# Patient Record
Sex: FEMALE | Race: WHITE | NOT HISPANIC OR LATINO | Employment: OTHER | URBAN - METROPOLITAN AREA
[De-identification: names, ages, dates, MRNs, and addresses within clinical notes are randomized per-mention and may not be internally consistent; named-entity substitution may affect disease eponyms.]

---

## 2017-04-17 ENCOUNTER — APPOINTMENT (OUTPATIENT)
Dept: LAB | Facility: MEDICAL CENTER | Age: 67
End: 2017-04-17
Payer: COMMERCIAL

## 2017-04-17 ENCOUNTER — TRANSCRIBE ORDERS (OUTPATIENT)
Dept: ADMINISTRATIVE | Facility: HOSPITAL | Age: 67
End: 2017-04-17

## 2017-04-17 DIAGNOSIS — E11.9 DIABETES MELLITUS WITHOUT COMPLICATION (HCC): ICD-10-CM

## 2017-04-17 DIAGNOSIS — E78.00 PURE HYPERCHOLESTEROLEMIA: Primary | ICD-10-CM

## 2017-04-17 DIAGNOSIS — E78.00 PURE HYPERCHOLESTEROLEMIA: ICD-10-CM

## 2017-04-17 LAB
ANION GAP SERPL CALCULATED.3IONS-SCNC: 4 MMOL/L (ref 4–13)
BUN SERPL-MCNC: 11 MG/DL (ref 5–25)
CALCIUM SERPL-MCNC: 9.2 MG/DL (ref 8.3–10.1)
CHLORIDE SERPL-SCNC: 97 MMOL/L (ref 100–108)
CO2 SERPL-SCNC: 31 MMOL/L (ref 21–32)
CREAT SERPL-MCNC: 0.84 MG/DL (ref 0.6–1.3)
EST. AVERAGE GLUCOSE BLD GHB EST-MCNC: 160 MG/DL
GFR SERPL CREATININE-BSD FRML MDRD: >60 ML/MIN/1.73SQ M
GLUCOSE P FAST SERPL-MCNC: 173 MG/DL (ref 65–99)
HBA1C MFR BLD: 7.2 % (ref 4.2–6.3)
POTASSIUM SERPL-SCNC: 3.8 MMOL/L (ref 3.5–5.3)
SODIUM SERPL-SCNC: 132 MMOL/L (ref 136–145)

## 2017-04-17 PROCEDURE — 36415 COLL VENOUS BLD VENIPUNCTURE: CPT

## 2017-04-17 PROCEDURE — 80048 BASIC METABOLIC PNL TOTAL CA: CPT

## 2017-04-17 PROCEDURE — 83036 HEMOGLOBIN GLYCOSYLATED A1C: CPT

## 2017-04-20 ENCOUNTER — ALLSCRIPTS OFFICE VISIT (OUTPATIENT)
Dept: OTHER | Facility: OTHER | Age: 67
End: 2017-04-20

## 2017-04-20 DIAGNOSIS — Z12.31 ENCOUNTER FOR SCREENING MAMMOGRAM FOR MALIGNANT NEOPLASM OF BREAST: ICD-10-CM

## 2017-04-20 DIAGNOSIS — J44.9 CHRONIC OBSTRUCTIVE PULMONARY DISEASE (HCC): ICD-10-CM

## 2017-05-01 DIAGNOSIS — E78.00 PURE HYPERCHOLESTEROLEMIA: ICD-10-CM

## 2017-05-01 DIAGNOSIS — N18.1 CHRONIC KIDNEY DISEASE, STAGE I: ICD-10-CM

## 2017-05-01 DIAGNOSIS — R80.9 PROTEINURIA: ICD-10-CM

## 2017-05-01 DIAGNOSIS — I12.9 HYPERTENSIVE CHRONIC KIDNEY DISEASE WITH STAGE 1 THROUGH STAGE 4 CHRONIC KIDNEY DISEASE, OR UNSPECIFIED CHRONIC KIDNEY DISEASE: ICD-10-CM

## 2017-05-01 DIAGNOSIS — E87.1 HYPO-OSMOLALITY AND HYPONATREMIA: ICD-10-CM

## 2017-05-12 ENCOUNTER — APPOINTMENT (OUTPATIENT)
Dept: LAB | Facility: MEDICAL CENTER | Age: 67
End: 2017-05-12
Payer: COMMERCIAL

## 2017-05-12 DIAGNOSIS — N18.1 CHRONIC KIDNEY DISEASE, STAGE I: ICD-10-CM

## 2017-05-12 DIAGNOSIS — R80.9 PROTEINURIA: ICD-10-CM

## 2017-05-12 DIAGNOSIS — I12.9 HYPERTENSIVE CHRONIC KIDNEY DISEASE WITH STAGE 1 THROUGH STAGE 4 CHRONIC KIDNEY DISEASE, OR UNSPECIFIED CHRONIC KIDNEY DISEASE: ICD-10-CM

## 2017-05-12 LAB
ALBUMIN SERPL BCP-MCNC: 3.7 G/DL (ref 3.5–5)
ALP SERPL-CCNC: 84 U/L (ref 46–116)
ALT SERPL W P-5'-P-CCNC: 25 U/L (ref 12–78)
ANION GAP SERPL CALCULATED.3IONS-SCNC: 5 MMOL/L (ref 4–13)
AST SERPL W P-5'-P-CCNC: 7 U/L (ref 5–45)
BASOPHILS # BLD AUTO: 0.03 THOUSANDS/ΜL (ref 0–0.1)
BASOPHILS NFR BLD AUTO: 0 % (ref 0–1)
BILIRUB SERPL-MCNC: 0.43 MG/DL (ref 0.2–1)
BUN SERPL-MCNC: 18 MG/DL (ref 5–25)
CALCIUM SERPL-MCNC: 9.3 MG/DL (ref 8.3–10.1)
CHLORIDE SERPL-SCNC: 97 MMOL/L (ref 100–108)
CK SERPL-CCNC: 73 U/L (ref 26–192)
CO2 SERPL-SCNC: 31 MMOL/L (ref 21–32)
CREAT SERPL-MCNC: 0.77 MG/DL (ref 0.6–1.3)
CREAT UR-MCNC: 56.2 MG/DL
EOSINOPHIL # BLD AUTO: 0.27 THOUSAND/ΜL (ref 0–0.61)
EOSINOPHIL NFR BLD AUTO: 3 % (ref 0–6)
ERYTHROCYTE [DISTWIDTH] IN BLOOD BY AUTOMATED COUNT: 13.6 % (ref 11.6–15.1)
GFR SERPL CREATININE-BSD FRML MDRD: >60 ML/MIN/1.73SQ M
GLUCOSE P FAST SERPL-MCNC: 172 MG/DL (ref 65–99)
HCT VFR BLD AUTO: 46.8 % (ref 34.8–46.1)
HGB BLD-MCNC: 16 G/DL (ref 11.5–15.4)
LYMPHOCYTES # BLD AUTO: 2.31 THOUSANDS/ΜL (ref 0.6–4.47)
LYMPHOCYTES NFR BLD AUTO: 28 % (ref 14–44)
MAGNESIUM SERPL-MCNC: 2 MG/DL (ref 1.6–2.6)
MCH RBC QN AUTO: 31.2 PG (ref 26.8–34.3)
MCHC RBC AUTO-ENTMCNC: 34.2 G/DL (ref 31.4–37.4)
MCV RBC AUTO: 91 FL (ref 82–98)
MONOCYTES # BLD AUTO: 0.57 THOUSAND/ΜL (ref 0.17–1.22)
MONOCYTES NFR BLD AUTO: 7 % (ref 4–12)
NEUTROPHILS # BLD AUTO: 4.99 THOUSANDS/ΜL (ref 1.85–7.62)
NEUTS SEG NFR BLD AUTO: 62 % (ref 43–75)
NRBC BLD AUTO-RTO: 0 /100 WBCS
PHOSPHATE SERPL-MCNC: 3.5 MG/DL (ref 2.3–4.1)
PLATELET # BLD AUTO: 196 THOUSANDS/UL (ref 149–390)
PMV BLD AUTO: 11.5 FL (ref 8.9–12.7)
POTASSIUM SERPL-SCNC: 3.8 MMOL/L (ref 3.5–5.3)
PROT SERPL-MCNC: 7.2 G/DL (ref 6.4–8.2)
PROT UR-MCNC: 22 MG/DL
PROT/CREAT UR: 0.39 MG/G{CREAT} (ref 0–0.1)
RBC # BLD AUTO: 5.13 MILLION/UL (ref 3.81–5.12)
SODIUM SERPL-SCNC: 133 MMOL/L (ref 136–145)
WBC # BLD AUTO: 8.19 THOUSAND/UL (ref 4.31–10.16)

## 2017-05-12 PROCEDURE — 82570 ASSAY OF URINE CREATININE: CPT

## 2017-05-12 PROCEDURE — 36415 COLL VENOUS BLD VENIPUNCTURE: CPT

## 2017-05-12 PROCEDURE — 84156 ASSAY OF PROTEIN URINE: CPT

## 2017-05-12 PROCEDURE — 84100 ASSAY OF PHOSPHORUS: CPT

## 2017-05-12 PROCEDURE — 83735 ASSAY OF MAGNESIUM: CPT

## 2017-05-12 PROCEDURE — 80053 COMPREHEN METABOLIC PANEL: CPT

## 2017-05-12 PROCEDURE — 82550 ASSAY OF CK (CPK): CPT

## 2017-05-12 PROCEDURE — 85025 COMPLETE CBC W/AUTO DIFF WBC: CPT

## 2017-05-22 ENCOUNTER — ALLSCRIPTS OFFICE VISIT (OUTPATIENT)
Dept: OTHER | Facility: OTHER | Age: 67
End: 2017-05-22

## 2017-05-30 ENCOUNTER — APPOINTMENT (OUTPATIENT)
Dept: LAB | Facility: MEDICAL CENTER | Age: 67
End: 2017-05-30
Payer: COMMERCIAL

## 2017-05-30 DIAGNOSIS — E78.00 PURE HYPERCHOLESTEROLEMIA: ICD-10-CM

## 2017-05-30 DIAGNOSIS — R80.9 PROTEINURIA: ICD-10-CM

## 2017-05-30 DIAGNOSIS — N18.1 CHRONIC KIDNEY DISEASE, STAGE I: ICD-10-CM

## 2017-05-30 DIAGNOSIS — E87.1 HYPO-OSMOLALITY AND HYPONATREMIA: ICD-10-CM

## 2017-05-30 DIAGNOSIS — I12.9 HYPERTENSIVE CHRONIC KIDNEY DISEASE WITH STAGE 1 THROUGH STAGE 4 CHRONIC KIDNEY DISEASE, OR UNSPECIFIED CHRONIC KIDNEY DISEASE: ICD-10-CM

## 2017-05-30 LAB
ANION GAP SERPL CALCULATED.3IONS-SCNC: 5 MMOL/L (ref 4–13)
BUN SERPL-MCNC: 14 MG/DL (ref 5–25)
CALCIUM SERPL-MCNC: 8.9 MG/DL (ref 8.3–10.1)
CHLORIDE SERPL-SCNC: 104 MMOL/L (ref 100–108)
CO2 SERPL-SCNC: 30 MMOL/L (ref 21–32)
CREAT SERPL-MCNC: 0.66 MG/DL (ref 0.6–1.3)
GFR SERPL CREATININE-BSD FRML MDRD: >60 ML/MIN/1.73SQ M
GLUCOSE P FAST SERPL-MCNC: 157 MG/DL (ref 65–99)
POTASSIUM SERPL-SCNC: 4.1 MMOL/L (ref 3.5–5.3)
SODIUM SERPL-SCNC: 139 MMOL/L (ref 136–145)

## 2017-05-30 PROCEDURE — 80048 BASIC METABOLIC PNL TOTAL CA: CPT

## 2017-05-30 PROCEDURE — 36415 COLL VENOUS BLD VENIPUNCTURE: CPT

## 2017-06-09 ENCOUNTER — GENERIC CONVERSION - ENCOUNTER (OUTPATIENT)
Dept: OTHER | Facility: OTHER | Age: 67
End: 2017-06-09

## 2017-07-17 DIAGNOSIS — E11.621 TYPE 2 DIABETES MELLITUS WITH FOOT ULCER (CODE) (HCC): ICD-10-CM

## 2017-07-17 DIAGNOSIS — E11.9 TYPE 2 DIABETES MELLITUS WITHOUT COMPLICATIONS (HCC): ICD-10-CM

## 2017-07-17 DIAGNOSIS — E78.00 PURE HYPERCHOLESTEROLEMIA: ICD-10-CM

## 2017-07-17 DIAGNOSIS — J44.9 CHRONIC OBSTRUCTIVE PULMONARY DISEASE (HCC): ICD-10-CM

## 2017-07-17 DIAGNOSIS — F32.9 MAJOR DEPRESSIVE DISORDER, SINGLE EPISODE: ICD-10-CM

## 2017-08-04 ENCOUNTER — HOSPITAL ENCOUNTER (OUTPATIENT)
Dept: RADIOLOGY | Facility: MEDICAL CENTER | Age: 67
Discharge: HOME | End: 2017-08-04
Payer: COMMERCIAL

## 2017-08-04 DIAGNOSIS — Z12.31 ENCOUNTER FOR SCREENING MAMMOGRAM FOR MALIGNANT NEOPLASM OF BREAST: ICD-10-CM

## 2017-08-04 PROCEDURE — G0202 SCR MAMMO BI INCL CAD: HCPCS

## 2017-08-20 ENCOUNTER — APPOINTMENT (EMERGENCY)
Dept: CT IMAGING | Facility: HOSPITAL | Age: 67
DRG: 189 | End: 2017-08-20
Payer: COMMERCIAL

## 2017-08-20 ENCOUNTER — HOSPITAL ENCOUNTER (INPATIENT)
Facility: HOSPITAL | Age: 67
LOS: 4 days | Discharge: NURSING, REHAB OR LONG-TERM CARE | DRG: 189 | End: 2017-08-24
Attending: EMERGENCY MEDICINE | Admitting: INTERNAL MEDICINE
Payer: COMMERCIAL

## 2017-08-20 DIAGNOSIS — L97.519 ULCER OF RIGHT FOOT (HCC): ICD-10-CM

## 2017-08-20 DIAGNOSIS — J44.9 COPD WITHOUT EXACERBATION (HCC): Chronic | ICD-10-CM

## 2017-08-20 DIAGNOSIS — J96.01 ACUTE RESPIRATORY FAILURE WITH HYPOXIA (HCC): ICD-10-CM

## 2017-08-20 DIAGNOSIS — J44.1 COPD WITH EXACERBATION (HCC): ICD-10-CM

## 2017-08-20 DIAGNOSIS — R41.0 DELIRIUM: Primary | ICD-10-CM

## 2017-08-20 DIAGNOSIS — R41.82 MENTAL STATUS CHANGE: ICD-10-CM

## 2017-08-20 PROBLEM — E87.6 HYPOKALEMIA: Status: ACTIVE | Noted: 2017-08-20

## 2017-08-20 PROBLEM — E11.65 TYPE 2 DIABETES MELLITUS WITH HYPERGLYCEMIA (HCC): Chronic | Status: ACTIVE | Noted: 2017-08-20

## 2017-08-20 PROBLEM — D72.829 LEUCOCYTOSIS: Status: ACTIVE | Noted: 2017-08-20

## 2017-08-20 PROBLEM — I10 ESSENTIAL HYPERTENSION: Chronic | Status: ACTIVE | Noted: 2017-08-20

## 2017-08-20 LAB
ALBUMIN SERPL BCP-MCNC: 3.7 G/DL (ref 3.5–5)
ALP SERPL-CCNC: 88 U/L (ref 46–116)
ALT SERPL W P-5'-P-CCNC: 25 U/L (ref 12–78)
AMMONIA PLAS-SCNC: <10 UMOL/L (ref 11–35)
ANION GAP SERPL CALCULATED.3IONS-SCNC: 9 MMOL/L (ref 4–13)
ARTERIAL PATENCY WRIST A: YES
AST SERPL W P-5'-P-CCNC: 13 U/L (ref 5–45)
BACTERIA UR QL AUTO: ABNORMAL /HPF
BASE EXCESS BLDA CALC-SCNC: -0.4 MMOL/L
BASOPHILS # BLD AUTO: 0.04 THOUSANDS/ΜL (ref 0–0.1)
BASOPHILS NFR BLD AUTO: 0 % (ref 0–1)
BILIRUB SERPL-MCNC: 0.7 MG/DL (ref 0.2–1)
BILIRUB UR QL STRIP: ABNORMAL
BUN SERPL-MCNC: 14 MG/DL (ref 5–25)
CALCIUM SERPL-MCNC: 9.3 MG/DL (ref 8.3–10.1)
CHLORIDE SERPL-SCNC: 98 MMOL/L (ref 100–108)
CLARITY UR: CLEAR
CO2 SERPL-SCNC: 29 MMOL/L (ref 21–32)
COLOR UR: YELLOW
CREAT SERPL-MCNC: 0.67 MG/DL (ref 0.6–1.3)
EOSINOPHIL # BLD AUTO: 0.09 THOUSAND/ΜL (ref 0–0.61)
EOSINOPHIL NFR BLD AUTO: 1 % (ref 0–6)
ERYTHROCYTE [DISTWIDTH] IN BLOOD BY AUTOMATED COUNT: 13.8 % (ref 11.6–15.1)
EST. AVERAGE GLUCOSE BLD GHB EST-MCNC: 143 MG/DL
GFR SERPL CREATININE-BSD FRML MDRD: 92 ML/MIN/1.73SQ M
GLUCOSE SERPL-MCNC: 150 MG/DL (ref 65–140)
GLUCOSE SERPL-MCNC: 171 MG/DL (ref 65–140)
GLUCOSE UR STRIP-MCNC: NEGATIVE MG/DL
HBA1C MFR BLD: 6.6 % (ref 4.2–6.3)
HCO3 BLDA-SCNC: 23.5 MMOL/L (ref 22–28)
HCT VFR BLD AUTO: 46.1 % (ref 34.8–46.1)
HGB BLD-MCNC: 16 G/DL (ref 11.5–15.4)
HGB UR QL STRIP.AUTO: ABNORMAL
KETONES UR STRIP-MCNC: ABNORMAL MG/DL
LEUKOCYTE ESTERASE UR QL STRIP: NEGATIVE
LYMPHOCYTES # BLD AUTO: 1.63 THOUSANDS/ΜL (ref 0.6–4.47)
LYMPHOCYTES NFR BLD AUTO: 12 % (ref 14–44)
MAGNESIUM SERPL-MCNC: 2.1 MG/DL (ref 1.6–2.6)
MCH RBC QN AUTO: 30.9 PG (ref 26.8–34.3)
MCHC RBC AUTO-ENTMCNC: 34.7 G/DL (ref 31.4–37.4)
MCV RBC AUTO: 89 FL (ref 82–98)
MONOCYTES # BLD AUTO: 1.01 THOUSAND/ΜL (ref 0.17–1.22)
MONOCYTES NFR BLD AUTO: 7 % (ref 4–12)
NEUTROPHILS # BLD AUTO: 11.06 THOUSANDS/ΜL (ref 1.85–7.62)
NEUTS SEG NFR BLD AUTO: 80 % (ref 43–75)
NITRITE UR QL STRIP: NEGATIVE
NON VENT ROOM AIR: 21 %
NON-SQ EPI CELLS URNS QL MICRO: ABNORMAL /HPF
O2 CT BLDA-SCNC: 19.2 ML/DL (ref 16–23)
OXYHGB MFR BLDA: 87.6 % (ref 94–97)
PCO2 BLDA: 36.5 MM HG (ref 36–44)
PH BLDA: 7.43 [PH] (ref 7.35–7.45)
PH UR STRIP.AUTO: 5.5 [PH] (ref 4.5–8)
PLATELET # BLD AUTO: 208 THOUSANDS/UL (ref 149–390)
PMV BLD AUTO: 10.9 FL (ref 8.9–12.7)
PO2 BLDA: 56.6 MM HG (ref 75–129)
POTASSIUM SERPL-SCNC: 3.4 MMOL/L (ref 3.5–5.3)
PROT SERPL-MCNC: 7.7 G/DL (ref 6.4–8.2)
PROT UR STRIP-MCNC: ABNORMAL MG/DL
RBC # BLD AUTO: 5.17 MILLION/UL (ref 3.81–5.12)
RBC #/AREA URNS AUTO: ABNORMAL /HPF
SODIUM SERPL-SCNC: 136 MMOL/L (ref 136–145)
SP GR UR STRIP.AUTO: >=1.03 (ref 1–1.03)
SPECIMEN SOURCE: ABNORMAL
SPECIMEN SOURCE: NORMAL
TROPONIN I BLD-MCNC: 0 NG/ML (ref 0–0.08)
TSH SERPL DL<=0.05 MIU/L-ACNC: 0.73 UIU/ML (ref 0.36–3.74)
UROBILINOGEN UR QL STRIP.AUTO: 1 E.U./DL
WBC # BLD AUTO: 13.83 THOUSAND/UL (ref 4.31–10.16)
WBC #/AREA URNS AUTO: ABNORMAL /HPF

## 2017-08-20 PROCEDURE — 81001 URINALYSIS AUTO W/SCOPE: CPT | Performed by: EMERGENCY MEDICINE

## 2017-08-20 PROCEDURE — 96360 HYDRATION IV INFUSION INIT: CPT

## 2017-08-20 PROCEDURE — 82805 BLOOD GASES W/O2 SATURATION: CPT | Performed by: INTERNAL MEDICINE

## 2017-08-20 PROCEDURE — 84443 ASSAY THYROID STIM HORMONE: CPT | Performed by: INTERNAL MEDICINE

## 2017-08-20 PROCEDURE — 83735 ASSAY OF MAGNESIUM: CPT | Performed by: EMERGENCY MEDICINE

## 2017-08-20 PROCEDURE — 94664 DEMO&/EVAL PT USE INHALER: CPT

## 2017-08-20 PROCEDURE — 70450 CT HEAD/BRAIN W/O DYE: CPT

## 2017-08-20 PROCEDURE — 99285 EMERGENCY DEPT VISIT HI MDM: CPT

## 2017-08-20 PROCEDURE — 36415 COLL VENOUS BLD VENIPUNCTURE: CPT | Performed by: EMERGENCY MEDICINE

## 2017-08-20 PROCEDURE — 83036 HEMOGLOBIN GLYCOSYLATED A1C: CPT | Performed by: INTERNAL MEDICINE

## 2017-08-20 PROCEDURE — 93005 ELECTROCARDIOGRAM TRACING: CPT | Performed by: EMERGENCY MEDICINE

## 2017-08-20 PROCEDURE — 84484 ASSAY OF TROPONIN QUANT: CPT

## 2017-08-20 PROCEDURE — 85025 COMPLETE CBC W/AUTO DIFF WBC: CPT | Performed by: EMERGENCY MEDICINE

## 2017-08-20 PROCEDURE — 82140 ASSAY OF AMMONIA: CPT | Performed by: EMERGENCY MEDICINE

## 2017-08-20 PROCEDURE — 96361 HYDRATE IV INFUSION ADD-ON: CPT

## 2017-08-20 PROCEDURE — 94760 N-INVAS EAR/PLS OXIMETRY 1: CPT

## 2017-08-20 PROCEDURE — 82948 REAGENT STRIP/BLOOD GLUCOSE: CPT

## 2017-08-20 PROCEDURE — 80053 COMPREHEN METABOLIC PANEL: CPT | Performed by: EMERGENCY MEDICINE

## 2017-08-20 PROCEDURE — 94640 AIRWAY INHALATION TREATMENT: CPT

## 2017-08-20 PROCEDURE — 36600 WITHDRAWAL OF ARTERIAL BLOOD: CPT

## 2017-08-20 RX ORDER — FOSINOPRIL SODIUM 20 MG/1
20 TABLET ORAL DAILY
Status: DISCONTINUED | OUTPATIENT
Start: 2017-08-21 | End: 2017-08-24 | Stop reason: HOSPADM

## 2017-08-20 RX ORDER — CELECOXIB 200 MG/1
CAPSULE ORAL
COMMUNITY
Start: 2011-10-31 | End: 2017-08-24 | Stop reason: HOSPADM

## 2017-08-20 RX ORDER — HYDROCHLOROTHIAZIDE 25 MG/1
TABLET ORAL
Status: ON HOLD | COMMUNITY
Start: 2012-02-01 | End: 2017-08-24

## 2017-08-20 RX ORDER — HYDROXYZINE HYDROCHLORIDE 25 MG/1
TABLET, FILM COATED ORAL
COMMUNITY
Start: 2016-11-09 | End: 2017-08-24 | Stop reason: HOSPADM

## 2017-08-20 RX ORDER — POTASSIUM CHLORIDE 20 MEQ/1
40 TABLET, EXTENDED RELEASE ORAL ONCE
Status: COMPLETED | OUTPATIENT
Start: 2017-08-20 | End: 2017-08-20

## 2017-08-20 RX ORDER — PRAVASTATIN SODIUM 80 MG/1
80 TABLET ORAL
Status: DISCONTINUED | OUTPATIENT
Start: 2017-08-21 | End: 2017-08-24 | Stop reason: HOSPADM

## 2017-08-20 RX ORDER — NICOTINE 21 MG/24HR
1 PATCH, TRANSDERMAL 24 HOURS TRANSDERMAL DAILY
Status: DISCONTINUED | OUTPATIENT
Start: 2017-08-21 | End: 2017-08-24 | Stop reason: HOSPADM

## 2017-08-20 RX ORDER — BUDESONIDE AND FORMOTEROL FUMARATE DIHYDRATE 160; 4.5 UG/1; UG/1
AEROSOL RESPIRATORY (INHALATION)
Status: ON HOLD | COMMUNITY
Start: 2011-08-29 | End: 2017-08-24

## 2017-08-20 RX ORDER — GLIMEPIRIDE 2 MG/1
TABLET ORAL
Status: ON HOLD | COMMUNITY
Start: 2015-09-22 | End: 2017-08-24

## 2017-08-20 RX ORDER — ASPIRIN 81 MG/1
81 TABLET, CHEWABLE ORAL DAILY
Status: DISCONTINUED | OUTPATIENT
Start: 2017-08-21 | End: 2017-08-24 | Stop reason: HOSPADM

## 2017-08-20 RX ORDER — BUDESONIDE AND FORMOTEROL FUMARATE DIHYDRATE 160; 4.5 UG/1; UG/1
2 AEROSOL RESPIRATORY (INHALATION)
Status: DISCONTINUED | OUTPATIENT
Start: 2017-08-20 | End: 2017-08-24 | Stop reason: HOSPADM

## 2017-08-20 RX ORDER — FOSINOPRIL SODIUM 20 MG/1
TABLET ORAL
Status: ON HOLD | COMMUNITY
Start: 2016-12-12 | End: 2017-08-24

## 2017-08-20 RX ORDER — METHYLPREDNISOLONE SODIUM SUCCINATE 40 MG/ML
40 INJECTION, POWDER, LYOPHILIZED, FOR SOLUTION INTRAMUSCULAR; INTRAVENOUS EVERY 12 HOURS SCHEDULED
Status: COMPLETED | OUTPATIENT
Start: 2017-08-20 | End: 2017-08-21

## 2017-08-20 RX ORDER — SODIUM CHLORIDE 9 MG/ML
100 INJECTION, SOLUTION INTRAVENOUS CONTINUOUS
Status: DISCONTINUED | OUTPATIENT
Start: 2017-08-20 | End: 2017-08-21

## 2017-08-20 RX ORDER — ACETAMINOPHEN 325 MG/1
650 TABLET ORAL EVERY 6 HOURS PRN
Status: DISCONTINUED | OUTPATIENT
Start: 2017-08-20 | End: 2017-08-24 | Stop reason: HOSPADM

## 2017-08-20 RX ORDER — BUTALBITAL, ACETAMINOPHEN AND CAFFEINE 50; 325; 40 MG/1; MG/1; MG/1
TABLET ORAL
COMMUNITY
Start: 2015-07-27 | End: 2017-08-24 | Stop reason: HOSPADM

## 2017-08-20 RX ORDER — LEVALBUTEROL 1.25 MG/.5ML
1.25 SOLUTION, CONCENTRATE RESPIRATORY (INHALATION)
Status: DISCONTINUED | OUTPATIENT
Start: 2017-08-20 | End: 2017-08-21

## 2017-08-20 RX ORDER — SIMVASTATIN 40 MG
TABLET ORAL
Status: ON HOLD | COMMUNITY
Start: 2017-02-09 | End: 2017-08-24

## 2017-08-20 RX ADMIN — SODIUM CHLORIDE 1000 ML: 0.9 INJECTION, SOLUTION INTRAVENOUS at 14:21

## 2017-08-20 RX ADMIN — IPRATROPIUM BROMIDE 0.5 MG: 0.5 SOLUTION RESPIRATORY (INHALATION) at 20:58

## 2017-08-20 RX ADMIN — INSULIN LISPRO 1 UNITS: 100 INJECTION, SOLUTION INTRAVENOUS; SUBCUTANEOUS at 22:59

## 2017-08-20 RX ADMIN — METHYLPREDNISOLONE SODIUM SUCCINATE 40 MG: 40 INJECTION, POWDER, FOR SOLUTION INTRAMUSCULAR; INTRAVENOUS at 22:51

## 2017-08-20 RX ADMIN — BUDESONIDE AND FORMOTEROL FUMARATE DIHYDRATE 2 PUFF: 160; 4.5 AEROSOL RESPIRATORY (INHALATION) at 22:50

## 2017-08-20 RX ADMIN — LEVALBUTEROL HYDROCHLORIDE 1.25 MG: 1.25 SOLUTION, CONCENTRATE RESPIRATORY (INHALATION) at 20:58

## 2017-08-20 RX ADMIN — SODIUM CHLORIDE 100 ML/HR: 0.9 INJECTION, SOLUTION INTRAVENOUS at 22:48

## 2017-08-20 RX ADMIN — POTASSIUM CHLORIDE 40 MEQ: 1500 TABLET, EXTENDED RELEASE ORAL at 22:51

## 2017-08-21 ENCOUNTER — APPOINTMENT (INPATIENT)
Dept: RADIOLOGY | Facility: HOSPITAL | Age: 67
DRG: 189 | End: 2017-08-21
Payer: COMMERCIAL

## 2017-08-21 LAB
ANION GAP SERPL CALCULATED.3IONS-SCNC: 11 MMOL/L (ref 4–13)
BUN SERPL-MCNC: 12 MG/DL (ref 5–25)
CALCIUM SERPL-MCNC: 8.5 MG/DL (ref 8.3–10.1)
CHLORIDE SERPL-SCNC: 102 MMOL/L (ref 100–108)
CO2 SERPL-SCNC: 23 MMOL/L (ref 21–32)
CREAT SERPL-MCNC: 0.56 MG/DL (ref 0.6–1.3)
ERYTHROCYTE [DISTWIDTH] IN BLOOD BY AUTOMATED COUNT: 13.7 % (ref 11.6–15.1)
GFR SERPL CREATININE-BSD FRML MDRD: 97 ML/MIN/1.73SQ M
GLUCOSE SERPL-MCNC: 162 MG/DL (ref 65–140)
GLUCOSE SERPL-MCNC: 167 MG/DL (ref 65–140)
GLUCOSE SERPL-MCNC: 186 MG/DL (ref 65–140)
GLUCOSE SERPL-MCNC: 230 MG/DL (ref 65–140)
GLUCOSE SERPL-MCNC: 237 MG/DL (ref 65–140)
HCT VFR BLD AUTO: 42.8 % (ref 34.8–46.1)
HGB BLD-MCNC: 14.7 G/DL (ref 11.5–15.4)
MCH RBC QN AUTO: 31.1 PG (ref 26.8–34.3)
MCHC RBC AUTO-ENTMCNC: 34.3 G/DL (ref 31.4–37.4)
MCV RBC AUTO: 91 FL (ref 82–98)
PLATELET # BLD AUTO: 197 THOUSANDS/UL (ref 149–390)
PMV BLD AUTO: 11.9 FL (ref 8.9–12.7)
POTASSIUM SERPL-SCNC: 3.9 MMOL/L (ref 3.5–5.3)
RBC # BLD AUTO: 4.72 MILLION/UL (ref 3.81–5.12)
SODIUM SERPL-SCNC: 136 MMOL/L (ref 136–145)
WBC # BLD AUTO: 12.51 THOUSAND/UL (ref 4.31–10.16)

## 2017-08-21 PROCEDURE — 82948 REAGENT STRIP/BLOOD GLUCOSE: CPT

## 2017-08-21 PROCEDURE — G8987 SELF CARE CURRENT STATUS: HCPCS

## 2017-08-21 PROCEDURE — 97163 PT EVAL HIGH COMPLEX 45 MIN: CPT

## 2017-08-21 PROCEDURE — 94760 N-INVAS EAR/PLS OXIMETRY 1: CPT

## 2017-08-21 PROCEDURE — G8978 MOBILITY CURRENT STATUS: HCPCS

## 2017-08-21 PROCEDURE — 71020 HB CHEST X-RAY 2VW FRONTAL&LATL: CPT

## 2017-08-21 PROCEDURE — G8979 MOBILITY GOAL STATUS: HCPCS

## 2017-08-21 PROCEDURE — 94640 AIRWAY INHALATION TREATMENT: CPT

## 2017-08-21 PROCEDURE — 80048 BASIC METABOLIC PNL TOTAL CA: CPT | Performed by: INTERNAL MEDICINE

## 2017-08-21 PROCEDURE — G8988 SELF CARE GOAL STATUS: HCPCS

## 2017-08-21 PROCEDURE — 85027 COMPLETE CBC AUTOMATED: CPT | Performed by: INTERNAL MEDICINE

## 2017-08-21 PROCEDURE — 97167 OT EVAL HIGH COMPLEX 60 MIN: CPT

## 2017-08-21 RX ORDER — INSULIN GLARGINE 100 [IU]/ML
5 INJECTION, SOLUTION SUBCUTANEOUS
Status: DISCONTINUED | OUTPATIENT
Start: 2017-08-21 | End: 2017-08-23

## 2017-08-21 RX ORDER — LEVALBUTEROL 1.25 MG/.5ML
1.25 SOLUTION, CONCENTRATE RESPIRATORY (INHALATION) EVERY 6 HOURS PRN
Status: DISCONTINUED | OUTPATIENT
Start: 2017-08-21 | End: 2017-08-24 | Stop reason: HOSPADM

## 2017-08-21 RX ORDER — SODIUM CHLORIDE FOR INHALATION 0.9 %
3 VIAL, NEBULIZER (ML) INHALATION EVERY 6 HOURS PRN
Status: DISCONTINUED | OUTPATIENT
Start: 2017-08-21 | End: 2017-08-24 | Stop reason: HOSPADM

## 2017-08-21 RX ORDER — HYDROCHLOROTHIAZIDE 25 MG/1
25 TABLET ORAL DAILY
Status: DISCONTINUED | OUTPATIENT
Start: 2017-08-21 | End: 2017-08-24 | Stop reason: HOSPADM

## 2017-08-21 RX ORDER — PREDNISONE 20 MG/1
40 TABLET ORAL DAILY
Status: DISCONTINUED | OUTPATIENT
Start: 2017-08-22 | End: 2017-08-21

## 2017-08-21 RX ADMIN — INSULIN LISPRO 2 UNITS: 100 INJECTION, SOLUTION INTRAVENOUS; SUBCUTANEOUS at 17:44

## 2017-08-21 RX ADMIN — IPRATROPIUM BROMIDE 0.5 MG: 0.5 SOLUTION RESPIRATORY (INHALATION) at 07:33

## 2017-08-21 RX ADMIN — PRAVASTATIN SODIUM 80 MG: 80 TABLET ORAL at 15:48

## 2017-08-21 RX ADMIN — INSULIN LISPRO 1 UNITS: 100 INJECTION, SOLUTION INTRAVENOUS; SUBCUTANEOUS at 22:53

## 2017-08-21 RX ADMIN — INSULIN LISPRO 1 UNITS: 100 INJECTION, SOLUTION INTRAVENOUS; SUBCUTANEOUS at 08:35

## 2017-08-21 RX ADMIN — FOSINOPRIL SODIUM 20 MG: 20 TABLET ORAL at 08:34

## 2017-08-21 RX ADMIN — TIOTROPIUM BROMIDE 18 MCG: 18 CAPSULE ORAL; RESPIRATORY (INHALATION) at 14:21

## 2017-08-21 RX ADMIN — METHYLPREDNISOLONE SODIUM SUCCINATE 40 MG: 40 INJECTION, POWDER, FOR SOLUTION INTRAMUSCULAR; INTRAVENOUS at 22:52

## 2017-08-21 RX ADMIN — LEVALBUTEROL HYDROCHLORIDE 1.25 MG: 1.25 SOLUTION, CONCENTRATE RESPIRATORY (INHALATION) at 07:33

## 2017-08-21 RX ADMIN — ENOXAPARIN SODIUM 40 MG: 40 INJECTION SUBCUTANEOUS at 08:34

## 2017-08-21 RX ADMIN — BUDESONIDE AND FORMOTEROL FUMARATE DIHYDRATE 2 PUFF: 160; 4.5 AEROSOL RESPIRATORY (INHALATION) at 08:34

## 2017-08-21 RX ADMIN — INSULIN GLARGINE 5 UNITS: 100 INJECTION, SOLUTION SUBCUTANEOUS at 22:52

## 2017-08-21 RX ADMIN — BUDESONIDE AND FORMOTEROL FUMARATE DIHYDRATE 2 PUFF: 160; 4.5 AEROSOL RESPIRATORY (INHALATION) at 22:54

## 2017-08-21 RX ADMIN — ASPIRIN 81 MG 81 MG: 81 TABLET ORAL at 08:34

## 2017-08-21 RX ADMIN — METHYLPREDNISOLONE SODIUM SUCCINATE 40 MG: 40 INJECTION, POWDER, FOR SOLUTION INTRAMUSCULAR; INTRAVENOUS at 08:34

## 2017-08-21 RX ADMIN — INSULIN LISPRO 2 UNITS: 100 INJECTION, SOLUTION INTRAVENOUS; SUBCUTANEOUS at 12:35

## 2017-08-21 RX ADMIN — HYDROCHLOROTHIAZIDE 25 MG: 25 TABLET ORAL at 15:15

## 2017-08-22 ENCOUNTER — APPOINTMENT (INPATIENT)
Dept: RADIOLOGY | Facility: HOSPITAL | Age: 67
DRG: 189 | End: 2017-08-22
Payer: COMMERCIAL

## 2017-08-22 LAB
BASOPHILS # BLD MANUAL: 0 THOUSAND/UL (ref 0–0.1)
BASOPHILS NFR MAR MANUAL: 0 % (ref 0–1)
EOSINOPHIL # BLD MANUAL: 0 THOUSAND/UL (ref 0–0.4)
EOSINOPHIL NFR BLD MANUAL: 0 % (ref 0–6)
ERYTHROCYTE [DISTWIDTH] IN BLOOD BY AUTOMATED COUNT: 13.6 % (ref 11.6–15.1)
GLUCOSE SERPL-MCNC: 136 MG/DL (ref 65–140)
GLUCOSE SERPL-MCNC: 149 MG/DL (ref 65–140)
GLUCOSE SERPL-MCNC: 175 MG/DL (ref 65–140)
GLUCOSE SERPL-MCNC: 203 MG/DL (ref 65–140)
GLUCOSE SERPL-MCNC: 301 MG/DL (ref 65–140)
HCT VFR BLD AUTO: 43.6 % (ref 34.8–46.1)
HGB BLD-MCNC: 15.1 G/DL (ref 11.5–15.4)
LYMPHOCYTES # BLD AUTO: 0.72 THOUSAND/UL (ref 0.6–4.47)
LYMPHOCYTES # BLD AUTO: 6 % (ref 14–44)
MCH RBC QN AUTO: 31.1 PG (ref 26.8–34.3)
MCHC RBC AUTO-ENTMCNC: 34.6 G/DL (ref 31.4–37.4)
MCV RBC AUTO: 90 FL (ref 82–98)
MONOCYTES # BLD AUTO: 0.6 THOUSAND/UL (ref 0–1.22)
MONOCYTES NFR BLD: 5 % (ref 4–12)
NEUTROPHILS # BLD MANUAL: 10.7 THOUSAND/UL (ref 1.85–7.62)
NEUTS SEG NFR BLD AUTO: 89 % (ref 43–75)
PLATELET # BLD AUTO: 187 THOUSANDS/UL (ref 149–390)
PLATELET BLD QL SMEAR: ADEQUATE
PMV BLD AUTO: 11.6 FL (ref 8.9–12.7)
RBC # BLD AUTO: 4.86 MILLION/UL (ref 3.81–5.12)
TOTAL CELLS COUNTED SPEC: 100
VIT B12 SERPL-MCNC: 432 PG/ML (ref 100–900)
WBC # BLD AUTO: 12.02 THOUSAND/UL (ref 4.31–10.16)

## 2017-08-22 PROCEDURE — 73630 X-RAY EXAM OF FOOT: CPT

## 2017-08-22 PROCEDURE — 85027 COMPLETE CBC AUTOMATED: CPT | Performed by: INTERNAL MEDICINE

## 2017-08-22 PROCEDURE — 85007 BL SMEAR W/DIFF WBC COUNT: CPT | Performed by: INTERNAL MEDICINE

## 2017-08-22 PROCEDURE — 82948 REAGENT STRIP/BLOOD GLUCOSE: CPT

## 2017-08-22 PROCEDURE — 82607 VITAMIN B-12: CPT | Performed by: INTERNAL MEDICINE

## 2017-08-22 RX ADMIN — TIOTROPIUM BROMIDE 18 MCG: 18 CAPSULE ORAL; RESPIRATORY (INHALATION) at 08:54

## 2017-08-22 RX ADMIN — ASPIRIN 81 MG 81 MG: 81 TABLET ORAL at 08:53

## 2017-08-22 RX ADMIN — ENOXAPARIN SODIUM 40 MG: 40 INJECTION SUBCUTANEOUS at 08:53

## 2017-08-22 RX ADMIN — INSULIN LISPRO 1 UNITS: 100 INJECTION, SOLUTION INTRAVENOUS; SUBCUTANEOUS at 22:09

## 2017-08-22 RX ADMIN — FOSINOPRIL SODIUM 20 MG: 20 TABLET ORAL at 08:55

## 2017-08-22 RX ADMIN — INSULIN LISPRO 3 UNITS: 100 INJECTION, SOLUTION INTRAVENOUS; SUBCUTANEOUS at 11:52

## 2017-08-22 RX ADMIN — PRAVASTATIN SODIUM 80 MG: 80 TABLET ORAL at 18:52

## 2017-08-22 RX ADMIN — BUDESONIDE AND FORMOTEROL FUMARATE DIHYDRATE 2 PUFF: 160; 4.5 AEROSOL RESPIRATORY (INHALATION) at 22:08

## 2017-08-22 RX ADMIN — HYDROCHLOROTHIAZIDE 25 MG: 25 TABLET ORAL at 08:53

## 2017-08-22 RX ADMIN — BUDESONIDE AND FORMOTEROL FUMARATE DIHYDRATE 2 PUFF: 160; 4.5 AEROSOL RESPIRATORY (INHALATION) at 08:54

## 2017-08-22 RX ADMIN — INSULIN GLARGINE 5 UNITS: 100 INJECTION, SOLUTION SUBCUTANEOUS at 22:08

## 2017-08-23 PROBLEM — E87.6 HYPOKALEMIA: Status: RESOLVED | Noted: 2017-08-20 | Resolved: 2017-08-23

## 2017-08-23 PROBLEM — D72.829 LEUCOCYTOSIS: Status: RESOLVED | Noted: 2017-08-20 | Resolved: 2017-08-23

## 2017-08-23 LAB
ATRIAL RATE: 101 BPM
ERYTHROCYTE [DISTWIDTH] IN BLOOD BY AUTOMATED COUNT: 13.7 % (ref 11.6–15.1)
GLUCOSE SERPL-MCNC: 158 MG/DL (ref 65–140)
GLUCOSE SERPL-MCNC: 162 MG/DL (ref 65–140)
GLUCOSE SERPL-MCNC: 173 MG/DL (ref 65–140)
GLUCOSE SERPL-MCNC: 189 MG/DL (ref 65–140)
HCT VFR BLD AUTO: 44.4 % (ref 34.8–46.1)
HGB BLD-MCNC: 15.1 G/DL (ref 11.5–15.4)
MCH RBC QN AUTO: 30.6 PG (ref 26.8–34.3)
MCHC RBC AUTO-ENTMCNC: 34 G/DL (ref 31.4–37.4)
MCV RBC AUTO: 90 FL (ref 82–98)
P AXIS: 74 DEGREES
PLATELET # BLD AUTO: 178 THOUSANDS/UL (ref 149–390)
PMV BLD AUTO: 10.8 FL (ref 8.9–12.7)
PR INTERVAL: 170 MS
QRS AXIS: 64 DEGREES
QRSD INTERVAL: 80 MS
QT INTERVAL: 340 MS
QTC INTERVAL: 440 MS
RBC # BLD AUTO: 4.93 MILLION/UL (ref 3.81–5.12)
T WAVE AXIS: 51 DEGREES
VENTRICULAR RATE: 101 BPM
WBC # BLD AUTO: 9.64 THOUSAND/UL (ref 4.31–10.16)

## 2017-08-23 PROCEDURE — 85027 COMPLETE CBC AUTOMATED: CPT | Performed by: INTERNAL MEDICINE

## 2017-08-23 PROCEDURE — 97116 GAIT TRAINING THERAPY: CPT

## 2017-08-23 PROCEDURE — 94760 N-INVAS EAR/PLS OXIMETRY 1: CPT

## 2017-08-23 PROCEDURE — 97535 SELF CARE MNGMENT TRAINING: CPT

## 2017-08-23 PROCEDURE — 97530 THERAPEUTIC ACTIVITIES: CPT

## 2017-08-23 PROCEDURE — 82948 REAGENT STRIP/BLOOD GLUCOSE: CPT

## 2017-08-23 RX ORDER — INSULIN GLARGINE 100 [IU]/ML
8 INJECTION, SOLUTION SUBCUTANEOUS
Status: DISCONTINUED | OUTPATIENT
Start: 2017-08-23 | End: 2017-08-24 | Stop reason: HOSPADM

## 2017-08-23 RX ORDER — ACETAMINOPHEN 325 MG/1
TABLET ORAL
Status: COMPLETED
Start: 2017-08-23 | End: 2017-08-23

## 2017-08-23 RX ADMIN — ACETAMINOPHEN 650 MG: 325 TABLET ORAL at 00:22

## 2017-08-23 RX ADMIN — BUDESONIDE AND FORMOTEROL FUMARATE DIHYDRATE 2 PUFF: 160; 4.5 AEROSOL RESPIRATORY (INHALATION) at 20:14

## 2017-08-23 RX ADMIN — INSULIN LISPRO 1 UNITS: 100 INJECTION, SOLUTION INTRAVENOUS; SUBCUTANEOUS at 17:31

## 2017-08-23 RX ADMIN — ENOXAPARIN SODIUM 40 MG: 40 INJECTION SUBCUTANEOUS at 08:11

## 2017-08-23 RX ADMIN — PRAVASTATIN SODIUM 80 MG: 80 TABLET ORAL at 17:32

## 2017-08-23 RX ADMIN — FOSINOPRIL SODIUM 20 MG: 20 TABLET ORAL at 08:11

## 2017-08-23 RX ADMIN — INSULIN GLARGINE 8 UNITS: 100 INJECTION, SOLUTION SUBCUTANEOUS at 21:07

## 2017-08-23 RX ADMIN — BUDESONIDE AND FORMOTEROL FUMARATE DIHYDRATE 2 PUFF: 160; 4.5 AEROSOL RESPIRATORY (INHALATION) at 08:12

## 2017-08-23 RX ADMIN — TIOTROPIUM BROMIDE 18 MCG: 18 CAPSULE ORAL; RESPIRATORY (INHALATION) at 08:12

## 2017-08-23 RX ADMIN — HYDROCHLOROTHIAZIDE 25 MG: 25 TABLET ORAL at 08:11

## 2017-08-23 RX ADMIN — ASPIRIN 81 MG 81 MG: 81 TABLET ORAL at 08:11

## 2017-08-23 RX ADMIN — INSULIN LISPRO 1 UNITS: 100 INJECTION, SOLUTION INTRAVENOUS; SUBCUTANEOUS at 21:07

## 2017-08-24 VITALS
RESPIRATION RATE: 18 BRPM | TEMPERATURE: 98.1 F | SYSTOLIC BLOOD PRESSURE: 130 MMHG | HEART RATE: 80 BPM | WEIGHT: 152 LBS | HEIGHT: 62 IN | OXYGEN SATURATION: 92 % | DIASTOLIC BLOOD PRESSURE: 69 MMHG | BODY MASS INDEX: 27.97 KG/M2

## 2017-08-24 LAB
GLUCOSE SERPL-MCNC: 149 MG/DL (ref 65–140)
GLUCOSE SERPL-MCNC: 257 MG/DL (ref 65–140)

## 2017-08-24 PROCEDURE — 82948 REAGENT STRIP/BLOOD GLUCOSE: CPT

## 2017-08-24 RX ORDER — NICOTINE 21 MG/24HR
1 PATCH, TRANSDERMAL 24 HOURS TRANSDERMAL DAILY
Qty: 28 PATCH | Refills: 0
Start: 2017-08-24 | End: 2017-10-05

## 2017-08-24 RX ORDER — HYDROCHLOROTHIAZIDE 25 MG/1
25 TABLET ORAL DAILY
Refills: 0
Start: 2017-08-24 | End: 2018-05-09

## 2017-08-24 RX ORDER — GLIMEPIRIDE 2 MG/1
2 TABLET ORAL
Refills: 0
Start: 2017-08-24 | End: 2020-11-02

## 2017-08-24 RX ORDER — NICOTINE 21 MG/24HR
1 PATCH, TRANSDERMAL 24 HOURS TRANSDERMAL EVERY 24 HOURS
Qty: 28 PATCH | Refills: 0
Start: 2017-10-06 | End: 2017-10-20

## 2017-08-24 RX ORDER — FOSINOPRIL SODIUM 20 MG/1
20 TABLET ORAL DAILY
Refills: 0
Start: 2017-08-24 | End: 2018-05-09

## 2017-08-24 RX ORDER — BUDESONIDE AND FORMOTEROL FUMARATE DIHYDRATE 160; 4.5 UG/1; UG/1
2 AEROSOL RESPIRATORY (INHALATION) 2 TIMES DAILY
Qty: 1 INHALER | Refills: 0
Start: 2017-08-24 | End: 2018-10-15 | Stop reason: SDUPTHER

## 2017-08-24 RX ORDER — SIMVASTATIN 40 MG
40 TABLET ORAL
Refills: 0
Start: 2017-08-24

## 2017-08-24 RX ADMIN — TIOTROPIUM BROMIDE 18 MCG: 18 CAPSULE ORAL; RESPIRATORY (INHALATION) at 08:03

## 2017-08-24 RX ADMIN — FOSINOPRIL SODIUM 20 MG: 20 TABLET ORAL at 08:02

## 2017-08-24 RX ADMIN — BUDESONIDE AND FORMOTEROL FUMARATE DIHYDRATE 2 PUFF: 160; 4.5 AEROSOL RESPIRATORY (INHALATION) at 08:03

## 2017-08-24 RX ADMIN — HYDROCHLOROTHIAZIDE 25 MG: 25 TABLET ORAL at 08:02

## 2017-08-24 RX ADMIN — ENOXAPARIN SODIUM 40 MG: 40 INJECTION SUBCUTANEOUS at 08:02

## 2017-08-24 RX ADMIN — ASPIRIN 81 MG 81 MG: 81 TABLET ORAL at 08:02

## 2017-08-25 ENCOUNTER — GENERIC CONVERSION - ENCOUNTER (OUTPATIENT)
Dept: OTHER | Facility: OTHER | Age: 67
End: 2017-08-25

## 2017-10-03 ENCOUNTER — HOSPITAL ENCOUNTER (EMERGENCY)
Facility: HOSPITAL | Age: 67
Discharge: HOME | End: 2017-10-03
Attending: EMERGENCY MEDICINE
Payer: MEDICARE

## 2017-10-03 VITALS
RESPIRATION RATE: 19 BRPM | OXYGEN SATURATION: 95 % | WEIGHT: 139.11 LBS | TEMPERATURE: 97.9 F | HEART RATE: 89 BPM | BODY MASS INDEX: 25.44 KG/M2 | SYSTOLIC BLOOD PRESSURE: 100 MMHG | DIASTOLIC BLOOD PRESSURE: 52 MMHG

## 2017-10-03 DIAGNOSIS — R46.89 WANDERING BEHAVIOR: Primary | ICD-10-CM

## 2017-10-03 LAB
GLUCOSE SERPL-MCNC: 196 MG/DL (ref 65–140)
GLUCOSE SERPL-MCNC: 302 MG/DL (ref 65–140)

## 2017-10-03 PROCEDURE — 99285 EMERGENCY DEPT VISIT HI MDM: CPT

## 2017-10-03 PROCEDURE — 82948 REAGENT STRIP/BLOOD GLUCOSE: CPT

## 2017-10-03 RX ORDER — GLIMEPIRIDE 1 MG/1
1 TABLET ORAL ONCE
Status: COMPLETED | OUTPATIENT
Start: 2017-10-03 | End: 2017-10-03

## 2017-10-03 RX ADMIN — GLIMEPIRIDE 1 MG: 1 TABLET ORAL at 14:25

## 2017-10-03 NOTE — ED NOTES
Pt ambulated to restroom with single-point cane with negative complications  Steady gait noted  Pt requested to sit at sink counter in room to eat "my meal"  Pt passed dysphagia screening with negative complications  Pt stated "I don't want to be hooked up to that monitor anymore"  NSR, VS  MD updated       Tyrone Moreland RN  10/03/17 6625

## 2017-10-03 NOTE — ED PROVIDER NOTES
History  Chief Complaint   Patient presents with    Altered Mental Status     Pt brought to ER via EMS with c/o increased confusion after family found pt walking on 36 today  Pt seen for similar symptoms 2 weeks ago  Pt denies any complaints at present time  79 yr female with hx of simialr behavior in past with intermitetn confusion/ wandering-- wasadmitted for same  [facility]- family member s took her in until she gets senoir aportment- today found walkign on [place] [address] told ems she was going to get breakfast-- no injury or any recent illness/new sympotms/ meds--         History provided by:  Patient and relative   used: No        Prior to Admission Medications   Prescriptions Last Dose Informant Patient Reported?  Taking?   aspirin 81 MG tablet   Yes No   Sig: Take by mouth   budesonide-formoterol (SYMBICORT) 160-4 5 mcg/act inhaler   No No   Sig: Inhale 2 puffs 2 (two) times a day   fosinopril (MONOPRIL) 20 mg tablet   No No   Sig: Take 1 tablet by mouth daily   glimepiride (AMARYL) 2 mg tablet   No No   Sig: Take 1 tablet by mouth daily with breakfast   hydrochlorothiazide (HYDRODIURIL) 25 mg tablet   No No   Sig: Take 1 tablet by mouth daily   nicotine (NICODERM CQ) 14 mg/24hr TD 24 hr patch   No No   Sig: Place 1 patch on the skin every 24 hours for 14 days   nicotine (NICODERM CQ) 21 mg/24 hr TD 24 hr patch   No No   Sig: Place 1 patch on the skin daily for 42 days   nicotine (NICODERM CQ) 7 mg/24hr TD 24 hr patch   No No   Sig: Place 1 patch on the skin every 24 hours for 14 days   simvastatin (ZOCOR) 40 mg tablet   No No   Sig: Take 1 tablet by mouth daily at bedtime   tiotropium (SPIRIVA) 18 mcg inhalation capsule   No No   Sig: Place 1 capsule into inhaler and inhale daily      Facility-Administered Medications: None       Past Medical History:   Diagnosis Date    Asthma     COPD (chronic obstructive pulmonary disease)     Diabetes mellitus     Hyperlipidemia     Hypertension        Past Surgical History:   Procedure Laterality Date    KNEE SURGERY      TOE SURGERY         Family History   Problem Relation Age of Onset    Dementia Brother      I have reviewed and agree with the history as documented  Social History   Substance Use Topics    Smoking status: Former Smoker     Packs/day: 1 00     Years: 52 00     Types: Cigarettes     Start date: 1965    Smokeless tobacco: Never Used    Alcohol use No        Review of Systems   Constitutional: Negative  HENT: Negative  Eyes: Negative  Respiratory: Negative  Cardiovascular: Negative  Gastrointestinal: Negative  Endocrine: Negative  Genitourinary: Negative  Musculoskeletal: Negative  Skin: Negative  Allergic/Immunologic: Negative  Neurological: Negative  Hematological: Negative  Psychiatric/Behavioral: Negative  Physical Exam  ED Triage Vitals [10/03/17 1142]   Temperature Pulse Respirations Blood Pressure SpO2   97 9 °F (36 6 °C) (!) 107 18 135/78 94 %      Temp Source Heart Rate Source Patient Position - Orthostatic VS BP Location FiO2 (%)   Oral Monitor Lying Right arm --      Pain Score       No Pain           Physical Exam   Constitutional: She is oriented to person, place, and time  No distress  avss-- pulse ox 95 % on r- intepretation is normal- no intervention    HENT:   Head: Normocephalic and atraumatic  Eyes: Conjunctivae and EOM are normal  Pupils are equal, round, and reactive to light  Right eye exhibits no discharge  Left eye exhibits no discharge  No scleral icterus  Mm pink   Neck: Normal range of motion  Neck supple  No JVD present  No tracheal deviation present  No thyromegaly present  Cardiovascular: Normal rate, regular rhythm, normal heart sounds and intact distal pulses  Exam reveals no gallop and no friction rub  No murmur heard  Pulmonary/Chest: Effort normal  No stridor  No respiratory distress  She has wheezes   She has no rales  She exhibits no tenderness  Faint bilateral expir wheezes   Abdominal: Soft  Bowel sounds are normal  She exhibits no distension and no mass  There is no tenderness  There is no rebound and no guarding  No hernia  Musculoskeletal: She exhibits no edema, tenderness or deformity  Normal/equal bilateral radial/dp pulses- no ble edema/claf tendenress/assym/ erythema   Lymphadenopathy:     She has no cervical adenopathy  Neurological: She is alert and oriented to person, place, and time  No cranial nerve deficit or sensory deficit  She exhibits normal muscle tone  Coordination normal    No nystgmus- neg terst of sckew/ normal gait   Skin: Skin is warm  Capillary refill takes less than 2 seconds  No rash noted  She is not diaphoretic  No erythema  Psychiatric: She has a normal mood and affect  Her behavior is normal  Judgment and thought content normal    Nursing note and vitals reviewed  ED Medications  Medications   glimepiride (AMARYL) tablet 1 mg (1 mg Oral Given 10/3/17 1425)       Diagnostic Studies  Labs Reviewed   POCT GLUCOSE - Abnormal        Result Value Ref Range Status    POC Glucose 302 (*) 65 - 140 mg/dl Final       No orders to display       Procedures  Procedures      Phone Contacts  ED Phone Contact    ED Course  ED Course as of Oct 03 1646   Tue Oct 03, 2017   1526 Er md note- d/w pt brother ceasar and  harpreet daniele- pt seen in er by case management- is working on day services and on waiting list for [facility] called back by er md- waiting for call back    [phone] - er md note- ceasar - pt brother ceasar- recontacted- talked to his wife- aware that pt is going to [facility]  The patient presented with a condition in which there was a high probability of imminent or life-threatening deterioration, and critical care services (excluding separately billable procedures) totalled 30-74 minutes          Disposition  Final diagnoses:   None ED Disposition     None      Follow-up Information    None       Patient's Medications   Discharge Prescriptions    No medications on file     No discharge procedures on file      ED Provider  Electronically Signed by       Kaden Kitchen MD  10/04/17 8743

## 2017-10-03 NOTE — ED PROCEDURE NOTE
PROCEDURE  CriticalCare Time  Performed by: Jose Francisco Marx  Authorized by: Jose Francisco Marx     Critical care provider statement:     Critical care time (minutes):  35    Critical care start time:  10/3/2017 3:17 PM    Critical care end time:  10/3/2017 3:52 PM    Critical care time was exclusive of:  Separately billable procedures and treating other patients and teaching time    Critical care was time spent personally by me on the following activities:  Examination of patient, development of treatment plan with patient or surrogate, discussions with consultants and review of old charts

## 2017-10-03 NOTE — ED NOTES
Pt laying on stretcher watching TV in negative distress  Pt denies any complaints at present time  NSR on monitor  VS  Will continue to monitor       Paula Wilson, PAUL  10/03/17 5960

## 2017-10-03 NOTE — ED NOTES
[facility] to transport to [place] at [facility]  10/03/17 8816

## 2017-10-03 NOTE — SOCIAL WORK
Patient was accepted a [facility]  All information needs to be fax to [phone] and phone is [phone] for reports  Please let them know a time once it is setup

## 2017-10-10 ENCOUNTER — ALLSCRIPTS OFFICE VISIT (OUTPATIENT)
Dept: OTHER | Facility: OTHER | Age: 67
End: 2017-10-10

## 2017-10-10 ENCOUNTER — TRANSCRIBE ORDERS (OUTPATIENT)
Dept: ADMINISTRATIVE | Facility: HOSPITAL | Age: 67
End: 2017-10-10

## 2017-10-10 DIAGNOSIS — F03.90 DEMENTIA WITHOUT BEHAVIORAL DISTURBANCE (HCC): ICD-10-CM

## 2017-10-10 DIAGNOSIS — R29.898 OTHER SYMPTOMS AND SIGNS INVOLVING THE MUSCULOSKELETAL SYSTEM: ICD-10-CM

## 2017-10-10 DIAGNOSIS — F03.90 SENILE DEMENTIA, UNCOMPLICATED (HCC): Primary | ICD-10-CM

## 2017-10-11 NOTE — CONSULTS
Assessment  1  Depression (311) (F32 9)   2  Advanced dementia (294 20) (F03 90)   3  Chronic obstructive pulmonary disease (496) (J44 9)   4  Acute respiratory failure with hypoxemia (518 81) (J96 01)   5  Left leg weakness (729 89) (R29 898)    Plan  Advanced dementia    · MRI BRAIN NEUROQUANT WO CONTRAST; Status:Need Information - Financial  Authorization; Requested for:10Oct2017;    Perform:[facility]; Due:10Oct2018; Ordered; For:Advanced dementia; Ordered By:Manuela Bruno;   · Occupational Therapy Referral Other Co-Management  *  Status: Active  Requested for:  92VEN2463   Ordered; For: Advanced dementia; Ordered By: Kenisha Hunter Performed:  Due: 88DWM9599  are Referring to a non- Preferred Provider : Established Patient  Care Summary provided  : Yes  Advanced dementia, Left leg weakness    · Physical Therapy Referral Other Co-Management  *  Status: Active  Requested for:  72PWO0920   Ordered; For: Advanced dementia, Left leg weakness; Ordered By: Kenisha Hunter Performed:  Due: 08ION6039  are Referring to a non- Preferred Provider : Established Patient  Care Summary provided  : Yes  Depression    · 1 - Devarinti DO, [address]  Neurology Co-Management  *  Status: Active  Requested for:  59VWK6394   Ordered; For: Depression; Ordered By: Kenisha Hunter Performed:  Due: 36SBE1932  Care Summary provided  : Yes   · Psychiatry Referral Other Co-Management  *  Status: Hold For - Scheduling  Requested  for: 28BKF5504   Ordered; For: Depression; Ordered By: Kenisha Hunter Performed:  Due: 15AAZ6799  are Referring to a non- Preferred Provider : Established Patient  Care Summary provided  : Yes   · Follow-up visit in 6 months Evaluation and Treatment  Follow-up  Status: Hold For -  Scheduling  Requested for: 09DAY8907   Ordered; For: Depression; Ordered By: Kenisha Hunter Performed:  Due: 44DXF2791    Discussion/Summary      Mrs Walls has presented to [facility] after her recent hospitalization in 2017 at [facility] for transient confusional states  Dr Yoel Monique completed evaluation in 2017 and suspicion was underlying cognitive impairment which felt to be multifactorial, including acute/on chronic respiratory failure, uncontrolled diabetes and underlying mood related issues  Patient has normal dementia labs and mild small vessels disease on her CT head  Patient will proceed with MRI neuroquant brain at [address] [place] [address]- area she lives  She will also be completing PT/OT for her cognitive dysfunction and ambulatory dysfunction  Moises Perera has several family members  last year and this year her brother , who she lived with, with pronounced psychiatric disturbances followed the event and she has worsening depression and anxiety, presenting as a dementia  Her MMSE was 26/30, with only MCI has been concerned at this point  [facility] referral [facility] provided, [facility], as patient was permanently placed to nursing home recently  During this encounter we reached nursing staff from [facility]  We were not able to reach Patricia Bennett, who is a patient's nephew and next of kin, at this point  His contact information is [phone] and cell [phone]  No new focal neurologic deficit has been noted since discharge  Patient is to follow with Dr Alaniz in 4-6 months after patient completes her therapy and will be evaluated and managed by psychiatry team    Counseling Documentation With Imm: Greater than 50% of the 60 minutes evaluation was a face-to-face discussion regarding  the pathophysiology of her current symptoms and further plan, as well as counseling, educating, and coordinating the patients care  Chief Complaint  Chief Complaint Free Text Note Form: Patient is here today for a consult for her dementia      History of Present Illness  Mrs Zenaida Parmar has presented for follow up on her confusional states after recent hospitalization to [facility]    Patient was evaluated by Dr Andres Connolly initially during her acute state and team discussed her findings with her nephew Marva Thornton  Patient showed difficulties with solving some visuospatial problems, and difficulties with executive function  Patient stated that she forgot where she was born, cant recall people, places, names  At the same time, patient was not able to complete MOCA, but MMSE was 25/30  She was able to have to complete 3 step Luria's test now  Patirnt stated thats eh had fell at least 3 times this year with likely concussion, and she moved to her sister-in-law house as she can't stay in apartment any longer  Patient's nephew Marva Thornton, is the one who brought the patient to the hospital, and then to SNF with gradual transition to full time NH  I tried to reach Marva Thornton today  to at least clarify the baseline of cognitive function, but was unsuccessful  Patient described to be depressed and anxious, she has been constantly rocking in her chair, with no eye contact made  She stated that she is not suicidal or homicidal  She has secure alarm on her left ankle while staying at [facility]   CT head was unremarkable while in the hospital  Blood work was reviewed with no other signs of thyroid dysfunction or B!2 deficiency noted  No new focal neurologic deficit described since her discharge from [facility] in August 2017  Review of Systems  ROS Reviewed:   ROS reviewed  Active Problems  1  Acute sinusitis (461 9) (J01 90)   2  Acute upper respiratory infection (465 9) (J06 9)   3  Benign essential hypertension (401 1) (I10)   4  Benign hypertensive kidney disease, stage I (403 10,585 1) (I12 9,N18 1)   5  Chronic kidney disease, stage 1 (585 1) (N18 1)   6  Chronic obstructive pulmonary disease (496) (J44 9)   7  COPD exacerbation (491 21) (J44 1)   8   Depression (311) (F32 9) 9  Diabetes mellitus, type II (250 00) (E11 9)   10  Diabetic ulcer of other part of right foot associated with type 2 diabetes mellitus, limited    to breakdown of skin (250 80,707 15) (E11 621,L97 511)   11  Diabetic ulcer of toe (250 80,707 15) (E11 621,L97 509)   12  Encounter for screening for malignant neoplasm of colon (V76 51) (Z12 11)   13  Encounter for screening mammogram for malignant neoplasm of breast (V76 12)    (Z12 31)   14  Headache (784 0) (R51)   15  Hematuria (599 70) (R31 9)   16  Hypercholesterolemia (272 0) (E78 00)   17  Hyponatremia (276 1) (E87 1)   18  Low blood potassium (276 8) (E87 6)   19  Lumbar radiculopathy (724 4) (M54 16)   20  Need for influenza vaccination (V04 81) (Z23)   21  Need for vaccination with 13-polyvalent pneumococcal conjugate vaccine (V03 82) (Z23)   22  Osteoarthritis of knee (715 36) (M17 10)   23  Osteopenia (733 90) (M85 80)   24  Proteinuria (791 0) (R80 9)   25  Sciatica (724 3) (M54 30)   26  Screening for genitourinary condition (V81 6) (Z13 89)   27  Screening for neurological condition (V80 09) (Z13 89)   28  Skin neoplasm (239 2) (D49 2)   29  Welcome to Medicare preventive visit (V70 0) (Z00 00)    Past Medical History  1  History of Acute maxillary sinusitis (461 0) (J01 00)   2  History of acute pharyngitis (V12 69) (Z87 09)   3  History of chronic kidney disease (V13 09) (Z87 448)   4  History of fever (V13 89) (Z87 898)   5  History of Osteoarthritis (V13 4)   6  History of Pruritus (698 9) (L29 9)  Active Problems And Past Medical History Reviewed: The active problems and past medical history were reviewed and updated today  Surgical History  1  History of Knee Surgery  Surgical History Reviewed: The surgical history was reviewed and updated today  Family History  Sister    1  Family history of Breast Cancer (V16 3)  Family History Reviewed: The family history was reviewed and updated today         Social History   · Denied: History of Alcohol Use (History)   · Current Every Day Smoker (305 1)   · Denied: History of Daily Coffee Consumption (___ Cups/Day)   · Daily Cola Consumption (___ Cans/Day)   · Daily Tea Consumption (___ Cups/Day)   · Does not use illicit drugs (E28 66) (Z78 9)  Social History Reviewed: The social history was reviewed and updated today  The social history was reviewed and is unchanged  Current Meds   1  Aspirin 81 MG TABS; Take 1 tablet daily Recorded   2  Butalbital-APAP-Caffeine -40 MG Oral Tablet; take 1 tablet by mouth every 4 hours   as needed for headache; Therapy: 35HEM6264 to (Evaluate:79Oex6060)  Requested for: 80NHO0898; Last   Rx:51Dsr5457 Ordered   3  Celecoxib 200 MG Oral Capsule; TAKE ONE CAPSULE BY MOUTH EVERY DAY; Therapy: 33CSE0139 to (Evaluate:76Afg1269)  Requested for: 17Aug2017; Last   Rx:50Dce0954 Ordered   4  Cyclobenzaprine HCl - 10 MG Oral Tablet; TAKE 1 TABLET BY MOUTH AT BEDTIME AS   NEEDED FOR MUSCLE SPASMS; Therapy: 15SLR6618 to (Evaluate:63Nez8297)  Requested for: 54RUC3910; Last   Rx:41Zbr7607 Ordered   5  Dulcolax 10 MG Rectal Suppository; USE AS DIRECTED; Therapy: (Recorded:10Oct2017) to Recorded   6  Fosinopril Sodium 20 MG Oral Tablet; take 1 tablet by mouth daily; Therapy: 02Apr2012 to (Evaluate:31Jan2018)  Requested for: 30AUH0381; Last   Rx:56Yzu4160 Ordered   7  FreeStyle Test In Vitro Strip; Therapy: 16OLX0468 to (Last Rx:03Mar2011)  Requested for: 39LFX9438 Ordered   8  Glimepiride 2 MG Oral Tablet; take 1 tablet twice a day; Therapy: 45GDD3627 to (442 99 778)  Requested for: 68Boo0148; Last   Rx:53Dek9509 Ordered   9  HydroCHLOROthiazide 25 MG Oral Tablet; TAKE 1 TABLET DAILY; Therapy: (Recorded:10Oct2017) to Recorded   10  HydrOXYzine HCl - 25 MG Oral Tablet; TAKE 1 TABLET BY MOUTH AT BEDTIME AS    NEEDED FOR SLEEP; Therapy: 83SVP6755 to (Gilda Ren)  Requested for: 21BDN7265; Last    Rx:09Nov2016 Ordered   11   Nicotine 21 MG/24HR Transdermal Patch 24 Hour; APPLY 1 PATCH DAILY AS    DIRECTED; Therapy: (Recorded:10Oct2017) to Recorded   12  OneTouch Ultra Blue In Vitro Strip; TEST ONE TO TWO TIMES A DAY/AS DIRECTED; Therapy: 78LXZ4762 to (Evaluate:30Apr2017)  Requested for: 97UIY0764; Last    Rx:12Oct2016 Ordered   13  Simvastatin 40 MG Oral Tablet; take 1 tablet by mouth every day; Therapy: 28FGM8906 to (HCHRAGPV:81QPS9473)  Requested for: 02Aug2017; Last    Rx:02Aug2017 Ordered   14  Spiriva HandiHaler 18 MCG Inhalation Capsule; INHALE CONTENTS OF 1 CAPSULE    ONCE DAILY; Therapy: (Recorded:10Oct2017) to Recorded   15  Symbicort 160-4 5 MCG/ACT Inhalation Aerosol; 1-2 PUFFS ONCE DAILY AS NEEDED; Therapy: 71Elo3627 to (Evaluate:56Gha2658)  Requested for: 28Mar2017; Last    Rx:28Mar2017 Ordered  Medication List Reviewed: The medication list was reviewed and updated today  Allergies  1  Penicillins   2  Crestor TABS   3  Keflex CAPS   4  Zithromax Z-Robert TABS    Vitals  Signs   Recorded: 47VWH2464 10:07AM   Heart Rate: 101  Respiration: 14  Systolic: 415  Diastolic: 64  Height: 5 ft   Height Unobtainable: Yes  Weight Unobtainable: Yes  O2 Saturation: 96    Physical Exam  CONSTITUTIONAL: NAD, pleasant  NECK: supple, no lymphadenopathy, no thyromegaly, no JVD  CARDIOVASCULAR: RRR, normal S1S2, no murmurs, no rubs  RESP: clear to auscultation bilaterally, no wheezes/rhonchi/rales  ABDOMEN: soft, non tender, non distended  SKIN: no rash or skin lesions  EXTREMITIES: no edema, pulses 2+bilaterally  PSYCH: appropriate mood and affect  NEUROLOGIC COMPREHENSIVE EXAM: Patient is oriented to person, place and time, NAD; blunted affect  CN II, III, IV, V, VI, VII,VIII,IX,X,XI-XII intact with EOMI, PERRLA, OKN intact, VF grossly intact, fundi poorly visualized secondary to pupillary constriction; symmetric face noted  Motor: 5/5 UE/LE bilateral symmetric;LLE 3/5 hip flexion and 4/ 5 dorsiflexion;  Sensory: intact to light touch and pinprick bilaterally; normal vibration sensation feet bilaterally; Coordination within normal limits on FTN and DICK testing; DTR: 1/4 through, no Babinski, no clonus  Tandem gait was not performed- patirnt is in wheelchair  Future Appointments    Date/Time Provider Specialty Site   10/19/2017 01:30 PM HILTON Cruz   [address] [facility]   11/01/2017 09:40 AM Consuelo Mcclelland DO Pulmonary Medicine [address], [address] [place] [address]     Signatures   Electronically signed by : HILTON Quijano ; Oct 10 2017 11:33AM EST                       (Author)

## 2017-10-26 ENCOUNTER — HOSPITAL ENCOUNTER (OUTPATIENT)
Dept: MRI IMAGING | Facility: HOSPITAL | Age: 67
Discharge: HOME | End: 2017-10-26
Attending: PSYCHIATRY & NEUROLOGY
Payer: MEDICARE

## 2017-10-26 DIAGNOSIS — F03.90 DEMENTIA WITHOUT BEHAVIORAL DISTURBANCE (HCC): ICD-10-CM

## 2017-10-26 DIAGNOSIS — F03.90 SENILE DEMENTIA, UNCOMPLICATED (HCC): ICD-10-CM

## 2017-10-26 PROCEDURE — 76377 3D RENDER W/INTRP POSTPROCES: CPT

## 2017-10-26 PROCEDURE — 70551 MRI BRAIN STEM W/O DYE: CPT

## 2017-10-28 ENCOUNTER — GENERIC CONVERSION - ENCOUNTER (OUTPATIENT)
Dept: OTHER | Facility: OTHER | Age: 67
End: 2017-10-28

## 2017-11-01 ENCOUNTER — ALLSCRIPTS OFFICE VISIT (OUTPATIENT)
Dept: OTHER | Facility: OTHER | Age: 67
End: 2017-11-01

## 2017-11-01 DIAGNOSIS — N18.1 CHRONIC KIDNEY DISEASE, STAGE I: ICD-10-CM

## 2017-11-01 DIAGNOSIS — E87.1 HYPO-OSMOLALITY AND HYPONATREMIA: ICD-10-CM

## 2017-11-01 DIAGNOSIS — E78.00 PURE HYPERCHOLESTEROLEMIA: ICD-10-CM

## 2017-11-01 DIAGNOSIS — R80.9 PROTEINURIA: ICD-10-CM

## 2017-11-01 DIAGNOSIS — I12.9 HYPERTENSIVE CHRONIC KIDNEY DISEASE WITH STAGE 1 THROUGH STAGE 4 CHRONIC KIDNEY DISEASE, OR UNSPECIFIED CHRONIC KIDNEY DISEASE: ICD-10-CM

## 2017-11-02 NOTE — PROGRESS NOTES
Assessment  1  Advanced dementia (294 20) (F03 90)   2  Asthma (493 90) (J45 909)    Plan  Asthma    · SPIROMETRY W/O BRONCHO- POC; Status:Active - Perform Order; Requested  MWM:34EZC7829;    Perform: In Office; POP:26OGS3602;SMHNGZY; Today; For:Asthma; Ordered By:Nader Duval;   · Follow-up visit in 1 year Evaluation and Treatment  Follow-up  Status: Hold For -  Scheduling  Requested for: 42VMS8367   Ordered; For: Asthma; Ordered By: Leonard Parker Performed:  Due: 17JLS4414    Results/Data  Results Free Text Form [facility]:   Results   Other 8/23/17 Hgb 15 1  Chest X-ray 8/2017 - CXR - images personally reviewed - mildly prominent right basilar vasculature, no dense infiltrates, no PTX, blunted CP angles suggestive of trace effusions  PFT Results v2:     Spirometry:   Post Bronchodilator Spirometry:   Lung Volumes:   DLCO:    PFT Interpretation:   11/1/2017 - Ratio 69%, %, FEV1 115% - normal spirometry - shortened expiratory time but with values >100% predicted  Discussion/Summary  Discussion Summary: It is a pleasure to see Ms Walls today  She does not have evidence of COPD as she has normal spirometry and was without hyperinflation on radiographs  She may have underlying asthma component  She was encouraged to remain tobacco free  Given current findings, I have recommended stopping spiriva and reduce symbicort to 1puff bid  She can continue with as needed short acting beta-agonist  She has obtained her flu vaccine, prevnar in 2015 and can repeat pneumovax  Recommendations placed on [facility] paperwork  She can follow up in 1 year or sooner as needed  Counseling Documentation With Imm: The patient, patient's caretaker was counseled regarding diagnostic results,-- instructions for management,-- risk factor reductions,-- impressions,-- risks and benefits of treatment options,-- importance of compliance with treatment  Goals and Barriers: The patient has the current Goals: Maintain health   The patent has the current Barriers: Former smoker, advancing dementia  Patient's Capacity to Self-Care: Patient is unable to Self-Care: Resides in [place]  Medication SE Review and Pt Understands Tx: Possible side effects of new medications were reviewed with the patient/guardian today  The treatment plan was reviewed with the patient/guardian  The patient/guardian understands and agrees with the treatment plan      Active Problems  1  Advanced dementia (294 20) (F03 90)   2  Benign essential hypertension (401 1) (I10)   3  Benign hypertensive kidney disease, stage I (403 10,585 1) (I12 9,N18 1)   4  Chronic kidney disease, stage 1 (585 1) (N18 1)   5  Depression (311) (F32 9)   6  Diabetes mellitus, type II (250 00) (E11 9)   7  Diabetic ulcer of other part of right foot associated with type 2 diabetes mellitus, limited   to breakdown of skin (250 80,707 15) (E11 621,L97 511)   8  Diabetic ulcer of toe (250 80,707 15) (E11 621,L97 509)   9  Encounter for screening for malignant neoplasm of colon (V76 51) (Z12 11)   10  Encounter for screening mammogram for malignant neoplasm of breast (V76 12)    (Z12 31)   11  Headache (784 0) (R51)   12  Hematuria (599 70) (R31 9)   13  Hypercholesterolemia (272 0) (E78 00)   14  Hyponatremia (276 1) (E87 1)   15  Left leg weakness (729 89) (R29 898)   16  Low blood potassium (276 8) (E87 6)   17  Lumbar radiculopathy (724 4) (M54 16)   18  Need for influenza vaccination (V04 81) (Z23)   19  Need for vaccination with 13-polyvalent pneumococcal conjugate vaccine (V03 82) (Z23)   20  Osteoarthritis of knee (715 36) (M17 10)   21  Osteopenia (733 90) (M85 80)   22  Proteinuria (791 0) (R80 9)   23  Sciatica (724 3) (M54 30)   24  Screening for genitourinary condition (V81 6) (Z13 89)   25  Screening for neurological condition (V80 09) (Z13 89)   26  Skin neoplasm (239 2) (D49 2)   27   Welcome to Medicare preventive visit (V70 0) (Z00 00)    Chief Complaint  Chief Complaint Free Text Note Form: hospital follow up      History of Present Illness  HPI: Ms Colletta Au is a 79year old woman who was initially seen during an admission for hypoxia and encephalopathy in August 2017  Her hypoxia resolved and she was at that time diagnosed with possible COPD without acute exacerbation  She did not require supplemental O2 at discharge  She also reported history of asthma  She has since had continued functional decline with progressive dementia and has now been admitted to [place]  She presents today for follow up, outpatient PFTs were not obtained  currently reports feeling well but the accuracy of her ROS is in question  She denied cough, no wheeze, no sputum production, no chest pain, no pleurisy, no edema, no hemoptysis  She denied any change in breathing with inhaler use and per [place] is on spiriva handihaler and symbicort 160/4 5 2puffs bid  She had been given duonebs three times a day on admission, she denied improvement in breathing with these  She denied nocturnal dyspnea or orthopnea  She reports she is no longer smoking since admitted  She is not very active and ambulates with walker in NH  Review of Systems  Complete-Female - Pulm:   Constitutional: no fever,-- not feeling poorly,-- no recent weight gain,-- no chills,-- not feeling tired-- and-- no recent weight loss  Eyes: no eye pain-- and-- eyes not red  ENT: no sore throat,-- no nasal discharge-- and-- no hoarseness  Cardiovascular: no chest pain,-- no palpitations-- and-- no lower extremity edema  Respiratory: as noted in HPI  Gastrointestinal: no abdominal pain,-- no nausea-- and-- no vomiting  Musculoskeletal: arthralgias, but-- no myalgias  Integumentary: no rashes-- and-- no skin lesions  Neurological: difficulty walking, but-- no headache,-- no dizziness-- and-- no fainting  Psychiatric: no sleep disturbances  Hematologic/Lymphatic: no swollen glands-- and-- no swollen glands in the neck        Past Medical History  1  History of Acute maxillary sinusitis (461 0) (J01 00)   2  History of acute pharyngitis (V12 69) (Z87 09)   3  History of chronic kidney disease (V13 09) (Z87 448)   4  History of fever (V13 89) (Z87 898)   5  History of Osteoarthritis (V13 4)   6  History of Pruritus (698 9) (L29 9)    Surgical History  1  History of Knee Surgery    Family History  Sister    1  Family history of Breast Cancer (V16 3)    Social History   · Denied: History of Alcohol Use (History)   · Current Every Day Smoker (305 1)   · Denied: History of Daily Coffee Consumption (___ Cups/Day)   · Daily Cola Consumption (___ Cans/Day)   · Daily Tea Consumption (___ Cups/Day)   · Does not use illicit drugs (X65 39) (Z78 9)  Social History Reviewed: The social history was reviewed and updated today  The social history was reviewed and is unchanged  Current Meds   1  Aspirin 81 MG TABS; Take 1 tablet daily Recorded   2  Butalbital-APAP-Caffeine -40 MG Oral Tablet; take 1 tablet by mouth every 4 hours   as needed for headache; Therapy: 29NKK2539 to (Evaluate:17Kgb2035)  Requested for: 52UXP2305; Last   Rx:75Kbd9243 Ordered   3  Celecoxib 200 MG Oral Capsule; TAKE ONE CAPSULE BY MOUTH EVERY DAY; Therapy: 01SRC6226 to (Evaluate:04Ogf7747)  Requested for: 17Aug2017; Last   Rx:25Ffu4442 Ordered   4  Cyclobenzaprine HCl - 10 MG Oral Tablet; TAKE 1 TABLET BY MOUTH AT BEDTIME AS   NEEDED FOR MUSCLE SPASMS; Therapy: 57VZW0795 to (Evaluate:95Nsw1911)  Requested for: 32BNB1085; Last   Rx:95Avi5318 Ordered   5  Dulcolax 10 MG Rectal Suppository; USE AS DIRECTED; Therapy: (Recorded:10Oct2017) to Recorded   6  Fosinopril Sodium 20 MG Oral Tablet; take 1 tablet by mouth daily; Therapy: 02Apr2012 to (Evaluate:31Jan2018)  Requested for: 25LBL8244; Last   Rx:93Cqc8460 Ordered   7  FreeStyle Test In Vitro Strip; Therapy: 73KNJ5674 to (Last Rx:03Mar2011)  Requested for: 17BMJ3197 Ordered   8   Glimepiride 2 MG Oral Tablet; take 1 tablet twice a day; Therapy: 92DFS1355 to ([phone])  Requested for: 02QNU0490; Last   Rx:98Sil7815 Ordered   9  HydroCHLOROthiazide 25 MG Oral Tablet; TAKE 1 TABLET DAILY; Therapy: (Recorded:10Oct2017) to Recorded   10  HydrOXYzine HCl - 25 MG Oral Tablet; TAKE 1 TABLET BY MOUTH AT BEDTIME AS    NEEDED FOR SLEEP; Therapy: 03TOJ1326 to (Suellyn Frankel)  Requested for: 04RQO4724; Last    Rx:09Nov2016 Ordered   11  Nicotine 21 MG/24HR Transdermal Patch 24 Hour; APPLY 1 PATCH DAILY AS    DIRECTED; Therapy: (Recorded:10Oct2017) to Recorded   12  OneTouch Ultra Blue In Vitro Strip; TEST ONE TO TWO TIMES A DAY/AS DIRECTED; Therapy: 87SGC7225 to (Evaluate:30Apr2017)  Requested for: 51YVU2028; Last    Rx:78Omg8022 Ordered   13  Simvastatin 40 MG Oral Tablet; take 1 tablet by mouth every day; Therapy: 89WKX0010 to (ISXVYNDU:01FCN4651)  Requested for: 12Lrt9792; Last    Rx:51Its1755 Ordered   14  Spiriva HandiHaler 18 MCG Inhalation Capsule; INHALE CONTENTS OF 1 CAPSULE    ONCE DAILY; Therapy: (Recorded:10Oct2017) to Recorded   15  Symbicort 160-4 5 MCG/ACT Inhalation Aerosol; 1-2 PUFFS ONCE DAILY AS NEEDED; Therapy: 34Rjr9866 to (Evaluate:79Knz0612)  Requested for: 28Mar2017; Last    Rx:28Mar2017 Ordered  Medication List Reviewed: The medication list was reviewed and updated today  Allergies  1  Penicillins   2  Crestor TABS   3  Keflex CAPS   4  Zithromax Z-Robert TABS    Vitals  Vital Signs    Recorded: 44VSN3508 09:37AM   Temperature 98 F   Heart Rate 87   Respiration 16   Systolic 159   Diastolic 60   Height Unobtainable Yes   Weight Unobtainable Yes   O2 Saturation 95, RA     Physical Exam    Constitutional   General appearance: No acute distress, well appearing and well nourished  -- Elderly female in wheelchair, NAD  Ears, Nose, Mouth, and Throat   Nasal mucosa, septum, and turbinates: Normal without edema or erythema  Lips, teeth, and gums: Normal, good dentition  -- no thrush  Oropharynx: Normal with no erythema, edema, exudate or lesions  Neck   Neck: Supple, symmetric, trachea midline, no masses  Jugular veins: Normal     Pulmonary   Chest: Normal     Respiratory effort: No increased work of breathing or signs of respiratory distress  Auscultation of lungs: Abnormal  -- mildly diminished BS, no wheeze, no rales  Cardiovascular   Auscultation of heart: Normal rate and rhythm, normal S1 and S2, no murmurs  Examination of extremities for edema and/or varicosities: Normal     Lymphatic   Palpation of lymph nodes in neck: No lymphadenopathy  Musculoskeletal   Gait and station: Abnormal  -- in wheelchair  Digits and nails: Normal without clubbing or cyanosis  Neurologic   Mental Status: Abnormal  -- calm but offers little details on recent events and symptoms  Skin   Skin and subcutaneous tissue: Limited exam shows no rash  Psychiatric   Orientation to person, place and time: Abnormal  -- oriented to person, place, and year - not month     Mood and affect: Normal        Signatures   Electronically signed by : Gavin Larkin DO; Nov 1 2017 10:56AM EST                       (Author)    Electronically signed by : Gavin Larkin DO; Nov 1 2017  1:15PM EST                       (Author)

## 2017-11-21 ENCOUNTER — TRANSCRIBE ORDERS (OUTPATIENT)
Dept: ADMINISTRATIVE | Facility: HOSPITAL | Age: 67
End: 2017-11-21

## 2017-11-21 DIAGNOSIS — J44.9 CHRONIC OBSTRUCTIVE PULMONARY DISEASE, UNSPECIFIED COPD TYPE (HCC): Primary | ICD-10-CM

## 2017-11-29 ENCOUNTER — GENERIC CONVERSION - ENCOUNTER (OUTPATIENT)
Dept: OTHER | Facility: OTHER | Age: 67
End: 2017-11-29

## 2017-11-29 ENCOUNTER — HOSPITAL ENCOUNTER (OUTPATIENT)
Dept: CT IMAGING | Facility: HOSPITAL | Age: 67
Discharge: HOME | End: 2017-11-29
Payer: MEDICARE

## 2017-11-29 DIAGNOSIS — J44.9 CHRONIC OBSTRUCTIVE PULMONARY DISEASE, UNSPECIFIED COPD TYPE (HCC): ICD-10-CM

## 2017-11-29 PROCEDURE — 71250 CT THORAX DX C-: CPT

## 2017-12-12 ENCOUNTER — GENERIC CONVERSION - ENCOUNTER (OUTPATIENT)
Dept: OTHER | Facility: OTHER | Age: 67
End: 2017-12-12

## 2018-01-10 NOTE — RESULT NOTES
Verified Results  MRI BRAIN NEUROQUANT [address] [facility] [id number] 09:26AM Iker Ruiz Order Number: [id number]    - Patient Instructions: To schedule this appointment, please contact Central Scheduling at [phone]  Test Name Result Flag Reference   MRI BRAIN [address] (Report)     MRI BRAIN WITHOUT CONTRAST, NeuroQuant IMAGING     INDICATION: Dementia  Cognitive decline  COMPARISON:  CT brain August 20, 2017     TECHNIQUE: Sagittal T1, axial T2, axial [place], axial T1, axial FLAIR, axial diffusion imaging  Sagittal 3D volumetric imaging processed by Marycarmen lopez [facility]rophy reports  IMAGE QUALITY: Diagnostic  FINDINGS:     BRAIN PARENCHYMA: There is no discrete mass, mass effect or midline shift There are foci of T2 and FLAIR signal abnormality in the periventricular and subcortical white matter which are typical for microvascular disease in patients of this age group       Brainstem and cerebellum demonstrate normal signal  There is no intracranial hemorrhage  There is no evidence of acute infarction and diffusion imaging is unremarkable  QUANTITATIVE:      Exam Quality: Adequate for volumetric analysis  Hippocampus     Total hippocampal volume (cc):  6 82    Percentile for similar age:   30th      Lateral ventricular volume (cc):   57 61    Percentile for similar age:   Greater than 95th      Inferior lateral vent volume (cc):  3 66    Percentile for similar age:   Greater than 95th        Quantitative conclusions:      Hippocampi:             Normal volume      Lateral Ventricle Volume:   High Volume     Temporal Horn Volume:    High Volume     Concordance between qualitative and quantitative hippocampal volume assessment: Concordant        Change in brain volumes: No previous volumetric study for comparison     Mean hippocampal volume loss among normal elderly: 0 7% per year, (-0 3 to 1 7; Maryana Menezes 2008; also Norris 2010)  VENTRICLES: Diffuse ventriculomegaly noted  SELLA AND PITUITARY GLAND: Normal      ORBITS: Normal      PARANASAL SINUSES: Right inferior mastoid opacification  VASCULATURE: Evaluation of the major intracranial vasculature demonstrates appropriate flow voids  CALVARIUM AND SKULL BASE: Normal      EXTRACRANIAL SOFT TISSUES: Normal            IMPRESSION:     1  Diffuse cerebral volume loss and moderate chronic microangiopathic changes  2  NeuroQuant analysis was performed: Normal hippocampal volume and enlarged ventricular system: does not support hippocampal degeneration  Possible expansion of ventricular system without medial temporal lobe focused ex-vacuo process         Workstation performed: [id number]     Signed by:   Lise Wharton DO   10/27/17

## 2018-01-12 NOTE — RESULT NOTES
Verified Results  (1) CBC/PLT/DIFF 05Apr2016 10:10AM [facility] Order Number: [id number]    TW Order Number: [id number]     Test Name Result Flag Reference   WBC COUNT 10 09 Thousand/uL  4 31-10 16   RBC COUNT 4 83 Million/uL  3 81-5 12   HEMOGLOBIN 15 3 g/dL  11 5-15 4   HEMATOCRIT 43 6 %  34 8-46  1   MCV 90 fL  82-98   MCH 31 7 pg  26 8-34 3   MCHC 35 1 g/dL  31 4-37 4   RDW 13 6 %  11 6-15 1   MPV 11 3 fL  8 9-12 7   PLATELET COUNT 029 Thousands/uL  149-390   nRBC AUTOMATED 0 /100 WBCs     NEUTROPHILS RELATIVE PERCENT 78 % H 43-75   LYMPHOCYTES RELATIVE PERCENT 15 %  14-44   MONOCYTES RELATIVE PERCENT 5 %  4-12   EOSINOPHILS RELATIVE PERCENT 2 %  0-6   BASOPHILS RELATIVE PERCENT 0 %  0-1   NEUTROPHILS ABSOLUTE COUNT 7 74 Thousands/µL H 1 85-7 62   LYMPHOCYTES ABSOLUTE COUNT 1 55 Thousands/µL  0 60-4 47   MONOCYTES ABSOLUTE COUNT 0 51 Thousand/µL  0 17-1 22   EOSINOPHILS ABSOLUTE COUNT 0 22 Thousand/µL  0 00-0 61   BASOPHILS ABSOLUTE COUNT 0 03 Thousands/µL  0 00-0 10     (1) SED RATE 05Apr2016 10:10AM Brooklyn Narvaez   TW Order Number: [id number]     Test Name Result Flag Reference   SED RATE 18 mm/hour  0-20     (1) HEMOGLOBIN A1C 05Apr2016 10:10AM [facility] Order Number: [id number]      5 7-6 4% impaired fasting glucose  >=6 5% diagnosis of diabetes    Falsely low levels are seen in conditions linked to short RBC life span-  hemolytic anemia, and splenomegaly  Falsely elevated levels are seen in situations where there is an increased production of RBC- receipt of erythropoietin or blood transfusions  Adopted from ADA-Clinical Practice Recommendations     Test Name Result Flag Reference   HEMOGLOBIN A1C 7 3 % H 4 0-5 6   EST  AVG   GLUCOSE 163 mg/dl       (1) COMPREHENSIVE METABOLIC PANEL 97GEK7061 42:44UX [facility] Order Number: [id number]      National Kidney Disease Education Program recommendations are as follows:  GFR calculation is accurate only with a steady state creatinine  Chronic Kidney disease less than 60 ml/min/1 73 sq  meters  Kidney failure less than 15 ml/min/1 73 sq  meters  Test Name Result Flag Reference   GLUCOSE,RANDM 255 mg/dL H    SODIUM 134 mmol/L L 136-145   POTASSIUM 3 6 mmol/L  3 5-5 3   CHLORIDE 97 mmol/L L 100-108   CARBON DIOXIDE 26 mmol/L  21-32   ANION GAP (CALC) 11 mmol/L  4-13   BLOOD UREA NITROGEN 16 mg/dL  5-25   CREATININE 0 86 mg/dL  0 60-1 30   Standardized to IDMS reference method   CALCIUM 8 9 mg/dL  8 3-10 1   BILI, TOTAL 0 81 mg/dL  0 20-1 00   ALK PHOSPHATAS 83 U/L     ALT (SGPT) 17 U/L  12-78   AST(SGOT) 10 U/L  5-45   ALBUMIN 3 7 g/dL  3 5-5 0   TOTAL PROTEIN 7 4 g/dL  6 4-8 2   eGFR Non-African American      >60 0 ml/min/1 73sq m     (Q) CULTURE, AEROBIC AND ANAEROBIC W/GRAM STAIN 2016 12:00AM Kalyan Contreras     Test Name Result Flag Reference   CULTURE, AEROBIC AND ANAEROBIC W/GRAM STAIN      CULTURE, ANAEROBIC BACTERIA W/GRAM STAIN         MICRO NUMBER:      [id number]    TEST STATUS:       FINAL    SPECIMEN SOURCE:   NOT GIVEN    SPECIMEN QUALITY:  INADEQUATE    GRAM STAIN:        Smear not performed  RESULT:            Test not performed  The specimen was received in                       an  collection container  CULTURE, AEROBIC BACTERIA      CULTURE, AEROBIC BACTERIA         MICRO NUMBER:      [id number]    TEST STATUS:       FINAL    SPECIMEN SOURCE:   NOT GIVEN    SPECIMEN QUALITY:  INADEQUATE    RESULT:            Test not performed  The specimen was received in                       an  collection container

## 2018-01-13 VITALS
HEIGHT: 62 IN | WEIGHT: 153.13 LBS | BODY MASS INDEX: 28.18 KG/M2 | SYSTOLIC BLOOD PRESSURE: 114 MMHG | RESPIRATION RATE: 17 BRPM | HEART RATE: 84 BPM | TEMPERATURE: 97.4 F | DIASTOLIC BLOOD PRESSURE: 68 MMHG

## 2018-01-14 VITALS
SYSTOLIC BLOOD PRESSURE: 105 MMHG | OXYGEN SATURATION: 96 % | RESPIRATION RATE: 14 BRPM | HEART RATE: 101 BPM | HEIGHT: 60 IN | DIASTOLIC BLOOD PRESSURE: 64 MMHG

## 2018-01-14 VITALS
SYSTOLIC BLOOD PRESSURE: 110 MMHG | HEART RATE: 88 BPM | DIASTOLIC BLOOD PRESSURE: 62 MMHG | WEIGHT: 153 LBS | HEIGHT: 62 IN | BODY MASS INDEX: 28.16 KG/M2

## 2018-01-14 VITALS
HEART RATE: 87 BPM | TEMPERATURE: 98 F | OXYGEN SATURATION: 95 % | DIASTOLIC BLOOD PRESSURE: 60 MMHG | RESPIRATION RATE: 16 BRPM | SYSTOLIC BLOOD PRESSURE: 110 MMHG

## 2018-01-14 NOTE — PROGRESS NOTES
History of Present Illness  Care Coordination Encounter Information:   Type of Encounter: Telephonic   Contact: Initial Contact    Spoke to Patient   Patient refused care coordination services at this time  Active Problems    1  Acute sinusitis (461 9) (J01 90)   2  Acute upper respiratory infection (465 9) (J06 9)   3  Benign essential hypertension (401 1) (I10)   4  Benign hypertensive kidney disease, stage I (403 10,585 1) (I12 9,N18 1)   5  Chronic kidney disease, stage 1 (585 1) (N18 1)   6  Chronic obstructive pulmonary disease (496) (J44 9)   7  COPD exacerbation (491 21) (J44 1)   8  Depression (311) (F32 9)   9  Diabetes mellitus, type II (250 00) (E11 9)   10  Diabetic ulcer of other part of right foot associated with type 2 diabetes mellitus, limited    to breakdown of skin (250 80,707 15) (E11 621,L97 511)   11  Diabetic ulcer of toe (250 80,707 15) (E11 621,L97 509)   12  Encounter for screening for malignant neoplasm of colon (V76 51) (Z12 11)   13  Encounter for screening mammogram for malignant neoplasm of breast (V76 12)    (Z12 31)   14  Headache (784 0) (R51)   15  Hematuria (599 70) (R31 9)   16  Hypercholesterolemia (272 0) (E78 00)   17  Hyponatremia (276 1) (E87 1)   18  Low blood potassium (276 8) (E87 6)   19  Lumbar radiculopathy (724 4) (M54 16)   20  Need for influenza vaccination (V04 81) (Z23)   21  Need for vaccination with 13-polyvalent pneumococcal conjugate vaccine (V03 82) (Z23)   22  Osteoarthritis of knee (715 36) (M17 10)   23  Osteopenia (733 90) (M85 80)   24  Proteinuria (791 0) (R80 9)   25  Sciatica (724 3) (M54 30)   26  Screening for genitourinary condition (V81 6) (Z13 89)   27  Screening for neurological condition (V80 09) (Z13 89)   28  Skin neoplasm (239 2) (D49 2)   29  Welcome to Medicare preventive visit (V70 0) (Z00 00)    Past Medical History    1  History of Acute maxillary sinusitis (461 0) (J01 00)   2   History of acute pharyngitis (V12 69) (Z87 09) 3  History of chronic kidney disease (V13 09) (Z87 448)   4  History of fever (V13 89) (Z87 898)   5  History of Osteoarthritis (V13 4)   6  History of Pruritus (698 9) (L29 9)    Surgical History    1  History of Knee Surgery    Family History  Sister    1  Family history of Breast Cancer (V16 3)    Social History    · Denied: History of Alcohol Use (History)   · Current Every Day Smoker (305 1)   · Denied: History of Daily Coffee Consumption (___ Cups/Day)   · Daily Cola Consumption (___ Cans/Day)   · Daily Tea Consumption (___ Cups/Day)   · Does not use illicit drugs (J84 30) (Z78 9)    Current Meds    1  Fosinopril Sodium 20 MG Oral Tablet; take 1 tablet by mouth daily; Therapy: 02Apr2012 to (Evaluate:10Jwj6376)  Requested for: 68Onw9161; Last   Rx:50Yco4034 Ordered    2  Symbicort 160-4 5 MCG/ACT Inhalation Aerosol; 1-2 PUFFS ONCE DAILY AS NEEDED; Therapy: 57Xss7726 to (Evaluate:62Ejo8459)  Requested for: 28Mar2017; Last   Rx:28Mar2017 Ordered    3  Glimepiride 2 MG Oral Tablet; take 1 tablet twice a day; Therapy: 31AEB7483 to [phone])  Requested for: 72Ksp6692; Last   Rx:52Abh4029 Ordered   4  OneTouch Ultra Blue In Vitro Strip; TEST ONE TO TWO TIMES A DAY/AS DIRECTED; Therapy: 41XTE5031 to (Evaluate:75Qtg4621)  Requested for: 72QPG1067; Last   Rx:29Tdu8707 Ordered    5  Butalbital-APAP-Caffeine -40 MG Oral Tablet; take 1 tablet by mouth every 4 hours   as needed for headache; Therapy: 98RNU2841 to (Evaluate:02Dym9254)  Requested for: 84FSQ1706; Last   HE:46WUT3905 Ordered    6  HydrOXYzine HCl - 25 MG Oral Tablet; TAKE 1 TABLET BY MOUTH AT BEDTIME AS   NEEDED FOR SLEEP; Therapy: 55TMP9745 to (Thuy Au)  Requested for: 57VWQ8201; Last   Rx:70Gwt5270 Ordered    7  Simvastatin 40 MG Oral Tablet; take 1 tablet by mouth every day; Therapy: 12RAR5794 to (Evaluate:97Wug9280)  Requested for: 93SRM4108; Last   Rx:56Ytv7480 Ordered    8   Cyclobenzaprine HCl - 10 MG Oral Tablet; TAKE 1 TABLET BY MOUTH AT BEDTIME AS   NEEDED FOR MUSCLE SPASMS; Therapy: 78PBA4346 to (Evaluate:71Xnh7752)  Requested for: 00VNB0143; Last   Rx:07May2017 Ordered    9  Celecoxib 200 MG Oral Capsule (CeleBREX); TAKE 1 CAPSULE ONCE DAILY; Therapy: 51FEM1029 to (Evaluate:04Krz1447)  Requested for: 13Yqc6078; Last   Rx:35Mnk1771 Ordered    10  Aspirin 81 MG TABS; Take 1 tablet daily Recorded   11  FreeStyle Test In Vitro Strip; Therapy: 86ZHV9538 to (Last Rx:03Mar2011)  Requested for: 82WAP7571 Ordered    Allergies    1  Penicillins   2  Crestor TABS   3  Keflex CAPS   4  Zithromax Z-Robert TABS    End of Encounter Meds    1  Fosinopril Sodium 20 MG Oral Tablet; take 1 tablet by mouth daily; Therapy: 88Dwn2328 to (Evaluate:47Gvy3925)  Requested for: 83Rms2532; Last   Rx:43Ejv2136 Ordered    2  Symbicort 160-4 5 MCG/ACT Inhalation Aerosol; 1-2 PUFFS ONCE DAILY AS NEEDED; Therapy: 71Nhk7095 to (Evaluate:58Rvx8468)  Requested for: 28Mar2017; Last   Rx:28Mar2017 Ordered    3  Glimepiride 2 MG Oral Tablet; take 1 tablet twice a day; Therapy: 40PYK1793 to (03 17 74 30 53)  Requested for: 47Roe0309; Last   Rx:49Qlg1563 Ordered   4  OneTouch Ultra Blue In Vitro Strip; TEST ONE TO TWO TIMES A DAY/AS DIRECTED; Therapy: 16FVQ4604 to (Evaluate:30Apr2017)  Requested for: 61RGT7293; Last   Rx:11Zyt1153 Ordered    5  Butalbital-APAP-Caffeine -40 MG Oral Tablet; take 1 tablet by mouth every 4 hours   as needed for headache; Therapy: 12BIQ8183 to (Evaluate:13Sab3302)  Requested for: 95SOR1587; Last   LY:98HNE7562 Ordered    6  HydrOXYzine HCl - 25 MG Oral Tablet; TAKE 1 TABLET BY MOUTH AT BEDTIME AS   NEEDED FOR SLEEP; Therapy: 18PUV8799 to (Himanshu Lopez)  Requested for: 69TAA8417; Last   Rx:09Nov2016 Ordered    7  Simvastatin 40 MG Oral Tablet; take 1 tablet by mouth every day; Therapy: 41EVT0380 to (Evaluate:27Rrs9470)  Requested for: 29BMR4743; Last   Rx:09Feb2017 Ordered    8   Cyclobenzaprine HCl - 10 MG Oral Tablet; TAKE 1 TABLET BY MOUTH AT BEDTIME AS   NEEDED FOR MUSCLE SPASMS; Therapy: 70ATH0121 to (Evaluate:26Ydj0999)  Requested for: 35DZR9367; Last   Rx:46Qis9860 Ordered    9  Celecoxib 200 MG Oral Capsule (CeleBREX); TAKE 1 CAPSULE ONCE DAILY; Therapy: 60JGQ3231 to (Evaluate:96Yme2590)  Requested for: 51Xwd0592; Last   Rx:60Txk6748 Ordered    10  Aspirin 81 MG TABS; Take 1 tablet daily Recorded   11  FreeStyle Test In Vitro Strip; Therapy: 91YQQ5606 to (Last UB:03JCI6624)  Requested for: 78CQC2581 Ordered    Future Appointments    Date/Time Provider Specialty Site   06/14/2017 03:50 PM JIMBO Martinez  Nephrology [address] [facility]   10/19/2017 01:30 PM IHLTON Moody   [address] [place] [address]     Patient Care Team    Care Team Member Role Specialty Office Number   [facility] MD Specialist Cardiology [phone]   Efrem Pryor MD Specialist Nephrology [phone]   Rhonda Og MD Specialist Gastroenterology Adult [phone]   Tisha Carter DPM Specialist Podiatry [phone]   [place] [address] Specialist Optometry [phone]     Signatures   Electronically signed by : Sangeeta Mcelroy RN; Jun 9 2017  2:00PM EST                       (Author)

## 2018-01-23 NOTE — MISCELLANEOUS
Message  Fax report of CT ordered by CT forwarded to me  Reported 7mm LYLA nodule and 4mm right apical nodule  I have recommended repeat CT at 6month interval after reviewing images  Will place order now and have office staff arrange for CT in 5/2018 in coordination with Marcelo MARCELO  Plan  Pulmonary nodule    · * CT CHEST WO CONTRAST; Status:Hold For - Scheduling,Exact Date;  Requested  for:After 57ZVU5392;     Signatures   Electronically signed by : Alissa Hurley DO; Dec 12 2017 10:14AM EST                       (Author)

## 2018-03-08 ENCOUNTER — TRANSCRIBE ORDERS (OUTPATIENT)
Dept: ADMINISTRATIVE | Facility: HOSPITAL | Age: 68
End: 2018-03-08

## 2018-03-08 DIAGNOSIS — R91.8 PULMONARY NODULES: Primary | ICD-10-CM

## 2018-03-12 ENCOUNTER — HOSPITAL ENCOUNTER (OUTPATIENT)
Dept: RADIOLOGY | Facility: MEDICAL CENTER | Age: 68
Discharge: HOME | End: 2018-03-12
Payer: MEDICARE

## 2018-03-12 DIAGNOSIS — R91.8 PULMONARY NODULES: ICD-10-CM

## 2018-03-12 PROCEDURE — 71250 CT THORAX DX C-: CPT

## 2018-04-01 DIAGNOSIS — R91.1 SOLITARY PULMONARY NODULE: ICD-10-CM

## 2018-05-09 ENCOUNTER — OFFICE VISIT (OUTPATIENT)
Dept: NEUROLOGY | Facility: CLINIC | Age: 68
End: 2018-05-09
Payer: MEDICARE

## 2018-05-09 VITALS
BODY MASS INDEX: 35.94 KG/M2 | SYSTOLIC BLOOD PRESSURE: 155 MMHG | RESPIRATION RATE: 16 BRPM | WEIGHT: 184 LBS | DIASTOLIC BLOOD PRESSURE: 64 MMHG | HEART RATE: 79 BPM

## 2018-05-09 DIAGNOSIS — I10 ESSENTIAL HYPERTENSION: Primary | Chronic | ICD-10-CM

## 2018-05-09 PROCEDURE — 99214 OFFICE O/P EST MOD 30 MIN: CPT | Performed by: PSYCHIATRY & NEUROLOGY

## 2018-05-09 RX ORDER — POLYETHYLENE GLYCOL 3350 17 G/17G
17 POWDER, FOR SOLUTION ORAL DAILY
COMMUNITY
End: 2022-05-17 | Stop reason: ALTCHOICE

## 2018-05-09 RX ORDER — MIRTAZAPINE 30 MG/1
7.5 TABLET, FILM COATED ORAL
COMMUNITY
End: 2020-09-25 | Stop reason: HOSPADM

## 2018-05-09 RX ORDER — ONDANSETRON 4 MG/1
4 TABLET, FILM COATED ORAL EVERY 8 HOURS PRN
COMMUNITY
End: 2021-11-08 | Stop reason: HOSPADM

## 2018-05-09 RX ORDER — MEMANTINE HYDROCHLORIDE 10 MG/1
10 TABLET ORAL 2 TIMES DAILY
COMMUNITY

## 2018-05-09 RX ORDER — CHOLECALCIFEROL (VITAMIN D3) 1250 MCG
50000 CAPSULE ORAL
COMMUNITY

## 2018-05-09 RX ORDER — NICOTINE POLACRILEX 4 MG/1
20 GUM, CHEWING ORAL DAILY
COMMUNITY

## 2018-05-09 RX ORDER — GABAPENTIN 300 MG/1
300 CAPSULE ORAL 3 TIMES DAILY
COMMUNITY
End: 2022-05-17 | Stop reason: ALTCHOICE

## 2018-05-09 NOTE — PROGRESS NOTES
Patient is here today for her follow up fof her memory    Patient ID: Jesus Carrillo is a 79 y o  female  Assessment/Plan:     Problem List Items Addressed This Visit     None          Dx:    Mild Cognitive Impairment     DEPRESSION    Mrs Marcela Babcock has presented for follow up on cognitive dysfunction, we believe due to mild cognitive impairment  We discussed that her problem is likely multifactorial, including diabetes related brain atrophy, moderate small vessels disease, depression  Patient had MRI brain Neuroquant- it did not support focal neurodegeneration usually seeing in Alzheimer's disease, but mild diffuse brain volume loss  She will be re-tested in 6-8 months  She had started Namenda 10 mg bid by primary team       MMSE today 24/30 with 3/3 words recalled and she had 26/30 on her prior evaluation in October 2017  I would consider she has Mild cognitive impairment rather than overt Dementia  Psychiatry referral was provided previously, consult is still pending  No new focal neurologic deficit noted on today's exam      Follow up in 6-8 months  HPI/History of Present Illness  Mrs Marcela Babcock has a history of HTN, CKD stage I, COPD, depression, right foot diabetic ulcer, DM type II, hematuria, HLD, osteopenia, sciatica, who presented from Patient is a resident of [address] [place] [address]  Patient started Namenda 10 mg bid after slow titration  Blood pressure under control  Patient has no new focal neurologic deficit, full strength in her upper and lower extremities  The following portions of the patient's history were reviewed and updated as appropriate:   She  has a past medical history of Asthma; COPD (chronic obstructive pulmonary disease) ([facility] ); Diabetes mellitus ([facility] ); Hyperlipidemia; and Hypertension    She   Patient Active Problem List    Diagnosis Date Noted    Mental status change 08/20/2017    Essential hypertension 08/20/2017    Acute respiratory failure with hypoxia (HCC) 08/20/2017    COPD (chronic obstructive pulmonary disease) ([facility] ) 08/20/2017    Type 2 diabetes mellitus with hyperglycemia ([facility] ) 08/20/2017     She  has a past surgical history that includes Knee surgery and Toe Surgery  Her family history includes Dementia in her brother  She  reports that she has quit smoking  Her smoking use included Cigarettes  She started smoking about 53 years ago  She has a 52 00 pack-year smoking history  She has never used smokeless tobacco  She reports that she does not drink alcohol or use drugs    Current Outpatient Prescriptions   Medication Sig Dispense Refill    acetaminophen (TYLENOL) 325 mg tablet Take 650 mg by mouth every 4 (four) hours as needed for mild pain      ALBUTEROL IN Inhale 3 mg      aspirin 81 MG tablet Take by mouth      bisacodyl (DULCOLAX) 10 mg suppository Insert into the rectum Twice daily      budesonide-formoterol (SYMBICORT) 160-4 5 mcg/act inhaler Inhale 2 puffs 2 (two) times a day 1 Inhaler 0    celecoxib (CeleBREX) 200 mg capsule Take 1 capsule by mouth daily      Cholecalciferol (VITAMIN D3) 3000 units TABS Take 50,000 Units by mouth every 30 (thirty) days      gabapentin (NEURONTIN) 300 mg capsule Take 300 mg by mouth 3 (three) times a day      glimepiride (AMARYL) 2 mg tablet Take 1 tablet by mouth daily with breakfast  0    Glucose Blood (BL TEST STRIP PACK VI) by In Vitro route      glucose blood (ONE TOUCH ULTRA TEST) test strip by In Vitro route      Ipratropium-Albuterol (DUONEB IN) Inhale 3 mg every 6 (six) hours as needed      memantine (NAMENDA) 10 mg tablet 10 mg      mirtazapine (REMERON) 30 mg tablet Take 30 mg by mouth daily at bedtime      Omeprazole 20 MG TBEC Take 20 mg by mouth daily      ondansetron (ZOFRAN) 4 mg tablet Take 4 mg by mouth every 8 (eight) hours as needed for nausea or vomiting      polyethylene glycol (MIRALAX) 17 g packet 17 g daily      simvastatin (ZOCOR) 40 mg tablet Take 1 tablet by mouth daily at bedtime  0    tiotropium (SPIRIVA) 18 mcg inhalation capsule Place 1 capsule into inhaler and inhale daily 30 capsule 0    nicotine (NICODERM CQ) 14 mg/24hr TD 24 hr patch Place 1 patch on the skin every 24 hours for 14 days 28 patch 0    nicotine (NICODERM CQ) 21 mg/24 hr TD 24 hr patch Place 1 patch on the skin daily for 42 days 28 patch 0    nicotine (NICODERM CQ) 7 mg/24hr TD 24 hr patch Place 1 patch on the skin every 24 hours for 14 days 28 patch 0     No current facility-administered medications for this visit        Current Outpatient Prescriptions on File Prior to Visit   Medication Sig    aspirin 81 MG tablet Take by mouth    bisacodyl (DULCOLAX) 10 mg suppository Insert into the rectum Twice daily    budesonide-formoterol (SYMBICORT) 160-4 5 mcg/act inhaler Inhale 2 puffs 2 (two) times a day    celecoxib (CeleBREX) 200 mg capsule Take 1 capsule by mouth daily    glimepiride (AMARYL) 2 mg tablet Take 1 tablet by mouth daily with breakfast    Glucose Blood (BL TEST STRIP PACK VI) by In Vitro route    glucose blood (ONE TOUCH ULTRA TEST) test strip by In Vitro route    simvastatin (ZOCOR) 40 mg tablet Take 1 tablet by mouth daily at bedtime    tiotropium (SPIRIVA) 18 mcg inhalation capsule Place 1 capsule into inhaler and inhale daily    [DISCONTINUED] budesonide-formoterol (SYMBICORT) 160-4 5 mcg/act inhaler Inhale    [DISCONTINUED] butalbital-acetaminophen-caffeine (FIORICET,ESGIC) -40 mg per tablet Take by mouth    [DISCONTINUED] cyclobenzaprine (FLEXERIL) 10 mg tablet Take by mouth    [DISCONTINUED] fosinopril (MONOPRIL) 20 mg tablet Take 1 tablet by mouth daily    [DISCONTINUED] hydrochlorothiazide (HYDRODIURIL) 25 mg tablet Take 1 tablet by mouth daily    [DISCONTINUED] hydrOXYzine HCL (ATARAX) 25 mg tablet Take 1 tablet by mouth    nicotine (NICODERM CQ) 14 mg/24hr TD 24 hr patch Place 1 patch on the skin every 24 hours for 14 days    nicotine (NICODERM CQ) 21 mg/24 hr TD 24 hr patch Place 1 patch on the skin daily for 42 days    nicotine (NICODERM CQ) 7 mg/24hr TD 24 hr patch Place 1 patch on the skin every 24 hours for 14 days     No current facility-administered medications on file prior to visit  She is allergic to penicillins; cephalexin; crestor  [rosuvastatin]; and zithromax  [azithromycin]            Objective:    Blood pressure 155/64, pulse 79, resp  rate 16, weight 83 5 kg (184 lb)  Physical Exam/Neurological Exam  CONSTITUTIONAL: NAD, pleasant  NECK: supple, no lymphadenopathy, no thyromegaly, no JVD  CARDIOVASCULAR: RRR, normal S1S2, no murmurs, no rubs  RESP: clear to auscultation bilaterally, no wheezes/rhonchi/rales  ABDOMEN: soft, non tender, non distended  SKIN: no rash or skin lesions  EXTREMITIES: no edema, pulses 2+bilaterally  PSYCH: appropriate mood and affect  NEUROLOGIC COMPREHENSIVE EXAM: Patient is oriented to person, place and time, NAD; appropriate affect  CN II, III, IV, V, VI, VII,VIII,IX,X,XI-XII intact with EOMI, PERRLA, OKN intact, VF grossly intact, fundi poorly visualized secondary to pupillary constriction; symmetric face noted  Motor: 5/5 UE/LE bilateral symmetric; Sensory: intact to light touch and pinprick bilaterally; normal vibration sensation feet bilaterally; Coordination within normal limits on FTN and DICK testing; DTR: 2/4 through, no Babinski, no clonus  Tandem gait is abnormal  Romberg: negative  ROS:  12 points of review of system was reviewed with the patient and was unremarkable with exception: see HPI  Review of Systems   Constitutional: Positive for fatigue  Negative for appetite change and fever  HENT: Positive for hearing loss  Negative for tinnitus, trouble swallowing and voice change  Eyes: Negative  Negative for photophobia and pain  Respiratory: Positive for cough, shortness of breath and wheezing  Cardiovascular: Negative  Negative for palpitations  Gastrointestinal: Negative  Negative for nausea and vomiting  Endocrine: Negative  Negative for cold intolerance and heat intolerance  Genitourinary: Negative  Negative for dysuria, frequency and urgency  Musculoskeletal: Negative  Negative for myalgias and neck pain  Skin: Negative  Negative for rash  Allergic/Immunologic: Negative  Neurological: Positive for tremors (right hand), speech difficulty, light-headedness and headaches  Negative for dizziness, seizures, syncope, facial asymmetry, weakness and numbness  Hematological: Negative  Does not bruise/bleed easily  Psychiatric/Behavioral: Positive for confusion and sleep disturbance  Negative for hallucinations

## 2018-07-09 ENCOUNTER — HOSPITAL ENCOUNTER (OUTPATIENT)
Dept: RADIOLOGY | Facility: MEDICAL CENTER | Age: 68
Discharge: HOME | End: 2018-07-09
Payer: MEDICARE

## 2018-07-09 DIAGNOSIS — R91.1 SOLITARY PULMONARY NODULE: ICD-10-CM

## 2018-07-09 PROCEDURE — 71250 CT THORAX DX C-: CPT

## 2018-07-18 DIAGNOSIS — R91.1 LUNG NODULE: Primary | ICD-10-CM

## 2018-10-15 ENCOUNTER — OFFICE VISIT (OUTPATIENT)
Dept: PULMONOLOGY | Facility: CLINIC | Age: 68
End: 2018-10-15
Payer: MEDICARE

## 2018-10-15 VITALS
BODY MASS INDEX: 39.14 KG/M2 | HEART RATE: 86 BPM | SYSTOLIC BLOOD PRESSURE: 142 MMHG | OXYGEN SATURATION: 93 % | DIASTOLIC BLOOD PRESSURE: 80 MMHG | WEIGHT: 200.4 LBS | TEMPERATURE: 97.4 F | RESPIRATION RATE: 16 BRPM

## 2018-10-15 DIAGNOSIS — G30.9 ALZHEIMER'S DEMENTIA WITHOUT BEHAVIORAL DISTURBANCE, UNSPECIFIED TIMING OF DEMENTIA ONSET (HCC): ICD-10-CM

## 2018-10-15 DIAGNOSIS — J45.41 MODERATE PERSISTENT ASTHMA WITH ACUTE EXACERBATION: Primary | ICD-10-CM

## 2018-10-15 DIAGNOSIS — R91.1 PULMONARY NODULE: ICD-10-CM

## 2018-10-15 DIAGNOSIS — F02.80 ALZHEIMER'S DEMENTIA WITHOUT BEHAVIORAL DISTURBANCE, UNSPECIFIED TIMING OF DEMENTIA ONSET (HCC): ICD-10-CM

## 2018-10-15 DIAGNOSIS — Z01.811 PREOP PULMONARY/RESPIRATORY EXAM: ICD-10-CM

## 2018-10-15 PROBLEM — J96.01 ACUTE RESPIRATORY FAILURE WITH HYPOXIA (HCC): Status: RESOLVED | Noted: 2017-08-20 | Resolved: 2018-10-15

## 2018-10-15 PROBLEM — J45.909 ASTHMA: Status: ACTIVE | Noted: 2018-10-15

## 2018-10-15 PROCEDURE — 99215 OFFICE O/P EST HI 40 MIN: CPT | Performed by: INTERNAL MEDICINE

## 2018-10-15 RX ORDER — PREDNISONE 20 MG/1
40 TABLET ORAL DAILY
Qty: 10 TABLET | Refills: 0 | Status: SHIPPED | OUTPATIENT
Start: 2018-10-15 | End: 2018-10-20

## 2018-10-15 RX ORDER — BUDESONIDE AND FORMOTEROL FUMARATE DIHYDRATE 160; 4.5 UG/1; UG/1
2 AEROSOL RESPIRATORY (INHALATION) 2 TIMES DAILY
Qty: 3 INHALER | Refills: 3 | Status: SHIPPED | OUTPATIENT
Start: 2018-10-15 | End: 2020-11-02

## 2018-10-15 NOTE — PROGRESS NOTES
Pulmonary Follow Up Note   Reg Luque 76 y o  female MRN: [id number]  10/15/2018      Assessment:  1  Asthma - moderate persistent with acute exacerbation  · Normal spirometry previously and with no emphysematous changes on CT - She does not have COPD  · Mild exacerbation currently  · Will continue with Zyrtec and singulair  · Increase Symbicort 160/4 5 2puffs BID  · Continue duonebs BID for now  · Start Prednisone 40mg daily x 5 days  · Advised to call if not improving in next 2-3 days  · Flu Vaccine 2018 at [facility], [address] 2015, pneumovax 1999 - can redose at next visit  · Follow up in 6 months or sooner as needed    2  Pulmonary Nodule - repeat CT 7/2019    3  Pulmonary Preoperative recommendations  · She does not have any contraindications for surgery  · She should be wheeze free and at baseline respiratory status on day of surgery  · Continue symbicort and duonebs in the perioperative period  · Monitor for apnea and hypoxia and provide oxygen to maintain SpO2 >88%    4  Dementia    Plan:    Diagnoses and all orders for this visit:    Moderate persistent asthma with acute exacerbation  -     predniSONE 20 mg tablet; Take 2 tablets (40 mg total) by mouth daily for 5 days  -     budesonide-formoterol (SYMBICORT) 160-4 5 mcg/act inhaler; Inhale 2 puffs 2 (two) times a day    Alzheimer's dementia without behavioral disturbance, unspecified timing of dementia onset    Pulmonary nodule    Preop pulmonary/respiratory exam        Return in about 6 months (around 4/15/2019) for Recheck  History of Present Illness   HPI:  Reg Luque is a 76 y o  female who was initially seen during an admission for hypoxia and encephalopathy in August 2017  Her hypoxia resolved and she was at that time diagnosed with possible COPD without acute exacerbation  She did not require supplemental O2 at discharge  She has progressive dementia and resides at [facility]   She was last seen in Nov 2017 and noted normal spirometry - andCOPD was excluded  Plans at last visit were to stop spiriva and reduce symbicort to 1puff BID  She presents today for follow up  She reports she was overall doing well until today  She reports mild nonproductive cough and some wheeze today  She denied fevers or chills  She reported needing OCS and antibiotics for respiratory infections 1-2 times since last visit  She denied fevers or chills, no hemoptysis, no pleurisy, no increased LE edema, no rest dyspnea  She remains primarily sedentary  She did have mechanical fall 2 weeks ago  She reports having planned knee replacement next [facility] at [facility]  Review of Systems   Constitutional: Negative for activity change, fatigue, fever and unexpected weight change  HENT: Negative for postnasal drip, rhinorrhea, sinus pain, trouble swallowing and voice change  Eyes: Negative for visual disturbance  Respiratory: Positive for cough, shortness of breath and wheezing  Negative for chest tightness  Cardiovascular: Negative for chest pain, palpitations and leg swelling  Gastrointestinal: Negative for abdominal pain, diarrhea, nausea and vomiting  Genitourinary: Negative for dysuria  Musculoskeletal: Positive for arthralgias and gait problem  Skin: Positive for rash and wound  Facial ecchymosis    Neurological: Negative for syncope and headaches  Hematological: Negative for adenopathy  Psychiatric/Behavioral: Negative for sleep disturbance         Historical Information   Past Medical History:   Diagnosis Date    Asthma     [facility]     Hyperlipidemia     Hypertension      Past Surgical History:   Procedure Laterality Date    KNEE SURGERY      TOE SURGERY       Family History   Problem Relation Age of Onset    Dementia Brother          Meds/Allergies     Current Outpatient Prescriptions:     acetaminophen (TYLENOL) 325 mg tablet, Take 650 mg by mouth every 4 (four) hours as needed for mild pain, Disp: , Rfl:    ALBUTEROL IN, Inhale 3 mg, Disp: , Rfl:     aspirin 81 MG tablet, Take by mouth, Disp: , Rfl:     bisacodyl (DULCOLAX) 10 mg suppository, Insert into the rectum Twice daily, Disp: , Rfl:     budesonide-formoterol (SYMBICORT) 160-4 5 mcg/act inhaler, Inhale 2 puffs 2 (two) times a day, Disp: 3 Inhaler, Rfl: 3    celecoxib (CeleBREX) 200 mg capsule, Take 1 capsule by mouth daily, Disp: , Rfl:     Cholecalciferol (VITAMIN D3) 3000 units TABS, Take 50,000 Units by mouth every 30 (thirty) days, Disp: , Rfl:     gabapentin (NEURONTIN) 300 mg capsule, Take 300 mg by mouth 3 (three) times a day, Disp: , Rfl:     glimepiride (AMARYL) 2 mg tablet, Take 1 tablet by mouth daily with breakfast, Disp: , Rfl: 0    Glucose Blood (BL TEST STRIP PACK VI), by In Vitro route, Disp: , Rfl:     glucose blood (ONE TOUCH ULTRA TEST) test strip, by In Vitro route, Disp: , Rfl:     Ipratropium-Albuterol (DUONEB IN), Inhale 3 mg every 6 (six) hours as needed, Disp: , Rfl:     memantine (NAMENDA) 10 mg tablet, 10 mg, Disp: , Rfl:     mirtazapine (REMERON) 30 mg tablet, Take 30 mg by mouth daily at bedtime, Disp: , Rfl:     Omeprazole 20 MG TBEC, Take 20 mg by mouth daily, Disp: , Rfl:     ondansetron (ZOFRAN) 4 mg tablet, Take 4 mg by mouth every 8 (eight) hours as needed for nausea or vomiting, Disp: , Rfl:     polyethylene glycol (MIRALAX) 17 g packet, 17 g daily, Disp: , Rfl:     simvastatin (ZOCOR) 40 mg tablet, Take 1 tablet by mouth daily at bedtime, Disp: , Rfl: 0    tiotropium (SPIRIVA) 18 mcg inhalation capsule, Place 1 capsule into inhaler and inhale daily, Disp: 30 capsule, Rfl: 0    nicotine (NICODERM CQ) 14 mg/24hr TD 24 hr patch, Place 1 patch on the skin every 24 hours for 14 days, Disp: 28 patch, Rfl: 0    nicotine (NICODERM CQ) 7 mg/24hr TD 24 hr patch, Place 1 patch on the skin every 24 hours for 14 days, Disp: 28 patch, Rfl: 0    predniSONE 20 mg tablet, Take 2 tablets (40 mg total) by mouth daily for 5 days, Disp: 10 tablet, Rfl: 0  Allergies   Allergen Reactions    Penicillins Shortness Of Breath    Cephalexin      Reaction Date: 26May2011;    Clarene Ham  [Rosuvastatin]      Reaction Date: [id number];     Zithromax  [Azithromycin]        Vitals: Blood pressure 142/80, pulse 86, temperature (!) 97 4 °F (36 3 °C), temperature source Tympanic, resp  rate 16, weight 90 9 kg (200 lb 6 4 oz), SpO2 93 %  Body mass index is 39 14 kg/m²  Oxygen Therapy  SpO2: 93 % (ROOM AIR)      Physical Exam  Physical Exam   Constitutional: She is oriented to person, place, and time  She appears well-developed and well-nourished  No distress  Obese elderly female in wheelchair   HENT:   Head: Normocephalic  Right Ear: External ear normal    Left Ear: External ear normal    Nose: Nose normal    Mouth/Throat: No oropharyngeal exudate  Healing facial ecchymosis on left   Eyes: Pupils are equal, round, and reactive to light  Conjunctivae are normal  Right eye exhibits no discharge  Left eye exhibits no discharge  No scleral icterus  Neck: No JVD present  No tracheal deviation present  Cardiovascular: Normal rate, regular rhythm and normal heart sounds  No murmur heard  Pulmonary/Chest: Effort normal  No stridor  No respiratory distress  She has wheezes  She has no rales  Moist nonproductive cough  Diffuse scattered expiratory wheeze b/l   Abdominal: Soft  Bowel sounds are normal  She exhibits no distension  There is no tenderness  Musculoskeletal: She exhibits edema  She exhibits no deformity  Trace pretibial edema b/l   Lymphadenopathy:     She has no cervical adenopathy  Neurological: She is alert and oriented to person, place, and time  Skin: Skin is warm and dry  No rash noted  No erythema  Psychiatric: She has a normal mood and affect  Her behavior is normal    Vitals reviewed  Labs: I have personally reviewed pertinent lab results    Lab Results   Component Value Date    WBC 9 64 08/23/2017    HGB 15 1 2017    HCT 44 4 2017    MCV 90 2017     2017     Lab Results   Component Value Date    GLUCOSE 171 (H) 2015    CALCIUM 8 5 2017     2017    K 3 9 2017    CO2 23 2017     2017    BUN 12 2017    CREATININE 0 56 (L) 2017     No results found for: IGE  Lab Results   Component Value Date    ALT 25 2017    AST 13 2017    ALKPHOS 88 2017    BILITOT 0 61 2015       Imaging and other studies: I have personally reviewed pertinent reports  and I have personally reviewed pertinent films in PACS  CT Chest 18 - stable 8mm LYLA and 3mm RUL nodules, no sig mediastinal adenopathy, mild basilar atelectasis    Pulmonary function testin2017 - Ratio 69%, %, FEV1 115% - normal spirometry - shortened expiratory time but with values >100% predicted  Cem Duval DO, [facility]

## 2018-10-15 NOTE — PATIENT INSTRUCTIONS
Asthma, Ambulatory Care   GENERAL INFORMATION:   Asthma  is a lung disease that makes breathing difficult  Chronic inflammation and reactions to triggers narrow the airways in your lungs  Asthma can become life-threatening if it is not managed  Common symptoms include the following:   · Coughing     · Wheezing     · Shortness of breath     · Chest tightness  Seek immediate care for the following symptoms:   · Severe shortness of breath    · Blue or gray lips or nails    · Skin around your neck and ribs pulls in with each breath    · Shortness of breath, even after you take your short-term medicine as directed     · Peak flow numbers in the red zone of your asthma action plan  Treatment for asthma  will depend on how severe it is  Medicine may decrease inflammation, open airways, and make it easier to breathe  Medicines may be inhaled, taken as a pill, or injected  Short-term medicines relieve your symptoms quickly  Long-term medicines are used to prevent future attacks  You may also need medicine to help control your allergies  Manage and prevent future asthma attacks:   · Follow your asthma action pan  This is a written plan that you and your healthcare provider create  It explains which medicine you need and when to change doses if necessary  It also explains how you can monitor symptoms and use a peak flow meter  The meter measures how well your lungs are working  · Manage other health conditions , such as allergies, acid reflux, and sleep apnea  · Identify and avoid triggers  These may include pets, dust mites, mold, and cockroaches  · Do not smoke and avoid others who smoke  If you smoke, it is never too late to quit  Ask your healthcare provider if you need help quitting  · Ask about a flu vaccine  The flu can make your asthma worse  You may need a yearly flu shot  Follow up with your healthcare provider as directed:   You will need to return to make sure your medicine is working and your symptoms are controlled  You may be referred to an asthma or allergy specialist  Poppy Veras may be asked to keep a record of your peak flow values and bring it with you to your appointments  Write down your questions so you remember to ask them during your visits  CARE AGREEMENT:   You have the right to help plan your care  Learn about your health condition and how it may be treated  Discuss treatment options with your caregivers to decide what care you want to receive  You always have the right to refuse treatment  The above information is an  only  It is not intended as medical advice for individual conditions or treatments  Talk to your doctor, nurse or pharmacist before following any medical regimen to see if it is safe and effective for you  © 2014 [address] is for End User's use only and may not be sold, redistributed or otherwise used for commercial purposes  All illustrations and images included in CareNotes® are the copyrighted property of A D A M [employer] Inc  or Remy Seo

## 2018-11-14 ENCOUNTER — OFFICE VISIT (OUTPATIENT)
Dept: NEUROLOGY | Facility: CLINIC | Age: 68
End: 2018-11-14
Payer: MEDICARE

## 2018-11-14 VITALS — HEART RATE: 81 BPM | SYSTOLIC BLOOD PRESSURE: 161 MMHG | DIASTOLIC BLOOD PRESSURE: 84 MMHG

## 2018-11-14 DIAGNOSIS — G31.84 MCI (MILD COGNITIVE IMPAIRMENT): Primary | ICD-10-CM

## 2018-11-14 DIAGNOSIS — H49.22 SIXTH NERVE PALSY OF LEFT EYE: ICD-10-CM
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
## 2018-11-14 DIAGNOSIS — S09.93XS FACIAL INJURY, SEQUELA: ICD-10-CM

## 2018-11-14 DIAGNOSIS — H02.402 PTOSIS OF LEFT EYELID: ICD-10-CM

## 2018-11-14 PROBLEM — S09.93XA INJURY OF FACE: Status: ACTIVE | Noted: 2018-11-14
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
## 2018-11-14 PROCEDURE — 99214 OFFICE O/P EST MOD 30 MIN: CPT | Performed by: PSYCHIATRY & NEUROLOGY

## 2018-11-14 NOTE — PROGRESS NOTES
Patient ID: Esther Rodriguez is a 76 y o  female  Assessment/Plan:     Problem List Items Addressed This Visit        Nervous and Auditory    MCI (mild cognitive impairment) - Primary    Sixth nerve palsy of left eye    Relevant Orders    MRI brain and orbits wo and w contrast    SUE Screen w/ Reflex to Titer/Pattern    BUN    Creatinine, serum    Acetylcholine receptor, blocking    Acetylcholine receptor, modulating    Acetylcholine receptor, binding    MUSK Antibody       Other    Fall    Injury of face    Relevant Orders    MRI brain and orbits wo and w contrast    SUE Screen w/ Reflex to Titer/Pattern    BUN    Creatinine, serum    CT facial bones wo contrast    Ptosis of left eyelid         Today I had the pleasure of seeing your patient, Kyra Block, in consultation at [address] [facility]  Mrs Karin Ferreira has presented for follow up on cognitive dysfunction - she has stable findings of her MCI with MMSE improved since last evaluation 6 months ago from 24/30 to 27/30 today  She is to continue Namenda 10 mg bid with trazodone and Mirtazapine for her sleep related issues  Patient had recent fall with facial trauma and tender to touch in left upper cheek - CT facial bones wo will be ordered  Patient has worsening left eye ptosis and left CN VI palsy ( not seen on prior evaluation) - MRI orbits will be ordered and blood work for connective tissue disorder and myasthenia gravis will be further scheduled  Left knee is swollen and tender - she is to follow with primary care team for that matter  Follow up in 3 months with Alyssa Dandy  Subjective: MCI and related dysfunction, double vision    HPI/History of Present Illness  Mrs Karin Ferreira has a history of HTN, CKD stage I, COPD, depression, right foot diabetic ulcer, DM type II, hematuria, HLD, osteopenia, sciatica, who presented from [facility] for follow up on cognitive dysfunction     1 month ago patient feel while walking with her walker, and she hit her face and left knee, with no LOC reported  No medical attention required  No confusional states or word finding difficulties reported  Left knee issue has been a longstanding information for a long time  The following portions of the patient's history were reviewed and updated as appropriate:   She  has a past medical history of Asthma; Diabetes mellitus ([facility] ); Hyperlipidemia; and Hypertension  She   Patient Active Problem List    Diagnosis Date Noted    MCI (mild cognitive impairment) 11/14/2018    Fall 11/14/2018    Injury of face 11/14/2018    Sixth nerve palsy of left eye 11/14/2018    Ptosis of left eyelid 11/14/2018    Moderate persistent asthma with acute exacerbation 10/15/2018    Alzheimer's disease 10/15/2018    Pulmonary nodule 10/15/2018    Preop pulmonary/respiratory exam 10/15/2018    Mental status change 08/20/2017    Essential hypertension 08/20/2017    Type 2 diabetes mellitus with hyperglycemia ([facility] ) 08/20/2017     She  has a past surgical history that includes Knee surgery and Toe Surgery  Her family history includes Dementia in her brother  She  reports that she quit smoking about 14 months ago  Her smoking use included Cigarettes  She started smoking about 53 years ago  She has a 52 00 pack-year smoking history  She has never used smokeless tobacco  She reports that she does not drink alcohol or use drugs    Current Outpatient Prescriptions   Medication Sig Dispense Refill    acetaminophen (TYLENOL) 325 mg tablet Take 650 mg by mouth every 4 (four) hours as needed for mild pain      ALBUTEROL IN Inhale 3 mg      aspirin 81 MG tablet Take by mouth      bisacodyl (DULCOLAX) 10 mg suppository Insert into the rectum Twice daily      budesonide-formoterol (SYMBICORT) 160-4 5 mcg/act inhaler Inhale 2 puffs 2 (two) times a day 3 Inhaler 3    celecoxib (CeleBREX) 200 mg capsule Take 1 capsule by mouth daily      Cholecalciferol (VITAMIN D3) 3000 units TABS Take 50,000 Units by mouth every 30 (thirty) days      gabapentin (NEURONTIN) 300 mg capsule Take 300 mg by mouth 3 (three) times a day      glimepiride (AMARYL) 2 mg tablet Take 1 tablet by mouth daily with breakfast  0    Glucose Blood (BL TEST STRIP PACK VI) by In Vitro route      glucose blood (ONE TOUCH ULTRA TEST) test strip by In Vitro route      Ipratropium-Albuterol (DUONEB IN) Inhale 3 mg every 6 (six) hours as needed      memantine (NAMENDA) 10 mg tablet 10 mg      mirtazapine (REMERON) 30 mg tablet Take 30 mg by mouth daily at bedtime      nicotine (NICODERM CQ) 7 mg/24hr TD 24 hr patch Place 1 patch on the skin every 24 hours for 14 days 28 patch 0    Omeprazole 20 MG TBEC Take 20 mg by mouth daily      ondansetron (ZOFRAN) 4 mg tablet Take 4 mg by mouth every 8 (eight) hours as needed for nausea or vomiting      polyethylene glycol (MIRALAX) 17 g packet 17 g daily      simvastatin (ZOCOR) 40 mg tablet Take 1 tablet by mouth daily at bedtime  0    tiotropium (SPIRIVA) 18 mcg inhalation capsule Place 1 capsule into inhaler and inhale daily 30 capsule 0     No current facility-administered medications for this visit        Current Outpatient Prescriptions on File Prior to Visit   Medication Sig    acetaminophen (TYLENOL) 325 mg tablet Take 650 mg by mouth every 4 (four) hours as needed for mild pain    ALBUTEROL IN Inhale 3 mg    aspirin 81 MG tablet Take by mouth    bisacodyl (DULCOLAX) 10 mg suppository Insert into the rectum Twice daily    budesonide-formoterol (SYMBICORT) 160-4 5 mcg/act inhaler Inhale 2 puffs 2 (two) times a day    celecoxib (CeleBREX) 200 mg capsule Take 1 capsule by mouth daily    Cholecalciferol (VITAMIN D3) 3000 units TABS Take 50,000 Units by mouth every 30 (thirty) days    gabapentin (NEURONTIN) 300 mg capsule Take 300 mg by mouth 3 (three) times a day    glimepiride (AMARYL) 2 mg tablet Take 1 tablet by mouth daily with breakfast    Glucose Blood (BL TEST STRIP PACK VI) by In Vitro route    glucose blood (ONE TOUCH ULTRA TEST) test strip by In Vitro route    Ipratropium-Albuterol (DUONEB IN) Inhale 3 mg every 6 (six) hours as needed    memantine (NAMENDA) 10 mg tablet 10 mg    mirtazapine (REMERON) 30 mg tablet Take 30 mg by mouth daily at bedtime    nicotine (NICODERM CQ) 7 mg/24hr TD 24 hr patch Place 1 patch on the skin every 24 hours for 14 days    Omeprazole 20 MG TBEC Take 20 mg by mouth daily    ondansetron (ZOFRAN) 4 mg tablet Take 4 mg by mouth every 8 (eight) hours as needed for nausea or vomiting    polyethylene glycol (MIRALAX) 17 g packet 17 g daily    simvastatin (ZOCOR) 40 mg tablet Take 1 tablet by mouth daily at bedtime    tiotropium (SPIRIVA) 18 mcg inhalation capsule Place 1 capsule into inhaler and inhale daily     No current facility-administered medications on file prior to visit  She is allergic to penicillins; cephalexin; crestor  [rosuvastatin]; and zithromax  [azithromycin]            Objective:    Blood pressure 161/84, pulse 81  Physical Exam/Neurological Exam    CONSTITUTIONAL: NAD, pleasant  NECK: supple, no lymphadenopathy, no thyromegaly, no JVD  CARDIOVASCULAR: RRR, normal S1S2, no murmurs, no rubs  RESP: clear to auscultation bilaterally, no wheezes/rhonchi/rales  ABDOMEN: soft, non tender, non distended  SKIN: no rash or skin lesions  EXTREMITIES: no edema, pulses 2+bilaterally  PSYCH: appropriate mood and affect  NEUROLOGIC COMPREHENSIVE EXAM: Patient is oriented to person, place and time, NAD; appropriate affect  CN II, III, IV, V, VI, VII,VIII,IX,X,XI-XII intact with EOMI, PERRLA, OKN intact, VF grossly intact, fundi poorly visualized secondary to pupillary constriction; symmetric face noted  Motor: 5/5 UE/LE bilateral symmetric; Sensory: intact to light touch and pinprick bilaterally; normal vibration sensation feet bilaterally;  Coordination within normal limits on FTN and DICK testing; DTR: 2/4 through, no Babinski, no clonus  Tandem gait is abnormal  Romberg: negative  Left knee swollen and painful to touch, left facial residual bruise noted  ROS:  12 points of review of system was reviewed with the patient and was unremarkable with exception: see HPI  Review of Systems   Constitutional: Negative  Negative for appetite change and fever  HENT: Negative  Negative for hearing loss, tinnitus, trouble swallowing and voice change  Eyes: Negative  Negative for photophobia and pain  Respiratory: Negative  Negative for shortness of breath  Cardiovascular: Negative  Negative for palpitations  Gastrointestinal: Negative  Negative for nausea and vomiting  Endocrine: Negative  Negative for cold intolerance and heat intolerance  Genitourinary: Negative  Negative for dysuria, frequency and urgency  Musculoskeletal: Negative  Negative for myalgias and neck pain  Skin: Negative  Negative for rash  Neurological: Negative  Negative for dizziness, tremors, seizures, syncope, facial asymmetry, speech difficulty, weakness, light-headedness, numbness and headaches  Hematological: Negative  Does not bruise/bleed easily  Psychiatric/Behavioral: Positive for confusion  Negative for hallucinations and sleep disturbance

## 2018-12-13 ENCOUNTER — HOSPITAL ENCOUNTER (OUTPATIENT)
Dept: CT IMAGING | Facility: HOSPITAL | Age: 68
Discharge: HOME | End: 2018-12-13
Attending: PSYCHIATRY & NEUROLOGY
Payer: MEDICARE

## 2018-12-13 ENCOUNTER — HOSPITAL ENCOUNTER (OUTPATIENT)
Dept: MRI IMAGING | Facility: HOSPITAL | Age: 68
Discharge: HOME | End: 2018-12-13
Attending: PSYCHIATRY & NEUROLOGY
Payer: MEDICARE

## 2018-12-13 DIAGNOSIS — S09.93XS FACIAL INJURY, SEQUELA: ICD-10-CM

## 2018-12-13 DIAGNOSIS — H49.22 SIXTH NERVE PALSY OF LEFT EYE: ICD-10-CM

## 2018-12-13 PROCEDURE — 70553 MRI BRAIN STEM W/O & W/DYE: CPT

## 2018-12-13 PROCEDURE — A9585 GADOBUTROL INJECTION: HCPCS | Performed by: PSYCHIATRY & NEUROLOGY

## 2018-12-13 PROCEDURE — 70486 CT MAXILLOFACIAL W/O DYE: CPT

## 2018-12-13 PROCEDURE — 70543 MRI ORBT/FAC/NCK W/O &W/DYE: CPT

## 2018-12-13 RX ADMIN — GADOBUTROL 9 ML: 604.72 INJECTION INTRAVENOUS at 11:40

## 2019-02-21 ENCOUNTER — OFFICE VISIT (OUTPATIENT)
Dept: NEUROLOGY | Facility: CLINIC | Age: 69
End: 2019-02-21
Payer: MEDICARE

## 2019-02-21 VITALS
SYSTOLIC BLOOD PRESSURE: 124 MMHG | DIASTOLIC BLOOD PRESSURE: 72 MMHG | BODY MASS INDEX: 44.54 KG/M2 | HEART RATE: 85 BPM | HEIGHT: 56 IN | WEIGHT: 198 LBS

## 2019-02-21 DIAGNOSIS — H49.22 SIXTH NERVE PALSY OF LEFT EYE: ICD-10-CM

## 2019-02-21 DIAGNOSIS — G31.84 MCI (MILD COGNITIVE IMPAIRMENT): Primary | ICD-10-CM

## 2019-02-21 DIAGNOSIS — H02.402 PTOSIS OF LEFT EYELID: ICD-10-CM

## 2019-02-21 PROCEDURE — 99214 OFFICE O/P EST MOD 30 MIN: CPT | Performed by: PHYSICIAN ASSISTANT

## 2019-02-21 RX ORDER — TRAZODONE HYDROCHLORIDE 50 MG/1
50 TABLET ORAL
COMMUNITY
End: 2022-03-16

## 2019-02-21 RX ORDER — RANITIDINE HCL 75 MG
75 TABLET ORAL 2 TIMES DAILY
COMMUNITY
End: 2021-11-08 | Stop reason: HOSPADM

## 2019-02-21 RX ORDER — MONTELUKAST SODIUM 10 MG/1
10 TABLET ORAL
COMMUNITY

## 2019-02-21 RX ORDER — CETIRIZINE HYDROCHLORIDE 10 MG/1
10 TABLET ORAL DAILY
COMMUNITY

## 2019-02-21 RX ORDER — GUAIFENESIN DEXTROMETHORPHAN HYDROBROMIDE ORAL SOLUTION 10; 100 MG/5ML; MG/5ML
5 SOLUTION ORAL EVERY 12 HOURS
Status: ON HOLD | COMMUNITY
End: 2022-06-25

## 2019-02-21 NOTE — PROGRESS NOTES
Patient ID: Alda Mcallister is a 76 y o  female  Assessment/Plan:    MCI (mild cognitive impairment)  Memory has been stable  Memantine 10mg 2x daily will be continued  Twenty minutes were spent with patient reviewing history, examining and formulating a treatment plan  Pt was agreeable to the treatment plan  She will follow-up in 6 months  Sixth nerve palsy of left eye  Resolved  Ptosis of left eyelid  Stable  Results of testing reviewed with the pt  Problem List Items Addressed This Visit        Nervous and Auditory    MCI (mild cognitive impairment) - Primary     Memory has been stable  Memantine 10mg 2x daily will be continued  She will follow-up in 6 months  Sixth nerve palsy of left eye     Resolved  Other    Ptosis of left eyelid     Stable  Results of testing reviewed with the pt  Subjective:    HPI    Maryan Rutledge returns today for neurologic follow-up  She was initially seen due to memory complaints  She was diagnosed with mild cognitive impairment likely due to chronic medical illness  She did not have any MRI findings consistent with Alzheimer's dementia  Her memory testing improved  She admits to being forgetful at times but overall it has been stable  At her last appt she was noted to have a new Lt cranial nerve VI palsy and Lt eye ptosis  An MRI of the brain and orbits with and without contrast was ordered  It was negative for acute ischemia and the orbital portion was unremarkable  She did have some white matter changes in the bilateral cerebral hemispheres  MUSK antibody and acetylcholine receptor binding, modulating and blocking antibodies were negative  A CT of the facial bones was also ordered and was negative  The patient denies any new problems since her last appt  She did have a Lt knee replacement since her last appt  She denies any further falls      The following portions of the patient's history were reviewed and updated as appropriate: allergies, current medications, past family history, past medical history, past social history, past surgical history and problem list          Objective:    Blood pressure 124/72, pulse 85, height 4' 8" (1 422 m), weight 89 8 kg (198 lb)  Physical Exam   Eyes: Pupils are equal, round, and reactive to light  Neurological: She has normal strength and normal reflexes  Coordination normal    Psychiatric: Her speech is normal    Vitals reviewed  Neurological Exam  Mental Status   Oriented to person, place, time and situation  Speech is normal  Language is fluent with no aphasia  Attention and concentration are normal  Fund of knowledge is appropriate for level of education  Apraxia absent  Cranial Nerves  CN II: Visual acuity is normal  Visual fields full to confrontation  CN III, IV, VI: Extraocular movements intact bilaterally  Left ptosis  Pupils equal round and reactive to light bilaterally  CN V: Facial sensation is normal   CN VII: Full and symmetric facial movement  CN VIII: Hearing is normal   CN IX, X: Palate elevates symmetrically  Normal gag reflex  CN XI: Shoulder shrug strength is normal   CN XII: Tongue midline without atrophy or fasciculations  Motor   Strength is 5/5 throughout all four extremities  Sensory  Sensation is intact to light touch, pinprick, vibration and proprioception in all four extremities  Reflexes  Deep tendon reflexes are 2+ and symmetric in all four extremities with downgoing toes bilaterally  Coordination  Finger-to-nose, rapid alternating movements and heel-to-shin normal bilaterally without dysmetria  Gait  Uses a rolling walker           ROS:    Review of Systems   Constitutional: Negative  Negative for appetite change and fever  HENT: Negative  Negative for hearing loss, tinnitus, trouble swallowing and voice change  Eyes: Negative  Negative for photophobia and pain  Respiratory: Negative  Negative for shortness of breath  Cardiovascular: Negative  Negative for palpitations  Gastrointestinal: Negative  Negative for nausea and vomiting  Endocrine: Negative  Negative for cold intolerance and heat intolerance  Genitourinary: Negative  Negative for dysuria, frequency and urgency  Musculoskeletal: Positive for gait problem  Negative for myalgias and neck pain  Skin: Negative  Negative for rash  Neurological: Negative for dizziness, tremors, seizures, syncope, facial asymmetry, speech difficulty, weakness, light-headedness, numbness and headaches  Hematological: Negative  Does not bruise/bleed easily  Psychiatric/Behavioral: Positive for confusion (occasionally) and decreased concentration (occasionally)  Negative for hallucinations and sleep disturbance  The patient is nervous/anxious (occasionally)  Review of systems personally reviewed

## 2019-02-21 NOTE — ASSESSMENT & PLAN NOTE
Memory has been stable  Memantine 10mg 2x daily will be continued  Twenty minutes were spent with patient reviewing history, examining and formulating a treatment plan  Pt was agreeable to the treatment plan  She will follow-up in 6 months

## 2019-02-21 NOTE — PATIENT INSTRUCTIONS
MCI (mild cognitive impairment)  Memory has been stable  Memantine 10mg 2x daily will be continued  Twenty minutes were spent with patient reviewing history, examining and formulating a treatment plan  Pt was agreeable to the treatment plan  She will follow-up in 6 months  Sixth nerve palsy of left eye  Resolved  Ptosis of left eyelid  Stable  Results of testing reviewed with the pt

## 2019-03-29 ENCOUNTER — TRANSCRIBE ORDERS (OUTPATIENT)
Dept: ADMINISTRATIVE | Facility: HOSPITAL | Age: 69
End: 2019-03-29

## 2019-03-29 DIAGNOSIS — Z12.31 SCREENING MAMMOGRAM, ENCOUNTER FOR: Primary | ICD-10-CM

## 2019-04-09 ENCOUNTER — HOSPITAL ENCOUNTER (OUTPATIENT)
Dept: RADIOLOGY | Facility: HOSPITAL | Age: 69
Discharge: HOME | End: 2019-04-09
Payer: MEDICARE

## 2019-04-09 VITALS — HEIGHT: 56 IN | WEIGHT: 198 LBS | BODY MASS INDEX: 44.54 KG/M2

## 2019-04-09 DIAGNOSIS — Z12.31 SCREENING MAMMOGRAM, ENCOUNTER FOR: ICD-10-CM

## 2019-04-09 PROCEDURE — 77067 SCR MAMMO BI INCL CAD: CPT

## 2019-04-12 ENCOUNTER — OFFICE VISIT (OUTPATIENT)
Dept: PULMONOLOGY | Facility: CLINIC | Age: 69
End: 2019-04-12
Payer: MEDICARE

## 2019-04-12 VITALS
TEMPERATURE: 98.1 F | HEIGHT: 56 IN | BODY MASS INDEX: 44.81 KG/M2 | SYSTOLIC BLOOD PRESSURE: 148 MMHG | OXYGEN SATURATION: 93 % | WEIGHT: 199.2 LBS | HEART RATE: 82 BPM | DIASTOLIC BLOOD PRESSURE: 76 MMHG

## 2019-04-12 DIAGNOSIS — R91.1 PULMONARY NODULE: ICD-10-CM

## 2019-04-12 DIAGNOSIS — Z23 NEED FOR PNEUMOCOCCAL VACCINATION: ICD-10-CM

## 2019-04-12 DIAGNOSIS — J30.2 SEASONAL ALLERGIES: ICD-10-CM

## 2019-04-12 DIAGNOSIS — J45.41 MODERATE PERSISTENT ASTHMA WITH ACUTE EXACERBATION: Primary | ICD-10-CM

## 2019-04-12 PROCEDURE — 90732 PPSV23 VACC 2 YRS+ SUBQ/IM: CPT | Performed by: NURSE PRACTITIONER

## 2019-04-12 PROCEDURE — G0009 ADMIN PNEUMOCOCCAL VACCINE: HCPCS | Performed by: NURSE PRACTITIONER

## 2019-04-12 PROCEDURE — 99214 OFFICE O/P EST MOD 30 MIN: CPT | Performed by: NURSE PRACTITIONER

## 2019-04-12 RX ORDER — PREDNISONE 20 MG/1
40 TABLET ORAL DAILY
Qty: 10 TABLET | Refills: 0 | Status: SHIPPED | OUTPATIENT
Start: 2019-04-12 | End: 2019-08-20 | Stop reason: ALTCHOICE

## 2019-07-08 ENCOUNTER — HOSPITAL ENCOUNTER (OUTPATIENT)
Dept: CT IMAGING | Facility: HOSPITAL | Age: 69
Discharge: HOME | End: 2019-07-08
Attending: INTERNAL MEDICINE
Payer: MEDICARE

## 2019-07-08 DIAGNOSIS — R91.1 LUNG NODULE: ICD-10-CM

## 2019-07-08 PROCEDURE — 71250 CT THORAX DX C-: CPT

## 2019-07-17 DIAGNOSIS — R91.1 PULMONARY NODULE: Primary | ICD-10-CM

## 2019-08-13 LAB — HBA1C MFR BLD HPLC: 8 %

## 2019-08-19 NOTE — ASSESSMENT & PLAN NOTE
· CT chest shows stable nodule in July of 2019  · CT of the chest ordered for July 2020  If stable, no further imaging per Dr Dany James last note

## 2019-08-19 NOTE — PROGRESS NOTES
Pulmonary Follow-Up Note   Ivette Anders 76 y o  female MRN: [id number]  8/20/2019      Assessment/Plan:    Problem List Items Addressed This Visit        Respiratory    Moderate persistent asthma without complication - Primary     · Continue Symbicort with DuoNeb nebulized twice daily  · Activity as tolerated  · Continue Singulair and Zyrtec and add Flonase  · Up-to-date on flu and pneumonia vaccines  Repeat flu vaccine this fall at facility  Other    Pulmonary nodule     · CT chest shows stable nodule in July of 2019  · CT of the chest ordered for July 2020  If stable, no further imaging per Dr Jade Dai last note  Seasonal allergies     · Continue Zyrtec, Singulair, and add Flonase for postnasal drip  Relevant Medications    fluticasone (FLONASE) 50 mcg/act nasal spray    Hoarse voice quality     · Reports acid reflux is controlled with Zantac  · Add Flonase for postnasal drip  · If no further improvement, will need follow-up with ENT  · No thrush on exam today  Education provided at this visit:   Need for Vaccination:  Up-to-date on flu and pneumonia vaccines   Pulmonary Rehab:  Not applicable   Smoking Cessation:  Not applicable   Lung Cancer Screening:  CT chest as above   Inhaler Use:  Reiterated proper use and technique    Return in about 3 months (around 11/20/2019), or if symptoms worsen or fail to improve  All of Jud's questions were answered prior to leaving the office today  She will follow-up with Dr Madonna Paris in 4 months or sooner should the need arise  She is aware to call our office with any further questions or concerns  History of Present Illness   Reason for Visit: Follow-Up  Chief Complaint:  "I'm doing pretty good    HPI: Ivette Anders is a 76 y o  female who presents to the office today for follow-up of asthma  Overall, Elner Pollen reports she is doing well    When she was last seen in our office, she required prednisone for an asthma exacerbation  She reports she required additional prednisone taper sometime between then and now, but has been doing well  She has good days and bad days in regard to her shortness of breath and has an occasional cough  She has had a hoarse voice over the last few weeks and attributes this to allergies  She does have postnasal drip and is no longer on nasal spray  She has controlled acid reflux on Zantac  She has no thrush  She rinses her mouth after using her Symbicort and uses her DuoNeb nebulized twice daily  She denies any fevers or chills  She denies any chest pain  She denies any other complaints  She does have a boot on her right foot for a wound that is slowly healing  She reports she is still able to be active and out of bed with her walker  Review of Systems   All other systems reviewed and are negative  A full 12-point review of systems was completed and is negative except for those outlined in the HPI       Historical Information   Past Medical History:   Diagnosis Date    Asthma     Diabetes mellitus ([facility] [address] )     Hyperlipidemia     Hypertension      Past Surgical History:   Procedure Laterality Date    KNEE SURGERY      TOE SURGERY       Family History   Problem Relation Age of Onset    Dementia Brother     Breast cancer Sister 76     Social History   Social History     Substance and Sexual Activity   Alcohol Use No     Social History     Substance and Sexual Activity   Drug Use No     Social History     Tobacco Use   Smoking Status Former Smoker    Packs/day: 1 00    Years: 52 00    Pack years: 52 00    Types: Cigarettes    Start date:     Last attempt to quit: 2017    Years since quittin 9   Smokeless Tobacco Never Used       Meds/Allergies     Current Outpatient Medications:     ACETAMINOPHEN PO, Take 650 mg by mouth every 12 (twelve) hours as needed for mild pain , Disp: , Rfl:     ALBUTEROL IN, Inhale 3 mg, Disp: , Rfl:     aspirin 81 MG tablet, Take by mouth, Disp: , Rfl:     bisacodyl (DULCOLAX) 10 mg suppository, Insert into the rectum Twice daily, Disp: , Rfl:     budesonide-formoterol (SYMBICORT) 160-4 5 mcg/act inhaler, Inhale 2 puffs 2 (two) times a day, Disp: 3 Inhaler, Rfl: 3    celecoxib (CeleBREX) 200 mg capsule, Take 100 mg by mouth daily , Disp: , Rfl:     cetirizine (ZyrTEC) 10 mg tablet, Take 10 mg by mouth daily, Disp: , Rfl:     Cholecalciferol (VITAMIN D3) 49983 units CAPS, Take 50,000 Units by mouth every 30 (thirty) days, Disp: , Rfl:     clindamycin (CLEOCIN) 300 MG capsule, Take 300 mg by mouth 4 (four) times a day, Disp: , Rfl:     dextromethorphan-guaiFENesin (DIABETIC SILTUSSIN-DM)  mg/5 mL oral liquid, Take 5 mL by mouth every 12 (twelve) hours, Disp: , Rfl:     gabapentin (NEURONTIN) 300 mg capsule, Take 300 mg by mouth 3 (three) times a day, Disp: , Rfl:     glimepiride (AMARYL) 2 mg tablet, Take 1 tablet by mouth daily with breakfast (Patient taking differently: Take 4 mg by mouth daily with breakfast ), Disp: , Rfl: 0    Glucose Blood (BL TEST STRIP PACK VI), by In Vitro route, Disp: , Rfl:     glucose blood (ONE TOUCH ULTRA TEST) test strip, by In Vitro route, Disp: , Rfl:     insulin glargine (LANTUS SOLOSTAR) 100 units/mL injection pen, Inject under the skin daily, Disp: , Rfl:     Ipratropium-Albuterol (DUONEB IN), Inhale 3 mg every 6 (six) hours as needed, Disp: , Rfl:     lisinopril (ZESTRIL) 2 5 mg tablet, Take 2 5 mg by mouth daily, Disp: , Rfl:     magnesium hydroxide (EQ MILK OF MAGNESIA) 400 mg/5 mL oral suspension, Take by mouth daily as needed for constipation, Disp: , Rfl:     memantine (NAMENDA) 10 mg tablet, 10 mg 2 (two) times a day , Disp: , Rfl:     metFORMIN (GLUCOPHAGE) 1000 MG tablet, Take 1,000 mg by mouth 2 (two) times a day with meals, Disp: , Rfl:     mirtazapine (REMERON) 30 mg tablet, Take 7 5 mg by mouth daily at bedtime , Disp: , Rfl:     montelukast (SINGULAIR) 10 mg tablet, Take 10 mg by mouth daily at bedtime, Disp: , Rfl:     Omeprazole 20 MG TBEC, Take 20 mg by mouth daily, Disp: , Rfl:     ondansetron (ZOFRAN) 4 mg tablet, Take 4 mg by mouth every 8 (eight) hours as needed for nausea or vomiting, Disp: , Rfl:     polyethylene glycol (MIRALAX) 17 g packet, 17 g daily, Disp: , Rfl:     ranitidine (ZANTAC) 75 MG tablet, Take 75 mg by mouth 2 (two) times a day, Disp: , Rfl:     simvastatin (ZOCOR) 40 mg tablet, Take 1 tablet by mouth daily at bedtime, Disp: , Rfl: 0    traZODone (DESYREL) 50 mg tablet, Take 50 mg by mouth daily at bedtime, Disp: , Rfl:     fluticasone (FLONASE) 50 mcg/act nasal spray, 1 spray into each nostril daily, Disp: 1 Bottle, Rfl: 5  Allergies   Allergen Reactions    Penicillins Shortness Of Breath    Cephalexin      Reaction Date: [id number];     Crestor  [Rosuvastatin]      Reaction Date: [id number];     Zithromax  [Azithromycin]        Vitals: Blood pressure 130/76, pulse 85, temperature (!) 97 4 °F (36 3 °C), temperature source Tympanic, height 4' 8" (1 422 m), weight 90 7 kg (200 lb), SpO2 91 %  Body mass index is 44 84 kg/m²  Oxygen Therapy  SpO2: 91 %  Oxygen Therapy: None (Room air)    Physical Exam:  Physical Exam   Constitutional: She is oriented to person, place, and time  She appears well-developed and well-nourished  She is cooperative  Non-toxic appearance  No distress  HENT:   Head: Normocephalic and atraumatic  Mouth/Throat: No oropharyngeal exudate  Eyes: EOM are normal  No scleral icterus  Neck: Neck supple  No JVD present  No tracheal deviation present  Cardiovascular: Normal rate, regular rhythm, S1 normal and S2 normal  Exam reveals no gallop and no friction rub  No murmur heard  Pulmonary/Chest: Effort normal and breath sounds normal  No accessory muscle usage or stridor  No tachypnea  No respiratory distress  She has no decreased breath sounds  She has no wheezes  She has no rhonchi  She has no rales   She exhibits no tenderness  Abdominal: Soft  Bowel sounds are normal  She exhibits no distension  There is no tenderness  There is no rebound and no guarding  Musculoskeletal: She exhibits no edema  Neurological: She is alert and oriented to person, place, and time  She has normal strength  GCS eye subscore is 4  GCS verbal subscore is 5  GCS motor subscore is 6  Skin: Skin is warm and dry  No rash noted  She is not diaphoretic  No cyanosis  Right foot boot in place  Psychiatric: She has a normal mood and affect  Her speech is normal and behavior is normal    Vitals reviewed  No new labs or diagnostic testing    CELINA Kenney  [facility]        Portions of the record may have been created with voice recognition software  Occasional wrong word or "sound a like" substitutions may have occurred due to the inherent limitations of voice recognition software  Read the chart carefully and recognize, using context, where substitutions have occurred or contact the dictating provider

## 2019-08-20 ENCOUNTER — OFFICE VISIT (OUTPATIENT)
Dept: PULMONOLOGY | Facility: CLINIC | Age: 69
End: 2019-08-20
Payer: MEDICARE

## 2019-08-20 VITALS
OXYGEN SATURATION: 91 % | WEIGHT: 200 LBS | HEIGHT: 56 IN | DIASTOLIC BLOOD PRESSURE: 76 MMHG | BODY MASS INDEX: 44.99 KG/M2 | HEART RATE: 85 BPM | SYSTOLIC BLOOD PRESSURE: 130 MMHG | TEMPERATURE: 97.4 F

## 2019-08-20 DIAGNOSIS — J45.40 MODERATE PERSISTENT ASTHMA WITHOUT COMPLICATION: Primary | ICD-10-CM

## 2019-08-20 DIAGNOSIS — J30.2 SEASONAL ALLERGIES: ICD-10-CM

## 2019-08-20 DIAGNOSIS — R49.0 HOARSE VOICE QUALITY: ICD-10-CM

## 2019-08-20 DIAGNOSIS — R91.1 PULMONARY NODULE: ICD-10-CM

## 2019-08-20 PROCEDURE — 99215 OFFICE O/P EST HI 40 MIN: CPT | Performed by: NURSE PRACTITIONER

## 2019-08-20 RX ORDER — CLINDAMYCIN HYDROCHLORIDE 300 MG/1
300 CAPSULE ORAL 4 TIMES DAILY
COMMUNITY
End: 2020-09-25 | Stop reason: HOSPADM

## 2019-08-20 RX ORDER — FLUTICASONE PROPIONATE 50 MCG
1 SPRAY, SUSPENSION (ML) NASAL DAILY
Qty: 1 BOTTLE | Refills: 5 | Status: SHIPPED | OUTPATIENT
Start: 2019-08-20

## 2019-08-20 RX ORDER — LISINOPRIL 2.5 MG/1
2.5 TABLET ORAL DAILY
COMMUNITY
End: 2022-06-28

## 2019-08-20 NOTE — ASSESSMENT & PLAN NOTE
· Continue Symbicort with DuoNeb nebulized twice daily  · Activity as tolerated  · Continue Singulair and Zyrtec and add Flonase  · Up-to-date on flu and pneumonia vaccines  Repeat flu vaccine this fall at facility

## 2019-08-20 NOTE — ASSESSMENT & PLAN NOTE
· Reports acid reflux is controlled with Zantac  · Add Flonase for postnasal drip  · If no further improvement, will need follow-up with ENT  · No thrush on exam today

## 2019-08-21 ENCOUNTER — OFFICE VISIT (OUTPATIENT)
Dept: NEUROLOGY | Facility: CLINIC | Age: 69
End: 2019-08-21
Payer: MEDICARE

## 2019-08-21 VITALS
HEART RATE: 84 BPM | SYSTOLIC BLOOD PRESSURE: 138 MMHG | HEIGHT: 56 IN | BODY MASS INDEX: 44.84 KG/M2 | DIASTOLIC BLOOD PRESSURE: 74 MMHG

## 2019-08-21 DIAGNOSIS — G31.84 MILD COGNITIVE IMPAIRMENT: Primary | ICD-10-CM

## 2019-08-21 PROCEDURE — 99213 OFFICE O/P EST LOW 20 MIN: CPT | Performed by: PHYSICIAN ASSISTANT

## 2019-08-21 NOTE — PATIENT INSTRUCTIONS
MCI (mild cognitive impairment)  Pt will continue memantine  She will continue to play memory games on the tablet, socialize and do crossword puzzles  She was encouraged to continue walking as well  Fifteen minutes were spent with the patient gathering history, examining patient and formulating a treatment plan  Pt/family understood and were in agreement with the treatment plan  She will follow-up in 1 year

## 2019-08-21 NOTE — ASSESSMENT & PLAN NOTE
Pt will continue memantine  She will continue to play memory games on the tablet, socialize and do crossword puzzles  She was encouraged to continue walking as well  Fifteen minutes were spent with the patient gathering history, examining patient and formulating a treatment plan  Pt/family understood and were in agreement with the treatment plan  She will follow-up in 1 year

## 2019-08-21 NOTE — PROGRESS NOTES
Patient ID: Susan Rueda is a 76 y o  female  Assessment/Plan:    MCI (mild cognitive impairment)  Pt will continue memantine  She will continue to play memory games on the tablet, socialize and do crossword puzzles  She was encouraged to continue walking as well  Fifteen minutes were spent with the patient gathering history, examining patient and formulating a treatment plan  Pt/family understood and were in agreement with the treatment plan  She will follow-up in 1 year  Problem List Items Addressed This Visit        Nervous and Auditory    Alzheimer's disease - Primary             Subjective:    YAQUELIN Barron returns today for neurologic follow-up  She was initially seen due to memory complaints  She was diagnosed with mild cognitive impairment likely due to chronic medical illness  She did not have any MRI findings consistent with Alzheimer's dementia  Her memory testing improved  She admits to being forgetful at times and believes that there has been some worsening  Pt reports that sometimes she forgets when she needs to do  She reports that her brother notices her difficulties at times  She is tolerating the memantine without difficulty  She does get around with a rolling walker  She does socialize, do crosswork puzzles and plays memory games on a tablet  She denies any recent hospitalizations or falls  The following portions of the patient's history were reviewed and updated as appropriate: allergies, current medications, past family history, past medical history, past social history, past surgical history and problem list          Objective:    Blood pressure 138/74, pulse 84, height 4' 8" (1 422 m)  Physical Exam   Eyes: Pupils are equal, round, and reactive to light  Lids are normal    Neurological: She has normal strength and normal reflexes  Coordination normal    Psychiatric: Her speech is normal    Vitals reviewed        Neurological Exam  Mental Status   Oriented to person, place, time and situation  Speech is normal  Language is fluent with no aphasia  Fund of knowledge is appropriate for level of education  Apraxia absent  Cranial Nerves  CN II: Visual acuity is normal  Visual fields full to confrontation  CN III, IV, VI: Extraocular movements intact bilaterally  Normal lids and orbits bilaterally  Pupils equal round and reactive to light bilaterally  CN V: Facial sensation is normal   CN VII: Full and symmetric facial movement  CN VIII: Hearing is normal   CN IX, X: Palate elevates symmetrically  Normal gag reflex  CN XI: Shoulder shrug strength is normal   CN XII: Tongue midline without atrophy or fasciculations  Motor   Strength is 5/5 throughout all four extremities  Sensory  Sensation is intact to light touch, pinprick, vibration and proprioception in all four extremities  Reflexes  Deep tendon reflexes are 2+ and symmetric in all four extremities with downgoing toes bilaterally  Coordination  Finger-to-nose, rapid alternating movements and heel-to-shin normal bilaterally without dysmetria  Gait  Uses a rolling walker  ROS:    Review of Systems   Constitutional: Negative  Negative for appetite change and fever  HENT: Negative  Negative for hearing loss, tinnitus, trouble swallowing and voice change (ruff voice)  Eyes: Negative  Negative for photophobia and pain  Respiratory: Positive for shortness of breath  Cardiovascular: Negative  Negative for palpitations  Gastrointestinal: Negative  Negative for nausea and vomiting  Endocrine: Negative  Negative for cold intolerance and heat intolerance  Genitourinary: Negative  Negative for dysuria, frequency and urgency  Musculoskeletal: Positive for neck pain (spine surgery at one time)  Negative for myalgias  Skin: Negative  Negative for rash  Neurological: Positive for dizziness and weakness   Negative for tremors, seizures, syncope, facial asymmetry, speech difficulty, light-headedness, numbness and headaches (once and awhile)  Hematological: Negative  Does not bruise/bleed easily  Psychiatric/Behavioral: Positive for confusion and decreased concentration (some times)  Negative for hallucinations and sleep disturbance  Review of systems personally reviewed

## 2019-11-26 NOTE — PROGRESS NOTES
Pulmonary Follow Up Note   Kimber Espinoza 71 y o  female MRN: [id number]  11/27/2019      Assessment:  1  Asthma - moderate persistent with acute exacerbation  · Normal spirometry previously and with no emphysematous changes on CT - She does not have COPD  · Mild exacerbation currently noted  · Will continue with Zyrtec and singulair  · Cont Symbicort 160/4 5 2puffs BID  · Continue duonebs TID-QID for now  · Restart Prednisone 40mg daily x 5 days, add doxycycline 100mg BID x 7 days  · Advised to call if not improving in next 2-3 days  · Flu Vaccine 2019 at [facility], [address] 2015, pneumovax 1999/2019  · Follow up in 4 months or sooner as needed     2  Pulmonary Nodule - repeat CT 7/2020     3  Acute tracheobronchitis  · Trial of doxycycline x 7 days     4  Dementia  Plan:    Diagnoses and all orders for this visit:    Moderate persistent asthma with acute exacerbation  -     doxycycline monohydrate (MONODOX) 100 mg capsule; Take 1 capsule (100 mg total) by mouth 2 (two) times a day for 7 days  -     predniSONE 20 mg tablet; Take 2 tablets (40 mg total) by mouth daily for 5 days    Tracheobronchitis  -     doxycycline monohydrate (MONODOX) 100 mg capsule; Take 1 capsule (100 mg total) by mouth 2 (two) times a day for 7 days  -     predniSONE 20 mg tablet; Take 2 tablets (40 mg total) by mouth daily for 5 days    Pulmonary nodule        Return in about 4 months (around 3/27/2020) for Recheck  History of Present Illness   HPI:  Kimber Espinoza is a 71 y o  female who was initially seen during an admission for hypoxia and encephalopathy in August 2017  Her hypoxia resolved and she was at that time diagnosed with possible COPD without acute exacerbation  She did not require supplemental O2 at discharge  She has progressive dementia and resides at [address] [place] [address]  She was last seen by me in Oct 2018 and plans made for increase symbicort 2puffs BID and duonebs BID with repeat CT Chest in July 2019    She has since been seen by Lolly Nunnelly in April 2019 for mild asthma exacerbation and again in August 2019  She presents today for follow up      She reports she had felt well until 1 week ago  She has had increased cough, purulent sputum production and dyspnea  She reported given OCS course with some improvement but had completed and cough has worsened  She did awaken one night with dyspnea relieved with neb  She denied fevers or chills, no hemoptysis  She denied SSCP  She did report some intermittent GERD symptoms but no aspiration events  Review of Systems   Constitutional: Negative for activity change, chills, fatigue and fever  HENT: Negative for congestion, mouth sores, postnasal drip, rhinorrhea, sore throat, trouble swallowing and voice change  Respiratory: Positive for cough and shortness of breath  Negative for chest tightness and wheezing  Cardiovascular: Negative for chest pain, palpitations and leg swelling  Gastrointestinal: Negative for abdominal pain, nausea and vomiting  Endocrine: Negative for cold intolerance and heat intolerance  Musculoskeletal: Positive for gait problem  Negative for arthralgias  Skin: Negative for rash  Allergic/Immunologic: Negative for immunocompromised state  Neurological: Negative for dizziness, syncope, light-headedness and headaches  Hematological: Negative for adenopathy  Psychiatric/Behavioral: Negative for sleep disturbance  The patient is not nervous/anxious          Historical Information   Past Medical History:   Diagnosis Date    Asthma     Diabetes mellitus ([facility] [place] [address] )     Hyperlipidemia     Hypertension      Past Surgical History:   Procedure Laterality Date    KNEE SURGERY      TOE SURGERY       Family History   Problem Relation Age of Onset    Dementia Brother     Breast cancer Sister 76         Meds/Allergies     Current Outpatient Medications:     ACETAMINOPHEN PO, Take 650 mg by mouth every 12 (twelve) hours as needed for mild pain , Disp: , Rfl:    ALBUTEROL IN, Inhale 3 mg, Disp: , Rfl:     aspirin 81 MG tablet, Take by mouth, Disp: , Rfl:     bisacodyl (DULCOLAX) 10 mg suppository, Insert into the rectum Twice daily, Disp: , Rfl:     budesonide-formoterol (SYMBICORT) 160-4 5 mcg/act inhaler, Inhale 2 puffs 2 (two) times a day, Disp: 3 Inhaler, Rfl: 3    celecoxib (CeleBREX) 200 mg capsule, Take 100 mg by mouth daily , Disp: , Rfl:     cetirizine (ZyrTEC) 10 mg tablet, Take 10 mg by mouth daily, Disp: , Rfl:     Cholecalciferol (VITAMIN D3) 28716 units CAPS, Take 50,000 Units by mouth every 30 (thirty) days, Disp: , Rfl:     clindamycin (CLEOCIN) 300 MG capsule, Take 300 mg by mouth 4 (four) times a day, Disp: , Rfl:     dextromethorphan-guaiFENesin (DIABETIC SILTUSSIN-DM)  mg/5 mL oral liquid, Take 5 mL by mouth every 12 (twelve) hours, Disp: , Rfl:     fluticasone (FLONASE) 50 mcg/act nasal spray, 1 spray into each nostril daily, Disp: 1 Bottle, Rfl: 5    gabapentin (NEURONTIN) 300 mg capsule, Take 300 mg by mouth 3 (three) times a day, Disp: , Rfl:     glimepiride (AMARYL) 2 mg tablet, Take 1 tablet by mouth daily with breakfast (Patient taking differently: Take 4 mg by mouth daily with breakfast ), Disp: , Rfl: 0    Glucose Blood (BL TEST STRIP PACK VI), by In Vitro route, Disp: , Rfl:     glucose blood (ONE TOUCH ULTRA TEST) test strip, by In Vitro route, Disp: , Rfl:     insulin glargine (LANTUS SOLOSTAR) 100 units/mL injection pen, Inject under the skin daily, Disp: , Rfl:     Ipratropium-Albuterol (DUONEB IN), Inhale 3 mg every 6 (six) hours as needed, Disp: , Rfl:     lisinopril (ZESTRIL) 2 5 mg tablet, Take 2 5 mg by mouth daily, Disp: , Rfl:     magnesium hydroxide (EQ MILK OF MAGNESIA) 400 mg/5 mL oral suspension, Take by mouth daily as needed for constipation, Disp: , Rfl:     memantine (NAMENDA) 10 mg tablet, 10 mg 2 (two) times a day , Disp: , Rfl:     metFORMIN (GLUCOPHAGE) 1000 MG tablet, Take 1,000 mg by mouth 2 (two) times a day with meals, Disp: , Rfl:     mirtazapine (REMERON) 30 mg tablet, Take 7 5 mg by mouth daily at bedtime , Disp: , Rfl:     montelukast (SINGULAIR) 10 mg tablet, Take 10 mg by mouth daily at bedtime, Disp: , Rfl:     Omeprazole 20 MG TBEC, Take 20 mg by mouth daily, Disp: , Rfl:     ondansetron (ZOFRAN) 4 mg tablet, Take 4 mg by mouth every 8 (eight) hours as needed for nausea or vomiting, Disp: , Rfl:     polyethylene glycol (MIRALAX) 17 g packet, 17 g daily, Disp: , Rfl:     ranitidine (ZANTAC) 75 MG tablet, Take 75 mg by mouth 2 (two) times a day, Disp: , Rfl:     simvastatin (ZOCOR) 40 mg tablet, Take 1 tablet by mouth daily at bedtime, Disp: , Rfl: 0    traZODone (DESYREL) 50 mg tablet, Take 50 mg by mouth daily at bedtime, Disp: , Rfl:     doxycycline monohydrate (MONODOX) 100 mg capsule, Take 1 capsule (100 mg total) by mouth 2 (two) times a day for 7 days, Disp: 14 capsule, Rfl: 0    predniSONE 20 mg tablet, Take 2 tablets (40 mg total) by mouth daily for 5 days, Disp: 10 tablet, Rfl: 0  Allergies   Allergen Reactions    Penicillins Shortness Of Breath    Cephalexin      Reaction Date: 26May2011;     Crestor  [Rosuvastatin]      Reaction Date: [id number];     Zithromax  [Azithromycin]        Vitals: Blood pressure 148/76, pulse 92, temperature 97 6 °F (36 4 °C), temperature source Tympanic, height 4' 8" (1 422 m), weight 89 6 kg (197 lb 9 6 oz), SpO2 91 %  Body mass index is 44 3 kg/m²  Oxygen Therapy  SpO2: 91 %  Oxygen Therapy: None (Room air)      Physical Exam  Physical Exam   Constitutional: She is oriented to person, place, and time  She appears well-developed and well-nourished  No distress  Obese female, no distress, conversant   HENT:   Head: Normocephalic and atraumatic  Right Ear: External ear normal    Left Ear: External ear normal    Nose: Nose normal    Mouth/Throat: No oropharyngeal exudate  No thrush   Eyes: Pupils are equal, round, and reactive to light  Conjunctivae are normal  Right eye exhibits no discharge  Left eye exhibits no discharge  No scleral icterus  Neck: No JVD present  No tracheal deviation present  Cardiovascular: Normal rate, regular rhythm and normal heart sounds  No murmur heard  Pulmonary/Chest: Effort normal  No stridor  No respiratory distress  She has wheezes  She has no rales  Moderate scattered central rhonchi with scattered basilar expiratory wheeze   Abdominal: Soft  Bowel sounds are normal  She exhibits no distension  There is no tenderness  Obese, otherwise normal   Musculoskeletal: She exhibits edema  She exhibits no deformity  Trace pretibial edema b/l   Lymphadenopathy:     She has no cervical adenopathy  Neurological: She is alert and oriented to person, place, and time  Skin: Skin is warm and dry  Capillary refill takes less than 2 seconds  No rash noted  No erythema  Psychiatric: She has a normal mood and affect  Her behavior is normal    Vitals reviewed  Labs: I have personally reviewed pertinent lab results    Lab Results   Component Value Date    WBC 9 64 08/23/2017    HGB 15 1 08/23/2017    HCT 44 4 08/23/2017    MCV 90 08/23/2017     08/23/2017     Lab Results   Component Value Date    GLUCOSE 171 (H) 09/21/2015    CALCIUM 8 5 08/21/2017     (L) 09/21/2015    K 3 9 08/21/2017    CO2 23 08/21/2017     08/21/2017    BUN 12 08/21/2017    CREATININE 0 56 (L) 08/21/2017     No results found for: IGE  Lab Results   Component Value Date    ALT 25 08/20/2017    AST 13 08/20/2017    ALKPHOS 88 08/20/2017    BILITOT 0 61 09/21/2015       Imaging and other studies: New images and reports personally reviewed  CT Chest 7/8/19 - stable LYLA apical nodule, measures at 7mm per radiology, 4mm RUL pulmonary nodule, no significant mediastinal adenopathy    CT Chest 7/9/18 - stable 8mm LYLA and 3mm RUL nodules, no sig mediastinal adenopathy, mild basilar atelectasis     Pulmonary function testing: 11/1/2017 - Ratio 69%, %, FEV1 115% - normal spirometry - shortened expiratory time but with values >100% predicted  Kayleen Duval DO, [facility]

## 2019-11-27 ENCOUNTER — OFFICE VISIT (OUTPATIENT)
Dept: PULMONOLOGY | Facility: CLINIC | Age: 69
End: 2019-11-27
Payer: MEDICARE

## 2019-11-27 VITALS
OXYGEN SATURATION: 91 % | BODY MASS INDEX: 44.45 KG/M2 | HEART RATE: 92 BPM | HEIGHT: 56 IN | WEIGHT: 197.6 LBS | DIASTOLIC BLOOD PRESSURE: 76 MMHG | SYSTOLIC BLOOD PRESSURE: 148 MMHG | TEMPERATURE: 97.6 F

## 2019-11-27 DIAGNOSIS — R91.1 PULMONARY NODULE: ICD-10-CM

## 2019-11-27 DIAGNOSIS — J45.41 MODERATE PERSISTENT ASTHMA WITH ACUTE EXACERBATION: Primary | ICD-10-CM

## 2019-11-27 DIAGNOSIS — J40 TRACHEOBRONCHITIS: ICD-10-CM

## 2019-11-27 PROCEDURE — 99214 OFFICE O/P EST MOD 30 MIN: CPT | Performed by: INTERNAL MEDICINE

## 2019-11-27 RX ORDER — DOXYCYCLINE 100 MG/1
100 CAPSULE ORAL 2 TIMES DAILY
Qty: 14 CAPSULE | Refills: 0 | Status: SHIPPED | OUTPATIENT
Start: 2019-11-27 | End: 2019-12-04

## 2019-11-27 RX ORDER — PREDNISONE 20 MG/1
40 TABLET ORAL DAILY
Qty: 10 TABLET | Refills: 0 | Status: SHIPPED | OUTPATIENT
Start: 2019-11-27 | End: 2019-12-02

## 2020-05-26 ENCOUNTER — TELEMEDICINE (OUTPATIENT)
Dept: PULMONOLOGY | Facility: CLINIC | Age: 70
End: 2020-05-26
Payer: MEDICARE

## 2020-05-26 DIAGNOSIS — J45.40 MODERATE PERSISTENT ASTHMA WITHOUT COMPLICATION: Primary | ICD-10-CM

## 2020-05-26 DIAGNOSIS — R91.1 PULMONARY NODULE: ICD-10-CM

## 2020-05-26 PROCEDURE — 99442 PR PHYS/QHP TELEPHONE EVALUATION 11-20 MIN: CPT | Performed by: PHYSICIAN ASSISTANT

## 2020-07-28 ENCOUNTER — TELEPHONE (OUTPATIENT)
Dept: NEUROLOGY | Facility: CLINIC | Age: 70
End: 2020-07-28

## 2020-09-10 ENCOUNTER — TRANSCRIBE ORDERS (OUTPATIENT)
Dept: ADMINISTRATIVE | Facility: HOSPITAL | Age: 70
End: 2020-09-10

## 2020-09-10 DIAGNOSIS — Z12.31 ENCOUNTER FOR SCREENING MAMMOGRAM FOR MALIGNANT NEOPLASM OF BREAST: Primary | ICD-10-CM

## 2020-09-16 ENCOUNTER — HOSPITAL ENCOUNTER (OUTPATIENT)
Dept: MAMMOGRAPHY | Facility: HOSPITAL | Age: 70
Discharge: HOME | End: 2020-09-16
Payer: MEDICARE

## 2020-09-16 VITALS — BODY MASS INDEX: 44.32 KG/M2 | WEIGHT: 197 LBS | HEIGHT: 56 IN

## 2020-09-16 DIAGNOSIS — Z12.31 ENCOUNTER FOR SCREENING MAMMOGRAM FOR MALIGNANT NEOPLASM OF BREAST: ICD-10-CM

## 2020-09-16 PROCEDURE — 77067 SCR MAMMO BI INCL CAD: CPT

## 2020-09-19 ENCOUNTER — APPOINTMENT (EMERGENCY)
Dept: RADIOLOGY | Facility: HOSPITAL | Age: 70
DRG: 871 | End: 2020-09-19
Payer: MEDICARE

## 2020-09-19 ENCOUNTER — APPOINTMENT (EMERGENCY)
Dept: CT IMAGING | Facility: HOSPITAL | Age: 70
DRG: 871 | End: 2020-09-19
Payer: MEDICARE

## 2020-09-19 ENCOUNTER — HOSPITAL ENCOUNTER (INPATIENT)
Facility: HOSPITAL | Age: 70
LOS: 6 days | Discharge: NURSING, REHAB OR LONG-TERM CARE | DRG: 871 | End: 2020-09-25
Attending: EMERGENCY MEDICINE | Admitting: INTERNAL MEDICINE
Payer: MEDICARE

## 2020-09-19 DIAGNOSIS — A41.9 SEVERE SEPSIS (HCC): Primary | ICD-10-CM

## 2020-09-19 DIAGNOSIS — G89.4 CHRONIC PAIN SYNDROME: ICD-10-CM

## 2020-09-19 DIAGNOSIS — J45.909 ASTHMA: ICD-10-CM

## 2020-09-19 DIAGNOSIS — G47.33 OSA (OBSTRUCTIVE SLEEP APNEA): ICD-10-CM

## 2020-09-19 DIAGNOSIS — J18.9 PNEUMONIA: ICD-10-CM

## 2020-09-19 DIAGNOSIS — R65.20 SEVERE SEPSIS (HCC): Primary | ICD-10-CM

## 2020-09-19 DIAGNOSIS — J18.9 PNEUMONIA OF BOTH LOWER LOBES DUE TO INFECTIOUS ORGANISM: ICD-10-CM

## 2020-09-19 DIAGNOSIS — F41.9 ANXIETY: ICD-10-CM

## 2020-09-19 DIAGNOSIS — J45.40 MODERATE PERSISTENT ASTHMA WITHOUT COMPLICATION: ICD-10-CM

## 2020-09-19 PROBLEM — N17.9 AKI (ACUTE KIDNEY INJURY) (HCC): Status: ACTIVE | Noted: 2020-09-19

## 2020-09-19 PROBLEM — E78.5 HYPERLIPIDEMIA: Status: ACTIVE | Noted: 2020-09-19

## 2020-09-19 LAB
ALBUMIN SERPL BCP-MCNC: 3.6 G/DL (ref 3.5–5)
ALP SERPL-CCNC: 71 U/L (ref 46–116)
ALT SERPL W P-5'-P-CCNC: 34 U/L (ref 12–78)
ANION GAP SERPL CALCULATED.3IONS-SCNC: 8 MMOL/L (ref 4–13)
APTT PPP: 27 SECONDS (ref 23–37)
AST SERPL W P-5'-P-CCNC: 29 U/L (ref 5–45)
BACTERIA UR QL AUTO: ABNORMAL /HPF
BASOPHILS # BLD AUTO: 0.04 THOUSANDS/ΜL (ref 0–0.1)
BASOPHILS NFR BLD AUTO: 0 % (ref 0–1)
BILIRUB SERPL-MCNC: 0.68 MG/DL (ref 0.2–1)
BILIRUB UR QL STRIP: NEGATIVE
BUN SERPL-MCNC: 25 MG/DL (ref 5–25)
CALCIUM SERPL-MCNC: 9.2 MG/DL (ref 8.3–10.1)
CHLORIDE SERPL-SCNC: 97 MMOL/L (ref 100–108)
CLARITY UR: CLEAR
CO2 SERPL-SCNC: 30 MMOL/L (ref 21–32)
COLOR UR: YELLOW
CREAT SERPL-MCNC: 1.33 MG/DL (ref 0.6–1.3)
EOSINOPHIL # BLD AUTO: 0.13 THOUSAND/ΜL (ref 0–0.61)
EOSINOPHIL NFR BLD AUTO: 1 % (ref 0–6)
ERYTHROCYTE [DISTWIDTH] IN BLOOD BY AUTOMATED COUNT: 15.9 % (ref 11.6–15.1)
GFR SERPL CREATININE-BSD FRML MDRD: 41 ML/MIN/1.73SQ M
GLUCOSE SERPL-MCNC: 196 MG/DL (ref 65–140)
GLUCOSE UR STRIP-MCNC: NEGATIVE MG/DL
HCT VFR BLD AUTO: 37.2 % (ref 34.8–46.1)
HGB BLD-MCNC: 11.8 G/DL (ref 11.5–15.4)
HGB UR QL STRIP.AUTO: ABNORMAL
IMM GRANULOCYTES # BLD AUTO: 0.1 THOUSAND/UL (ref 0–0.2)
IMM GRANULOCYTES NFR BLD AUTO: 1 % (ref 0–2)
INR PPP: 0.96 (ref 0.84–1.19)
KETONES UR STRIP-MCNC: NEGATIVE MG/DL
LACTATE SERPL-SCNC: 1.5 MMOL/L (ref 0.5–2)
LACTATE SERPL-SCNC: 2.4 MMOL/L (ref 0.5–2)
LEUKOCYTE ESTERASE UR QL STRIP: NEGATIVE
LYMPHOCYTES # BLD AUTO: 1.05 THOUSANDS/ΜL (ref 0.6–4.47)
LYMPHOCYTES NFR BLD AUTO: 6 % (ref 14–44)
MCH RBC QN AUTO: 28.2 PG (ref 26.8–34.3)
MCHC RBC AUTO-ENTMCNC: 31.7 G/DL (ref 31.4–37.4)
MCV RBC AUTO: 89 FL (ref 82–98)
MONOCYTES # BLD AUTO: 1 THOUSAND/ΜL (ref 0.17–1.22)
MONOCYTES NFR BLD AUTO: 6 % (ref 4–12)
NEUTROPHILS # BLD AUTO: 14.79 THOUSANDS/ΜL (ref 1.85–7.62)
NEUTS SEG NFR BLD AUTO: 86 % (ref 43–75)
NITRITE UR QL STRIP: NEGATIVE
NON-SQ EPI CELLS URNS QL MICRO: ABNORMAL /HPF
NRBC BLD AUTO-RTO: 0 /100 WBCS
PH UR STRIP.AUTO: 6 [PH]
PLATELET # BLD AUTO: 202 THOUSANDS/UL (ref 149–390)
PMV BLD AUTO: 10.1 FL (ref 8.9–12.7)
POTASSIUM SERPL-SCNC: 4.8 MMOL/L (ref 3.5–5.3)
PROT SERPL-MCNC: 8.3 G/DL (ref 6.4–8.2)
PROT UR STRIP-MCNC: ABNORMAL MG/DL
PROTHROMBIN TIME: 12.9 SECONDS (ref 11.6–14.5)
RBC # BLD AUTO: 4.19 MILLION/UL (ref 3.81–5.12)
RBC #/AREA URNS AUTO: ABNORMAL /HPF
SARS-COV-2 RNA RESP QL NAA+PROBE: NEGATIVE
SODIUM SERPL-SCNC: 135 MMOL/L (ref 136–145)
SP GR UR STRIP.AUTO: 1.01 (ref 1–1.03)
UROBILINOGEN UR QL STRIP.AUTO: 0.2 E.U./DL
WBC # BLD AUTO: 17.11 THOUSAND/UL (ref 4.31–10.16)
WBC #/AREA URNS AUTO: ABNORMAL /HPF

## 2020-09-19 PROCEDURE — 85025 COMPLETE CBC W/AUTO DIFF WBC: CPT | Performed by: EMERGENCY MEDICINE

## 2020-09-19 PROCEDURE — 36415 COLL VENOUS BLD VENIPUNCTURE: CPT

## 2020-09-19 PROCEDURE — 80053 COMPREHEN METABOLIC PANEL: CPT | Performed by: EMERGENCY MEDICINE

## 2020-09-19 PROCEDURE — 93005 ELECTROCARDIOGRAM TRACING: CPT

## 2020-09-19 PROCEDURE — 96366 THER/PROPH/DIAG IV INF ADDON: CPT

## 2020-09-19 PROCEDURE — 74177 CT ABD & PELVIS W/CONTRAST: CPT

## 2020-09-19 PROCEDURE — 84145 PROCALCITONIN (PCT): CPT | Performed by: EMERGENCY MEDICINE

## 2020-09-19 PROCEDURE — 99223 1ST HOSP IP/OBS HIGH 75: CPT | Performed by: NURSE PRACTITIONER

## 2020-09-19 PROCEDURE — 87040 BLOOD CULTURE FOR BACTERIA: CPT | Performed by: EMERGENCY MEDICINE

## 2020-09-19 PROCEDURE — 85730 THROMBOPLASTIN TIME PARTIAL: CPT | Performed by: EMERGENCY MEDICINE

## 2020-09-19 PROCEDURE — 85610 PROTHROMBIN TIME: CPT | Performed by: EMERGENCY MEDICINE

## 2020-09-19 PROCEDURE — 96365 THER/PROPH/DIAG IV INF INIT: CPT

## 2020-09-19 PROCEDURE — G1004 CDSM NDSC: HCPCS

## 2020-09-19 PROCEDURE — 99285 EMERGENCY DEPT VISIT HI MDM: CPT | Performed by: EMERGENCY MEDICINE

## 2020-09-19 PROCEDURE — 83605 ASSAY OF LACTIC ACID: CPT | Performed by: EMERGENCY MEDICINE

## 2020-09-19 PROCEDURE — 71260 CT THORAX DX C+: CPT

## 2020-09-19 PROCEDURE — 87635 SARS-COV-2 COVID-19 AMP PRB: CPT | Performed by: EMERGENCY MEDICINE

## 2020-09-19 PROCEDURE — 71045 X-RAY EXAM CHEST 1 VIEW: CPT

## 2020-09-19 PROCEDURE — 99285 EMERGENCY DEPT VISIT HI MDM: CPT

## 2020-09-19 PROCEDURE — 81001 URINALYSIS AUTO W/SCOPE: CPT | Performed by: EMERGENCY MEDICINE

## 2020-09-19 RX ORDER — ACETAMINOPHEN 325 MG/1
650 TABLET ORAL ONCE
Status: COMPLETED | OUTPATIENT
Start: 2020-09-19 | End: 2020-09-19

## 2020-09-19 RX ORDER — LEVOFLOXACIN 5 MG/ML
750 INJECTION, SOLUTION INTRAVENOUS ONCE
Status: COMPLETED | OUTPATIENT
Start: 2020-09-19 | End: 2020-09-19

## 2020-09-19 RX ADMIN — MORPHINE SULFATE 2 MG: 2 INJECTION, SOLUTION INTRAMUSCULAR; INTRAVENOUS at 23:20

## 2020-09-19 RX ADMIN — SODIUM CHLORIDE 1000 ML: 0.9 INJECTION, SOLUTION INTRAVENOUS at 19:39

## 2020-09-19 RX ADMIN — VANCOMYCIN HYDROCHLORIDE 1500 MG: 1 INJECTION, POWDER, LYOPHILIZED, FOR SOLUTION INTRAVENOUS at 23:20

## 2020-09-19 RX ADMIN — IOHEXOL 100 ML: 350 INJECTION, SOLUTION INTRAVENOUS at 20:27

## 2020-09-19 RX ADMIN — ACETAMINOPHEN 650 MG: 325 TABLET, FILM COATED ORAL at 19:37

## 2020-09-19 RX ADMIN — LEVOFLOXACIN 750 MG: 5 INJECTION, SOLUTION INTRAVENOUS at 20:06

## 2020-09-19 NOTE — ED PROVIDER NOTES
History  Chief Complaint   Patient presents with    Shortness of Breath     Pt presents to ED via EMS from [address] [place] [address] w/ fever, sob, cough for days  Pt tested for covid Tues, was negative  Pt (+) hx COPD     Patient is a 75-year-old female with a history of COPD who presents with a fever  She resides at [address] [place] [address] and has reportedly had a fever, shortness of breath and cough for several days  Patient states that it worsened today  She admits to shortness of breath and a nonproductive cough  She is on oxygen on an as-needed basis  She was tested for COVID-19 on Tuesday and it was negative  She has a history of COPD and has been receiving her bronchodilators  She denies chest pain, abdominal pain, vomiting, diarrhea or other complaints  History provided by:  Patient  Fever - 9 weeks to 74 years   Severity:  Moderate  Onset quality:  Gradual  Timing:  Constant  Progression:  Worsening  Chronicity:  New  Ineffective treatments:  Acetaminophen  Associated symptoms: cough    Associated symptoms: no chest pain, no chills, no diarrhea, no dysuria, no headaches, no nausea, no rash, no sore throat and no vomiting        Prior to Admission Medications   Prescriptions Last Dose Informant Patient Reported? Taking?    [address] (Specify) Yes No   Sig: Take 650 mg by mouth every 12 (twelve) hours as needed for mild pain    ALBUTEROL IN  Outside Facility (Specify) Yes No   Sig: Inhale 3 mg   Cholecalciferol (VITAMIN D3) 01521 units CAPS  Outside Facility (Specify) Yes No   Sig: Take 50,000 Units by mouth every 30 (thirty) days   Glucose Blood (BL TEST STRIP PACK VI)  Outside Facility (Specify) Yes No   Sig: by In Vitro route   Ipratropium-Albuterol (DUONEB IN)  Outside Facility (Specify) Yes No   Sig: Inhale 3 mg every 6 (six) hours as needed   Omeprazole 20 MG TBEC  Outside Facility (Specify) Yes No   Sig: Take 20 mg by mouth daily   aspirin 81 MG tablet  Outside Facility (Specify) Yes No   Sig: Take by mouth   bisacodyl (DULCOLAX) 10 mg suppository  Outside Facility (Specify) Yes No   Sig: Insert into the rectum Twice daily   budesonide-formoterol (SYMBICORT) 160-4 5 mcg/act inhaler  Outside Facility (Specify) No No   Sig: Inhale 2 puffs 2 (two) times a day   celecoxib (CeleBREX) 200 mg capsule  Outside Facility (Specify) Yes No   Sig: Take 100 mg by mouth daily    cetirizine (ZyrTEC) 10 mg tablet  Outside Facility (Specify) Yes No   Sig: Take 10 mg by mouth daily   clindamycin (CLEOCIN) 300 MG capsule  Outside Facility (Specify) Yes No   Sig: Take 300 mg by mouth 4 (four) times a day   dextromethorphan-guaiFENesin (DIABETIC SILTUSSIN-DM)  mg/5 mL oral liquid  Outside Facility (Specify) Yes No   Sig: Take 5 mL by mouth every 12 (twelve) hours   fluticasone (FLONASE) 50 mcg/act nasal spray  Outside Facility (Specify) No No   Si spray into each nostril daily   gabapentin (NEURONTIN) 300 mg capsule  Outside Facility (Specify) Yes No   Sig: Take 300 mg by mouth 3 (three) times a day   glimepiride (AMARYL) 2 mg tablet  Outside Facility (Specify) No No   Sig: Take 1 tablet by mouth daily with breakfast   Patient taking differently: Take 4 mg by mouth daily with breakfast    glucose blood (ONE TOUCH ULTRA TEST) test strip  Outside Facility (Specify) Yes No   Sig: by In Vitro route   insulin glargine (LANTUS SOLOSTAR) 100 units/mL injection pen  Outside Facility (Specify) Yes No   Sig: Inject under the skin daily   lisinopril (ZESTRIL) 2 5 mg tablet  Outside Facility (Specify) Yes No   Sig: Take 2 5 mg by mouth daily   magnesium hydroxide (EQ MILK OF MAGNESIA) 400 mg/5 mL oral suspension  Outside Facility (Specify) Yes No   Sig: Take by mouth daily as needed for constipation   memantine (NAMENDA) 10 mg tablet  Outside Facility (Specify) Yes No   Sig: 10 mg 2 (two) times a day    metFORMIN (GLUCOPHAGE) 1000 MG tablet  Outside Facility (Specify) Yes No   Sig: Take 1,000 mg by mouth 2 (two) times a day with meals   mirtazapine (REMERON) 30 mg tablet  Outside Facility (Specify) Yes No   Sig: Take 7 5 mg by mouth daily at bedtime    montelukast (SINGULAIR) 10 mg tablet  Outside Facility (Specify) Yes No   Sig: Take 10 mg by mouth daily at bedtime   ondansetron (ZOFRAN) 4 mg tablet  Outside Facility (Specify) Yes No   Sig: Take 4 mg by mouth every 8 (eight) hours as needed for nausea or vomiting   polyethylene glycol (MIRALAX) 17 g packet  Outside Facility (Specify) Yes No   Si g daily   ranitidine (ZANTAC) 75 MG tablet  Outside Facility (Specify) Yes No   Sig: Take 75 mg by mouth 2 (two) times a day   simvastatin (ZOCOR) 40 mg tablet  Outside Facility (Specify) No No   Sig: Take 1 tablet by mouth daily at bedtime   traZODone (DESYREL) 50 mg tablet  Outside Facility (Specify) Yes No   Sig: Take 50 mg by mouth daily at bedtime      Facility-Administered Medications: None       Past Medical History:   Diagnosis Date    Asthma     Diabetes mellitus ([facility] [address] )     Hyperlipidemia     Hypertension        Past Surgical History:   Procedure Laterality Date    KNEE SURGERY      TOE SURGERY         Family History   Problem Relation Age of Onset    Dementia Brother     Breast cancer Sister 76    Cancer Brother      I have reviewed and agree with the history as documented  E-Cigarette/Vaping    E-Cigarette Use Never User      E-Cigarette/Vaping Substances     Social History     Tobacco Use    Smoking status: Former Smoker     Packs/day: 1 00     Years: 52 00     Pack years: 52 00     Types: Cigarettes     Start date:      Last attempt to quit: 2017     Years since quitting: 3 0    Smokeless tobacco: Never Used   Substance Use Topics    Alcohol use: No    Drug use: No       Review of Systems   Constitutional: Positive for fever  Negative for chills and diaphoresis  HENT: Negative for nosebleeds, sore throat and trouble swallowing  Eyes: Negative for photophobia, pain and visual disturbance     Respiratory: Positive for cough  Negative for chest tightness and shortness of breath  Cardiovascular: Negative for chest pain, palpitations and leg swelling  Gastrointestinal: Negative for abdominal pain, constipation, diarrhea, nausea and vomiting  Endocrine: Negative for polydipsia and polyuria  Genitourinary: Negative for difficulty urinating, dysuria, hematuria, pelvic pain, vaginal bleeding and vaginal discharge  Musculoskeletal: Negative for back pain, neck pain and neck stiffness  Skin: Negative for pallor and rash  Neurological: Negative for dizziness, seizures, light-headedness and headaches  All other systems reviewed and are negative  Physical Exam  Physical Exam  Vitals signs and nursing note reviewed  Constitutional:       General: She is not in acute distress  Appearance: She is well-developed  HENT:      Head: Normocephalic and atraumatic  Eyes:      Pupils: Pupils are equal, round, and reactive to light  Neck:      Musculoskeletal: Normal range of motion and neck supple  Cardiovascular:      Rate and Rhythm: Regular rhythm  Tachycardia present  Pulses: Normal pulses  Heart sounds: Normal heart sounds  Pulmonary:      Effort: Pulmonary effort is normal  No respiratory distress  Breath sounds: Examination of the right-lower field reveals rhonchi  Examination of the left-lower field reveals rhonchi  Wheezing and rhonchi present  Abdominal:      General: There is no distension  Palpations: Abdomen is soft  Abdomen is not rigid  Tenderness: There is no abdominal tenderness  There is no guarding or rebound  Musculoskeletal: Normal range of motion  General: No tenderness  Lymphadenopathy:      Cervical: No cervical adenopathy  Skin:     General: Skin is warm and dry  Capillary Refill: Capillary refill takes less than 2 seconds  Neurological:      Mental Status: She is alert and oriented to person, place, and time        Cranial Nerves: No cranial nerve deficit  Sensory: No sensory deficit  Vital Signs  ED Triage Vitals   Temperature Pulse Respirations Blood Pressure SpO2   09/19/20 1848 09/19/20 1847 09/19/20 1847 09/19/20 1851 09/19/20 1847   (!) 101 2 °F (38 4 °C) (!) 112 20 140/80 91 %      Temp Source Heart Rate Source Patient Position - Orthostatic VS BP Location FiO2 (%)   09/19/20 1848 09/19/20 1847 09/19/20 2015 09/19/20 1851 --   Oral Monitor Lying Right arm       Pain Score       09/19/20 2008       7           Vitals:    09/19/20 2215 09/19/20 2230 09/19/20 2330 09/19/20 2345   BP: 111/60 112/63 116/66 127/58   Pulse: 100 98 96 94   Patient Position - Orthostatic VS: Lying Lying Lying Lying         Visual Acuity      ED Medications  Medications   vancomycin (VANCOCIN) 1,500 mg in sodium chloride 0 9 % 250 mL IVPB (1,500 mg Intravenous New Bag 9/19/20 2320)   acetaminophen (TYLENOL) tablet 650 mg (650 mg Oral Given 9/19/20 1937)   sodium chloride 0 9 % bolus 1,000 mL (0 mL Intravenous Stopped 9/19/20 2134)   levofloxacin (LEVAQUIN) IVPB (premix in dextrose) 750 mg 150 mL (0 mg Intravenous Stopped 9/19/20 2139)   iohexol (OMNIPAQUE) 350 MG/ML injection (MULTI-DOSE) 100 mL (100 mL Intravenous Given 9/19/20 2027)   morphine injection 2 mg (2 mg Intravenous Given 9/19/20 2320)       Diagnostic Studies  Results Reviewed     Procedure Component Value Units Date/Time    Lactic acid 2 Hours [[id number]]  (Normal) Collected:  09/19/20 2142    Lab Status:  Final result Specimen:  Blood from Arm, Left Updated:  09/19/20 2203     LACTIC ACID 1 5 mmol/L     Narrative:       Result may be elevated if tourniquet was used during collection  [facility] [[id number]]  (Normal) Collected:  09/19/20 1939    Lab Status:  Final result Specimen:  Nares from Nose Updated:  09/19/20 2035     SARS-CoV-2 Negative    Narrative:        The specimen collection materials, transport medium, and/or testing methodology utilized in the production of these test results have been proven to be reliable in a limited validation with an abbreviated program under the Emergency Utilization Authorization provided by the FDA  Testing reported as "Presumptive positive" will be confirmed with secondary testing with a reference laboratory to ensure result accuracy  Clinical caution and judgement should be used with the interpretation of these results with consideration of the clinical impression and other laboratory testing  Testing reported as "Positive" or "Negative" has been proven to be accurate according to standard laboratory validation requirements  All testing is performed with control materials showing appropriate reactivity at standard intervals  Urine Microscopic [[id number]]  (Abnormal) Collected:  09/19/20 1928    Lab Status:  Final result Specimen:  Urine, Clean Catch Updated:  09/19/20 1957     RBC, UA 0-1 /hpf      WBC, UA None Seen /hpf      Epithelial Cells Occasional /hpf      Bacteria, UA None Seen /hpf     Lactic acid [[id number]]  (Abnormal) Collected:  09/19/20 1854    Lab Status:  Final result Specimen:  Blood from Arm, Right Updated:  09/19/20 1940     LACTIC ACID 2 4 mmol/L     Narrative:       Result may be elevated if tourniquet was used during collection      UA w Reflex to Microscopic w Reflex to Culture [[id number]]  (Abnormal) Collected:  09/19/20 1928    Lab Status:  Final result Specimen:  Urine, Clean Catch Updated:  09/19/20 1940     Color, UA Yellow     Clarity, UA Clear     Specific Gravity, UA 1 015     pH, UA 6 0     Leukocytes, UA Negative     Nitrite, UA Negative     Protein,  (2+) mg/dl      Glucose, UA Negative mg/dl      Ketones, UA Negative mg/dl      Urobilinogen, UA 0 2 E U /dl      Bilirubin, UA Negative     Blood, UA Trace-Intact    Comprehensive metabolic panel [[id number]]  (Abnormal) Collected:  09/19/20 1854    Lab Status:  Final result Specimen:  Blood from Arm, Right Updated:  09/19/20 1915 Sodium 135 mmol/L      Potassium 4 8 mmol/L      Chloride 97 mmol/L      CO2 30 mmol/L      ANION GAP 8 mmol/L      BUN 25 mg/dL      Creatinine 1 33 mg/dL      Glucose 196 mg/dL      Calcium 9 2 mg/dL      AST 29 U/L      ALT 34 U/L      Alkaline Phosphatase 71 U/L      Total Protein 8 3 g/dL      Albumin 3 6 g/dL      Total Bilirubin 0 68 mg/dL      eGFR 41 ml/min/1 73sq m     Narrative:       National Kidney Disease Foundation guidelines for Chronic Kidney Disease (CKD):     Stage 1 with normal or high GFR (GFR > 90 mL/min/1 73 square meters)    Stage 2 Mild CKD (GFR = 60-89 mL/min/1 73 square meters)    Stage 3A Moderate CKD (GFR = 45-59 mL/min/1 73 square meters)    Stage 3B Moderate CKD (GFR = 30-44 mL/min/1 73 square meters)    Stage 4 Severe CKD (GFR = 15-29 mL/min/1 73 square meters)    Stage 5 End Stage CKD (GFR <15 mL/min/1 73 square meters)  Note: GFR calculation is accurate only with a steady state creatinine    APTT [[id number]]  (Normal) Collected:  09/19/20 1854    Lab Status:  Final result Specimen:  Blood from Arm, Right Updated:  09/19/20 1910     PTT 27 seconds     Protime-INR [[id number]]  (Normal) Collected:  09/19/20 1854    Lab Status:  Final result Specimen:  Blood from Arm, Right Updated:  09/19/20 1910     Protime 12 9 seconds      INR 0 96    CBC and differential [[id number]]  (Abnormal) Collected:  09/19/20 1854    Lab Status:  Final result Specimen:  Blood from Arm, Right Updated:  09/19/20 1902     WBC 17 11 Thousand/uL      RBC 4 19 Million/uL      Hemoglobin 11 8 g/dL      Hematocrit 37 2 %      MCV 89 fL      MCH 28 2 pg      MCHC 31 7 g/dL      RDW 15 9 %      MPV 10 1 fL      Platelets 619 Thousands/uL      nRBC 0 /100 WBCs      Neutrophils Relative 86 %      Immat GRANS % 1 %      Lymphocytes Relative 6 %      Monocytes Relative 6 %      Eosinophils Relative 1 %      Basophils Relative 0 %      Neutrophils Absolute 14 79 Thousands/µL      Immature Grans Absolute 0 10 Thousand/uL      Lymphocytes Absolute 1 05 Thousands/µL      Monocytes Absolute 1 00 Thousand/µL      Eosinophils Absolute 0 13 Thousand/µL      Basophils Absolute 0 04 Thousands/µL     Blood culture #2 [[id number]] Collected:  09/19/20 1854    Lab Status: In process Specimen:  Blood from Arm, Left Updated:  09/19/20 1859    Blood culture #1 [[id number]] Collected:  09/19/20 1854    Lab Status: In process Specimen:  Blood from Arm, Right Updated:  09/19/20 1859    Procalcitonin with AM Reflex [[id number]] Collected:  09/19/20 1854    Lab Status: In process Specimen:  Blood from Arm, Right Updated:  09/19/20 1858                 CT chest abdomen pelvis w contrast   Final Result by Xavier Phillips MD (09/19 2108)         1  Bibasilar infiltrates concerning for atelectasis versus pneumonia  Stable pulmonary nodules  2   No acute abnormality in the abdomen or pelvis  3   Hepatosplenomegaly with underlying severe hepatic steatosis  4   Constipation  5   Left lower quadrant abdominal wall hernia containing nondilated colon  Workstation performed: [id number]         XR chest 1 view portable   ED Interpretation by Paulette Chambers DO (09/19 1953)   No infiltrates  No cardiomegaly  Procedures  Procedures         ED Course  ED Course as of Sep 20 0018   Sat Sep 19, 2020   1954 Antibiotics ordered for severe sepsis  Will call sepsis alert  Initial Sepsis Screening     Row Name 09/19/20 1954                Is the patient's history suggestive of a new or worsening infection? (!) Yes (Proceed)  -CD        Suspected source of infection  suspect infection, source unknown  -CD        Are two or more of the following signs & symptoms of infection both present and new to the patient? (!) Yes (Proceed)  -CD        Indicate SIRS criteria  Hyperthemia > 38 3C (100 9F); Tachycardia > 90 bpm;Leukocytosis (WBC > 95961 IJL)  -CD        If the answer is yes to both questions, suspicion of sepsis is present          If severe sepsis is present AND tissue hypoperfusion perists in the hour after fluid resuscitation or lactate > 4, the patient meets criteria for SEPTIC SHOCK          Are any of the following organ dysfunction criteria present within 6 hours of suspected infection and SIRS criteria that are NOT considered to be chronic conditions? (!) Yes  -CD        Organ dysfunction  Lactate > 2 0 mmol/L  -CD        Date of presentation of severe sepsis  09/19/20  -CD        Time of presentation of severe sepsis  1954  -        Tissue hypoperfusion persists in the hour after crystalloid fluid administration, evidenced, by either:          Was hypotension present within one hour of the conclusion of crystalloid fluid administration?         Date of presentation of septic shock          Time of presentation of septic shock            User Key  (r) = Recorded By, (t) = Taken By, (c) = Cosigned By    Initials Name Provider Type    KRISTA Vargas DO Physician          SBIRT 22yo+      Most Recent Value   SBIRT (23 yo +)   In order to provide better care to our patients, we are screening all of our patients for alcohol and drug use  Would it be okay to ask you these screening questions? Yes Filed at: 09/19/2020 1909   Initial Alcohol Screen: US AUDIT-C    1  How often do you have a drink containing alcohol?  0 Filed at: 09/19/2020 1909   2  How many drinks containing alcohol do you have on a typical day you are drinking? 0 Filed at: 09/19/2020 1909   3a  Male UNDER 65: How often do you have five or more drinks on one occasion? 0 Filed at: 09/19/2020 1909   3b  FEMALE Any Age, or MALE 65+: How often do you have 4 or more drinks on one occassion? 0 Filed at: 09/19/2020 1909   Audit-C Score  0 Filed at: 09/19/2020 1909   KELI: How many times in the past year have you    Used an illegal drug or used a prescription medication for non-medical reasons?   Never Filed at: 09/19/2020 Sheila Wiggins                  [facility]  Number of Diagnoses or Management Options  Pneumonia of both lower lobes due to infectious organism: new and requires workup  Severe sepsis [facility]): new and requires workup  Diagnosis management comments: Patient presents with cough, fever and shortness of breath  CT confirms a pulmonary source of infection  She was treated with vancomycin and Levaquin since she is coming from a nursing facility  Initial lactate is elevated however it trended down with IV fluids  She is hemodynamically stable  Will hospitalize for further treatment  Amount and/or Complexity of Data Reviewed  Clinical lab tests: ordered and reviewed  Tests in the radiology section of CPT®: ordered and reviewed  Tests in the medicine section of CPT®: ordered and reviewed  Review and summarize past medical records: yes  Discuss the patient with other providers: yes  Independent visualization of images, tracings, or specimens: yes    Risk of Complications, Morbidity, and/or Mortality  Presenting problems: high  Diagnostic procedures: high  Management options: high    Patient Progress  Patient progress: stable      Disposition  Final diagnoses:   Severe sepsis ([facility] [address] )   Pneumonia of both lower lobes due to infectious organism     Time reflects when diagnosis was documented in both MDM as applicable and the Disposition within this note     Time User Action Codes Description Comment    9/19/2020 10:52 PM Brea Lazo Add [A41 9,  R65 20] Severe sepsis ([facility] [address] )     9/19/2020 10:53 PM Opal GRAVES Add [J18 9] Pneumonia of both lower lobes due to infectious organism       ED Disposition     ED Disposition Condition Date/Time Comment    Admit Stable Sat Sep 19, 2020 10:52 PM Case was discussed with GAIL and the patient's admission status was agreed to be Admission Status: inpatient status to the service of Dr Maria De Jesus Pulido           Follow-up Information    None         Patient's Medications   Discharge Prescriptions    No medications on file     No discharge procedures on file      PDMP Review     None          ED Provider  Electronically Signed by           Dayana Gilliland DO  09/20/20 8490

## 2020-09-20 PROBLEM — K08.409 S/P TOOTH EXTRACTION: Status: ACTIVE | Noted: 2020-09-20

## 2020-09-20 LAB
ANION GAP SERPL CALCULATED.3IONS-SCNC: 9 MMOL/L (ref 4–13)
ATRIAL RATE: 113 BPM
BUN SERPL-MCNC: 21 MG/DL (ref 5–25)
CALCIUM SERPL-MCNC: 8.7 MG/DL (ref 8.3–10.1)
CHLORIDE SERPL-SCNC: 102 MMOL/L (ref 100–108)
CO2 SERPL-SCNC: 29 MMOL/L (ref 21–32)
CREAT SERPL-MCNC: 1.09 MG/DL (ref 0.6–1.3)
ERYTHROCYTE [DISTWIDTH] IN BLOOD BY AUTOMATED COUNT: 16 % (ref 11.6–15.1)
FLUAV RNA NPH QL NAA+PROBE: NORMAL
FLUBV RNA NPH QL NAA+PROBE: NORMAL
GFR SERPL CREATININE-BSD FRML MDRD: 52 ML/MIN/1.73SQ M
GLUCOSE SERPL-MCNC: 275 MG/DL (ref 65–140)
GLUCOSE SERPL-MCNC: 281 MG/DL (ref 65–140)
GLUCOSE SERPL-MCNC: 369 MG/DL (ref 65–140)
GLUCOSE SERPL-MCNC: 71 MG/DL (ref 65–140)
GLUCOSE SERPL-MCNC: 80 MG/DL (ref 65–140)
GLUCOSE SERPL-MCNC: 81 MG/DL (ref 65–140)
HCT VFR BLD AUTO: 34.1 % (ref 34.8–46.1)
HGB BLD-MCNC: 10.6 G/DL (ref 11.5–15.4)
L PNEUMO1 AG UR QL IA.RAPID: NEGATIVE
MCH RBC QN AUTO: 28.2 PG (ref 26.8–34.3)
MCHC RBC AUTO-ENTMCNC: 31.1 G/DL (ref 31.4–37.4)
MCV RBC AUTO: 91 FL (ref 82–98)
P AXIS: 59 DEGREES
PLATELET # BLD AUTO: 165 THOUSANDS/UL (ref 149–390)
PMV BLD AUTO: 10 FL (ref 8.9–12.7)
POTASSIUM SERPL-SCNC: 3.9 MMOL/L (ref 3.5–5.3)
PR INTERVAL: 162 MS
PROCALCITONIN SERPL-MCNC: 0.25 NG/ML
PROCALCITONIN SERPL-MCNC: 0.48 NG/ML
QRS AXIS: 70 DEGREES
QRSD INTERVAL: 86 MS
QT INTERVAL: 328 MS
QTC INTERVAL: 443 MS
RBC # BLD AUTO: 3.76 MILLION/UL (ref 3.81–5.12)
RSV RNA NPH QL NAA+PROBE: NORMAL
S PNEUM AG UR QL: NEGATIVE
SODIUM SERPL-SCNC: 140 MMOL/L (ref 136–145)
T WAVE AXIS: 70 DEGREES
VENTRICULAR RATE: 110 BPM
WBC # BLD AUTO: 12.98 THOUSAND/UL (ref 4.31–10.16)

## 2020-09-20 PROCEDURE — 87081 CULTURE SCREEN ONLY: CPT | Performed by: NURSE PRACTITIONER

## 2020-09-20 PROCEDURE — 80048 BASIC METABOLIC PNL TOTAL CA: CPT | Performed by: NURSE PRACTITIONER

## 2020-09-20 PROCEDURE — 94640 AIRWAY INHALATION TREATMENT: CPT

## 2020-09-20 PROCEDURE — 85027 COMPLETE CBC AUTOMATED: CPT | Performed by: NURSE PRACTITIONER

## 2020-09-20 PROCEDURE — 99232 SBSQ HOSP IP/OBS MODERATE 35: CPT | Performed by: INTERNAL MEDICINE

## 2020-09-20 PROCEDURE — 97163 PT EVAL HIGH COMPLEX 45 MIN: CPT

## 2020-09-20 PROCEDURE — 99223 1ST HOSP IP/OBS HIGH 75: CPT | Performed by: INTERNAL MEDICINE

## 2020-09-20 PROCEDURE — 87631 RESP VIRUS 3-5 TARGETS: CPT | Performed by: INTERNAL MEDICINE

## 2020-09-20 PROCEDURE — 82948 REAGENT STRIP/BLOOD GLUCOSE: CPT

## 2020-09-20 PROCEDURE — 87449 NOS EACH ORGANISM AG IA: CPT | Performed by: NURSE PRACTITIONER

## 2020-09-20 PROCEDURE — 94760 N-INVAS EAR/PLS OXIMETRY 1: CPT

## 2020-09-20 PROCEDURE — 93010 ELECTROCARDIOGRAM REPORT: CPT | Performed by: INTERNAL MEDICINE

## 2020-09-20 PROCEDURE — 84145 PROCALCITONIN (PCT): CPT | Performed by: NURSE PRACTITIONER

## 2020-09-20 PROCEDURE — 94668 MNPJ CHEST WALL SBSQ: CPT

## 2020-09-20 RX ORDER — ASPIRIN 81 MG/1
81 TABLET ORAL DAILY
Status: DISCONTINUED | OUTPATIENT
Start: 2020-09-20 | End: 2020-09-25 | Stop reason: HOSPADM

## 2020-09-20 RX ORDER — ACETAMINOPHEN 325 MG/1
975 TABLET ORAL EVERY 8 HOURS SCHEDULED
Status: DISCONTINUED | OUTPATIENT
Start: 2020-09-20 | End: 2020-09-25 | Stop reason: HOSPADM

## 2020-09-20 RX ORDER — GUAIFENESIN 600 MG
1200 TABLET, EXTENDED RELEASE 12 HR ORAL EVERY 12 HOURS SCHEDULED
COMMUNITY
End: 2021-11-08 | Stop reason: HOSPADM

## 2020-09-20 RX ORDER — LISINOPRIL 2.5 MG/1
2.5 TABLET ORAL DAILY
Status: DISCONTINUED | OUTPATIENT
Start: 2020-09-20 | End: 2020-09-25 | Stop reason: HOSPADM

## 2020-09-20 RX ORDER — GABAPENTIN 300 MG/1
300 CAPSULE ORAL 3 TIMES DAILY
Status: DISCONTINUED | OUTPATIENT
Start: 2020-09-20 | End: 2020-09-25 | Stop reason: HOSPADM

## 2020-09-20 RX ORDER — DOCUSATE SODIUM 100 MG/1
100 CAPSULE, LIQUID FILLED ORAL 2 TIMES DAILY
Status: DISCONTINUED | OUTPATIENT
Start: 2020-09-20 | End: 2020-09-25 | Stop reason: HOSPADM

## 2020-09-20 RX ORDER — ALBUTEROL SULFATE 90 UG/1
2 AEROSOL, METERED RESPIRATORY (INHALATION) EVERY 6 HOURS PRN
Status: DISCONTINUED | OUTPATIENT
Start: 2020-09-20 | End: 2020-09-25 | Stop reason: HOSPADM

## 2020-09-20 RX ORDER — TRAZODONE HYDROCHLORIDE 50 MG/1
50 TABLET ORAL
Status: DISCONTINUED | OUTPATIENT
Start: 2020-09-20 | End: 2020-09-25 | Stop reason: HOSPADM

## 2020-09-20 RX ORDER — LORATADINE 10 MG/1
10 TABLET ORAL DAILY
Status: DISCONTINUED | OUTPATIENT
Start: 2020-09-20 | End: 2020-09-25 | Stop reason: HOSPADM

## 2020-09-20 RX ORDER — INSULIN GLARGINE 100 [IU]/ML
58 INJECTION, SOLUTION SUBCUTANEOUS EVERY 12 HOURS SCHEDULED
Status: DISCONTINUED | OUTPATIENT
Start: 2020-09-21 | End: 2020-09-24

## 2020-09-20 RX ORDER — FLUTICASONE PROPIONATE 50 MCG
1 SPRAY, SUSPENSION (ML) NASAL DAILY
Status: DISCONTINUED | OUTPATIENT
Start: 2020-09-20 | End: 2020-09-25 | Stop reason: HOSPADM

## 2020-09-20 RX ORDER — CALCIUM CARBONATE 200(500)MG
1000 TABLET,CHEWABLE ORAL DAILY PRN
Status: DISCONTINUED | OUTPATIENT
Start: 2020-09-20 | End: 2020-09-25 | Stop reason: HOSPADM

## 2020-09-20 RX ORDER — MEMANTINE HYDROCHLORIDE 10 MG/1
10 TABLET ORAL 2 TIMES DAILY
Status: DISCONTINUED | OUTPATIENT
Start: 2020-09-20 | End: 2020-09-25 | Stop reason: HOSPADM

## 2020-09-20 RX ORDER — ACETAMINOPHEN 325 MG/1
650 TABLET ORAL EVERY 6 HOURS PRN
Status: DISCONTINUED | OUTPATIENT
Start: 2020-09-20 | End: 2020-09-25 | Stop reason: HOSPADM

## 2020-09-20 RX ORDER — PANTOPRAZOLE SODIUM 40 MG/1
40 TABLET, DELAYED RELEASE ORAL
Status: DISCONTINUED | OUTPATIENT
Start: 2020-09-20 | End: 2020-09-25 | Stop reason: HOSPADM

## 2020-09-20 RX ORDER — GUAIFENESIN 600 MG
1200 TABLET, EXTENDED RELEASE 12 HR ORAL EVERY 12 HOURS SCHEDULED
Status: DISCONTINUED | OUTPATIENT
Start: 2020-09-20 | End: 2020-09-25 | Stop reason: HOSPADM

## 2020-09-20 RX ORDER — BENZONATATE 100 MG/1
200 CAPSULE ORAL 3 TIMES DAILY PRN
Status: DISCONTINUED | OUTPATIENT
Start: 2020-09-20 | End: 2020-09-25 | Stop reason: HOSPADM

## 2020-09-20 RX ORDER — METHYLPREDNISOLONE SODIUM SUCCINATE 40 MG/ML
20 INJECTION, POWDER, LYOPHILIZED, FOR SOLUTION INTRAMUSCULAR; INTRAVENOUS EVERY 12 HOURS SCHEDULED
Status: COMPLETED | OUTPATIENT
Start: 2020-09-20 | End: 2020-09-22

## 2020-09-20 RX ORDER — BENZONATATE 200 MG/1
200 CAPSULE ORAL 3 TIMES DAILY PRN
COMMUNITY

## 2020-09-20 RX ORDER — OXYCODONE HYDROCHLORIDE 5 MG/1
2.5 TABLET ORAL EVERY 4 HOURS PRN
Status: DISCONTINUED | OUTPATIENT
Start: 2020-09-20 | End: 2020-09-25 | Stop reason: HOSPADM

## 2020-09-20 RX ORDER — LEVOFLOXACIN 5 MG/ML
750 INJECTION, SOLUTION INTRAVENOUS
Status: DISCONTINUED | OUTPATIENT
Start: 2020-09-21 | End: 2020-09-20

## 2020-09-20 RX ORDER — BUDESONIDE AND FORMOTEROL FUMARATE DIHYDRATE 160; 4.5 UG/1; UG/1
2 AEROSOL RESPIRATORY (INHALATION) 2 TIMES DAILY
Status: DISCONTINUED | OUTPATIENT
Start: 2020-09-20 | End: 2020-09-25 | Stop reason: HOSPADM

## 2020-09-20 RX ORDER — MONTELUKAST SODIUM 10 MG/1
10 TABLET ORAL
Status: DISCONTINUED | OUTPATIENT
Start: 2020-09-20 | End: 2020-09-25 | Stop reason: HOSPADM

## 2020-09-20 RX ORDER — PRAVASTATIN SODIUM 80 MG/1
80 TABLET ORAL
Status: DISCONTINUED | OUTPATIENT
Start: 2020-09-20 | End: 2020-09-25 | Stop reason: HOSPADM

## 2020-09-20 RX ORDER — TRAMADOL HYDROCHLORIDE 50 MG/1
50 TABLET ORAL 2 TIMES DAILY
Status: ON HOLD | COMMUNITY
End: 2020-09-25 | Stop reason: SDUPTHER

## 2020-09-20 RX ORDER — HEPARIN SODIUM 5000 [USP'U]/ML
7500 INJECTION, SOLUTION INTRAVENOUS; SUBCUTANEOUS EVERY 8 HOURS SCHEDULED
Status: DISCONTINUED | OUTPATIENT
Start: 2020-09-20 | End: 2020-09-25 | Stop reason: HOSPADM

## 2020-09-20 RX ORDER — LEVALBUTEROL 1.25 MG/.5ML
1.25 SOLUTION, CONCENTRATE RESPIRATORY (INHALATION)
Status: DISCONTINUED | OUTPATIENT
Start: 2020-09-20 | End: 2020-09-25 | Stop reason: HOSPADM

## 2020-09-20 RX ADMIN — ACETAMINOPHEN 975 MG: 325 TABLET, FILM COATED ORAL at 05:54

## 2020-09-20 RX ADMIN — IPRATROPIUM BROMIDE 0.5 MG: 0.5 SOLUTION RESPIRATORY (INHALATION) at 15:00

## 2020-09-20 RX ADMIN — BUDESONIDE AND FORMOTEROL FUMARATE DIHYDRATE 2 PUFF: 160; 4.5 AEROSOL RESPIRATORY (INHALATION) at 17:27

## 2020-09-20 RX ADMIN — GUAIFENESIN 1200 MG: 600 TABLET, EXTENDED RELEASE ORAL at 02:18

## 2020-09-20 RX ADMIN — INSULIN LISPRO 2 UNITS: 100 INJECTION, SOLUTION INTRAVENOUS; SUBCUTANEOUS at 17:28

## 2020-09-20 RX ADMIN — LORATADINE 10 MG: 10 TABLET ORAL at 08:31

## 2020-09-20 RX ADMIN — ACETAMINOPHEN 975 MG: 325 TABLET, FILM COATED ORAL at 21:57

## 2020-09-20 RX ADMIN — OXYCODONE HYDROCHLORIDE 2.5 MG: 5 TABLET ORAL at 11:16

## 2020-09-20 RX ADMIN — ACETAMINOPHEN 650 MG: 325 TABLET, FILM COATED ORAL at 08:47

## 2020-09-20 RX ADMIN — HEPARIN SODIUM 7500 UNITS: 5000 INJECTION INTRAVENOUS; SUBCUTANEOUS at 21:58

## 2020-09-20 RX ADMIN — GUAIFENESIN 1200 MG: 600 TABLET, EXTENDED RELEASE ORAL at 08:31

## 2020-09-20 RX ADMIN — LEVALBUTEROL HYDROCHLORIDE 1.25 MG: 1.25 SOLUTION, CONCENTRATE RESPIRATORY (INHALATION) at 19:19

## 2020-09-20 RX ADMIN — METHYLPREDNISOLONE SODIUM SUCCINATE 20 MG: 40 INJECTION, POWDER, FOR SOLUTION INTRAMUSCULAR; INTRAVENOUS at 13:16

## 2020-09-20 RX ADMIN — GABAPENTIN 300 MG: 300 CAPSULE ORAL at 17:27

## 2020-09-20 RX ADMIN — GABAPENTIN 300 MG: 300 CAPSULE ORAL at 21:58

## 2020-09-20 RX ADMIN — PRAVASTATIN SODIUM 80 MG: 80 TABLET ORAL at 17:27

## 2020-09-20 RX ADMIN — METHYLPREDNISOLONE SODIUM SUCCINATE 20 MG: 40 INJECTION, POWDER, FOR SOLUTION INTRAMUSCULAR; INTRAVENOUS at 22:03

## 2020-09-20 RX ADMIN — TRAZODONE HYDROCHLORIDE 50 MG: 50 TABLET ORAL at 21:57

## 2020-09-20 RX ADMIN — DOCUSATE SODIUM 100 MG: 100 CAPSULE ORAL at 08:31

## 2020-09-20 RX ADMIN — IPRATROPIUM BROMIDE 0.5 MG: 0.5 SOLUTION RESPIRATORY (INHALATION) at 07:56

## 2020-09-20 RX ADMIN — MEMANTINE 10 MG: 10 TABLET ORAL at 17:26

## 2020-09-20 RX ADMIN — MEMANTINE 10 MG: 10 TABLET ORAL at 08:31

## 2020-09-20 RX ADMIN — VANCOMYCIN HYDROCHLORIDE 750 MG: 750 INJECTION, SOLUTION INTRAVENOUS at 11:19

## 2020-09-20 RX ADMIN — LEVALBUTEROL HYDROCHLORIDE 1.25 MG: 1.25 SOLUTION, CONCENTRATE RESPIRATORY (INHALATION) at 07:56

## 2020-09-20 RX ADMIN — GABAPENTIN 300 MG: 300 CAPSULE ORAL at 08:31

## 2020-09-20 RX ADMIN — INSULIN LISPRO 4 UNITS: 100 INJECTION, SOLUTION INTRAVENOUS; SUBCUTANEOUS at 13:20

## 2020-09-20 RX ADMIN — HEPARIN SODIUM 7500 UNITS: 5000 INJECTION INTRAVENOUS; SUBCUTANEOUS at 05:54

## 2020-09-20 RX ADMIN — MONTELUKAST SODIUM 10 MG: 10 TABLET, FILM COATED ORAL at 21:58

## 2020-09-20 RX ADMIN — ACETAMINOPHEN 975 MG: 325 TABLET, FILM COATED ORAL at 13:16

## 2020-09-20 RX ADMIN — PANTOPRAZOLE SODIUM 40 MG: 40 TABLET, DELAYED RELEASE ORAL at 05:54

## 2020-09-20 RX ADMIN — LEVALBUTEROL HYDROCHLORIDE 1.25 MG: 1.25 SOLUTION, CONCENTRATE RESPIRATORY (INHALATION) at 15:00

## 2020-09-20 RX ADMIN — GUAIFENESIN 1200 MG: 600 TABLET, EXTENDED RELEASE ORAL at 21:57

## 2020-09-20 RX ADMIN — MONTELUKAST SODIUM 10 MG: 10 TABLET, FILM COATED ORAL at 02:18

## 2020-09-20 RX ADMIN — DOXYCYCLINE 100 MG: 100 INJECTION, POWDER, LYOPHILIZED, FOR SOLUTION INTRAVENOUS at 22:05

## 2020-09-20 RX ADMIN — INSULIN LISPRO 4 UNITS: 100 INJECTION, SOLUTION INTRAVENOUS; SUBCUTANEOUS at 22:08

## 2020-09-20 RX ADMIN — BUDESONIDE AND FORMOTEROL FUMARATE DIHYDRATE 2 PUFF: 160; 4.5 AEROSOL RESPIRATORY (INHALATION) at 08:32

## 2020-09-20 RX ADMIN — LISINOPRIL 2.5 MG: 2.5 TABLET ORAL at 08:31

## 2020-09-20 RX ADMIN — PANTOPRAZOLE SODIUM 40 MG: 40 TABLET, DELAYED RELEASE ORAL at 17:26

## 2020-09-20 RX ADMIN — FLUTICASONE PROPIONATE 1 SPRAY: 50 SPRAY, METERED NASAL at 08:32

## 2020-09-20 RX ADMIN — HEPARIN SODIUM 7500 UNITS: 5000 INJECTION INTRAVENOUS; SUBCUTANEOUS at 13:16

## 2020-09-20 RX ADMIN — ASPIRIN 81 MG: 81 TABLET, COATED ORAL at 08:31

## 2020-09-20 RX ADMIN — IPRATROPIUM BROMIDE 0.5 MG: 0.5 SOLUTION RESPIRATORY (INHALATION) at 19:19

## 2020-09-20 RX ADMIN — TRAZODONE HYDROCHLORIDE 50 MG: 50 TABLET ORAL at 02:18

## 2020-09-20 NOTE — ASSESSMENT & PLAN NOTE
Lab Results   Component Value Date    HGBA1C 7 6 (H) 08/13/2020       Recent Labs     09/20/20  0201 09/20/20  0705 09/20/20  1136   POCGLU 71 80 275*       Blood Sugar Average: Last 72 hrs:  (P) 142   · Accu-Checks, SSI and Lantus q12H  · Hypoglycemic Protocol

## 2020-09-20 NOTE — ASSESSMENT & PLAN NOTE
 Chest x-ray: Official read Pending  · CT Chest/Abdomen/Pelvis: Bibasilar infiltrates concerning for atelectasis vs  Pneumonia  Stable pulmonary nodules   Treating as HCAP  o IV antibiotics: Levaquin and Vancomycin   Respiratory protocol, nebs PRN   Obtain sputum culture and Gram stain, strep and Legionella antigens   Obtain procalcitonin   Monitor respiratory status  [facility] [place] [facility] consult

## 2020-09-20 NOTE — PLAN OF CARE
Problem: METABOLIC, FLUID AND ELECTROLYTES - ADULT  Goal: Glucose maintained within target range  Description: INTERVENTIONS:  - Monitor Blood Glucose as ordered  - Assess for signs and symptoms of hyperglycemia and hypoglycemia  - Administer ordered medications to maintain glucose within target range  - Assess nutritional intake and initiate nutrition service referral as needed  Outcome: Progressing

## 2020-09-20 NOTE — ASSESSMENT & PLAN NOTE
Lab Results   Component Value Date    HGBA1C 7 6 (H) 08/13/2020       No results for input(s): POCGLU in the last 72 hours  Blood Sugar Average: Last 72 hrs:     · Accu-Checks and SSI  · Continue Lantus?   · Hypoglycemic Protocol

## 2020-09-20 NOTE — ASSESSMENT & PLAN NOTE
· CT Chest/Abd/Pelvis: Left Upper Lobe 7mm nodule and 4mm right apical nodule stable since 2017  No tracheal or endobrachial lesions    · Continue Outpatient Follow Up

## 2020-09-20 NOTE — PROGRESS NOTES
Vancomycin Assessment    Gema Avila is a 79 y o  female who is currently receiving vancomycin 1500 mg iv once (given) then 1250 mg iv q 24 holurs for Pneumonia     Relevant clinical data and objective history reviewed:  Creatinine   Date Value Ref Range Status   09/19/2020 1 33 (H) 0 60 - 1 30 mg/dL Final     Comment:     Standardized to IDMS reference method   08/21/2017 0 56 (L) 0 60 - 1 30 mg/dL Final     Comment:     Standardized to IDMS reference method   08/20/2017 0 67 0 60 - 1 30 mg/dL Final     Comment:     Standardized to IDMS reference method   09/21/2015 0 79 0 60 - 1 30 mg/dL Final     Comment:     Standardized to IDMS reference method   08/17/2015 0 72 0 60 - 1 30 mg/dL Final     Comment:     Standardized to IDMS reference method   03/16/2015 0 75 0 60 - 1 30 mg/dL Final     Comment:     Standardized to IDMS reference method     /58 (BP Location: Right arm)   Pulse 90   Temp 98 2 °F (36 8 °C) (Oral)   Resp 18   Ht 4' 8" (1 422 m)   Wt 98 5 kg (217 lb 2 5 oz)   SpO2 90%   BMI 48 68 kg/m²   No intake/output data recorded  Lab Results   Component Value Date/Time    BUN 25 09/19/2020 06:54 PM    BUN 18 09/21/2015 07:38 AM    WBC 17 11 (H) 09/19/2020 06:54 PM    WBC 10 21 (H) 09/21/2015 07:38 AM    HGB 11 8 09/19/2020 06:54 PM    HGB 15 7 (H) 09/21/2015 07:38 AM    HCT 37 2 09/19/2020 06:54 PM    HCT 44 0 09/21/2015 07:38 AM    MCV 89 09/19/2020 06:54 PM    MCV 89 09/21/2015 07:38 AM     09/19/2020 06:54 PM     09/21/2015 07:38 AM     Temp Readings from Last 3 Encounters:   09/20/20 98 2 °F (36 8 °C) (Oral)   11/27/19 97 6 °F (36 4 °C) (Tympanic)   08/20/19 (!) 97 4 °F (36 3 °C) (Tympanic)     Vancomycin Days of Therapy: 1    Assessment/Plan  The patient is currently on vancomycin utilizing scheduled dosing based on adjusted body weight (due to obesity)    Baseline risks associated with therapy include: pre-existing renal impairment, concomitant nephrotoxic medications, advanced age, and dehydration  The patient is currently receiving 1500 mg iv once (given) then 1250 mg iv q 24 holurs and after clinical evaluation will be changed to 750 mg iv q 12 hours  Pharmacy will also follow closely for s/sx of nephrotoxicity, infusion reactions, and appropriateness of therapy  BMP and CBC will be ordered per protocol  Plan for trough as patient approaches steady state, prior to the 4th  dose at approximately 11:00 on 9/21/2020  Due to infection severity, will target a trough of 15-20 (appropriate for most indications)   Pharmacy will continue to follow the patients culture results and clinical progress daily      Oliva Mcgill, Pharmacist

## 2020-09-20 NOTE — PLAN OF CARE
Problem: PHYSICAL THERAPY ADULT  Goal: Performs mobility at highest level of function for planned discharge setting  See evaluation for individualized goals  Description: Treatment/Interventions: Functional transfer training, LE strengthening/ROM, Therapeutic exercise, Endurance training, Patient/family training, Equipment eval/education, Bed mobility, Gait training  Equipment Recommended: Cathaleen Kick, Wheelchair       See flowsheet documentation for full assessment, interventions and recommendations  Note: Prognosis: Fair  Problem List: Decreased strength, Decreased endurance, Impaired balance, Decreased mobility, Obesity, Pain  Assessment: Pt is a 79 y o  female seen for PT evaluation s/p admit to [facility] on 9/19/2020 w/ Sepsis ([facility] [address] )  Order placed for PT  Comorbidities affecting pt's physical performance at time of assessment listed above  Personal factors affecting pt at time of IE include: advanced age, inability to perform IADLs, inability to perform ADLs and inability to ambulate household distances  Prior to admission, pt was required A for mobility and was a long term resident of SNF  Upon evaluation: Pt requires min A for sit to stand and min A for marching in place with RW for support  (Please find full objective findings from PT assessment regarding body systems outlined above)  Impairments and limitations also listed above, especially due to  weakness, impaired balance, decreased endurance, impaired coordination, pain, decreased activity tolerance and fall risk  The following objective measures performed on IE also reveal limitations: Barthel Index 45/100  Pt's clinical presentation is currently unstable/unpredictable seen in pt's presentation of decreased safety awareness, fall risk, and decreased insight into deficits  Pt to benefit from continued skilled PT tx while in hospital and upon DC to address deficits as defined above and maximize level of functional mobility   From PT/mobility standpoint, recommendation at time of d/c would be to return to facility with PT services  Recommend progression of ambulation and initiation of HEP as appropriate  PT Discharge Recommendation: Other (Comment)(return to SNF with PT services)          See flowsheet documentation for full assessment

## 2020-09-20 NOTE — PHYSICAL THERAPY NOTE
PHYSICAL THERAPY EVALUATION  NAME: Jud Walls  AGE:   79 y o  MRN:  [id number]  ADMIT DX: SOB (shortness of breath) [R06 02]  Severe sepsis (HCC) [A41 9, R65 20]  Pneumonia of both lower lobes due to infectious organism [J18 9]    PMH:   Past Medical History:   Diagnosis Date    Asthma     Diabetes mellitus ([facility] [address] )     Hyperlipidemia     Hypertension      LENGTH OF STAY: 1       20 0955   PT Last Visit   PT Visit Date 20   Pain Assessment   Pain Assessment Tool 0-10   Pain Score 8   Pain Location/Orientation Location: Leg;Orientation: Bilateral   Hospital Pain Intervention(s) Ambulation/increased activity;Repositioned   Home Living   Type of Home SNF  ([address] [place] [address] )   Home Layout One [address] [place] [address]; Wheelchair-manual   Additional Comments Pt reports mod I with WC self-propulsion and SPT at [address] [place] [address]  Currently receiving PT/OT at [address] [place] [address] and working on ambulating hallway distances with RW and assist     Prior Function   Level of [place] Needs assistance with ADLs and functional mobility   Lives With Facility staff   Receives Help From Personal care attendant   ADL Assistance Needs assistance   IADLs Needs assistance   Falls in the last 6 months 0   Comments pt wears O2 as needed at [address] [place] [address]   Restrictions/Precautions   Weight Bearing Precautions Per Order No   Other Precautions Chair Alarm; Bed Alarm;Multiple lines;O2;Fall Risk;Pain  (1 5 L O2 NC )   General   Family/Caregiver Present No   Cognition   Overall Cognitive Status WFL   Arousal/Participation Cooperative   Orientation Level Oriented X4   Memory Decreased long term memory;Decreased short term memory;Decreased recall of recent events;Decreased recall of precautions   Following Commands Follows one step commands with increased time or repetition   Comments Pt identified by name and       RLE Assessment   RLE Assessment X   Strength RLE   RLE Overall Strength 3+/5  (functionally)   LLE Assessment   LLE Assessment X Strength LLE   LLE Overall Strength 3+/5  (functionally)   Bed Mobility   Supine to Sit Unable to assess  (left OOB in chair pre/post session with alarm intact)   Transfers   Sit to Stand 4  Minimal assistance   Additional items Assist x 1; Increased time required;Verbal cues   Stand to Sit 4  Minimal assistance   Additional items Assist x 1; Increased time required;Verbal cues   Additional Comments able to perform standing marches x4 with min A and RW   Ambulation/Elevation   Gait pattern Not tested  (pt declines due to incontinence and LE weakness)   Balance   Static Sitting Fair   Dynamic Sitting Fair -   Static Standing Fair -   Dynamic Standing Poor +   Endurance Deficit   Endurance Deficit Yes   Endurance Deficit Description limited standing tolerance  (incontinent of urine during stance)   Activity Tolerance   Activity Tolerance Patient limited by fatigue   Nurse Made Aware Per RN, pt appropriate to evaluate   Assessment   Prognosis Fair   Problem List Decreased strength;Decreased endurance; Impaired balance;Decreased mobility;Obesity;Pain   Goals   Patient Goals to get stronger and walk better   STG Expiration Date 09/29/20   Short Term Goal #1 Pt will be able to: (1) perform bed mobility with supervision to promote OOB activity (2) perform sit to stand with supervision to decrease burden of care (3) ambulate at least 200` with min A and least restrictive AD to increase activity tolerance (4) increase standing balance by 1 grade to decrease risk of falls    PT Treatment Day 0   Plan   Treatment/Interventions Functional transfer training;LE strengthening/ROM; Therapeutic exercise; Endurance training;Patient/family training;Equipment eval/education; Bed mobility;Gait training   PT Frequency   (3-5x/week)   Recommendation   PT Discharge Recommendation Other (Comment)  (return to SNF with PT services)   Equipment Recommended Khushi Le; Wheelchair   Barthel Index   Feeding 10   Bathing 0   Grooming Score 5   Dressing Score 5   Bladder Score 0   Bowels Score 10   Toilet Use Score 5   Transfers (Bed/Chair) Score 10   Mobility (Level Surface) Score 0   Stairs Score 0   Barthel Index Score 45       Assessment: Pt is a 79 y o  female seen for PT evaluation s/p admit to [address] [place] [address] on 9/19/2020 w/ Sepsis ([place] [address] )  Order placed for PT  Comorbidities affecting pt's physical performance at time of assessment listed above  Personal factors affecting pt at time of IE include: advanced age, inability to perform IADLs, inability to perform ADLs and inability to ambulate household distances  Prior to admission, pt was required A for mobility and was a long term resident of SNF  Upon evaluation: Pt requires min A for sit to stand and min A for marching in place with RW for support  (Please find full objective findings from PT assessment regarding body systems outlined above)  Impairments and limitations also listed above, especially due to  weakness, impaired balance, decreased endurance, impaired coordination, pain, decreased activity tolerance and fall risk  The following objective measures performed on IE also reveal limitations: Barthel Index 45/100  Pt's clinical presentation is currently unstable/unpredictable seen in pt's presentation of decreased safety awareness, fall risk, and decreased insight into deficits  Pt to benefit from continued skilled PT tx while in hospital and upon DC to address deficits as defined above and maximize level of functional mobility  From PT/mobility standpoint, recommendation at time of d/c would be to return to facility with PT services  Recommend progression of ambulation and initiation of HEP as appropriate        Dacia Shields, PT,DPT

## 2020-09-20 NOTE — ASSESSMENT & PLAN NOTE
 Chest x-ray: No acute cardiopulmonary disease  · CT Chest/Abdomen/Pelvis: Bibasilar infiltrates concerning for atelectasis vs  Pneumonia  Stable pulmonary nodules   Treating as HCAP: IV antibiotics: Levaquin and Vancomycin   Respiratory protocol, nebs PRN   Obtain sputum culture and Gram stain; strep and Legionella urine antigens   Blood cultured obtained   Procalcitonin x1 normal   Lactic acid: 1st elevated at 3 4; next normal  Clarence Casas Pulmonology consulted  Plan:  · Continue antibiotics  · Follow up with next procalcitonin  · Follow up with cultures and urine antigens  · Started IV Solumedrol 20mg BID  · Influenza PCR ordered  · Monitor respiratory status

## 2020-09-20 NOTE — PROGRESS NOTES
Progress Note - Katey Carranza 1950, 79 y o  female MRN: [id number]    Unit/Bed#: S -91 Encounter: [id number]    Primary Care Provider: Deion Perez MD   Date and time admitted to hospital: 9/19/2020  6:45 PM        * Sepsis [facility])  Assessment & Plan   SIRS criteria POA: Fever, Tachycardia, and Leukocytosis    Suspected source: Pneumonia  · CT Chest/Abdome/Pelvis: Bibasilar infiltrates concerning for atelectasis vs  Pneumonia  Stable pulmonary nodules   Lactic acid: Upon Admission 2 4 Redraw 1 5   POA WBC: 17 11; currently at 12 98   IV Fluids: 1L NSS Bolus  Discontinued   IV antibiotics: Started Levaquin and Vancomycin   Procalcitonin x1: normal   S  pneumoniae, Legionella urine antigen ordered   Blood cultures ordered  Currently in process   Wheezes and rhonchi B/L on exam  Cough productive of yellow sputum  Plan:   Continue to monitor vital signs   Follow up cultures and urine antigens   Influenza A & B PCR ordered   Follow up next procalcitonin result   Continue antibiotics    Pneumonia  Assessment & Plan   Chest x-ray: No acute cardiopulmonary disease  · CT Chest/Abdomen/Pelvis: Bibasilar infiltrates concerning for atelectasis vs  Pneumonia  Stable pulmonary nodules   Treating as HCAP: IV antibiotics: Levaquin and Vancomycin   Respiratory protocol, nebs PRN   Obtain sputum culture and Gram stain; strep and Legionella urine antigens   Blood cultured obtained   Procalcitonin x1 normal   Lactic acid: 1st elevated at 3 4; next normal  Patience Riis Pulmonology consulted  Plan:  · Continue antibiotics  · Follow up with next procalcitonin  · Follow up with cultures and urine antigens  · Started IV Solumedrol 20mg BID  · Influenza PCR ordered  · Monitor respiratory status  Asthma  Assessment & Plan  · POA Wheezing noted on exam   · Chest Xray: No acute cardiopulmonary disease  · CT Chest/Abd/Pelvis: Bibasilar infiltrates concerning for atelectasis vs  Pneumonia   Stable pulmonary nodules  · Continue to present with wheezes and rhonchi B/L  Productive cough of yellow sputum  Plan:  · Continue Home Nebulizers/ Inhalers (Budesonide-formoterol)  · Started: Ipratropium and Levalbuterol  · Started IV Solumedrol 20mg BID  · Influenza PCR ordered    S/P tooth extraction  Assessment & Plan  · Dental Soft Diet until 9/21  · Salt Water Rinses every Shift until 9/26  · Avoid Brushing and Use of Straws until 9/25    Hyperlipidemia  Assessment & Plan  · Continue Statin  · Consider Lipid panel as an outpatient    KATHY (acute kidney injury) ([facility] [address] )  Assessment & Plan   Creatinine upon admission: 1 33  o Baseline: 0 5-0 8   Secondary to Infection vs  Hypotension   Cr level back to normal range after fluids  Plan:   Monitor BMP  Pulmonary nodule  Assessment & Plan  · CT Chest/Abd/Pelvis: Left Upper Lobe 7mm nodule and 4mm right apical nodule stable since 2017  No tracheal or endobrachial lesions  Plan  · Continue Outpatient Follow Up    Alzheimer's disease [facility])  Assessment & Plan  · Continue Namenda 10 mg BID    Type 2 diabetes mellitus with hyperglycemia [facility])  Assessment & Plan  Lab Results   Component Value Date    HGBA1C 7 6 (H) 08/13/2020       Recent Labs     09/20/20  0201 09/20/20  0705 09/20/20  1136   POCGLU 71 80 275*       Blood Sugar Average: Last 72 hrs:  (P) 142   · Accu-Checks, SSI and Lantus q12H  · Hypoglycemic Protocol    Essential hypertension  Assessment & Plan   Blood pressure is reviewed and acceptable  Plan:   Monitor blood pressure   Restarted Lisinopril since KATHY seems to have resolved           VTE Pharmacologic Prophylaxis:   Pharmacologic: Heparin  Mechanical VTE Prophylaxis in Place: No    Discussions with Specialists or Other Care Team Provider: Pulmonology    Education and Discussions with Family / Patient: Discussed with patient at bedside    Current Length of Stay: 1 day(s)    Current Patient Status: Inpatient     Discharge Plan / Estimated Discharge Date: 48 hours    Code Status: Level 3 - DNAR and DNI      Subjective:   Patient was alert and awake, lying in bed  Patient was coughing during examination  Cough was productive of yellow sputum, more than a tsp in quantity  Patient currently on 1 L O2, denied SOB  Also denied chest pain, diarrhea, chills  Does not appear to be confused  Continue to endorse feeling weak with generalized joint/muscle pains    Objective:     Vitals:   Temp (24hrs), Av 3 °F (37 4 °C), Min:98 2 °F (36 8 °C), Max:101 2 °F (38 4 °C)    Temp:  [98 2 °F (36 8 °C)-101 2 °F (38 4 °C)] 98 6 °F (37 °C)  HR:  [] 84  Resp:  [18-20] 19  BP: (109-154)/(54-80) 119/60  SpO2:  [90 %-97 %] 94 %  Body mass index is 48 68 kg/m²  Input and Output Summary (last 24 hours): Intake/Output Summary (Last 24 hours) at 2020 1200  Last data filed at 2020 1100  Gross per 24 hour   Intake 1630 ml   Output 1350 ml   Net 280 ml       Physical Exam:     Physical Exam  Constitutional:       General: She is awake  She is not in acute distress  Appearance: She is morbidly obese  She is not diaphoretic  HENT:      Head: Normocephalic and atraumatic  Nose: Nose normal       Mouth/Throat:      Mouth: Mucous membranes are moist    Eyes:      Extraocular Movements: Extraocular movements intact  Cardiovascular:      Rate and Rhythm: Normal rate and regular rhythm  Pulses: Normal pulses  Heart sounds: Normal heart sounds  No murmur  No friction rub  Pulmonary:      Effort: Pulmonary effort is normal  No accessory muscle usage or respiratory distress  Breath sounds: No stridor  Wheezing (Bilaterally) and rhonchi (Bilaterally) present  No rales  Chest:      Chest wall: No tenderness  Abdominal:      General: Abdomen is protuberant  Bowel sounds are normal  There is no distension  Palpations: Abdomen is soft  Tenderness: There is no abdominal tenderness   There is no right CVA tenderness, left CVA tenderness, guarding or rebound  Hernia: A hernia is present  Musculoskeletal: Normal range of motion  General: No swelling, tenderness or deformity  Right lower leg: No edema  Left lower leg: No edema  Skin:     General: Skin is warm  Coloration: Skin is not jaundiced  Findings: No erythema, lesion or rash  Neurological:      General: No focal deficit present  Mental Status: She is alert and oriented to person, place, and time  Motor: Weakness present  Psychiatric:         Mood and Affect: Mood normal          Speech: Speech normal          Behavior: Behavior normal  Behavior is cooperative  Additional Data:     Labs:    Results from last 7 days   Lab Units 09/20/20  0606 09/19/20  1854   WBC Thousand/uL 12 98* 17 11*   HEMOGLOBIN g/dL 10 6* 11 8   HEMATOCRIT % 34 1* 37 2   PLATELETS Thousands/uL 165 202   NEUTROS PCT %  --  86*   LYMPHS PCT %  --  6*   MONOS PCT %  --  6   EOS PCT %  --  1     Results from last 7 days   Lab Units 09/20/20  0606 09/19/20  1854   POTASSIUM mmol/L 3 9 4 8   CHLORIDE mmol/L 102 97*   CO2 mmol/L 29 30   BUN mg/dL 21 25   CREATININE mg/dL 1 09 1 33*   CALCIUM mg/dL 8 7 9 2   ALK PHOS U/L  --  71   ALT U/L  --  34   AST U/L  --  29     Results from last 7 days   Lab Units 09/19/20  1854   INR  0 96       * I Have Reviewed All Lab Data Listed Above  * Additional Pertinent Lab Tests Reviewed: Thelma 66 Admission Reviewed    Imaging:    Imaging Reports Reviewed Today Include: CXR: No acute cardiopulmonary disease  Imaging Personally Reviewed by Myself Includes:  None    Recent Cultures (last 7 days):     Results from last 7 days   Lab Units 09/20/20  0600 09/19/20  1854   BLOOD CULTURE   --  Received in Microbiology Lab  Culture in Progress  Received in Microbiology Lab  Culture in Progress     LEGIONELLA URINARY ANTIGEN  Negative  --        Last 24 Hours Medication List:   Current Facility-Administered Medications Medication Dose Route Frequency Provider Last Rate    acetaminophen  650 mg Oral Q6H PRN CELINA Davalos      acetaminophen  975 mg Oral Q8H [facility] CELINA Davalos      albuterol  2 puff Inhalation Q6H PRN CELINA Davalos      aspirin  81 mg Oral Daily CELINA Davalos      benzonatate  200 mg Oral TID PRN CELINA Davalos      budesonide-formoterol  2 puff Inhalation BID CELINA Davalos      calcium carbonate  1,000 mg Oral Daily PRN CELINA Davalos      docusate sodium  100 mg Oral BID CELINA Davalos      fluticasone  1 spray Nasal Daily CELINA Davalos      gabapentin  300 mg Oral TID CELINA Davalos      guaiFENesin  1,200 mg Oral Q12H [facility] CELINA Davalos      heparin (porcine)  7,500 Units Subcutaneous [facility] Catracho Gomez, [address]      [START ON 9/21/2020] insulin glargine  58 Units Subcutaneous Q12H [facility] CELNIA Qiu      insulin lispro  1-5 Units Subcutaneous HS CELINA Davalos      insulin lispro  1-6 Units Subcutaneous TID AC CELINA Rodriguez      levalbuterol  1 25 mg Nebulization TID Nancy Tsang MD      And    ipratropium  0 5 mg Nebulization TID Nancy Tsang MD      [START ON 9/21/2020] levofloxacin  750 mg Intravenous Q48H CELINA Rodriguez      lisinopril  2 5 mg Oral Daily CELINA Davalos      loratadine  10 mg Oral Daily CELINA Davalos      memantine  10 mg Oral BID CELINA Davalos      methylPREDNISolone sodium succinate  20 mg Intravenous Q12H [facility] Rajesh Andrade MD      montelukast  10 mg Oral HS CELINA Davalos      oxyCODONE  2 5 mg Oral Q4H PRN CELINA Davalos      pantoprazole  40 mg Oral BID AC CELINA Rodriguez      pravastatin  80 mg Oral Daily With [employer], CELINA      traZODone  50 mg Oral HS CELINA Davalos      vancomycin  10 mg/kg (Adjusted) Intravenous Q12H [employer] CELINA Pompa 750 mg (09/20/20 3969)        Today, Patient Was Seen By: Alberto Harris MD    ** Please Note: This note has been constructed using a voice recognition system   **

## 2020-09-20 NOTE — ASSESSMENT & PLAN NOTE
· Dental Soft Diet until 9/21  · Salt Water Rinses every Shift until 9/26  · Avoid Brushing and Use of Straws until 9/25

## 2020-09-20 NOTE — ASSESSMENT & PLAN NOTE
 SIRS criteria POA: Fever, Tachycardia, and Leukocytosis    Suspected source: Pneumonia  · CT Chest/Abdome/Pelvis: Bibasilar infiltrates concerning for atelectasis vs  Pneumonia  Stable pulmonary nodules   Lactic acid: Upon Admission 2 4 Redraw 1 5   POA WBC: 17 11; currently at 12 98   IV Fluids: 1L NSS Bolus  Discontinued   IV antibiotics: Started Levaquin and Vancomycin   Procalcitonin x1: normal   S  pneumoniae, Legionella urine antigen ordered   Blood cultures ordered  Currently in process   Wheezes and rhonchi B/L on exam  Cough productive of yellow sputum    Plan:   Continue to monitor vital signs   Follow up cultures and urine antigens   Influenza A & B PCR ordered   Follow up next procalcitonin result   Continue antibiotics

## 2020-09-20 NOTE — ASSESSMENT & PLAN NOTE
· Wheezing noted on exam, will attempt nebulizer first, consider steroids if wheezing persists  · Chest Xray:   Official read Pending  · CT Chest/Abd/Pelvis: Bibasilar infiltrates concerning for atelectasis vs  Pneumonia  Stable pulmonary nodules    · Continue Home Nebulizers/ Inhalers

## 2020-09-20 NOTE — ASSESSMENT & PLAN NOTE
 Creatinine upon admission: 1 33  o Baseline: 0 5-0 8   Secondary to Infection vs  Hypotension   Treatment: 1L NSS Bolus   Monitor BMP

## 2020-09-20 NOTE — PLAN OF CARE
Problem: Potential for Falls  Goal: Patient will remain free of falls  Description: INTERVENTIONS:  - Assess patient frequently for physical needs  -  Identify cognitive and physical deficits and behaviors that affect risk of falls    -  [place] fall precautions as indicated by assessment   - Educate patient/family on patient safety including physical limitations  - Instruct patient to call for assistance with activity based on assessment  - Modify environment to reduce risk of injury  - Consider OT/PT consult to assist with strengthening/mobility  Outcome: Progressing     Problem: Prexisting or High Potential for Compromised Skin Integrity  Goal: Skin integrity is maintained or improved  Description: INTERVENTIONS:  - Identify patients at risk for skin breakdown  - Assess and monitor skin integrity  - Assess and monitor nutrition and hydration status  - Monitor labs   - Assess for incontinence   - Turn and reposition patient  - Assist with mobility/ambulation  - Relieve pressure over bony prominences  - Avoid friction and shearing  - Provide appropriate hygiene as needed including keeping skin clean and dry  - Evaluate need for skin moisturizer/barrier cream  - Collaborate with interdisciplinary team   - Patient/family teaching  - Consider wound care consult   Outcome: Progressing     Problem: RESPIRATORY - ADULT  Goal: Achieves optimal ventilation and oxygenation  Description: INTERVENTIONS:  - Assess for changes in respiratory status  - Assess for changes in mentation and behavior  - Position to facilitate oxygenation and minimize respiratory effort  - Oxygen administered by appropriate delivery if ordered  - Initiate smoking cessation education as indicated  - Encourage broncho-pulmonary hygiene including cough, deep breathe, Incentive Spirometry  - Assess the need for suctioning and aspirate as needed  - Assess and instruct to report SOB or any respiratory difficulty  - Respiratory Therapy support as indicated  Outcome: Progressing     Problem: METABOLIC, FLUID AND ELECTROLYTES - ADULT  Goal: Electrolytes maintained within normal limits  Description: INTERVENTIONS:  - Monitor labs and assess patient for signs and symptoms of electrolyte imbalances  - Administer electrolyte replacement as ordered  - Monitor response to electrolyte replacements, including repeat lab results as appropriate  - Instruct patient on fluid and nutrition as appropriate  Outcome: Progressing  Goal: Fluid balance maintained  Description: INTERVENTIONS:  - Monitor labs   - Monitor I/O and WT  - Instruct patient on fluid and nutrition as appropriate  - Assess for signs & symptoms of volume excess or deficit  Outcome: Progressing  Goal: Glucose maintained within target range  Description: INTERVENTIONS:  - Monitor Blood Glucose as ordered  - Assess for signs and symptoms of hyperglycemia and hypoglycemia  - Administer ordered medications to maintain glucose within target range  - Assess nutritional intake and initiate nutrition service referral as needed  Outcome: Progressing     Problem: PAIN - ADULT  Goal: Verbalizes/displays adequate comfort level or baseline comfort level  Description: Interventions:  - Encourage patient to monitor pain and request assistance  - Assess pain using appropriate pain scale  - Administer analgesics based on type and severity of pain and evaluate response  - Implement non-pharmacological measures as appropriate and evaluate response  - Consider cultural and social influences on pain and pain management  - Notify physician/advanced practitioner if interventions unsuccessful or patient reports new pain  Outcome: Progressing     Problem: INFECTION - ADULT  Goal: Absence or prevention of progression during hospitalization  Description: INTERVENTIONS:  - Assess and monitor for signs and symptoms of infection  - Monitor lab/diagnostic results  - Monitor all insertion sites, i e  indwelling lines, tubes, and drains  - Monitor endotracheal if appropriate and nasal secretions for changes in amount and color  - [place] appropriate cooling/warming therapies per order  - Administer medications as ordered  - Instruct and encourage patient and family to use good hand hygiene technique  - Identify and instruct in appropriate isolation precautions for identified infection/condition  Outcome: Progressing  Goal: Absence of fever/infection during neutropenic period  Description: INTERVENTIONS:  - Monitor WBC    Outcome: Progressing     Problem: SAFETY ADULT  Goal: Maintain or return to baseline ADL function  Description: INTERVENTIONS:  -  Assess patient's ability to carry out ADLs; assess patient's baseline for ADL function and identify physical deficits which impact ability to perform ADLs (bathing, care of mouth/teeth, toileting, grooming, dressing, etc )  - Assess/evaluate cause of self-care deficits   - Assess range of motion  - Assess patient's mobility; develop plan if impaired  - Assess patient's need for assistive devices and provide as appropriate  - Encourage maximum independence but intervene and supervise when necessary  - Involve family in performance of ADLs  - Assess for home care needs following discharge   - Consider OT consult to assist with ADL evaluation and planning for discharge  - Provide patient education as appropriate  Outcome: Progressing  Goal: Maintain or return mobility status to optimal level  Description: INTERVENTIONS:  - Assess patient's baseline mobility status (ambulation, transfers, stairs, etc )    - Identify cognitive and physical deficits and behaviors that affect mobility  - Identify mobility aids required to assist with transfers and/or ambulation (gait belt, sit-to-stand, lift, walker, cane, etc )  - [place] fall precautions as indicated by assessment  - Record patient progress and toleration of activity level on Mobility SBAR; progress patient to next Phase/Stage  - Instruct patient to call for assistance with activity based on assessment  - Consider rehabilitation consult to assist with strengthening/weightbearing, etc   Outcome: Progressing     Problem: DISCHARGE PLANNING  Goal: Discharge to home or other facility with appropriate resources  Description: INTERVENTIONS:  - Identify barriers to discharge w/patient and caregiver  - Arrange for needed discharge resources and transportation as appropriate  - Identify discharge learning needs (meds, wound care, etc )  - Arrange for interpretive services to assist at discharge as needed  - Refer to Case Management Department for coordinating discharge planning if the patient needs post-hospital services based on physician/advanced practitioner order or complex needs related to functional status, cognitive ability, or social support system  Outcome: Progressing     Problem: Knowledge Deficit  Goal: Patient/family/caregiver demonstrates understanding of disease process, treatment plan, medications, and discharge instructions  Description: Complete learning assessment and assess knowledge base    Interventions:  - Provide teaching at level of understanding  - Provide teaching via preferred learning methods  Outcome: Progressing

## 2020-09-20 NOTE — H&P
[address] [facility]    H&P- Mariella Fritz 1950, 79 y o  female MRN: [id number]    Unit/Bed#: S -69 Encounter: [id number]    Primary Care Provider: Lawanda Salamanca MD   Date and time admitted to hospital: 9/19/2020  6:45 PM        * Sepsis [facility])  Assessment & Plan   SIRS criteria: Fever, Tachycardia, and Leuokocytosis   Suspected source: Poss  Pneumonia  · CT Chest/Abdome/Pelvis: Bibasilar infiltrates concerning for atelectasis vs  Pneumonia  Stable pulmonary nodules   Lactic acid: Upon Admission 2 4 Redraw 1 5   IV Fluids: 1L NSS Bolus   IV antibiotics: Started Levaquin and Vancomycin   Follow up on culture results   Monitor vital signs, laboratory studies  Pneumonia  Assessment & Plan   Chest x-ray: Official read Pending  · CT Chest/Abdomen/Pelvis: Bibasilar infiltrates concerning for atelectasis vs  Pneumonia  Stable pulmonary nodules   Treating as HCAP  o IV antibiotics: Levaquin and Vancomycin   Respiratory protocol, nebs PRN   Obtain sputum culture and Gram stain, strep and Legionella antigens   Obtain procalcitonin   Monitor respiratory status  [place] [facility] consult  KATHY (acute kidney injury) ([facility] [address] )  Assessment & Plan   Creatinine upon admission: 1 33  o Baseline: 0 5-0 8   Secondary to Infection vs  Hypotension   Treatment: 1L NSS Bolus   Monitor BMP  Asthma  Assessment & Plan  · Wheezing noted on exam, will attempt nebulizer first, consider steroids if wheezing persists  · Chest Xray:   Official read Pending  · CT Chest/Abd/Pelvis: Bibasilar infiltrates concerning for atelectasis vs  Pneumonia  Stable pulmonary nodules  · Continue Home Nebulizers/ Inhalers    Essential hypertension  Assessment & Plan   Blood pressure is reviewed and acceptable   o Last recorded pressure: 112/63   Hold Lisinopril 2 5mg Daily due to Acute Kidney Injury   Monitor blood pressure   Avoid hypotension and Nephrotoxins       Type 2 diabetes mellitus with hyperglycemia ([facility] )  Assessment & Plan  Lab Results   Component Value Date    HGBA1C 7 6 (H) 08/13/2020       No results for input(s): POCGLU in the last 72 hours  Blood Sugar Average: Last 72 hrs:     · Accu-Checks and SSI  · Continue Lantus? · Hypoglycemic Protocol    Pulmonary nodule  Assessment & Plan  · CT Chest/Abd/Pelvis: Left Upper Lobe 7mm nodule and 4mm right apical nodule stable since 2017  No tracheal or endobrachial lesions  · Continue Outpatient Follow Up    Hyperlipidemia  Assessment & Plan  · Continue Statin    S/P tooth extraction  Assessment & Plan  · Dental Soft Diet until 9/21  · [place] every Shift until 9/26  · Avoid Brushing and Use of Straws until 9/25    Alzheimer's disease ([facility] )  Assessment & Plan  · Continue Namenda 10 mg BID    VTE Prophylaxis: Heparin  / sequential compression device   Code Status: Level 3 DNR/DNI  POLST: POLST form is on file already (pre-hospital)      Anticipated Length of Stay:  Patient will be admitted on an Inpatient basis with an anticipated length of stay of  > 2 midnights  Justification for Hospital Stay: IV Antibiotics    Total Time for Visit, including Counseling / Coordination of Care: 45 minutes  Greater than 50% of this total time spent on direct patient counseling and coordination of care  Chief Complaint:   "Shortness of Breath, cough and Generalized Pain"    History of Present Illness:    Trever Hope is a 79 y o  female with a past medical history of asthma, Lung nodules, hyperlipidemia, hypertension, and diabetes who presents to the ED with   Shortness of breath and non productive cough for the past week  Patient states she has been delirious screaming and shouting out since this morning because she has been feeling unwell  Patient claims that she has generalized pain and weakness  Patient denies chest pain, fever, chills and abdominal pain  Patient will be admitted for IV antibiotics for suspected pneumonia       Review of Systems:    Review of Systems   Constitutional: Positive for chills and fever  Negative for diaphoresis and fatigue  Respiratory: Negative for cough and shortness of breath  Cardiovascular: Negative for chest pain and palpitations  Gastrointestinal: Negative for abdominal pain, diarrhea, nausea and vomiting  Musculoskeletal:        Generalized Pain   Neurological: Positive for weakness  All other systems reviewed and are negative  Past Medical and Surgical History:     Past Medical History:   Diagnosis Date    Asthma     Diabetes mellitus ([facility] [address] )     Hyperlipidemia     Hypertension        Past Surgical History:   Procedure Laterality Date    KNEE SURGERY      TOE SURGERY         Meds/Allergies:    Prior to Admission medications    Medication Sig Start Date End Date Taking?  Authorizing Provider   ACETAMINOPHEN PO Take 650 mg by mouth every 12 (twelve) hours as needed for mild pain     Historical Provider, MD   ALBUTEROL IN Inhale 3 mg    Historical Provider, MD   aspirin 81 MG tablet Take by mouth    Historical Provider, MD   bisacodyl (DULCOLAX) 10 mg suppository Insert into the rectum Twice daily    Historical Provider, MD   budesonide-formoterol (SYMBICORT) 160-4 5 mcg/act inhaler Inhale 2 puffs 2 (two) times a day 10/15/18   Lili Knapp DO   celecoxib (CeleBREX) 200 mg capsule Take 100 mg by mouth daily  10/31/11   Historical Provider, MD   cetirizine (ZyrTEC) 10 mg tablet Take 10 mg by mouth daily    Historical Provider, MD   Cholecalciferol (VITAMIN D3) 61009 units CAPS Take 50,000 Units by mouth every 30 (thirty) days    Historical Provider, MD   clindamycin (CLEOCIN) 300 MG capsule Take 300 mg by mouth 4 (four) times a day    Historical Provider, MD   dextromethorphan-guaiFENesin (DIABETIC SILTUSSIN-DM)  mg/5 mL oral liquid Take 5 mL by mouth every 12 (twelve) hours    Historical Provider, MD   fluticasone (FLONASE) 50 mcg/act nasal spray 1 spray into each nostril daily 8/20/19 [place] Shape, CRNP   gabapentin (NEURONTIN) 300 mg capsule Take 300 mg by mouth 3 (three) times a day    Historical Provider, MD   glimepiride (AMARYL) 2 mg tablet Take 1 tablet by mouth daily with breakfast  Patient taking differently: Take 4 mg by mouth daily with breakfast  8/24/17   Jacqueline Sharma MD   Glucose Blood (BL TEST STRIP PACK VI) by In Vitro route 3/3/11   Historical Provider, MD   glucose blood (ONE TOUCH ULTRA TEST) test strip by In Vitro route 10/8/14   Historical Provider, MD   insulin glargine (LANTUS SOLOSTAR) 100 units/mL injection pen Inject under the skin daily    Historical Provider, MD   Ipratropium-Albuterol (DUONEB IN) Inhale 3 mg every 6 (six) hours as needed    Historical Provider, MD   lisinopril (ZESTRIL) 2 5 mg tablet Take 2 5 mg by mouth daily    Historical Provider, MD   magnesium hydroxide (EQ MILK OF MAGNESIA) 400 mg/5 mL oral suspension Take by mouth daily as needed for constipation    Historical Provider, MD   memantine (NAMENDA) 10 mg tablet 10 mg 2 (two) times a day     Historical Provider, MD   metFORMIN (GLUCOPHAGE) 1000 MG tablet Take 1,000 mg by mouth 2 (two) times a day with meals    Historical Provider, MD   mirtazapine (REMERON) 30 mg tablet Take 7 5 mg by mouth daily at bedtime     Historical Provider, MD   montelukast (SINGULAIR) 10 mg tablet Take 10 mg by mouth daily at bedtime    Historical Provider, MD   Omeprazole 20 MG TBEC Take 20 mg by mouth daily    Historical Provider, MD   ondansetron (ZOFRAN) 4 mg tablet Take 4 mg by mouth every 8 (eight) hours as needed for nausea or vomiting    Historical Provider, MD   polyethylene glycol (MIRALAX) 17 g packet 17 g daily    Historical Provider, MD   ranitidine (ZANTAC) 75 MG tablet Take 75 mg by mouth 2 (two) times a day    Historical Provider, MD   simvastatin (ZOCOR) 40 mg tablet Take 1 tablet by mouth daily at bedtime 8/24/17   Jacqueline Sharma MD   traZODone (DESYREL) 50 mg tablet Take 50 mg by mouth daily at bedtime    Historical Provider, MD [place] have reveiwed home medications using records provided by [facility]  Allergies: Allergies   Allergen Reactions    Penicillins Shortness Of Breath    Cephalexin      Reaction Date: 26May2011;    Alix Shira  [Rosuvastatin]      Reaction Date: [id number];     Zithromax  [Azithromycin]        Social History:     Marital Status: Single   Occupation: Retired  [facility]  Patient Pre-hospital Level of Mobility: Wheel Chair Bound  Working with PT/OT  Patient Pre-hospital Diet Restrictions: Dental Soft  Substance Use History:   Social History     Substance and Sexual Activity   Alcohol Use No     Social History     Tobacco Use   Smoking Status Former Smoker    Packs/day: 1 00    Years: 52 00    Pack years: 52 00    Types: Cigarettes    Start date: 65    Last attempt to quit: 09/2017    Years since quitting: 3 0   Smokeless Tobacco Never Used     Social History     Substance and Sexual Activity   Drug Use No       Family History:    non-contributory    Physical Exam:     Vitals:   Blood Pressure: 120/58 (09/20/20 0028)  Pulse: 90 (09/20/20 0028)  Temperature: 98 2 °F (36 8 °C) (09/20/20 0028)  Temp Source: Oral (09/20/20 0028)  Respirations: 18 (09/20/20 0028)  Height: 4' 8" (142 2 cm) (09/20/20 0028)  Weight - Scale: 98 5 kg (217 lb 2 5 oz) (09/20/20 0028)  SpO2: 90 % (09/20/20 0045)    Physical Exam  Vitals signs and nursing note reviewed  Constitutional:       General: She is awake  HENT:      Head: Normocephalic  Cardiovascular:      Rate and Rhythm: Normal rate and regular rhythm  Pulses: Normal pulses  Heart sounds: Normal heart sounds, S1 normal and S2 normal    Pulmonary:      Effort: Pulmonary effort is normal       Breath sounds: Decreased breath sounds and wheezing present  Abdominal:      General: Bowel sounds are normal       Palpations: Abdomen is soft  Musculoskeletal:      Right lower leg: No edema  Left lower leg: No edema  Feet:    Feet:      Right foot:      Skin integrity: Ulcer present  Left foot:      Skin integrity: Ulcer present  Comments: Diabetic Foot Ulcers present  Left Healing, REJI  Right arch currently being treated  Skin:     General: Skin is warm and dry  Neurological:      Mental Status: She is alert and oriented to person, place, and time  Motor: Weakness present  Comments: Patient Wifty and Forgetful   Psychiatric:         Behavior: Behavior is cooperative  Additional Data:     Lab Results: I have personally reviewed pertinent reports  Results from last 7 days   Lab Units 09/19/20  1854   WBC Thousand/uL 17 11*   HEMOGLOBIN g/dL 11 8   HEMATOCRIT % 37 2   PLATELETS Thousands/uL 202   NEUTROS PCT % 86*   LYMPHS PCT % 6*   MONOS PCT % 6   EOS PCT % 1     Results from last 7 days   Lab Units 09/19/20  1854   SODIUM mmol/L 135*   POTASSIUM mmol/L 4 8   CHLORIDE mmol/L 97*   CO2 mmol/L 30   BUN mg/dL 25   CREATININE mg/dL 1 33*   ANION GAP mmol/L 8   CALCIUM mg/dL 9 2   ALBUMIN g/dL 3 6   TOTAL BILIRUBIN mg/dL 0 68   ALK PHOS U/L 71   ALT U/L 34   AST U/L 29   GLUCOSE RANDOM mg/dL 196*     Results from last 7 days   Lab Units 09/19/20  1854   INR  0 96     Results from last 7 days   Lab Units 09/20/20  0201   POC GLUCOSE mg/dl 71         Results from last 7 days   Lab Units 09/19/20  2142 09/19/20  1854   LACTIC ACID mmol/L 1 5 2 4*   PROCALCITONIN ng/ml  --  0 25       Imaging: I have personally reviewed pertinent reports  CT chest abdomen pelvis w contrast   Final Result by Brenda Aragon MD (09/19 2108)         1  Bibasilar infiltrates concerning for atelectasis versus pneumonia  Stable pulmonary nodules  2   No acute abnormality in the abdomen or pelvis  3   Hepatosplenomegaly with underlying severe hepatic steatosis  4   Constipation  5   Left lower quadrant abdominal wall hernia containing nondilated colon              Workstation performed: [id number]         XR chest 1 view portable   ED Interpretation by Terri Keita DO (09/19 1953)   No infiltrates  No cardiomegaly  EKG, Pathology, and Other Studies Reviewed on Admission:   · EKG: Sinus Tachycardia  110 BPM     Allscripts / Epic Records Reviewed: Yes     ** Please Note: This note has been constructed using a voice recognition system   **

## 2020-09-20 NOTE — ASSESSMENT & PLAN NOTE
· POA Wheezing noted on exam   · Chest Xray: No acute cardiopulmonary disease  · CT Chest/Abd/Pelvis: Bibasilar infiltrates concerning for atelectasis vs  Pneumonia  Stable pulmonary nodules  · Continue to present with wheezes and rhonchi B/L   Productive cough of yellow sputum  Plan:  · Continue Home Nebulizers/ Inhalers (Budesonide-formoterol)  · Started: Ipratropium and Levalbuterol  · Started IV Solumedrol 20mg BID  · Influenza PCR ordered

## 2020-09-20 NOTE — ASSESSMENT & PLAN NOTE
 Blood pressure is reviewed and acceptable  Plan:   Monitor blood pressure   Restarted Lisinopril since KATHY seems to have resolved

## 2020-09-20 NOTE — ASSESSMENT & PLAN NOTE
 Creatinine upon admission: 1 33  o Baseline: 0 5-0 8   Secondary to Infection vs  Hypotension   Cr level back to normal range after fluids  Plan:   Monitor BMP

## 2020-09-20 NOTE — ASSESSMENT & PLAN NOTE
· CT Chest/Abd/Pelvis: Left Upper Lobe 7mm nodule and 4mm right apical nodule stable since 2017  No tracheal or endobrachial lesions    Plan  · Continue Outpatient Follow Up

## 2020-09-20 NOTE — ASSESSMENT & PLAN NOTE
 Blood pressure is reviewed and acceptable   o Last recorded pressure: 112/63   Hold Lisinopril 2 5mg Daily due to Acute Kidney Injury   Monitor blood pressure   Avoid hypotension and Nephrotoxins

## 2020-09-20 NOTE — ASSESSMENT & PLAN NOTE
 SIRS criteria: Fever, Tachycardia, and Leuokocytosis   Suspected source: Poss  Pneumonia  · CT Chest/Abdome/Pelvis: Bibasilar infiltrates concerning for atelectasis vs  Pneumonia  Stable pulmonary nodules   Lactic acid: Upon Admission 2 4 Redraw 1 5   IV Fluids: 1L NSS Bolus   IV antibiotics: Started Levaquin and Vancomycin   Follow up on culture results   Monitor vital signs, laboratory studies

## 2020-09-20 NOTE — CONSULTS
Consultation - Pulmonary Medicine   Esther Rodriguez 79 y o  female MRN: [id number]  Unit/Bed#: S -01 Encounter: [id number]      Assessment/Plan:    Acute pulmonary insufficiency, multifactorial due to below   Use oxygen as needed to keep saturations greater than or equal to 89%  Out of bed as tolerated  Add flutter valve and incentive spirometer  Abnormal CT chest with bibasilar atelectasis versus infiltrate   Pulmonary hygiene as above  Suspect more atelectasis with acute tracheobronchitis over pneumonia  Discontinue Levaquin and vancomycin  Continue antibiotics with doxycycline  Follow-up procalcitonin and culture data  Monitor temperatures and WBCs  Moderate persistent asthma with acute exacerbation due to above   Start Solu-Medrol 20 milligrams IV twice daily  Continue Xopenex/Atrovent 3 times daily with Symbicort  Continue Flonase, Claritin, and Singulair  At discharge, will continue Symbicort with DuoNeb nebulized twice daily, Flonase, Zyrtec, and Singulair  Outpatient follow-up pending course  Discussed with primary team     History of Present Illness   Physician Requesting Consult: Adore Saunders MD  Reason for Consult / Principal Problem:  Pneumonia, moderate persistent asthma  Hx and PE limited by:  None  Chief Complaint:  I felt really sick yesterday    HPI: Etsher Rodriguez is a 79 y o   female who presented to [address] [place] [address] with complaints of shortness of breath and generalized weakness, as well as fevers and chills  Peggy Villanueva reports that over the last 1 to 2 weeks, she has noticed that her shortness of breath has been worsening  She notes that she has had intermittent wheezing  She denies any cough or sputum production  She reports she had fevers and chills yesterday, as well as generalized weakness and was sent to the ER for further evaluation  She denies any chest pain or leg pain  She has had no swelling    Appetite remains stable and she denies any dysphagia or aspiration symptoms  Acid reflux has been controlled  Aside from the above, she has no other complaints  From pulmonary standpoint, she follows with Dr Stan Villa for her moderate persistent asthma  She maintains on Symbicort with DuoNeb nebulized twice daily, Singulair, Zyrtec, and Flonase  Inpatient consult to Pulmonology  Consult performed by: CELINA Franco  Consult ordered by: CELINA Drake        Review of Systems   All other systems reviewed and are negative  A full 12-point review of systems was completed and is negative except for those outlined in the HPI  Historical Information   Past Medical History:   Diagnosis Date    Asthma     Diabetes mellitus ([facility] [address] )     Hyperlipidemia     Hypertension      Past Surgical History:   Procedure Laterality Date    KNEE SURGERY      TOE SURGERY       Social History   Social History     Substance and Sexual Activity   Alcohol Use No     Social History     Substance and Sexual Activity   Drug Use No     Social History     Tobacco Use   Smoking Status Former Smoker    Packs/day: 1 00    Years: 52 00    Pack years: 52 00    Types: Cigarettes    Start date: 65    Last attempt to quit: 09/2017    Years since quitting: 3 0   Smokeless Tobacco Never Used     E-Cigarette/Vaping    E-Cigarette Use Never User      E-Cigarette/Vaping Substances     Occupational History:  Retired  Lives at [place]      Family History:   Family History   Problem Relation Age of Onset    Dementia Brother     Breast cancer Sister 76    Cancer Brother        Meds/Allergies   all current active meds have been reviewed, pertinent pulmonary meds have been reviewed, current meds:   Current Facility-Administered Medications   Medication Dose Route Frequency    acetaminophen (TYLENOL) tablet 650 mg  650 mg Oral Q6H PRN    acetaminophen (TYLENOL) tablet 975 mg  975 mg Oral Q8H [address]    albuterol (PROVENTIL HFA,VENTOLIN HFA) inhaler 2 puff  2 puff Inhalation Q6H PRN    aspirin (ECOTRIN LOW STRENGTH) EC tablet 81 mg  81 mg Oral Daily    benzonatate (TESSALON PERLES) capsule 200 mg  200 mg Oral TID PRN    budesonide-formoterol (SYMBICORT) 160-4 5 mcg/act inhaler 2 puff  2 puff Inhalation BID    calcium carbonate (TUMS) chewable tablet 1,000 mg  1,000 mg Oral Daily PRN    docusate sodium (COLACE) capsule 100 mg  100 mg Oral BID    fluticasone (FLONASE) 50 mcg/act nasal spray 1 spray  1 spray Nasal Daily    gabapentin (NEURONTIN) capsule 300 mg  300 mg Oral TID    guaiFENesin (MUCINEX) 12 hr tablet 1,200 mg  1,200 mg Oral Q12H CLEMENTINE    heparin (porcine) subcutaneous injection 7,500 Units  7,500 Units Subcutaneous Q8H [address]    [START ON 9/21/2020] insulin glargine (LANTUS) subcutaneous injection 58 Units 0 58 mL  58 Units Subcutaneous Q12H CLEMENTINE    insulin lispro (HumaLOG) 100 units/mL subcutaneous injection 1-5 Units  1-5 Units Subcutaneous HS    insulin lispro (HumaLOG) 100 units/mL subcutaneous injection 1-6 Units  1-6 Units Subcutaneous TID AC    levalbuterol (XOPENEX) inhalation solution 1 25 mg  1 25 mg Nebulization TID    And    ipratropium (ATROVENT) 0 02 % inhalation solution 0 5 mg  0 5 mg Nebulization TID    [START ON 9/21/2020] levofloxacin (LEVAQUIN) IVPB (premix in dextrose) 750 mg 150 mL  750 mg Intravenous Q48H    lisinopril (ZESTRIL) tablet 2 5 mg  2 5 mg Oral Daily    loratadine (CLARITIN) tablet 10 mg  10 mg Oral Daily    memantine (NAMENDA) tablet 10 mg  10 mg Oral BID    montelukast (SINGULAIR) tablet 10 mg  10 mg Oral HS    oxyCODONE (ROXICODONE) IR tablet 2 5 mg  2 5 mg Oral Q4H PRN    pantoprazole (PROTONIX) EC tablet 40 mg  40 mg Oral BID AC    pravastatin (PRAVACHOL) tablet 80 mg  80 mg Oral Daily With Dinner    traZODone (DESYREL) tablet 50 mg  50 mg Oral HS    vancomycin (VANCOCIN) IVPB (premix in dextrose) 750 mg 150 mL  10 mg/kg (Adjusted) Intravenous Q12H    and PTA meds:   Prior to Admission Medications   Prescriptions Last Dose Informant Patient Reported? Taking?    [facility] (Specify) Yes No   Sig: Take 650 mg by mouth every 12 (twelve) hours as needed for mild pain    ALBUTEROL IN  Outside Facility (Specify) Yes No   Sig: Inhale 3 mg   Cholecalciferol (VITAMIN D3) 70616 units CAPS  Outside Facility (Specify) Yes No   Sig: Take 50,000 Units by mouth every 30 (thirty) days   Glucose Blood (BL TEST STRIP PACK VI)  Outside Facility (Specify) Yes No   Sig: by In Vitro route   Ipratropium-Albuterol (DUONEB IN)  Outside Facility (Specify) Yes No   Sig: Inhale 3 mg every 6 (six) hours as needed   Omeprazole 20 MG TBEC  Outside Facility (Specify) Yes No   Sig: Take 20 mg by mouth daily   aspirin 81 MG tablet  Outside Facility (Specify) Yes No   Sig: Take by mouth   benzonatate (TESSALON) 200 MG capsule  Outside Facility (Specify) Yes Yes   Sig: Take 200 mg by mouth 3 (three) times a day as needed for cough   bisacodyl (DULCOLAX) 10 mg suppository  Outside Facility (Specify) Yes No   Sig: Insert 10 mg into the rectum daily as needed    budesonide-formoterol (SYMBICORT) 160-4 5 mcg/act inhaler  Outside Facility (Specify) No No   Sig: Inhale 2 puffs 2 (two) times a day   celecoxib (CeleBREX) 200 mg capsule  Outside Facility (Specify) Yes No   Sig: Take 100 mg by mouth daily    cetirizine (ZyrTEC) 10 mg tablet  Outside Facility (Specify) Yes No   Sig: Take 10 mg by mouth daily   clindamycin (CLEOCIN) 300 MG capsule  Outside Facility (Specify) Yes No   Sig: Take 300 mg by mouth 4 (four) times a day   dextromethorphan-guaiFENesin (DIABETIC SILTUSSIN-DM)  mg/5 mL oral liquid  Outside Facility (Specify) Yes No   Sig: Take 5 mL by mouth every 12 (twelve) hours   fluticasone (FLONASE) 50 mcg/act nasal spray  Outside Facility (Specify) No No   Si spray into each nostril daily   gabapentin (NEURONTIN) 300 mg capsule  Outside Facility (Specify) Yes No   Sig: Take 300 mg by mouth 3 (three) times a day   glimepiride (AMARYL) 2 mg tablet  Outside Facility (Specify) No No   Sig: Take 1 tablet by mouth daily with breakfast   Patient taking differently: Take 4 mg by mouth daily with breakfast    glucose blood (ONE TOUCH ULTRA TEST) test strip  Outside Facility (Specify) Yes No   Sig: by In Vitro route   guaiFENesin (MUCINEX) 600 mg 12 hr tablet  Outside Facility (Specify) Yes Yes   Sig: Take 1,200 mg by mouth every 12 (twelve) hours   insulin glargine (LANTUS SOLOSTAR) 100 units/mL injection pen  Outside Facility (Specify) Yes No   Sig: Inject 58 Units under the skin every 12 (twelve) hours    lisinopril (ZESTRIL) 2 5 mg tablet  Outside Facility (Specify) Yes No   Sig: Take 2 5 mg by mouth daily   magnesium hydroxide (EQ MILK OF MAGNESIA) 400 mg/5 mL oral suspension  Outside Facility (Specify) Yes No   Sig: Take by mouth daily as needed for constipation   memantine (NAMENDA) 10 mg tablet  Outside Facility (Specify) Yes No   Sig: 10 mg 2 (two) times a day    metFORMIN (GLUCOPHAGE) 1000 MG tablet  Outside Facility (Specify) Yes No   Sig: Take 1,000 mg by mouth 2 (two) times a day with meals   mirtazapine (REMERON) 30 mg tablet  Outside Facility (Specify) Yes No   Sig: Take 7 5 mg by mouth daily at bedtime    montelukast (SINGULAIR) 10 mg tablet  Outside Facility (Specify) Yes No   Sig: Take 10 mg by mouth daily at bedtime   ondansetron (ZOFRAN) 4 mg tablet  Outside Facility (Specify) Yes No   Sig: Take 4 mg by mouth every 8 (eight) hours as needed for nausea or vomiting   polyethylene glycol (MIRALAX) 17 g packet  Outside Facility (Specify) Yes No   Si g daily   ranitidine (ZANTAC) 75 MG tablet  Outside Facility (Specify) Yes No   Sig: Take 75 mg by mouth 2 (two) times a day   simvastatin (ZOCOR) 40 mg tablet  Outside Facility (Specify) No No   Sig: Take 1 tablet by mouth daily at bedtime   traMADol (ULTRAM) 50 mg tablet  Outside Facility (Specify) Yes Yes   Sig: Take 50 mg by mouth 2 (two) times a day traZODone (DESYREL) 50 mg tablet  Outside Facility (Specify) Yes No   Sig: Take 50 mg by mouth daily at bedtime      Facility-Administered Medications: None       Allergies   Allergen Reactions    Penicillins Shortness Of Breath    Cephalexin      Reaction Date: 26May2011;    Alen Nares  [Rosuvastatin]      Reaction Date: [id number];     Zithromax  [Azithromycin]        Objective   Vitals: Blood pressure 119/60, pulse 84, temperature 98 6 °F (37 °C), temperature source Oral, resp  rate 19, height 4' 8" (1 422 m), weight 98 5 kg (217 lb 2 5 oz), SpO2 94 %  2 liters nasal cannula,Body mass index is 48 68 kg/m²  Intake/Output Summary (Last 24 hours) at 9/20/2020 1130  Last data filed at 9/20/2020 1100  Gross per 24 hour   Intake 1630 ml   Output 1350 ml   Net 280 ml     Invasive Devices     Peripheral Intravenous Line            Peripheral IV Left;Ventral (anterior) Forearm -- days    Peripheral IV 09/19/20 Right; Lower Arm 1 day              Physical Exam  Vitals signs reviewed  Constitutional:       General: She is not in acute distress  Appearance: She is well-developed  She is not toxic-appearing or diaphoretic  Interventions: Nasal cannula in place  HENT:      Head: Normocephalic and atraumatic  Mouth/Throat:      Pharynx: No oropharyngeal exudate  Eyes:      General: No scleral icterus  Neck:      Musculoskeletal: Neck supple  Vascular: No JVD  Trachea: No tracheal deviation  Cardiovascular:      Rate and Rhythm: Normal rate and regular rhythm  Heart sounds: S1 normal and S2 normal  No murmur  No friction rub  No gallop  Pulmonary:      Effort: Pulmonary effort is normal  No tachypnea, accessory muscle usage or respiratory distress  Breath sounds: No stridor  Wheezing present  No decreased breath sounds, rhonchi or rales  Chest:      Chest wall: No tenderness  Abdominal:      General: Bowel sounds are normal  There is no distension        Palpations: Abdomen is soft  Tenderness: There is no abdominal tenderness  There is no guarding or rebound  Skin:     General: Skin is warm and dry  Findings: No rash  Neurological:      Mental Status: She is alert and oriented to person, place, and time  GCS: GCS eye subscore is 4  GCS verbal subscore is 5  GCS motor subscore is 6  Psychiatric:         Speech: Speech normal          Behavior: Behavior normal  Behavior is cooperative  Lab Results:   CBC:   Lab Results   Component Value Date    WBC 12 98 (H) 09/20/2020    HGB 10 6 (L) 09/20/2020    HCT 34 1 (L) 09/20/2020    MCV 91 09/20/2020     09/20/2020    MCH 28 2 09/20/2020    MCHC 31 1 (L) 09/20/2020    RDW 16 0 (H) 09/20/2020    MPV 10 0 09/20/2020    NRBC 0 09/19/2020   , CMP:   Lab Results   Component Value Date    SODIUM 140 09/20/2020    K 3 9 09/20/2020     09/20/2020    CO2 29 09/20/2020    BUN 21 09/20/2020    CREATININE 1 09 09/20/2020    CALCIUM 8 7 09/20/2020    AST 29 09/19/2020    ALT 34 09/19/2020    ALKPHOS 71 09/19/2020    EGFR 52 09/20/2020     Procalcitonin:  0 25 yesterday, repeat pending this morning    Viral PCR:  Influenza PCR pending, COVID-19 testing negative    Culture Data:  Blood cultures x2 pending, MRSA culture pending, urine strep and Legionella antigens negative    Imaging Studies: I have personally reviewed pertinent reports  and I have personally reviewed pertinent films in PACS   Chest x-ray shows no acute pulmonary infiltrate, effusion, or mass  CT chest abdomen pelvis shows bibasilar infiltrates concerning atelectasis versus pneumonia with stable pulmonary nodules  There is no acute abnormality in the abdomen or pelvis  She does have constipation      EKG, Pathology, and Other Studies: None    Pulmonary Results (PFTs, PSG): None    VTE Prophylaxis: Sequential compression device (Venodyne)  and Heparin    Code Status: Level 3 - DNAR and DNI    None    Portions of the record may have been created with voice recognition software  Occasional wrong word or "sound a like" substitutions may have occurred due to the inherent limitations of voice recognition software  Read the chart carefully and recognize, using context, where substitutions have occurred

## 2020-09-21 LAB
GLUCOSE SERPL-MCNC: 288 MG/DL (ref 65–140)
GLUCOSE SERPL-MCNC: 294 MG/DL (ref 65–140)
GLUCOSE SERPL-MCNC: 310 MG/DL (ref 65–140)
GLUCOSE SERPL-MCNC: 349 MG/DL (ref 65–140)
MRSA NOSE QL CULT: NORMAL
PROCALCITONIN SERPL-MCNC: 0.15 NG/ML

## 2020-09-21 PROCEDURE — 99232 SBSQ HOSP IP/OBS MODERATE 35: CPT | Performed by: INTERNAL MEDICINE

## 2020-09-21 PROCEDURE — 82948 REAGENT STRIP/BLOOD GLUCOSE: CPT

## 2020-09-21 PROCEDURE — 94640 AIRWAY INHALATION TREATMENT: CPT

## 2020-09-21 PROCEDURE — 94760 N-INVAS EAR/PLS OXIMETRY 1: CPT

## 2020-09-21 PROCEDURE — 94668 MNPJ CHEST WALL SBSQ: CPT

## 2020-09-21 PROCEDURE — 84145 PROCALCITONIN (PCT): CPT | Performed by: NURSE PRACTITIONER

## 2020-09-21 RX ORDER — LANOLIN ALCOHOL/MO/W.PET/CERES
CREAM (GRAM) TOPICAL 3 TIMES DAILY PRN
Status: DISCONTINUED | OUTPATIENT
Start: 2020-09-21 | End: 2020-09-25 | Stop reason: HOSPADM

## 2020-09-21 RX ADMIN — INSULIN GLARGINE 58 UNITS: 100 INJECTION, SOLUTION SUBCUTANEOUS at 08:46

## 2020-09-21 RX ADMIN — BUDESONIDE AND FORMOTEROL FUMARATE DIHYDRATE 2 PUFF: 160; 4.5 AEROSOL RESPIRATORY (INHALATION) at 08:46

## 2020-09-21 RX ADMIN — LEVALBUTEROL HYDROCHLORIDE 1.25 MG: 1.25 SOLUTION, CONCENTRATE RESPIRATORY (INHALATION) at 07:40

## 2020-09-21 RX ADMIN — ACETAMINOPHEN 975 MG: 325 TABLET, FILM COATED ORAL at 22:08

## 2020-09-21 RX ADMIN — DOXYCYCLINE 100 MG: 100 INJECTION, POWDER, LYOPHILIZED, FOR SOLUTION INTRAVENOUS at 22:20

## 2020-09-21 RX ADMIN — HEPARIN SODIUM 7500 UNITS: 5000 INJECTION INTRAVENOUS; SUBCUTANEOUS at 22:07

## 2020-09-21 RX ADMIN — GABAPENTIN 300 MG: 300 CAPSULE ORAL at 17:20

## 2020-09-21 RX ADMIN — PANTOPRAZOLE SODIUM 40 MG: 40 TABLET, DELAYED RELEASE ORAL at 17:20

## 2020-09-21 RX ADMIN — OXYCODONE HYDROCHLORIDE 2.5 MG: 5 TABLET ORAL at 19:38

## 2020-09-21 RX ADMIN — LEVALBUTEROL HYDROCHLORIDE 1.25 MG: 1.25 SOLUTION, CONCENTRATE RESPIRATORY (INHALATION) at 20:14

## 2020-09-21 RX ADMIN — LISINOPRIL 2.5 MG: 2.5 TABLET ORAL at 08:45

## 2020-09-21 RX ADMIN — DOCUSATE SODIUM 100 MG: 100 CAPSULE ORAL at 17:20

## 2020-09-21 RX ADMIN — IPRATROPIUM BROMIDE 0.5 MG: 0.5 SOLUTION RESPIRATORY (INHALATION) at 07:40

## 2020-09-21 RX ADMIN — PANTOPRAZOLE SODIUM 40 MG: 40 TABLET, DELAYED RELEASE ORAL at 06:08

## 2020-09-21 RX ADMIN — OXYCODONE HYDROCHLORIDE 2.5 MG: 5 TABLET ORAL at 23:38

## 2020-09-21 RX ADMIN — INSULIN LISPRO 4 UNITS: 100 INJECTION, SOLUTION INTRAVENOUS; SUBCUTANEOUS at 22:09

## 2020-09-21 RX ADMIN — DOXYCYCLINE 100 MG: 100 INJECTION, POWDER, LYOPHILIZED, FOR SOLUTION INTRAVENOUS at 11:45

## 2020-09-21 RX ADMIN — OXYCODONE HYDROCHLORIDE 2.5 MG: 5 TABLET ORAL at 08:45

## 2020-09-21 RX ADMIN — INSULIN GLARGINE 58 UNITS: 100 INJECTION, SOLUTION SUBCUTANEOUS at 22:08

## 2020-09-21 RX ADMIN — MEMANTINE 10 MG: 10 TABLET ORAL at 08:45

## 2020-09-21 RX ADMIN — GABAPENTIN 300 MG: 300 CAPSULE ORAL at 08:45

## 2020-09-21 RX ADMIN — GUAIFENESIN 1200 MG: 600 TABLET, EXTENDED RELEASE ORAL at 08:45

## 2020-09-21 RX ADMIN — METHYLPREDNISOLONE SODIUM SUCCINATE 20 MG: 40 INJECTION, POWDER, FOR SOLUTION INTRAMUSCULAR; INTRAVENOUS at 22:07

## 2020-09-21 RX ADMIN — IPRATROPIUM BROMIDE 0.5 MG: 0.5 SOLUTION RESPIRATORY (INHALATION) at 13:27

## 2020-09-21 RX ADMIN — HEPARIN SODIUM 7500 UNITS: 5000 INJECTION INTRAVENOUS; SUBCUTANEOUS at 13:04

## 2020-09-21 RX ADMIN — BUDESONIDE AND FORMOTEROL FUMARATE DIHYDRATE 2 PUFF: 160; 4.5 AEROSOL RESPIRATORY (INHALATION) at 17:20

## 2020-09-21 RX ADMIN — GUAIFENESIN 1200 MG: 600 TABLET, EXTENDED RELEASE ORAL at 22:08

## 2020-09-21 RX ADMIN — INSULIN LISPRO 7 UNITS: 100 INJECTION, SOLUTION INTRAVENOUS; SUBCUTANEOUS at 17:20

## 2020-09-21 RX ADMIN — ACETAMINOPHEN 975 MG: 325 TABLET, FILM COATED ORAL at 13:04

## 2020-09-21 RX ADMIN — LEVALBUTEROL HYDROCHLORIDE 1.25 MG: 1.25 SOLUTION, CONCENTRATE RESPIRATORY (INHALATION) at 13:27

## 2020-09-21 RX ADMIN — METHYLPREDNISOLONE SODIUM SUCCINATE 20 MG: 40 INJECTION, POWDER, FOR SOLUTION INTRAMUSCULAR; INTRAVENOUS at 08:45

## 2020-09-21 RX ADMIN — HEPARIN SODIUM 7500 UNITS: 5000 INJECTION INTRAVENOUS; SUBCUTANEOUS at 06:09

## 2020-09-21 RX ADMIN — ACETAMINOPHEN 975 MG: 325 TABLET, FILM COATED ORAL at 06:08

## 2020-09-21 RX ADMIN — IPRATROPIUM BROMIDE 0.5 MG: 0.5 SOLUTION RESPIRATORY (INHALATION) at 20:14

## 2020-09-21 RX ADMIN — GABAPENTIN 300 MG: 300 CAPSULE ORAL at 22:08

## 2020-09-21 RX ADMIN — INSULIN LISPRO 5 UNITS: 100 INJECTION, SOLUTION INTRAVENOUS; SUBCUTANEOUS at 11:45

## 2020-09-21 RX ADMIN — OXYCODONE HYDROCHLORIDE 2.5 MG: 5 TABLET ORAL at 13:04

## 2020-09-21 RX ADMIN — MONTELUKAST SODIUM 10 MG: 10 TABLET, FILM COATED ORAL at 22:08

## 2020-09-21 RX ADMIN — FLUTICASONE PROPIONATE 1 SPRAY: 50 SPRAY, METERED NASAL at 08:47

## 2020-09-21 RX ADMIN — ASPIRIN 81 MG: 81 TABLET, COATED ORAL at 08:45

## 2020-09-21 RX ADMIN — LORATADINE 10 MG: 10 TABLET ORAL at 08:45

## 2020-09-21 RX ADMIN — PRAVASTATIN SODIUM 80 MG: 80 TABLET ORAL at 17:20

## 2020-09-21 RX ADMIN — TRAZODONE HYDROCHLORIDE 50 MG: 50 TABLET ORAL at 22:08

## 2020-09-21 RX ADMIN — INSULIN LISPRO 4 UNITS: 100 INJECTION, SOLUTION INTRAVENOUS; SUBCUTANEOUS at 17:19

## 2020-09-21 RX ADMIN — MEMANTINE 10 MG: 10 TABLET ORAL at 17:20

## 2020-09-21 RX ADMIN — INSULIN LISPRO 4 UNITS: 100 INJECTION, SOLUTION INTRAVENOUS; SUBCUTANEOUS at 08:47

## 2020-09-21 NOTE — ASSESSMENT & PLAN NOTE
· POA Wheezing noted on exam   · Chest Xray: No acute cardiopulmonary disease  · CT Chest/Abd/Pelvis: Bibasilar infiltrates concerning for atelectasis vs  Pneumonia  Stable pulmonary nodules  · Continue to present with wheezes and rhonchi B/L  Productive cough of yellow sputum  Plan:  · Continue Xopenex/Atrovent with home Symbicort  · Continue IV Solumedrol 20mg BID  Consider p o   Tomorrow

## 2020-09-21 NOTE — ASSESSMENT & PLAN NOTE
Lab Results   Component Value Date    HGBA1C 7 6 (H) 08/13/2020       Recent Labs     09/20/20  2047 09/21/20  0733 09/21/20  1108 09/21/20  1515   POCGLU 369* 288* 310* 294*       Blood Sugar Average: Last 72 hrs:  (P) 246   · Accu-Checks, SSI and Lantus q12H  · Hypoglycemic Protocol  · Lantus trace to 58 units  Humalog 10 units t i d   Before meals

## 2020-09-21 NOTE — CASE MANAGEMENT
As per chart review, patient appears to be a LTC resident at [address] [place] [address]  Referral sent via [address] [place] [address]

## 2020-09-21 NOTE — PROGRESS NOTES
Progress Note - Pulmonary   Omaira Smart 79 y o  female MRN: [id number]  Unit/Bed#: S -01 Encounter: [id number]    Assessment/Plan:    Acute pulmonary insufficiency, multifactorial due to below              Use oxygen as needed to keep saturations greater than or equal to 89%  Out of bed as tolerated  Continue flutter valve and incentive spirometer      Abnormal CT chest with bibasilar atelectasis versus infiltrate              Pulmonary hygiene as above  Suspect more atelectasis with acute tracheobronchitis over pneumonia  Continue antibiotics with doxycycline  Follow-up procalcitonin and culture data  Monitor temperatures and WBCs  Moderate persistent asthma with acute exacerbation due to above              Continue Solu-Medrol 20 milligrams IV twice daily  Continue Xopenex/Atrovent 3 times daily with Symbicort  Continue Flonase, Claritin, and Singulair  At discharge, will continue Symbicort with DuoNeb nebulized twice daily, Flonase, Zyrtec, and Singulair      Outpatient follow-up pending course  Discussed with primary team     Chief Complaint/subjective:    Johnson Jo is sitting out of bed in the chair  She reports she feels well today  Breathing is improved, but not back to baseline  She continues to have intermittent wheezing  No significant cough  Objective:    Vitals: Blood pressure 139/63, pulse 89, temperature (!) 97 4 °F (36 3 °C), temperature source Oral, resp  rate 18, height 4' 8" (1 422 m), weight 96 1 kg (211 lb 13 8 oz), SpO2 95 %  Room air,Body mass index is 47 5 kg/m²        Intake/Output Summary (Last 24 hours) at 9/21/2020 0846  Last data filed at 9/21/2020 0615  Gross per 24 hour   Intake 830 ml   Output 3700 ml   Net -2870 ml       Invasive Devices     Peripheral Intravenous Line            Peripheral IV Left;Ventral (anterior) Forearm -- days Peripheral IV 09/19/20 Right; Lower Arm 2 days                Physical Exam:     Physical Exam  Vitals signs reviewed  Constitutional:       General: She is not in acute distress  Appearance: She is well-developed  She is not toxic-appearing or diaphoretic  HENT:      Head: Normocephalic and atraumatic  Mouth/Throat:      Pharynx: No oropharyngeal exudate  Eyes:      General: No scleral icterus  Neck:      Musculoskeletal: Neck supple  Vascular: No JVD  Trachea: No tracheal deviation  Cardiovascular:      Rate and Rhythm: Normal rate and regular rhythm  Heart sounds: S1 normal and S2 normal  No murmur  No friction rub  No gallop  Pulmonary:      Effort: Pulmonary effort is normal  No tachypnea, accessory muscle usage or respiratory distress  Breath sounds: No stridor  Wheezing present  No decreased breath sounds, rhonchi or rales  Chest:      Chest wall: No tenderness  Abdominal:      General: Bowel sounds are normal  There is no distension  Palpations: Abdomen is soft  Tenderness: There is no abdominal tenderness  There is no guarding or rebound  Skin:     General: Skin is warm and dry  Findings: No rash  Neurological:      Mental Status: She is alert and oriented to person, place, and time  GCS: GCS eye subscore is 4  GCS verbal subscore is 5  GCS motor subscore is 6  Psychiatric:         Speech: Speech normal          Behavior: Behavior normal  Behavior is cooperative  Labs: Influenza/RSV PCR negative    MRSA culture pending    Procalcitonin 0 48, pending this a m      Imaging and other studies: None new

## 2020-09-21 NOTE — OCCUPATIONAL THERAPY NOTE
Occupational Therapy Screen Note        Patient Name: Soto Cox  HCNJA'F Date: 9/21/2020    OT orders received and chart review completed  Spoke w/ Martha CENTENO  Pt admit from [facility] and per CM is a long term resident  Pt needs assistance w/ ADL at baseline and was receiving PT/OT  Recommend active participation in ADL w/ nursing staff while in acute care and OT Eval / screen upon return to [facility]  No acute OT needs at this time   Please re consult if needs arise    Michelle Romero, OT

## 2020-09-21 NOTE — CASE MANAGEMENT
CM received update from [facility] admissions; patient is a resident of [facility] [address]  Contact information placed on AVS     CM contacted patient's nephew, Judy Loaiza  He reports he is POA and confirmed his wish is for patient to return to [facility] upon discharge  Patient has lived there for approximately 2 years and [facility] provides all medication and transportation  CM discussed transportation back to facility including options of WCV with O2 if available vs BLS  Adrian aware that [facility] would be out of pocket cost and is agreeable to same  Adrian asked that CM update him with transportation plans once arranged  CM contacted RN Veronica Reyna at [facility]  She reports patient does primarily utilizes a WC to ambulate and is able to self propel  Patient receives assistance with all ADLs and is able to feed herself with set up assistance  Patient is on 2-3L chronic O2; it is technically prescribed PRN but she relies on it more often than not  CM to update SW-2 prior to patient's return  CM will continue to follow patient and will arrange transportation once medically stable for discharge  CM reviewed discharge planning process including the following: identifying caregivers at home, preference for d/c planning needs, availability of treatment team to discuss questions or concerns patient and/or family may have regarding diagnosis, plan of care, old or new medications and discharge planning   CM will continue to follow for care coordination and update assessment as appropriate

## 2020-09-21 NOTE — PROGRESS NOTES
Progress Note - Marbella Still 1950, 79 y o  female MRN: [id number]    Unit/Bed#: S -77 Encounter: [id number]    Primary Care Provider: Herrera Jack MD   Date and time admitted to hospital: 9/19/2020  6:45 PM        * Sepsis [facility])  Assessment & Plan   SIRS criteria POA: Fever, Tachycardia, and Leukocytosis    Suspected source: Pneumonia  · CT Chest/Abdome/Pelvis: Bibasilar infiltrates concerning for atelectasis vs  Pneumonia  Stable pulmonary nodules   Lactic acid: Upon Admission 2 4 Redraw 1 5   POA WBC: 17 11; currently at 12 98   IV Fluids: 1L NSS Bolus  Discontinued   IV antibiotics: Started Levaquin and Vancomycin  Currently on doxycycline   Procalcitonin x1: normal   S  pneumoniae, Legionella urine antigen ordered   Blood cultures ordered  No growth 24 hours   Wheezes and rhonchi B/L on exam  Cough productive of yellow sputum   Influenza A & B RSV PCR ordered:  None detected   Second procalcitonin elevated at 0 48, a m  Reflex normal   Plan:   Continue to monitor vital signs   Follow up cultures and urine antigens   Continue antibiotic (doxycycline)   CBC tomorrow    Pneumonia  Assessment & Plan   Chest x-ray: No acute cardiopulmonary disease  · CT Chest/Abdomen/Pelvis: Bibasilar infiltrates concerning for atelectasis vs  Pneumonia  Stable pulmonary nodules   Treating as HCAP: IV antibiotics: Levaquin and Vancomycin discontinued  Started on doxycycline   Respiratory protocol, nebs PRN   Obtain sputum culture and Gram stain; strep and Legionella urine antigens   Blood cultured obtained:  No growth at 12:00 p m   Procalcitonin 1st normal   Second elevated blood a m  Reflex normal   Lactic acid: 1st elevated at 3 4; next normal  [facility] Pulmonology consulted   Urine antigens negative  Influenza and RSV PCR negative/nondetected  Plan:  · Continue with doxycycline  · Continue IV Solumedrol 20mg BID  · Monitor respiratory status      Asthma  Assessment & Plan  · POA Wheezing noted on exam   · Chest Xray: No acute cardiopulmonary disease  · CT Chest/Abd/Pelvis: Bibasilar infiltrates concerning for atelectasis vs  Pneumonia  Stable pulmonary nodules  · Continue to present with wheezes and rhonchi B/L  Productive cough of yellow sputum  Plan:  · Continue Xopenex/Atrovent with home Symbicort  · Continue IV Solumedrol 20mg BID  Consider p o  Tomorrow    S/P tooth extraction  Assessment & Plan  · Dental Soft Diet until 9/21  · Salt Water Rinses every Shift until 9/26  · Avoid Brushing and Use of Straws until 9/25    Hyperlipidemia  Assessment & Plan  · Continue Statin  · Consider Lipid panel as an outpatient    KATHY (acute kidney injury) ([facility] [address] )  Assessment & Plan   Creatinine upon admission: 1 33  o Baseline: 0 5-0 8   Secondary to Infection vs  Hypotension   Cr level back to normal range after fluids  Plan:   Monitor BMP  Pulmonary nodule  Assessment & Plan  · CT Chest/Abd/Pelvis: Left Upper Lobe 7mm nodule and 4mm right apical nodule stable since 2017  No tracheal or endobrachial lesions  Plan  · Continue Outpatient Follow Up    Alzheimer's disease [facility])  Assessment & Plan  · Continue Namenda 10 mg BID    Type 2 diabetes mellitus with hyperglycemia [facility])  Assessment & Plan  Lab Results   Component Value Date    HGBA1C 7 6 (H) 08/13/2020       Recent Labs     09/20/20  2047 09/21/20  0733 09/21/20  1108 09/21/20  1515   POCGLU 369* 288* 310* 294*       Blood Sugar Average: Last 72 hrs:  (P) 246   · Accu-Checks, SSI and Lantus q12H  · Hypoglycemic Protocol  · Lantus trace to 58 units  Humalog 10 units t i d  Before meals    Essential hypertension  Assessment & Plan   Blood pressure is reviewed and acceptable  Plan:   Monitor blood pressure   Restarted Lisinopril since KATHY seems to have resolved           VTE Pharmacologic Prophylaxis:   Pharmacologic: Heparin  Mechanical VTE Prophylaxis in Place: No    Discussions with Specialists or Other Care Team Provider:  Internal Medicine, Pulmonology    Education and Discussions with Family / Patient:  Discussed with patient at bedside    Current Length of Stay: 2 day(s)    Current Patient Status: Inpatient     Discharge Plan / Estimated Discharge Date:  48 hours    Code Status: Level 3 - DNAR and DNI      Subjective:   Patient was alert and awake, lying in bed  Patient continues to endorse cough productive of yellow sputum, however mentions improvement  Patient currently on 2 L oxygen, denies shortness of breath  Denies chest pain, palpitations  Continue to reports feeling weak with generalized joint/muscle pain  Objective:     Vitals:   Temp (24hrs), Av 9 °F (36 6 °C), Min:97 4 °F (36 3 °C), Max:98 5 °F (36 9 °C)    Temp:  [97 4 °F (36 3 °C)-98 5 °F (36 9 °C)] 98 5 °F (36 9 °C)  HR:  [88-91] 88  Resp:  [18] 18  BP: (127-139)/(63-72) 127/72  SpO2:  [90 %-96 %] 90 %  Body mass index is 47 5 kg/m²  Input and Output Summary (last 24 hours): Intake/Output Summary (Last 24 hours) at 2020 1745  Last data filed at 2020 1401  Gross per 24 hour   Intake 870 ml   Output 2850 ml   Net -1980 ml       Physical Exam:     Physical Exam  Constitutional:       General: She is not in acute distress  Appearance: She is morbidly obese  HENT:      Head: Normocephalic and atraumatic  Nose: Nose normal       Mouth/Throat:      Mouth: Mucous membranes are moist    Eyes:      Extraocular Movements: Extraocular movements intact  Cardiovascular:      Rate and Rhythm: Normal rate and regular rhythm  Pulses: Normal pulses  Heart sounds: Normal heart sounds  No murmur  No friction rub  Pulmonary:      Effort: Pulmonary effort is normal  No accessory muscle usage or respiratory distress  Breath sounds: No stridor  Wheezing and rhonchi present  No rales  Chest:      Chest wall: No tenderness  Abdominal:      General: Bowel sounds are normal  There is no distension  Palpations: Abdomen is soft  Tenderness: There is no abdominal tenderness  There is no right CVA tenderness, left CVA tenderness, guarding or rebound  Hernia: A hernia is present  Musculoskeletal: Normal range of motion  General: No swelling, tenderness or deformity  Right lower leg: No edema  Left lower leg: No edema  Skin:     General: Skin is warm  Coloration: Skin is not jaundiced  Findings: No erythema, lesion or rash  Neurological:      General: No focal deficit present  Mental Status: She is alert and oriented to person, place, and time  Motor: Weakness present  Psychiatric:         Mood and Affect: Mood normal          Behavior: Behavior normal              Additional Data:     Labs:    Results from last 7 days   Lab Units 09/20/20  0606 09/19/20  1854   WBC Thousand/uL 12 98* 17 11*   HEMOGLOBIN g/dL 10 6* 11 8   HEMATOCRIT % 34 1* 37 2   PLATELETS Thousands/uL 165 202   NEUTROS PCT %  --  86*   LYMPHS PCT %  --  6*   MONOS PCT %  --  6   EOS PCT %  --  1     Results from last 7 days   Lab Units 09/20/20  0606 09/19/20  1854   POTASSIUM mmol/L 3 9 4 8   CHLORIDE mmol/L 102 97*   CO2 mmol/L 29 30   BUN mg/dL 21 25   CREATININE mg/dL 1 09 1 33*   CALCIUM mg/dL 8 7 9 2   ALK PHOS U/L  --  71   ALT U/L  --  34   AST U/L  --  29     Results from last 7 days   Lab Units 09/19/20  1854   INR  0 96       * I Have Reviewed All Lab Data Listed Above  * Additional Pertinent Lab Tests Reviewed: Thelma 66 Admission Reviewed    Imaging:    Imaging Reports Reviewed Today Include:  None   Imaging Personally Reviewed by Myself Includes:  None    Recent Cultures (last 7 days):     Results from last 7 days   Lab Units 09/20/20  0600 09/19/20  1854   BLOOD CULTURE   --  No Growth at 24 hrs  No Growth at 24 hrs     LEGIONELLA URINARY ANTIGEN  Negative  --        Last 24 Hours Medication List:   Current Facility-Administered Medications   Medication Dose Route Frequency Provider Last Rate    acetaminophen  650 mg Oral Q6H PRN Claude Poag, CRNP      acetaminophen  975 mg Oral Q8H [facility] Claude Poag, CRNP      albuterol  2 puff Inhalation Q6H PRN Claude Poag, CRNP      aspirin  81 mg Oral Daily Claude Poag, CRNP      benzonatate  200 mg Oral TID PRN Claude Poag, CRNP      budesonide-formoterol  2 puff Inhalation BID Claude Poag, CRNP      calcium carbonate  1,000 mg Oral Daily PRN Claude Poag, CRNP      docusate sodium  100 mg Oral BID Claude Poag, CRNP      doxycycline  100 mg Intravenous Q12H Doraine Meigs, CRNP 100 mg (09/21/20 1145)    fluticasone  1 spray Nasal Daily Claude Poag, CRNP      gabapentin  300 mg Oral TID Claude Poag, CRNP      guaiFENesin  1,200 mg Oral Q12H [facility] Claude Poag, CRNP      heparin (porcine)  7,500 Units Subcutaneous Q8H [facility] CELINA Epperson      insulin glargine  58 Units Subcutaneous Q12H [facility] Connie CELINA Epperson      insulin lispro  1-5 Units Subcutaneous HS Claude Poag, CRNP      insulin lispro  1-6 Units Subcutaneous TID AC CELINA Qui      [START ON 9/22/2020] insulin lispro  10 Units Subcutaneous TID With Meals Isaura Huntley MD      levalbuterol  1 25 mg Nebulization TID Nancy Tsang MD      And    ipratropium  0 5 mg Nebulization TID Nancy Tsang MD      lisinopril  2 5 mg Oral Daily Claude Poag, CRNP      loratadine  10 mg Oral Daily Claude Poag, CRNP      memantine  10 mg Oral BID Claude Poag, CRNP      methylPREDNISolone sodium succinate  20 mg Intravenous Q12H [address] Maria Del Carmen      montelukast  10 mg Oral HS Claude Poag, CRNP      oxyCODONE  2 5 mg Oral Q4H PRN Claude Poag, CRNP      pantoprazole  40 mg Oral BID AC Claude Poag, CRNP      pravastatin  80 mg Oral Daily With [employer], CRNP      traZODone  50 mg Oral HS Claude Poag, CRNP          Today, Patient Was Seen By: Jo Thomas MD    ** Please Note: This note has been constructed using a voice recognition system   **

## 2020-09-21 NOTE — ASSESSMENT & PLAN NOTE
 SIRS criteria POA: Fever, Tachycardia, and Leukocytosis    Suspected source: Pneumonia  · CT Chest/Abdome/Pelvis: Bibasilar infiltrates concerning for atelectasis vs  Pneumonia  Stable pulmonary nodules   Lactic acid: Upon Admission 2 4 Redraw 1 5   POA WBC: 17 11; currently at 12 98   IV Fluids: 1L NSS Bolus  Discontinued   IV antibiotics: Started Levaquin and Vancomycin  Currently on doxycycline   Procalcitonin x1: normal   S  pneumoniae, Legionella urine antigen ordered   Blood cultures ordered  No growth 24 hours   Wheezes and rhonchi B/L on exam  Cough productive of yellow sputum   Influenza A & B RSV PCR ordered:  None detected   Second procalcitonin elevated at 0 48, a m   Reflex normal   Plan:   Continue to monitor vital signs   Follow up cultures and urine antigens   Continue antibiotic (doxycycline)   CBC tomorrow

## 2020-09-21 NOTE — MALNUTRITION/BMI
This medical record reflects one or more clinical indicators suggestive of malnutrition and/or morbid obesity  BMI Findings:  BMI Classifications: Morbid Obesity 45-49 9     Body mass index is 47 5 kg/m²  See Nutrition note dated 9/21/20 for additional details  Completed nutrition assessment is viewable in the nutrition documentation

## 2020-09-21 NOTE — ASSESSMENT & PLAN NOTE
 Chest x-ray: No acute cardiopulmonary disease  · CT Chest/Abdomen/Pelvis: Bibasilar infiltrates concerning for atelectasis vs  Pneumonia  Stable pulmonary nodules   Treating as HCAP: IV antibiotics: Levaquin and Vancomycin discontinued  Started on doxycycline   Respiratory protocol, nebs PRN   Obtain sputum culture and Gram stain; strep and Legionella urine antigens   Blood cultured obtained:  No growth at 12:00 p m   Procalcitonin 1st normal   Second elevated blood a m  Reflex normal   Lactic acid: 1st elevated at 3 4; next normal  [facility] consulted   Urine antigens negative  Influenza and RSV PCR negative/nondetected  Plan:  · Continue with doxycycline  · Continue IV Solumedrol 20mg BID  · Monitor respiratory status

## 2020-09-22 LAB
ANION GAP SERPL CALCULATED.3IONS-SCNC: 10 MMOL/L (ref 4–13)
BUN SERPL-MCNC: 27 MG/DL (ref 5–25)
CALCIUM SERPL-MCNC: 9.5 MG/DL (ref 8.3–10.1)
CHLORIDE SERPL-SCNC: 101 MMOL/L (ref 100–108)
CO2 SERPL-SCNC: 26 MMOL/L (ref 21–32)
CREAT SERPL-MCNC: 1.27 MG/DL (ref 0.6–1.3)
ERYTHROCYTE [DISTWIDTH] IN BLOOD BY AUTOMATED COUNT: 15.8 % (ref 11.6–15.1)
GFR SERPL CREATININE-BSD FRML MDRD: 43 ML/MIN/1.73SQ M
GLUCOSE SERPL-MCNC: 234 MG/DL (ref 65–140)
GLUCOSE SERPL-MCNC: 250 MG/DL (ref 65–140)
GLUCOSE SERPL-MCNC: 281 MG/DL (ref 65–140)
GLUCOSE SERPL-MCNC: 283 MG/DL (ref 65–140)
GLUCOSE SERPL-MCNC: 308 MG/DL (ref 65–140)
HCT VFR BLD AUTO: 36.3 % (ref 34.8–46.1)
HGB BLD-MCNC: 11.3 G/DL (ref 11.5–15.4)
MCH RBC QN AUTO: 27.8 PG (ref 26.8–34.3)
MCHC RBC AUTO-ENTMCNC: 31.1 G/DL (ref 31.4–37.4)
MCV RBC AUTO: 89 FL (ref 82–98)
PLATELET # BLD AUTO: 206 THOUSANDS/UL (ref 149–390)
PMV BLD AUTO: 9.9 FL (ref 8.9–12.7)
POTASSIUM SERPL-SCNC: 4.7 MMOL/L (ref 3.5–5.3)
RBC # BLD AUTO: 4.06 MILLION/UL (ref 3.81–5.12)
SODIUM SERPL-SCNC: 137 MMOL/L (ref 136–145)
WBC # BLD AUTO: 9.37 THOUSAND/UL (ref 4.31–10.16)

## 2020-09-22 PROCEDURE — 94668 MNPJ CHEST WALL SBSQ: CPT

## 2020-09-22 PROCEDURE — 94640 AIRWAY INHALATION TREATMENT: CPT

## 2020-09-22 PROCEDURE — 85027 COMPLETE CBC AUTOMATED: CPT | Performed by: INTERNAL MEDICINE

## 2020-09-22 PROCEDURE — 94760 N-INVAS EAR/PLS OXIMETRY 1: CPT

## 2020-09-22 PROCEDURE — 80048 BASIC METABOLIC PNL TOTAL CA: CPT | Performed by: INTERNAL MEDICINE

## 2020-09-22 PROCEDURE — 82948 REAGENT STRIP/BLOOD GLUCOSE: CPT

## 2020-09-22 PROCEDURE — 99233 SBSQ HOSP IP/OBS HIGH 50: CPT | Performed by: INTERNAL MEDICINE

## 2020-09-22 PROCEDURE — 99232 SBSQ HOSP IP/OBS MODERATE 35: CPT | Performed by: INTERNAL MEDICINE

## 2020-09-22 RX ORDER — PREDNISONE 20 MG/1
40 TABLET ORAL DAILY
Status: DISCONTINUED | OUTPATIENT
Start: 2020-09-23 | End: 2020-09-25 | Stop reason: HOSPADM

## 2020-09-22 RX ADMIN — HEPARIN SODIUM 7500 UNITS: 5000 INJECTION INTRAVENOUS; SUBCUTANEOUS at 14:15

## 2020-09-22 RX ADMIN — MONTELUKAST SODIUM 10 MG: 10 TABLET, FILM COATED ORAL at 21:40

## 2020-09-22 RX ADMIN — ASPIRIN 81 MG: 81 TABLET, COATED ORAL at 08:24

## 2020-09-22 RX ADMIN — INSULIN GLARGINE 58 UNITS: 100 INJECTION, SOLUTION SUBCUTANEOUS at 21:41

## 2020-09-22 RX ADMIN — IPRATROPIUM BROMIDE 0.5 MG: 0.5 SOLUTION RESPIRATORY (INHALATION) at 13:12

## 2020-09-22 RX ADMIN — MEMANTINE 10 MG: 10 TABLET ORAL at 08:24

## 2020-09-22 RX ADMIN — PANTOPRAZOLE SODIUM 40 MG: 40 TABLET, DELAYED RELEASE ORAL at 17:18

## 2020-09-22 RX ADMIN — INSULIN GLARGINE 58 UNITS: 100 INJECTION, SOLUTION SUBCUTANEOUS at 08:24

## 2020-09-22 RX ADMIN — INSULIN LISPRO 3 UNITS: 100 INJECTION, SOLUTION INTRAVENOUS; SUBCUTANEOUS at 21:40

## 2020-09-22 RX ADMIN — BUDESONIDE AND FORMOTEROL FUMARATE DIHYDRATE 2 PUFF: 160; 4.5 AEROSOL RESPIRATORY (INHALATION) at 17:19

## 2020-09-22 RX ADMIN — GABAPENTIN 300 MG: 300 CAPSULE ORAL at 21:40

## 2020-09-22 RX ADMIN — DOXYCYCLINE 100 MG: 100 INJECTION, POWDER, LYOPHILIZED, FOR SOLUTION INTRAVENOUS at 11:44

## 2020-09-22 RX ADMIN — GABAPENTIN 300 MG: 300 CAPSULE ORAL at 17:18

## 2020-09-22 RX ADMIN — BUDESONIDE AND FORMOTEROL FUMARATE DIHYDRATE 2 PUFF: 160; 4.5 AEROSOL RESPIRATORY (INHALATION) at 08:25

## 2020-09-22 RX ADMIN — HEPARIN SODIUM 7500 UNITS: 5000 INJECTION INTRAVENOUS; SUBCUTANEOUS at 21:41

## 2020-09-22 RX ADMIN — LEVALBUTEROL HYDROCHLORIDE 1.25 MG: 1.25 SOLUTION, CONCENTRATE RESPIRATORY (INHALATION) at 07:32

## 2020-09-22 RX ADMIN — OXYCODONE HYDROCHLORIDE 2.5 MG: 5 TABLET ORAL at 14:21

## 2020-09-22 RX ADMIN — DOCUSATE SODIUM 100 MG: 100 CAPSULE ORAL at 17:18

## 2020-09-22 RX ADMIN — TRAZODONE HYDROCHLORIDE 50 MG: 50 TABLET ORAL at 21:40

## 2020-09-22 RX ADMIN — ACETAMINOPHEN 975 MG: 325 TABLET, FILM COATED ORAL at 06:18

## 2020-09-22 RX ADMIN — OXYCODONE HYDROCHLORIDE 2.5 MG: 5 TABLET ORAL at 23:35

## 2020-09-22 RX ADMIN — PANTOPRAZOLE SODIUM 40 MG: 40 TABLET, DELAYED RELEASE ORAL at 06:18

## 2020-09-22 RX ADMIN — METHYLPREDNISOLONE SODIUM SUCCINATE 20 MG: 40 INJECTION, POWDER, FOR SOLUTION INTRAMUSCULAR; INTRAVENOUS at 21:40

## 2020-09-22 RX ADMIN — INSULIN LISPRO 4 UNITS: 100 INJECTION, SOLUTION INTRAVENOUS; SUBCUTANEOUS at 17:19

## 2020-09-22 RX ADMIN — FLUTICASONE PROPIONATE 1 SPRAY: 50 SPRAY, METERED NASAL at 08:28

## 2020-09-22 RX ADMIN — IPRATROPIUM BROMIDE 0.5 MG: 0.5 SOLUTION RESPIRATORY (INHALATION) at 07:32

## 2020-09-22 RX ADMIN — GUAIFENESIN 1200 MG: 600 TABLET, EXTENDED RELEASE ORAL at 08:24

## 2020-09-22 RX ADMIN — IPRATROPIUM BROMIDE 0.5 MG: 0.5 SOLUTION RESPIRATORY (INHALATION) at 19:21

## 2020-09-22 RX ADMIN — PRAVASTATIN SODIUM 80 MG: 80 TABLET ORAL at 17:18

## 2020-09-22 RX ADMIN — LISINOPRIL 2.5 MG: 2.5 TABLET ORAL at 08:24

## 2020-09-22 RX ADMIN — DOCUSATE SODIUM 100 MG: 100 CAPSULE ORAL at 08:24

## 2020-09-22 RX ADMIN — GABAPENTIN 300 MG: 300 CAPSULE ORAL at 08:24

## 2020-09-22 RX ADMIN — HEPARIN SODIUM 7500 UNITS: 5000 INJECTION INTRAVENOUS; SUBCUTANEOUS at 06:19

## 2020-09-22 RX ADMIN — LEVALBUTEROL HYDROCHLORIDE 1.25 MG: 1.25 SOLUTION, CONCENTRATE RESPIRATORY (INHALATION) at 19:21

## 2020-09-22 RX ADMIN — MEMANTINE 10 MG: 10 TABLET ORAL at 17:18

## 2020-09-22 RX ADMIN — METHYLPREDNISOLONE SODIUM SUCCINATE 20 MG: 40 INJECTION, POWDER, FOR SOLUTION INTRAMUSCULAR; INTRAVENOUS at 08:24

## 2020-09-22 RX ADMIN — DOXYCYCLINE 100 MG: 100 INJECTION, POWDER, LYOPHILIZED, FOR SOLUTION INTRAVENOUS at 21:40

## 2020-09-22 RX ADMIN — ACETAMINOPHEN 975 MG: 325 TABLET, FILM COATED ORAL at 14:15

## 2020-09-22 RX ADMIN — INSULIN LISPRO 3 UNITS: 100 INJECTION, SOLUTION INTRAVENOUS; SUBCUTANEOUS at 11:45

## 2020-09-22 RX ADMIN — ACETAMINOPHEN 975 MG: 325 TABLET, FILM COATED ORAL at 21:40

## 2020-09-22 RX ADMIN — GUAIFENESIN 1200 MG: 600 TABLET, EXTENDED RELEASE ORAL at 21:40

## 2020-09-22 RX ADMIN — LEVALBUTEROL HYDROCHLORIDE 1.25 MG: 1.25 SOLUTION, CONCENTRATE RESPIRATORY (INHALATION) at 13:12

## 2020-09-22 RX ADMIN — INSULIN LISPRO 3 UNITS: 100 INJECTION, SOLUTION INTRAVENOUS; SUBCUTANEOUS at 08:30

## 2020-09-22 RX ADMIN — LORATADINE 10 MG: 10 TABLET ORAL at 08:24

## 2020-09-22 RX ADMIN — OXYCODONE HYDROCHLORIDE 2.5 MG: 5 TABLET ORAL at 06:32

## 2020-09-22 NOTE — PLAN OF CARE
Problem: Potential for Falls  Goal: Patient will remain free of falls  Description: INTERVENTIONS:  - Assess patient frequently for physical needs  -  Identify cognitive and physical deficits and behaviors that affect risk of falls    -  [place] fall precautions as indicated by assessment   - Educate patient/family on patient safety including physical limitations  - Instruct patient to call for assistance with activity based on assessment  - Modify environment to reduce risk of injury  - Consider OT/PT consult to assist with strengthening/mobility  Outcome: Progressing     Problem: Prexisting or High Potential for Compromised Skin Integrity  Goal: Skin integrity is maintained or improved  Description: INTERVENTIONS:  - Identify patients at risk for skin breakdown  - Assess and monitor skin integrity  - Assess and monitor nutrition and hydration status  - Monitor labs   - Assess for incontinence   - Turn and reposition patient  - Assist with mobility/ambulation  - Relieve pressure over bony prominences  - Avoid friction and shearing  - Provide appropriate hygiene as needed including keeping skin clean and dry  - Evaluate need for skin moisturizer/barrier cream  - Collaborate with interdisciplinary team   - Patient/family teaching  - Consider wound care consult   Outcome: Progressing     Problem: RESPIRATORY - ADULT  Goal: Achieves optimal ventilation and oxygenation  Description: INTERVENTIONS:  - Assess for changes in respiratory status  - Assess for changes in mentation and behavior  - Position to facilitate oxygenation and minimize respiratory effort  - Oxygen administered by appropriate delivery if ordered  - Initiate smoking cessation education as indicated  - Encourage broncho-pulmonary hygiene including cough, deep breathe, Incentive Spirometry  - Assess the need for suctioning and aspirate as needed  - Assess and instruct to report SOB or any respiratory difficulty  - Respiratory Therapy support as indicated  Outcome: Progressing     Problem: METABOLIC, FLUID AND ELECTROLYTES - ADULT  Goal: Electrolytes maintained within normal limits  Description: INTERVENTIONS:  - Monitor labs and assess patient for signs and symptoms of electrolyte imbalances  - Administer electrolyte replacement as ordered  - Monitor response to electrolyte replacements, including repeat lab results as appropriate  - Instruct patient on fluid and nutrition as appropriate  Outcome: Progressing  Goal: Fluid balance maintained  Description: INTERVENTIONS:  - Monitor labs   - Monitor I/O and WT  - Instruct patient on fluid and nutrition as appropriate  - Assess for signs & symptoms of volume excess or deficit  Outcome: Progressing  Goal: Glucose maintained within target range  Description: INTERVENTIONS:  - Monitor Blood Glucose as ordered  - Assess for signs and symptoms of hyperglycemia and hypoglycemia  - Administer ordered medications to maintain glucose within target range  - Assess nutritional intake and initiate nutrition service referral as needed  Outcome: Progressing     Problem: PAIN - ADULT  Goal: Verbalizes/displays adequate comfort level or baseline comfort level  Description: Interventions:  - Encourage patient to monitor pain and request assistance  - Assess pain using appropriate pain scale  - Administer analgesics based on type and severity of pain and evaluate response  - Implement non-pharmacological measures as appropriate and evaluate response  - Consider cultural and social influences on pain and pain management  - Notify physician/advanced practitioner if interventions unsuccessful or patient reports new pain  Outcome: Progressing     Problem: INFECTION - ADULT  Goal: Absence or prevention of progression during hospitalization  Description: INTERVENTIONS:  - Assess and monitor for signs and symptoms of infection  - Monitor lab/diagnostic results  - Monitor all insertion sites, i e  indwelling lines, tubes, and drains  - Monitor endotracheal if appropriate and nasal secretions for changes in amount and color  - [place] appropriate cooling/warming therapies per order  - Administer medications as ordered  - Instruct and encourage patient and family to use good hand hygiene technique  - Identify and instruct in appropriate isolation precautions for identified infection/condition  Outcome: Progressing  Goal: Absence of fever/infection during neutropenic period  Description: INTERVENTIONS:  - Monitor WBC    Outcome: Progressing     Problem: SAFETY ADULT  Goal: Maintain or return to baseline ADL function  Description: INTERVENTIONS:  -  Assess patient's ability to carry out ADLs; assess patient's baseline for ADL function and identify physical deficits which impact ability to perform ADLs (bathing, care of mouth/teeth, toileting, grooming, dressing, etc )  - Assess/evaluate cause of self-care deficits   - Assess range of motion  - Assess patient's mobility; develop plan if impaired  - Assess patient's need for assistive devices and provide as appropriate  - Encourage maximum independence but intervene and supervise when necessary  - Involve family in performance of ADLs  - Assess for home care needs following discharge   - Consider OT consult to assist with ADL evaluation and planning for discharge  - Provide patient education as appropriate  Outcome: Progressing  Goal: Maintain or return mobility status to optimal level  Description: INTERVENTIONS:  - Assess patient's baseline mobility status (ambulation, transfers, stairs, etc )    - Identify cognitive and physical deficits and behaviors that affect mobility  - Identify mobility aids required to assist with transfers and/or ambulation (gait belt, sit-to-stand, lift, walker, cane, etc )  - [place] fall precautions as indicated by assessment  - Record patient progress and toleration of activity level on Mobility SBAR; progress patient to next Phase/Stage  - Instruct patient to call for assistance with activity based on assessment  - Consider rehabilitation consult to assist with strengthening/weightbearing, etc   Outcome: Progressing     Problem: DISCHARGE PLANNING  Goal: Discharge to home or other facility with appropriate resources  Description: INTERVENTIONS:  - Identify barriers to discharge w/patient and caregiver  - Arrange for needed discharge resources and transportation as appropriate  - Identify discharge learning needs (meds, wound care, etc )  - Arrange for interpretive services to assist at discharge as needed  - Refer to Case Management Department for coordinating discharge planning if the patient needs post-hospital services based on physician/advanced practitioner order or complex needs related to functional status, cognitive ability, or social support system  Outcome: Progressing     Problem: Knowledge Deficit  Goal: Patient/family/caregiver demonstrates understanding of disease process, treatment plan, medications, and discharge instructions  Description: Complete learning assessment and assess knowledge base  Interventions:  - Provide teaching at level of understanding  - Provide teaching via preferred learning methods  Outcome: Progressing     Problem: Nutrition/Hydration-ADULT  Goal: Nutrient/Hydration intake appropriate for improving, restoring or maintaining nutritional needs  Description: Monitor and assess patient's nutrition/hydration status for malnutrition  Collaborate with interdisciplinary team and initiate plan and interventions as ordered  Monitor patient's weight and dietary intake as ordered or per policy  Utilize nutrition screening tool and intervene as necessary  Determine patient's food preferences and provide high-protein, high-caloric foods as appropriate       INTERVENTIONS:  - Monitor oral intake, urinary output, labs, and treatment plans  - Assess nutrition and hydration status and recommend course of action  - Evaluate amount of meals eaten  - Assist patient with eating if necessary   - Allow adequate time for meals  - Recommend/ encourage appropriate diets, oral nutritional supplements, and vitamin/mineral supplements  - Order, calculate, and assess calorie counts as needed  - Recommend, monitor, and adjust tube feedings and TPN/PPN based on assessed needs  - Assess need for intravenous fluids  - Provide specific nutrition/hydration education as appropriate  - Include patient/family/caregiver in decisions related to nutrition  Outcome: Progressing

## 2020-09-22 NOTE — ASSESSMENT & PLAN NOTE
· POA Wheezing noted on exam   · Chest Xray: No acute cardiopulmonary disease  · CT Chest/Abd/Pelvis: Bibasilar infiltrates concerning for atelectasis vs  Pneumonia  Stable pulmonary nodules  · Continue to present with wheezes and rhonchi B/L  Productive cough of yellow sputum  Plan:  · Continue Xopenex/Atrovent with home Symbicort  · Continue IV Solumedrol 20mg BID     · Oscillatory pep ordered  · High-frequency chest wall oscillation (vest) ordered

## 2020-09-22 NOTE — ASSESSMENT & PLAN NOTE
 Blood pressure is reviewed and acceptable  Plan:   Monitor blood pressure     Continue lisinopril

## 2020-09-22 NOTE — PROGRESS NOTES
Progress Note - Roberto Marycruz 1950, 79 y o  female MRN: [id number]    Unit/Bed#: S -18 Encounter: [id number]    Primary Care Provider: Teja Canales MD   Date and time admitted to hospital: 9/19/2020  6:45 PM        * Sepsis [facility])  Assessment & Plan   SIRS criteria POA: Fever, Tachycardia, and Leukocytosis    Suspected source: Pneumonia  · CT Chest/Abdome/Pelvis: Bibasilar infiltrates concerning for atelectasis vs  Pneumonia  Stable pulmonary nodules   Lactic acid: Upon Admission 2 4 Redraw 1 5   POA WBC: 17 11; currently at 9 37   IV Fluids: 1L NSS Bolus  Discontinued   IV antibiotics: Started Levaquin and Vancomycin  Currently on doxycycline   Procalcitonin x1: normal   S  pneumoniae, Legionella urine antigen ordered   Blood cultures ordered  No growth 24 hours   Wheezes and rhonchi B/L on exam  Cough productive of yellow sputum   Influenza A & B RSV PCR ordered:  None detected   Second procalcitonin elevated at 0 48, a m  Reflex normal    Blood cultures continue to remain negative at 48 hours  Plan:   Continue to monitor vital signs   Continue to follow up cultures   Continue doxycycline   CBC tomorrow    Pneumonia  Assessment & Plan   Chest x-ray: No acute cardiopulmonary disease  · CT Chest/Abdomen/Pelvis: Bibasilar infiltrates concerning for atelectasis vs  Pneumonia  Stable pulmonary nodules   Treating as HCAP: IV antibiotics: Levaquin and Vancomycin discontinued  Started on doxycycline   Respiratory protocol, nebs PRN   Procalcitonin 1st normal   Second elevated blood a m  Reflex normal   Lactic acid: 1st elevated at 3 4; next normal  [facility] consulted   Urine antigens negative  Influenza and RSV PCR negative/nondetected  Blood cultures continue to remain -48 hours  Plan:  · Continue with doxycycline  · Continue IV Solumedrol 20mg BID  · Monitor respiratory status    · Continue Xopenex, Atrovent and Symbicort    Asthma  Assessment & Plan  · POA Wheezing noted on exam   · Chest Xray: No acute cardiopulmonary disease  · CT Chest/Abd/Pelvis: Bibasilar infiltrates concerning for atelectasis vs  Pneumonia  Stable pulmonary nodules  · Continue to present with wheezes and rhonchi B/L  Productive cough of yellow sputum  Plan:  · Continue Xopenex/Atrovent with home Symbicort  · Continue IV Solumedrol 20mg BID  · Oscillatory pep ordered  · High-frequency chest wall oscillation (vest) ordered    S/P tooth extraction  Assessment & Plan  · Dental Soft Diet until 9/21  · Salt Water Rinses every Shift until 9/26  · Avoid Brushing and Use of Straws until 9/25    Hyperlipidemia  Assessment & Plan  · Continue Statin  · Consider Lipid panel as an outpatient    KATHY (acute kidney injury) ([facility] [address] )  Assessment & Plan   Creatinine upon admission: 1 33  o Baseline: 0 5-0 8   Secondary to Infection vs  Hypotension   Cr continues to present at normal level  Plan:   Monitor BMP  Pulmonary nodule  Assessment & Plan  · CT Chest/Abd/Pelvis: Left Upper Lobe 7mm nodule and 4mm right apical nodule stable since 2017  No tracheal or endobrachial lesions  Plan  · Continue Outpatient Follow Up    Alzheimer's disease [facility])  Assessment & Plan  · Continue Namenda 10 mg BID    Type 2 diabetes mellitus with hyperglycemia [facility])  Assessment & Plan  Lab Results   Component Value Date    HGBA1C 7 6 (H) 08/13/2020       Recent Labs     09/21/20  2135 09/22/20  0735 09/22/20  1111 09/22/20  1536   POCGLU 349* 234* 250* 308*       Blood Sugar Average: Last 72 hrs:  (P) 740 [id number]   · Accu-Checks, SSI and Lantus q12H  · Hypoglycemic Protocol  · Lantus trace to 58 units  Humalog 10 units t i d  Before meals    Essential hypertension  Assessment & Plan   Blood pressure is reviewed and acceptable  Plan:   Monitor blood pressure     Continue lisinopril         VTE Pharmacologic Prophylaxis:   Pharmacologic: Heparin  Mechanical VTE Prophylaxis in Place: No    Discussions with Specialists or Other Care Team Provider:  Pulmonology    Education and Discussions with Family / Patient:  Discussed with patient at bedside    Current Length of Stay: 3 day(s)    Current Patient Status: Inpatient     Discharge Plan / Estimated Discharge Date:  Less than 48 hours    Code Status: Level 3 - DNAR and DNI      Subjective:   Patient was alert and awake, sitting in recliner  Reports chest pain during the night that had a duration of a couple of seconds  Patient reports shortness of breath, noted to be off her oxygen  Oxygen started at 2 L  Objective:     Vitals:   Temp (24hrs), Av 7 °F (36 5 °C), Min:97 6 °F (36 4 °C), Max:97 8 °F (36 6 °C)    Temp:  [97 6 °F (36 4 °C)-97 8 °F (36 6 °C)] 97 8 °F (36 6 °C)  HR:  [78-91] 78  Resp:  [18] 18  BP: (120-134)/(60-62) 120/62  SpO2:  [92 %-95 %] 95 %  Body mass index is 47 53 kg/m²  Input and Output Summary (last 24 hours): Intake/Output Summary (Last 24 hours) at 2020 1554  Last data filed at 2020 1134  Gross per 24 hour   Intake 180 ml   Output 1750 ml   Net -1570 ml       Physical Exam:     Physical Exam  Constitutional:       General: She is not in acute distress  Appearance: She is morbidly obese  HENT:      Head: Normocephalic and atraumatic  Nose: Nose normal       Mouth/Throat:      Mouth: Mucous membranes are moist    Eyes:      Extraocular Movements: Extraocular movements intact  Cardiovascular:      Rate and Rhythm: Normal rate and regular rhythm  Pulses: Normal pulses  Heart sounds: Normal heart sounds  No murmur  No friction rub  Pulmonary:      Effort: Pulmonary effort is normal  No respiratory distress  Breath sounds: No stridor  Wheezing present  No rhonchi or rales  Chest:      Chest wall: No tenderness  Abdominal:      General: Bowel sounds are normal  There is no distension  Palpations: Abdomen is soft  Tenderness: There is no abdominal tenderness   There is no right CVA tenderness, left CVA tenderness, guarding or rebound  Hernia: A hernia is present  Musculoskeletal: Normal range of motion  General: No swelling, tenderness or deformity  Right lower leg: No edema  Left lower leg: No edema  Skin:     General: Skin is warm  Coloration: Skin is not jaundiced  Findings: No erythema, lesion or rash  Neurological:      General: No focal deficit present  Mental Status: She is alert and oriented to person, place, and time  Motor: Weakness present  Psychiatric:         Mood and Affect: Mood normal          Behavior: Behavior normal            Additional Data:     Labs:    Results from last 7 days   Lab Units 09/22/20  0608 09/19/20  1854   WBC Thousand/uL 9 37   < > 17 11*   HEMOGLOBIN g/dL 11 3*   < > 11 8   HEMATOCRIT % 36 3   < > 37 2   PLATELETS Thousands/uL 206   < > 202   NEUTROS PCT %  --   --  86*   LYMPHS PCT %  --   --  6*   MONOS PCT %  --   --  6   EOS PCT %  --   --  1    < > = values in this interval not displayed  Results from last 7 days   Lab Units 09/22/20  0608  09/19/20  1854   POTASSIUM mmol/L 4 7   < > 4 8   CHLORIDE mmol/L 101   < > 97*   CO2 mmol/L 26   < > 30   BUN mg/dL 27*   < > 25   CREATININE mg/dL 1 27   < > 1 33*   CALCIUM mg/dL 9 5   < > 9 2   ALK PHOS U/L  --   --  71   ALT U/L  --   --  34   AST U/L  --   --  29    < > = values in this interval not displayed  Results from last 7 days   Lab Units 09/19/20  1854   INR  0 96       * I Have Reviewed All Lab Data Listed Above  * Additional Pertinent Lab Tests Reviewed: Thelma 66 Admission Reviewed    Imaging:    Imaging Reports Reviewed Today Include:  None  Imaging Personally Reviewed by Myself Includes:  None    Recent Cultures (last 7 days):     Results from last 7 days   Lab Units 09/20/20  0600 09/19/20  1854   BLOOD CULTURE   --  No Growth at 48 hrs  No Growth at 48 hrs     LEGIONELLA URINARY ANTIGEN  Negative  --        Last 24 Hours Medication List:   Current Facility-Administered Medications   Medication Dose Route Frequency Provider Last Rate    acetaminophen  650 mg Oral Q6H PRN Julio Me, CRNP      acetaminophen  975 mg Oral Q8H [facility] Julio Me, CRNP      albuterol  2 puff Inhalation Q6H PRN Julio Me, CRNP      aspirin  81 mg Oral Daily Julio Me, CRNP      benzonatate  200 mg Oral TID PRN Julio Me, CRNP      budesonide-formoterol  2 puff Inhalation BID Julio Me, CRNP      calcium carbonate  1,000 mg Oral Daily PRN Julio Me, CRNP      docusate sodium  100 mg Oral BID Julio Me, CRNP      doxycycline  100 mg Intravenous Q12H [facility], CRNP 100 mg (09/22/20 9264)    fluticasone  1 spray Nasal Daily Julio Me, CRNP      gabapentin  300 mg Oral TID Julio Me, CRNP      guaiFENesin  1,200 mg Oral Q12H [facility] Julio Me, CRNP      heparin (porcine)  7,500 Units Subcutaneous Q8H [facility], CRNP      insulin glargine  58 Units Subcutaneous Q12H [facility], CRNP      insulin lispro  1-5 Units Subcutaneous HS Julio Me, CRNP      insulin lispro  1-6 Units Subcutaneous TID AC Connie Pompa, CELINA      insulin lispro  10 Units Subcutaneous TID With Meals Keith Starr MD      levalbuterol  1 25 mg Nebulization TID Nancy Tsang MD      And    ipratropium  0 5 mg Nebulization TID Nancy Tsang MD      lisinopril  2 5 mg Oral Daily Julio Me, CRNP      loratadine  10 mg Oral Daily Julio Me, CRNP      memantine  10 mg Oral BID Julio Me, CRNP      methylPREDNISolone sodium succinate  20 mg Intravenous Q12H [facility] Clenathan Orona, CRNP      montelukast  10 mg Oral HS Julio Me, CRNP      oxyCODONE  2 5 mg Oral Q4H PRN Julio Me, CRNP      pantoprazole  40 mg Oral BID AC Connie Pompa, CELINA      pravastatin  80 mg Oral Daily With Utrner Norman CELINA Pompa      [START ON 9/23/2020] predniSONE  40 mg Oral Daily CELINA Rodriguez      traZODone  50 mg Oral HS CELINA Nielsen      white petrolatum-mineral oil   Topical TID PRN [facility], MD          Today, Patient Was Seen By: Daniel Simmons MD    ** Please Note: This note has been constructed using a voice recognition system   **

## 2020-09-22 NOTE — PROGRESS NOTES
Progress Note - Pulmonary   Leanne Rene 79 y o  female MRN: [id number]  Unit/Bed#: S -01 Encounter: [id number]    Assessment/Plan:    Acute pulmonary insufficiency, multifactorial due to below              Use oxygen as needed to keep saturations greater than or equal to 89%              Out of bed as tolerated               Continue flutter valve and incentive spirometer      Abnormal CT chest with bibasilar atelectasis versus infiltrate              Pulmonary hygiene as above               Suspect more atelectasis with acute tracheobronchitis over pneumonia              Continue antibiotics with doxycycline               Follow-up procalcitonin and culture data               Monitor temperatures and WBCs                 Moderate persistent asthma with acute exacerbation due to above              Continue Solu-Medrol 20 milligrams IV twice daily today and transition to prednisone 40 milligrams daily tomorrow               Continue Xopenex/Atrovent 3 times daily with Symbicort               Continue Flonase, Claritin, and Singulair               At discharge, will continue Symbicort with DuoNeb nebulized twice daily, Flonase, Zyrtec, and Singulair      Morbid obesity with suspected underlying sleep apnea   Overnight pulse ox tonight  Outpatient follow-up pending course  Chief Complaint:    I feel better      Subjective:    Oliver Acuña is sitting out of bed in the chair  She reports she feels better now  She has an occasional dry cough  She denies any chest pain  She does feel that she woke up from sleep and was slightly short of breath, but this is now resolved  Wheezing is improved  Objective:    Vitals: Blood pressure 120/62, pulse 78, temperature 97 8 °F (36 6 °C), temperature source Oral, resp  rate 18, height 4' 8" (1 422 m), weight 96 2 kg (212 lb), SpO2 95 %  RA,Body mass index is 47 53 kg/m²        Intake/Output Summary (Last 24 hours) at 9/22/2020 1031  Last data filed at 9/21/2020 2318  Gross per 24 hour   Intake 1110 ml   Output 2050 ml   Net -940 ml       Invasive Devices     Peripheral Intravenous Line            Peripheral IV 09/22/20 Right;Ventral (anterior) Forearm less than 1 day                Physical Exam:     Physical Exam  Vitals signs reviewed  Constitutional:       General: She is not in acute distress  Appearance: She is well-developed  She is not toxic-appearing or diaphoretic  HENT:      Head: Normocephalic and atraumatic  Mouth/Throat:      Pharynx: No oropharyngeal exudate  Eyes:      General: No scleral icterus  Neck:      Musculoskeletal: Neck supple  Vascular: No JVD  Trachea: No tracheal deviation  Cardiovascular:      Rate and Rhythm: Normal rate and regular rhythm  Heart sounds: S1 normal and S2 normal  No murmur  No friction rub  No gallop  Pulmonary:      Effort: Pulmonary effort is normal  No tachypnea, accessory muscle usage or respiratory distress  Breath sounds: Normal breath sounds  No stridor  No decreased breath sounds, wheezing, rhonchi or rales  Chest:      Chest wall: No tenderness  Abdominal:      General: Bowel sounds are normal  There is no distension  Palpations: Abdomen is soft  Tenderness: There is no abdominal tenderness  There is no guarding or rebound  Skin:     General: Skin is warm and dry  Findings: No rash  Neurological:      Mental Status: She is alert and oriented to person, place, and time  GCS: GCS eye subscore is 4  GCS verbal subscore is 5  GCS motor subscore is 6  Psychiatric:         Speech: Speech normal          Behavior: Behavior normal  Behavior is cooperative           Labs:   CBC:   Lab Results   Component Value Date    WBC 9 37 09/22/2020    HGB 11 3 (L) 09/22/2020    HCT 36 3 09/22/2020    MCV 89 09/22/2020     09/22/2020    MCH 27 8 09/22/2020    MCHC 31 1 (L) 09/22/2020    RDW 15 8 (H) 09/22/2020    MPV 9 9 09/22/2020   , CMP:   Lab Results Component Value Date    SODIUM 137 09/22/2020    K 4 7 09/22/2020     09/22/2020    CO2 26 09/22/2020    BUN 27 (H) 09/22/2020    CREATININE 1 27 09/22/2020    CALCIUM 9 5 09/22/2020    EGFR 43 09/22/2020     Imaging and other studies: None new

## 2020-09-22 NOTE — ASSESSMENT & PLAN NOTE
 SIRS criteria POA: Fever, Tachycardia, and Leukocytosis    Suspected source: Pneumonia  · CT Chest/Abdome/Pelvis: Bibasilar infiltrates concerning for atelectasis vs  Pneumonia  Stable pulmonary nodules   Lactic acid: Upon Admission 2 4 Redraw 1 5   POA WBC: 17 11; currently at 9 37   IV Fluids: 1L NSS Bolus  Discontinued   IV antibiotics: Started Levaquin and Vancomycin  Currently on doxycycline   Procalcitonin x1: normal   S  pneumoniae, Legionella urine antigen ordered   Blood cultures ordered  No growth 24 hours   Wheezes and rhonchi B/L on exam  Cough productive of yellow sputum   Influenza A & B RSV PCR ordered:  None detected   Second procalcitonin elevated at 0 48, a m   Reflex normal    Blood cultures continue to remain negative at 48 hours  Plan:   Continue to monitor vital signs   Continue to follow up cultures   Continue doxycycline   CBC tomorrow

## 2020-09-22 NOTE — ASSESSMENT & PLAN NOTE
Lab Results   Component Value Date    HGBA1C 7 6 (H) 08/13/2020       Recent Labs     09/21/20  2135 09/22/20  0735 09/22/20  1111 09/22/20  1536   POCGLU 349* 234* 250* 308*       Blood Sugar Average: Last 72 hrs:  (P) 347 [id number]   · Accu-Checks, SSI and Lantus q12H  · Hypoglycemic Protocol  · Lantus trace to 58 units  Humalog 10 units t i d   Before meals

## 2020-09-22 NOTE — ASSESSMENT & PLAN NOTE
 Creatinine upon admission: 1 33  o Baseline: 0 5-0 8   Secondary to Infection vs  Hypotension   Cr continues to present at normal level  Plan:   Monitor BMP

## 2020-09-22 NOTE — ASSESSMENT & PLAN NOTE
 Chest x-ray: No acute cardiopulmonary disease  · CT Chest/Abdomen/Pelvis: Bibasilar infiltrates concerning for atelectasis vs  Pneumonia  Stable pulmonary nodules   Treating as HCAP: IV antibiotics: Levaquin and Vancomycin discontinued  Started on doxycycline   Respiratory protocol, nebs PRN   Procalcitonin 1st normal   Second elevated blood a m  Reflex normal   Lactic acid: 1st elevated at 3 4; next normal  [facility] consulted   Urine antigens negative  Influenza and RSV PCR negative/nondetected  Blood cultures continue to remain -48 hours  Plan:  · Continue with doxycycline  · Continue IV Solumedrol 20mg BID  · Monitor respiratory status    · Continue Xopenex, Atrovent and Symbicort

## 2020-09-22 NOTE — PLAN OF CARE
Problem: Potential for Falls  Goal: Patient will remain free of falls  Description: INTERVENTIONS:  - Assess patient frequently for physical needs  -  Identify cognitive and physical deficits and behaviors that affect risk of falls    -  [place] fall precautions as indicated by assessment   - Educate patient/family on patient safety including physical limitations  - Instruct patient to call for assistance with activity based on assessment  - Modify environment to reduce risk of injury  - Consider OT/PT consult to assist with strengthening/mobility  Outcome: Progressing     Problem: Prexisting or High Potential for Compromised Skin Integrity  Goal: Skin integrity is maintained or improved  Description: INTERVENTIONS:  - Identify patients at risk for skin breakdown  - Assess and monitor skin integrity  - Assess and monitor nutrition and hydration status  - Monitor labs   - Assess for incontinence   - Turn and reposition patient  - Assist with mobility/ambulation  - Relieve pressure over bony prominences  - Avoid friction and shearing  - Provide appropriate hygiene as needed including keeping skin clean and dry  - Evaluate need for skin moisturizer/barrier cream  - Collaborate with interdisciplinary team   - Patient/family teaching  - Consider wound care consult   Outcome: Progressing     Problem: RESPIRATORY - ADULT  Goal: Achieves optimal ventilation and oxygenation  Description: INTERVENTIONS:  - Assess for changes in respiratory status  - Assess for changes in mentation and behavior  - Position to facilitate oxygenation and minimize respiratory effort  - Oxygen administered by appropriate delivery if ordered  - Initiate smoking cessation education as indicated  - Encourage broncho-pulmonary hygiene including cough, deep breathe, Incentive Spirometry  - Assess the need for suctioning and aspirate as needed  - Assess and instruct to report SOB or any respiratory difficulty  - Respiratory Therapy support as indicated  Outcome: Progressing     Problem: METABOLIC, FLUID AND ELECTROLYTES - ADULT  Goal: Electrolytes maintained within normal limits  Description: INTERVENTIONS:  - Monitor labs and assess patient for signs and symptoms of electrolyte imbalances  - Administer electrolyte replacement as ordered  - Monitor response to electrolyte replacements, including repeat lab results as appropriate  - Instruct patient on fluid and nutrition as appropriate  Outcome: Progressing  Goal: Fluid balance maintained  Description: INTERVENTIONS:  - Monitor labs   - Monitor I/O and WT  - Instruct patient on fluid and nutrition as appropriate  - Assess for signs & symptoms of volume excess or deficit  Outcome: Progressing  Goal: Glucose maintained within target range  Description: INTERVENTIONS:  - Monitor Blood Glucose as ordered  - Assess for signs and symptoms of hyperglycemia and hypoglycemia  - Administer ordered medications to maintain glucose within target range  - Assess nutritional intake and initiate nutrition service referral as needed  Outcome: Progressing     Problem: PAIN - ADULT  Goal: Verbalizes/displays adequate comfort level or baseline comfort level  Description: Interventions:  - Encourage patient to monitor pain and request assistance  - Assess pain using appropriate pain scale  - Administer analgesics based on type and severity of pain and evaluate response  - Implement non-pharmacological measures as appropriate and evaluate response  - Consider cultural and social influences on pain and pain management  - Notify physician/advanced practitioner if interventions unsuccessful or patient reports new pain  Outcome: Progressing     Problem: INFECTION - ADULT  Goal: Absence or prevention of progression during hospitalization  Description: INTERVENTIONS:  - Assess and monitor for signs and symptoms of infection  - Monitor lab/diagnostic results  - Monitor all insertion sites, i e  indwelling lines, tubes, and drains  - Monitor endotracheal if appropriate and nasal secretions for changes in amount and color  - [place] appropriate cooling/warming therapies per order  - Administer medications as ordered  - Instruct and encourage patient and family to use good hand hygiene technique  - Identify and instruct in appropriate isolation precautions for identified infection/condition  Outcome: Progressing  Goal: Absence of fever/infection during neutropenic period  Description: INTERVENTIONS:  - Monitor WBC    Outcome: Progressing     Problem: SAFETY ADULT  Goal: Maintain or return to baseline ADL function  Description: INTERVENTIONS:  -  Assess patient's ability to carry out ADLs; assess patient's baseline for ADL function and identify physical deficits which impact ability to perform ADLs (bathing, care of mouth/teeth, toileting, grooming, dressing, etc )  - Assess/evaluate cause of self-care deficits   - Assess range of motion  - Assess patient's mobility; develop plan if impaired  - Assess patient's need for assistive devices and provide as appropriate  - Encourage maximum independence but intervene and supervise when necessary  - Involve family in performance of ADLs  - Assess for home care needs following discharge   - Consider OT consult to assist with ADL evaluation and planning for discharge  - Provide patient education as appropriate  Outcome: Progressing  Goal: Maintain or return mobility status to optimal level  Description: INTERVENTIONS:  - Assess patient's baseline mobility status (ambulation, transfers, stairs, etc )    - Identify cognitive and physical deficits and behaviors that affect mobility  - Identify mobility aids required to assist with transfers and/or ambulation (gait belt, sit-to-stand, lift, walker, cane, etc )  - [place] fall precautions as indicated by assessment  - Record patient progress and toleration of activity level on Mobility SBAR; progress patient to next Phase/Stage  - Instruct patient to call for assistance with activity based on assessment  - Consider rehabilitation consult to assist with strengthening/weightbearing, etc   Outcome: Progressing     Problem: DISCHARGE PLANNING  Goal: Discharge to home or other facility with appropriate resources  Description: INTERVENTIONS:  - Identify barriers to discharge w/patient and caregiver  - Arrange for needed discharge resources and transportation as appropriate  - Identify discharge learning needs (meds, wound care, etc )  - Arrange for interpretive services to assist at discharge as needed  - Refer to Case Management Department for coordinating discharge planning if the patient needs post-hospital services based on physician/advanced practitioner order or complex needs related to functional status, cognitive ability, or social support system  Outcome: Progressing     Problem: Knowledge Deficit  Goal: Patient/family/caregiver demonstrates understanding of disease process, treatment plan, medications, and discharge instructions  Description: Complete learning assessment and assess knowledge base  Interventions:  - Provide teaching at level of understanding  - Provide teaching via preferred learning methods  Outcome: Progressing     Problem: Nutrition/Hydration-ADULT  Goal: Nutrient/Hydration intake appropriate for improving, restoring or maintaining nutritional needs  Description: Monitor and assess patient's nutrition/hydration status for malnutrition  Collaborate with interdisciplinary team and initiate plan and interventions as ordered  Monitor patient's weight and dietary intake as ordered or per policy  Utilize nutrition screening tool and intervene as necessary  Determine patient's food preferences and provide high-protein, high-caloric foods as appropriate       INTERVENTIONS:  - Monitor oral intake, urinary output, labs, and treatment plans  - Assess nutrition and hydration status and recommend course of action  - Evaluate amount of meals eaten  - Assist patient with eating if necessary   - Allow adequate time for meals  - Recommend/ encourage appropriate diets, oral nutritional supplements, and vitamin/mineral supplements  - Order, calculate, and assess calorie counts as needed  - Recommend, monitor, and adjust tube feedings and TPN/PPN based on assessed needs  - Assess need for intravenous fluids  - Provide specific nutrition/hydration education as appropriate  - Include patient/family/caregiver in decisions related to nutrition  Outcome: Progressing

## 2020-09-23 LAB
ANION GAP SERPL CALCULATED.3IONS-SCNC: 10 MMOL/L (ref 4–13)
BUN SERPL-MCNC: 28 MG/DL (ref 5–25)
CALCIUM SERPL-MCNC: 8.9 MG/DL (ref 8.3–10.1)
CHLORIDE SERPL-SCNC: 103 MMOL/L (ref 100–108)
CO2 SERPL-SCNC: 26 MMOL/L (ref 21–32)
CREAT SERPL-MCNC: 1.16 MG/DL (ref 0.6–1.3)
ERYTHROCYTE [DISTWIDTH] IN BLOOD BY AUTOMATED COUNT: 15.4 % (ref 11.6–15.1)
GFR SERPL CREATININE-BSD FRML MDRD: 48 ML/MIN/1.73SQ M
GLUCOSE SERPL-MCNC: 136 MG/DL (ref 65–140)
GLUCOSE SERPL-MCNC: 169 MG/DL (ref 65–140)
GLUCOSE SERPL-MCNC: 215 MG/DL (ref 65–140)
GLUCOSE SERPL-MCNC: 222 MG/DL (ref 65–140)
GLUCOSE SERPL-MCNC: 271 MG/DL (ref 65–140)
HCT VFR BLD AUTO: 34 % (ref 34.8–46.1)
HGB BLD-MCNC: 10.7 G/DL (ref 11.5–15.4)
MCH RBC QN AUTO: 28.3 PG (ref 26.8–34.3)
MCHC RBC AUTO-ENTMCNC: 31.5 G/DL (ref 31.4–37.4)
MCV RBC AUTO: 90 FL (ref 82–98)
PLATELET # BLD AUTO: 196 THOUSANDS/UL (ref 149–390)
PMV BLD AUTO: 9.9 FL (ref 8.9–12.7)
POTASSIUM SERPL-SCNC: 4.5 MMOL/L (ref 3.5–5.3)
RBC # BLD AUTO: 3.78 MILLION/UL (ref 3.81–5.12)
SODIUM SERPL-SCNC: 139 MMOL/L (ref 136–145)
WBC # BLD AUTO: 8.71 THOUSAND/UL (ref 4.31–10.16)

## 2020-09-23 PROCEDURE — 80048 BASIC METABOLIC PNL TOTAL CA: CPT | Performed by: INTERNAL MEDICINE

## 2020-09-23 PROCEDURE — 99232 SBSQ HOSP IP/OBS MODERATE 35: CPT | Performed by: INTERNAL MEDICINE

## 2020-09-23 PROCEDURE — 94668 MNPJ CHEST WALL SBSQ: CPT

## 2020-09-23 PROCEDURE — 85027 COMPLETE CBC AUTOMATED: CPT | Performed by: INTERNAL MEDICINE

## 2020-09-23 PROCEDURE — 94640 AIRWAY INHALATION TREATMENT: CPT

## 2020-09-23 PROCEDURE — 94762 N-INVAS EAR/PLS OXIMTRY CONT: CPT

## 2020-09-23 PROCEDURE — 99233 SBSQ HOSP IP/OBS HIGH 50: CPT | Performed by: INTERNAL MEDICINE

## 2020-09-23 PROCEDURE — 82948 REAGENT STRIP/BLOOD GLUCOSE: CPT

## 2020-09-23 PROCEDURE — 94760 N-INVAS EAR/PLS OXIMETRY 1: CPT

## 2020-09-23 RX ORDER — PREDNISONE 20 MG/1
40 TABLET ORAL DAILY
Status: COMPLETED | OUTPATIENT
Start: 2020-09-23 | End: 2020-09-23

## 2020-09-23 RX ORDER — ALPRAZOLAM 0.25 MG/1
0.25 TABLET ORAL 2 TIMES DAILY PRN
Status: DISCONTINUED | OUTPATIENT
Start: 2020-09-23 | End: 2020-09-25 | Stop reason: HOSPADM

## 2020-09-23 RX ADMIN — ACETAMINOPHEN 975 MG: 325 TABLET, FILM COATED ORAL at 13:22

## 2020-09-23 RX ADMIN — PREDNISONE 40 MG: 20 TABLET ORAL at 08:46

## 2020-09-23 RX ADMIN — INSULIN LISPRO 3 UNITS: 100 INJECTION, SOLUTION INTRAVENOUS; SUBCUTANEOUS at 21:31

## 2020-09-23 RX ADMIN — ASPIRIN 81 MG: 81 TABLET, COATED ORAL at 08:23

## 2020-09-23 RX ADMIN — GUAIFENESIN 1200 MG: 600 TABLET, EXTENDED RELEASE ORAL at 08:24

## 2020-09-23 RX ADMIN — Medication: at 10:50

## 2020-09-23 RX ADMIN — HEPARIN SODIUM 7500 UNITS: 5000 INJECTION INTRAVENOUS; SUBCUTANEOUS at 14:24

## 2020-09-23 RX ADMIN — MONTELUKAST SODIUM 10 MG: 10 TABLET, FILM COATED ORAL at 21:30

## 2020-09-23 RX ADMIN — IPRATROPIUM BROMIDE 0.5 MG: 0.5 SOLUTION RESPIRATORY (INHALATION) at 07:45

## 2020-09-23 RX ADMIN — IPRATROPIUM BROMIDE 0.5 MG: 0.5 SOLUTION RESPIRATORY (INHALATION) at 13:18

## 2020-09-23 RX ADMIN — MEMANTINE 10 MG: 10 TABLET ORAL at 08:26

## 2020-09-23 RX ADMIN — BUDESONIDE AND FORMOTEROL FUMARATE DIHYDRATE 2 PUFF: 160; 4.5 AEROSOL RESPIRATORY (INHALATION) at 08:23

## 2020-09-23 RX ADMIN — INSULIN LISPRO 2 UNITS: 100 INJECTION, SOLUTION INTRAVENOUS; SUBCUTANEOUS at 16:36

## 2020-09-23 RX ADMIN — INSULIN LISPRO 2 UNITS: 100 INJECTION, SOLUTION INTRAVENOUS; SUBCUTANEOUS at 11:39

## 2020-09-23 RX ADMIN — HEPARIN SODIUM 7500 UNITS: 5000 INJECTION INTRAVENOUS; SUBCUTANEOUS at 05:22

## 2020-09-23 RX ADMIN — FLUTICASONE PROPIONATE 1 SPRAY: 50 SPRAY, METERED NASAL at 08:24

## 2020-09-23 RX ADMIN — OXYCODONE HYDROCHLORIDE 2.5 MG: 5 TABLET ORAL at 16:35

## 2020-09-23 RX ADMIN — INSULIN GLARGINE 58 UNITS: 100 INJECTION, SOLUTION SUBCUTANEOUS at 21:29

## 2020-09-23 RX ADMIN — GUAIFENESIN 1200 MG: 600 TABLET, EXTENDED RELEASE ORAL at 21:30

## 2020-09-23 RX ADMIN — PRAVASTATIN SODIUM 80 MG: 80 TABLET ORAL at 17:00

## 2020-09-23 RX ADMIN — MEMANTINE 10 MG: 10 TABLET ORAL at 17:40

## 2020-09-23 RX ADMIN — LEVALBUTEROL HYDROCHLORIDE 1.25 MG: 1.25 SOLUTION, CONCENTRATE RESPIRATORY (INHALATION) at 19:17

## 2020-09-23 RX ADMIN — DOCUSATE SODIUM 100 MG: 100 CAPSULE ORAL at 08:24

## 2020-09-23 RX ADMIN — LORATADINE 10 MG: 10 TABLET ORAL at 08:26

## 2020-09-23 RX ADMIN — DOXYCYCLINE 100 MG: 100 INJECTION, POWDER, LYOPHILIZED, FOR SOLUTION INTRAVENOUS at 21:30

## 2020-09-23 RX ADMIN — PREDNISONE 40 MG: 20 TABLET ORAL at 17:00

## 2020-09-23 RX ADMIN — GABAPENTIN 300 MG: 300 CAPSULE ORAL at 08:24

## 2020-09-23 RX ADMIN — IPRATROPIUM BROMIDE 0.5 MG: 0.5 SOLUTION RESPIRATORY (INHALATION) at 19:17

## 2020-09-23 RX ADMIN — LEVALBUTEROL HYDROCHLORIDE 1.25 MG: 1.25 SOLUTION, CONCENTRATE RESPIRATORY (INHALATION) at 13:18

## 2020-09-23 RX ADMIN — INSULIN GLARGINE 58 UNITS: 100 INJECTION, SOLUTION SUBCUTANEOUS at 08:25

## 2020-09-23 RX ADMIN — GABAPENTIN 300 MG: 300 CAPSULE ORAL at 17:00

## 2020-09-23 RX ADMIN — ACETAMINOPHEN 975 MG: 325 TABLET, FILM COATED ORAL at 05:20

## 2020-09-23 RX ADMIN — PANTOPRAZOLE SODIUM 40 MG: 40 TABLET, DELAYED RELEASE ORAL at 17:00

## 2020-09-23 RX ADMIN — HEPARIN SODIUM 7500 UNITS: 5000 INJECTION INTRAVENOUS; SUBCUTANEOUS at 21:30

## 2020-09-23 RX ADMIN — GABAPENTIN 300 MG: 300 CAPSULE ORAL at 21:30

## 2020-09-23 RX ADMIN — OXYCODONE HYDROCHLORIDE 2.5 MG: 5 TABLET ORAL at 08:10

## 2020-09-23 RX ADMIN — BUDESONIDE AND FORMOTEROL FUMARATE DIHYDRATE 2 PUFF: 160; 4.5 AEROSOL RESPIRATORY (INHALATION) at 17:41

## 2020-09-23 RX ADMIN — PANTOPRAZOLE SODIUM 40 MG: 40 TABLET, DELAYED RELEASE ORAL at 05:21

## 2020-09-23 RX ADMIN — LEVALBUTEROL HYDROCHLORIDE 1.25 MG: 1.25 SOLUTION, CONCENTRATE RESPIRATORY (INHALATION) at 07:45

## 2020-09-23 RX ADMIN — DOXYCYCLINE 100 MG: 100 INJECTION, POWDER, LYOPHILIZED, FOR SOLUTION INTRAVENOUS at 09:25

## 2020-09-23 RX ADMIN — TRAZODONE HYDROCHLORIDE 50 MG: 50 TABLET ORAL at 22:04

## 2020-09-23 RX ADMIN — ACETAMINOPHEN 975 MG: 325 TABLET, FILM COATED ORAL at 21:30

## 2020-09-23 RX ADMIN — LISINOPRIL 2.5 MG: 2.5 TABLET ORAL at 08:26

## 2020-09-23 NOTE — PROGRESS NOTES
Progress Note - Celena Allen 1950, 79 y o  female MRN: [id number]    Unit/Bed#: S -44 Encounter: [id number]    Primary Care Provider: Elba Sanchez MD   Date and time admitted to hospital: 9/19/2020  6:45 PM        * Sepsis [facility])  Assessment & Plan   SIRS criteria POA: Fever, Tachycardia, and Leukocytosis    Suspected source: Pneumonia  · CT Chest/Abdome/Pelvis: Bibasilar infiltrates concerning for atelectasis vs  Pneumonia  Stable pulmonary nodules   Lactic acid: Upon Admission 2 4 Redraw 1 5   POA WBC: 17 11; currently at 9 37   IV Fluids: 1L NSS Bolus  Discontinued   IV antibiotics: Started Levaquin and Vancomycin  Currently on doxycycline   Procalcitonin x1: normal   S  pneumoniae, Legionella urine antigen ordered   Blood cultures ordered  No growth 24 hours   Wheezes and rhonchi B/L on exam  Cough productive of yellow sputum   Influenza A & B RSV PCR ordered:  None detected   Second procalcitonin elevated at 0 48, a m  Reflex normal    Blood cultures continue to remain negative at 72 hours   Wheezing on bilateral lung fields continues to be present during examination  Cough present however no longer productive   Patient required oxygen during the night   Echo ordered  Plan:   Continue to monitor vital signs   Continue to follow up cultures   Continue doxycycline   CBC tomorrow    Pneumonia  Assessment & Plan   Chest x-ray: No acute cardiopulmonary disease  · CT Chest/Abdomen/Pelvis: Bibasilar infiltrates concerning for atelectasis vs  Pneumonia  Stable pulmonary nodules   Treating as HCAP: IV antibiotics: Levaquin and Vancomycin discontinued  Started on doxycycline   Respiratory protocol, nebs PRN   Procalcitonin 1st normal   Second elevated blood a m  Reflex normal   Lactic acid: 1st elevated at 3 4; next normal  [facility] consulted  Following up   Urine antigens negative  Influenza and RSV PCR negative/nondetected    Blood cultures continue to remain -48 hours   Wheezing on bilateral lung fields continues to be present during examination  Cough present however no longer productive   Patient required oxygen during the night  Plan:  · Continue with doxycycline  · Prednisone 40 mg started  Extra dose ordered  · Monitor respiratory status  · Continue Xopenex, Atrovent and Symbicort    Asthma  Assessment & Plan  · POA Wheezing noted on exam   · Chest Xray: No acute cardiopulmonary disease  · CT Chest/Abd/Pelvis: Bibasilar infiltrates concerning for atelectasis vs  Pneumonia  Stable pulmonary nodules  · Continue to present with wheezes B/L  Nonproductive cough present during examination  Plan:  · Continue Xopenex/Atrovent with home Symbicort  · Started prednisone 40 mg p o   · Oscillatory pep ordered    S/P tooth extraction  Assessment & Plan  · Dental Soft Diet until 9/21  · Salt Water Rinses every Shift until 9/26  · Avoid Brushing and Use of Straws until 9/25    Hyperlipidemia  Assessment & Plan  · Continue Statin  · Consider Lipid panel as an outpatient    KATHY (acute kidney injury) ([facility], [place] [address] )  Assessment & Plan   Creatinine upon admission: 1 33  o Baseline: 0 5-0 8   Secondary to Infection vs  Hypotension   Cr continues to present at normal level  Plan:   Monitor BMP  Pulmonary nodule  Assessment & Plan  · CT Chest/Abd/Pelvis: Left Upper Lobe 7mm nodule and 4mm right apical nodule stable since 2017  No tracheal or endobrachial lesions  Plan  · Continue Outpatient Follow Up    Alzheimer's disease [facility])  Assessment & Plan  · Continue Namenda 10 mg BID    Type 2 diabetes mellitus with hyperglycemia [facility])  Assessment & Plan  Lab Results   Component Value Date    HGBA1C 7 6 (H) 08/13/2020       Recent Labs     09/22/20  1536 09/22/20  2042 09/23/20  0729 09/23/20  1100   POCGLU 308* 283* 136 222*       Blood Sugar Average: Last 72 hrs:  (P) 250   · Accu-Checks, SSI and Lantus q12H  · Hypoglycemic Protocol  · Lantus trace to 58 units   Humalog 10 units t i d  Before meals    Essential hypertension  Assessment & Plan   Blood pressure is reviewed and acceptable  Plan:   Monitor blood pressure   Continue lisinopril         VTE Pharmacologic Prophylaxis:   Pharmacologic: Heparin  Mechanical VTE Prophylaxis in Place: No    Discussions with Specialists or Other Care Team Provider:  Pulmonology, respiratory therapy  Education and Discussions with Family / Patient:  Discussed with patient at bedside    Current Length of Stay: 4 day(s)    Current Patient Status: Inpatient     Discharge Plan / Estimated Discharge Date:  Less than 48 hours    Code Status: Level 3 - DNAR and DNI      Subjective:   Patient was alert and awake sitting in recliner  Describes feeling weak and having generalized muscle and joint pain, worse on the lower extremities bilaterally  Denies any shortness of breath or chest pain  Objective:     Vitals:   Temp (24hrs), Av 9 °F (36 6 °C), Min:97 8 °F (36 6 °C), Max:98 °F (36 7 °C)    Temp:  [97 8 °F (36 6 °C)-98 °F (36 7 °C)] 97 8 °F (36 6 °C)  HR:  [76-88] 76  Resp:  [17-18] 17  BP: (121-159)/(58-72) 159/72  SpO2:  [95 %-100 %] 97 %  Body mass index is 47 15 kg/m²  Input and Output Summary (last 24 hours): Intake/Output Summary (Last 24 hours) at 2020 1208  Last data filed at 2020 1101  Gross per 24 hour   Intake 960 ml   Output 3900 ml   Net -2940 ml       Physical Exam:     Physical Exam  Constitutional:       General: She is not in acute distress  Appearance: She is morbidly obese  HENT:      Head: Normocephalic and atraumatic  Nose: Nose normal       Mouth/Throat:      Mouth: Mucous membranes are moist    Eyes:      Extraocular Movements: Extraocular movements intact  Cardiovascular:      Rate and Rhythm: Normal rate and regular rhythm  Pulses: Normal pulses  Heart sounds: Normal heart sounds  No murmur  No friction rub     Pulmonary:      Effort: Pulmonary effort is normal  No respiratory distress  Breath sounds: No stridor  Wheezing present  No rhonchi or rales  Chest:      Chest wall: No tenderness  Abdominal:      General: Bowel sounds are normal  There is no distension  Palpations: Abdomen is soft  Tenderness: There is no abdominal tenderness  There is no right CVA tenderness, left CVA tenderness, guarding or rebound  Hernia: A hernia is present  Musculoskeletal: Normal range of motion  General: No swelling, tenderness or deformity  Right lower leg: No edema  Left lower leg: No edema  Skin:     General: Skin is warm  Coloration: Skin is not jaundiced  Findings: No erythema, lesion or rash  Neurological:      General: No focal deficit present  Mental Status: She is alert and oriented to person, place, and time  Motor: Weakness (Lower extremities bilaterally) present  Psychiatric:         Mood and Affect: Mood normal          Behavior: Behavior normal            Additional Data:     Labs:    Results from last 7 days   Lab Units 09/23/20  0552  09/19/20  1854   WBC Thousand/uL 8 71   < > 17 11*   HEMOGLOBIN g/dL 10 7*   < > 11 8   HEMATOCRIT % 34 0*   < > 37 2   PLATELETS Thousands/uL 196   < > 202   NEUTROS PCT %  --   --  86*   LYMPHS PCT %  --   --  6*   MONOS PCT %  --   --  6   EOS PCT %  --   --  1    < > = values in this interval not displayed  Results from last 7 days   Lab Units 09/23/20  0552  09/19/20  1854   POTASSIUM mmol/L 4 5   < > 4 8   CHLORIDE mmol/L 103   < > 97*   CO2 mmol/L 26   < > 30   BUN mg/dL 28*   < > 25   CREATININE mg/dL 1 16   < > 1 33*   CALCIUM mg/dL 8 9   < > 9 2   ALK PHOS U/L  --   --  71   ALT U/L  --   --  34   AST U/L  --   --  29    < > = values in this interval not displayed  Results from last 7 days   Lab Units 09/19/20  1854   INR  0 96       * I Have Reviewed All Lab Data Listed Above  * Additional Pertinent Lab Tests Reviewed:  Thelma 66 Admission Reviewed    Imaging:    Imaging Reports Reviewed Today Include:  None  Imaging Personally Reviewed by Myself Includes:  None    Recent Cultures (last 7 days):     Results from last 7 days   Lab Units 09/20/20  0600 09/19/20  1854   BLOOD CULTURE   --  No Growth at 72 hrs  No Growth at 72 hrs     LEGIONELLA URINARY ANTIGEN  Negative  --        Last 24 Hours Medication List:   Current Facility-Administered Medications   Medication Dose Route Frequency Provider Last Rate    acetaminophen  650 mg Oral Q6H PRN Hyla Cuff, CRNP      acetaminophen  975 mg Oral Q8H [facility] Connie Epperson, CRNP      albuterol  2 puff Inhalation Q6H PRN Hyla Cuff, CRNP      aspirin  81 mg Oral Daily Hyla Cuff, CRNP      benzonatate  200 mg Oral TID PRN Hyla Cuff, CRNP      budesonide-formoterol  2 puff Inhalation BID Hyla Cuff, CRNP      calcium carbonate  1,000 mg Oral Daily PRN Hyla Cuff, CRNP      docusate sodium  100 mg Oral BID Hyla Cuff, CRNP      doxycycline  100 mg Intravenous Q12H Doyle Nones, CRNP 100 mg (09/23/20 0925)    fluticasone  1 spray Nasal Daily Hyla Cuff, CRNP      gabapentin  300 mg Oral TID Hyla Cuff, CRNP      guaiFENesin  1,200 mg Oral Q12H [facility] Hyla Cuff, CRNP      heparin (porcine)  7,500 Units Subcutaneous [facility] Connie Zhou, CRNP      insulin glargine  58 Units Subcutaneous Q12H [facility] Connie Epperson, CRNP      insulin lispro  1-5 Units Subcutaneous HS Hyla Cuff, CRNP      insulin lispro  1-6 Units Subcutaneous TID AC Connie Pompa, CRNP      insulin lispro  10 Units Subcutaneous TID With Meals Chantal Tolentino MD      levalbuterol  1 25 mg Nebulization TID Nancy Tsang MD      And    ipratropium  0 5 mg Nebulization TID Nancy Tsang MD      lisinopril  2 5 mg Oral Daily Hyla Cuff, CRNP      loratadine  10 mg Oral Daily Hyla Cuff, CRNP      memantine  10 mg Oral BID Israel Baltazar, CELINA      montelukast  10 mg Oral HS Israel Baltazar, LAINEYNP      oxyCODONE  2 5 mg Oral Q4H PRN Israel Baltazar, CELINA      pantoprazole  40 mg Oral BID AC CELINA Rodriguez      pravastatin  80 mg Oral Daily With [employer], CRNP      predniSONE  40 mg Oral Daily Lawanda Gutierrez, CRBRIDGETTE      predniSONE  40 mg Oral Daily Lawanda Gutierrez, CRBRIDGETTE      traZODone  50 mg Oral HS Israel Baltazar, CELINA      white petrolatum-mineral oil   Topical TID PRN Elkin Gustafson MD          Today, Patient Was Seen By: Kaylen Fry MD    ** Please Note: This note has been constructed using a voice recognition system   **

## 2020-09-23 NOTE — PROGRESS NOTES
Progress Note - Pulmonary   Josselin Bryant 79 y o  female MRN: [id number]  Unit/Bed#: S -01 Encounter: [id number]    Assessment/Plan:    Acute pulmonary insufficiency, multifactorial due to below              Use oxygen as needed to keep saturations greater than or equal to 89%              Out of bed as tolerated               Continue flutter valve and incentive spirometer      Abnormal CT chest with bibasilar atelectasis versus infiltrate              Pulmonary hygiene as above               Suspect more atelectasis with acute tracheobronchitis over pneumonia              Continue antibiotics with doxycycline x 5 days total               Follow-up procalcitonin and culture data               Monitor temperatures and WBCs                 Moderate persistent asthma with acute exacerbation due to above              Continue prednisone 40 milligrams daily with extra dose this afternoon and then taper by 10 mg q3days as tolerated               Continue Xopenex/Atrovent 3 times daily with Symbicort               Continue Flonase, Claritin, and Singulair               At discharge, will continue Symbicort with DuoNeb nebulized twice daily, Flonase, Zyrtec, and Singulair      Morbid obesity with suspected underlying sleep apnea              Overnight pulse ox reveals desaturation  Will require 2L NC at  and outpatient sleep study  She is agreeable      Outpatient follow-up pending course  D/W primary team     Chief Complaint:    "I feel a little SOB today "    Subjective:    Melvina Section reports she is feeling a little bit more SOB and wheezy this morning  She has a dry cough  She denies any other complaints  She did have an overnight POX last night and desaturated for 21 minutes and was placed on 1L NC  Objective:    Vitals: Blood pressure 159/72, pulse 76, temperature 97 8 °F (36 6 °C), temperature source Oral, resp  rate 17, height 4' 8" (1 422 m), weight 95 4 kg (210 lb 5 1 oz), SpO2 98 %   RA,Body mass index is 47 15 kg/m²  Intake/Output Summary (Last 24 hours) at 9/23/2020 0833  Last data filed at 9/23/2020 0646  Gross per 24 hour   Intake 900 ml   Output 3300 ml   Net -2400 ml       Invasive Devices     Peripheral Intravenous Line            Peripheral IV 09/22/20 Left Forearm less than 1 day                Physical Exam:     Physical Exam  Vitals signs reviewed  Constitutional:       General: She is not in acute distress  Appearance: She is well-developed  She is not toxic-appearing or diaphoretic  HENT:      Head: Normocephalic and atraumatic  Mouth/Throat:      Pharynx: No oropharyngeal exudate  Eyes:      General: No scleral icterus  Neck:      Musculoskeletal: Neck supple  Vascular: No JVD  Trachea: No tracheal deviation  Cardiovascular:      Rate and Rhythm: Normal rate and regular rhythm  Heart sounds: S1 normal and S2 normal  No murmur  No friction rub  No gallop  Pulmonary:      Effort: Pulmonary effort is normal  No tachypnea, accessory muscle usage or respiratory distress  Breath sounds: No stridor  Wheezing present  No decreased breath sounds, rhonchi or rales  Chest:      Chest wall: No tenderness  Abdominal:      General: Bowel sounds are normal  There is no distension  Palpations: Abdomen is soft  Tenderness: There is no abdominal tenderness  There is no guarding or rebound  Skin:     General: Skin is warm and dry  Findings: No rash  Neurological:      Mental Status: She is alert and oriented to person, place, and time  GCS: GCS eye subscore is 4  GCS verbal subscore is 5  GCS motor subscore is 6  Psychiatric:         Speech: Speech normal          Behavior: Behavior normal  Behavior is cooperative         Labs:   CBC:   Lab Results   Component Value Date    WBC 8 71 09/23/2020    HGB 10 7 (L) 09/23/2020    HCT 34 0 (L) 09/23/2020    MCV 90 09/23/2020     09/23/2020    MCH 28 3 09/23/2020    MCHC 31 5 09/23/2020 RDW 15 4 (H) 09/23/2020    MPV 9 9 09/23/2020   , CMP:   Lab Results   Component Value Date    SODIUM 139 09/23/2020    K 4 5 09/23/2020     09/23/2020    CO2 26 09/23/2020    BUN 28 (H) 09/23/2020    CREATININE 1 16 09/23/2020    CALCIUM 8 9 09/23/2020    EGFR 48 09/23/2020     Imaging and other studies: None today

## 2020-09-23 NOTE — ASSESSMENT & PLAN NOTE
 SIRS criteria POA: Fever, Tachycardia, and Leukocytosis    Suspected source: Pneumonia  · CT Chest/Abdome/Pelvis: Bibasilar infiltrates concerning for atelectasis vs  Pneumonia  Stable pulmonary nodules   Lactic acid: Upon Admission 2 4 Redraw 1 5   POA WBC: 17 11; currently at 9 37   IV Fluids: 1L NSS Bolus  Discontinued   IV antibiotics: Started Levaquin and Vancomycin  Currently on doxycycline   Procalcitonin x1: normal   S  pneumoniae, Legionella urine antigen ordered   Blood cultures ordered  No growth 24 hours   Wheezes and rhonchi B/L on exam  Cough productive of yellow sputum   Influenza A & B RSV PCR ordered:  None detected   Second procalcitonin elevated at 0 48, a m  Reflex normal    Blood cultures continue to remain negative at 72 hours   Wheezing on bilateral lung fields continues to be present during examination  Cough present however no longer productive   Patient required oxygen during the night     Echo ordered  Plan:   Continue to monitor vital signs   Continue to follow up cultures   Continue doxycycline   CBC tomorrow

## 2020-09-23 NOTE — ASSESSMENT & PLAN NOTE
Lab Results   Component Value Date    HGBA1C 7 6 (H) 08/13/2020       Recent Labs     09/22/20  1536 09/22/20  2042 09/23/20  0729 09/23/20  1100   POCGLU 308* 283* 136 222*       Blood Sugar Average: Last 72 hrs:  (P) 250   · Accu-Checks, SSI and Lantus q12H  · Hypoglycemic Protocol  · Lantus trace to 58 units  Humalog 10 units t i d   Before meals

## 2020-09-23 NOTE — ASSESSMENT & PLAN NOTE
 Chest x-ray: No acute cardiopulmonary disease  · CT Chest/Abdomen/Pelvis: Bibasilar infiltrates concerning for atelectasis vs  Pneumonia  Stable pulmonary nodules   Treating as HCAP: IV antibiotics: Levaquin and Vancomycin discontinued  Started on doxycycline   Respiratory protocol, nebs PRN   Procalcitonin 1st normal   Second elevated blood a m  Reflex normal   Lactic acid: 1st elevated at 3 4; next normal  Francoise Clunes Pulmonology consulted  Following up   Urine antigens negative  Influenza and RSV PCR negative/nondetected  Blood cultures continue to remain -48 hours   Wheezing on bilateral lung fields continues to be present during examination  Cough present however no longer productive   Patient required oxygen during the night  Plan:  · Continue with doxycycline  · Prednisone 40 mg started  Extra dose ordered  · Monitor respiratory status    · Continue Xopenex, Atrovent and Symbicort

## 2020-09-23 NOTE — ASSESSMENT & PLAN NOTE
· POA Wheezing noted on exam   · Chest Xray: No acute cardiopulmonary disease  · CT Chest/Abd/Pelvis: Bibasilar infiltrates concerning for atelectasis vs  Pneumonia  Stable pulmonary nodules  · Continue to present with wheezes B/L    Nonproductive cough present during examination  Plan:  · Continue Xopenex/Atrovent with home Symbicort  · Started prednisone 40 mg p o   · Oscillatory pep ordered

## 2020-09-23 NOTE — PLAN OF CARE
Problem: Potential for Falls  Goal: Patient will remain free of falls  Description: INTERVENTIONS:  - Assess patient frequently for physical needs  -  Identify cognitive and physical deficits and behaviors that affect risk of falls    -  [place] fall precautions as indicated by assessment   - Educate patient/family on patient safety including physical limitations  - Instruct patient to call for assistance with activity based on assessment  - Modify environment to reduce risk of injury  - Consider OT/PT consult to assist with strengthening/mobility  Outcome: Progressing     Problem: Prexisting or High Potential for Compromised Skin Integrity  Goal: Skin integrity is maintained or improved  Description: INTERVENTIONS:  - Identify patients at risk for skin breakdown  - Assess and monitor skin integrity  - Assess and monitor nutrition and hydration status  - Monitor labs   - Assess for incontinence   - Turn and reposition patient  - Assist with mobility/ambulation  - Relieve pressure over bony prominences  - Avoid friction and shearing  - Provide appropriate hygiene as needed including keeping skin clean and dry  - Evaluate need for skin moisturizer/barrier cream  - Collaborate with interdisciplinary team   - Patient/family teaching  - Consider wound care consult   Outcome: Progressing     Problem: RESPIRATORY - ADULT  Goal: Achieves optimal ventilation and oxygenation  Description: INTERVENTIONS:  - Assess for changes in respiratory status  - Assess for changes in mentation and behavior  - Position to facilitate oxygenation and minimize respiratory effort  - Oxygen administered by appropriate delivery if ordered  - Initiate smoking cessation education as indicated  - Encourage broncho-pulmonary hygiene including cough, deep breathe, Incentive Spirometry  - Assess the need for suctioning and aspirate as needed  - Assess and instruct to report SOB or any respiratory difficulty  - Respiratory Therapy support as indicated  Outcome: Progressing     Problem: METABOLIC, FLUID AND ELECTROLYTES - ADULT  Goal: Electrolytes maintained within normal limits  Description: INTERVENTIONS:  - Monitor labs and assess patient for signs and symptoms of electrolyte imbalances  - Administer electrolyte replacement as ordered  - Monitor response to electrolyte replacements, including repeat lab results as appropriate  - Instruct patient on fluid and nutrition as appropriate  Outcome: Progressing  Goal: Fluid balance maintained  Description: INTERVENTIONS:  - Monitor labs   - Monitor I/O and WT  - Instruct patient on fluid and nutrition as appropriate  - Assess for signs & symptoms of volume excess or deficit  Outcome: Progressing  Goal: Glucose maintained within target range  Description: INTERVENTIONS:  - Monitor Blood Glucose as ordered  - Assess for signs and symptoms of hyperglycemia and hypoglycemia  - Administer ordered medications to maintain glucose within target range  - Assess nutritional intake and initiate nutrition service referral as needed  Outcome: Progressing     Problem: PAIN - ADULT  Goal: Verbalizes/displays adequate comfort level or baseline comfort level  Description: Interventions:  - Encourage patient to monitor pain and request assistance  - Assess pain using appropriate pain scale  - Administer analgesics based on type and severity of pain and evaluate response  - Implement non-pharmacological measures as appropriate and evaluate response  - Consider cultural and social influences on pain and pain management  - Notify physician/advanced practitioner if interventions unsuccessful or patient reports new pain  Outcome: Progressing     Problem: INFECTION - ADULT  Goal: Absence or prevention of progression during hospitalization  Description: INTERVENTIONS:  - Assess and monitor for signs and symptoms of infection  - Monitor lab/diagnostic results  - Monitor all insertion sites, i e  indwelling lines, tubes, and drains  - Monitor endotracheal if appropriate and nasal secretions for changes in amount and color  - [place] appropriate cooling/warming therapies per order  - Administer medications as ordered  - Instruct and encourage patient and family to use good hand hygiene technique  - Identify and instruct in appropriate isolation precautions for identified infection/condition  Outcome: Progressing  Goal: Absence of fever/infection during neutropenic period  Description: INTERVENTIONS:  - Monitor WBC    Outcome: Progressing     Problem: SAFETY ADULT  Goal: Maintain or return to baseline ADL function  Description: INTERVENTIONS:  -  Assess patient's ability to carry out ADLs; assess patient's baseline for ADL function and identify physical deficits which impact ability to perform ADLs (bathing, care of mouth/teeth, toileting, grooming, dressing, etc )  - Assess/evaluate cause of self-care deficits   - Assess range of motion  - Assess patient's mobility; develop plan if impaired  - Assess patient's need for assistive devices and provide as appropriate  - Encourage maximum independence but intervene and supervise when necessary  - Involve family in performance of ADLs  - Assess for home care needs following discharge   - Consider OT consult to assist with ADL evaluation and planning for discharge  - Provide patient education as appropriate  Outcome: Progressing  Goal: Maintain or return mobility status to optimal level  Description: INTERVENTIONS:  - Assess patient's baseline mobility status (ambulation, transfers, stairs, etc )    - Identify cognitive and physical deficits and behaviors that affect mobility  - Identify mobility aids required to assist with transfers and/or ambulation (gait belt, sit-to-stand, lift, walker, cane, etc )  - [place] fall precautions as indicated by assessment  - Record patient progress and toleration of activity level on Mobility SBAR; progress patient to next Phase/Stage  - Instruct patient to call for assistance with activity based on assessment  - Consider rehabilitation consult to assist with strengthening/weightbearing, etc   Outcome: Progressing     Problem: DISCHARGE PLANNING  Goal: Discharge to home or other facility with appropriate resources  Description: INTERVENTIONS:  - Identify barriers to discharge w/patient and caregiver  - Arrange for needed discharge resources and transportation as appropriate  - Identify discharge learning needs (meds, wound care, etc )  - Arrange for interpretive services to assist at discharge as needed  - Refer to Case Management Department for coordinating discharge planning if the patient needs post-hospital services based on physician/advanced practitioner order or complex needs related to functional status, cognitive ability, or social support system  Outcome: Progressing     Problem: Knowledge Deficit  Goal: Patient/family/caregiver demonstrates understanding of disease process, treatment plan, medications, and discharge instructions  Description: Complete learning assessment and assess knowledge base  Interventions:  - Provide teaching at level of understanding  - Provide teaching via preferred learning methods  Outcome: Progressing     Problem: Nutrition/Hydration-ADULT  Goal: Nutrient/Hydration intake appropriate for improving, restoring or maintaining nutritional needs  Description: Monitor and assess patient's nutrition/hydration status for malnutrition  Collaborate with interdisciplinary team and initiate plan and interventions as ordered  Monitor patient's weight and dietary intake as ordered or per policy  Utilize nutrition screening tool and intervene as necessary  Determine patient's food preferences and provide high-protein, high-caloric foods as appropriate       INTERVENTIONS:  - Monitor oral intake, urinary output, labs, and treatment plans  - Assess nutrition and hydration status and recommend course of action  - Evaluate amount of meals eaten  - Assist patient with eating if necessary   - Allow adequate time for meals  - Recommend/ encourage appropriate diets, oral nutritional supplements, and vitamin/mineral supplements  - Order, calculate, and assess calorie counts as needed  - Recommend, monitor, and adjust tube feedings and TPN/PPN based on assessed needs  - Assess need for intravenous fluids  - Provide specific nutrition/hydration education as appropriate  - Include patient/family/caregiver in decisions related to nutrition  Outcome: Progressing

## 2020-09-24 ENCOUNTER — APPOINTMENT (INPATIENT)
Dept: NON INVASIVE DIAGNOSTICS | Facility: HOSPITAL | Age: 70
DRG: 871 | End: 2020-09-24
Payer: MEDICARE

## 2020-09-24 LAB
ANION GAP SERPL CALCULATED.3IONS-SCNC: 13 MMOL/L (ref 4–13)
BUN SERPL-MCNC: 29 MG/DL (ref 5–25)
CALCIUM SERPL-MCNC: 9.8 MG/DL (ref 8.3–10.1)
CHLORIDE SERPL-SCNC: 101 MMOL/L (ref 100–108)
CO2 SERPL-SCNC: 25 MMOL/L (ref 21–32)
CREAT SERPL-MCNC: 1.14 MG/DL (ref 0.6–1.3)
ERYTHROCYTE [DISTWIDTH] IN BLOOD BY AUTOMATED COUNT: 15.2 % (ref 11.6–15.1)
GFR SERPL CREATININE-BSD FRML MDRD: 49 ML/MIN/1.73SQ M
GLUCOSE SERPL-MCNC: 217 MG/DL (ref 65–140)
GLUCOSE SERPL-MCNC: 226 MG/DL (ref 65–140)
GLUCOSE SERPL-MCNC: 254 MG/DL (ref 65–140)
GLUCOSE SERPL-MCNC: 281 MG/DL (ref 65–140)
GLUCOSE SERPL-MCNC: 434 MG/DL (ref 65–140)
HCT VFR BLD AUTO: 38.3 % (ref 34.8–46.1)
HGB BLD-MCNC: 12.2 G/DL (ref 11.5–15.4)
MCH RBC QN AUTO: 27.9 PG (ref 26.8–34.3)
MCHC RBC AUTO-ENTMCNC: 31.9 G/DL (ref 31.4–37.4)
MCV RBC AUTO: 87 FL (ref 82–98)
PLATELET # BLD AUTO: 244 THOUSANDS/UL (ref 149–390)
PMV BLD AUTO: 9.5 FL (ref 8.9–12.7)
POTASSIUM SERPL-SCNC: 4.3 MMOL/L (ref 3.5–5.3)
RBC # BLD AUTO: 4.38 MILLION/UL (ref 3.81–5.12)
SODIUM SERPL-SCNC: 139 MMOL/L (ref 136–145)
WBC # BLD AUTO: 11.06 THOUSAND/UL (ref 4.31–10.16)

## 2020-09-24 PROCEDURE — 94668 MNPJ CHEST WALL SBSQ: CPT

## 2020-09-24 PROCEDURE — 93306 TTE W/DOPPLER COMPLETE: CPT | Performed by: INTERNAL MEDICINE

## 2020-09-24 PROCEDURE — 80048 BASIC METABOLIC PNL TOTAL CA: CPT | Performed by: INTERNAL MEDICINE

## 2020-09-24 PROCEDURE — 94760 N-INVAS EAR/PLS OXIMETRY 1: CPT

## 2020-09-24 PROCEDURE — 97110 THERAPEUTIC EXERCISES: CPT

## 2020-09-24 PROCEDURE — 97116 GAIT TRAINING THERAPY: CPT

## 2020-09-24 PROCEDURE — C8929 TTE W OR WO FOL WCON,DOPPLER: HCPCS

## 2020-09-24 PROCEDURE — 82948 REAGENT STRIP/BLOOD GLUCOSE: CPT

## 2020-09-24 PROCEDURE — 99233 SBSQ HOSP IP/OBS HIGH 50: CPT | Performed by: INTERNAL MEDICINE

## 2020-09-24 PROCEDURE — 97530 THERAPEUTIC ACTIVITIES: CPT

## 2020-09-24 PROCEDURE — 85027 COMPLETE CBC AUTOMATED: CPT | Performed by: INTERNAL MEDICINE

## 2020-09-24 PROCEDURE — 94640 AIRWAY INHALATION TREATMENT: CPT

## 2020-09-24 PROCEDURE — 99232 SBSQ HOSP IP/OBS MODERATE 35: CPT | Performed by: INTERNAL MEDICINE

## 2020-09-24 RX ORDER — INSULIN GLARGINE 100 [IU]/ML
62 INJECTION, SOLUTION SUBCUTANEOUS EVERY 12 HOURS SCHEDULED
Status: DISCONTINUED | OUTPATIENT
Start: 2020-09-24 | End: 2020-09-25

## 2020-09-24 RX ADMIN — PANTOPRAZOLE SODIUM 40 MG: 40 TABLET, DELAYED RELEASE ORAL at 05:41

## 2020-09-24 RX ADMIN — OXYCODONE HYDROCHLORIDE 2.5 MG: 5 TABLET ORAL at 08:59

## 2020-09-24 RX ADMIN — HEPARIN SODIUM 7500 UNITS: 5000 INJECTION INTRAVENOUS; SUBCUTANEOUS at 13:35

## 2020-09-24 RX ADMIN — DOXYCYCLINE 100 MG: 100 INJECTION, POWDER, LYOPHILIZED, FOR SOLUTION INTRAVENOUS at 10:45

## 2020-09-24 RX ADMIN — PERFLUTREN 1 ML/MIN: 6.52 INJECTION, SUSPENSION INTRAVENOUS at 15:31

## 2020-09-24 RX ADMIN — BUDESONIDE AND FORMOTEROL FUMARATE DIHYDRATE 2 PUFF: 160; 4.5 AEROSOL RESPIRATORY (INHALATION) at 17:05

## 2020-09-24 RX ADMIN — ACETAMINOPHEN 975 MG: 325 TABLET, FILM COATED ORAL at 05:40

## 2020-09-24 RX ADMIN — LEVALBUTEROL HYDROCHLORIDE 1.25 MG: 1.25 SOLUTION, CONCENTRATE RESPIRATORY (INHALATION) at 13:25

## 2020-09-24 RX ADMIN — LEVALBUTEROL HYDROCHLORIDE 1.25 MG: 1.25 SOLUTION, CONCENTRATE RESPIRATORY (INHALATION) at 19:51

## 2020-09-24 RX ADMIN — ALPRAZOLAM 0.25 MG: 0.25 TABLET ORAL at 08:58

## 2020-09-24 RX ADMIN — PANTOPRAZOLE SODIUM 40 MG: 40 TABLET, DELAYED RELEASE ORAL at 15:43

## 2020-09-24 RX ADMIN — ASPIRIN 81 MG: 81 TABLET, COATED ORAL at 08:48

## 2020-09-24 RX ADMIN — HEPARIN SODIUM 7500 UNITS: 5000 INJECTION INTRAVENOUS; SUBCUTANEOUS at 05:41

## 2020-09-24 RX ADMIN — LORATADINE 10 MG: 10 TABLET ORAL at 08:48

## 2020-09-24 RX ADMIN — DOCUSATE SODIUM 100 MG: 100 CAPSULE ORAL at 08:47

## 2020-09-24 RX ADMIN — IPRATROPIUM BROMIDE 0.5 MG: 0.5 SOLUTION RESPIRATORY (INHALATION) at 13:25

## 2020-09-24 RX ADMIN — MONTELUKAST SODIUM 10 MG: 10 TABLET, FILM COATED ORAL at 21:08

## 2020-09-24 RX ADMIN — MEMANTINE 10 MG: 10 TABLET ORAL at 08:48

## 2020-09-24 RX ADMIN — OXYCODONE HYDROCHLORIDE 2.5 MG: 5 TABLET ORAL at 21:08

## 2020-09-24 RX ADMIN — ALPRAZOLAM 0.25 MG: 0.25 TABLET ORAL at 21:08

## 2020-09-24 RX ADMIN — OXYCODONE HYDROCHLORIDE 2.5 MG: 5 TABLET ORAL at 15:42

## 2020-09-24 RX ADMIN — MEMANTINE 10 MG: 10 TABLET ORAL at 17:05

## 2020-09-24 RX ADMIN — ACETAMINOPHEN 975 MG: 325 TABLET, FILM COATED ORAL at 21:09

## 2020-09-24 RX ADMIN — TRAZODONE HYDROCHLORIDE 50 MG: 50 TABLET ORAL at 21:09

## 2020-09-24 RX ADMIN — GABAPENTIN 300 MG: 300 CAPSULE ORAL at 15:42

## 2020-09-24 RX ADMIN — INSULIN LISPRO 2 UNITS: 100 INJECTION, SOLUTION INTRAVENOUS; SUBCUTANEOUS at 21:11

## 2020-09-24 RX ADMIN — INSULIN LISPRO 4 UNITS: 100 INJECTION, SOLUTION INTRAVENOUS; SUBCUTANEOUS at 17:06

## 2020-09-24 RX ADMIN — FLUTICASONE PROPIONATE 1 SPRAY: 50 SPRAY, METERED NASAL at 08:59

## 2020-09-24 RX ADMIN — INSULIN GLARGINE 62 UNITS: 100 INJECTION, SOLUTION SUBCUTANEOUS at 21:11

## 2020-09-24 RX ADMIN — GABAPENTIN 300 MG: 300 CAPSULE ORAL at 08:48

## 2020-09-24 RX ADMIN — INSULIN LISPRO 2 UNITS: 100 INJECTION, SOLUTION INTRAVENOUS; SUBCUTANEOUS at 08:50

## 2020-09-24 RX ADMIN — LISINOPRIL 2.5 MG: 2.5 TABLET ORAL at 08:47

## 2020-09-24 RX ADMIN — GUAIFENESIN 1200 MG: 600 TABLET, EXTENDED RELEASE ORAL at 21:09

## 2020-09-24 RX ADMIN — ACETAMINOPHEN 975 MG: 325 TABLET, FILM COATED ORAL at 13:31

## 2020-09-24 RX ADMIN — BUDESONIDE AND FORMOTEROL FUMARATE DIHYDRATE 2 PUFF: 160; 4.5 AEROSOL RESPIRATORY (INHALATION) at 08:59

## 2020-09-24 RX ADMIN — HEPARIN SODIUM 7500 UNITS: 5000 INJECTION INTRAVENOUS; SUBCUTANEOUS at 21:08

## 2020-09-24 RX ADMIN — GABAPENTIN 300 MG: 300 CAPSULE ORAL at 21:08

## 2020-09-24 RX ADMIN — DOXYCYCLINE 100 MG: 100 INJECTION, POWDER, LYOPHILIZED, FOR SOLUTION INTRAVENOUS at 21:04

## 2020-09-24 RX ADMIN — PREDNISONE 40 MG: 20 TABLET ORAL at 08:57

## 2020-09-24 RX ADMIN — GUAIFENESIN 1200 MG: 600 TABLET, EXTENDED RELEASE ORAL at 08:46

## 2020-09-24 RX ADMIN — IPRATROPIUM BROMIDE 0.5 MG: 0.5 SOLUTION RESPIRATORY (INHALATION) at 07:25

## 2020-09-24 RX ADMIN — INSULIN LISPRO 6 UNITS: 100 INJECTION, SOLUTION INTRAVENOUS; SUBCUTANEOUS at 11:43

## 2020-09-24 RX ADMIN — PRAVASTATIN SODIUM 80 MG: 80 TABLET ORAL at 17:05

## 2020-09-24 RX ADMIN — IPRATROPIUM BROMIDE 0.5 MG: 0.5 SOLUTION RESPIRATORY (INHALATION) at 19:51

## 2020-09-24 RX ADMIN — INSULIN GLARGINE 58 UNITS: 100 INJECTION, SOLUTION SUBCUTANEOUS at 08:48

## 2020-09-24 RX ADMIN — LEVALBUTEROL HYDROCHLORIDE 1.25 MG: 1.25 SOLUTION, CONCENTRATE RESPIRATORY (INHALATION) at 07:25

## 2020-09-24 NOTE — ASSESSMENT & PLAN NOTE
 SIRS criteria POA: Fever, Tachycardia, and Leukocytosis    Suspected source: Pneumonia  · CT Chest/Abdome/Pelvis: Bibasilar infiltrates concerning for atelectasis vs  Pneumonia  Stable pulmonary nodules   Lactic acid: Upon Admission 2 4 Redraw 1 5   POA WBC: 17 11; currently at 11 06   IV Fluids: 1L NSS Bolus  Discontinued   IV antibiotics: Started Levaquin and Vancomycin  Currently on doxycycline   Procalcitonin x1: normal   S  pneumoniae, Legionella urine antigen ordered: Negative   Influenza A & B RSV PCR ordered:  None detected   Second procalcitonin elevated at 0 48, a m  Reflex normal    Blood cultures continue to remain negative at 4 days   Wheezing on bilateral lung fields continues to be present during examination  Cough no longer present   Patient required oxygen during the night   Echo ordered   Awaiting results  Plan:   Continue to monitor vital signs   Continue to follow up cultures   Continue doxycycline   CBC tomorrow

## 2020-09-24 NOTE — PROGRESS NOTES
Progress Note - Pulmonary   Amanda Hosuton 79 y o  female MRN: [id number]  Unit/Bed#: S -01 Encounter: [id number]      Assessment/Plan: Moderate persistent asthma with acute exacerbation   -Breathing improved compared to yesterday on my exam today she has significantly more air movement and very minimal wheezing compared to before   -Continue prednisone 40 mg daily, upon discharge will plan for a taper of 10 mg every 3 days  -continue Xopenex/Atrovent 3 times a day  -At discharge, recommend to continue home Symbicort with her home DuoNebs, Flonase, Zyrtec, Singulair  -Will arrange pulmonary follow up     Nocturnal Hypoxemia  -had significant desaturations on overnight pulse oximetry from 9/22  -recommend 1 L of oxygen overnight  -suspect she has underlying obstructive sleep apnea, will need formal sleep study which we have ordered as an outpatient   -likely will need to be initially on nightly PAP therapy    Case discussed with Dr Alphonso Meckel      Subjective:  Feels better this morning, slept well, feels like wheezing is less  Vitals: Blood pressure 148/70, pulse 70, temperature 97 5 °F (36 4 °C), temperature source Oral, resp  rate 18, height 4' 8" (1 422 m), weight 92 8 kg (204 lb 9 6 oz), SpO2 95 %  , Body mass index is 45 87 kg/m²        Intake/Output Summary (Last 24 hours) at 9/24/2020 1041  Last data filed at 9/24/2020 0301  Gross per 24 hour   Intake 480 ml   Output 3650 ml   Net -3170 ml       Physical Exam  Gen: Awake, alert, oriented x 3, no acute distress  HEENT: Mucous membranes moist, no oral lesions, no thrush  NECK: No accessory muscle use, JVP not elevated  Cardiac: Regular, single S1, single S2, no murmurs, no rubs, no gallops  Lungs: lung fields much more clear, minimal wheezing  Abdomen: normoactive bowel sounds, soft nontender, nondistended, no rebound or rigidity, no guarding  Extremities: no cyanosis, no clubbing, no edema    Labs: ABG: No results found for: PHART, SBE2PHH, PO2ART, JESUS Ferrell, SOURCE        Apple Maxwell MD

## 2020-09-24 NOTE — ASSESSMENT & PLAN NOTE
Lab Results   Component Value Date    HGBA1C 7 6 (H) 08/13/2020       Recent Labs     09/23/20  1553 09/23/20  2051 09/24/20  0710 09/24/20  1113   POCGLU 215* 271* 217* 434*       Blood Sugar Average: Last 72 hrs:  (P) 272 [id number]   · Accu-Checks, SSI and Lantus q12H  · Hypoglycemic Protocol  · Lantus increased to 62 units  Humalog 16 units t i d   Before meals

## 2020-09-24 NOTE — PROGRESS NOTES
Progress Note - Maty Nallely 1950, 79 y o  female MRN: [id number]    Unit/Bed#: S -16 Encounter: [id number]    Primary Care Provider: Kuldeep Way MD   Date and time admitted to hospital: 9/19/2020  6:45 PM        * Sepsis [facility])  Assessment & Plan   SIRS criteria POA: Fever, Tachycardia, and Leukocytosis    Suspected source: Pneumonia  · CT Chest/Abdome/Pelvis: Bibasilar infiltrates concerning for atelectasis vs  Pneumonia  Stable pulmonary nodules   Lactic acid: Upon Admission 2 4 Redraw 1 5   POA WBC: 17 11; currently at 11 06   IV Fluids: 1L NSS Bolus  Discontinued   IV antibiotics: Started Levaquin and Vancomycin  Currently on doxycycline   Procalcitonin x1: normal   S  pneumoniae, Legionella urine antigen ordered: Negative   Influenza A & B RSV PCR ordered:  None detected   Second procalcitonin elevated at 0 48, a m  Reflex normal    Blood cultures continue to remain negative at 4 days   Wheezing on bilateral lung fields continues to be present during examination  Cough no longer present   Patient required oxygen during the night   Echo ordered  Awaiting results  Plan:   Continue to monitor vital signs   Continue to follow up cultures   Continue doxycycline   CBC tomorrow    Pneumonia  Assessment & Plan   Chest x-ray: No acute cardiopulmonary disease  · CT Chest/Abdomen/Pelvis: Bibasilar infiltrates concerning for atelectasis vs  Pneumonia  Stable pulmonary nodules   Treating as HCAP: IV antibiotics: Levaquin and Vancomycin discontinued  Started on doxycycline   Respiratory protocol, nebs PRN   Procalcitonin 1st normal   Second elevated blood a m  Reflex normal   Lactic acid: 1st elevated at 3 4; next normal  [facility]monology consulted  Following up   Urine antigens negative  Influenza and RSV PCR negative/nondetected  Blood cultures continue to remain 4 days   Wheezing on bilateral lung fields continues to be present during examination    Cough present however no longer productive   Patient required oxygen during the night  Plan:  · Continue with doxycycline for 5 days  · Prednisone 40 mg  Extra dose give yesterday  · Monitor respiratory status  · Continue Xopenex, Atrovent and Symbicort  · Continue flutter valve and incentive spirometry  · NC at night    Asthma  Assessment & Plan  · POA Wheezing noted on exam   · Chest Xray: No acute cardiopulmonary disease  · CT Chest/Abd/Pelvis: Bibasilar infiltrates concerning for atelectasis vs  Pneumonia  Stable pulmonary nodules  · Continue to have wheezes B/L, however improvement is evident  Plan:  · Continue Xopenex/Atrovent with home Symbicort  · Started prednisone 40 mg p o  · Continue flutter valve and incentive spirometer    S/P tooth extraction  Assessment & Plan  · Dental Soft Diet until 9/21  · Salt Water Rinses every Shift until 9/26  · Avoid Brushing and Use of Straws until 9/25    Hyperlipidemia  Assessment & Plan  · Continue Statin  · Consider Lipid panel as an outpatient    KATHY (acute kidney injury) ([facility] [address] )  Assessment & Plan   Creatinine upon admission: 1 33  o Baseline: 0 5-0 8   Secondary to Infection vs  Hypotension   Cr continues to present at normal level  Plan:   Monitor BMP  Pulmonary nodule  Assessment & Plan  · CT Chest/Abd/Pelvis: Left Upper Lobe 7mm nodule and 4mm right apical nodule stable since 2017  No tracheal or endobrachial lesions  Plan  · Continue Outpatient Follow Up    Alzheimer's disease [facility])  Assessment & Plan  · Continue Namenda 10 mg BID    Type 2 diabetes mellitus with hyperglycemia [facility])  Assessment & Plan  Lab Results   Component Value Date    HGBA1C 7 6 (H) 08/13/2020       Recent Labs     09/23/20  1553 09/23/20  2051 09/24/20  0710 09/24/20  1113   POCGLU 215* 271* 217* 434*       Blood Sugar Average: Last 72 hrs:  (P) 272 [id number]   · Accu-Checks, SSI and Lantus q12H  · Hypoglycemic Protocol  · Lantus increased to 62 units  Humalog 16 units t i d   Before meals    Essential hypertension  Assessment & Plan   Blood pressure is reviewed and acceptable  Plan:   Monitor blood pressure   Continue lisinopril         VTE Pharmacologic Prophylaxis:   Pharmacologic: Heparin  Mechanical VTE Prophylaxis in Place: No    Discussions with Specialists or Other Care Team Provider:  Pulmonology, physical therapy    Education and Discussions with Family / Patient:  Discussed with patient at bedside    Current Length of Stay: 5 day(s)    Current Patient Status: Inpatient     Discharge Plan / Estimated Discharge Date:  Tomorrow    Code Status: Level 3 - DNAR and DNI      Subjective:   Patient was alert and awake, sitting in her recliner  Refers feeling much better  Denies any shortness of breath, however mentions chest discomfort  Denies any coughing episode  Objective:     Vitals:   Temp (24hrs), Av 8 °F (36 6 °C), Min:97 5 °F (36 4 °C), Max:98 1 °F (36 7 °C)    Temp:  [97 5 °F (36 4 °C)-98 1 °F (36 7 °C)] 97 5 °F (36 4 °C)  HR:  [70-85] 70  Resp:  [18] 18  BP: (133-148)/(65-70) 148/70  SpO2:  [91 %-95 %] 94 %  Body mass index is 45 87 kg/m²  Input and Output Summary (last 24 hours): Intake/Output Summary (Last 24 hours) at 2020 1630  Last data filed at 2020 1431  Gross per 24 hour   Intake 1165 ml   Output 3440 ml   Net -2275 ml       Physical Exam:     Physical Exam  Constitutional:       General: She is not in acute distress  Appearance: She is morbidly obese  HENT:      Head: Normocephalic and atraumatic  Nose: Nose normal       Mouth/Throat:      Mouth: Mucous membranes are moist    Eyes:      Extraocular Movements: Extraocular movements intact  Cardiovascular:      Rate and Rhythm: Normal rate and regular rhythm  Pulses: Normal pulses  Heart sounds: Normal heart sounds  No murmur  No friction rub  Pulmonary:      Effort: Pulmonary effort is normal  No respiratory distress  Breath sounds: No stridor  Wheezing present  No rhonchi or rales  Chest:      Chest wall: No tenderness  Abdominal:      General: Bowel sounds are normal  There is no distension  Palpations: Abdomen is soft  Tenderness: There is no abdominal tenderness  There is no right CVA tenderness, left CVA tenderness, guarding or rebound  Hernia: A hernia is present  Musculoskeletal: Normal range of motion  General: No swelling, tenderness or deformity  Right lower leg: No edema  Left lower leg: No edema  Skin:     General: Skin is warm  Coloration: Skin is not jaundiced  Findings: No erythema, lesion or rash  Neurological:      General: No focal deficit present  Mental Status: She is alert and oriented to person, place, and time  Motor: Weakness (Lower extremities bilaterally) present  Psychiatric:         Mood and Affect: Mood normal          Behavior: Behavior normal            Additional Data:     Labs:    Results from last 7 days   Lab Units 09/24/20  0614 09/19/20  1854   WBC Thousand/uL 11 06*   < > 17 11*   HEMOGLOBIN g/dL 12 2   < > 11 8   HEMATOCRIT % 38 3   < > 37 2   PLATELETS Thousands/uL 244   < > 202   NEUTROS PCT %  --   --  86*   LYMPHS PCT %  --   --  6*   MONOS PCT %  --   --  6   EOS PCT %  --   --  1    < > = values in this interval not displayed  Results from last 7 days   Lab Units 09/24/20  0614  09/19/20  1854   POTASSIUM mmol/L 4 3   < > 4 8   CHLORIDE mmol/L 101   < > 97*   CO2 mmol/L 25   < > 30   BUN mg/dL 29*   < > 25   CREATININE mg/dL 1 14   < > 1 33*   CALCIUM mg/dL 9 8   < > 9 2   ALK PHOS U/L  --   --  71   ALT U/L  --   --  34   AST U/L  --   --  29    < > = values in this interval not displayed  Results from last 7 days   Lab Units 09/19/20  1854   INR  0 96       * I Have Reviewed All Lab Data Listed Above  * Additional Pertinent Lab Tests Reviewed:  All Labs For Current Hospital Admission Reviewed    Imaging:    Imaging Reports Reviewed Today Include: None  Imaging Personally Reviewed by Myself Includes:  None    Recent Cultures (last 7 days):     Results from last 7 days   Lab Units 09/20/20  0600 09/19/20  1854   BLOOD CULTURE   --  No Growth After 4 Days  No Growth After 4 Days     LEGIONELLA URINARY ANTIGEN  Negative  --        Last 24 Hours Medication List:   Current Facility-Administered Medications   Medication Dose Route Frequency Provider Last Rate    acetaminophen  650 mg Oral Q6H PRN Amanda Ora, LAINEYNP      acetaminophen  975 mg Oral Q8H [address] Amanda Ora, CRBRIDGETTE      albuterol  2 puff Inhalation Q6H PRN Amanda Sherie, CELINA      ALPRAZolam  0 25 mg Oral BID PRN Swati Mejia MD      aspirin  81 mg Oral Daily CELINA Garrison      benzonatate  200 mg Oral TID PRN CELINA Garrison      budesonide-formoterol  2 puff Inhalation BID Amanda Rehman, CELINA      calcium carbonate  1,000 mg Oral Daily PRN Amanda Stafforda, CELINA      docusate sodium  100 mg Oral BID Amanda Ora, CELINA      doxycycline  100 mg Intravenous Q12H Swati Mejia  mg (09/24/20 1045)    fluticasone  1 spray Nasal Daily CELINA Garrison      gabapentin  300 mg Oral TID CELINA Garrison      guaiFENesin  1,200 mg Oral Q12H [address] CELINA Garrison      heparin (porcine)  7,500 Units Subcutaneous [facility] CELINA Qiu      insulin glargine  62 Units Subcutaneous Q12H [address] Swati Mejia MD      insulin lispro  1-5 Units Subcutaneous HS CELINA Garrison      insulin lispro  1-6 Units Subcutaneous TID AC CELINA Rodriguez      insulin lispro  16 Units Subcutaneous TID With Meals Swati Mejia MD      levalbuterol  1 25 mg Nebulization TID Nancy Tsang MD      And    ipratropium  0 5 mg Nebulization TID Nancy Tsang MD      lisinopril  2 5 mg Oral Daily CELINA Garrison      loratadine  10 mg Oral Daily CELINA Garrison      memantine  10 mg Oral BID CELINA Garrison  montelukast  10 mg Oral HS CELINA Resendiz      oxyCODONE  2 5 mg Oral Q4H PRN CELINA Resendiz      pantoprazole  40 mg Oral BID AC CELINA Rodriguez      pravastatin  80 mg Oral Daily With [employer], CELINA      predniSONE  40 mg Oral Daily CELINA Shay      traZODone  50 mg Oral HS CELINA Resendiz      white petrolatum-mineral oil   Topical TID PRN Camila Baires MD          Today, Patient Was Seen By: Joseline Childs MD    ** Please Note: This note has been constructed using a voice recognition system   **

## 2020-09-24 NOTE — PLAN OF CARE
Problem: Potential for Falls  Goal: Patient will remain free of falls  Description: INTERVENTIONS:  - Assess patient frequently for physical needs  -  Identify cognitive and physical deficits and behaviors that affect risk of falls    -  [place] fall precautions as indicated by assessment   - Educate patient/family on patient safety including physical limitations  - Instruct patient to call for assistance with activity based on assessment  - Modify environment to reduce risk of injury  - Consider OT/PT consult to assist with strengthening/mobility  Outcome: Progressing     Problem: Prexisting or High Potential for Compromised Skin Integrity  Goal: Skin integrity is maintained or improved  Description: INTERVENTIONS:  - Identify patients at risk for skin breakdown  - Assess and monitor skin integrity  - Assess and monitor nutrition and hydration status  - Monitor labs   - Assess for incontinence   - Turn and reposition patient  - Assist with mobility/ambulation  - Relieve pressure over bony prominences  - Avoid friction and shearing  - Provide appropriate hygiene as needed including keeping skin clean and dry  - Evaluate need for skin moisturizer/barrier cream  - Collaborate with interdisciplinary team   - Patient/family teaching  - Consider wound care consult   Outcome: Progressing     Problem: RESPIRATORY - ADULT  Goal: Achieves optimal ventilation and oxygenation  Description: INTERVENTIONS:  - Assess for changes in respiratory status  - Assess for changes in mentation and behavior  - Position to facilitate oxygenation and minimize respiratory effort  - Oxygen administered by appropriate delivery if ordered  - Initiate smoking cessation education as indicated  - Encourage broncho-pulmonary hygiene including cough, deep breathe, Incentive Spirometry  - Assess the need for suctioning and aspirate as needed  - Assess and instruct to report SOB or any respiratory difficulty  - Respiratory Therapy support as indicated  Outcome: Progressing     Problem: METABOLIC, FLUID AND ELECTROLYTES - ADULT  Goal: Electrolytes maintained within normal limits  Description: INTERVENTIONS:  - Monitor labs and assess patient for signs and symptoms of electrolyte imbalances  - Administer electrolyte replacement as ordered  - Monitor response to electrolyte replacements, including repeat lab results as appropriate  - Instruct patient on fluid and nutrition as appropriate  Outcome: Progressing  Goal: Fluid balance maintained  Description: INTERVENTIONS:  - Monitor labs   - Monitor I/O and WT  - Instruct patient on fluid and nutrition as appropriate  - Assess for signs & symptoms of volume excess or deficit  Outcome: Progressing  Goal: Glucose maintained within target range  Description: INTERVENTIONS:  - Monitor Blood Glucose as ordered  - Assess for signs and symptoms of hyperglycemia and hypoglycemia  - Administer ordered medications to maintain glucose within target range  - Assess nutritional intake and initiate nutrition service referral as needed  Outcome: Progressing     Problem: PAIN - ADULT  Goal: Verbalizes/displays adequate comfort level or baseline comfort level  Description: Interventions:  - Encourage patient to monitor pain and request assistance  - Assess pain using appropriate pain scale  - Administer analgesics based on type and severity of pain and evaluate response  - Implement non-pharmacological measures as appropriate and evaluate response  - Consider cultural and social influences on pain and pain management  - Notify physician/advanced practitioner if interventions unsuccessful or patient reports new pain  Outcome: Progressing     Problem: INFECTION - ADULT  Goal: Absence or prevention of progression during hospitalization  Description: INTERVENTIONS:  - Assess and monitor for signs and symptoms of infection  - Monitor lab/diagnostic results  - Monitor all insertion sites, i e  indwelling lines, tubes, and drains  - Monitor endotracheal if appropriate and nasal secretions for changes in amount and color  - [place] appropriate cooling/warming therapies per order  - Administer medications as ordered  - Instruct and encourage patient and family to use good hand hygiene technique  - Identify and instruct in appropriate isolation precautions for identified infection/condition  Outcome: Progressing  Goal: Absence of fever/infection during neutropenic period  Description: INTERVENTIONS:  - Monitor WBC    Outcome: Progressing     Problem: SAFETY ADULT  Goal: Maintain or return to baseline ADL function  Description: INTERVENTIONS:  -  Assess patient's ability to carry out ADLs; assess patient's baseline for ADL function and identify physical deficits which impact ability to perform ADLs (bathing, care of mouth/teeth, toileting, grooming, dressing, etc )  - Assess/evaluate cause of self-care deficits   - Assess range of motion  - Assess patient's mobility; develop plan if impaired  - Assess patient's need for assistive devices and provide as appropriate  - Encourage maximum independence but intervene and supervise when necessary  - Involve family in performance of ADLs  - Assess for home care needs following discharge   - Consider OT consult to assist with ADL evaluation and planning for discharge  - Provide patient education as appropriate  Outcome: Progressing  Goal: Maintain or return mobility status to optimal level  Description: INTERVENTIONS:  - Assess patient's baseline mobility status (ambulation, transfers, stairs, etc )    - Identify cognitive and physical deficits and behaviors that affect mobility  - Identify mobility aids required to assist with transfers and/or ambulation (gait belt, sit-to-stand, lift, walker, cane, etc )  - [place] fall precautions as indicated by assessment  - Record patient progress and toleration of activity level on Mobility SBAR; progress patient to next Phase/Stage  - Instruct patient to call for assistance with activity based on assessment  - Consider rehabilitation consult to assist with strengthening/weightbearing, etc   Outcome: Progressing     Problem: DISCHARGE PLANNING  Goal: Discharge to home or other facility with appropriate resources  Description: INTERVENTIONS:  - Identify barriers to discharge w/patient and caregiver  - Arrange for needed discharge resources and transportation as appropriate  - Identify discharge learning needs (meds, wound care, etc )  - Arrange for interpretive services to assist at discharge as needed  - Refer to Case Management Department for coordinating discharge planning if the patient needs post-hospital services based on physician/advanced practitioner order or complex needs related to functional status, cognitive ability, or social support system  Outcome: Progressing     Problem: Knowledge Deficit  Goal: Patient/family/caregiver demonstrates understanding of disease process, treatment plan, medications, and discharge instructions  Description: Complete learning assessment and assess knowledge base  Interventions:  - Provide teaching at level of understanding  - Provide teaching via preferred learning methods  Outcome: Progressing     Problem: Nutrition/Hydration-ADULT  Goal: Nutrient/Hydration intake appropriate for improving, restoring or maintaining nutritional needs  Description: Monitor and assess patient's nutrition/hydration status for malnutrition  Collaborate with interdisciplinary team and initiate plan and interventions as ordered  Monitor patient's weight and dietary intake as ordered or per policy  Utilize nutrition screening tool and intervene as necessary  Determine patient's food preferences and provide high-protein, high-caloric foods as appropriate       INTERVENTIONS:  - Monitor oral intake, urinary output, labs, and treatment plans  - Assess nutrition and hydration status and recommend course of action  - Evaluate amount of meals eaten  - Assist patient with eating if necessary   - Allow adequate time for meals  - Recommend/ encourage appropriate diets, oral nutritional supplements, and vitamin/mineral supplements  - Order, calculate, and assess calorie counts as needed  - Recommend, monitor, and adjust tube feedings and TPN/PPN based on assessed needs  - Assess need for intravenous fluids  - Provide specific nutrition/hydration education as appropriate  - Include patient/family/caregiver in decisions related to nutrition  Outcome: Progressing

## 2020-09-24 NOTE — ASSESSMENT & PLAN NOTE
 Chest x-ray: No acute cardiopulmonary disease  · CT Chest/Abdomen/Pelvis: Bibasilar infiltrates concerning for atelectasis vs  Pneumonia  Stable pulmonary nodules   Treating as HCAP: IV antibiotics: Levaquin and Vancomycin discontinued  Started on doxycycline   Respiratory protocol, nebs PRN   Procalcitonin 1st normal   Second elevated blood a m  Reflex normal   Lactic acid: 1st elevated at 3 4; next normal  [facility] consulted  Following up   Urine antigens negative  Influenza and RSV PCR negative/nondetected  Blood cultures continue to remain 4 days   Wheezing on bilateral lung fields continues to be present during examination  Cough present however no longer productive   Patient required oxygen during the night  Plan:  · Continue with doxycycline for 5 days  · Prednisone 40 mg  Extra dose give yesterday  · Monitor respiratory status    · Continue Xopenex, Atrovent and Symbicort  · Continue flutter valve and incentive spirometry  · NC at night

## 2020-09-24 NOTE — PLAN OF CARE
Problem: PHYSICAL THERAPY ADULT  Goal: Performs mobility at highest level of function for planned discharge setting  See evaluation for individualized goals  Description: Treatment/Interventions: Functional transfer training, LE strengthening/ROM, Therapeutic exercise, Endurance training, Patient/family training, Equipment eval/education, Bed mobility, Gait training  Equipment Recommended: Khushi Le, Wheelchair       See flowsheet documentation for full assessment, interventions and recommendations  Outcome: Progressing  Note: Prognosis: Fair  Problem List: Decreased strength, Decreased endurance, Impaired balance, Decreased mobility, Obesity, Pain  Assessment: Patient agreeable to participate in therapy session  Multiple sit<>stand transfers with consistent supervision and good technique  Standing tolerance at sink for hand hygenie x 2 minutes with no UE support and contact guard assist  Patient demonstrated ability to ambulated short gait distances with roller walker and min ax1  Pt fatigues quickly limited gait distacne and requires seated 3-4 minute rest breaks  Participated in seated EOB exercise program with input for form and pace  Continue to focus on OOB mobility with progression of ambulation and activity tolerance as appropriate  PT Discharge Recommendation: Other (Comment)(return to SNF with PT services)          See flowsheet documentation for full assessment

## 2020-09-24 NOTE — ASSESSMENT & PLAN NOTE
· POA Wheezing noted on exam   · Chest Xray: No acute cardiopulmonary disease  · CT Chest/Abd/Pelvis: Bibasilar infiltrates concerning for atelectasis vs  Pneumonia  Stable pulmonary nodules  · Continue to have wheezes B/L, however improvement is evident  Plan:  · Continue Xopenex/Atrovent with home Symbicort  · Started prednisone 40 mg p o    · Continue flutter valve and incentive spirometer

## 2020-09-24 NOTE — PHYSICAL THERAPY NOTE
PHYSICAL THERAPY NOTE    Patient Name: Hakeem Quinonez  CMPGT'R Date: 20 1222   PT Last Visit   PT Visit Date 20   Pain Assessment   Pain Assessment Tool 0-10   Pain Score 5   Pain Location/Orientation Location: Leg;Orientation: Bilateral   Restrictions/Precautions   Weight Bearing Precautions Per Order No   Other Precautions Chair Alarm; Bed Alarm;Multiple lines; Fall Risk;Pain   General   Family/Caregiver Present No   Subjective   Subjective Patient seated OOB in recliner is agreeable to participate in therapy session  Patient idenitfers obtained from name &   Bed Mobility   Supine to Sit Unable to assess   Sit to Supine Unable to assess   Additional Comments Patient seated OOB in recliner pre and post session with call bell and belongings in reach  Transfers   Sit to Stand 5  Supervision   Additional items Assist x 1; Armrests; Increased time required;Verbal cues   Stand to Sit 5  Supervision   Additional items Assist x 1; Armrests; Increased time required;Verbal cues   Ambulation/Elevation   Gait pattern Poor UE support; Improper Weight shift; Antalgic; Wide JACINTO;Step to;Excessively slow   Gait Assistance 4  Minimal assist   Additional items Assist x 1;Verbal cues   Assistive Device Rolling walker   Distance 20' x1, 50' x2, 15' x1   Balance   Static Sitting Fair +   Dynamic Sitting Fair   Static Standing Fair -   Dynamic Standing Fair -   Ambulatory Poor +   Endurance Deficit   Endurance Deficit Yes   Activity Tolerance   Activity Tolerance Patient limited by fatigue;Patient limited by pain   Nurse Made Aware Spoke to Cecelia Elizabeth RN    Exercises   Knee AROM Long Arc Quad Sitting;15 reps;AROM; Bilateral   Ankle Pumps Sitting;15 reps;AROM; Bilateral   Marching Sitting;10 reps;AROM; Bilateral   Assessment   Prognosis Fair   Problem List Decreased strength;Decreased endurance; Impaired balance;Decreased mobility;Obesity;Pain   Assessment Patient agreeable to participate in therapy session  Multiple sit<>stand transfers with consistent supervision and good technique  Standing tolerance at sink for hand hygenie x 2 minutes with no UE support and contact guard assist  Patient demonstrated ability to ambulated short gait distances with roller walker and min ax1  Pt fatigues quickly limited gait distacne and requires seated 3-4 minute rest breaks  Participated in seated EOB exercise program with input for form and pace  Continue to focus on OOB mobility with progression of ambulation and activity tolerance as appropriate  Goals   Patient Goals none stated   STG Expiration Date 09/29/20   PT Treatment Day 1   Plan   Treatment/Interventions Functional transfer training;LE strengthening/ROM; Therapeutic exercise; Endurance training;Patient/family training;Equipment eval/education; Bed mobility;Gait training;Spoke to nursing   PT Frequency Other (Comment)  (3-5x/week)   Recommendation   PT Discharge Recommendation Other (Comment)  (return to SNF with PT services)   Equipment Recommended [place] [facility]; Wheelchair       [place], [place]

## 2020-09-25 VITALS
HEART RATE: 73 BPM | TEMPERATURE: 97.8 F | WEIGHT: 203.93 LBS | RESPIRATION RATE: 16 BRPM | DIASTOLIC BLOOD PRESSURE: 53 MMHG | BODY MASS INDEX: 45.87 KG/M2 | SYSTOLIC BLOOD PRESSURE: 99 MMHG | OXYGEN SATURATION: 96 % | HEIGHT: 56 IN

## 2020-09-25 PROBLEM — N17.9 AKI (ACUTE KIDNEY INJURY) (HCC): Status: RESOLVED | Noted: 2020-09-19 | Resolved: 2020-09-25

## 2020-09-25 LAB
ANION GAP SERPL CALCULATED.3IONS-SCNC: 13 MMOL/L (ref 4–13)
BACTERIA BLD CULT: NORMAL
BACTERIA BLD CULT: NORMAL
BUN SERPL-MCNC: 35 MG/DL (ref 5–25)
CALCIUM SERPL-MCNC: 9.6 MG/DL (ref 8.3–10.1)
CHLORIDE SERPL-SCNC: 105 MMOL/L (ref 100–108)
CO2 SERPL-SCNC: 24 MMOL/L (ref 21–32)
CREAT SERPL-MCNC: 1.16 MG/DL (ref 0.6–1.3)
ERYTHROCYTE [DISTWIDTH] IN BLOOD BY AUTOMATED COUNT: 15.3 % (ref 11.6–15.1)
GFR SERPL CREATININE-BSD FRML MDRD: 48 ML/MIN/1.73SQ M
GLUCOSE SERPL-MCNC: 151 MG/DL (ref 65–140)
GLUCOSE SERPL-MCNC: 188 MG/DL (ref 65–140)
GLUCOSE SERPL-MCNC: 51 MG/DL (ref 65–140)
GLUCOSE SERPL-MCNC: 51 MG/DL (ref 65–140)
GLUCOSE SERPL-MCNC: 52 MG/DL (ref 65–140)
HCT VFR BLD AUTO: 39.5 % (ref 34.8–46.1)
HGB BLD-MCNC: 12.2 G/DL (ref 11.5–15.4)
MCH RBC QN AUTO: 27.9 PG (ref 26.8–34.3)
MCHC RBC AUTO-ENTMCNC: 30.9 G/DL (ref 31.4–37.4)
MCV RBC AUTO: 90 FL (ref 82–98)
PLATELET # BLD AUTO: 259 THOUSANDS/UL (ref 149–390)
PMV BLD AUTO: 10.1 FL (ref 8.9–12.7)
POTASSIUM SERPL-SCNC: 4.1 MMOL/L (ref 3.5–5.3)
RBC # BLD AUTO: 4.37 MILLION/UL (ref 3.81–5.12)
SODIUM SERPL-SCNC: 142 MMOL/L (ref 136–145)
WBC # BLD AUTO: 13.95 THOUSAND/UL (ref 4.31–10.16)

## 2020-09-25 PROCEDURE — 85027 COMPLETE CBC AUTOMATED: CPT | Performed by: INTERNAL MEDICINE

## 2020-09-25 PROCEDURE — 82948 REAGENT STRIP/BLOOD GLUCOSE: CPT

## 2020-09-25 PROCEDURE — 99239 HOSP IP/OBS DSCHRG MGMT >30: CPT | Performed by: INTERNAL MEDICINE

## 2020-09-25 PROCEDURE — 94760 N-INVAS EAR/PLS OXIMETRY 1: CPT

## 2020-09-25 PROCEDURE — 94640 AIRWAY INHALATION TREATMENT: CPT

## 2020-09-25 PROCEDURE — 94668 MNPJ CHEST WALL SBSQ: CPT

## 2020-09-25 PROCEDURE — 80048 BASIC METABOLIC PNL TOTAL CA: CPT | Performed by: INTERNAL MEDICINE

## 2020-09-25 RX ORDER — PREDNISONE 10 MG/1
TABLET ORAL
Qty: 18 TABLET | Refills: 0
Start: 2020-09-25 | End: 2020-10-04

## 2020-09-25 RX ORDER — TRAMADOL HYDROCHLORIDE 50 MG/1
50 TABLET ORAL 2 TIMES DAILY
Qty: 5 TABLET | Refills: 0 | Status: ON HOLD
Start: 2020-09-25 | End: 2020-10-15 | Stop reason: SDUPTHER

## 2020-09-25 RX ORDER — INSULIN GLARGINE 100 [IU]/ML
58 INJECTION, SOLUTION SUBCUTANEOUS EVERY 12 HOURS SCHEDULED
Status: DISCONTINUED | OUTPATIENT
Start: 2020-09-25 | End: 2020-09-25 | Stop reason: HOSPADM

## 2020-09-25 RX ORDER — ACETAMINOPHEN 500 MG
1000 TABLET ORAL EVERY 8 HOURS PRN
Qty: 84 TABLET | Refills: 0
Start: 2020-09-25 | End: 2020-10-09

## 2020-09-25 RX ORDER — LEVALBUTEROL 1.25 MG/.5ML
1.25 SOLUTION, CONCENTRATE RESPIRATORY (INHALATION)
Qty: 21 ML | Refills: 0
Start: 2020-09-25 | End: 2020-10-09

## 2020-09-25 RX ORDER — ACETAMINOPHEN 325 MG/1
975 TABLET ORAL EVERY 8 HOURS SCHEDULED
Qty: 30 TABLET | Refills: 0
Start: 2020-09-25

## 2020-09-25 RX ORDER — CLONAZEPAM 0.25 MG/1
0.25 TABLET, ORALLY DISINTEGRATING ORAL 2 TIMES DAILY PRN
Qty: 5 TABLET | Refills: 0
Start: 2020-09-25 | End: 2021-11-08 | Stop reason: HOSPADM

## 2020-09-25 RX ADMIN — GABAPENTIN 300 MG: 300 CAPSULE ORAL at 09:00

## 2020-09-25 RX ADMIN — DOCUSATE SODIUM 100 MG: 100 CAPSULE ORAL at 09:00

## 2020-09-25 RX ADMIN — LORATADINE 10 MG: 10 TABLET ORAL at 09:00

## 2020-09-25 RX ADMIN — OXYCODONE HYDROCHLORIDE 2.5 MG: 5 TABLET ORAL at 09:17

## 2020-09-25 RX ADMIN — LEVALBUTEROL HYDROCHLORIDE 1.25 MG: 1.25 SOLUTION, CONCENTRATE RESPIRATORY (INHALATION) at 07:25

## 2020-09-25 RX ADMIN — FLUTICASONE PROPIONATE 1 SPRAY: 50 SPRAY, METERED NASAL at 09:03

## 2020-09-25 RX ADMIN — PREDNISONE 40 MG: 20 TABLET ORAL at 09:01

## 2020-09-25 RX ADMIN — ACETAMINOPHEN 975 MG: 325 TABLET, FILM COATED ORAL at 05:30

## 2020-09-25 RX ADMIN — GUAIFENESIN 1200 MG: 600 TABLET, EXTENDED RELEASE ORAL at 09:00

## 2020-09-25 RX ADMIN — ACETAMINOPHEN 975 MG: 325 TABLET, FILM COATED ORAL at 13:40

## 2020-09-25 RX ADMIN — DOXYCYCLINE 100 MG: 100 INJECTION, POWDER, LYOPHILIZED, FOR SOLUTION INTRAVENOUS at 10:14

## 2020-09-25 RX ADMIN — BUDESONIDE AND FORMOTEROL FUMARATE DIHYDRATE 2 PUFF: 160; 4.5 AEROSOL RESPIRATORY (INHALATION) at 09:03

## 2020-09-25 RX ADMIN — ASPIRIN 81 MG: 81 TABLET, COATED ORAL at 09:00

## 2020-09-25 RX ADMIN — IPRATROPIUM BROMIDE 0.5 MG: 0.5 SOLUTION RESPIRATORY (INHALATION) at 07:25

## 2020-09-25 RX ADMIN — HEPARIN SODIUM 7500 UNITS: 5000 INJECTION INTRAVENOUS; SUBCUTANEOUS at 05:30

## 2020-09-25 RX ADMIN — INSULIN LISPRO 1 UNITS: 100 INJECTION, SOLUTION INTRAVENOUS; SUBCUTANEOUS at 12:26

## 2020-09-25 RX ADMIN — HEPARIN SODIUM 7500 UNITS: 5000 INJECTION INTRAVENOUS; SUBCUTANEOUS at 13:47

## 2020-09-25 RX ADMIN — PANTOPRAZOLE SODIUM 40 MG: 40 TABLET, DELAYED RELEASE ORAL at 05:30

## 2020-09-25 RX ADMIN — MEMANTINE 10 MG: 10 TABLET ORAL at 09:01

## 2020-09-25 NOTE — CASE MANAGEMENT
CM informed by SLIM that patient is stable for discharge back to [facility] today  CM confirmed with RN that patient is WCV appropriate and is on room air at rest; she does not require O2 for transportation  CM contacted Ed at [facility] to request WCV back to [facility]  CM received TT from Abena; WCV arranged with Giovanna Gordon at 3 pm  GAIL, RN, and meek Andujar all aware of transport plans  RN to inform patient of same  CM also sent update via [facility] to [facility]  CM reviewed IMM with Linda Andujar and he agrees with discharge back to [facility] today  No additional CM needs identified at this time

## 2020-09-25 NOTE — ASSESSMENT & PLAN NOTE
 Creatinine upon admission: 1 33  o Baseline: 0 5-0 8   Secondary to Infection vs  Hypotension   Cr continues to present at normal level

## 2020-09-25 NOTE — ASSESSMENT & PLAN NOTE
· POA Wheezing noted on exam   · Chest Xray: No acute cardiopulmonary disease  · CT Chest/Abd/Pelvis: Bibasilar infiltrates concerning for atelectasis vs  Pneumonia  Stable pulmonary nodules    · Continue to have wheezes B/L, however improvement is evident  Plan:   Will continue Symbicort with DuoNeb t i d  and daily Flonase, Zyrtec and Singulair   Discharge on Prednisone taper

## 2020-09-25 NOTE — DISCHARGE SUMMARY
Discharge- Nancy Richardson 1950, 79 y o  female MRN: [id number]    Unit/Bed#: S -30 Encounter: [id number]    Primary Care Provider: Kristi Garcia MD   Date and time admitted to hospital: 9/19/2020  6:45 PM        * Sepsis [facility])  Assessment & Plan   SIRS criteria POA: Fever, Tachycardia, and Leukocytosis    Suspected source: Pneumonia  · CT Chest/Abdome/Pelvis: Bibasilar infiltrates concerning for atelectasis vs  Pneumonia  Stable pulmonary nodules   Lactic acid: Upon Admission 2 4 Redraw 1 5   POA WBC: 17 11; currently at 11 06   IV Fluids: 1L NSS Bolus  Discontinued   IV antibiotics: Started Levaquin and Vancomycin  Currently on doxycycline   Procalcitonin x1: normal   S  pneumoniae, Legionella urine antigen ordered: Negative   Influenza A & B RSV PCR ordered:  None detected   Second procalcitonin elevated at 0 48, a m  Reflex normal    Blood cultures continue to remain negative at 5 days   Wheezes have been resolved  No cough   Patient at room air   Echo ordered  Normal study  Plan:   Last dose of abx given today   Will continue Symbicort with DuoNeb t i d  and daily Flonase, Zyrtec and Singulair   Discharge on Prednisone taper    Pneumonia  Assessment & Plan   Chest x-ray: No acute cardiopulmonary disease  · CT Chest/Abdomen/Pelvis: Bibasilar infiltrates concerning for atelectasis vs  Pneumonia  Stable pulmonary nodules   Treating as HCAP: IV antibiotics: Levaquin and Vancomycin discontinued  Started on doxycycline   Respiratory protocol, nebs PRN   Procalcitonin 1st normal   Second elevated blood a m  Reflex normal   Lactic acid: 1st elevated at 3 4; next normal  [facility] consulted  Following up   Urine antigens negative  Influenza and RSV PCR negative/nondetected  Blood cultures continue to remain 5 days   No wheezes heard during examination   Patient required oxygen during the night    Plan:  · Last dose of abx given today   Will continue Symbicort with DuoNeb t i d  and daily Flonase, Zyrtec and Singulair   Discharge on Prednisone taper    Asthma  Assessment & Plan  · POA Wheezing noted on exam   · Chest Xray: No acute cardiopulmonary disease  · CT Chest/Abd/Pelvis: Bibasilar infiltrates concerning for atelectasis vs  Pneumonia  Stable pulmonary nodules  · Continue to have wheezes B/L, however improvement is evident  Plan:   Will continue Symbicort with DuoNeb t i d  and daily Flonase, Zyrtec and Singulair   Discharge on Prednisone taper    S/P tooth extraction  Assessment & Plan  · Dental Soft Diet until 9/21  · Salt Water Rinses every Shift until 9/26  · Avoid Brushing and Use of Straws until 9/25    Hyperlipidemia  Assessment & Plan  · Continue Statin  · Consider Lipid panel as an outpatient    Pulmonary nodule  Assessment & Plan  · CT Chest/Abd/Pelvis: Left Upper Lobe 7mm nodule and 4mm right apical nodule stable since 2017  No tracheal or endobrachial lesions  Plan  · Continue Outpatient Follow Up    Alzheimer's disease [facility])  Assessment & Plan  · Continue Namenda 10 mg BID    Type 2 diabetes mellitus with hyperglycemia [facility])  Assessment & Plan  Lab Results   Component Value Date    HGBA1C 7 6 (H) 08/13/2020       Recent Labs     09/25/20  0816 09/25/20  0905 09/25/20  0947 09/25/20  1151   POCGLU 51* 52* 151* 188*       Blood Sugar Average: Last 72 hrs:  (P) 221 6875   · Accu-Checks, SSI and Lantus q12H  · Hypoglycemic Protocol  · Lantus decreased back to chelsy 58u and will continue with the rest of her home meds  Essential hypertension  Assessment & Plan   Blood pressure is reviewed and acceptable  Plan:   Monitor blood pressure     Continue lisinopril     KATHY (acute kidney injury) (HCC)resolved as of 9/25/2020  Assessment & Plan   Creatinine upon admission: 1 33  o Baseline: 0 5-0 8   Secondary to Infection vs  Hypotension   Cr continues to present at normal level    Discharging Resident Physician: Collin Cotot MD  Attending: Natan Baez MD  PCP: Lawanda Salamanca MD  Admission Date: 9/19/2020  Discharge Date: 09/25/20    Disposition:     [address] at [place]    Reason for Admission: Healthcare acquired pneumonia    Consultations During Hospital Stay:  · Internal medicine, Pulmonology, PT, OT, respiratory    Procedures Performed:     · None    Significant Findings / Test Results:     · CT chest, abd, pelvis w contrast: Bibasilar infiltrates concerning for atelectasis versus pneumonia  Stable pulmonary nodules  · CXR: Neg  · Urine antigens for S  Pneumo and Legionella: Neg  · Echo: Systolic function was normal  Ejection fraction was estimated to be 55 %  No regional wall motion abnormality was identified  Incidental Findings:   · None     Test Results Pending at Discharge (will require follow up): · None     Outpatient Tests Requested:  · Sleep study    Complications:  None    Hospital Course:     Mariella Fritz is a 79 y o  female patient who originally presented to the hospital on 9/19/2020 due to hortness of breath and non productive cough for 1 week  Past medical history of asthma, Lung nodules hyperlipidemia, hypertension, and diabetes  Patient brought from [place] because she was feeling unwell  Patient claims that she has generalized pain and weakness and was experimenting fever and chills  Patient denies chest pain, or abdominal pain  Patient was admitted for IV antibiotics for suspected pneumonia  She is on oxygen 2L at baseline  Diabetic Foot Ulcers present  Left Healing  Right arch currently being treated  T chest, abd, pelvis w contrast: Bibasilar infiltrates concerning for atelectasis versus pneumonia  Stable pulmonary nodules  CXR: Neg  Urine antigens for S  Pneumo and Legionella: Neg  Influenza & RSV PCR: None detected Blood cultures showed no growth at 24 hours  Initiated on Levaquin and Vancomycin, however meds were change to Doxycycline for 5 days   Patient was wheezing on exam  Recommend to start IV steroids BID  Continued Xopenex, Atrovent and Symbicort  A nocturnal oxymetry was obtained to assess for GILDARDO which revealed desaturation  Outpatient sleep study was recommended  An Echo showed systolic function was normal  Ejection fraction was estimated to be 55 %  No regional wall motion abnormality was identified  Patient's glucose was managed with Lantus and Lispro during her stay  She will continue the Lantus on discharge and will return to her DM home meds  On discharge will continue w Symbicort and DuoNeb t i d  and daily Zyrtex, Flonase and Singulair            Condition at Discharge: stable     Discharge Day Visit / Exam:     Subjective:  Patient was alert and awake, seated in recliner having breakfast  Patient endorses feeling much better  Denies any SOB or chest tightness  Vitals: Blood Pressure: 99/53 (09/25/20 0807)  Pulse: 73 (09/25/20 0807)  Temperature: 97 8 °F (36 6 °C) (09/25/20 0807)  Temp Source: Oral (09/25/20 0807)  Respirations: 16 (09/25/20 0807)  Height: 4' 8" (142 2 cm) (09/21/20 1104)  Weight - Scale: 92 5 kg (203 lb 14 8 oz) (09/25/20 0600)  SpO2: 96 % (09/25/20 0807)  Exam:   Physical Exam  Constitutional:       General: She is not in acute distress  Appearance: She is morbidly obese  HENT:      Head: Normocephalic and atraumatic  Nose: Nose normal       Mouth/Throat:      Mouth: Mucous membranes are moist    Eyes:      Extraocular Movements: Extraocular movements intact  Cardiovascular:      Rate and Rhythm: Normal rate and regular rhythm  Pulses: Normal pulses  Heart sounds: Normal heart sounds  No murmur  No friction rub  Pulmonary:      Effort: Pulmonary effort is normal  No respiratory distress  Breath sounds: Normal breath sounds  No stridor  No wheezing, rhonchi or rales  Chest:      Chest wall: No tenderness  Abdominal:      General: Bowel sounds are normal  There is no distension  Palpations: Abdomen is soft        Tenderness: There is no abdominal tenderness  There is no right CVA tenderness, left CVA tenderness, guarding or rebound  Hernia: A hernia is present  Musculoskeletal: Normal range of motion  General: No swelling, tenderness or deformity  Right lower leg: No edema  Left lower leg: No edema  Skin:     General: Skin is warm  Coloration: Skin is not jaundiced  Findings: No erythema, lesion or rash  Neurological:      General: No focal deficit present  Mental Status: She is alert and oriented to person, place, and time  Motor: Weakness (lower extrenities b/l) present  Psychiatric:         Mood and Affect: Mood normal          Behavior: Behavior normal          Discharge instructions/Information to patient and family:   See after visit summary for information provided to patient and family  Provisions for Follow-Up Care:  See after visit summary for information related to follow-up care and any pertinent home health orders  Planned Readmission: None     Discharge Medications:  See after visit summary for reconciled discharge medications provided to patient and family        ** Please Note: This note has been constructed using a voice recognition system **

## 2020-09-25 NOTE — ASSESSMENT & PLAN NOTE
 SIRS criteria POA: Fever, Tachycardia, and Leukocytosis    Suspected source: Pneumonia  · CT Chest/Abdome/Pelvis: Bibasilar infiltrates concerning for atelectasis vs  Pneumonia  Stable pulmonary nodules   Lactic acid: Upon Admission 2 4 Redraw 1 5   POA WBC: 17 11; currently at 11 06   IV Fluids: 1L NSS Bolus  Discontinued   IV antibiotics: Started Levaquin and Vancomycin  Currently on doxycycline   Procalcitonin x1: normal   S  pneumoniae, Legionella urine antigen ordered: Negative   Influenza A & B RSV PCR ordered:  None detected   Second procalcitonin elevated at 0 48, a m  Reflex normal    Blood cultures continue to remain negative at 5 days   Wheezes have been resolved  No cough   Patient at room air   Echo ordered   Normal study  Plan:   Last dose of abx given today   Will continue Symbicort with DuoNeb t i d  and daily Flonase, Zyrtec and Singulair   Discharge on Prednisone taper

## 2020-09-25 NOTE — PLAN OF CARE
Problem: Potential for Falls  Goal: Patient will remain free of falls  Description: INTERVENTIONS:  - Assess patient frequently for physical needs  -  Identify cognitive and physical deficits and behaviors that affect risk of falls    -  [place] fall precautions as indicated by assessment   - Educate patient/family on patient safety including physical limitations  - Instruct patient to call for assistance with activity based on assessment  - Modify environment to reduce risk of injury  - Consider OT/PT consult to assist with strengthening/mobility  Outcome: Progressing     Problem: Prexisting or High Potential for Compromised Skin Integrity  Goal: Skin integrity is maintained or improved  Description: INTERVENTIONS:  - Identify patients at risk for skin breakdown  - Assess and monitor skin integrity  - Assess and monitor nutrition and hydration status  - Monitor labs   - Assess for incontinence   - Turn and reposition patient  - Assist with mobility/ambulation  - Relieve pressure over bony prominences  - Avoid friction and shearing  - Provide appropriate hygiene as needed including keeping skin clean and dry  - Evaluate need for skin moisturizer/barrier cream  - Collaborate with interdisciplinary team   - Patient/family teaching  - Consider wound care consult   Outcome: Progressing     Problem: RESPIRATORY - ADULT  Goal: Achieves optimal ventilation and oxygenation  Description: INTERVENTIONS:  - Assess for changes in respiratory status  - Assess for changes in mentation and behavior  - Position to facilitate oxygenation and minimize respiratory effort  - Oxygen administered by appropriate delivery if ordered  - Initiate smoking cessation education as indicated  - Encourage broncho-pulmonary hygiene including cough, deep breathe, Incentive Spirometry  - Assess the need for suctioning and aspirate as needed  - Assess and instruct to report SOB or any respiratory difficulty  - Respiratory Therapy support as indicated  Outcome: Progressing     Problem: METABOLIC, FLUID AND ELECTROLYTES - ADULT  Goal: Electrolytes maintained within normal limits  Description: INTERVENTIONS:  - Monitor labs and assess patient for signs and symptoms of electrolyte imbalances  - Administer electrolyte replacement as ordered  - Monitor response to electrolyte replacements, including repeat lab results as appropriate  - Instruct patient on fluid and nutrition as appropriate  Outcome: Progressing  Goal: Fluid balance maintained  Description: INTERVENTIONS:  - Monitor labs   - Monitor I/O and WT  - Instruct patient on fluid and nutrition as appropriate  - Assess for signs & symptoms of volume excess or deficit  Outcome: Progressing  Goal: Glucose maintained within target range  Description: INTERVENTIONS:  - Monitor Blood Glucose as ordered  - Assess for signs and symptoms of hyperglycemia and hypoglycemia  - Administer ordered medications to maintain glucose within target range  - Assess nutritional intake and initiate nutrition service referral as needed  Outcome: Progressing     Problem: PAIN - ADULT  Goal: Verbalizes/displays adequate comfort level or baseline comfort level  Description: Interventions:  - Encourage patient to monitor pain and request assistance  - Assess pain using appropriate pain scale  - Administer analgesics based on type and severity of pain and evaluate response  - Implement non-pharmacological measures as appropriate and evaluate response  - Consider cultural and social influences on pain and pain management  - Notify physician/advanced practitioner if interventions unsuccessful or patient reports new pain  Outcome: Progressing     Problem: INFECTION - ADULT  Goal: Absence or prevention of progression during hospitalization  Description: INTERVENTIONS:  - Assess and monitor for signs and symptoms of infection  - Monitor lab/diagnostic results  - Monitor all insertion sites, i e  indwelling lines, tubes, and drains  - Monitor endotracheal if appropriate and nasal secretions for changes in amount and color  - [place] appropriate cooling/warming therapies per order  - Administer medications as ordered  - Instruct and encourage patient and family to use good hand hygiene technique  - Identify and instruct in appropriate isolation precautions for identified infection/condition  Outcome: Progressing  Goal: Absence of fever/infection during neutropenic period  Description: INTERVENTIONS:  - Monitor WBC    Outcome: Progressing     Problem: SAFETY ADULT  Goal: Maintain or return to baseline ADL function  Description: INTERVENTIONS:  -  Assess patient's ability to carry out ADLs; assess patient's baseline for ADL function and identify physical deficits which impact ability to perform ADLs (bathing, care of mouth/teeth, toileting, grooming, dressing, etc )  - Assess/evaluate cause of self-care deficits   - Assess range of motion  - Assess patient's mobility; develop plan if impaired  - Assess patient's need for assistive devices and provide as appropriate  - Encourage maximum independence but intervene and supervise when necessary  - Involve family in performance of ADLs  - Assess for home care needs following discharge   - Consider OT consult to assist with ADL evaluation and planning for discharge  - Provide patient education as appropriate  Outcome: Progressing  Goal: Maintain or return mobility status to optimal level  Description: INTERVENTIONS:  - Assess patient's baseline mobility status (ambulation, transfers, stairs, etc )    - Identify cognitive and physical deficits and behaviors that affect mobility  - Identify mobility aids required to assist with transfers and/or ambulation (gait belt, sit-to-stand, lift, walker, cane, etc )  - [place] fall precautions as indicated by assessment  - Record patient progress and toleration of activity level on Mobility SBAR; progress patient to next Phase/Stage  - Instruct patient to call for assistance with activity based on assessment  - Consider rehabilitation consult to assist with strengthening/weightbearing, etc   Outcome: Progressing     Problem: DISCHARGE PLANNING  Goal: Discharge to home or other facility with appropriate resources  Description: INTERVENTIONS:  - Identify barriers to discharge w/patient and caregiver  - Arrange for needed discharge resources and transportation as appropriate  - Identify discharge learning needs (meds, wound care, etc )  - Arrange for interpretive services to assist at discharge as needed  - Refer to Case Management Department for coordinating discharge planning if the patient needs post-hospital services based on physician/advanced practitioner order or complex needs related to functional status, cognitive ability, or social support system  Outcome: Progressing     Problem: Knowledge Deficit  Goal: Patient/family/caregiver demonstrates understanding of disease process, treatment plan, medications, and discharge instructions  Description: Complete learning assessment and assess knowledge base  Interventions:  - Provide teaching at level of understanding  - Provide teaching via preferred learning methods  Outcome: Progressing     Problem: Nutrition/Hydration-ADULT  Goal: Nutrient/Hydration intake appropriate for improving, restoring or maintaining nutritional needs  Description: Monitor and assess patient's nutrition/hydration status for malnutrition  Collaborate with interdisciplinary team and initiate plan and interventions as ordered  Monitor patient's weight and dietary intake as ordered or per policy  Utilize nutrition screening tool and intervene as necessary  Determine patient's food preferences and provide high-protein, high-caloric foods as appropriate       INTERVENTIONS:  - Monitor oral intake, urinary output, labs, and treatment plans  - Assess nutrition and hydration status and recommend course of action  - Evaluate amount of meals eaten  - Assist patient with eating if necessary   - Allow adequate time for meals  - Recommend/ encourage appropriate diets, oral nutritional supplements, and vitamin/mineral supplements  - Order, calculate, and assess calorie counts as needed  - Recommend, monitor, and adjust tube feedings and TPN/PPN based on assessed needs  - Assess need for intravenous fluids  - Provide specific nutrition/hydration education as appropriate  - Include patient/family/caregiver in decisions related to nutrition  Outcome: Progressing

## 2020-09-25 NOTE — ASSESSMENT & PLAN NOTE
 Chest x-ray: No acute cardiopulmonary disease  · CT Chest/Abdomen/Pelvis: Bibasilar infiltrates concerning for atelectasis vs  Pneumonia  Stable pulmonary nodules   Treating as HCAP: IV antibiotics: Levaquin and Vancomycin discontinued  Started on doxycycline   Respiratory protocol, nebs PRN   Procalcitonin 1st normal   Second elevated blood a m  Reflex normal   Lactic acid: 1st elevated at 3 4; next normal  [facility] Pulmonology consulted  Following up   Urine antigens negative  Influenza and RSV PCR negative/nondetected  Blood cultures continue to remain 5 days   No wheezes heard during examination   Patient required oxygen during the night    Plan:  · Last dose of abx given today   Will continue Symbicort with DuoNeb t i d  and daily Flonase, Zyrtec and Singulair   Discharge on Prednisone taper

## 2020-09-25 NOTE — ASSESSMENT & PLAN NOTE
Lab Results   Component Value Date    HGBA1C 7 6 (H) 08/13/2020       Recent Labs     09/25/20  0816 09/25/20  0905 09/25/20  0947 09/25/20  1151   POCGLU 51* 52* 151* 188*       Blood Sugar Average: Last 72 hrs:  (P) 221 4819   · Accu-Checks, SSI and Lantus q12H  · Hypoglycemic Protocol  · Lantus decreased back to chelsy 58u and will continue with the rest of her home meds

## 2020-09-28 ENCOUNTER — PATIENT OUTREACH (OUTPATIENT)
Dept: CASE MANAGEMENT | Facility: OTHER | Age: 70
End: 2020-09-28

## 2020-09-28 ENCOUNTER — EPISODE CHANGES (OUTPATIENT)
Dept: CASE MANAGEMENT | Facility: OTHER | Age: 70
End: 2020-09-28

## 2020-10-02 ENCOUNTER — PATIENT OUTREACH (OUTPATIENT)
Dept: CASE MANAGEMENT | Facility: OTHER | Age: 70
End: 2020-10-02

## 2020-10-09 ENCOUNTER — PATIENT OUTREACH (OUTPATIENT)
Dept: CASE MANAGEMENT | Facility: OTHER | Age: 70
End: 2020-10-09

## 2020-10-14 ENCOUNTER — PATIENT OUTREACH (OUTPATIENT)
Dept: CASE MANAGEMENT | Facility: OTHER | Age: 70
End: 2020-10-14

## 2020-10-14 ENCOUNTER — APPOINTMENT (EMERGENCY)
Dept: RADIOLOGY | Facility: HOSPITAL | Age: 70
End: 2020-10-14
Payer: MEDICARE

## 2020-10-14 ENCOUNTER — HOSPITAL ENCOUNTER (OUTPATIENT)
Facility: HOSPITAL | Age: 70
Setting detail: OBSERVATION
Discharge: NURSING, REHAB OR LONG-TERM CARE | End: 2020-10-15
Attending: EMERGENCY MEDICINE | Admitting: INTERNAL MEDICINE
Payer: MEDICARE

## 2020-10-14 DIAGNOSIS — G89.4 CHRONIC PAIN SYNDROME: ICD-10-CM

## 2020-10-14 DIAGNOSIS — E86.0 DEHYDRATION: ICD-10-CM

## 2020-10-14 DIAGNOSIS — R05.9 COUGH: ICD-10-CM

## 2020-10-14 DIAGNOSIS — R53.1 WEAKNESS: Primary | ICD-10-CM

## 2020-10-14 DIAGNOSIS — R74.02 ELEVATED SERUM LACTATE DEHYDROGENASE: ICD-10-CM

## 2020-10-14 DIAGNOSIS — N17.9 AKI (ACUTE KIDNEY INJURY) (HCC): ICD-10-CM

## 2020-10-14 DIAGNOSIS — J45.40 MODERATE PERSISTENT ASTHMA WITHOUT COMPLICATION: ICD-10-CM

## 2020-10-14 PROBLEM — N18.9 ACUTE KIDNEY INJURY SUPERIMPOSED ON CHRONIC KIDNEY DISEASE (HCC): Status: ACTIVE | Noted: 2020-10-14

## 2020-10-14 PROBLEM — E87.20 LACTIC ACIDOSIS: Status: ACTIVE | Noted: 2020-10-14

## 2020-10-14 PROBLEM — E87.2 LACTIC ACIDOSIS: Status: ACTIVE | Noted: 2020-10-14

## 2020-10-14 LAB
ALBUMIN SERPL BCP-MCNC: 3.2 G/DL (ref 3.5–5)
ALP SERPL-CCNC: 59 U/L (ref 46–116)
ALT SERPL W P-5'-P-CCNC: 35 U/L (ref 12–78)
ANION GAP SERPL CALCULATED.3IONS-SCNC: 10 MMOL/L (ref 4–13)
AST SERPL W P-5'-P-CCNC: 16 U/L (ref 5–45)
ATRIAL RATE: 104 BPM
BASOPHILS # BLD AUTO: 0.04 THOUSANDS/ΜL (ref 0–0.1)
BASOPHILS NFR BLD AUTO: 0 % (ref 0–1)
BILIRUB SERPL-MCNC: 0.56 MG/DL (ref 0.2–1)
BUN SERPL-MCNC: 42 MG/DL (ref 5–25)
CALCIUM ALBUM COR SERPL-MCNC: 9.6 MG/DL (ref 8.3–10.1)
CALCIUM SERPL-MCNC: 9 MG/DL (ref 8.3–10.1)
CHLORIDE SERPL-SCNC: 100 MMOL/L (ref 100–108)
CO2 SERPL-SCNC: 29 MMOL/L (ref 21–32)
CREAT SERPL-MCNC: 1.48 MG/DL (ref 0.6–1.3)
EOSINOPHIL # BLD AUTO: 0.14 THOUSAND/ΜL (ref 0–0.61)
EOSINOPHIL NFR BLD AUTO: 1 % (ref 0–6)
ERYTHROCYTE [DISTWIDTH] IN BLOOD BY AUTOMATED COUNT: 15.5 % (ref 11.6–15.1)
GFR SERPL CREATININE-BSD FRML MDRD: 36 ML/MIN/1.73SQ M
GLUCOSE SERPL-MCNC: 129 MG/DL (ref 65–140)
HCT VFR BLD AUTO: 36.4 % (ref 34.8–46.1)
HGB BLD-MCNC: 11.4 G/DL (ref 11.5–15.4)
IMM GRANULOCYTES # BLD AUTO: 0.07 THOUSAND/UL (ref 0–0.2)
IMM GRANULOCYTES NFR BLD AUTO: 1 % (ref 0–2)
LACTATE SERPL-SCNC: 2.1 MMOL/L (ref 0.5–2)
LACTATE SERPL-SCNC: 2.9 MMOL/L (ref 0.5–2)
LYMPHOCYTES # BLD AUTO: 1.43 THOUSANDS/ΜL (ref 0.6–4.47)
LYMPHOCYTES NFR BLD AUTO: 14 % (ref 14–44)
MCH RBC QN AUTO: 28.2 PG (ref 26.8–34.3)
MCHC RBC AUTO-ENTMCNC: 31.3 G/DL (ref 31.4–37.4)
MCV RBC AUTO: 90 FL (ref 82–98)
MONOCYTES # BLD AUTO: 0.55 THOUSAND/ΜL (ref 0.17–1.22)
MONOCYTES NFR BLD AUTO: 6 % (ref 4–12)
NEUTROPHILS # BLD AUTO: 7.79 THOUSANDS/ΜL (ref 1.85–7.62)
NEUTS SEG NFR BLD AUTO: 78 % (ref 43–75)
NRBC BLD AUTO-RTO: 0 /100 WBCS
P AXIS: 53 DEGREES
PLATELET # BLD AUTO: 137 THOUSANDS/UL (ref 149–390)
PMV BLD AUTO: 9.8 FL (ref 8.9–12.7)
POTASSIUM SERPL-SCNC: 3.9 MMOL/L (ref 3.5–5.3)
PR INTERVAL: 176 MS
PROT SERPL-MCNC: 7 G/DL (ref 6.4–8.2)
QRS AXIS: 52 DEGREES
QRSD INTERVAL: 86 MS
QT INTERVAL: 354 MS
QTC INTERVAL: 457 MS
RBC # BLD AUTO: 4.04 MILLION/UL (ref 3.81–5.12)
SARS-COV-2 RNA RESP QL NAA+PROBE: NEGATIVE
SODIUM SERPL-SCNC: 139 MMOL/L (ref 136–145)
T WAVE AXIS: 41 DEGREES
TROPONIN I SERPL-MCNC: <0.02 NG/ML
VENTRICULAR RATE: 100 BPM
WBC # BLD AUTO: 10.02 THOUSAND/UL (ref 4.31–10.16)

## 2020-10-14 PROCEDURE — 85025 COMPLETE CBC W/AUTO DIFF WBC: CPT | Performed by: EMERGENCY MEDICINE

## 2020-10-14 PROCEDURE — G0008 ADMIN INFLUENZA VIRUS VAC: HCPCS | Performed by: INTERNAL MEDICINE

## 2020-10-14 PROCEDURE — 87635 SARS-COV-2 COVID-19 AMP PRB: CPT | Performed by: NURSE PRACTITIONER

## 2020-10-14 PROCEDURE — 94664 DEMO&/EVAL PT USE INHALER: CPT

## 2020-10-14 PROCEDURE — 90662 IIV NO PRSV INCREASED AG IM: CPT | Performed by: INTERNAL MEDICINE

## 2020-10-14 PROCEDURE — 94760 N-INVAS EAR/PLS OXIMETRY 1: CPT

## 2020-10-14 PROCEDURE — 94640 AIRWAY INHALATION TREATMENT: CPT

## 2020-10-14 PROCEDURE — 99285 EMERGENCY DEPT VISIT HI MDM: CPT

## 2020-10-14 PROCEDURE — 96360 HYDRATION IV INFUSION INIT: CPT

## 2020-10-14 PROCEDURE — 1123F ACP DISCUSS/DSCN MKR DOCD: CPT | Performed by: INTERNAL MEDICINE

## 2020-10-14 PROCEDURE — 93005 ELECTROCARDIOGRAM TRACING: CPT

## 2020-10-14 PROCEDURE — 99285 EMERGENCY DEPT VISIT HI MDM: CPT | Performed by: EMERGENCY MEDICINE

## 2020-10-14 PROCEDURE — 71046 X-RAY EXAM CHEST 2 VIEWS: CPT

## 2020-10-14 PROCEDURE — 99220 PR INITIAL OBSERVATION CARE/DAY 70 MINUTES: CPT | Performed by: INTERNAL MEDICINE

## 2020-10-14 PROCEDURE — 84484 ASSAY OF TROPONIN QUANT: CPT | Performed by: EMERGENCY MEDICINE

## 2020-10-14 PROCEDURE — 93010 ELECTROCARDIOGRAM REPORT: CPT | Performed by: INTERNAL MEDICINE

## 2020-10-14 PROCEDURE — 87040 BLOOD CULTURE FOR BACTERIA: CPT | Performed by: EMERGENCY MEDICINE

## 2020-10-14 PROCEDURE — 36415 COLL VENOUS BLD VENIPUNCTURE: CPT | Performed by: EMERGENCY MEDICINE

## 2020-10-14 PROCEDURE — 80053 COMPREHEN METABOLIC PANEL: CPT | Performed by: EMERGENCY MEDICINE

## 2020-10-14 PROCEDURE — 83605 ASSAY OF LACTIC ACID: CPT | Performed by: EMERGENCY MEDICINE

## 2020-10-14 RX ORDER — CLONAZEPAM 0.5 MG/1
0.25 TABLET ORAL 2 TIMES DAILY PRN
Status: DISCONTINUED | OUTPATIENT
Start: 2020-10-14 | End: 2020-10-15 | Stop reason: HOSPADM

## 2020-10-14 RX ORDER — SENNOSIDES 8.6 MG
1 TABLET ORAL DAILY
Status: DISCONTINUED | OUTPATIENT
Start: 2020-10-15 | End: 2020-10-15 | Stop reason: HOSPADM

## 2020-10-14 RX ORDER — ASPIRIN 81 MG/1
81 TABLET, CHEWABLE ORAL DAILY
Status: DISCONTINUED | OUTPATIENT
Start: 2020-10-15 | End: 2020-10-15 | Stop reason: HOSPADM

## 2020-10-14 RX ORDER — DOCUSATE SODIUM 100 MG/1
100 CAPSULE, LIQUID FILLED ORAL 2 TIMES DAILY
Status: DISCONTINUED | OUTPATIENT
Start: 2020-10-14 | End: 2020-10-15 | Stop reason: HOSPADM

## 2020-10-14 RX ORDER — SODIUM CHLORIDE 9 MG/ML
125 INJECTION, SOLUTION INTRAVENOUS CONTINUOUS
Status: DISCONTINUED | OUTPATIENT
Start: 2020-10-14 | End: 2020-10-15 | Stop reason: HOSPADM

## 2020-10-14 RX ORDER — PRAVASTATIN SODIUM 80 MG/1
80 TABLET ORAL
Status: DISCONTINUED | OUTPATIENT
Start: 2020-10-14 | End: 2020-10-15 | Stop reason: HOSPADM

## 2020-10-14 RX ORDER — INSULIN GLARGINE 100 [IU]/ML
58 INJECTION, SOLUTION SUBCUTANEOUS EVERY 12 HOURS SCHEDULED
Status: DISCONTINUED | OUTPATIENT
Start: 2020-10-14 | End: 2020-10-15 | Stop reason: HOSPADM

## 2020-10-14 RX ORDER — HEPARIN SODIUM 5000 [USP'U]/ML
5000 INJECTION, SOLUTION INTRAVENOUS; SUBCUTANEOUS EVERY 8 HOURS SCHEDULED
Status: DISCONTINUED | OUTPATIENT
Start: 2020-10-14 | End: 2020-10-14

## 2020-10-14 RX ORDER — BENZONATATE 100 MG/1
200 CAPSULE ORAL 3 TIMES DAILY PRN
Status: DISCONTINUED | OUTPATIENT
Start: 2020-10-14 | End: 2020-10-15 | Stop reason: HOSPADM

## 2020-10-14 RX ORDER — TRAZODONE HYDROCHLORIDE 50 MG/1
50 TABLET ORAL
Status: DISCONTINUED | OUTPATIENT
Start: 2020-10-14 | End: 2020-10-15 | Stop reason: HOSPADM

## 2020-10-14 RX ORDER — PANTOPRAZOLE SODIUM 20 MG/1
20 TABLET, DELAYED RELEASE ORAL
Status: DISCONTINUED | OUTPATIENT
Start: 2020-10-15 | End: 2020-10-15 | Stop reason: HOSPADM

## 2020-10-14 RX ORDER — HEPARIN SODIUM 5000 [USP'U]/ML
7500 INJECTION, SOLUTION INTRAVENOUS; SUBCUTANEOUS EVERY 8 HOURS SCHEDULED
Status: DISCONTINUED | OUTPATIENT
Start: 2020-10-14 | End: 2020-10-15 | Stop reason: HOSPADM

## 2020-10-14 RX ORDER — MEMANTINE HYDROCHLORIDE 10 MG/1
10 TABLET ORAL 2 TIMES DAILY
Status: DISCONTINUED | OUTPATIENT
Start: 2020-10-14 | End: 2020-10-15 | Stop reason: HOSPADM

## 2020-10-14 RX ORDER — GABAPENTIN 300 MG/1
300 CAPSULE ORAL 3 TIMES DAILY
Status: DISCONTINUED | OUTPATIENT
Start: 2020-10-14 | End: 2020-10-15 | Stop reason: HOSPADM

## 2020-10-14 RX ORDER — FLUTICASONE PROPIONATE 50 MCG
1 SPRAY, SUSPENSION (ML) NASAL DAILY
Status: DISCONTINUED | OUTPATIENT
Start: 2020-10-15 | End: 2020-10-15 | Stop reason: HOSPADM

## 2020-10-14 RX ORDER — ONDANSETRON 2 MG/ML
4 INJECTION INTRAMUSCULAR; INTRAVENOUS EVERY 6 HOURS PRN
Status: DISCONTINUED | OUTPATIENT
Start: 2020-10-14 | End: 2020-10-15 | Stop reason: HOSPADM

## 2020-10-14 RX ORDER — MONTELUKAST SODIUM 10 MG/1
10 TABLET ORAL
Status: DISCONTINUED | OUTPATIENT
Start: 2020-10-14 | End: 2020-10-15 | Stop reason: HOSPADM

## 2020-10-14 RX ORDER — BUDESONIDE AND FORMOTEROL FUMARATE DIHYDRATE 160; 4.5 UG/1; UG/1
2 AEROSOL RESPIRATORY (INHALATION) 2 TIMES DAILY
Status: DISCONTINUED | OUTPATIENT
Start: 2020-10-14 | End: 2020-10-15 | Stop reason: HOSPADM

## 2020-10-14 RX ORDER — LORATADINE 10 MG/1
10 TABLET ORAL DAILY
Status: DISCONTINUED | OUTPATIENT
Start: 2020-10-15 | End: 2020-10-15 | Stop reason: HOSPADM

## 2020-10-14 RX ORDER — GUAIFENESIN 600 MG
1200 TABLET, EXTENDED RELEASE 12 HR ORAL EVERY 12 HOURS SCHEDULED
Status: DISCONTINUED | OUTPATIENT
Start: 2020-10-14 | End: 2020-10-15 | Stop reason: HOSPADM

## 2020-10-14 RX ORDER — BISACODYL 10 MG
10 SUPPOSITORY, RECTAL RECTAL DAILY PRN
Status: DISCONTINUED | OUTPATIENT
Start: 2020-10-14 | End: 2020-10-15 | Stop reason: HOSPADM

## 2020-10-14 RX ORDER — TRAMADOL HYDROCHLORIDE 50 MG/1
50 TABLET ORAL 2 TIMES DAILY
Status: DISCONTINUED | OUTPATIENT
Start: 2020-10-14 | End: 2020-10-15 | Stop reason: HOSPADM

## 2020-10-14 RX ORDER — ACETAMINOPHEN 325 MG/1
650 TABLET ORAL EVERY 6 HOURS PRN
Status: DISCONTINUED | OUTPATIENT
Start: 2020-10-14 | End: 2020-10-15 | Stop reason: HOSPADM

## 2020-10-14 RX ADMIN — DOCUSATE SODIUM 100 MG: 100 CAPSULE ORAL at 17:48

## 2020-10-14 RX ADMIN — INSULIN GLARGINE 58 UNITS: 100 INJECTION, SOLUTION SUBCUTANEOUS at 21:43

## 2020-10-14 RX ADMIN — TRAMADOL HYDROCHLORIDE 50 MG: 50 TABLET, FILM COATED ORAL at 21:44

## 2020-10-14 RX ADMIN — HEPARIN SODIUM 7500 UNITS: 5000 INJECTION INTRAVENOUS; SUBCUTANEOUS at 21:44

## 2020-10-14 RX ADMIN — IPRATROPIUM BROMIDE 0.5 MG: 0.5 SOLUTION RESPIRATORY (INHALATION) at 19:57

## 2020-10-14 RX ADMIN — SODIUM CHLORIDE 125 ML/HR: 0.9 INJECTION, SOLUTION INTRAVENOUS at 14:35

## 2020-10-14 RX ADMIN — INFLUENZA A VIRUS A/MICHIGAN/45/2015 X-275 (H1N1) ANTIGEN (FORMALDEHYDE INACTIVATED), INFLUENZA A VIRUS A/SINGAPORE/INFIMH-16-0019/2016 IVR-186 (H3N2) ANTIGEN (FORMALDEHYDE INACTIVATED), INFLUENZA B VIRUS B/PHUKET/3073/2013 ANTIGEN (FORMALDEHYDE INACTIVATED), AND INFLUENZA B VIRUS B/MARYLAND/15/2016 BX-69A ANTIGEN (FORMALDEHYDE INACTIVATED) 0.7 ML: 60; 60; 60; 60 INJECTION, SUSPENSION INTRAMUSCULAR at 17:48

## 2020-10-14 RX ADMIN — MEMANTINE 10 MG: 10 TABLET ORAL at 17:48

## 2020-10-14 RX ADMIN — GABAPENTIN 300 MG: 300 CAPSULE ORAL at 21:44

## 2020-10-14 RX ADMIN — SODIUM CHLORIDE 1000 ML: 0.9 INJECTION, SOLUTION INTRAVENOUS at 16:09

## 2020-10-14 RX ADMIN — PRAVASTATIN SODIUM 80 MG: 80 TABLET ORAL at 17:47

## 2020-10-14 RX ADMIN — SODIUM CHLORIDE 125 ML/HR: 0.9 INJECTION, SOLUTION INTRAVENOUS at 21:51

## 2020-10-14 RX ADMIN — BUDESONIDE AND FORMOTEROL FUMARATE DIHYDRATE 2 PUFF: 160; 4.5 AEROSOL RESPIRATORY (INHALATION) at 21:51

## 2020-10-14 RX ADMIN — MONTELUKAST SODIUM 10 MG: 10 TABLET, FILM COATED ORAL at 21:44

## 2020-10-14 RX ADMIN — TRAZODONE HYDROCHLORIDE 50 MG: 50 TABLET ORAL at 21:44

## 2020-10-14 RX ADMIN — GABAPENTIN 300 MG: 300 CAPSULE ORAL at 17:48

## 2020-10-14 RX ADMIN — GUAIFENESIN 1200 MG: 600 TABLET, EXTENDED RELEASE ORAL at 21:44

## 2020-10-15 VITALS
BODY MASS INDEX: 49.2 KG/M2 | HEIGHT: 56 IN | RESPIRATION RATE: 19 BRPM | HEART RATE: 96 BPM | OXYGEN SATURATION: 96 % | WEIGHT: 218.7 LBS | TEMPERATURE: 97.9 F | DIASTOLIC BLOOD PRESSURE: 67 MMHG | SYSTOLIC BLOOD PRESSURE: 141 MMHG

## 2020-10-15 LAB
ALBUMIN SERPL BCP-MCNC: 3 G/DL (ref 3.5–5)
ALP SERPL-CCNC: 59 U/L (ref 46–116)
ALT SERPL W P-5'-P-CCNC: 32 U/L (ref 12–78)
ANION GAP SERPL CALCULATED.3IONS-SCNC: 7 MMOL/L (ref 4–13)
AST SERPL W P-5'-P-CCNC: 18 U/L (ref 5–45)
ATRIAL RATE: 92 BPM
BILIRUB SERPL-MCNC: 0.72 MG/DL (ref 0.2–1)
BUN SERPL-MCNC: 30 MG/DL (ref 5–25)
CALCIUM ALBUM COR SERPL-MCNC: 9.3 MG/DL (ref 8.3–10.1)
CALCIUM SERPL-MCNC: 8.5 MG/DL (ref 8.3–10.1)
CHLORIDE SERPL-SCNC: 106 MMOL/L (ref 100–108)
CO2 SERPL-SCNC: 28 MMOL/L (ref 21–32)
CREAT SERPL-MCNC: 1.06 MG/DL (ref 0.6–1.3)
ERYTHROCYTE [DISTWIDTH] IN BLOOD BY AUTOMATED COUNT: 15.9 % (ref 11.6–15.1)
GFR SERPL CREATININE-BSD FRML MDRD: 53 ML/MIN/1.73SQ M
GLUCOSE SERPL-MCNC: 101 MG/DL (ref 65–140)
GLUCOSE SERPL-MCNC: 151 MG/DL (ref 65–140)
GLUCOSE SERPL-MCNC: 255 MG/DL (ref 65–140)
HCT VFR BLD AUTO: 35.2 % (ref 34.8–46.1)
HGB BLD-MCNC: 10.8 G/DL (ref 11.5–15.4)
LACTATE SERPL-SCNC: 1.8 MMOL/L (ref 0.5–2)
MAGNESIUM SERPL-MCNC: 1.9 MG/DL (ref 1.6–2.6)
MCH RBC QN AUTO: 27.9 PG (ref 26.8–34.3)
MCHC RBC AUTO-ENTMCNC: 30.7 G/DL (ref 31.4–37.4)
MCV RBC AUTO: 91 FL (ref 82–98)
PHOSPHATE SERPL-MCNC: 3.2 MG/DL (ref 2.3–4.1)
PLATELET # BLD AUTO: 133 THOUSANDS/UL (ref 149–390)
PMV BLD AUTO: 9.8 FL (ref 8.9–12.7)
POTASSIUM SERPL-SCNC: 4 MMOL/L (ref 3.5–5.3)
PR INTERVAL: 192 MS
PROCALCITONIN SERPL-MCNC: 0.09 NG/ML
PROT SERPL-MCNC: 6.4 G/DL (ref 6.4–8.2)
QRS AXIS: 152 DEGREES
QRSD INTERVAL: 86 MS
QT INTERVAL: 366 MS
QTC INTERVAL: 452 MS
RBC # BLD AUTO: 3.87 MILLION/UL (ref 3.81–5.12)
SODIUM SERPL-SCNC: 141 MMOL/L (ref 136–145)
T WAVE AXIS: 153 DEGREES
VENTRICULAR RATE: 92 BPM
WBC # BLD AUTO: 6.51 THOUSAND/UL (ref 4.31–10.16)

## 2020-10-15 PROCEDURE — 85027 COMPLETE CBC AUTOMATED: CPT | Performed by: NURSE PRACTITIONER

## 2020-10-15 PROCEDURE — 83735 ASSAY OF MAGNESIUM: CPT | Performed by: NURSE PRACTITIONER

## 2020-10-15 PROCEDURE — 94640 AIRWAY INHALATION TREATMENT: CPT

## 2020-10-15 PROCEDURE — 82948 REAGENT STRIP/BLOOD GLUCOSE: CPT

## 2020-10-15 PROCEDURE — 80053 COMPREHEN METABOLIC PANEL: CPT | Performed by: NURSE PRACTITIONER

## 2020-10-15 PROCEDURE — 84145 PROCALCITONIN (PCT): CPT | Performed by: NURSE PRACTITIONER

## 2020-10-15 PROCEDURE — 99217 PR OBSERVATION CARE DISCHARGE MANAGEMENT: CPT | Performed by: INTERNAL MEDICINE

## 2020-10-15 PROCEDURE — 94760 N-INVAS EAR/PLS OXIMETRY 1: CPT

## 2020-10-15 PROCEDURE — 93005 ELECTROCARDIOGRAM TRACING: CPT

## 2020-10-15 PROCEDURE — 84100 ASSAY OF PHOSPHORUS: CPT | Performed by: NURSE PRACTITIONER

## 2020-10-15 PROCEDURE — 83605 ASSAY OF LACTIC ACID: CPT | Performed by: INTERNAL MEDICINE

## 2020-10-15 PROCEDURE — 93010 ELECTROCARDIOGRAM REPORT: CPT | Performed by: INTERNAL MEDICINE

## 2020-10-15 RX ORDER — MAGNESIUM HYDROXIDE/ALUMINUM HYDROXICE/SIMETHICONE 120; 1200; 1200 MG/30ML; MG/30ML; MG/30ML
30 SUSPENSION ORAL EVERY 4 HOURS PRN
Status: DISCONTINUED | OUTPATIENT
Start: 2020-10-15 | End: 2020-10-15 | Stop reason: HOSPADM

## 2020-10-15 RX ORDER — TRAMADOL HYDROCHLORIDE 50 MG/1
50 TABLET ORAL 2 TIMES DAILY
Qty: 10 TABLET | Refills: 0 | Status: SHIPPED | OUTPATIENT
Start: 2020-10-15 | End: 2022-03-16

## 2020-10-15 RX ORDER — GUAIFENESIN 600 MG
1200 TABLET, EXTENDED RELEASE 12 HR ORAL EVERY 12 HOURS SCHEDULED
Qty: 10 TABLET | Refills: 0 | Status: SHIPPED | OUTPATIENT
Start: 2020-10-15 | End: 2021-11-06

## 2020-10-15 RX ADMIN — IPRATROPIUM BROMIDE 0.5 MG: 0.5 SOLUTION RESPIRATORY (INHALATION) at 13:53

## 2020-10-15 RX ADMIN — IPRATROPIUM BROMIDE 0.5 MG: 0.5 SOLUTION RESPIRATORY (INHALATION) at 08:46

## 2020-10-15 RX ADMIN — SODIUM CHLORIDE 125 ML/HR: 0.9 INJECTION, SOLUTION INTRAVENOUS at 05:43

## 2020-10-15 RX ADMIN — ALUMINUM HYDROXIDE, MAGNESIUM HYDROXIDE, AND SIMETHICONE 30 ML: 200; 200; 20 SUSPENSION ORAL at 15:21

## 2020-10-15 RX ADMIN — BUDESONIDE AND FORMOTEROL FUMARATE DIHYDRATE 2 PUFF: 160; 4.5 AEROSOL RESPIRATORY (INHALATION) at 08:14

## 2020-10-15 RX ADMIN — FLUTICASONE PROPIONATE 1 SPRAY: 50 SPRAY, METERED NASAL at 08:12

## 2020-10-15 RX ADMIN — MEMANTINE 10 MG: 10 TABLET ORAL at 08:12

## 2020-10-15 RX ADMIN — GUAIFENESIN 1200 MG: 600 TABLET, EXTENDED RELEASE ORAL at 08:12

## 2020-10-15 RX ADMIN — PANTOPRAZOLE SODIUM 20 MG: 20 TABLET, DELAYED RELEASE ORAL at 05:53

## 2020-10-15 RX ADMIN — LORATADINE 10 MG: 10 TABLET ORAL at 08:12

## 2020-10-15 RX ADMIN — ASPIRIN 81 MG CHEWABLE TABLET 81 MG: 81 TABLET CHEWABLE at 08:12

## 2020-10-15 RX ADMIN — INSULIN GLARGINE 58 UNITS: 100 INJECTION, SOLUTION SUBCUTANEOUS at 08:12

## 2020-10-15 RX ADMIN — DOCUSATE SODIUM 100 MG: 100 CAPSULE ORAL at 08:12

## 2020-10-15 RX ADMIN — TRAMADOL HYDROCHLORIDE 50 MG: 50 TABLET, FILM COATED ORAL at 08:11

## 2020-10-15 RX ADMIN — SENNOSIDES 8.6 MG: 8.6 TABLET, FILM COATED ORAL at 08:12

## 2020-10-15 RX ADMIN — GABAPENTIN 300 MG: 300 CAPSULE ORAL at 08:11

## 2020-10-15 RX ADMIN — HEPARIN SODIUM 7500 UNITS: 5000 INJECTION INTRAVENOUS; SUBCUTANEOUS at 05:53

## 2020-10-16 ENCOUNTER — PATIENT OUTREACH (OUTPATIENT)
Dept: CASE MANAGEMENT | Facility: OTHER | Age: 70
End: 2020-10-16

## 2020-10-19 ENCOUNTER — TELEPHONE (OUTPATIENT)
Dept: SLEEP CENTER | Facility: CLINIC | Age: 70
End: 2020-10-19

## 2020-10-19 LAB
BACTERIA BLD CULT: NORMAL
BACTERIA BLD CULT: NORMAL

## 2020-10-23 ENCOUNTER — PATIENT OUTREACH (OUTPATIENT)
Dept: CASE MANAGEMENT | Facility: OTHER | Age: 70
End: 2020-10-23

## 2020-10-26 ENCOUNTER — HOSPITAL ENCOUNTER (OUTPATIENT)
Dept: CT IMAGING | Facility: HOSPITAL | Age: 70
Discharge: HOME | End: 2020-10-26
Attending: INTERNAL MEDICINE
Payer: MEDICARE

## 2020-10-26 ENCOUNTER — TRANSCRIBE ORDERS (OUTPATIENT)
Dept: ADMINISTRATIVE | Facility: HOSPITAL | Age: 70
End: 2020-10-26

## 2020-10-26 DIAGNOSIS — R91.1 PULMONARY NODULE: ICD-10-CM

## 2020-10-26 PROCEDURE — 71250 CT THORAX DX C-: CPT

## 2020-10-29 ENCOUNTER — PATIENT OUTREACH (OUTPATIENT)
Dept: CASE MANAGEMENT | Facility: OTHER | Age: 70
End: 2020-10-29

## 2020-10-30 ENCOUNTER — TELEPHONE (OUTPATIENT)
Dept: PULMONOLOGY | Facility: CLINIC | Age: 70
End: 2020-10-30

## 2020-11-02 ENCOUNTER — PATIENT OUTREACH (OUTPATIENT)
Dept: CASE MANAGEMENT | Facility: OTHER | Age: 70
End: 2020-11-02

## 2020-11-02 ENCOUNTER — OFFICE VISIT (OUTPATIENT)
Dept: PULMONOLOGY | Facility: CLINIC | Age: 70
End: 2020-11-02
Payer: MEDICARE

## 2020-11-02 ENCOUNTER — NURSING HOME VISIT (OUTPATIENT)
Dept: GERIATRICS | Facility: OTHER | Age: 70
End: 2020-11-02
Payer: MEDICARE

## 2020-11-02 VITALS
HEIGHT: 56 IN | BODY MASS INDEX: 47.01 KG/M2 | HEART RATE: 97 BPM | WEIGHT: 209 LBS | SYSTOLIC BLOOD PRESSURE: 122 MMHG | TEMPERATURE: 95.8 F | OXYGEN SATURATION: 92 % | DIASTOLIC BLOOD PRESSURE: 68 MMHG

## 2020-11-02 VITALS
BODY MASS INDEX: 39.27 KG/M2 | WEIGHT: 208 LBS | DIASTOLIC BLOOD PRESSURE: 55 MMHG | HEART RATE: 68 BPM | TEMPERATURE: 98.2 F | HEIGHT: 61 IN | SYSTOLIC BLOOD PRESSURE: 118 MMHG

## 2020-11-02 DIAGNOSIS — G47.34 NOCTURNAL HYPOXIA: ICD-10-CM

## 2020-11-02 DIAGNOSIS — G30.9 ALZHEIMER'S DEMENTIA WITHOUT BEHAVIORAL DISTURBANCE, UNSPECIFIED TIMING OF DEMENTIA ONSET (HCC): ICD-10-CM

## 2020-11-02 DIAGNOSIS — N28.9 KIDNEY LESION: ICD-10-CM

## 2020-11-02 DIAGNOSIS — F02.80 ALZHEIMER'S DEMENTIA WITHOUT BEHAVIORAL DISTURBANCE, UNSPECIFIED TIMING OF DEMENTIA ONSET (HCC): ICD-10-CM

## 2020-11-02 DIAGNOSIS — J45.40 MODERATE PERSISTENT ASTHMA WITHOUT COMPLICATION: Primary | ICD-10-CM

## 2020-11-02 DIAGNOSIS — R91.1 PULMONARY NODULE: ICD-10-CM

## 2020-11-02 DIAGNOSIS — D49.2 NEOPLASM OF SKIN OF FACE: Primary | ICD-10-CM

## 2020-11-02 PROBLEM — J18.9 PNEUMONIA: Status: RESOLVED | Noted: 2020-09-19 | Resolved: 2020-11-02

## 2020-11-02 PROBLEM — J40 TRACHEOBRONCHITIS: Status: RESOLVED | Noted: 2019-11-27 | Resolved: 2020-11-02

## 2020-11-02 PROBLEM — E87.20 LACTIC ACIDOSIS: Status: RESOLVED | Noted: 2020-10-14 | Resolved: 2020-11-02

## 2020-11-02 PROBLEM — E87.2 LACTIC ACIDOSIS: Status: RESOLVED | Noted: 2020-10-14 | Resolved: 2020-11-02

## 2020-11-02 PROCEDURE — 99304 1ST NF CARE SF/LOW MDM 25: CPT | Performed by: SURGERY

## 2020-11-02 PROCEDURE — 99214 OFFICE O/P EST MOD 30 MIN: CPT | Performed by: INTERNAL MEDICINE

## 2020-11-02 RX ORDER — CELECOXIB 100 MG/1
100 CAPSULE ORAL DAILY
COMMUNITY
Start: 2020-09-30 | End: 2022-06-28

## 2020-11-02 RX ORDER — FUROSEMIDE 40 MG/1
TABLET ORAL
COMMUNITY
Start: 2020-09-16 | End: 2022-05-17 | Stop reason: ALTCHOICE

## 2020-11-02 RX ORDER — GLIMEPIRIDE 4 MG/1
TABLET ORAL
COMMUNITY
Start: 2020-09-30 | End: 2022-01-11 | Stop reason: ALTCHOICE

## 2020-11-02 RX ORDER — ALBUTEROL SULFATE 0.63 MG/3ML
SOLUTION RESPIRATORY (INHALATION)
COMMUNITY
Start: 2020-09-07 | End: 2021-10-19

## 2020-11-03 ENCOUNTER — PATIENT OUTREACH (OUTPATIENT)
Dept: CASE MANAGEMENT | Facility: OTHER | Age: 70
End: 2020-11-03

## 2020-11-04 ENCOUNTER — PATIENT OUTREACH (OUTPATIENT)
Dept: CASE MANAGEMENT | Facility: OTHER | Age: 70
End: 2020-11-04

## 2020-11-12 ENCOUNTER — HOSPITAL ENCOUNTER (OUTPATIENT)
Dept: ULTRASOUND IMAGING | Facility: HOSPITAL | Age: 70
Discharge: HOME | End: 2020-11-12
Attending: INTERNAL MEDICINE
Payer: MEDICARE

## 2020-11-12 DIAGNOSIS — N28.9 KIDNEY LESION: ICD-10-CM

## 2020-11-12 PROCEDURE — 76770 US EXAM ABDO BACK WALL COMP: CPT

## 2020-11-16 ENCOUNTER — PATIENT OUTREACH (OUTPATIENT)
Dept: CASE MANAGEMENT | Facility: OTHER | Age: 70
End: 2020-11-16

## 2020-11-18 ENCOUNTER — PATIENT OUTREACH (OUTPATIENT)
Dept: CASE MANAGEMENT | Facility: OTHER | Age: 70
End: 2020-11-18

## 2020-12-10 ENCOUNTER — TELEPHONE (OUTPATIENT)
Dept: NEUROLOGY | Facility: CLINIC | Age: 70
End: 2020-12-10

## 2020-12-10 ENCOUNTER — TELEMEDICINE (OUTPATIENT)
Dept: NEUROLOGY | Facility: CLINIC | Age: 70
End: 2020-12-10
Payer: MEDICARE

## 2020-12-10 ENCOUNTER — PATIENT OUTREACH (OUTPATIENT)
Dept: CASE MANAGEMENT | Facility: OTHER | Age: 70
End: 2020-12-10

## 2020-12-10 VITALS
TEMPERATURE: 98 F | WEIGHT: 219 LBS | BODY MASS INDEX: 49.27 KG/M2 | HEIGHT: 56 IN | RESPIRATION RATE: 20 BRPM | HEART RATE: 90 BPM

## 2020-12-10 DIAGNOSIS — F02.81 ALZHEIMER'S DEMENTIA WITH BEHAVIORAL DISTURBANCE, UNSPECIFIED TIMING OF DEMENTIA ONSET (HCC): Primary | ICD-10-CM

## 2020-12-10 DIAGNOSIS — G30.9 ALZHEIMER'S DEMENTIA WITH BEHAVIORAL DISTURBANCE, UNSPECIFIED TIMING OF DEMENTIA ONSET (HCC): Primary | ICD-10-CM

## 2020-12-10 PROCEDURE — 99213 OFFICE O/P EST LOW 20 MIN: CPT | Performed by: PHYSICIAN ASSISTANT

## 2020-12-23 ENCOUNTER — EPISODE CHANGES (OUTPATIENT)
Dept: CASE MANAGEMENT | Facility: OTHER | Age: 70
End: 2020-12-23

## 2021-04-28 NOTE — PROGRESS NOTES
Pulmonary Follow Up Note   Laurel Elam 79 y o  female MRN: [id number]  4/30/2021      Assessment:  1  Asthma - moderate persistent without acute exacerbation  · Continue Advair 500/50 1puff twice daily  · Continue Duonebs TID  · Continue singulair 10mg daily  · Only use NIKI HFA on prn basis  · Flu Vaccine 2020, Prevnar 2015, pneumovax 1999/2019, COVID-19 x2  · Follow up in 6 months or sooner as needed  · Noted POLST and DNR/I     2  Pulmonary Nodule - stable, no further tracking is needed     3  Renal Lesion  · Simple cyst per US     4  Dementia    5  Nocturnal hypoxia - continue supplemental Oxygen and follow up PSG results  Plan:    Diagnoses and all orders for this visit:    Moderate persistent asthma without complication    Nocturnal hypoxia    Alzheimer's disease ([facility] [address] )        Return in about 6 months (around 10/30/2021) for Recheck  History of Present Illness   HPI:  Laurel Elam is a 79 y o  female who was initially seen during an admission for hypoxia and encephalopathy in August 2017  She is followed for her asthma and does not have COPD  She has progressive dementia and resides at [facility]  Since last visit, she had admission for asthma exacerbation in September 2020 and found to have nocturnal hypoxia requiring O2 and outpatient PSG was ordered but not obtained  She was last seen in Nov 2020      She reports feeling stable, using advair and duonebs, noted BID NIKI use per nursing staff  She denied dyspnea, no chest pain, no need for antibiotics or OCS since last visit, no nocturnal or rest dyspnea, uses wheelchair primarily  Denied GERD symptoms, no abdominal pain  Review of Systems   Constitutional: Negative for activity change, chills, fatigue and fever  HENT: Negative for mouth sores, sore throat and trouble swallowing  Respiratory: Negative for cough, chest tightness, shortness of breath and wheezing  Cardiovascular: Positive for leg swelling   Negative for chest pain and palpitations  Gastrointestinal: Negative for abdominal pain, nausea and vomiting  Musculoskeletal: Positive for gait problem  Neurological: Negative for light-headedness and headaches  Psychiatric/Behavioral: Negative for sleep disturbance  The patient is not nervous/anxious          Historical Information   Past Medical History:   Diagnosis Date    Asthma     Diabetes mellitus ([address] )     Hyperlipidemia     Hypertension      Past Surgical History:   Procedure Laterality Date    JOINT REPLACEMENT Bilateral     KNEE SURGERY      TOE SURGERY       Family History   Problem Relation Age of Onset    Dementia Brother     Breast cancer Sister 76    Cancer Brother          Meds/Allergies     Current Outpatient Medications:     acetaminophen (TYLENOL) 325 mg tablet, Take 3 tablets (975 mg total) by mouth every 8 (eight) hours, Disp: 30 tablet, Rfl: 0    albuterol (ACCUNEB) 0 63 MG/3ML nebulizer solution, , Disp: , Rfl:     aspirin 81 MG tablet, Take by mouth, Disp: , Rfl:     benzonatate (TESSALON) 200 MG capsule, Take 200 mg by mouth 3 (three) times a day as needed for cough, Disp: , Rfl:     bisacodyl (DULCOLAX) 10 mg suppository, Insert 10 mg into the rectum daily as needed , Disp: , Rfl:     celecoxib (CeleBREX) 100 mg capsule, Take 100 mg by mouth daily , Disp: , Rfl:     cetirizine (ZyrTEC) 10 mg tablet, Take 10 mg by mouth daily, Disp: , Rfl:     Cholecalciferol (VITAMIN D3) 33537 units CAPS, Take 50,000 Units by mouth every 30 (thirty) days, Disp: , Rfl:     dextromethorphan-guaiFENesin (DIABETIC SILTUSSIN-DM)  mg/5 mL oral liquid, Take 5 mL by mouth every 12 (twelve) hours, Disp: , Rfl:     fluticasone (FLONASE) 50 mcg/act nasal spray, 1 spray into each nostril daily, Disp: 1 Bottle, Rfl: 5    furosemide (LASIX) 40 mg tablet, , Disp: , Rfl:     gabapentin (NEURONTIN) 300 mg capsule, Take 300 mg by mouth 3 (three) times a day, Disp: , Rfl:     glimepiride (AMARYL) 4 mg tablet, , Disp: , Rfl:     Glucose Blood (BL TEST STRIP PACK VI), by In Vitro route, Disp: , Rfl:     glucose blood (ONE TOUCH ULTRA TEST) test strip, by In Vitro route, Disp: , Rfl:     guaiFENesin (MUCINEX) 600 mg 12 hr tablet, Take 1,200 mg by mouth every 12 (twelve) hours, Disp: , Rfl:     guaiFENesin (MUCINEX) 600 mg 12 hr tablet, Take 2 tablets (1,200 mg total) by mouth every 12 (twelve) hours, Disp: 10 tablet, Rfl: 0    insulin aspart (NovoLOG) 100 Units/mL injection pen, Inject 18 Units under the skin 3 (three) times a day with meals, Disp: , Rfl:     insulin glargine (LANTUS SOLOSTAR) 100 units/mL injection pen, Inject 72 Units under the skin every 12 (twelve) hours , Disp: , Rfl:     lisinopril (ZESTRIL) 2 5 mg tablet, Take 2 5 mg by mouth daily, Disp: , Rfl:     magnesium hydroxide (EQ MILK OF MAGNESIA) 400 mg/5 mL oral suspension, Take by mouth daily as needed for constipation, Disp: , Rfl:     memantine (NAMENDA) 10 mg tablet, 10 mg 2 (two) times a day , Disp: , Rfl:     metFORMIN (GLUCOPHAGE) 1000 MG tablet, Take 1,000 mg by mouth 2 (two) times a day with meals, Disp: , Rfl:     montelukast (SINGULAIR) 10 mg tablet, Take 10 mg by mouth daily at bedtime, Disp: , Rfl:     Omeprazole 20 MG TBEC, Take 20 mg by mouth daily, Disp: , Rfl:     ondansetron (ZOFRAN) 4 mg tablet, Take 4 mg by mouth every 8 (eight) hours as needed for nausea or vomiting, Disp: , Rfl:     polyethylene glycol (MIRALAX) 17 g packet, 17 g daily, Disp: , Rfl:     ranitidine (ZANTAC) 75 MG tablet, Take 75 mg by mouth 2 (two) times a day, Disp: , Rfl:     simvastatin (ZOCOR) 40 mg tablet, Take 1 tablet by mouth daily at bedtime, Disp: , Rfl: 0    traMADol (ULTRAM) 50 mg tablet, Take 1 tablet (50 mg total) by mouth 2 (two) times a day, Disp: 10 tablet, Rfl: 0    traZODone (DESYREL) 50 mg tablet, Take 50 mg by mouth daily at bedtime, Disp: , Rfl:     clonazePAM (KlonoPIN) 0 25 MG disintegrating tablet, Take 1 tablet (0 25 mg total) by mouth 2 (two) times a day as needed for anxiety (Patient not taking: Reported on 12/10/2020), Disp: 5 tablet, Rfl: 0    ipratropium (ATROVENT) 0 02 % nebulizer solution, Take 1 vial (0 5 mg total) by nebulization 3 (three) times a day for 14 days, Disp: 105 mL, Rfl: 0  Allergies   Allergen Reactions    Penicillins Shortness Of Breath    Cephalexin Other (See Comments)     Reaction Date: [id number]; unknown     Crestor [Rosuvastatin] Other (See Comments)     Reaction Date: [id number]; unknown     Zithromax [Azithromycin] Other (See Comments)     Unknown        Vitals: Blood pressure 120/60, pulse 83, temperature 97 8 °F (36 6 °C), height 4' 8" (1 422 m), weight 98 4 kg (217 lb), SpO2 98 %  Body mass index is 48 65 kg/m²  Oxygen Therapy  SpO2: 98 %  Oxygen Therapy: Supplemental oxygen  O2 Delivery Method: Nasal cannula  O2 Flow Rate (L/min): 2 L/min      Physical Exam  Physical Exam  Vitals signs reviewed  Constitutional:       General: She is not in acute distress  Appearance: Normal appearance  She is well-developed  She is obese  She is not ill-appearing, toxic-appearing or diaphoretic  HENT:      Head: Normocephalic and atraumatic  Right Ear: External ear normal       Left Ear: External ear normal       Nose: Nose normal       Mouth/Throat:      Mouth: Mucous membranes are moist       Pharynx: Oropharynx is clear  No oropharyngeal exudate  Comments: No thrush  Eyes:      General: No scleral icterus  Right eye: No discharge  Left eye: No discharge  Conjunctiva/sclera: Conjunctivae normal       Pupils: Pupils are equal, round, and reactive to light  Neck:      Vascular: No JVD  Trachea: No tracheal deviation  Cardiovascular:      Rate and Rhythm: Normal rate and regular rhythm  Heart sounds: Normal heart sounds  No murmur  No gallop  Pulmonary:      Effort: Pulmonary effort is normal  No respiratory distress  Breath sounds: No stridor   No wheezing, rhonchi or rales  Comments: Diminished at bases  Abdominal:      General: Bowel sounds are normal  There is no distension  Palpations: Abdomen is soft  Tenderness: There is no abdominal tenderness  There is no guarding or rebound  Comments: obese   Musculoskeletal:         General: No deformity  Right lower leg: No edema  Left lower leg: No edema  Comments: In wheelchair  LE wraps in place   Lymphadenopathy:      Cervical: No cervical adenopathy  Skin:     General: Skin is warm and dry  Coloration: Skin is not jaundiced  Findings: No erythema or rash  Neurological:      General: No focal deficit present  Mental Status: She is alert  Mental status is at baseline  Psychiatric:         Mood and Affect: Mood normal          Behavior: Behavior normal          Thought Content: Thought content normal          Labs: I have personally reviewed pertinent lab results    Lab Results   Component Value Date    WBC 6 51 10/15/2020    HGB 10 8 (L) 10/15/2020    HCT 35 2 10/15/2020    MCV 91 10/15/2020     (L) 10/15/2020     Lab Results   Component Value Date    GLUCOSE 171 (H) 09/21/2015    CALCIUM 8 5 10/15/2020     (L) 09/21/2015    K 4 0 10/15/2020    CO2 28 10/15/2020     10/15/2020    BUN 30 (H) 10/15/2020    CREATININE 1 06 10/15/2020     No results found for: IGE  Lab Results   Component Value Date    ALT 32 10/15/2020    AST 18 10/15/2020    ALKPHOS 59 10/15/2020    BILITOT 0 61 09/21/2015       COVID-19 neg 10/28, 10/2, 9/19  Imaging and other studies: New images and reports personally reviewed  Renal US 11/12/20 - simple cyst left kidney    CT Chest 10/26/2020 - resolved basilar infiltrates, stable pulmonary nodules largest 7-8mm LYLA has been stable, left kidney lesion recommending renal US        CT Chest 7/8/19 - stable LYLA apical nodule, measures at 7mm per radiology, 4mm RUL pulmonary nodule, no significant mediastinal adenopathy     CT Chest 18 - stable 8mm LYLA and 3mm RUL nodules, no sig mediastinal adenopathy, mild basilar atelectasis     Pulmonary function testin2017 - Ratio 69%, %, FEV1 115% - normal spirometry - shortened expiratory time but with values >100% predicted       TTE 2020 - EF 55%, normal RV size/function    Nader Duval DO, [facility]

## 2021-04-30 ENCOUNTER — OFFICE VISIT (OUTPATIENT)
Dept: PULMONOLOGY | Facility: CLINIC | Age: 71
End: 2021-04-30
Payer: MEDICARE

## 2021-04-30 VITALS
TEMPERATURE: 97.8 F | OXYGEN SATURATION: 98 % | BODY MASS INDEX: 48.81 KG/M2 | WEIGHT: 217 LBS | SYSTOLIC BLOOD PRESSURE: 120 MMHG | HEIGHT: 56 IN | DIASTOLIC BLOOD PRESSURE: 60 MMHG | HEART RATE: 83 BPM

## 2021-04-30 DIAGNOSIS — J45.40 MODERATE PERSISTENT ASTHMA WITHOUT COMPLICATION: Primary | ICD-10-CM

## 2021-04-30 DIAGNOSIS — G47.34 NOCTURNAL HYPOXIA: ICD-10-CM

## 2021-04-30 DIAGNOSIS — G30.9 ALZHEIMER'S DISEASE (HCC): ICD-10-CM

## 2021-04-30 DIAGNOSIS — F02.80 ALZHEIMER'S DISEASE (HCC): ICD-10-CM

## 2021-04-30 PROCEDURE — 99213 OFFICE O/P EST LOW 20 MIN: CPT | Performed by: INTERNAL MEDICINE

## 2021-08-08 ENCOUNTER — APPOINTMENT (EMERGENCY)
Dept: CT IMAGING | Facility: HOSPITAL | Age: 71
End: 2021-08-08
Payer: MEDICARE

## 2021-08-08 ENCOUNTER — HOSPITAL ENCOUNTER (EMERGENCY)
Facility: HOSPITAL | Age: 71
Discharge: HOME | End: 2021-08-09
Attending: EMERGENCY MEDICINE | Admitting: EMERGENCY MEDICINE
Payer: MEDICARE

## 2021-08-08 DIAGNOSIS — R10.84 GENERALIZED ABDOMINAL PAIN: Primary | ICD-10-CM

## 2021-08-08 LAB
ALBUMIN SERPL BCP-MCNC: 3.6 G/DL (ref 3.5–5)
ALP SERPL-CCNC: 69 U/L (ref 46–116)
ALT SERPL W P-5'-P-CCNC: 49 U/L (ref 12–78)
ANION GAP SERPL CALCULATED.3IONS-SCNC: 9 MMOL/L (ref 4–13)
AST SERPL W P-5'-P-CCNC: 53 U/L (ref 5–45)
BASOPHILS # BLD AUTO: 0.03 THOUSANDS/ΜL (ref 0–0.1)
BASOPHILS NFR BLD AUTO: 0 % (ref 0–1)
BILIRUB SERPL-MCNC: 0.46 MG/DL (ref 0.2–1)
BUN SERPL-MCNC: 36 MG/DL (ref 5–25)
CALCIUM SERPL-MCNC: 9.4 MG/DL (ref 8.3–10.1)
CHLORIDE SERPL-SCNC: 98 MMOL/L (ref 100–108)
CO2 SERPL-SCNC: 29 MMOL/L (ref 21–32)
CREAT SERPL-MCNC: 1.52 MG/DL (ref 0.6–1.3)
EOSINOPHIL # BLD AUTO: 0.19 THOUSAND/ΜL (ref 0–0.61)
EOSINOPHIL NFR BLD AUTO: 2 % (ref 0–6)
ERYTHROCYTE [DISTWIDTH] IN BLOOD BY AUTOMATED COUNT: 15.5 % (ref 11.6–15.1)
GFR SERPL CREATININE-BSD FRML MDRD: 34 ML/MIN/1.73SQ M
GLUCOSE SERPL-MCNC: 269 MG/DL (ref 65–140)
HCT VFR BLD AUTO: 30.8 % (ref 34.8–46.1)
HGB BLD-MCNC: 9.8 G/DL (ref 11.5–15.4)
IMM GRANULOCYTES # BLD AUTO: 0.11 THOUSAND/UL (ref 0–0.2)
IMM GRANULOCYTES NFR BLD AUTO: 1 % (ref 0–2)
LACTATE SERPL-SCNC: 2.7 MMOL/L (ref 0.5–2)
LIPASE SERPL-CCNC: 240 U/L (ref 73–393)
LYMPHOCYTES # BLD AUTO: 1.15 THOUSANDS/ΜL (ref 0.6–4.47)
LYMPHOCYTES NFR BLD AUTO: 14 % (ref 14–44)
MCH RBC QN AUTO: 27.7 PG (ref 26.8–34.3)
MCHC RBC AUTO-ENTMCNC: 31.8 G/DL (ref 31.4–37.4)
MCV RBC AUTO: 87 FL (ref 82–98)
MONOCYTES # BLD AUTO: 0.69 THOUSAND/ΜL (ref 0.17–1.22)
MONOCYTES NFR BLD AUTO: 8 % (ref 4–12)
NEUTROPHILS # BLD AUTO: 6.26 THOUSANDS/ΜL (ref 1.85–7.62)
NEUTS SEG NFR BLD AUTO: 75 % (ref 43–75)
NRBC BLD AUTO-RTO: 0 /100 WBCS
PLATELET # BLD AUTO: 158 THOUSANDS/UL (ref 149–390)
PMV BLD AUTO: 10.4 FL (ref 8.9–12.7)
POTASSIUM SERPL-SCNC: 5.3 MMOL/L (ref 3.5–5.3)
PROT SERPL-MCNC: 8.1 G/DL (ref 6.4–8.2)
RBC # BLD AUTO: 3.54 MILLION/UL (ref 3.81–5.12)
SODIUM SERPL-SCNC: 136 MMOL/L (ref 136–145)
WBC # BLD AUTO: 8.43 THOUSAND/UL (ref 4.31–10.16)

## 2021-08-08 PROCEDURE — 83690 ASSAY OF LIPASE: CPT | Performed by: EMERGENCY MEDICINE

## 2021-08-08 PROCEDURE — 80053 COMPREHEN METABOLIC PANEL: CPT | Performed by: EMERGENCY MEDICINE

## 2021-08-08 PROCEDURE — 85025 COMPLETE CBC W/AUTO DIFF WBC: CPT | Performed by: EMERGENCY MEDICINE

## 2021-08-08 PROCEDURE — 36415 COLL VENOUS BLD VENIPUNCTURE: CPT | Performed by: EMERGENCY MEDICINE

## 2021-08-08 PROCEDURE — 96365 THER/PROPH/DIAG IV INF INIT: CPT

## 2021-08-08 PROCEDURE — 96366 THER/PROPH/DIAG IV INF ADDON: CPT

## 2021-08-08 PROCEDURE — G1004 CDSM NDSC: HCPCS

## 2021-08-08 PROCEDURE — 74176 CT ABD & PELVIS W/O CONTRAST: CPT

## 2021-08-08 PROCEDURE — 83605 ASSAY OF LACTIC ACID: CPT | Performed by: EMERGENCY MEDICINE

## 2021-08-08 PROCEDURE — 96376 TX/PRO/DX INJ SAME DRUG ADON: CPT

## 2021-08-08 PROCEDURE — 96375 TX/PRO/DX INJ NEW DRUG ADDON: CPT

## 2021-08-08 PROCEDURE — 99285 EMERGENCY DEPT VISIT HI MDM: CPT | Performed by: EMERGENCY MEDICINE

## 2021-08-08 PROCEDURE — 99285 EMERGENCY DEPT VISIT HI MDM: CPT

## 2021-08-08 RX ORDER — MORPHINE SULFATE 4 MG/ML
4 INJECTION, SOLUTION INTRAMUSCULAR; INTRAVENOUS ONCE
Status: COMPLETED | OUTPATIENT
Start: 2021-08-08 | End: 2021-08-08

## 2021-08-08 RX ORDER — TRAMADOL HYDROCHLORIDE 50 MG/1
TABLET ORAL
COMMUNITY
Start: 2021-04-30 | End: 2021-11-06

## 2021-08-08 RX ADMIN — MORPHINE SULFATE 4 MG: 4 INJECTION INTRAVENOUS at 19:33

## 2021-08-08 RX ADMIN — IOHEXOL 50 ML: 240 INJECTION, SOLUTION INTRATHECAL; INTRAVASCULAR; INTRAVENOUS; ORAL at 21:49

## 2021-08-08 RX ADMIN — MORPHINE SULFATE 4 MG: 4 INJECTION INTRAVENOUS at 20:03

## 2021-08-08 RX ADMIN — SODIUM CHLORIDE, SODIUM LACTATE, POTASSIUM CHLORIDE, AND CALCIUM CHLORIDE 500 ML: .6; .31; .03; .02 INJECTION, SOLUTION INTRAVENOUS at 19:33

## 2021-08-08 RX ADMIN — SODIUM CHLORIDE, SODIUM LACTATE, POTASSIUM CHLORIDE, AND CALCIUM CHLORIDE 500 ML: .6; .31; .03; .02 INJECTION, SOLUTION INTRAVENOUS at 22:35

## 2021-08-09 VITALS
RESPIRATION RATE: 20 BRPM | WEIGHT: 230.6 LBS | BODY MASS INDEX: 51.87 KG/M2 | DIASTOLIC BLOOD PRESSURE: 66 MMHG | SYSTOLIC BLOOD PRESSURE: 129 MMHG | TEMPERATURE: 98.5 F | OXYGEN SATURATION: 97 % | HEIGHT: 56 IN | HEART RATE: 86 BPM

## 2021-08-09 LAB — LACTATE SERPL-SCNC: 2.8 MMOL/L (ref 0.5–2)

## 2021-08-09 NOTE — ED PROVIDER NOTES
History  Chief Complaint   Patient presents with    Abdominal Pain     Left sided - same side as hernia  Started yesterday  79 yr  Female with abd wall hernia-- c/o onset today of constant  Mid abd pain with no other comps- no cp- has chronic sob- unchanged- pos nausea- no vomitus- no gu/gyn comps- no change in stools       History provided by:  Patient   used: No    Abdominal Pain  Pain location: mid abd  Associated symptoms: nausea    Associated symptoms: no constipation, no diarrhea and no vomiting        Prior to Admission Medications   Prescriptions Last Dose Informant Patient Reported? Taking?    Cholecalciferol (VITAMIN D3) 75697 units CAPS 8/8/2021 at Unknown time Outside Facility ([address] [place] [address]) Yes Yes   Sig: Take 50,000 Units by mouth every 30 (thirty) days   Glucose Blood (BL TEST STRIP PACK VI) 8/8/2021 at Unknown time Outside Facility ([address] [place] [address]) Yes Yes   Sig: by In Vitro route   Omeprazole 20 MG TBEC 8/8/2021 at Unknown time Outside Facility ([address] [place] [address]) Yes Yes   Sig: Take 20 mg by mouth daily   acetaminophen (TYLENOL) 325 mg tablet Unknown at Unknown time Outside Facility (Specify) No No   Sig: Take 3 tablets (975 mg total) by mouth every 8 (eight) hours   albuterol (ACCUNEB) 0 63 MG/3ML nebulizer solution Not Taking at Unknown time Outside Facility (Specify) Yes No   Patient not taking: Reported on 8/8/2021   aspirin 81 MG tablet 8/8/2021 at Unknown time Outside Facility ([address] [place] [address]) Yes Yes   Sig: Take by mouth   benzonatate (TESSALON) 200 MG capsule 8/8/2021 at Unknown time Outside Facility ([address] [place] [address]) Yes Yes   Sig: Take 200 mg by mouth 3 (three) times a day as needed for cough   bisacodyl (DULCOLAX) 10 mg suppository Unknown at Unknown time Outside Facility (Specify) Yes No   Sig: Insert 10 mg into the rectum daily as needed    celecoxib (CeleBREX) 100 mg capsule 8/8/2021 at Unknown time Outside Facility (Specify) Yes Yes   Sig: Take 100 mg by mouth daily    cetirizine (ZyrTEC) 10 mg tablet 2021 at Unknown time Outside Facility ([address] [place] [address]) Yes Yes   Sig: Take 10 mg by mouth daily   clonazePAM (KlonoPIN) 0 25 MG disintegrating tablet Not Taking at Unknown time Outside Facility (Specify) No No   Sig: Take 1 tablet (0 25 mg total) by mouth 2 (two) times a day as needed for anxiety   Patient not taking: Reported on 12/10/2020   dextromethorphan-guaiFENesin (DIABETIC SILTUSSIN-DM)  mg/5 mL oral liquid  Outside Facility (Specify) Yes No   Sig: Take 5 mL by mouth every 12 (twelve) hours   fluticasone (FLONASE) 50 mcg/act nasal spray 2021 at Unknown time Outside Facility ([address] [place] [address]) No Yes   Si spray into each nostril daily   furosemide (LASIX) 40 mg tablet 2021 at Unknown time Outside Facility (Specify) Yes Yes   gabapentin (NEURONTIN) 300 mg capsule 2021 at Unknown time Outside Facility ([address] [place] [address]) Yes Yes   Sig: Take 300 mg by mouth 3 (three) times a day   glimepiride (AMARYL) 4 mg tablet 2021 at Unknown time Outside Facility (Specify) Yes Yes   glucose blood (ONE TOUCH ULTRA TEST) test strip 2021 at Unknown time Outside Facility ([address] [place] [address]) Yes Yes   Sig: by In Vitro route   guaiFENesin (MUCINEX) 600 mg 12 hr tablet 2021 at Unknown time Outside Facility ([address] [place] [address]) Yes Yes   Sig: Take 1,200 mg by mouth every 12 (twelve) hours   guaiFENesin (MUCINEX) 600 mg 12 hr tablet 2021 at Unknown time Outside Facility (Specify) No Yes   Sig: Take 2 tablets (1,200 mg total) by mouth every 12 (twelve) hours   insulin aspart (NovoLOG) 100 Units/mL injection pen 2021 at Unknown time Outside Facility (Specify) Yes Yes   Sig: Inject 18 Units under the skin 3 (three) times a day with meals   insulin glargine (LANTUS SOLOSTAR) 100 units/mL injection pen 2021 at Unknown time Outside Facility ([address] [place] [address]) Yes Yes   Sig: Inject 72 Units under the skin every 12 (twelve) hours    ipratropium (ATROVENT) 0 02 % nebulizer solution 2021 at Unknown time  No Yes   Sig: Take 1 vial (0 5 mg total) by nebulization 3 (three) times a day for 14 days   lisinopril (ZESTRIL) 2 5 mg tablet 2021 at Unknown time Outside Facility ([address] [place] [address]) Yes Yes   Sig: Take 2 5 mg by mouth daily   magnesium hydroxide (EQ MILK OF MAGNESIA) 400 mg/5 mL oral suspension Unknown at Unknown time Outside Facility (Specify) Yes No   Sig: Take by mouth daily as needed for constipation   memantine (NAMENDA) 10 mg tablet 2021 at Unknown time Outside Facility ([address] [place] [address]) Yes Yes   Sig: 10 mg 2 (two) times a day    metFORMIN (GLUCOPHAGE) 1000 MG tablet 2021 at Unknown time Outside Facility ([address] [place] [address]) Yes Yes   Sig: Take 1,000 mg by mouth 2 (two) times a day with meals   montelukast (SINGULAIR) 10 mg tablet 2021 at Unknown time Outside Facility (Specify) Yes Yes   Sig: Take 10 mg by mouth daily at bedtime   ondansetron (ZOFRAN) 4 mg tablet Unknown at Unknown time Outside Facility (Specify) Yes No   Sig: Take 4 mg by mouth every 8 (eight) hours as needed for nausea or vomiting   polyethylene glycol (MIRALAX) 17 g packet Unknown at Unknown time Outside Facility (Specify) Yes No   Si g daily   ranitidine (ZANTAC) 75 MG tablet Not Taking at Unknown time Outside Facility (Specify) Yes No   Sig: Take 75 mg by mouth 2 (two) times a day   Patient not taking: Reported on 2021   simvastatin (ZOCOR) 40 mg tablet 2021 at Unknown time Outside Facility (Specify) No Yes   Sig: Take 1 tablet by mouth daily at bedtime   traMADol (ULTRAM) 50 mg tablet 2021 at Unknown time Outside Facility (Specify) No Yes   Sig: Take 1 tablet (50 mg total) by mouth 2 (two) times a day   traMADol (ULTRAM) 50 mg tablet   Yes Yes   Sig: give one tablet po BID   traZODone (DESYREL) 50 mg tablet 2021 at Unknown time Outside Facility (Specify) Yes Yes   Sig: Take 50 mg by mouth daily at bedtime      Facility-Administered Medications: None       Past Medical History:   Diagnosis Date    Asthma     [facility] mellitus ([facility] [address] )     Hyperlipidemia     Hypertension        Past Surgical History:   Procedure Laterality Date    JOINT REPLACEMENT Bilateral     KNEE SURGERY      TOE SURGERY         Family History   Problem Relation Age of Onset    Dementia Brother     Breast cancer Sister 76    Cancer Brother      I have reviewed and agree with the history as documented  E-Cigarette/Vaping    E-Cigarette Use Never User      E-Cigarette/Vaping Substances    Nicotine No     THC No     CBD No     Flavoring No     Other No      Social History     Tobacco Use    Smoking status: Former Smoker     Packs/day: 1 00     Years: 52 00     Pack years: 52 00     Types: Cigarettes     Start date: 1965     Quit date: 09/2017     Years since quitting: 3 9    Smokeless tobacco: Never Used   Vaping Use    Vaping Use: Never used   Substance Use Topics    Alcohol use: Not Currently    Drug use: No       Review of Systems   Constitutional: Negative  HENT: Negative  Eyes: Negative  Respiratory: Negative  Cardiovascular: Negative  Gastrointestinal: Positive for abdominal pain and nausea  Negative for abdominal distention, anal bleeding, blood in stool, constipation, diarrhea, rectal pain and vomiting  Endocrine: Negative  Genitourinary: Negative  Musculoskeletal: Negative  Skin: Negative  Allergic/Immunologic: Negative  Neurological: Negative  Hematological: Negative  Psychiatric/Behavioral: Negative  Physical Exam  Physical Exam  Vitals and nursing note reviewed  Constitutional:       General: She is not in acute distress  Appearance: She is well-developed  She is not ill-appearing, toxic-appearing or diaphoretic  Comments: avss-- pulse ox 93 % on ra- interpretation is low- normal- no intervention    HENT:      Head: Normocephalic and atraumatic  Eyes:      General: No scleral icterus  Extraocular Movements: Extraocular movements intact  Pupils: Pupils are equal, round, and reactive to light  Comments: Mm pink   Cardiovascular:      Rate and Rhythm: Normal rate and regular rhythm  Heart sounds: Normal heart sounds  No murmur heard  No friction rub  No gallop  Pulmonary:      Effort: Pulmonary effort is normal  No respiratory distress  Breath sounds: Normal breath sounds  No stridor  No wheezing, rhonchi or rales  Chest:      Chest wall: No tenderness  Abdominal:      General: Abdomen is protuberant  Bowel sounds are normal  There is no distension or abdominal bruit  There are no signs of injury  Palpations: There is no shifting dullness, fluid wave, hepatomegaly, splenomegaly, mass or pulsatile mass  Tenderness: There is abdominal tenderness  There is no right CVA tenderness, left CVA tenderness, guarding or rebound  Negative signs include Colindres's sign, Rovsing's sign, McBurney's sign, psoas sign and obturator sign  Hernia: A hernia is present  Hernia is present in the ventral area  There is no hernia in the umbilical area, left inguinal area, right femoral area, left femoral area or right inguinal area  Comments: Pos mid abd tenderness- llq ventral nt abd hernia- no signs of panniculitis--  No peritoneal signs- no cva tenderness   Skin:     Capillary Refill: Capillary refill takes less than 2 seconds  Coloration: Skin is pale  Skin is not cyanotic, jaundiced or mottled  Findings: No erythema or rash  Comments: Pt has bel compression stockings on    Neurological:      General: No focal deficit present  Mental Status: She is alert and oriented to person, place, and time  Cranial Nerves: No cranial nerve deficit  Motor: No weakness  Psychiatric:         Mood and Affect: Mood normal  Mood is not anxious or depressed           Behavior: Behavior normal          Vital Signs  ED Triage Vitals [08/08/21 1852]   Temperature Pulse Respirations Blood Pressure SpO2   98 5 °F (36 9 °C) 87 18 133/66 93 %      Temp Source Heart Rate Source Patient Position - Orthostatic VS BP Location FiO2 (%)   Oral Monitor Lying Right arm --      Pain Score       5           Vitals:    08/08/21 1852   BP: 133/66   Pulse: 87   Patient Position - Orthostatic VS: Lying         Visual Acuity  Visual Acuity      Most Recent Value   L Pupil Size (mm)  3   R Pupil Size (mm)  3          ED Medications  Medications   lactated ringers bolus 500 mL (500 mL Intravenous New Bag 8/8/21 1933)   morphine (PF) 4 mg/mL injection 4 mg (4 mg Intravenous Given 8/8/21 1933)   morphine (PF) 4 mg/mL injection 4 mg (4 mg Intravenous Given 8/8/21 2003)       Diagnostic Studies  Results Reviewed     Procedure Component Value Units Date/Time    CBC and differential [[id number]] Updated: 08/08/21 2001    Lab Status: No result Specimen: Blood from Arm, Right     Comprehensive metabolic panel [[id number]] Collected: 08/08/21 1927    Lab Status: In process Specimen: Blood from Arm, Right Updated: 08/08/21 1935    Lipase [[id number]] Collected: 08/08/21 1927    Lab Status: In process Specimen: Blood from Arm, Right Updated: 08/08/21 1935    Lactic acid, plasma [[id number]] Collected: 08/08/21 1927    Lab Status:  In process Specimen: Blood from Arm, Right Updated: 08/08/21 1934                 No orders to display              Procedures  Procedures         ED Course  ED Course as of Aug 12 1013   Sun Aug 08, 2021   1910 Sam sharpe note- 6/21 labs rev iewed by sam sharpe      2000 Er md note- pt- re-evaluated- feels mildly improved after iv morphine-- but is still in pain will re dose and re- eval      2152 Sam sharpe note- lactic  acid was not checked fror sepsis      Mon Aug 09, 2021   0001 Er md note- pt re-evaluated aware of waiting for ct scan report-- c/o arm /leg pain from laying in bed       0330 Sam sharpe note- pt re-evaluated- restign in nad- repeat abd exam- positive legft lower abd wall hernia- nt- rest of abd soft nt/nd- no peritoneal signs- sam sharpe clinical suspicion of acute mesentaric ischemia is low- will d/c back to chronic care facility                                SBIRT 22yo+      Most Recent Value   SBIRT (24 yo +)   In order to provide better care to our patients, we are screening all of our patients for alcohol and drug use  Would it be okay to ask you these screening questions? Yes Filed at: 08/08/2021 1850   Initial Alcohol Screen: US AUDIT-C    1  How often do you have a drink containing alcohol?  0 Filed at: 08/08/2021 1850   2  How many drinks containing alcohol do you have on a typical day you are drinking? 0 Filed at: 08/08/2021 1850   3a  Male UNDER 65: How often do you have five or more drinks on one occasion? 0 Filed at: 08/08/2021 1850   3b  FEMALE Any Age, or MALE 65+: How often do you have 4 or more drinks on one occassion? 0 Filed at: 08/08/2021 1850   Audit-C Score  0 Filed at: 08/08/2021 1850   KELI: How many times in the past year have you    Used an illegal drug or used a prescription medication for non-medical reasons? Never Filed at: 08/08/2021 1850                    MDM    Disposition  Final diagnoses:   None     ED Disposition     None      Follow-up Information    None         Patient's Medications   Discharge Prescriptions    No medications on file     No discharge procedures on file      PDMP Review       Value Time User    PDMP Reviewed  Yes 9/25/2020 12:01 PM Nasra Grande MD          ED Provider  Electronically Signed by           Isabella Ramos MD  08/12/21 1019

## 2021-10-19 ENCOUNTER — TELEPHONE (OUTPATIENT)
Dept: PULMONOLOGY | Facility: CLINIC | Age: 71
End: 2021-10-19

## 2021-10-19 ENCOUNTER — OFFICE VISIT (OUTPATIENT)
Dept: PULMONOLOGY | Facility: CLINIC | Age: 71
End: 2021-10-19
Payer: MEDICARE

## 2021-10-19 VITALS
TEMPERATURE: 97.1 F | HEIGHT: 59 IN | BODY MASS INDEX: 39.72 KG/M2 | WEIGHT: 197 LBS | OXYGEN SATURATION: 93 % | DIASTOLIC BLOOD PRESSURE: 68 MMHG | HEART RATE: 85 BPM | SYSTOLIC BLOOD PRESSURE: 130 MMHG

## 2021-10-19 DIAGNOSIS — J45.41 MODERATE PERSISTENT ASTHMA WITH ACUTE EXACERBATION: Primary | ICD-10-CM

## 2021-10-19 DIAGNOSIS — G30.9 ALZHEIMER'S DISEASE (HCC): ICD-10-CM

## 2021-10-19 DIAGNOSIS — F02.80 ALZHEIMER'S DISEASE (HCC): ICD-10-CM

## 2021-10-19 DIAGNOSIS — G47.34 NOCTURNAL HYPOXIA: ICD-10-CM

## 2021-10-19 DIAGNOSIS — E66.01 MORBID (SEVERE) OBESITY DUE TO EXCESS CALORIES (HCC): ICD-10-CM

## 2021-10-19 PROCEDURE — 99214 OFFICE O/P EST MOD 30 MIN: CPT | Performed by: INTERNAL MEDICINE

## 2021-10-19 RX ORDER — DULAGLUTIDE 1.5 MG/.5ML
INJECTION, SOLUTION SUBCUTANEOUS
COMMUNITY
Start: 2021-10-05 | End: 2022-04-18

## 2021-10-19 RX ORDER — DOXYCYCLINE HYCLATE 100 MG/1
100 CAPSULE ORAL EVERY 12 HOURS SCHEDULED
Qty: 14 CAPSULE | Refills: 0 | Status: SHIPPED | OUTPATIENT
Start: 2021-10-19 | End: 2021-10-26

## 2021-10-19 RX ORDER — ASPIRIN 81 MG/1
TABLET ORAL
COMMUNITY
Start: 2021-08-28

## 2021-10-19 RX ORDER — PREDNISONE 10 MG/1
TABLET ORAL
Qty: 30 TABLET | Refills: 0 | Status: SHIPPED | OUTPATIENT
Start: 2021-10-19 | End: 2021-11-08 | Stop reason: HOSPADM

## 2021-10-19 RX ORDER — IPRATROPIUM BROMIDE AND ALBUTEROL SULFATE 2.5; .5 MG/3ML; MG/3ML
SOLUTION RESPIRATORY (INHALATION)
COMMUNITY
Start: 2021-10-14

## 2021-10-19 RX ORDER — INSULIN LISPRO 100 [IU]/ML
26 INJECTION, SOLUTION INTRAVENOUS; SUBCUTANEOUS
COMMUNITY
Start: 2021-09-12 | End: 2022-01-11 | Stop reason: SDUPTHER

## 2021-11-06 ENCOUNTER — HOSPITAL ENCOUNTER (INPATIENT)
Facility: HOSPITAL | Age: 71
LOS: 2 days | Discharge: NURSING, REHAB OR LONG-TERM CARE | DRG: 871 | End: 2021-11-08
Attending: EMERGENCY MEDICINE | Admitting: INTERNAL MEDICINE
Payer: MEDICARE

## 2021-11-06 ENCOUNTER — APPOINTMENT (EMERGENCY)
Dept: RADIOLOGY | Facility: HOSPITAL | Age: 71
DRG: 871 | End: 2021-11-06
Payer: MEDICARE

## 2021-11-06 DIAGNOSIS — J45.909 UNCOMPLICATED ASTHMA, UNSPECIFIED ASTHMA SEVERITY, UNSPECIFIED WHETHER PERSISTENT: ICD-10-CM

## 2021-11-06 DIAGNOSIS — A41.9 SEPSIS (HCC): Primary | ICD-10-CM

## 2021-11-06 LAB
ALBUMIN SERPL BCP-MCNC: 3.4 G/DL (ref 3.5–5)
ALP SERPL-CCNC: 59 U/L (ref 46–116)
ALT SERPL W P-5'-P-CCNC: 54 U/L (ref 12–78)
ANION GAP SERPL CALCULATED.3IONS-SCNC: 6 MMOL/L (ref 4–13)
AST SERPL W P-5'-P-CCNC: 30 U/L (ref 5–45)
BACTERIA UR QL AUTO: NORMAL /HPF
BASOPHILS # BLD AUTO: 0.02 THOUSANDS/ΜL (ref 0–0.1)
BASOPHILS NFR BLD AUTO: 0 % (ref 0–1)
BILIRUB SERPL-MCNC: 0.71 MG/DL (ref 0.2–1)
BILIRUB UR QL STRIP: NEGATIVE
BUN SERPL-MCNC: 37 MG/DL (ref 5–25)
CALCIUM ALBUM COR SERPL-MCNC: 9.5 MG/DL (ref 8.3–10.1)
CALCIUM SERPL-MCNC: 9 MG/DL (ref 8.3–10.1)
CHLORIDE SERPL-SCNC: 105 MMOL/L (ref 100–108)
CLARITY UR: CLEAR
CO2 SERPL-SCNC: 30 MMOL/L (ref 21–32)
COLOR UR: YELLOW
CREAT SERPL-MCNC: 1.83 MG/DL (ref 0.6–1.3)
EOSINOPHIL # BLD AUTO: 0.09 THOUSAND/ΜL (ref 0–0.61)
EOSINOPHIL NFR BLD AUTO: 1 % (ref 0–6)
ERYTHROCYTE [DISTWIDTH] IN BLOOD BY AUTOMATED COUNT: 16.6 % (ref 11.6–15.1)
FLUAV RNA RESP QL NAA+PROBE: NEGATIVE
FLUBV RNA RESP QL NAA+PROBE: NEGATIVE
GFR SERPL CREATININE-BSD FRML MDRD: 27 ML/MIN/1.73SQ M
GLUCOSE SERPL-MCNC: 78 MG/DL (ref 65–140)
GLUCOSE UR STRIP-MCNC: NEGATIVE MG/DL
HCT VFR BLD AUTO: 32.7 % (ref 34.8–46.1)
HGB BLD-MCNC: 9.8 G/DL (ref 11.5–15.4)
HGB UR QL STRIP.AUTO: NEGATIVE
IMM GRANULOCYTES # BLD AUTO: 0.08 THOUSAND/UL (ref 0–0.2)
IMM GRANULOCYTES NFR BLD AUTO: 1 % (ref 0–2)
KETONES UR STRIP-MCNC: NEGATIVE MG/DL
LACTATE SERPL-SCNC: 2.1 MMOL/L (ref 0.5–2)
LEUKOCYTE ESTERASE UR QL STRIP: ABNORMAL
LYMPHOCYTES # BLD AUTO: 0.59 THOUSANDS/ΜL (ref 0.6–4.47)
LYMPHOCYTES NFR BLD AUTO: 5 % (ref 14–44)
MCH RBC QN AUTO: 26.7 PG (ref 26.8–34.3)
MCHC RBC AUTO-ENTMCNC: 30 G/DL (ref 31.4–37.4)
MCV RBC AUTO: 89 FL (ref 82–98)
MONOCYTES # BLD AUTO: 0.78 THOUSAND/ΜL (ref 0.17–1.22)
MONOCYTES NFR BLD AUTO: 6 % (ref 4–12)
NEUTROPHILS # BLD AUTO: 10.63 THOUSANDS/ΜL (ref 1.85–7.62)
NEUTS SEG NFR BLD AUTO: 87 % (ref 43–75)
NITRITE UR QL STRIP: NEGATIVE
NON-SQ EPI CELLS URNS QL MICRO: NORMAL /HPF
NRBC BLD AUTO-RTO: 0 /100 WBCS
PH UR STRIP.AUTO: 5.5 [PH]
PLATELET # BLD AUTO: 140 THOUSANDS/UL (ref 149–390)
PMV BLD AUTO: 10.4 FL (ref 8.9–12.7)
POTASSIUM SERPL-SCNC: 5 MMOL/L (ref 3.5–5.3)
PROT SERPL-MCNC: 7.6 G/DL (ref 6.4–8.2)
PROT UR STRIP-MCNC: NEGATIVE MG/DL
RBC # BLD AUTO: 3.67 MILLION/UL (ref 3.81–5.12)
RBC #/AREA URNS AUTO: NORMAL /HPF
RSV RNA RESP QL NAA+PROBE: NEGATIVE
SARS-COV-2 RNA RESP QL NAA+PROBE: NEGATIVE
SODIUM SERPL-SCNC: 141 MMOL/L (ref 136–145)
SP GR UR STRIP.AUTO: 1.02 (ref 1–1.03)
UROBILINOGEN UR QL STRIP.AUTO: 0.2 E.U./DL
WBC # BLD AUTO: 12.19 THOUSAND/UL (ref 4.31–10.16)
WBC #/AREA URNS AUTO: NORMAL /HPF

## 2021-11-06 PROCEDURE — 87040 BLOOD CULTURE FOR BACTERIA: CPT

## 2021-11-06 PROCEDURE — 0241U HB NFCT DS VIR RESP RNA 4 TRGT: CPT

## 2021-11-06 PROCEDURE — 81001 URINALYSIS AUTO W/SCOPE: CPT

## 2021-11-06 PROCEDURE — 99285 EMERGENCY DEPT VISIT HI MDM: CPT

## 2021-11-06 PROCEDURE — 71045 X-RAY EXAM CHEST 1 VIEW: CPT

## 2021-11-06 PROCEDURE — 99285 EMERGENCY DEPT VISIT HI MDM: CPT | Performed by: EMERGENCY MEDICINE

## 2021-11-06 PROCEDURE — 85025 COMPLETE CBC W/AUTO DIFF WBC: CPT

## 2021-11-06 PROCEDURE — 99223 1ST HOSP IP/OBS HIGH 75: CPT | Performed by: INTERNAL MEDICINE

## 2021-11-06 PROCEDURE — 1123F ACP DISCUSS/DSCN MKR DOCD: CPT | Performed by: EMERGENCY MEDICINE

## 2021-11-06 PROCEDURE — 96365 THER/PROPH/DIAG IV INF INIT: CPT

## 2021-11-06 PROCEDURE — 80053 COMPREHEN METABOLIC PANEL: CPT

## 2021-11-06 PROCEDURE — 93005 ELECTROCARDIOGRAM TRACING: CPT

## 2021-11-06 PROCEDURE — 94640 AIRWAY INHALATION TREATMENT: CPT

## 2021-11-06 PROCEDURE — 83605 ASSAY OF LACTIC ACID: CPT

## 2021-11-06 PROCEDURE — 36415 COLL VENOUS BLD VENIPUNCTURE: CPT

## 2021-11-06 RX ORDER — LEVOFLOXACIN 5 MG/ML
750 INJECTION, SOLUTION INTRAVENOUS ONCE
Status: COMPLETED | OUTPATIENT
Start: 2021-11-06 | End: 2021-11-06

## 2021-11-06 RX ADMIN — SODIUM CHLORIDE, SODIUM LACTATE, POTASSIUM CHLORIDE, AND CALCIUM CHLORIDE 800 ML: .6; .31; .03; .02 INJECTION, SOLUTION INTRAVENOUS at 23:21

## 2021-11-06 RX ADMIN — VANCOMYCIN HYDROCHLORIDE 1500 MG: 5 INJECTION, POWDER, LYOPHILIZED, FOR SOLUTION INTRAVENOUS at 23:21

## 2021-11-07 LAB
ANION GAP SERPL CALCULATED.3IONS-SCNC: 6 MMOL/L (ref 4–13)
ATRIAL RATE: 116 BPM
BASOPHILS # BLD AUTO: 0.02 THOUSANDS/ΜL (ref 0–0.1)
BASOPHILS NFR BLD AUTO: 0 % (ref 0–1)
BUN SERPL-MCNC: 37 MG/DL (ref 5–25)
CALCIUM SERPL-MCNC: 8.6 MG/DL (ref 8.3–10.1)
CHLORIDE SERPL-SCNC: 106 MMOL/L (ref 100–108)
CO2 SERPL-SCNC: 28 MMOL/L (ref 21–32)
CREAT SERPL-MCNC: 1.82 MG/DL (ref 0.6–1.3)
D DIMER PPP FEU-MCNC: 1.01 UG/ML FEU
EOSINOPHIL # BLD AUTO: 0.07 THOUSAND/ΜL (ref 0–0.61)
EOSINOPHIL NFR BLD AUTO: 1 % (ref 0–6)
ERYTHROCYTE [DISTWIDTH] IN BLOOD BY AUTOMATED COUNT: 16.6 % (ref 11.6–15.1)
GFR SERPL CREATININE-BSD FRML MDRD: 28 ML/MIN/1.73SQ M
GLUCOSE SERPL-MCNC: 170 MG/DL (ref 65–140)
GLUCOSE SERPL-MCNC: 338 MG/DL (ref 65–140)
GLUCOSE SERPL-MCNC: 428 MG/DL (ref 65–140)
GLUCOSE SERPL-MCNC: 430 MG/DL (ref 65–140)
GLUCOSE SERPL-MCNC: 431 MG/DL (ref 65–140)
GLUCOSE SERPL-MCNC: 435 MG/DL (ref 65–140)
GLUCOSE SERPL-MCNC: 89 MG/DL (ref 65–140)
HCT VFR BLD AUTO: 28.9 % (ref 34.8–46.1)
HGB BLD-MCNC: 8.7 G/DL (ref 11.5–15.4)
IMM GRANULOCYTES # BLD AUTO: 0.06 THOUSAND/UL (ref 0–0.2)
IMM GRANULOCYTES NFR BLD AUTO: 1 % (ref 0–2)
LACTATE SERPL-SCNC: 1.7 MMOL/L (ref 0.5–2)
LYMPHOCYTES # BLD AUTO: 0.71 THOUSANDS/ΜL (ref 0.6–4.47)
LYMPHOCYTES NFR BLD AUTO: 6 % (ref 14–44)
MCH RBC QN AUTO: 27.1 PG (ref 26.8–34.3)
MCHC RBC AUTO-ENTMCNC: 30.1 G/DL (ref 31.4–37.4)
MCV RBC AUTO: 90 FL (ref 82–98)
MONOCYTES # BLD AUTO: 0.66 THOUSAND/ΜL (ref 0.17–1.22)
MONOCYTES NFR BLD AUTO: 6 % (ref 4–12)
NEUTROPHILS # BLD AUTO: 10.07 THOUSANDS/ΜL (ref 1.85–7.62)
NEUTS SEG NFR BLD AUTO: 86 % (ref 43–75)
NRBC BLD AUTO-RTO: 0 /100 WBCS
NT-PROBNP SERPL-MCNC: 665 PG/ML
P AXIS: 61 DEGREES
PLATELET # BLD AUTO: 123 THOUSANDS/UL (ref 149–390)
PLATELET # BLD AUTO: 126 THOUSANDS/UL (ref 149–390)
PMV BLD AUTO: 10.1 FL (ref 8.9–12.7)
PMV BLD AUTO: 10.5 FL (ref 8.9–12.7)
POTASSIUM SERPL-SCNC: 4.7 MMOL/L (ref 3.5–5.3)
PR INTERVAL: 142 MS
PROCALCITONIN SERPL-MCNC: 2.18 NG/ML
QRS AXIS: 63 DEGREES
QRSD INTERVAL: 74 MS
QT INTERVAL: 306 MS
QTC INTERVAL: 425 MS
RBC # BLD AUTO: 3.21 MILLION/UL (ref 3.81–5.12)
SODIUM SERPL-SCNC: 140 MMOL/L (ref 136–145)
T WAVE AXIS: 45 DEGREES
VENTRICULAR RATE: 116 BPM
WBC # BLD AUTO: 11.59 THOUSAND/UL (ref 4.31–10.16)

## 2021-11-07 PROCEDURE — 85049 AUTOMATED PLATELET COUNT: CPT | Performed by: INTERNAL MEDICINE

## 2021-11-07 PROCEDURE — 83880 ASSAY OF NATRIURETIC PEPTIDE: CPT | Performed by: INTERNAL MEDICINE

## 2021-11-07 PROCEDURE — 99232 SBSQ HOSP IP/OBS MODERATE 35: CPT | Performed by: INTERNAL MEDICINE

## 2021-11-07 PROCEDURE — 36415 COLL VENOUS BLD VENIPUNCTURE: CPT | Performed by: INTERNAL MEDICINE

## 2021-11-07 PROCEDURE — 82948 REAGENT STRIP/BLOOD GLUCOSE: CPT

## 2021-11-07 PROCEDURE — 80048 BASIC METABOLIC PNL TOTAL CA: CPT | Performed by: INTERNAL MEDICINE

## 2021-11-07 PROCEDURE — 94664 DEMO&/EVAL PT USE INHALER: CPT

## 2021-11-07 PROCEDURE — 84145 PROCALCITONIN (PCT): CPT | Performed by: INTERNAL MEDICINE

## 2021-11-07 PROCEDURE — 85025 COMPLETE CBC W/AUTO DIFF WBC: CPT | Performed by: INTERNAL MEDICINE

## 2021-11-07 PROCEDURE — 85379 FIBRIN DEGRADATION QUANT: CPT | Performed by: INTERNAL MEDICINE

## 2021-11-07 PROCEDURE — 87081 CULTURE SCREEN ONLY: CPT | Performed by: INTERNAL MEDICINE

## 2021-11-07 PROCEDURE — 83605 ASSAY OF LACTIC ACID: CPT | Performed by: INTERNAL MEDICINE

## 2021-11-07 PROCEDURE — 93010 ELECTROCARDIOGRAM REPORT: CPT | Performed by: INTERNAL MEDICINE

## 2021-11-07 RX ORDER — VANCOMYCIN HYDROCHLORIDE 1 G/200ML
15 INJECTION, SOLUTION INTRAVENOUS DAILY PRN
Status: DISCONTINUED | OUTPATIENT
Start: 2021-11-07 | End: 2021-11-07

## 2021-11-07 RX ORDER — ACETAMINOPHEN 325 MG/1
975 TABLET ORAL EVERY 8 HOURS SCHEDULED
Status: DISCONTINUED | OUTPATIENT
Start: 2021-11-07 | End: 2021-11-08 | Stop reason: HOSPADM

## 2021-11-07 RX ORDER — BISACODYL 10 MG
10 SUPPOSITORY, RECTAL RECTAL DAILY PRN
Status: DISCONTINUED | OUTPATIENT
Start: 2021-11-07 | End: 2021-11-08 | Stop reason: HOSPADM

## 2021-11-07 RX ORDER — INSULIN GLARGINE 100 [IU]/ML
75 INJECTION, SOLUTION SUBCUTANEOUS EVERY 12 HOURS SCHEDULED
Status: DISCONTINUED | OUTPATIENT
Start: 2021-11-07 | End: 2021-11-08 | Stop reason: HOSPADM

## 2021-11-07 RX ORDER — ALBUTEROL SULFATE 2.5 MG/3ML
2.5 SOLUTION RESPIRATORY (INHALATION) EVERY 6 HOURS
Status: DISCONTINUED | OUTPATIENT
Start: 2021-11-07 | End: 2021-11-07

## 2021-11-07 RX ORDER — LEVOFLOXACIN 750 MG/1
750 TABLET ORAL EVERY OTHER DAY
Status: DISCONTINUED | OUTPATIENT
Start: 2021-11-08 | End: 2021-11-08 | Stop reason: HOSPADM

## 2021-11-07 RX ORDER — LORATADINE 10 MG/1
10 TABLET ORAL DAILY
Status: DISCONTINUED | OUTPATIENT
Start: 2021-11-07 | End: 2021-11-08 | Stop reason: HOSPADM

## 2021-11-07 RX ORDER — BENZONATATE 100 MG/1
200 CAPSULE ORAL 3 TIMES DAILY PRN
Status: DISCONTINUED | OUTPATIENT
Start: 2021-11-07 | End: 2021-11-08 | Stop reason: HOSPADM

## 2021-11-07 RX ORDER — DOXYCYCLINE HYCLATE 100 MG/1
100 CAPSULE ORAL EVERY 12 HOURS
Status: DISCONTINUED | OUTPATIENT
Start: 2021-11-07 | End: 2021-11-08 | Stop reason: HOSPADM

## 2021-11-07 RX ORDER — FLUTICASONE PROPIONATE 50 MCG
1 SPRAY, SUSPENSION (ML) NASAL DAILY
Status: DISCONTINUED | OUTPATIENT
Start: 2021-11-07 | End: 2021-11-08 | Stop reason: HOSPADM

## 2021-11-07 RX ORDER — ONDANSETRON 2 MG/ML
4 INJECTION INTRAMUSCULAR; INTRAVENOUS EVERY 6 HOURS PRN
Status: DISCONTINUED | OUTPATIENT
Start: 2021-11-07 | End: 2021-11-08 | Stop reason: HOSPADM

## 2021-11-07 RX ORDER — GUAIFENESIN 600 MG
1200 TABLET, EXTENDED RELEASE 12 HR ORAL EVERY 12 HOURS SCHEDULED
Status: DISCONTINUED | OUTPATIENT
Start: 2021-11-07 | End: 2021-11-08 | Stop reason: HOSPADM

## 2021-11-07 RX ORDER — MEMANTINE HYDROCHLORIDE 10 MG/1
10 TABLET ORAL 2 TIMES DAILY
Status: DISCONTINUED | OUTPATIENT
Start: 2021-11-07 | End: 2021-11-08 | Stop reason: HOSPADM

## 2021-11-07 RX ORDER — GABAPENTIN 300 MG/1
300 CAPSULE ORAL 3 TIMES DAILY
Status: DISCONTINUED | OUTPATIENT
Start: 2021-11-07 | End: 2021-11-08 | Stop reason: HOSPADM

## 2021-11-07 RX ORDER — MONTELUKAST SODIUM 10 MG/1
10 TABLET ORAL
Status: DISCONTINUED | OUTPATIENT
Start: 2021-11-07 | End: 2021-11-08 | Stop reason: HOSPADM

## 2021-11-07 RX ORDER — HEPARIN SODIUM 5000 [USP'U]/ML
7500 INJECTION, SOLUTION INTRAVENOUS; SUBCUTANEOUS EVERY 8 HOURS SCHEDULED
Status: DISCONTINUED | OUTPATIENT
Start: 2021-11-07 | End: 2021-11-08 | Stop reason: HOSPADM

## 2021-11-07 RX ORDER — LEVOFLOXACIN 5 MG/ML
250 INJECTION, SOLUTION INTRAVENOUS EVERY 24 HOURS
Status: DISCONTINUED | OUTPATIENT
Start: 2021-11-07 | End: 2021-11-07

## 2021-11-07 RX ORDER — HEPARIN SODIUM 5000 [USP'U]/ML
5000 INJECTION, SOLUTION INTRAVENOUS; SUBCUTANEOUS EVERY 8 HOURS SCHEDULED
Status: DISCONTINUED | OUTPATIENT
Start: 2021-11-07 | End: 2021-11-07

## 2021-11-07 RX ORDER — HYDRALAZINE HYDROCHLORIDE 20 MG/ML
5 INJECTION INTRAMUSCULAR; INTRAVENOUS EVERY 6 HOURS PRN
Status: DISCONTINUED | OUTPATIENT
Start: 2021-11-07 | End: 2021-11-08 | Stop reason: HOSPADM

## 2021-11-07 RX ORDER — TRAMADOL HYDROCHLORIDE 50 MG/1
50 TABLET ORAL 2 TIMES DAILY PRN
Status: DISCONTINUED | OUTPATIENT
Start: 2021-11-07 | End: 2021-11-08 | Stop reason: HOSPADM

## 2021-11-07 RX ORDER — LEVOFLOXACIN 500 MG/1
500 TABLET, FILM COATED ORAL ONCE
Status: DISCONTINUED | OUTPATIENT
Start: 2021-11-07 | End: 2021-11-07

## 2021-11-07 RX ORDER — ACETAMINOPHEN 325 MG/1
650 TABLET ORAL EVERY 6 HOURS PRN
Status: DISCONTINUED | OUTPATIENT
Start: 2021-11-07 | End: 2021-11-08 | Stop reason: HOSPADM

## 2021-11-07 RX ORDER — PRAVASTATIN SODIUM 80 MG/1
80 TABLET ORAL
Status: DISCONTINUED | OUTPATIENT
Start: 2021-11-07 | End: 2021-11-08 | Stop reason: HOSPADM

## 2021-11-07 RX ORDER — POLYETHYLENE GLYCOL 3350 17 G/17G
17 POWDER, FOR SOLUTION ORAL DAILY PRN
Status: DISCONTINUED | OUTPATIENT
Start: 2021-11-07 | End: 2021-11-08 | Stop reason: HOSPADM

## 2021-11-07 RX ORDER — PREDNISONE 20 MG/1
40 TABLET ORAL DAILY
Status: DISCONTINUED | OUTPATIENT
Start: 2021-11-07 | End: 2021-11-08 | Stop reason: HOSPADM

## 2021-11-07 RX ORDER — IPRATROPIUM BROMIDE AND ALBUTEROL SULFATE 2.5; .5 MG/3ML; MG/3ML
3 SOLUTION RESPIRATORY (INHALATION)
Status: DISCONTINUED | OUTPATIENT
Start: 2021-11-07 | End: 2021-11-08

## 2021-11-07 RX ORDER — TRAZODONE HYDROCHLORIDE 50 MG/1
50 TABLET ORAL
Status: DISCONTINUED | OUTPATIENT
Start: 2021-11-07 | End: 2021-11-08 | Stop reason: HOSPADM

## 2021-11-07 RX ORDER — FLUTICASONE FUROATE AND VILANTEROL 100; 25 UG/1; UG/1
1 POWDER RESPIRATORY (INHALATION)
Status: DISCONTINUED | OUTPATIENT
Start: 2021-11-07 | End: 2021-11-08 | Stop reason: HOSPADM

## 2021-11-07 RX ORDER — PANTOPRAZOLE SODIUM 20 MG/1
20 TABLET, DELAYED RELEASE ORAL
Status: DISCONTINUED | OUTPATIENT
Start: 2021-11-07 | End: 2021-11-08 | Stop reason: HOSPADM

## 2021-11-07 RX ORDER — ASPIRIN 81 MG/1
81 TABLET ORAL DAILY
Status: DISCONTINUED | OUTPATIENT
Start: 2021-11-07 | End: 2021-11-08 | Stop reason: HOSPADM

## 2021-11-07 RX ORDER — LEVOFLOXACIN 5 MG/ML
250 INJECTION, SOLUTION INTRAVENOUS
Status: DISCONTINUED | OUTPATIENT
Start: 2021-11-08 | End: 2021-11-07

## 2021-11-07 RX ADMIN — GABAPENTIN 300 MG: 300 CAPSULE ORAL at 22:02

## 2021-11-07 RX ADMIN — INSULIN LISPRO 5 UNITS: 100 INJECTION, SOLUTION INTRAVENOUS; SUBCUTANEOUS at 13:06

## 2021-11-07 RX ADMIN — IPRATROPIUM BROMIDE AND ALBUTEROL SULFATE 3 ML: 2.5; .5 SOLUTION RESPIRATORY (INHALATION) at 08:30

## 2021-11-07 RX ADMIN — TRAMADOL HYDROCHLORIDE 50 MG: 50 TABLET ORAL at 17:50

## 2021-11-07 RX ADMIN — FLUTICASONE PROPIONATE 1 SPRAY: 50 SPRAY, METERED NASAL at 08:30

## 2021-11-07 RX ADMIN — TRAZODONE HYDROCHLORIDE 50 MG: 50 TABLET ORAL at 22:01

## 2021-11-07 RX ADMIN — MEMANTINE 10 MG: 10 TABLET ORAL at 17:50

## 2021-11-07 RX ADMIN — GABAPENTIN 300 MG: 300 CAPSULE ORAL at 08:31

## 2021-11-07 RX ADMIN — GABAPENTIN 300 MG: 300 CAPSULE ORAL at 17:50

## 2021-11-07 RX ADMIN — MEMANTINE 10 MG: 10 TABLET ORAL at 08:31

## 2021-11-07 RX ADMIN — PREDNISONE 40 MG: 20 TABLET ORAL at 08:31

## 2021-11-07 RX ADMIN — FLUTICASONE FUROATE AND VILANTEROL TRIFENATATE 1 PUFF: 100; 25 POWDER RESPIRATORY (INHALATION) at 08:30

## 2021-11-07 RX ADMIN — ASPIRIN 81 MG: 81 TABLET, COATED ORAL at 08:31

## 2021-11-07 RX ADMIN — INSULIN GLARGINE 75 UNITS: 100 INJECTION, SOLUTION SUBCUTANEOUS at 08:30

## 2021-11-07 RX ADMIN — ACETAMINOPHEN 975 MG: 325 TABLET, FILM COATED ORAL at 06:35

## 2021-11-07 RX ADMIN — MONTELUKAST 10 MG: 10 TABLET, FILM COATED ORAL at 22:02

## 2021-11-07 RX ADMIN — HEPARIN SODIUM 7500 UNITS: 5000 INJECTION INTRAVENOUS; SUBCUTANEOUS at 17:57

## 2021-11-07 RX ADMIN — GUAIFENESIN 1200 MG: 600 TABLET, EXTENDED RELEASE ORAL at 08:32

## 2021-11-07 RX ADMIN — PANTOPRAZOLE SODIUM 20 MG: 20 TABLET, DELAYED RELEASE ORAL at 06:35

## 2021-11-07 RX ADMIN — INSULIN GLARGINE 75 UNITS: 100 INJECTION, SOLUTION SUBCUTANEOUS at 22:02

## 2021-11-07 RX ADMIN — LORATADINE 10 MG: 10 TABLET ORAL at 08:31

## 2021-11-07 RX ADMIN — INSULIN LISPRO 6 UNITS: 100 INJECTION, SOLUTION INTRAVENOUS; SUBCUTANEOUS at 17:06

## 2021-11-07 RX ADMIN — HEPARIN SODIUM 7500 UNITS: 5000 INJECTION INTRAVENOUS; SUBCUTANEOUS at 22:01

## 2021-11-07 RX ADMIN — ACETAMINOPHEN 975 MG: 325 TABLET, FILM COATED ORAL at 17:49

## 2021-11-07 RX ADMIN — GUAIFENESIN 1200 MG: 600 TABLET, EXTENDED RELEASE ORAL at 22:01

## 2021-11-07 RX ADMIN — HEPARIN SODIUM 7500 UNITS: 5000 INJECTION INTRAVENOUS; SUBCUTANEOUS at 06:35

## 2021-11-07 RX ADMIN — PRAVASTATIN SODIUM 80 MG: 80 TABLET ORAL at 17:50

## 2021-11-07 RX ADMIN — ACETAMINOPHEN 975 MG: 325 TABLET, FILM COATED ORAL at 22:01

## 2021-11-07 RX ADMIN — DOXYCYCLINE 100 MG: 100 INJECTION, POWDER, LYOPHILIZED, FOR SOLUTION INTRAVENOUS at 10:54

## 2021-11-07 RX ADMIN — DOXYCYCLINE 100 MG: 100 CAPSULE ORAL at 22:01

## 2021-11-07 RX ADMIN — IPRATROPIUM BROMIDE AND ALBUTEROL SULFATE 3 ML: 2.5; .5 SOLUTION RESPIRATORY (INHALATION) at 19:26

## 2021-11-08 VITALS
OXYGEN SATURATION: 90 % | WEIGHT: 227.07 LBS | TEMPERATURE: 98.7 F | HEART RATE: 93 BPM | HEIGHT: 59 IN | DIASTOLIC BLOOD PRESSURE: 79 MMHG | SYSTOLIC BLOOD PRESSURE: 170 MMHG | BODY MASS INDEX: 45.78 KG/M2 | RESPIRATION RATE: 20 BRPM

## 2021-11-08 PROBLEM — N17.9 ACUTE KIDNEY INJURY SUPERIMPOSED ON CHRONIC KIDNEY DISEASE (HCC): Status: RESOLVED | Noted: 2020-10-14 | Resolved: 2021-11-08

## 2021-11-08 PROBLEM — L98.429 SACRAL ULCER (HCC): Status: ACTIVE | Noted: 2021-11-08

## 2021-11-08 PROBLEM — N18.9 ACUTE KIDNEY INJURY SUPERIMPOSED ON CHRONIC KIDNEY DISEASE (HCC): Status: RESOLVED | Noted: 2020-10-14 | Resolved: 2021-11-08

## 2021-11-08 LAB
ANION GAP SERPL CALCULATED.3IONS-SCNC: 6 MMOL/L (ref 4–13)
BUN SERPL-MCNC: 33 MG/DL (ref 5–25)
CALCIUM SERPL-MCNC: 8.8 MG/DL (ref 8.3–10.1)
CHLORIDE SERPL-SCNC: 108 MMOL/L (ref 100–108)
CO2 SERPL-SCNC: 29 MMOL/L (ref 21–32)
CREAT SERPL-MCNC: 1.5 MG/DL (ref 0.6–1.3)
ERYTHROCYTE [DISTWIDTH] IN BLOOD BY AUTOMATED COUNT: 16.5 % (ref 11.6–15.1)
GFR SERPL CREATININE-BSD FRML MDRD: 35 ML/MIN/1.73SQ M
GLUCOSE SERPL-MCNC: 115 MG/DL (ref 65–140)
GLUCOSE SERPL-MCNC: 152 MG/DL (ref 65–140)
GLUCOSE SERPL-MCNC: 280 MG/DL (ref 65–140)
HCT VFR BLD AUTO: 26.9 % (ref 34.8–46.1)
HGB BLD-MCNC: 8.1 G/DL (ref 11.5–15.4)
MCH RBC QN AUTO: 26.9 PG (ref 26.8–34.3)
MCHC RBC AUTO-ENTMCNC: 30.1 G/DL (ref 31.4–37.4)
MCV RBC AUTO: 89 FL (ref 82–98)
MRSA NOSE QL CULT: NORMAL
PLATELET # BLD AUTO: 119 THOUSANDS/UL (ref 149–390)
PMV BLD AUTO: 9.8 FL (ref 8.9–12.7)
POTASSIUM SERPL-SCNC: 4.3 MMOL/L (ref 3.5–5.3)
PROCALCITONIN SERPL-MCNC: 0.68 NG/ML
RBC # BLD AUTO: 3.01 MILLION/UL (ref 3.81–5.12)
SODIUM SERPL-SCNC: 143 MMOL/L (ref 136–145)
WBC # BLD AUTO: 7.64 THOUSAND/UL (ref 4.31–10.16)

## 2021-11-08 PROCEDURE — 94640 AIRWAY INHALATION TREATMENT: CPT

## 2021-11-08 PROCEDURE — 84145 PROCALCITONIN (PCT): CPT | Performed by: INTERNAL MEDICINE

## 2021-11-08 PROCEDURE — 80048 BASIC METABOLIC PNL TOTAL CA: CPT

## 2021-11-08 PROCEDURE — 82948 REAGENT STRIP/BLOOD GLUCOSE: CPT

## 2021-11-08 PROCEDURE — 99239 HOSP IP/OBS DSCHRG MGMT >30: CPT | Performed by: INTERNAL MEDICINE

## 2021-11-08 PROCEDURE — 85027 COMPLETE CBC AUTOMATED: CPT

## 2021-11-08 PROCEDURE — 94760 N-INVAS EAR/PLS OXIMETRY 1: CPT

## 2021-11-08 RX ORDER — PREDNISONE 20 MG/1
40 TABLET ORAL DAILY
Qty: 6 TABLET | Refills: 0
Start: 2021-11-09 | End: 2021-11-12

## 2021-11-08 RX ORDER — DOXYCYCLINE HYCLATE 100 MG/1
100 CAPSULE ORAL EVERY 12 HOURS
Qty: 8 CAPSULE | Refills: 0
Start: 2021-11-08 | End: 2021-11-13

## 2021-11-08 RX ORDER — LEVOFLOXACIN 750 MG/1
750 TABLET ORAL EVERY OTHER DAY
Qty: 3 TABLET
Start: 2021-11-08 | End: 2021-11-13

## 2021-11-08 RX ORDER — LEVALBUTEROL 1.25 MG/.5ML
1.25 SOLUTION, CONCENTRATE RESPIRATORY (INHALATION)
Status: DISCONTINUED | OUTPATIENT
Start: 2021-11-08 | End: 2021-11-08 | Stop reason: HOSPADM

## 2021-11-08 RX ADMIN — LORATADINE 10 MG: 10 TABLET ORAL at 10:27

## 2021-11-08 RX ADMIN — GUAIFENESIN 1200 MG: 600 TABLET, EXTENDED RELEASE ORAL at 10:28

## 2021-11-08 RX ADMIN — IPRATROPIUM BROMIDE 0.5 MG: 0.5 SOLUTION RESPIRATORY (INHALATION) at 07:21

## 2021-11-08 RX ADMIN — INSULIN LISPRO 4 UNITS: 100 INJECTION, SOLUTION INTRAVENOUS; SUBCUTANEOUS at 12:34

## 2021-11-08 RX ADMIN — MEMANTINE 10 MG: 10 TABLET ORAL at 10:27

## 2021-11-08 RX ADMIN — GABAPENTIN 300 MG: 300 CAPSULE ORAL at 10:27

## 2021-11-08 RX ADMIN — ACETAMINOPHEN 975 MG: 325 TABLET, FILM COATED ORAL at 14:03

## 2021-11-08 RX ADMIN — ACETAMINOPHEN 975 MG: 325 TABLET, FILM COATED ORAL at 05:41

## 2021-11-08 RX ADMIN — LEVALBUTEROL HYDROCHLORIDE 1.25 MG: 1.25 SOLUTION, CONCENTRATE RESPIRATORY (INHALATION) at 07:21

## 2021-11-08 RX ADMIN — DOXYCYCLINE 100 MG: 100 CAPSULE ORAL at 10:27

## 2021-11-08 RX ADMIN — PREDNISONE 40 MG: 20 TABLET ORAL at 10:27

## 2021-11-08 RX ADMIN — ASPIRIN 81 MG: 81 TABLET, COATED ORAL at 10:27

## 2021-11-08 RX ADMIN — HEPARIN SODIUM 7500 UNITS: 5000 INJECTION INTRAVENOUS; SUBCUTANEOUS at 14:03

## 2021-11-08 RX ADMIN — LEVALBUTEROL HYDROCHLORIDE 1.25 MG: 1.25 SOLUTION, CONCENTRATE RESPIRATORY (INHALATION) at 13:05

## 2021-11-08 RX ADMIN — IPRATROPIUM BROMIDE 0.5 MG: 0.5 SOLUTION RESPIRATORY (INHALATION) at 13:05

## 2021-11-08 RX ADMIN — INSULIN GLARGINE 75 UNITS: 100 INJECTION, SOLUTION SUBCUTANEOUS at 10:28

## 2021-11-08 RX ADMIN — FLUTICASONE FUROATE AND VILANTEROL TRIFENATATE 1 PUFF: 100; 25 POWDER RESPIRATORY (INHALATION) at 12:40

## 2021-11-08 RX ADMIN — FLUTICASONE PROPIONATE 1 SPRAY: 50 SPRAY, METERED NASAL at 12:39

## 2021-11-08 RX ADMIN — HEPARIN SODIUM 7500 UNITS: 5000 INJECTION INTRAVENOUS; SUBCUTANEOUS at 05:42

## 2021-11-08 RX ADMIN — PANTOPRAZOLE SODIUM 20 MG: 20 TABLET, DELAYED RELEASE ORAL at 05:41

## 2021-11-12 LAB
BACTERIA BLD CULT: NORMAL
BACTERIA BLD CULT: NORMAL

## 2021-12-02 ENCOUNTER — TELEPHONE (OUTPATIENT)
Dept: PULMONOLOGY | Facility: CLINIC | Age: 71
End: 2021-12-02

## 2021-12-10 ENCOUNTER — TELEPHONE (OUTPATIENT)
Dept: NEUROLOGY | Facility: CLINIC | Age: 71
End: 2021-12-10

## 2021-12-15 ENCOUNTER — OFFICE VISIT (OUTPATIENT)
Dept: NEUROLOGY | Facility: CLINIC | Age: 71
End: 2021-12-15
Payer: MEDICARE

## 2021-12-15 VITALS
WEIGHT: 190 LBS | HEART RATE: 91 BPM | SYSTOLIC BLOOD PRESSURE: 123 MMHG | TEMPERATURE: 96 F | BODY MASS INDEX: 38.3 KG/M2 | DIASTOLIC BLOOD PRESSURE: 60 MMHG | HEIGHT: 59 IN

## 2021-12-15 DIAGNOSIS — F02.80 ALZHEIMER'S DISEASE (HCC): Primary | ICD-10-CM

## 2021-12-15 DIAGNOSIS — G30.9 ALZHEIMER'S DISEASE (HCC): Primary | ICD-10-CM

## 2021-12-15 PROCEDURE — 99213 OFFICE O/P EST LOW 20 MIN: CPT | Performed by: PHYSICIAN ASSISTANT

## 2021-12-17 ENCOUNTER — OFFICE VISIT (OUTPATIENT)
Dept: PULMONOLOGY | Facility: CLINIC | Age: 71
End: 2021-12-17
Payer: MEDICARE

## 2021-12-17 VITALS
TEMPERATURE: 97.7 F | OXYGEN SATURATION: 92 % | HEART RATE: 92 BPM | DIASTOLIC BLOOD PRESSURE: 76 MMHG | BODY MASS INDEX: 37.11 KG/M2 | HEIGHT: 60 IN | SYSTOLIC BLOOD PRESSURE: 134 MMHG

## 2021-12-17 DIAGNOSIS — R91.1 PULMONARY NODULE: ICD-10-CM

## 2021-12-17 DIAGNOSIS — G47.34 NOCTURNAL HYPOXIA: ICD-10-CM

## 2021-12-17 DIAGNOSIS — J45.40 MODERATE PERSISTENT ASTHMA WITHOUT COMPLICATION: Primary | ICD-10-CM

## 2021-12-17 PROBLEM — J18.9 PNEUMONIA: Status: RESOLVED | Noted: 2020-09-19 | Resolved: 2021-12-17

## 2021-12-17 PROCEDURE — 99213 OFFICE O/P EST LOW 20 MIN: CPT | Performed by: NURSE PRACTITIONER

## 2021-12-17 RX ORDER — DOXYCYCLINE HYCLATE 100 MG
TABLET ORAL
COMMUNITY
Start: 2021-10-20 | End: 2022-03-16

## 2022-01-10 ENCOUNTER — NURSING HOME VISIT (OUTPATIENT)
Dept: GERIATRICS | Facility: OTHER | Age: 72
End: 2022-01-10
Payer: MEDICARE

## 2022-01-10 VITALS
BODY MASS INDEX: 43 KG/M2 | SYSTOLIC BLOOD PRESSURE: 129 MMHG | WEIGHT: 219 LBS | DIASTOLIC BLOOD PRESSURE: 55 MMHG | TEMPERATURE: 98 F | HEIGHT: 60 IN | HEART RATE: 78 BPM

## 2022-01-10 DIAGNOSIS — L02.211 ABSCESS OF ABDOMINAL WALL: Primary | ICD-10-CM

## 2022-01-10 PROBLEM — J45.40 MODERATE PERSISTENT ASTHMA WITHOUT COMPLICATION: Status: RESOLVED | Noted: 2018-10-15 | Resolved: 2022-01-10

## 2022-01-10 PROCEDURE — 99304 1ST NF CARE SF/LOW MDM 25: CPT | Performed by: SURGERY

## 2022-01-10 NOTE — PROGRESS NOTES
Assessment/Plan:   Diagnoses and all orders for this visit:    Abscess of abdominal wall      Resolved  Will check in 6 weeks  Subjective:      Patient ID: Omaira Smart is a 70 y o  female  Resident of [address] [place] [address]  HPI   Patient was seen in the surgical clinic for a possible abscess on the skin of the epigastric area of the abdominal wall  This appears to have resolved  Patient states that she had a pimple which drained pus but is much better now    The following portions of the patient's history were reviewed and updated as appropriate: allergies, current medications, past family history, past medical history, past social history, past surgical history and problem list     Review of Systems    Diabetes mellitus    Objective:    /55   Pulse 78   Temp 98 °F (36 7 °C)   Ht 5' (1 524 m)   Wt 99 3 kg (219 lb)   BMI 42 77 kg/m²      Physical Exam    There is an erythematous area on the skin of the epigastric area abdominal wall but there is no underlying abscess or collection  There is no palpable cyst subcutaneously  I will recheck the patient in 6 weeks to ensure that the skin is completely healed

## 2022-01-11 ENCOUNTER — OFFICE VISIT (OUTPATIENT)
Dept: ENDOCRINOLOGY | Facility: CLINIC | Age: 72
End: 2022-01-11
Payer: MEDICARE

## 2022-01-11 VITALS
BODY MASS INDEX: 44.23 KG/M2 | SYSTOLIC BLOOD PRESSURE: 130 MMHG | DIASTOLIC BLOOD PRESSURE: 80 MMHG | HEART RATE: 92 BPM | HEIGHT: 59 IN | TEMPERATURE: 97.5 F

## 2022-01-11 DIAGNOSIS — E55.9 VITAMIN D DEFICIENCY: ICD-10-CM

## 2022-01-11 DIAGNOSIS — E78.2 MIXED HYPERLIPIDEMIA: ICD-10-CM

## 2022-01-11 DIAGNOSIS — Z79.4 TYPE 2 DIABETES MELLITUS WITH HYPERGLYCEMIA, WITH LONG-TERM CURRENT USE OF INSULIN (HCC): Primary | Chronic | ICD-10-CM

## 2022-01-11 DIAGNOSIS — E11.65 TYPE 2 DIABETES MELLITUS WITH HYPERGLYCEMIA, WITH LONG-TERM CURRENT USE OF INSULIN (HCC): Primary | Chronic | ICD-10-CM

## 2022-01-11 DIAGNOSIS — I10 ESSENTIAL HYPERTENSION: Chronic | ICD-10-CM

## 2022-01-11 PROCEDURE — 99204 OFFICE O/P NEW MOD 45 MIN: CPT | Performed by: INTERNAL MEDICINE

## 2022-01-11 RX ORDER — INSULIN LISPRO 100 [IU]/ML
INJECTION, SOLUTION INTRAVENOUS; SUBCUTANEOUS
Qty: 15 ML
Start: 2022-01-11 | End: 2022-02-01 | Stop reason: SDUPTHER

## 2022-01-11 NOTE — PATIENT INSTRUCTIONS
INSULIN DOSAGE INSTRUCTIONS    Name: Gema Avila                        : 1950  MRN #: [id number]    Your Current Insulin  and dose is: Before Breakfast Before Lunch Before Evening Meal Bedtime     Humalog Insulin     30 units        30 units    30 units     Regular, Apidra, Humalog orNovolog Sliding Scale:   <80              151-200 + 2 +2 +2    201-250 + 4 +4 +4 +   251-300 + 6 +6 +6 +   301-350 + 8 +8 +8 +   >350 +10 +10 +10 +       Lantus Insulin   60 units    60 unit 0     Additional Instructions:   Please test your blood sugar:  _4_ Times per day  X_ Before Breakfast                _ Alternate Testing  X_ Before Lunch                _ 2 Hours After  Meal  X_ Before Evening Meal               _ 3 a m   x_ Before Bedtime Snack     Target Blood sugar range _70_to _140__  Call if your blood sugar is less than _60_ or greater than _400__  Today's Date: 2022        Hypoglycemia instructions   Gema Avila  2022  [id number]    Low Blood Sugar    Steps to treat low blood sugar  1  Test blood sugar if you have symptoms of low blood sugar:   Low Blood Sugar Symptoms:  o Sweaty  o Dizzy  o Rapid heartbeat  o Shaky    o Bad mood  o Hungry      2  Treat blood sugar less than 70 with 15 grams of fast-acting carbohydrate:   Examples of 15 grams Fast-Acting Carbohydrate:  o 4 oz juice  o 4 oz regular soda  o 3-4 glucose tablets (chew)  o 3-4 hard candies (chew)              3    Wait 15 minutes and test your blood sugar again           4   If blood sugar is less than 100, repeat steps 2-3       5  When your blood sugar is 100 or more, eat a snack if it will be longer than one hour until your next meal  The snack should be 15 grams of carbohydrate and a protein:   Examples of snacks:  o ½ sandwich  o 6 crackers with cheese  o Piece of fruit with cheese or peanut butter  o 6 crackers with peanut butter

## 2022-01-27 DIAGNOSIS — E11.65 TYPE 2 DIABETES MELLITUS WITH HYPERGLYCEMIA, WITH LONG-TERM CURRENT USE OF INSULIN (HCC): Chronic | ICD-10-CM

## 2022-01-27 DIAGNOSIS — Z79.4 TYPE 2 DIABETES MELLITUS WITH HYPERGLYCEMIA, WITH LONG-TERM CURRENT USE OF INSULIN (HCC): Chronic | ICD-10-CM

## 2022-01-27 NOTE — LETTER
02/01/22    Dear Michael Monroy 1950    Reviewed blood sugars, blood sugars are usually in 200-250 mg/dL range    bs log     meds  humalog 30u with meals + scale  lantus 60u Q 12 hours  Metformin 808PO BID  Trulicity 5 3WW weekly    Please inform patient, to increase Lantus to 65 units q 12 hours  Increase Humalog, 35 units with meals plus scale  Continue metformin 500 mg twice a day    Continue Trulicity 1 5 mg weekly    Robert Mejisa MD

## 2022-01-28 NOTE — TELEPHONE ENCOUNTER
bs log    meds  humalog 30u with meals + scale  lantus 60u Q 12 hours  Metformin 041YJ BID  Trulicity 4 2HO weekly

## 2022-01-31 NOTE — TELEPHONE ENCOUNTER
Reviewed blood sugars, blood sugars are usually in 200-250 mg/dL range    bs log     meds  humalog 30u with meals + scale  lantus 60u Q 12 hours  Metformin 493US BID  Trulicity 8 2NS weekly    Please inform patient, to increase Lantus to 65 units q 12 hours  Increase Humalog, 35 units with meals plus scale  Continue metformin 500 mg twice a day    Continue Trulicity 1 5 mg weekly    Lamar Dugan MD

## 2022-02-01 RX ORDER — INSULIN LISPRO 100 [IU]/ML
INJECTION, SOLUTION INTRAVENOUS; SUBCUTANEOUS
Qty: 15 ML
Start: 2022-02-01 | End: 2022-04-18

## 2022-02-01 NOTE — TELEPHONE ENCOUNTER
Called snow spoke with Elfego Mackenzie advised will be faxing order changes for pt, med list updated

## 2022-03-16 ENCOUNTER — OFFICE VISIT (OUTPATIENT)
Dept: PULMONOLOGY | Facility: CLINIC | Age: 72
End: 2022-03-16
Payer: MEDICARE

## 2022-03-16 VITALS
TEMPERATURE: 97.4 F | HEIGHT: 59 IN | HEART RATE: 88 BPM | SYSTOLIC BLOOD PRESSURE: 120 MMHG | DIASTOLIC BLOOD PRESSURE: 66 MMHG | OXYGEN SATURATION: 92 % | BODY MASS INDEX: 44.23 KG/M2

## 2022-03-16 DIAGNOSIS — J45.909 UNCOMPLICATED ASTHMA, UNSPECIFIED ASTHMA SEVERITY, UNSPECIFIED WHETHER PERSISTENT: Primary | ICD-10-CM

## 2022-03-16 DIAGNOSIS — E66.01 MORBID (SEVERE) OBESITY DUE TO EXCESS CALORIES (HCC): ICD-10-CM

## 2022-03-16 DIAGNOSIS — G47.34 NOCTURNAL HYPOXIA: ICD-10-CM

## 2022-03-16 DIAGNOSIS — G47.33 OSA (OBSTRUCTIVE SLEEP APNEA): ICD-10-CM

## 2022-03-16 PROCEDURE — 99214 OFFICE O/P EST MOD 30 MIN: CPT | Performed by: INTERNAL MEDICINE

## 2022-03-16 RX ORDER — TRAZODONE HYDROCHLORIDE 150 MG/1
TABLET ORAL
COMMUNITY
Start: 2022-01-25

## 2022-03-16 RX ORDER — DOXYCYCLINE HYCLATE 100 MG/1
CAPSULE ORAL
COMMUNITY
Start: 2022-01-03 | End: 2022-05-17 | Stop reason: ALTCHOICE

## 2022-03-16 NOTE — PROGRESS NOTES
Pulmonary Follow Up Note   Kayla De Dios 70 y o  female MRN: [id number]  3/16/2022      Assessment:  1  Asthma - moderate persistent with acute exacerbation  · Continue Advair 500/50 1puff twice daily  · Continue Duonebs BID-TID  · Continue singulair 10mg daily  · Only use NIKI HFA on prn basis  · Flu Vaccine encouraged yearly, COVID-19 x2 - asked for booster records through [facility]  · Follow up in 6 months or sooner as needed  · Noted POLST and DNR/I - previously     2  Pulmonary Nodule - stable, no further tracking is needed     3  Renal Lesion  · Simple cyst per US     4  Dementia     5  Nocturnal hypoxia - per report c/w GILDARDO  · Obtain PSG results from [facility]  · She is declining CPAP at this time  · Will review further with next visit  · PSG [facility] 5/24/21 - RDI 16 7/hr, margie SpO2 75%, 179 minutes SpO2 <88%  · She declines CPAP titration, will continue overnight oxygen at 2L/min    6  LE edema - further diuretic management per PCP    Plan:    Diagnoses and all orders for this visit:    Uncomplicated asthma, unspecified asthma severity, unspecified whether persistent    Nocturnal hypoxia    Morbid (severe) obesity due to excess calories (HCC)    GILDARDO (obstructive sleep apnea)    Other orders  -     doxycycline hyclate (VIBRAMYCIN) 100 mg capsule  -     traZODone (DESYREL) 150 mg tablet        Return in about 6 months (around 9/16/2022) for Recheck  History of Present Illness   HPI:  Kayla De Dios is a 70 y o  female who was initially seen during an admission for hypoxia and encephalopathy in August 2017  She is followed for her asthma and does not have COPD   She has progressive dementia and resides at [facility]  She had admission for asthma exacerbation in September 2020 and found to have nocturnal hypoxia requiring O2 and outpatient PSG was ordered but not obtained  Huangkerry Ghassan was last seen in April Oct 2021 with plans to start doxy/prednisone for asthma exacerbation    She had admission in Nov 2021 for pneumonia and admission record was reviewed  She was seen by Saul Champagne in Dec 2021 and was improving  She reports she is at baseline  She is tolerating Advair and duonebs twice daily  She denied wheeze, no sputum production, no chest pain  She remains primarily sedentary but is starting attempts with walker  She is using nocturnal oxygen, denied nocturnal dyspnea, denied AM headaches or fatigue  Review of Systems   Constitutional: Negative for activity change, chills, fatigue and fever  HENT: Negative for mouth sores, sore throat and trouble swallowing  Respiratory: Negative for cough, chest tightness, shortness of breath and wheezing  Cardiovascular: Positive for leg swelling  Negative for chest pain and palpitations  Gastrointestinal: Negative for abdominal pain, nausea and vomiting  Musculoskeletal: Positive for gait problem  Allergic/Immunologic: Negative for immunocompromised state  Neurological: Negative for headaches  Hematological: Negative for adenopathy  Psychiatric/Behavioral: Negative for sleep disturbance         Historical Information   Past Medical History:   Diagnosis Date    Asthma     Diabetes mellitus ([facility] [address] )     Hyperlipidemia     Hypertension      Past Surgical History:   Procedure Laterality Date    JOINT REPLACEMENT Bilateral     KNEE SURGERY      TOE SURGERY       Family History   Problem Relation Age of Onset    Dementia Brother     Breast cancer Sister 76    Cancer Brother          Meds/Allergies     Current Outpatient Medications:     acetaminophen (TYLENOL) 325 mg tablet, Take 3 tablets (975 mg total) by mouth every 8 (eight) hours, Disp: 30 tablet, Rfl: 0    Advair Diskus 500-50 MCG/DOSE inhaler, , Disp: , Rfl:     aspirin (ECOTRIN LOW STRENGTH) 81 mg EC tablet, , Disp: , Rfl:     benzonatate (TESSALON) 200 MG capsule, Take 200 mg by mouth 3 (three) times a day as needed for cough, Disp: , Rfl:     bisacodyl (DULCOLAX) 10 mg suppository, Insert 10 mg into the rectum daily as needed , Disp: , Rfl:     celecoxib (CeleBREX) 100 mg capsule, Take 100 mg by mouth daily , Disp: , Rfl:     cetirizine (ZyrTEC) 10 mg tablet, Take 10 mg by mouth daily, Disp: , Rfl:     Cholecalciferol (VITAMIN D3) 84347 units CAPS, Take 50,000 Units by mouth every 30 (thirty) days, Disp: , Rfl:     fluticasone (FLONASE) 50 mcg/act nasal spray, 1 spray into each nostril daily, Disp: 1 Bottle, Rfl: 5    furosemide (LASIX) 40 mg tablet, , Disp: , Rfl:     gabapentin (NEURONTIN) 300 mg capsule, Take 300 mg by mouth 3 (three) times a day, Disp: , Rfl:     Glucose Blood (BL TEST STRIP PACK VI), by In Vitro route, Disp: , Rfl:     glucose blood (ONE TOUCH ULTRA TEST) test strip, by In Vitro route, Disp: , Rfl:     HumaLOG KwikPen 100 units/mL injection pen, Inject 35 units with meals +scale (  Scale - 150-200 +2 units, 201-250+4 units, 251-300 +6 units, 301-350+8 units, > 350+10 units, Disp: 15 mL, Rfl:     insulin glargine (LANTUS SOLOSTAR) 100 units/mL injection pen, Inject 65 units twice a day, Disp: 15 mL, Rfl:     ipratropium-albuterol (DUO-NEB) 0 5-2 5 mg/3 mL nebulizer solution, , Disp: , Rfl:     lisinopril (ZESTRIL) 2 5 mg tablet, Take 2 5 mg by mouth daily, Disp: , Rfl:     magnesium hydroxide (EQ MILK OF MAGNESIA) 400 mg/5 mL oral suspension, Take by mouth daily as needed for constipation, Disp: , Rfl:     memantine (NAMENDA) 10 mg tablet, 10 mg 2 (two) times a day , Disp: , Rfl:     metFORMIN (GLUCOPHAGE) 500 mg tablet, , Disp: , Rfl:     montelukast (SINGULAIR) 10 mg tablet, Take 10 mg by mouth daily at bedtime, Disp: , Rfl:     Omeprazole 20 MG TBEC, Take 20 mg by mouth daily, Disp: , Rfl:     simvastatin (ZOCOR) 40 mg tablet, Take 1 tablet by mouth daily at bedtime, Disp: , Rfl: 0    traZODone (DESYREL) 150 mg tablet, , Disp: , Rfl:     Trulicity 1 5 ZY/5 3CT SOPN, , Disp: , Rfl:     dextromethorphan-guaiFENesin (DIABETIC SILTUSSIN-DM)  mg/5 mL oral liquid, Take 5 mL by mouth every 12 (twelve) hours (Patient not taking: Reported on 1/11/2022 ), Disp: , Rfl:     doxycycline hyclate (VIBRAMYCIN) 100 mg capsule, , Disp: , Rfl:     polyethylene glycol (MIRALAX) 17 g packet, 17 g daily (Patient not taking: Reported on 1/11/2022 ), Disp: , Rfl:   Allergies   Allergen Reactions    Penicillins Shortness Of Breath    Cephalexin Other (See Comments)     Reaction Date: [id number]; unknown     Crestor [Rosuvastatin] Other (See Comments)     Reaction Date: [id number]; unknown     Zithromax [Azithromycin] Other (See Comments)     Unknown        Vitals: Blood pressure 120/66, pulse 88, temperature (!) 97 4 °F (36 3 °C), temperature source Tympanic, height 4' 11" (1 499 m), SpO2 92 %  Body mass index is 44 23 kg/m²  Oxygen Therapy  SpO2: 92 %  Oxygen Therapy: None (Room air)      Physical Exam  Physical Exam  Vitals reviewed  Constitutional:       General: She is not in acute distress  Appearance: Normal appearance  She is well-developed  She is obese  She is not ill-appearing, toxic-appearing or diaphoretic  HENT:      Head: Normocephalic and atraumatic  Right Ear: External ear normal       Left Ear: External ear normal       Nose: Nose normal  No congestion  Mouth/Throat:      Mouth: Mucous membranes are moist       Pharynx: Oropharynx is clear  No oropharyngeal exudate  Comments: No thrush  Eyes:      General: No scleral icterus  Right eye: No discharge  Left eye: No discharge  Conjunctiva/sclera: Conjunctivae normal       Pupils: Pupils are equal, round, and reactive to light  Neck:      Vascular: No JVD  Trachea: No tracheal deviation  Cardiovascular:      Rate and Rhythm: Normal rate and regular rhythm  Heart sounds: Normal heart sounds  No murmur heard  No gallop  Pulmonary:      Effort: Pulmonary effort is normal  No respiratory distress  Breath sounds: No stridor  No wheezing, rhonchi or rales  Comments: Mildly diminished at bases, no wheeze or rales  Abdominal:      General: Bowel sounds are normal  There is no distension  Palpations: Abdomen is soft  Tenderness: There is no abdominal tenderness  There is no guarding or rebound  Musculoskeletal:         General: No deformity  Right lower leg: Edema present  Left lower leg: Edema present  Comments: 3+ LE edema with chronic early venous stasis changes   Lymphadenopathy:      Cervical: No cervical adenopathy  Skin:     General: Skin is warm and dry  Coloration: Skin is not jaundiced  Findings: No erythema or rash  Neurological:      General: No focal deficit present  Mental Status: She is alert  Mental status is at baseline  Comments: In wheelchair   Psychiatric:         Mood and Affect: Mood normal          Behavior: Behavior normal          Thought Content: Thought content normal          Labs: I have personally reviewed pertinent lab results    Lab Results   Component Value Date    WBC 7 64 11/08/2021    HGB 8 1 (L) 11/08/2021    HCT 26 9 (L) 11/08/2021    MCV 89 11/08/2021     (L) 11/08/2021     Lab Results   Component Value Date    GLUCOSE 171 (H) 09/21/2015    CALCIUM 8 8 11/08/2021     (L) 09/21/2015    K 4 3 11/08/2021    CO2 29 11/08/2021     11/08/2021    BUN 33 (H) 11/08/2021    CREATININE 1 50 (H) 11/08/2021     No results found for: IGE  Lab Results   Component Value Date    ALT 54 11/06/2021    AST 30 11/06/2021    ALKPHOS 59 11/06/2021    BILITOT 0 61 09/21/2015     COVID-19 neg 10/28, 10/2, 9/19  Imaging and other studies: New images and reports personally reviewed  CXR 11/6/2022 - no dense infiltrate but noted suggestion of central vascular congestions, no PTX, no sig effusions    Renal US 11/12/20 - simple cyst left kidney     CT Chest 10/26/2020 - resolved basilar infiltrates, stable pulmonary nodules largest 7-8mm LYLA has been stable, left kidney lesion recommending renal US         CT Chest 19 - stable LYLA apical nodule, measures at 7mm per radiology, 4mm RUL pulmonary nodule, no significant mediastinal adenopathy     CT Chest 18 - stable 8mm LYLA and 3mm RUL nodules, no sig mediastinal adenopathy, mild basilar atelectasis     Pulmonary function testin2017 - Ratio 69%, %, FEV1 115% - normal spirometry - shortened expiratory time but with values >100% predicted       TTE 2020 - EF 55%, normal RV size/function    Nader Duval DO, [facility]

## 2022-04-14 ENCOUNTER — TELEPHONE (OUTPATIENT)
Dept: ENDOCRINOLOGY | Facility: CLINIC | Age: 72
End: 2022-04-14

## 2022-04-18 ENCOUNTER — OFFICE VISIT (OUTPATIENT)
Dept: ENDOCRINOLOGY | Facility: CLINIC | Age: 72
End: 2022-04-18
Payer: MEDICARE

## 2022-04-18 VITALS
BODY MASS INDEX: 44.23 KG/M2 | SYSTOLIC BLOOD PRESSURE: 130 MMHG | TEMPERATURE: 98.2 F | HEART RATE: 96 BPM | DIASTOLIC BLOOD PRESSURE: 80 MMHG | HEIGHT: 59 IN

## 2022-04-18 DIAGNOSIS — Z79.4 TYPE 2 DIABETES MELLITUS WITH HYPERGLYCEMIA, WITH LONG-TERM CURRENT USE OF INSULIN (HCC): Primary | ICD-10-CM

## 2022-04-18 DIAGNOSIS — E11.65 TYPE 2 DIABETES MELLITUS WITH HYPERGLYCEMIA, WITH LONG-TERM CURRENT USE OF INSULIN (HCC): Primary | ICD-10-CM

## 2022-04-18 DIAGNOSIS — I10 ESSENTIAL HYPERTENSION: ICD-10-CM

## 2022-04-18 DIAGNOSIS — E78.2 MIXED HYPERLIPIDEMIA: ICD-10-CM

## 2022-04-18 PROCEDURE — 99214 OFFICE O/P EST MOD 30 MIN: CPT | Performed by: PHYSICIAN ASSISTANT

## 2022-04-18 RX ORDER — TRAMADOL HYDROCHLORIDE 50 MG/1
50 TABLET ORAL EVERY 6 HOURS PRN
COMMUNITY
End: 2022-06-28

## 2022-04-18 RX ORDER — DULAGLUTIDE 3 MG/.5ML
3 INJECTION, SOLUTION SUBCUTANEOUS WEEKLY
Qty: 2 ML | Refills: 1 | Status: SHIPPED | OUTPATIENT
Start: 2022-04-18

## 2022-04-18 RX ORDER — INSULIN LISPRO 100 [IU]/ML
INJECTION, SOLUTION INTRAVENOUS; SUBCUTANEOUS
Qty: 15 ML
Start: 2022-04-18 | End: 2022-06-28

## 2022-04-18 RX ORDER — TORSEMIDE 20 MG/1
20 TABLET ORAL DAILY
COMMUNITY

## 2022-04-18 NOTE — PATIENT INSTRUCTIONS
Hypoglycemia instructions   Dinora oLzano  4/18/2022  [id number]    Low Blood Sugar    Steps to treat low blood sugar  1  Test blood sugar if you have symptoms of low blood sugar:   Low Blood Sugar Symptoms:  o Sweaty  o Dizzy  o Rapid heartbeat  o Shaky  o Bad mood  o Hungry      2  Treat blood sugar less than 70 with 15 grams of fast-acting carbohydrate:   Examples of 15 grams Fast-Acting Carbohydrate:  o 4 oz juice  o 4 oz regular soda  o 3-4 glucose tablets (chew)  o 3-4 hard candies (chew)          3  Wait 15 minutes and test your blood sugar again     4   If blood sugar is less than 100, repeat steps 2-3     5  When your blood sugar is 100 or more, eat a snack if it will be longer than one hour until your next meal  The snack should be 15 grams of carbohydrate and a protein:   Examples of snacks:  o ½ sandwich  o 6 crackers with cheese  o Piece of fruit with cheese or peanut butter  o 6 crackers with peanut butter

## 2022-04-18 NOTE — PROGRESS NOTES
Patient Progress Note      CC: DM   Resident at [facility]  Here with her transport  Referring Provider  Belinda Pham Md  [address] [facility]  [address]  [facility],  [address]     History of Present Illness:   Michael Lundberg is a 70 y o  female with a history of type 2 diabetes with long term use of insulin  Diabetes course has been stable  Denies recent illness or hospitalizations  Denies recent severe hypoglycemic or severe hyperglycemic episodes  Denies any issues with her current regimen  Home glucose monitoring: are performed regularly    Home blood glucose readings: 170-400s mg/dl     Current regimen: Lantus 67 units BID, Humalog 39 units TID with meals plus scale, Trulicity 1 5 mg weekly, metformin 500 mg BID  compliant most of the time, denies any side effects from medications  Injects in: abdomen  Rotates sites: Yes  Hypoglycemic episodes: No, rare (not that she is aware of)  H/o of hypoglycemia causing hospitalization or Intervention such as glucagon injection  or ambulance call :  No  Hypoglycemia symptoms: jitteriness  Treatment of hypoglycemia: orange juice    Medic alert tag: recommended: Yes    Diabetes education: No  Diet: 3 meals per day, 1-3 snacks per day  Timing of meals is predictable  Diabetic diet compliance:  noncompliant some of the time  Activity: Daily activity is predictable: Yes  She does try to walk as much as she tolerates  Ophthamology: she is not aware of the last  Podiatry: seen at facility     Has hypertension: on ACE inhibitor/ARB, compliant most of the time  Has hyperlipidemia: on statin - tolerating well, no myalgias  compliant most of the time, denies any side effects from medications    Thyroid disorders: No  History of pancreatitis: No    Patient Active Problem List   Diagnosis    Mental status change    Essential hypertension    Type 2 diabetes mellitus with hyperglycemia [facility]    Alzheimer's disease ([facility]    Pulmonary nodule    Preop pulmonary/respiratory exam    MCI (mild cognitive impairment)    Fall    Injury of face    Sixth nerve palsy of left eye    Ptosis of left eyelid    Seasonal allergies    Need for pneumococcal vaccination    Hoarse voice quality    Sepsis ([facility] ) secondary to pneumonia    Hyperlipidemia    Asthma    S/P tooth extraction    Weakness generalized    Nocturnal hypoxia    Kidney lesion    Morbid (severe) obesity due to excess calories (HCC)    Sacral ulcer (HCC)    Abscess of abdominal wall    GILDARDO (obstructive sleep apnea)      Past Medical History:   Diagnosis Date    Asthma     Diabetes mellitus ([facility] )     Hyperlipidemia     Hypertension       Past Surgical History:   Procedure Laterality Date    JOINT REPLACEMENT Bilateral     KNEE SURGERY      TOE SURGERY        Family History   Problem Relation Age of Onset    Dementia Brother     Breast cancer Sister 76    Cancer Brother      Social History     Tobacco Use    Smoking status: Former Smoker     Packs/day: 1 00     Years: 52 00     Pack years: 52 00     Types: Cigarettes     Start date:      Quit date: 2017     Years since quittin 6    Smokeless tobacco: Never Used   Substance Use Topics    Alcohol use: Not Currently     Allergies   Allergen Reactions    Penicillins Shortness Of Breath    Cephalexin Other (See Comments)     Reaction Date: 2011; unknown     Crestor [Rosuvastatin] Other (See Comments)     Reaction Date: 2011; unknown     Zithromax [Azithromycin] Other (See Comments)     Unknown          Current Outpatient Medications:     acetaminophen (TYLENOL) 325 mg tablet, Take 3 tablets (975 mg total) by mouth every 8 (eight) hours, Disp: 30 tablet, Rfl: 0    Advair Diskus 500-50 MCG/DOSE inhaler, , Disp: , Rfl:     aspirin (ECOTRIN LOW STRENGTH) 81 mg EC tablet, , Disp: , Rfl:     benzonatate (TESSALON) 200 MG capsule, Take 200 mg by mouth 3 (three) times a day as needed for cough, Disp: , Rfl:    bisacodyl (DULCOLAX) 10 mg suppository, Insert 10 mg into the rectum daily as needed , Disp: , Rfl:     celecoxib (CeleBREX) 100 mg capsule, Take 100 mg by mouth daily , Disp: , Rfl:     cetirizine (ZyrTEC) 10 mg tablet, Take 10 mg by mouth daily, Disp: , Rfl:     Cholecalciferol (VITAMIN D3) 94135 units CAPS, Take 50,000 Units by mouth every 30 (thirty) days, Disp: , Rfl:     dextromethorphan-guaiFENesin (DIABETIC SILTUSSIN-DM)  mg/5 mL oral liquid, Take 5 mL by mouth every 12 (twelve) hours  , Disp: , Rfl:     fluticasone (FLONASE) 50 mcg/act nasal spray, 1 spray into each nostril daily, Disp: 1 Bottle, Rfl: 5    Glucose Blood (BL TEST STRIP PACK VI), by In Vitro route, Disp: , Rfl:     glucose blood (ONE TOUCH ULTRA TEST) test strip, by In Vitro route, Disp: , Rfl:     HumaLOG KwikPen 100 units/mL injection pen, Inject 39 units with meals +scale (  Scale - 150-200 +2 units, 201-250+4 units, 251-300 +6 units, 301-350+8 units, > 350+10 units, Disp: 15 mL, Rfl:     insulin glargine (LANTUS SOLOSTAR) 100 units/mL injection pen, Inject 70 units twice a day, Disp: 15 mL, Rfl:     ipratropium-albuterol (DUO-NEB) 0 5-2 5 mg/3 mL nebulizer solution, , Disp: , Rfl:     lisinopril (ZESTRIL) 2 5 mg tablet, Take 2 5 mg by mouth daily, Disp: , Rfl:     magnesium hydroxide (EQ MILK OF MAGNESIA) 400 mg/5 mL oral suspension, Take by mouth daily as needed for constipation, Disp: , Rfl:     memantine (NAMENDA) 10 mg tablet, 10 mg 2 (two) times a day , Disp: , Rfl:     metFORMIN (GLUCOPHAGE) 500 mg tablet, , Disp: , Rfl:     montelukast (SINGULAIR) 10 mg tablet, Take 10 mg by mouth daily at bedtime, Disp: , Rfl:     Omeprazole 20 MG TBEC, Take 20 mg by mouth daily, Disp: , Rfl:     simvastatin (ZOCOR) 40 mg tablet, Take 1 tablet by mouth daily at bedtime, Disp: , Rfl: 0    torsemide (DEMADEX) 20 mg tablet, Take 20 mg by mouth daily, Disp: , Rfl:     traMADol (ULTRAM) 50 mg tablet, Take 50 mg by mouth every 6 (six) hours as needed for moderate pain, Disp: , Rfl:     traZODone (DESYREL) 150 mg tablet, , Disp: , Rfl:     doxycycline hyclate (VIBRAMYCIN) 100 mg capsule, , Disp: , Rfl:     Dulaglutide (Trulicity) 3 IE/9 7FP SOPN, Inject 0 5 mL (3 mg total) under the skin once a week, Disp: 2 mL, Rfl: 1    furosemide (LASIX) 40 mg tablet, , Disp: , Rfl:     gabapentin (NEURONTIN) 300 mg capsule, Take 300 mg by mouth 3 (three) times a day (Patient not taking: Reported on 4/18/2022 ), Disp: , Rfl:     polyethylene glycol (MIRALAX) 17 g packet, 17 g daily (Patient not taking: Reported on 1/11/2022 ), Disp: , Rfl:   Review of Systems   Constitutional: Positive for fatigue (occasional)  Negative for activity change, appetite change and unexpected weight change  HENT: Negative for trouble swallowing  Eyes: Negative for visual disturbance  Respiratory: Negative for shortness of breath  Cardiovascular: Negative for chest pain and palpitations  Gastrointestinal: Negative for constipation and diarrhea  Endocrine: Negative for polydipsia and polyuria  Musculoskeletal: Positive for arthralgias and myalgias  Skin: Negative for wound  Neurological: Positive for numbness (occasional)  Psychiatric/Behavioral: Negative  Physical Exam:  Body mass index is 44 23 kg/m²  /80   Pulse 96   Temp 98 2 °F (36 8 °C) (Tympanic)   Ht 4' 11" (1 499 m)   BMI 44 23 kg/m²    Wt Readings from Last 3 Encounters:   01/10/22 99 3 kg (219 lb)   12/15/21 86 2 kg (190 lb)   11/08/21 103 kg (227 lb 1 2 oz)       Physical Exam  Vitals and nursing note reviewed  Constitutional:       Appearance: She is well-developed  HENT:      Head: Normocephalic  Eyes:      General: No scleral icterus  Pupils: Pupils are equal, round, and reactive to light  Neck:      Thyroid: No thyromegaly  Cardiovascular:      Rate and Rhythm: Normal rate and regular rhythm        Pulses:           Radial pulses are 2+ on the right side and 2+ on the left side  Heart sounds: No murmur heard  Pulmonary:      Effort: Pulmonary effort is normal  No respiratory distress  Breath sounds: Normal breath sounds  No wheezing  Musculoskeletal:      Cervical back: Neck supple  Skin:     General: Skin is warm and dry  Neurological:      Mental Status: She is alert  Patient's shoes and socks were not removed  Labs:   Lab Results   Component Value Date    HGBA1C 7 2 (H) 04/11/2022      Ref Range & Units 4/11/22  4:40 AM   Glucose 65 - 99 mg/dL 167 High     BUN 7 - 25 mg/dL 39 High     Creatinine 0 40 - 1 10 mg/dL 1 51 High     Sodium 135 - 145 mmol/L 141    Potassium 3 5 - 5 2 mmol/L 5 0    Chloride 100 - 109 mmol/L 102    Carbon Dioxide 23 - 31 mmol/L 34 High     Calcium 8 5 - 10 1 mg/dL 9 4    Anion Gap 3 - 11 5    eGFRcr >59 37 Low             Plan:    Diagnoses and all orders for this visit:    Type 2 diabetes mellitus with hyperglycemia, with long-term current use of insulin ([facility])  HGA1C 7 2%  Likely falsely low due to CKD / anemia  Treatment regimen: BG levels often ranging in 200-300s  Increase Lantus to 70 units BID and Trulicity to 3 mg weekly  Discussed intensive insulin regimen does increase risk for hypoglycemia  Episodes of hypoglycemia can lead to permanent disability and death  Discussed risks/complications associated with uncontrolled diabetes  Advised to adhere to diabetic diet, and recommended staying active/exercising routinely as tolerated  Keep carbohydrates consistent to limit blood glucose fluctuations  Advised to call if blood sugars less than 70 mg/dl or over 300 mg/dl  Check blood glucose 3+ times a day  Discussed symptoms and treatment of hypoglycemia  Recommended routine follow-up with podiatry and ophthalmology  Send log in 2 weeks  Ordered blood work to complete prior to next visit  -     Hemoglobin A1C; Future  -     Basic metabolic panel;  Future  -     insulin glargine (LANTUS SOLOSTAR) 100 units/mL injection pen; Inject 70 units twice a day  -     HumaLOG KwikPen 100 units/mL injection pen; Inject 39 units with meals +scale (  Scale - 150-200 +2 units, 201-250+4 units, 251-300 +6 units, 301-350+8 units, > 350+10 units  -     Dulaglutide (Trulicity) 3 OG/3 4SI SOPN; Inject 0 5 mL (3 mg total) under the skin once a week    Essential hypertension  Blood pressure adequately controlled, continue current treatment   -     Basic metabolic panel; Future    Mixed hyperlipidemia  Continue statin therapy         Discussed with the patient diagnosis and treatment and all questions fully answered  She will call me if any problems arise  Counseled patient on diagnostic results, prognosis, risk and benefit of treatment options, instruction for management, importance of treatment compliance, risk factor reduction and impressions        Elena Mays PA-C

## 2022-05-16 ENCOUNTER — NURSING HOME VISIT (OUTPATIENT)
Dept: GERIATRICS | Facility: OTHER | Age: 72
End: 2022-05-16
Payer: MEDICARE

## 2022-05-16 DIAGNOSIS — L02.211 ABSCESS OF ABDOMINAL WALL: Primary | ICD-10-CM

## 2022-05-16 PROCEDURE — 99304 1ST NF CARE SF/LOW MDM 25: CPT | Performed by: SURGERY

## 2022-05-17 VITALS
TEMPERATURE: 98.2 F | WEIGHT: 213 LBS | HEIGHT: 59 IN | SYSTOLIC BLOOD PRESSURE: 123 MMHG | BODY MASS INDEX: 42.94 KG/M2 | DIASTOLIC BLOOD PRESSURE: 74 MMHG | HEART RATE: 67 BPM

## 2022-05-17 NOTE — PROGRESS NOTES
Assessment/Plan:     Diagnoses and all orders for this visit:    Abscess of abdominal wall      resolved  Discharge and see p r n  Subjective:      Patient ID: Earline Black is a 70 y o  female  Resident of [address] [place] [address]   Patient was seen for follow-up of abscess on the abdominal wall in the epigastric area  Patient has no complaints now  The following portions of the patient's history were reviewed and updated as appropriate: allergies, current medications, past family history, past medical history, past social history, past surgical history and problem list     Review of Systems    Not applicable    Objective:    /74   Pulse 67   Temp 98 2 °F (36 8 °C)   Ht 4' 11" (1 499 m)   Wt 96 6 kg (213 lb)   BMI 43 02 kg/m²      Physical Exam    There is complete healing of the abscess site in the epigastric area    There is no residual cyst

## 2022-06-15 ENCOUNTER — OFFICE VISIT (OUTPATIENT)
Dept: NEUROLOGY | Facility: CLINIC | Age: 72
End: 2022-06-15
Payer: MEDICARE

## 2022-06-15 VITALS
DIASTOLIC BLOOD PRESSURE: 68 MMHG | OXYGEN SATURATION: 96 % | SYSTOLIC BLOOD PRESSURE: 120 MMHG | HEART RATE: 86 BPM | TEMPERATURE: 98.7 F

## 2022-06-15 DIAGNOSIS — G30.9 ALZHEIMER'S DEMENTIA (HCC): Primary | ICD-10-CM

## 2022-06-15 DIAGNOSIS — F02.80 ALZHEIMER'S DEMENTIA (HCC): Primary | ICD-10-CM

## 2022-06-15 PROCEDURE — 99214 OFFICE O/P EST MOD 30 MIN: CPT | Performed by: PHYSICIAN ASSISTANT

## 2022-06-15 RX ORDER — SODIUM HYPOCHLORITE 0.057 %
GEL (GRAM) TOPICAL DAILY
COMMUNITY

## 2022-06-15 RX ORDER — GABAPENTIN 300 MG/1
300 CAPSULE ORAL 2 TIMES DAILY
COMMUNITY

## 2022-06-15 NOTE — PROGRESS NOTES
Patient ID: Rito Monzon is a 70 y o  female  Assessment/Plan:    Alzheimer's disease ([facility] )  · Pt denies any current complaints  · Staff denies any new problems since her last appt  · She will continue memantine 10mg 2x daily  · Patient was encouraged to increase mind stimulating activities such as reading, crosswords, word searches, puzzles, Soduku, solitaire, coloring and other brain games  We also discussed the importance of staying physically active and eating a health diet such as the Mediterranean or MIND diet  I have spent 25 minutes with patient and staff today in which greater than 50% of this time was spent in counseling/coordination of care regarding Diagnostic results, Prognosis, Risks and benefits of tx options, Intructions for management, Patient and family education, Importance of tx compliance, Risk factor reductions and Impressions  She will follow-up in 1 year  Problem List Items Addressed This Visit    None     Visit Diagnoses     Alzheimer's dementia ([facility] )    -  Primary             Subjective:    HPI    Rito Monzon is a 70 y o  female, with HTN, DM type 2, hyperlipidemia, anxiety and asthma, who follows in the office due to Alzheimer's dementia  She states that she is doing well overall  She continues to take memantine and tolerates it without difficulty  She states that her memory is stable  She denies any difficulty with appetite or swallowing  She is able to socialize with other residents at [address] [place] [address]  She will participate in activities at times  She has not had any hallucinations  No problems were expressed by the staff at [address] [place] [address]      The following portions of the patient's history were reviewed and updated as appropriate: allergies, current medications, past family history, past medical history, past social history, past surgical history and problem list          Objective:    Blood pressure 120/68, pulse 86, temperature 98 7 °F (37 1 °C), temperature source Tympanic Core, SpO2 96 %  Physical Exam  Vitals reviewed  Constitutional:       General: She is awake  Eyes:      General: Lids are normal       Extraocular Movements: Extraocular movements intact  Pupils: Pupils are equal, round, and reactive to light  Neurological:      Mental Status: She is alert  Deep Tendon Reflexes: Strength normal       Reflex Scores:       Patellar reflexes are 1+ on the right side and 1+ on the left side  Achilles reflexes are 1+ on the right side and 1+ on the left side  Psychiatric:         Speech: Speech normal          Neurological Exam  Mental Status  Awake and alert  Speech is normal  Language is fluent with no aphasia  Apraxia absent  Cranial Nerves  CN II: Visual acuity is normal  Visual fields full to confrontation  CN III, IV, VI: Extraocular movements intact bilaterally  Normal lids and orbits bilaterally  Pupils equal round and reactive to light bilaterally  CN V: Facial sensation is normal   CN VII: Full and symmetric facial movement  CN VIII: Hearing is normal   CN IX, X: Palate elevates symmetrically  Normal gag reflex  CN XI: Shoulder shrug strength is normal   CN XII: Tongue midline without atrophy or fasciculations  Motor   Strength is 5/5 throughout all four extremities  Sensory  Sensation is intact to light touch, pinprick, vibration and proprioception in all four extremities  Reflexes  Deep tendon reflexes are 2+ and symmetric except as noted  Right                      Left  Patellar                                1+                         1+  Achilles                                1+                         1+    Coordination  Right: Finger-to-nose normal Left: Finger-to-nose normal     Gait    Wheelchair mobility  ROS:    Review of Systems   Constitutional: Negative  Negative for appetite change and fever  HENT: Negative    Negative for hearing loss, tinnitus, trouble swallowing and voice change  Eyes: Negative  Negative for photophobia and pain  Respiratory: Negative  Negative for shortness of breath  Cardiovascular: Negative  Negative for palpitations  Gastrointestinal: Negative  Negative for nausea and vomiting  Endocrine: Negative  Negative for cold intolerance  Genitourinary: Negative  Negative for dysuria, frequency and urgency  Musculoskeletal: Negative  Negative for myalgias and neck pain  Skin: Negative  Negative for rash  Neurological: Positive for numbness (Hands/Feet)  Negative for dizziness, tremors, seizures, syncope, facial asymmetry, speech difficulty, weakness, light-headedness and headaches  Hematological: Negative  Does not bruise/bleed easily  Psychiatric/Behavioral: Positive for confusion  Negative for hallucinations and sleep disturbance  Memory problem     Review of systems personally reviewed

## 2022-06-15 NOTE — PATIENT INSTRUCTIONS
Alzheimer's disease ([facility] )  Pt denies any current complaints  Staff denies any new problems since her last appt  She will continue memantine 10mg 2x daily  Patient was encouraged to increase mind stimulating activities such as reading, crosswords, word searches, puzzles, Soduku, solitaire, coloring and other brain games  We also discussed the importance of staying physically active and eating a health diet such as the Mediterranean or MIND diet  I have spent 25 minutes with patient and staff today in which greater than 50% of this time was spent in counseling/coordination of care regarding Diagnostic results, Prognosis, Risks and benefits of tx options, Intructions for management, Patient and family education, Importance of tx compliance, Risk factor reductions and Impressions  She will follow-up in 1 year

## 2022-06-15 NOTE — ASSESSMENT & PLAN NOTE
· Pt denies any current complaints  · Staff denies any new problems since her last appt  · She will continue memantine 10mg 2x daily  · Patient was encouraged to increase mind stimulating activities such as reading, crosswords, word searches, puzzles, Soduku, solitaire, coloring and other brain games  We also discussed the importance of staying physically active and eating a health diet such as the Mediterranean or MIND diet  I have spent 25 minutes with patient and staff today in which greater than 50% of this time was spent in counseling/coordination of care regarding Diagnostic results, Prognosis, Risks and benefits of tx options, Intructions for management, Patient and family education, Importance of tx compliance, Risk factor reductions and Impressions  She will follow-up in 1 year

## 2022-06-24 ENCOUNTER — APPOINTMENT (EMERGENCY)
Dept: RADIOLOGY | Facility: HOSPITAL | Age: 72
DRG: 638 | End: 2022-06-24
Payer: MEDICARE

## 2022-06-24 ENCOUNTER — HOSPITAL ENCOUNTER (INPATIENT)
Facility: HOSPITAL | Age: 72
LOS: 4 days | Discharge: NURSING, REHAB OR LONG-TERM CARE | DRG: 638 | End: 2022-06-28
Attending: EMERGENCY MEDICINE | Admitting: INTERNAL MEDICINE
Payer: MEDICARE

## 2022-06-24 DIAGNOSIS — E11.621 DIABETIC ULCER OF LEFT MIDFOOT ASSOCIATED WITH TYPE 2 DIABETES MELLITUS, UNSPECIFIED ULCER STAGE (HCC): ICD-10-CM

## 2022-06-24 DIAGNOSIS — L97.509 TYPE 2 DIABETES MELLITUS WITH FOOT ULCER, WITH LONG-TERM CURRENT USE OF INSULIN (HCC): ICD-10-CM

## 2022-06-24 DIAGNOSIS — L97.429 DIABETIC ULCER OF LEFT MIDFOOT ASSOCIATED WITH TYPE 2 DIABETES MELLITUS, UNSPECIFIED ULCER STAGE (HCC): ICD-10-CM

## 2022-06-24 DIAGNOSIS — E08.621 DIABETIC ULCER OF LEFT FOOT ASSOCIATED WITH DIABETES MELLITUS DUE TO UNDERLYING CONDITION, LIMITED TO BREAKDOWN OF SKIN, UNSPECIFIED PART OF FOOT (HCC): ICD-10-CM

## 2022-06-24 DIAGNOSIS — E11.621 TYPE 2 DIABETES MELLITUS WITH FOOT ULCER, WITH LONG-TERM CURRENT USE OF INSULIN (HCC): ICD-10-CM

## 2022-06-24 DIAGNOSIS — D64.9 ANEMIA, UNSPECIFIED TYPE: ICD-10-CM

## 2022-06-24 DIAGNOSIS — Z79.4 TYPE 2 DIABETES MELLITUS WITH FOOT ULCER, WITH LONG-TERM CURRENT USE OF INSULIN (HCC): ICD-10-CM

## 2022-06-24 DIAGNOSIS — L03.90 CELLULITIS: Primary | ICD-10-CM

## 2022-06-24 DIAGNOSIS — N17.9 AKI (ACUTE KIDNEY INJURY) (HCC): ICD-10-CM

## 2022-06-24 DIAGNOSIS — L97.521 DIABETIC ULCER OF LEFT FOOT ASSOCIATED WITH DIABETES MELLITUS DUE TO UNDERLYING CONDITION, LIMITED TO BREAKDOWN OF SKIN, UNSPECIFIED PART OF FOOT (HCC): ICD-10-CM

## 2022-06-24 LAB
ALBUMIN SERPL BCP-MCNC: 3.7 G/DL (ref 3.5–5)
ALP SERPL-CCNC: 53 U/L (ref 34–104)
ALT SERPL W P-5'-P-CCNC: 17 U/L (ref 7–52)
ANION GAP SERPL CALCULATED.3IONS-SCNC: 8 MMOL/L (ref 4–13)
APTT PPP: 36 SECONDS (ref 23–37)
AST SERPL W P-5'-P-CCNC: 12 U/L (ref 13–39)
BASOPHILS # BLD AUTO: 0.02 THOUSANDS/ΜL (ref 0–0.1)
BASOPHILS NFR BLD AUTO: 0 % (ref 0–1)
BILIRUB SERPL-MCNC: 0.49 MG/DL (ref 0.2–1)
BUN SERPL-MCNC: 71 MG/DL (ref 5–25)
CALCIUM SERPL-MCNC: 8.9 MG/DL (ref 8.4–10.2)
CHLORIDE SERPL-SCNC: 106 MMOL/L (ref 96–108)
CO2 SERPL-SCNC: 26 MMOL/L (ref 21–32)
CREAT SERPL-MCNC: 2.26 MG/DL (ref 0.6–1.3)
EOSINOPHIL # BLD AUTO: 0.02 THOUSAND/ΜL (ref 0–0.61)
EOSINOPHIL NFR BLD AUTO: 0 % (ref 0–6)
ERYTHROCYTE [DISTWIDTH] IN BLOOD BY AUTOMATED COUNT: 17.3 % (ref 11.6–15.1)
GFR SERPL CREATININE-BSD FRML MDRD: 21 ML/MIN/1.73SQ M
GLUCOSE SERPL-MCNC: 96 MG/DL (ref 65–140)
HCT VFR BLD AUTO: 25.7 % (ref 34.8–46.1)
HGB BLD-MCNC: 7.9 G/DL (ref 11.5–15.4)
IMM GRANULOCYTES # BLD AUTO: 0.1 THOUSAND/UL (ref 0–0.2)
IMM GRANULOCYTES NFR BLD AUTO: 1 % (ref 0–2)
INR PPP: 1.16 (ref 0.84–1.19)
LACTATE SERPL-SCNC: 1.5 MMOL/L (ref 0.5–2)
LYMPHOCYTES # BLD AUTO: 0.43 THOUSANDS/ΜL (ref 0.6–4.47)
LYMPHOCYTES NFR BLD AUTO: 4 % (ref 14–44)
MCH RBC QN AUTO: 27.7 PG (ref 26.8–34.3)
MCHC RBC AUTO-ENTMCNC: 30.7 G/DL (ref 31.4–37.4)
MCV RBC AUTO: 90 FL (ref 82–98)
MONOCYTES # BLD AUTO: 0.19 THOUSAND/ΜL (ref 0.17–1.22)
MONOCYTES NFR BLD AUTO: 2 % (ref 4–12)
NEUTROPHILS # BLD AUTO: 10.91 THOUSANDS/ΜL (ref 1.85–7.62)
NEUTS SEG NFR BLD AUTO: 93 % (ref 43–75)
NRBC BLD AUTO-RTO: 0 /100 WBCS
PLATELET # BLD AUTO: 162 THOUSANDS/UL (ref 149–390)
PMV BLD AUTO: 10.6 FL (ref 8.9–12.7)
POTASSIUM SERPL-SCNC: 5.4 MMOL/L (ref 3.5–5.3)
PROCALCITONIN SERPL-MCNC: 0.93 NG/ML
PROT SERPL-MCNC: 7.6 G/DL (ref 6.4–8.4)
PROTHROMBIN TIME: 14.8 SECONDS (ref 11.6–14.5)
RBC # BLD AUTO: 2.85 MILLION/UL (ref 3.81–5.12)
SODIUM SERPL-SCNC: 140 MMOL/L (ref 135–147)
WBC # BLD AUTO: 11.67 THOUSAND/UL (ref 4.31–10.16)

## 2022-06-24 PROCEDURE — 71045 X-RAY EXAM CHEST 1 VIEW: CPT

## 2022-06-24 PROCEDURE — 87040 BLOOD CULTURE FOR BACTERIA: CPT | Performed by: EMERGENCY MEDICINE

## 2022-06-24 PROCEDURE — 84145 PROCALCITONIN (PCT): CPT | Performed by: EMERGENCY MEDICINE

## 2022-06-24 PROCEDURE — 82728 ASSAY OF FERRITIN: CPT | Performed by: NURSE PRACTITIONER

## 2022-06-24 PROCEDURE — 86140 C-REACTIVE PROTEIN: CPT | Performed by: NURSE PRACTITIONER

## 2022-06-24 PROCEDURE — 83550 IRON BINDING TEST: CPT | Performed by: NURSE PRACTITIONER

## 2022-06-24 PROCEDURE — 85610 PROTHROMBIN TIME: CPT | Performed by: EMERGENCY MEDICINE

## 2022-06-24 PROCEDURE — 99285 EMERGENCY DEPT VISIT HI MDM: CPT | Performed by: EMERGENCY MEDICINE

## 2022-06-24 PROCEDURE — 99223 1ST HOSP IP/OBS HIGH 75: CPT | Performed by: NURSE PRACTITIONER

## 2022-06-24 PROCEDURE — 96360 HYDRATION IV INFUSION INIT: CPT

## 2022-06-24 PROCEDURE — 85730 THROMBOPLASTIN TIME PARTIAL: CPT | Performed by: EMERGENCY MEDICINE

## 2022-06-24 PROCEDURE — 85025 COMPLETE CBC W/AUTO DIFF WBC: CPT | Performed by: EMERGENCY MEDICINE

## 2022-06-24 PROCEDURE — 83540 ASSAY OF IRON: CPT | Performed by: NURSE PRACTITIONER

## 2022-06-24 PROCEDURE — 93005 ELECTROCARDIOGRAM TRACING: CPT

## 2022-06-24 PROCEDURE — 83605 ASSAY OF LACTIC ACID: CPT | Performed by: EMERGENCY MEDICINE

## 2022-06-24 PROCEDURE — 80053 COMPREHEN METABOLIC PANEL: CPT | Performed by: EMERGENCY MEDICINE

## 2022-06-24 PROCEDURE — 36415 COLL VENOUS BLD VENIPUNCTURE: CPT | Performed by: EMERGENCY MEDICINE

## 2022-06-24 PROCEDURE — 99285 EMERGENCY DEPT VISIT HI MDM: CPT

## 2022-06-24 RX ORDER — CLINDAMYCIN PHOSPHATE 600 MG/50ML
600 INJECTION INTRAVENOUS ONCE
Status: COMPLETED | OUTPATIENT
Start: 2022-06-24 | End: 2022-06-24

## 2022-06-24 RX ADMIN — SODIUM CHLORIDE 500 ML: 0.9 INJECTION, SOLUTION INTRAVENOUS at 21:54

## 2022-06-24 RX ADMIN — CLINDAMYCIN PHOSPHATE 600 MG: 600 INJECTION, SOLUTION INTRAVENOUS at 22:49

## 2022-06-25 ENCOUNTER — APPOINTMENT (INPATIENT)
Dept: RADIOLOGY | Facility: HOSPITAL | Age: 72
DRG: 638 | End: 2022-06-25
Payer: MEDICARE

## 2022-06-25 PROBLEM — L97.429 DIABETIC ULCER OF LEFT MIDFOOT ASSOCIATED WITH TYPE 2 DIABETES MELLITUS (HCC): Status: ACTIVE | Noted: 2022-06-25

## 2022-06-25 PROBLEM — E11.9 TYPE 2 DIABETES MELLITUS, WITH LONG-TERM CURRENT USE OF INSULIN (HCC): Status: ACTIVE | Noted: 2017-08-20

## 2022-06-25 PROBLEM — E11.621 DIABETIC ULCER OF LEFT MIDFOOT ASSOCIATED WITH TYPE 2 DIABETES MELLITUS (HCC): Status: ACTIVE | Noted: 2022-06-25

## 2022-06-25 PROBLEM — Z79.4 TYPE 2 DIABETES MELLITUS, WITH LONG-TERM CURRENT USE OF INSULIN (HCC): Status: ACTIVE | Noted: 2017-08-20

## 2022-06-25 PROBLEM — D64.9 ANEMIA: Status: ACTIVE | Noted: 2022-06-25

## 2022-06-25 PROBLEM — J96.11 CHRONIC RESPIRATORY FAILURE WITH HYPOXIA (HCC): Status: ACTIVE | Noted: 2022-06-25

## 2022-06-25 LAB
ANION GAP SERPL CALCULATED.3IONS-SCNC: 10 MMOL/L (ref 4–13)
BACTERIA UR QL AUTO: ABNORMAL /HPF
BASOPHILS # BLD AUTO: 0.01 THOUSANDS/ΜL (ref 0–0.1)
BASOPHILS NFR BLD AUTO: 0 % (ref 0–1)
BILIRUB UR QL STRIP: NEGATIVE
BUN SERPL-MCNC: 71 MG/DL (ref 5–25)
CALCIUM SERPL-MCNC: 9 MG/DL (ref 8.4–10.2)
CHLORIDE SERPL-SCNC: 108 MMOL/L (ref 96–108)
CLARITY UR: CLEAR
CO2 SERPL-SCNC: 22 MMOL/L (ref 21–32)
COLOR UR: ABNORMAL
CREAT SERPL-MCNC: 2.07 MG/DL (ref 0.6–1.3)
CRP SERPL QL: 109.8 MG/L
EOSINOPHIL # BLD AUTO: 0.01 THOUSAND/ΜL (ref 0–0.61)
EOSINOPHIL NFR BLD AUTO: 0 % (ref 0–6)
ERYTHROCYTE [DISTWIDTH] IN BLOOD BY AUTOMATED COUNT: 17.3 % (ref 11.6–15.1)
FERRITIN SERPL-MCNC: 48 NG/ML (ref 8–388)
GFR SERPL CREATININE-BSD FRML MDRD: 23 ML/MIN/1.73SQ M
GLUCOSE SERPL-MCNC: 181 MG/DL (ref 65–140)
GLUCOSE SERPL-MCNC: 224 MG/DL (ref 65–140)
GLUCOSE SERPL-MCNC: 228 MG/DL (ref 65–140)
GLUCOSE SERPL-MCNC: 252 MG/DL (ref 65–140)
GLUCOSE SERPL-MCNC: 280 MG/DL (ref 65–140)
GLUCOSE UR STRIP-MCNC: NEGATIVE MG/DL
HCT VFR BLD AUTO: 26.4 % (ref 34.8–46.1)
HGB BLD-MCNC: 7.9 G/DL (ref 11.5–15.4)
HGB UR QL STRIP.AUTO: NEGATIVE
IMM GRANULOCYTES # BLD AUTO: 0.09 THOUSAND/UL (ref 0–0.2)
IMM GRANULOCYTES NFR BLD AUTO: 1 % (ref 0–2)
IRON SATN MFR SERPL: 8 % (ref 15–50)
IRON SERPL-MCNC: 28 UG/DL (ref 50–170)
KETONES UR STRIP-MCNC: NEGATIVE MG/DL
LEUKOCYTE ESTERASE UR QL STRIP: ABNORMAL
LYMPHOCYTES # BLD AUTO: 0.62 THOUSANDS/ΜL (ref 0.6–4.47)
LYMPHOCYTES NFR BLD AUTO: 8 % (ref 14–44)
MCH RBC QN AUTO: 27.5 PG (ref 26.8–34.3)
MCHC RBC AUTO-ENTMCNC: 29.9 G/DL (ref 31.4–37.4)
MCV RBC AUTO: 92 FL (ref 82–98)
MONOCYTES # BLD AUTO: 0.25 THOUSAND/ΜL (ref 0.17–1.22)
MONOCYTES NFR BLD AUTO: 3 % (ref 4–12)
NEUTROPHILS # BLD AUTO: 7.16 THOUSANDS/ΜL (ref 1.85–7.62)
NEUTS SEG NFR BLD AUTO: 88 % (ref 43–75)
NITRITE UR QL STRIP: NEGATIVE
NON-SQ EPI CELLS URNS QL MICRO: ABNORMAL /HPF
NRBC BLD AUTO-RTO: 0 /100 WBCS
PH UR STRIP.AUTO: 7 [PH]
PLATELET # BLD AUTO: 129 THOUSANDS/UL (ref 149–390)
PMV BLD AUTO: 10.3 FL (ref 8.9–12.7)
POTASSIUM SERPL-SCNC: 5.3 MMOL/L (ref 3.5–5.3)
PROT UR STRIP-MCNC: NEGATIVE MG/DL
RBC # BLD AUTO: 2.87 MILLION/UL (ref 3.81–5.12)
RBC #/AREA URNS AUTO: ABNORMAL /HPF
SODIUM SERPL-SCNC: 140 MMOL/L (ref 135–147)
SP GR UR STRIP.AUTO: 1.01 (ref 1–1.03)
TIBC SERPL-MCNC: 368 UG/DL (ref 250–450)
UROBILINOGEN UR QL STRIP.AUTO: 0.2 E.U./DL
WBC # BLD AUTO: 8.14 THOUSAND/UL (ref 4.31–10.16)
WBC #/AREA URNS AUTO: ABNORMAL /HPF

## 2022-06-25 PROCEDURE — 81001 URINALYSIS AUTO W/SCOPE: CPT | Performed by: EMERGENCY MEDICINE

## 2022-06-25 PROCEDURE — 99222 1ST HOSP IP/OBS MODERATE 55: CPT | Performed by: PODIATRIST

## 2022-06-25 PROCEDURE — 94760 N-INVAS EAR/PLS OXIMETRY 1: CPT

## 2022-06-25 PROCEDURE — 94640 AIRWAY INHALATION TREATMENT: CPT

## 2022-06-25 PROCEDURE — 82948 REAGENT STRIP/BLOOD GLUCOSE: CPT

## 2022-06-25 PROCEDURE — 73630 X-RAY EXAM OF FOOT: CPT

## 2022-06-25 PROCEDURE — 85025 COMPLETE CBC W/AUTO DIFF WBC: CPT | Performed by: NURSE PRACTITIONER

## 2022-06-25 PROCEDURE — 99232 SBSQ HOSP IP/OBS MODERATE 35: CPT | Performed by: INTERNAL MEDICINE

## 2022-06-25 PROCEDURE — 80048 BASIC METABOLIC PNL TOTAL CA: CPT | Performed by: NURSE PRACTITIONER

## 2022-06-25 RX ORDER — INSULIN GLARGINE 100 [IU]/ML
35 INJECTION, SOLUTION SUBCUTANEOUS EVERY 12 HOURS SCHEDULED
Status: DISCONTINUED | OUTPATIENT
Start: 2022-06-25 | End: 2022-06-25

## 2022-06-25 RX ORDER — INSULIN LISPRO 100 [IU]/ML
1-6 INJECTION, SOLUTION INTRAVENOUS; SUBCUTANEOUS
Status: DISCONTINUED | OUTPATIENT
Start: 2022-06-25 | End: 2022-06-28 | Stop reason: HOSPADM

## 2022-06-25 RX ORDER — INSULIN GLARGINE 100 [IU]/ML
20 INJECTION, SOLUTION SUBCUTANEOUS EVERY 12 HOURS SCHEDULED
Status: DISCONTINUED | OUTPATIENT
Start: 2022-06-25 | End: 2022-06-26

## 2022-06-25 RX ORDER — IPRATROPIUM BROMIDE AND ALBUTEROL SULFATE 2.5; .5 MG/3ML; MG/3ML
3 SOLUTION RESPIRATORY (INHALATION)
Status: DISCONTINUED | OUTPATIENT
Start: 2022-06-25 | End: 2022-06-25

## 2022-06-25 RX ORDER — INSULIN LISPRO 100 [IU]/ML
1-5 INJECTION, SOLUTION INTRAVENOUS; SUBCUTANEOUS
Status: DISCONTINUED | OUTPATIENT
Start: 2022-06-25 | End: 2022-06-28 | Stop reason: HOSPADM

## 2022-06-25 RX ORDER — LEVALBUTEROL INHALATION SOLUTION 1.25 MG/3ML
1.25 SOLUTION RESPIRATORY (INHALATION) 3 TIMES DAILY
Status: DISCONTINUED | OUTPATIENT
Start: 2022-06-25 | End: 2022-06-25

## 2022-06-25 RX ORDER — PRAVASTATIN SODIUM 80 MG/1
80 TABLET ORAL
Status: DISCONTINUED | OUTPATIENT
Start: 2022-06-25 | End: 2022-06-28 | Stop reason: HOSPADM

## 2022-06-25 RX ORDER — HEPARIN SODIUM 5000 [USP'U]/ML
7500 INJECTION, SOLUTION INTRAVENOUS; SUBCUTANEOUS EVERY 8 HOURS SCHEDULED
Status: DISCONTINUED | OUTPATIENT
Start: 2022-06-25 | End: 2022-06-28 | Stop reason: HOSPADM

## 2022-06-25 RX ORDER — MEMANTINE HYDROCHLORIDE 5 MG/1
5 TABLET ORAL 2 TIMES DAILY
Status: DISCONTINUED | OUTPATIENT
Start: 2022-06-25 | End: 2022-06-28 | Stop reason: HOSPADM

## 2022-06-25 RX ORDER — PANTOPRAZOLE SODIUM 40 MG/1
40 TABLET, DELAYED RELEASE ORAL
Status: DISCONTINUED | OUTPATIENT
Start: 2022-06-25 | End: 2022-06-28 | Stop reason: HOSPADM

## 2022-06-25 RX ORDER — VANCOMYCIN HYDROCHLORIDE 1 G/200ML
15 INJECTION, SOLUTION INTRAVENOUS DAILY PRN
Status: DISCONTINUED | OUTPATIENT
Start: 2022-06-26 | End: 2022-06-26

## 2022-06-25 RX ORDER — LORATADINE 10 MG/1
5 TABLET ORAL DAILY
Status: DISCONTINUED | OUTPATIENT
Start: 2022-06-25 | End: 2022-06-28 | Stop reason: HOSPADM

## 2022-06-25 RX ORDER — MONTELUKAST SODIUM 10 MG/1
10 TABLET ORAL
Status: DISCONTINUED | OUTPATIENT
Start: 2022-06-25 | End: 2022-06-28 | Stop reason: HOSPADM

## 2022-06-25 RX ORDER — FLUTICASONE PROPIONATE 50 MCG
1 SPRAY, SUSPENSION (ML) NASAL DAILY
Status: DISCONTINUED | OUTPATIENT
Start: 2022-06-25 | End: 2022-06-28 | Stop reason: HOSPADM

## 2022-06-25 RX ORDER — LEVOFLOXACIN 750 MG/1
750 TABLET ORAL EVERY 24 HOURS
COMMUNITY
Start: 2022-06-24 | End: 2022-06-28

## 2022-06-25 RX ORDER — LEVALBUTEROL 1.25 MG/.5ML
1.25 SOLUTION, CONCENTRATE RESPIRATORY (INHALATION) 3 TIMES DAILY PRN
Status: DISCONTINUED | OUTPATIENT
Start: 2022-06-25 | End: 2022-06-28 | Stop reason: HOSPADM

## 2022-06-25 RX ORDER — ASPIRIN 81 MG/1
81 TABLET ORAL DAILY
Status: DISCONTINUED | OUTPATIENT
Start: 2022-06-25 | End: 2022-06-28 | Stop reason: HOSPADM

## 2022-06-25 RX ORDER — GABAPENTIN 300 MG/1
300 CAPSULE ORAL 2 TIMES DAILY
Status: DISCONTINUED | OUTPATIENT
Start: 2022-06-25 | End: 2022-06-28 | Stop reason: HOSPADM

## 2022-06-25 RX ORDER — HEPARIN SODIUM 5000 [USP'U]/ML
5000 INJECTION, SOLUTION INTRAVENOUS; SUBCUTANEOUS EVERY 8 HOURS SCHEDULED
Status: DISCONTINUED | OUTPATIENT
Start: 2022-06-25 | End: 2022-06-25

## 2022-06-25 RX ORDER — ACETAMINOPHEN 325 MG/1
975 TABLET ORAL EVERY 8 HOURS PRN
Status: DISCONTINUED | OUTPATIENT
Start: 2022-06-25 | End: 2022-06-28 | Stop reason: HOSPADM

## 2022-06-25 RX ADMIN — INSULIN LISPRO 2 UNITS: 100 INJECTION, SOLUTION INTRAVENOUS; SUBCUTANEOUS at 16:42

## 2022-06-25 RX ADMIN — INSULIN LISPRO 1 UNITS: 100 INJECTION, SOLUTION INTRAVENOUS; SUBCUTANEOUS at 11:56

## 2022-06-25 RX ADMIN — INSULIN LISPRO 2 UNITS: 100 INJECTION, SOLUTION INTRAVENOUS; SUBCUTANEOUS at 21:12

## 2022-06-25 RX ADMIN — GABAPENTIN 300 MG: 300 CAPSULE ORAL at 16:44

## 2022-06-25 RX ADMIN — PANTOPRAZOLE SODIUM 40 MG: 40 TABLET, DELAYED RELEASE ORAL at 05:34

## 2022-06-25 RX ADMIN — INSULIN LISPRO 3 UNITS: 100 INJECTION, SOLUTION INTRAVENOUS; SUBCUTANEOUS at 07:34

## 2022-06-25 RX ADMIN — VANCOMYCIN HYDROCHLORIDE 1250 MG: 5 INJECTION, POWDER, LYOPHILIZED, FOR SOLUTION INTRAVENOUS at 04:00

## 2022-06-25 RX ADMIN — FLUTICASONE PROPIONATE 1 SPRAY: 50 SPRAY, METERED NASAL at 08:03

## 2022-06-25 RX ADMIN — HEPARIN SODIUM 7500 UNITS: 5000 INJECTION INTRAVENOUS; SUBCUTANEOUS at 21:13

## 2022-06-25 RX ADMIN — IPRATROPIUM BROMIDE AND ALBUTEROL SULFATE 3 ML: 2.5; .5 SOLUTION RESPIRATORY (INHALATION) at 02:15

## 2022-06-25 RX ADMIN — MONTELUKAST 10 MG: 10 TABLET, FILM COATED ORAL at 03:59

## 2022-06-25 RX ADMIN — MEMANTINE 5 MG: 5 TABLET ORAL at 16:44

## 2022-06-25 RX ADMIN — MEMANTINE 5 MG: 5 TABLET ORAL at 08:03

## 2022-06-25 RX ADMIN — GABAPENTIN 300 MG: 300 CAPSULE ORAL at 08:02

## 2022-06-25 RX ADMIN — TRAZODONE HYDROCHLORIDE 150 MG: 100 TABLET ORAL at 21:11

## 2022-06-25 RX ADMIN — FLUTICASONE FUROATE AND VILANTEROL TRIFENATATE 1 PUFF: 200; 25 POWDER RESPIRATORY (INHALATION) at 08:03

## 2022-06-25 RX ADMIN — IPRATROPIUM BROMIDE 0.5 MG: 0.5 SOLUTION RESPIRATORY (INHALATION) at 07:38

## 2022-06-25 RX ADMIN — INSULIN GLARGINE 20 UNITS: 100 INJECTION, SOLUTION SUBCUTANEOUS at 21:11

## 2022-06-25 RX ADMIN — MONTELUKAST 10 MG: 10 TABLET, FILM COATED ORAL at 21:11

## 2022-06-25 RX ADMIN — TRAZODONE HYDROCHLORIDE 150 MG: 100 TABLET ORAL at 03:59

## 2022-06-25 RX ADMIN — ACETAMINOPHEN 975 MG: 325 TABLET, FILM COATED ORAL at 03:59

## 2022-06-25 RX ADMIN — LORATADINE 5 MG: 10 TABLET ORAL at 08:02

## 2022-06-25 RX ADMIN — LEVALBUTEROL HYDROCHLORIDE 1.25 MG: 1.25 SOLUTION RESPIRATORY (INHALATION) at 07:37

## 2022-06-25 RX ADMIN — INSULIN GLARGINE 35 UNITS: 100 INJECTION, SOLUTION SUBCUTANEOUS at 08:03

## 2022-06-25 RX ADMIN — PRAVASTATIN SODIUM 80 MG: 80 TABLET ORAL at 16:43

## 2022-06-25 RX ADMIN — ASPIRIN 81 MG: 81 TABLET, COATED ORAL at 08:03

## 2022-06-25 NOTE — PROGRESS NOTES
[address] [facility] [address] [place] 1950, 70 y o  female MRN: [id number]  Unit/Bed#: S -83 Encounter: [id number]  Primary Care Provider: Elba Sanchez MD   Date and time admitted to hospital: 6/24/2022  8:34 PM    * Diabetic ulcer of left midfoot associated with type 2 diabetes mellitus [facility])  Assessment & Plan  Lab Results   Component Value Date    HGBA1C 7 2 (H) 04/11/2022     · Presents to ED with complaints of left leg pain  T 100 7  Erythema to left calf  Ulcer to left lateral foot  · xrays left foot  · Received clindamycin in ED  Continue with vanco for now  · Podiatry consult  Possible OR I&D in Am     Chronic respiratory failure with hypoxia (HCC)  Assessment & Plan  · Chronically wears 2 liters nasal cannula    KATHY (acute kidney injury) ([facility] )  Assessment & Plan  · Creatinine 2 26  Baseline 1 5 to 1 8  · Patient was hypotensive with BP 94/47 on arrival to ED  Otherwise, patient denies any appetite change, GI losses, urinary symptoms   · S/p IVF  Hold lisinopril, torsemide, celebrex  · BMP in AM     Type 2 diabetes mellitus, with long-term current use of insulin (HCC)  Assessment & Plan  Lab Results   Component Value Date    HGBA1C 7 2 (H) 04/11/2022     · PTA: lantus 70 units Q12 hours, humalog 39 units TID with meals plus sliding scale  · Made NPO for possible OR in Am  Will reduce Lantus to 20 untis         VTE Pharmacologic Prophylaxis:   Pharmacologic: Heparin  Mechanical VTE Prophylaxis in Place: Yes    Patient Centered Rounds:     Education and Discussions with Family / Patient: Attempted to call pt's brother     Time Spent for Care: 20 minutes  More than 50% of total time spent on counseling and coordination of care as described above      Current Length of Stay: 1 day(s)    Current Patient Status: Inpatient   Certification Statement: The patient will continue to require additional inpatient hospital stay due to pending OR    Discharge Plan: 1 [facility] resident     Code Status: Level 3 - DNAR and DNI      Subjective:   Pt reports feeling better today  Denies pain, CP or dyspnea    Objective:     Vitals:   Temp (24hrs), Av 8 °F (37 1 °C), Min:97 8 °F (36 6 °C), Max:100 7 °F (38 2 °C)    Temp:  [97 8 °F (36 6 °C)-100 7 °F (38 2 °C)] 97 8 °F (36 6 °C)  HR:  [77-89] 88  Resp:  [18-20] 18  BP: ()/(47-59) 116/57  SpO2:  [94 %-99 %] 94 %  Body mass index is 44 75 kg/m²  Input and Output Summary (last 24 hours): Intake/Output Summary (Last 24 hours) at 2022 1811  Last data filed at 2022 1754  Gross per 24 hour   Intake 2074 ml   Output 1100 ml   Net 974 ml       Physical Exam:     Physical Exam  Constitutional:       General: She is not in acute distress  Appearance: She is not ill-appearing, toxic-appearing or diaphoretic  Eyes:      General:         Right eye: No discharge  Left eye: No discharge  Cardiovascular:      Rate and Rhythm: Normal rate and regular rhythm  Pulses: Normal pulses  Pulmonary:      Effort: Pulmonary effort is normal  No respiratory distress  Breath sounds: Wheezing present  No rales  Abdominal:      General: Abdomen is flat  There is no distension  Palpations: Abdomen is soft  Tenderness: There is no abdominal tenderness  Musculoskeletal:      Comments: Left foot wrapped in dressing    Skin:     General: Skin is warm  Neurological:      Mental Status: She is oriented to person, place, and time     Psychiatric:         Behavior: Behavior normal          Additional Data:     Labs:    Results from last 7 days   Lab Units 22  0415   WBC Thousand/uL 8 14   HEMOGLOBIN g/dL 7 9*   HEMATOCRIT % 26 4*   PLATELETS Thousands/uL 129*   NEUTROS PCT % 88*   LYMPHS PCT % 8*   MONOS PCT % 3*   EOS PCT % 0     Results from last 7 days   Lab Units 225 22  2048   POTASSIUM mmol/L 5 3 5 4*   CHLORIDE mmol/L 108 106   CO2 mmol/L 22 26   BUN mg/dL 71* 71* CREATININE mg/dL 2 07* 2 26*   CALCIUM mg/dL 9 0 8 9   ALK PHOS U/L  --  53   ALT U/L  --  17   AST U/L  --  12*     Results from last 7 days   Lab Units 06/24/22 2048   INR  1 16         Recent Cultures (last 7 days):     Results from last 7 days   Lab Units 06/24/22 2048   BLOOD CULTURE  Received in Microbiology Lab  Culture in Progress  Received in Microbiology Lab  Culture in Progress  Last 24 Hours Medication List:   Current Facility-Administered Medications   Medication Dose Route Frequency Provider Last Rate    acetaminophen  975 mg Oral Q8H PRN CELINA Perry      aspirin  81 mg Oral Daily CELINA Perry      fluticasone  1 spray Nasal Daily CELINA Perry      fluticasone-vilanterol  1 puff Inhalation Daily [address]inedu Perry [address]      gabapentin  300 mg Oral BID CELINA Perry      insulin glargine  20 Units Subcutaneous Q12H [address] Ana Blunt MD      insulin lispro  1-5 Units Subcutaneous HS CELINA Perry      insulin lispro  1-6 Units Subcutaneous TID [facility] CELINA Perry      ipratropium  0 5 mg Nebulization TID PRN Ana Blunt MD      levalbuterol  1 25 mg Nebulization TID PRN Ana Blunt MD      loratadine  5 mg Oral Daily CELINA Perry      memantine  5 mg Oral BID CELINA Perry      montelukast  10 mg Oral HS CELINA Perry      pantoprazole  40 mg Oral Early Morning CELINA Perry      [facility]  [facility]      traZODone  150 mg Oral HS CELINA Perry      [START ON 6/26/2022] vancomycin  15 mg/kg (Adjusted) Intravenous Daily PRN CELINA Perry          Today, Patient Was Seen By: Ana Blunt MD    ** Please Note: Dictation voice to text software may have been used in the creation of this document   **

## 2022-06-25 NOTE — CONSULTS
Consult - [facility] CELI Kavita 70 y o  female MRN: [id number]  Unit/Bed#: S -01 Encounter: [id number]        ASSESSMENT:  1  DFU left - [place] 1  2  Type II daibetes with neuropathy  3  Decreased pedal pulses on left foot to rule out PAD  4  Venous stasis left leg  PLAN:  1  Reviewed medical records  X-ray negative for osteomyelitis  Clinically, wound looks stable wihtout signs of infection  Discussed her condition and prognosis  2  Will check arterial study to rule out PAD in left LE  3  Wound care with calcium alginate  4  Recommended NWB left foot  Will consult PT     5  Orthowedge shoe left foot  Imaging: I have personally reviewed pertinent films in PACS  EKG, Pathology, labs, and Other Studies: I have personally reviewed pertinent reports  History of Present Illness     Sergei Mendes is a 70 y o  female who consulted for left foot ulcer  She reports she notices ulcer on left foot in the past 1 week  No pain or redness  She does not recall how it started  She is a poor historian  She sees a podiatrist, but does not remember the name  She has diabetes since 1991  She has neuropathy in her feet with numbness  She denies problem with walking  Inpatient consult to Podiatry  Consult performed by: Janette Woodward DPM  Consult ordered by: CELINA Rodriguez          Review of Systems   Constitutional: Negative  HENT: Negative  Eyes: Negative  Respiratory: Negative  Cardiovascular: Negative  Gastrointestinal: Negative  Musculoskeletal: Negative  Skin:Ulcer left foot   Neurological: Numbness in feet  Psych: negative         Historical Information   Past Medical History:   Diagnosis Date    Asthma     Diabetes mellitus ([address] )     Hyperlipidemia     Hypertension      Past Surgical History:   Procedure Laterality Date    JOINT REPLACEMENT Bilateral     KNEE SURGERY      TOE SURGERY       Social History   Social History     Substance and Sexual Activity   Alcohol Use Not Currently     Social History     Substance and Sexual Activity   Drug Use No     Social History     Tobacco Use   Smoking Status Former Smoker    Packs/day: 1 00    Years: 52 00    Pack years: 52 00    Types: Cigarettes    Start date:     Quit date: 2017    Years since quittin 8   Smokeless Tobacco Never Used     Family History:   Family History   Problem Relation Age of Onset    Dementia Brother     Breast cancer Sister 76    Cancer Brother        Meds/Allergies   Medications Prior to Admission   Medication    levofloxacin (LEVAQUIN) 750 mg tablet    acetaminophen (TYLENOL) 325 mg tablet    Advair Diskus 500-50 MCG/DOSE inhaler    aspirin (ECOTRIN LOW STRENGTH) 81 mg EC tablet    benzonatate (TESSALON) 200 MG capsule    bisacodyl (DULCOLAX) 10 mg suppository    celecoxib (CeleBREX) 100 mg capsule    cetirizine (ZyrTEC) 10 mg tablet    Cholecalciferol (VITAMIN D3) 15974 units CAPS    Dulaglutide (Trulicity) 3 OS/4 9KQ SOPN    fluticasone (FLONASE) 50 mcg/act nasal spray    gabapentin (NEURONTIN) 300 mg capsule    Glucose Blood (BL TEST STRIP PACK VI)    glucose blood test strip    HumaLOG KwikPen 100 units/mL injection pen    insulin glargine (LANTUS SOLOSTAR) 100 units/mL injection pen    ipratropium-albuterol (DUO-NEB) 0 5-2 5 mg/3 mL nebulizer solution    lisinopril (ZESTRIL) 2 5 mg tablet    magnesium hydroxide (MILK OF MAGNESIA) 400 mg/5 mL oral suspension    memantine (NAMENDA) 10 mg tablet    MENTHOL-METHYL SALICYLATE EX    metFORMIN (GLUCOPHAGE) 500 mg tablet    montelukast (SINGULAIR) 10 mg tablet    Omeprazole 20 MG TBEC    simvastatin (ZOCOR) 40 mg tablet    Sodium Hypochlorite (Anasept Antimicrobial) 0 057 % GEL    torsemide (DEMADEX) 20 mg tablet    traMADol (ULTRAM) 50 mg tablet    traZODone (DESYREL) 150 mg tablet     Allergies   Allergen Reactions    Penicillins Shortness Of Breath    Cephalexin Other (See Comments) Reaction Date: 26May2011; unknown     Crestor [Rosuvastatin] Other (See Comments)     Reaction Date: [id number]; unknown     Zithromax [Azithromycin] Other (See Comments)     Unknown        Objective   First Vitals:   Blood Pressure: (!) 94/47 (06/24/22 2038)  Pulse: 89 (06/24/22 2038)  Temperature: (!) 100 7 °F (38 2 °C) (06/24/22 2038)  Temp Source: Oral (06/24/22 2038)  Respirations: 20 (06/24/22 2038)  Weight - Scale: 101 kg (221 lb 9 oz) (06/25/22 0600)  SpO2: 98 % (06/24/22 2038)    Current Vitals:   Blood Pressure: 125/59 (06/25/22 0700)  Pulse: 83 (06/25/22 0700)  Temperature: 98 7 °F (37 1 °C) (06/25/22 0700)  Temp Source: Oral (06/25/22 0700)  Respirations: 18 (06/25/22 0700)  Weight - Scale: 101 kg (221 lb 9 oz) (06/25/22 0600)  SpO2: 97 % (06/25/22 0700)        /59 (BP Location: Right arm)   Pulse 83   Temp 98 7 °F (37 1 °C) (Oral)   Resp 18   Wt 101 kg (221 lb 9 oz)   SpO2 97%   BMI 44 75 kg/m²      PHYSICAL EXAMINATIONS:    General appearance: Afebrile, cooperative, NAD  HEENT: Normocephalic, atraumatic, without obvious abnormality  HEART: Normal rate and rhythm  Lungs: Non-labored breathing  No acute respiratory distress  Abdomen: No distension  Psychiatric: No confusion  LE Vascular: Right DP -  palpable  Left DP - non palpable  Right PT - non palpable  Left PT - non palpable  CRT - intact  No ischemia  No gangrene  LE Musculoskeletal: No calf pain  MMT intact  ROM intact  No cellulitis BLE  Dermatological:  Stable, granular ulcer on left 5th met head  It is 1 3 X 1 3 X 0 2 cm  Wound does not probe to bone  No abscess  No purulence  No redness or signs of infection  Neurological: Gross sensation intact  Protective sensation decreased  CN intact  No focal neurologic deficit          Lab Results:     Admission on 06/24/2022   Component Date Value    WBC 06/24/2022 11 67 (A)    RBC 06/24/2022 2 85 (A)    Hemoglobin 06/24/2022 7 9 (A)    Hematocrit 06/24/2022 25 7 (A)    MCV 06/24/2022 90     MCH 06/24/2022 27 7     MCHC 06/24/2022 30 7 (A)    RDW 06/24/2022 17 3 (A)    MPV 06/24/2022 10 6     Platelets 33/32/7519 162     nRBC 06/24/2022 0     Neutrophils Relative 06/24/2022 93 (A)    Immat GRANS % 06/24/2022 1     Lymphocytes Relative 06/24/2022 4 (A)    Monocytes Relative 06/24/2022 2 (A)    Eosinophils Relative 06/24/2022 0     Basophils Relative 06/24/2022 0     Neutrophils Absolute 06/24/2022 10 91 (A)    Immature Grans Absolute 06/24/2022 0 10     Lymphocytes Absolute 06/24/2022 0 43 (A)    Monocytes Absolute 06/24/2022 0 19     Eosinophils Absolute 06/24/2022 0 02     Basophils Absolute 06/24/2022 0 02     Sodium 06/24/2022 140     Potassium 06/24/2022 5 4 (A)    Chloride 06/24/2022 106     CO2 06/24/2022 26     ANION GAP 06/24/2022 8     BUN 06/24/2022 71 (A)    Creatinine 06/24/2022 2 26 (A)    Glucose 06/24/2022 96     Calcium 06/24/2022 8 9     AST 06/24/2022 12 (A)    ALT 06/24/2022 17     Alkaline Phosphatase 06/24/2022 53     Total Protein 06/24/2022 7 6     Albumin 06/24/2022 3 7     Total Bilirubin 06/24/2022 0 49     eGFR 06/24/2022 21     LACTIC ACID 06/24/2022 1 5     Procalcitonin 06/24/2022 0 93 (A)    Protime 06/24/2022 14 8 (A)    INR 06/24/2022 1 16     PTT 06/24/2022 36     Blood Culture 06/24/2022 Received in Microbiology Lab  Culture in Progress   Blood Culture 06/24/2022 Received in Microbiology Lab  Culture in Progress       Color, UA 06/25/2022 Light Yellow     Clarity, UA 06/25/2022 Clear     Specific Gravity, UA 06/25/2022 1 010     pH, UA 06/25/2022 7 0     Leukocytes, UA 06/25/2022 Large (A)    Nitrite, UA 06/25/2022 Negative     Protein, UA 06/25/2022 Negative     Glucose, UA 06/25/2022 Negative     Ketones, UA 06/25/2022 Negative     Urobilinogen, UA 06/25/2022 0 2     Bilirubin, UA 06/25/2022 Negative     Occult Blood, UA 06/25/2022 Negative     CRP 06/24/2022 109 8 (A)    Sodium 06/25/2022 140     Potassium 06/25/2022 5 3     Chloride 06/25/2022 108     CO2 06/25/2022 22     ANION GAP 06/25/2022 10     BUN 06/25/2022 71 (A)    Creatinine 06/25/2022 2 07 (A)    Glucose 06/25/2022 228 (A)    Calcium 06/25/2022 9 0     eGFR 06/25/2022 23     WBC 06/25/2022 8 14     RBC 06/25/2022 2 87 (A)    Hemoglobin 06/25/2022 7 9 (A)    Hematocrit 06/25/2022 26 4 (A)    MCV 06/25/2022 92     MCH 06/25/2022 27 5     MCHC 06/25/2022 29 9 (A)    RDW 06/25/2022 17 3 (A)    MPV 06/25/2022 10 3     Platelets 97/08/6278 129 (A)    nRBC 06/25/2022 0     Neutrophils Relative 06/25/2022 88 (A)    Immat GRANS % 06/25/2022 1     Lymphocytes Relative 06/25/2022 8 (A)    Monocytes Relative 06/25/2022 3 (A)    Eosinophils Relative 06/25/2022 0     Basophils Relative 06/25/2022 0     Neutrophils Absolute 06/25/2022 7 16     Immature Grans Absolute 06/25/2022 0 09     Lymphocytes Absolute 06/25/2022 0 62     Monocytes Absolute 06/25/2022 0 25     Eosinophils Absolute 06/25/2022 0 01     Basophils Absolute 06/25/2022 0 01     RBC, UA 06/25/2022 None Seen     WBC, UA 06/25/2022 4-10 (A)    Epithelial Cells 06/25/2022 Occasional     Bacteria, UA 06/25/2022 Occasional     POC Glucose 06/25/2022 252 (A)             Results from last 7 days   Lab Units 06/24/22 2048   BLOOD CULTURE  Received in Microbiology Lab  Culture in Progress  Received in Microbiology Lab  Culture in Progress

## 2022-06-25 NOTE — ASSESSMENT & PLAN NOTE
Lab Results   Component Value Date    HGBA1C 7 2 (H) 04/11/2022     · PTA: lantus 70 units Q12 hours, humalog 39 units TID with meals plus sliding scale  · Made NPO for possible OR in Am  Will reduce Lantus to 20 untis

## 2022-06-25 NOTE — ASSESSMENT & PLAN NOTE
Lab Results   Component Value Date    HGBA1C 7 2 (H) 04/11/2022       Recent Labs     06/25/22  0714 06/25/22  1128 06/25/22  1514   POCGLU 252* 181* 224*       Blood Sugar Average: Last 72 hrs:  (P) 219   Seen by podiatry   Possible I&D in OR Am

## 2022-06-25 NOTE — ASSESSMENT & PLAN NOTE
Lab Results   Component Value Date    HGBA1C 7 2 (H) 04/11/2022     · PTA: lantus 70 units Q12 hours, humalog 39 units TID with meals plus sliding scale  · Due to KATHY, continue with lantus 35 units Q12 hours, sliding scale  Adjust as necessary

## 2022-06-25 NOTE — ASSESSMENT & PLAN NOTE
Lab Results   Component Value Date    HGBA1C 7 2 (H) 04/11/2022     · Presents to ED with complaints of left leg pain  T 100 7  Erythema to left calf  Ulcer to left lateral foot  · xrays left foot  · Received clindamycin in ED  Continue with vanco for now  · Podiatry consult   Possible OR I&D in Am

## 2022-06-25 NOTE — ED PROVIDER NOTES
History  Chief Complaint   Patient presents with    Medical Problem     Pt from [facility], [facility] and [facility] pts only complaint is leg pain from cellulitis  Pt is on levaquin for pneumonia  Pt wears 2L chronically  Patient is a 79-year-old female with a history of diabetes, hypertension and asthma who presents with fever and leg pain  Patient resides at [address] [place] [address]  According to records, she was recently diagnosed with pneumonia and cellulitis  She is on Levaquin for pneumonia  However she has had worsening left leg pain  She was also noted to have a fever this evening and received Tylenol prior to transport  Patient wears 2 L of oxygen chronically and denies any shortness of breath  She denies chest pain, abdominal pain, nausea, vomiting or other concerns  History provided by:  Patient  Leg Pain  Location:  Leg  Leg location:  L lower leg  Pain details:     Radiates to:  Does not radiate    Severity:  Moderate  Chronicity:  New  Associated symptoms: fever    Associated symptoms: no back pain, no neck pain, no numbness and no swelling        Prior to Admission Medications   Prescriptions Last Dose Informant Patient Reported? Taking?    Advair Diskus 500-50 MCG/DOSE inhaler  Outside Facility (Specify) Yes No   Cholecalciferol (VITAMIN D3) 78103 units CAPS  Outside Facility (Specify) Yes No   Sig: Take 50,000 Units by mouth every 30 (thirty) days   Dulaglutide (Trulicity) 3 CJ/5 2RJ SOPN   No No   Sig: Inject 0 5 mL (3 mg total) under the skin once a week   Glucose Blood (BL TEST STRIP PACK VI)  Outside Facility (Specify) Yes No   Sig: by In Vitro route   HumaLOG KwikPen 100 units/mL injection pen   No No   Sig: Inject 39 units with meals +scale (  Scale - 150-200 +2 units, 201-250+4 units, 251-300 +6 units, 301-350+8 units, > 350+10 units   MENTHOL-METHYL SALICYLATE EX  Outside Facility (Specify) Yes No   Sig: Apply topically 2 (two) times a day   Omeprazole 20 MG TBEC  Outside Facility (Specify) Yes No   Sig: Take 20 mg by mouth daily   Sodium Hypochlorite (Anasept Antimicrobial) 0 057 % GEL  Outside Facility (Specify) Yes No   Sig: Apply topically in the morning   acetaminophen (TYLENOL) 325 mg tablet  Outside Facility (Specify) No No   Sig: Take 3 tablets (975 mg total) by mouth every 8 (eight) hours   aspirin (ECOTRIN LOW STRENGTH) 81 mg EC tablet  Outside Facility (Specify) Yes No   benzonatate (TESSALON) 200 MG capsule   Yes No   Sig: Take 200 mg by mouth 3 (three) times a day as needed for cough   bisacodyl (DULCOLAX) 10 mg suppository  Outside Facility (Specify) Yes No   Sig: Insert 10 mg into the rectum daily as needed    celecoxib (CeleBREX) 100 mg capsule  Outside Facility (Specify) Yes No   Sig: Take 100 mg by mouth daily    cetirizine (ZyrTEC) 10 mg tablet  Outside Facility (Specify) Yes No   Sig: Take 10 mg by mouth daily   fluticasone (FLONASE) 50 mcg/act nasal spray  Outside Facility (Specify) No No   Si spray into each nostril daily   gabapentin (NEURONTIN) 300 mg capsule  Outside Facility (Specify) Yes No   Sig: Take 300 mg by mouth 2 (two) times a day   glucose blood test strip  Outside Facility (Specify) Yes No   Sig: by In Vitro route   insulin glargine (LANTUS SOLOSTAR) 100 units/mL injection pen   No No   Sig: Inject 70 units twice a day   ipratropium-albuterol (DUO-NEB) 0 5-2 5 mg/3 mL nebulizer solution  Outside Facility (Specify) Yes No   levofloxacin (LEVAQUIN) 750 mg tablet   Yes Yes   Sig: Take 750 mg by mouth every 24 hours   lisinopril (ZESTRIL) 2 5 mg tablet  Outside Facility (Specify) Yes No   Sig: Take 2 5 mg by mouth daily   magnesium hydroxide (MILK OF MAGNESIA) 400 mg/5 mL oral suspension  Outside Facility (Specify) Yes No   Sig: Take by mouth daily as needed for constipation   memantine (NAMENDA) 10 mg tablet  Outside Facility (Specify) Yes No   Sig: 10 mg 2 (two) times a day metFORMIN (GLUCOPHAGE) 500 mg tablet  Outside Facility (Specify) Yes No   montelukast (SINGULAIR) 10 mg tablet  Outside Facility (Specify) Yes No   Sig: Take 10 mg by mouth daily at bedtime   simvastatin (ZOCOR) 40 mg tablet  Outside Facility (Specify) No No   Sig: Take 1 tablet by mouth daily at bedtime   torsemide (DEMADEX) 20 mg tablet   Yes No   Sig: Take 20 mg by mouth daily   traMADol (ULTRAM) 50 mg tablet   Yes No   Sig: Take 50 mg by mouth every 6 (six) hours as needed for moderate pain   Patient not taking: Reported on 6/15/2022   traZODone (DESYREL) 150 mg tablet  Outside Facility (Specify) Yes No      Facility-Administered Medications: None       Past Medical History:   Diagnosis Date    Asthma     Diabetes mellitus ([facility] )     Hyperlipidemia     Hypertension        Past Surgical History:   Procedure Laterality Date    JOINT REPLACEMENT Bilateral     KNEE SURGERY      TOE SURGERY         Family History   Problem Relation Age of Onset    Dementia Brother     Breast cancer Sister 76    Cancer Brother      I have reviewed and agree with the history as documented  E-Cigarette/Vaping    E-Cigarette Use Never User      E-Cigarette/Vaping Substances    Nicotine No     THC No     CBD No     Flavoring No     Other No      Social History     Tobacco Use    Smoking status: Former Smoker     Packs/day: 1 00     Years: 52 00     Pack years: 52 00     Types: Cigarettes     Start date:      Quit date: 2017     Years since quittin 8    Smokeless tobacco: Never Used   Vaping Use    Vaping Use: Never used   Substance Use Topics    Alcohol use: Not Currently    Drug use: No       Review of Systems   Constitutional: Positive for fever  Negative for chills and diaphoresis  HENT: Negative for nosebleeds, sore throat and trouble swallowing  Eyes: Negative for photophobia, pain and visual disturbance  Respiratory: Negative for cough, chest tightness and shortness of breath  Cardiovascular: Negative for chest pain, palpitations and leg swelling  Gastrointestinal: Negative for abdominal pain, constipation, diarrhea, nausea and vomiting  Endocrine: Negative for polydipsia and polyuria  Genitourinary: Negative for difficulty urinating, dysuria, hematuria, pelvic pain, vaginal bleeding and vaginal discharge  Musculoskeletal: Negative for back pain, neck pain and neck stiffness  Skin: Negative for pallor and rash  Neurological: Negative for dizziness, seizures, light-headedness and headaches  All other systems reviewed and are negative  Physical Exam  Physical Exam  Vitals and nursing note reviewed  Constitutional:       General: She is not in acute distress  Appearance: She is well-developed  HENT:      Head: Normocephalic and atraumatic  Mouth/Throat:      Mouth: Mucous membranes are dry  Eyes:      Pupils: Pupils are equal, round, and reactive to light  Cardiovascular:      Rate and Rhythm: Normal rate and regular rhythm  Pulses: Normal pulses  Heart sounds: Normal heart sounds  Pulmonary:      Effort: Pulmonary effort is normal  No respiratory distress  Breath sounds: Normal breath sounds  Abdominal:      General: There is no distension  Palpations: Abdomen is soft  Abdomen is not rigid  Tenderness: There is no abdominal tenderness  There is no guarding or rebound  Musculoskeletal:         General: Tenderness (Tenderness and warmth to touch involving the left lower leg  There is circumferential erythema of the mid tibial region distally) present  Normal range of motion  Cervical back: Normal range of motion and neck supple  Right lower leg: Edema present  Left lower leg: Edema present  Lymphadenopathy:      Cervical: No cervical adenopathy  Skin:     General: Skin is warm and dry  Capillary Refill: Capillary refill takes less than 2 seconds     Neurological:      Mental Status: She is alert and oriented to person, place, and time  Cranial Nerves: No cranial nerve deficit  Sensory: No sensory deficit           Vital Signs  ED Triage Vitals [06/24/22 2038]   Temperature Pulse Respirations Blood Pressure SpO2   (!) 100 7 °F (38 2 °C) 89 20 (!) 94/47 98 %      Temp Source Heart Rate Source Patient Position - Orthostatic VS BP Location FiO2 (%)   Oral Monitor Lying Right arm --      Pain Score       5           Vitals:    06/24/22 2038 06/24/22 2100 06/24/22 2300 06/24/22 2334   BP: (!) 94/47 120/58 122/56 116/56   Pulse: 89 86 82    Patient Position - Orthostatic VS: Lying  Lying          Visual Acuity      ED Medications  Medications   acetaminophen (TYLENOL) tablet 975 mg (has no administration in time range)   loratadine (CLARITIN) tablet 5 mg (has no administration in time range)   fluticasone (FLONASE) 50 mcg/act nasal spray 1 spray (has no administration in time range)   traZODone (DESYREL) tablet 150 mg (has no administration in time range)   ipratropium-albuterol (DUO-NEB) 0 5-2 5 mg/3 mL inhalation solution 3 mL (has no administration in time range)   insulin glargine (LANTUS) subcutaneous injection 35 Units 0 35 mL (has no administration in time range)   aspirin (ECOTRIN LOW STRENGTH) EC tablet 81 mg (has no administration in time range)   gabapentin (NEURONTIN) capsule 300 mg (has no administration in time range)   montelukast (SINGULAIR) tablet 10 mg (has no administration in time range)   memantine (NAMENDA) tablet 5 mg (has no administration in time range)   pantoprazole (PROTONIX) EC tablet 40 mg (has no administration in time range)   pravastatin (PRAVACHOL) tablet 80 mg (has no administration in time range)   fluticasone-vilanterol (BREO ELLIPTA) 200-25 MCG/INH inhaler 1 puff (has no administration in time range)   insulin lispro (HumaLOG) 100 units/mL subcutaneous injection 1-6 Units (has no administration in time range)   insulin lispro (HumaLOG) 100 units/mL subcutaneous injection 1-5 Units (has no administration in time range)   vancomycin (VANCOCIN) 1500 mg in sodium chloride 0 9% 250 mL IVPB (has no administration in time range)   sodium chloride 0 9 % bolus 500 mL (0 mL Intravenous Stopped 6/24/22 2306)   clindamycin (CLEOCIN) IVPB (premix in dextrose) 600 mg 50 mL (0 mg Intravenous Stopped 6/24/22 2306)       Diagnostic Studies  Results Reviewed     Procedure Component Value Units Date/Time    C-reactive protein [[id number]]  (Abnormal) Collected: 06/24/22 2048    Lab Status: Final result Specimen: Blood from Arm, Right Updated: 06/25/22 0119      8 mg/L     Procalcitonin [[id number]]  (Abnormal) Collected: 06/24/22 2048    Lab Status: Final result Specimen: Blood from Arm, Right Updated: 06/24/22 2232     Procalcitonin 0 93 ng/ml     Lactic acid [[id number]]  (Normal) Collected: 06/24/22 2048    Lab Status: Final result Specimen: Blood from Arm, Right Updated: 06/24/22 2223     LACTIC ACID 1 5 mmol/L     Narrative:      Result may be elevated if tourniquet was used during collection      Comprehensive metabolic panel [[id number]]  (Abnormal) Collected: 06/24/22 2048    Lab Status: Final result Specimen: Blood from Arm, Right Updated: 06/24/22 2223     Sodium 140 mmol/L      Potassium 5 4 mmol/L      Chloride 106 mmol/L      CO2 26 mmol/L      ANION GAP 8 mmol/L      BUN 71 mg/dL      Creatinine 2 26 mg/dL      Glucose 96 mg/dL      Calcium 8 9 mg/dL      AST 12 U/L      ALT 17 U/L      Alkaline Phosphatase 53 U/L      Total Protein 7 6 g/dL      Albumin 3 7 g/dL      Total Bilirubin 0 49 mg/dL      eGFR 21 ml/min/1 73sq m     Narrative:      [place] guidelines for Chronic Kidney Disease (CKD):     Stage 1 with normal or high GFR (GFR > 90 mL/min/1 73 square meters)    Stage 2 Mild CKD (GFR = 60-89 mL/min/1 73 square meters)    Stage 3A Moderate CKD (GFR = 45-59 mL/min/1 73 square meters)    Stage 3B Moderate CKD (GFR = 30-44 mL/min/1 73 square meters)   Stage 4 Severe CKD (GFR = 15-29 mL/min/1 73 square meters)    Stage 5 End Stage CKD (GFR <15 mL/min/1 73 square meters)  Note: GFR calculation is accurate only with a steady state creatinine    CBC and differential [[id number]]  (Abnormal) Collected: 06/24/22 2048    Lab Status: Final result Specimen: Blood from Arm, Right Updated: 06/24/22 2219     WBC 11 67 Thousand/uL      RBC 2 85 Million/uL      Hemoglobin 7 9 g/dL      Hematocrit 25 7 %      MCV 90 fL      MCH 27 7 pg      MCHC 30 7 g/dL      RDW 17 3 %      MPV 10 6 fL      Platelets 825 Thousands/uL      nRBC 0 /100 WBCs      Neutrophils Relative 93 %      Immat GRANS % 1 %      Lymphocytes Relative 4 %      Monocytes Relative 2 %      Eosinophils Relative 0 %      Basophils Relative 0 %      Neutrophils Absolute 10 91 Thousands/µL      Immature Grans Absolute 0 10 Thousand/uL      Lymphocytes Absolute 0 43 Thousands/µL      Monocytes Absolute 0 19 Thousand/µL      Eosinophils Absolute 0 02 Thousand/µL      Basophils Absolute 0 02 Thousands/µL     Narrative: This is an appended report  These results have been appended to a previously verified report  Protime-INR [[id number]]  (Abnormal) Collected: 06/24/22 2048    Lab Status: Final result Specimen: Blood from Arm, Right Updated: 06/24/22 2218     Protime 14 8 seconds      INR 1 16    APTT [[id number]]  (Normal) Collected: 06/24/22 2048    Lab Status: Final result Specimen: Blood from Arm, Right Updated: 06/24/22 2218     PTT 36 seconds     Blood culture #2 [[id number]] Collected: 06/24/22 2048    Lab Status: In process Specimen: Blood from Line, Venous Updated: 06/24/22 2204    Blood culture #1 [[id number]] Collected: 06/24/22 2048    Lab Status:  In process Specimen: Blood from Hand, Right Updated: 06/24/22 2203    UA w Reflex to Microscopic w Reflex to Culture [[id number]]     Lab Status: No result Specimen: Urine                  XR chest 1 view portable   ED Interpretation by Samreen Yates DO (06/24 2235)   No infiltrates  XR foot 3+ vw left    (Results Pending)              Procedures  Procedures         ED Course                                             MDM  Number of Diagnoses or Management Options  KATHY (acute kidney injury) [facility]): new and requires workup  Cellulitis: new and requires workup  Diagnosis management comments: Patient presents with fever and leg pain  She was febrile upon arrival with evidence of cellulitis of the left lower extremity  She does not meet SIRS criteria  However her labs do reveal acute kidney injury and elevated procalcitonin  Will start on IV antibiotics for cellulitis  Will hospitalize for further workup and treatment  Portions of the above record have been created with voice recognition software   Occasional wrong word or "sound alike" substitutions may have occurred due to the inherent limitations of voice recognition software   Read the chart carefully and recognize, using context, where substitutions may have occurred           Amount and/or Complexity of Data Reviewed  Clinical lab tests: ordered and reviewed  Tests in the radiology section of CPT®: ordered and reviewed  Tests in the medicine section of CPT®: reviewed and ordered  Review and summarize past medical records: yes  Discuss the patient with other providers: yes  Independent visualization of images, tracings, or specimens: yes    Risk of Complications, Morbidity, and/or Mortality  Presenting problems: high  Diagnostic procedures: moderate  Management options: high    Patient Progress  Patient progress: stable      Disposition  Final diagnoses:   Cellulitis   KATHY (acute kidney injury) ([facility] )     Time reflects when diagnosis was documented in both MDM as applicable and the Disposition within this note     Time User Action Codes Description Comment    6/24/2022 10:40 PM Evelia Vietnamese Add [L03 90] Cellulitis     6/24/2022 10:41 PM Quincyaxel Teran L Add [N17 9] KATHY (acute kidney injury) ([facility] ) 6/25/2022 12:55 AM Jayden Riedel Add [V80 512,  C28 661] Diabetic ulcer of left foot associated with diabetes mellitus due to underlying condition, limited to breakdown of skin, unspecified part of foot ([facility] )     6/25/2022  1:25 AM Jayden Ramirezdimple Ortiz [E11 861,  L9 429] Diabetic ulcer of left midfoot associated with type 2 diabetes mellitus, unspecified ulcer stage [facility])       ED Disposition     ED Disposition   Admit    Condition   Stable    Date/Time   Fri Jun 24, 2022 10:40 PM    Comment   Case was discussed with GAIL and the patient's admission status was agreed to be Admission Status: inpatient status to the service of Dr Catrachito Gonzalez              Follow-up Information    None         Current Discharge Medication List      CONTINUE these medications which have NOT CHANGED    Details   levofloxacin (LEVAQUIN) 750 mg tablet Take 750 mg by mouth every 24 hours      acetaminophen (TYLENOL) 325 mg tablet Take 3 tablets (975 mg total) by mouth every 8 (eight) hours  Qty: 30 tablet, Refills: 0    Associated Diagnoses: Chronic pain syndrome      Advair Diskus 500-50 MCG/DOSE inhaler       aspirin (ECOTRIN LOW STRENGTH) 81 mg EC tablet       benzonatate (TESSALON) 200 MG capsule Take 200 mg by mouth 3 (three) times a day as needed for cough      bisacodyl (DULCOLAX) 10 mg suppository Insert 10 mg into the rectum daily as needed       celecoxib (CeleBREX) 100 mg capsule Take 100 mg by mouth daily       cetirizine (ZyrTEC) 10 mg tablet Take 10 mg by mouth daily      Cholecalciferol (VITAMIN D3) 57333 units CAPS Take 50,000 Units by mouth every 30 (thirty) days      Dulaglutide (Trulicity) 3 AJ/3 2HP SOPN Inject 0 5 mL (3 mg total) under the skin once a week  Qty: 2 mL, Refills: 1    Associated Diagnoses: Type 2 diabetes mellitus with hyperglycemia, with long-term current use of insulin ([facility])      fluticasone (FLONASE) 50 mcg/act nasal spray 1 spray into each nostril daily  Qty: 1 Bottle, Refills: 5    Associated Diagnoses: Seasonal allergies      gabapentin (NEURONTIN) 300 mg capsule Take 300 mg by mouth 2 (two) times a day      !! Glucose Blood (BL TEST STRIP PACK VI) by In Vitro route      !! glucose blood test strip by In Vitro route      HumaLOG KwikPen 100 units/mL injection pen Inject 39 units with meals +scale (  Scale - 150-200 +2 units, 201-250+4 units, 251-300 +6 units, 301-350+8 units, > 350+10 units  Qty: 15 mL    Associated Diagnoses: Type 2 diabetes mellitus with hyperglycemia, with long-term current use of insulin ([facility])      insulin glargine (LANTUS SOLOSTAR) 100 units/mL injection pen Inject 70 units twice a day  Qty: 15 mL    Associated Diagnoses: Type 2 diabetes mellitus with hyperglycemia, with long-term current use of insulin ([facility])      ipratropium-albuterol (DUO-NEB) 0 5-2 5 mg/3 mL nebulizer solution       lisinopril (ZESTRIL) 2 5 mg tablet Take 2 5 mg by mouth daily      magnesium hydroxide (MILK OF MAGNESIA) 400 mg/5 mL oral suspension Take by mouth daily as needed for constipation      memantine (NAMENDA) 10 mg tablet 10 mg 2 (two) times a day       MENTHOL-METHYL SALICYLATE EX Apply topically 2 (two) times a day      metFORMIN (GLUCOPHAGE) 500 mg tablet       montelukast (SINGULAIR) 10 mg tablet Take 10 mg by mouth daily at bedtime      Omeprazole 20 MG TBEC Take 20 mg by mouth daily      simvastatin (ZOCOR) 40 mg tablet Take 1 tablet by mouth daily at bedtime  Refills: 0      Sodium Hypochlorite (Anasept Antimicrobial) 0 057 % GEL Apply topically in the morning      torsemide (DEMADEX) 20 mg tablet Take 20 mg by mouth daily      traMADol (ULTRAM) 50 mg tablet Take 50 mg by mouth every 6 (six) hours as needed for moderate pain      traZODone (DESYREL) 150 mg tablet        !! - Potential duplicate medications found  Please discuss with provider  No discharge procedures on file      PDMP Review       Value Time User    PDMP Reviewed  Yes 9/25/2020 12:01 PM Sherren Pipes, MD          ED Provider  Electronically Signed by           Nawaf Brunson DO  06/25/22 0129

## 2022-06-25 NOTE — ASSESSMENT & PLAN NOTE
· Initial blood pressure 94/47  · Improved with fluid bolus   · Hold lisinopril, torsemide due to KATHY

## 2022-06-25 NOTE — ASSESSMENT & PLAN NOTE
Lab Results   Component Value Date    HGBA1C 7 2 (H) 04/11/2022     · Presents to ED with complaints of left leg pain  T 100 7  Erythema to left calf  Ulcer to left lateral foot  · xrays left foot  · Received clindamycin in ED  Continue with vanco for now     · Podiatry consult

## 2022-06-25 NOTE — ASSESSMENT & PLAN NOTE
Lab Results   Component Value Date    HGBA1C 7 2 (H) 04/11/2022       Recent Labs     06/25/22  0714 06/25/22  1128 06/25/22  1514   POCGLU 252* 181* 224*       Blood Sugar Average: Last 72 hrs:  (P) 219   Home dose: Lantus 70 units twice daily, Humalog 39 units before meals   No OR per podiatry  Diet resumed  Continue Latus 40 mg bid and Humalog 10 untis tid   Will monitor BG and adjust accordingly

## 2022-06-25 NOTE — ASSESSMENT & PLAN NOTE
· Creatinine 2 26  Baseline 1 5 to 1 8  · Patient was hypotensive with BP 94/47 on arrival to ED  Otherwise, patient denies any appetite change, GI losses, urinary symptoms   · S/p IVF   Hold lisinopril, torsemide, celebrex  · BMP in AM

## 2022-06-25 NOTE — ASSESSMENT & PLAN NOTE
· Creatinine 2 26  Baseline 1 5 to 1 8  · Patient was hypotensive with BP 94/47 on arrival to ED  Otherwise, patient denies any appetite change, GI losses, urinary symptoms   · Continue IV fluids      · Hold lisinopril, torsemide, celebrex  · BMP in AM

## 2022-06-25 NOTE — PROGRESS NOTES
Vancomycin Assessment    Amanda Houston is a 70 y o  female who is currently receiving vancomycin 1500 mg IV q24h for skin-soft tissue infection     Relevant clinical data and objective history reviewed:  Creatinine   Date Value Ref Range Status   06/24/2022 2 26 (H) 0 60 - 1 30 mg/dL Final     Comment:     Standardized to IDMS reference method   11/08/2021 1 50 (H) 0 60 - 1 30 mg/dL Final     Comment:     Standardized to IDMS reference method   11/07/2021 1 82 (H) 0 60 - 1 30 mg/dL Final     Comment:     Standardized to IDMS reference method   09/21/2015 0 79 0 60 - 1 30 mg/dL Final     Comment:     Standardized to IDMS reference method   08/17/2015 0 72 0 60 - 1 30 mg/dL Final     Comment:     Standardized to IDMS reference method   03/16/2015 0 75 0 60 - 1 30 mg/dL Final     Comment:     Standardized to IDMS reference method     /56   Pulse 82   Temp 98 1 °F (36 7 °C)   Resp 18   SpO2 99%   No intake/output data recorded  Lab Results   Component Value Date/Time    BUN 71 (H) 06/24/2022 08:48 PM    BUN 18 09/21/2015 07:38 AM    WBC 11 67 (H) 06/24/2022 08:48 PM    WBC 10 21 (H) 09/21/2015 07:38 AM    HGB 7 9 (L) 06/24/2022 08:48 PM    HGB 15 7 (H) 09/21/2015 07:38 AM    HCT 25 7 (L) 06/24/2022 08:48 PM    HCT 44 0 09/21/2015 07:38 AM    MCV 90 06/24/2022 08:48 PM    MCV 89 09/21/2015 07:38 AM     06/24/2022 08:48 PM     09/21/2015 07:38 AM     Temp Readings from Last 3 Encounters:   06/24/22 98 1 °F (36 7 °C)   06/15/22 98 7 °F (37 1 °C) (Tympanic Core)   05/17/22 98 2 °F (36 8 °C)     Vancomycin Days of Therapy: 1    Assessment/Plan  The patient is currently on vancomycin utilizing pulse dosing based on adjusted body weight (due to obesity)  Baseline risks associated with therapy include: pre-existing renal impairment, concomitant nephrotoxic medications, advanced age, and dehydration    The patient is currently receiving 1500 mg IV q24h and after clinical evaluation will be changed to 1250 mg IV once followed by 1000 mg IV daily PRN random level <20  Pharmacy will also follow closely for s/sx of nephrotoxicity, infusion reactions, and appropriateness of therapy  BMP and CBC will be ordered per protocol  Plan for random level at approximately 0200 on 06/26, vancomycin will be re-dosed when level <20  Pharmacy will continue to follow the patients culture results and clinical progress daily      Nina Alba, Pharmacist

## 2022-06-26 ENCOUNTER — APPOINTMENT (INPATIENT)
Dept: VASCULAR ULTRASOUND | Facility: HOSPITAL | Age: 72
DRG: 638 | End: 2022-06-26
Payer: MEDICARE

## 2022-06-26 LAB
ANION GAP SERPL CALCULATED.3IONS-SCNC: 6 MMOL/L (ref 4–13)
ATRIAL RATE: 93 BPM
BASOPHILS # BLD AUTO: 0.02 THOUSANDS/ΜL (ref 0–0.1)
BASOPHILS NFR BLD AUTO: 0 % (ref 0–1)
BUN SERPL-MCNC: 53 MG/DL (ref 5–25)
CALCIUM SERPL-MCNC: 9.3 MG/DL (ref 8.4–10.2)
CHLORIDE SERPL-SCNC: 106 MMOL/L (ref 96–108)
CO2 SERPL-SCNC: 27 MMOL/L (ref 21–32)
CREAT SERPL-MCNC: 1.46 MG/DL (ref 0.6–1.3)
EOSINOPHIL # BLD AUTO: 0.15 THOUSAND/ΜL (ref 0–0.61)
EOSINOPHIL NFR BLD AUTO: 2 % (ref 0–6)
ERYTHROCYTE [DISTWIDTH] IN BLOOD BY AUTOMATED COUNT: 17 % (ref 11.6–15.1)
GFR SERPL CREATININE-BSD FRML MDRD: 35 ML/MIN/1.73SQ M
GLUCOSE SERPL-MCNC: 147 MG/DL (ref 65–140)
GLUCOSE SERPL-MCNC: 158 MG/DL (ref 65–140)
GLUCOSE SERPL-MCNC: 165 MG/DL (ref 65–140)
GLUCOSE SERPL-MCNC: 169 MG/DL (ref 65–140)
GLUCOSE SERPL-MCNC: 206 MG/DL (ref 65–140)
GLUCOSE SERPL-MCNC: 243 MG/DL (ref 65–140)
HCT VFR BLD AUTO: 25.2 % (ref 34.8–46.1)
HGB BLD-MCNC: 7.5 G/DL (ref 11.5–15.4)
IMM GRANULOCYTES # BLD AUTO: 0.11 THOUSAND/UL (ref 0–0.2)
IMM GRANULOCYTES NFR BLD AUTO: 2 % (ref 0–2)
LYMPHOCYTES # BLD AUTO: 1.63 THOUSANDS/ΜL (ref 0.6–4.47)
LYMPHOCYTES NFR BLD AUTO: 23 % (ref 14–44)
MCH RBC QN AUTO: 27 PG (ref 26.8–34.3)
MCHC RBC AUTO-ENTMCNC: 29.8 G/DL (ref 31.4–37.4)
MCV RBC AUTO: 91 FL (ref 82–98)
MONOCYTES # BLD AUTO: 0.63 THOUSAND/ΜL (ref 0.17–1.22)
MONOCYTES NFR BLD AUTO: 9 % (ref 4–12)
NEUTROPHILS # BLD AUTO: 4.63 THOUSANDS/ΜL (ref 1.85–7.62)
NEUTS SEG NFR BLD AUTO: 64 % (ref 43–75)
NRBC BLD AUTO-RTO: 0 /100 WBCS
P AXIS: 59 DEGREES
PLATELET # BLD AUTO: 158 THOUSANDS/UL (ref 149–390)
PMV BLD AUTO: 10.1 FL (ref 8.9–12.7)
POTASSIUM SERPL-SCNC: 4.6 MMOL/L (ref 3.5–5.3)
PR INTERVAL: 188 MS
QRS AXIS: 58 DEGREES
QRSD INTERVAL: 84 MS
QT INTERVAL: 342 MS
QTC INTERVAL: 480 MS
RBC # BLD AUTO: 2.78 MILLION/UL (ref 3.81–5.12)
SODIUM SERPL-SCNC: 139 MMOL/L (ref 135–147)
T WAVE AXIS: 60 DEGREES
VANCOMYCIN SERPL-MCNC: 7.1 UG/ML (ref 10–20)
VENTRICULAR RATE: 118 BPM
WBC # BLD AUTO: 7.17 THOUSAND/UL (ref 4.31–10.16)

## 2022-06-26 PROCEDURE — 80202 ASSAY OF VANCOMYCIN: CPT | Performed by: NURSE PRACTITIONER

## 2022-06-26 PROCEDURE — 93010 ELECTROCARDIOGRAM REPORT: CPT | Performed by: INTERNAL MEDICINE

## 2022-06-26 PROCEDURE — 99232 SBSQ HOSP IP/OBS MODERATE 35: CPT | Performed by: INTERNAL MEDICINE

## 2022-06-26 PROCEDURE — 80048 BASIC METABOLIC PNL TOTAL CA: CPT | Performed by: INTERNAL MEDICINE

## 2022-06-26 PROCEDURE — 94640 AIRWAY INHALATION TREATMENT: CPT

## 2022-06-26 PROCEDURE — 94760 N-INVAS EAR/PLS OXIMETRY 1: CPT

## 2022-06-26 PROCEDURE — 93925 LOWER EXTREMITY STUDY: CPT

## 2022-06-26 PROCEDURE — 85025 COMPLETE CBC W/AUTO DIFF WBC: CPT | Performed by: INTERNAL MEDICINE

## 2022-06-26 PROCEDURE — 93923 UPR/LXTR ART STDY 3+ LVLS: CPT

## 2022-06-26 PROCEDURE — 82948 REAGENT STRIP/BLOOD GLUCOSE: CPT

## 2022-06-26 RX ORDER — TORSEMIDE 20 MG/1
20 TABLET ORAL DAILY
Status: DISCONTINUED | OUTPATIENT
Start: 2022-06-27 | End: 2022-06-28 | Stop reason: HOSPADM

## 2022-06-26 RX ORDER — DOXYCYCLINE HYCLATE 50 MG/1
324 CAPSULE, GELATIN COATED ORAL
Status: DISCONTINUED | OUTPATIENT
Start: 2022-06-27 | End: 2022-06-28 | Stop reason: HOSPADM

## 2022-06-26 RX ORDER — INSULIN GLARGINE 100 [IU]/ML
40 INJECTION, SOLUTION SUBCUTANEOUS EVERY 12 HOURS SCHEDULED
Status: DISCONTINUED | OUTPATIENT
Start: 2022-06-26 | End: 2022-06-28 | Stop reason: HOSPADM

## 2022-06-26 RX ORDER — INSULIN LISPRO 100 [IU]/ML
10 INJECTION, SOLUTION INTRAVENOUS; SUBCUTANEOUS
Status: DISCONTINUED | OUTPATIENT
Start: 2022-06-26 | End: 2022-06-28 | Stop reason: HOSPADM

## 2022-06-26 RX ADMIN — MONTELUKAST 10 MG: 10 TABLET, FILM COATED ORAL at 21:20

## 2022-06-26 RX ADMIN — VANCOMYCIN HYDROCHLORIDE 1500 MG: 1 INJECTION, POWDER, LYOPHILIZED, FOR SOLUTION INTRAVENOUS at 09:51

## 2022-06-26 RX ADMIN — INSULIN LISPRO 2 UNITS: 100 INJECTION, SOLUTION INTRAVENOUS; SUBCUTANEOUS at 11:30

## 2022-06-26 RX ADMIN — MEMANTINE 5 MG: 5 TABLET ORAL at 17:23

## 2022-06-26 RX ADMIN — INSULIN LISPRO 1 UNITS: 100 INJECTION, SOLUTION INTRAVENOUS; SUBCUTANEOUS at 17:23

## 2022-06-26 RX ADMIN — PRAVASTATIN SODIUM 80 MG: 80 TABLET ORAL at 17:22

## 2022-06-26 RX ADMIN — LORATADINE 5 MG: 10 TABLET ORAL at 08:10

## 2022-06-26 RX ADMIN — GABAPENTIN 300 MG: 300 CAPSULE ORAL at 17:23

## 2022-06-26 RX ADMIN — INSULIN GLARGINE 40 UNITS: 100 INJECTION, SOLUTION SUBCUTANEOUS at 21:00

## 2022-06-26 RX ADMIN — HEPARIN SODIUM 7500 UNITS: 5000 INJECTION INTRAVENOUS; SUBCUTANEOUS at 21:19

## 2022-06-26 RX ADMIN — ASPIRIN 81 MG: 81 TABLET, COATED ORAL at 08:10

## 2022-06-26 RX ADMIN — ACETAMINOPHEN 975 MG: 325 TABLET, FILM COATED ORAL at 23:28

## 2022-06-26 RX ADMIN — INSULIN LISPRO 10 UNITS: 100 INJECTION, SOLUTION INTRAVENOUS; SUBCUTANEOUS at 17:24

## 2022-06-26 RX ADMIN — MEMANTINE 5 MG: 5 TABLET ORAL at 08:09

## 2022-06-26 RX ADMIN — TRAZODONE HYDROCHLORIDE 150 MG: 100 TABLET ORAL at 21:20

## 2022-06-26 RX ADMIN — FLUTICASONE PROPIONATE 1 SPRAY: 50 SPRAY, METERED NASAL at 08:11

## 2022-06-26 RX ADMIN — GABAPENTIN 300 MG: 300 CAPSULE ORAL at 08:09

## 2022-06-26 RX ADMIN — IPRATROPIUM BROMIDE 0.5 MG: 0.5 SOLUTION RESPIRATORY (INHALATION) at 22:02

## 2022-06-26 RX ADMIN — FLUTICASONE FUROATE AND VILANTEROL TRIFENATATE 1 PUFF: 200; 25 POWDER RESPIRATORY (INHALATION) at 08:11

## 2022-06-26 RX ADMIN — INSULIN LISPRO 2 UNITS: 100 INJECTION, SOLUTION INTRAVENOUS; SUBCUTANEOUS at 21:21

## 2022-06-26 RX ADMIN — INSULIN GLARGINE 20 UNITS: 100 INJECTION, SOLUTION SUBCUTANEOUS at 08:09

## 2022-06-26 NOTE — PROGRESS NOTES
[address] [place] 1950, 70 y o  female MRN: [id number]  Unit/Bed#: S -03 Encounter: [id number]  Primary Care Provider: Elba Sanchez MD   Date and time admitted to hospital: 6/24/2022  8:34 PM    * Diabetic ulcer of left midfoot associated with type 2 diabetes mellitus [facility])  Assessment & Plan  Lab Results   Component Value Date    HGBA1C 7 2 (H) 04/11/2022     · Presents to ED with complaints of left leg pain  T 100 7  Erythema to left calf  Ulcer to left lateral foot  · xrays left foot  · Received clindamycin in ED  Continue with vanco for now  · Spoke with Podiatry  NO plan for OR today  May need bedside I&D  Pending arterial doppler    Chronic respiratory failure with hypoxia (HCC)  Assessment & Plan  · Chronically wears 2 liters nasal cannula  Neb prn     KATHY (acute kidney injury) ([facility] [address] )  Assessment & Plan  · Creatinine 2 26  Baseline 1 5 to 1 8  · Patient was hypotensive with BP 94/47 on arrival to ED  Otherwise, patient denies any appetite change, GI losses, urinary symptoms   · S/p IVF  Hold lisinopril and celebrex  · Resolved  Cr 1 46  Close to baseline  Will resume torsemide 20 mg   Alzheimer's disease ([facility] [address] )  Assessment & Plan  · Stable  Currently oriented to self and place  · Continue PTA meds  · Resident at [facility]    Type 2 diabetes mellitus, with long-term current use of insulin [facility])  Assessment & Plan  Lab Results   Component Value Date    HGBA1C 7 2 (H) 04/11/2022     · PTA: lantus 70 units Q12 hours, humalog 39 units TID with meals plus sliding scale  ·  No OR today per podiatry  Diet resumed  Continue Latus 40 mg bid and Humalog 10 untis tid   Will monitor BG and adjust accordingly         VTE Pharmacologic Prophylaxis:   Pharmacologic: Heparin  Mechanical VTE Prophylaxis in Place: Yes    Patient Centered Rounds:     Discussions with Specialists or Other Care Team Provider: Podiatry     Education and Discussions with Family / Patient: I attempted to call pt's brother for updates    Time Spent for Care: 20 minutes  More than 50% of total time spent on counseling and coordination of care as described above  Current Length of Stay: 2 day(s)    Current Patient Status: Inpatient   Certification Statement: The patient will continue to require additional inpatient hospital stay due to IV abx/pending possible debridement    Discharge Plan:     Code Status: Level 3 - DNAR and DNI      Subjective:   No events overnight  Objective:     Vitals:   Temp (24hrs), Av 1 °F (37 3 °C), Min:98 1 °F (36 7 °C), Max:99 7 °F (37 6 °C)    Temp:  [98 1 °F (36 7 °C)-99 7 °F (37 6 °C)] 99 7 °F (37 6 °C)  HR:  [82-88] 82  Resp:  [16-18] 18  BP: (120-130)/(54-59) 130/56  SpO2:  [94 %-96 %] 94 %  Body mass index is 44 75 kg/m²  Input and Output Summary (last 24 hours): Intake/Output Summary (Last 24 hours) at 2022  Last data filed at 2022 1500  Gross per 24 hour   Intake --   Output 2900 ml   Net -2900 ml       Physical Exam:     Physical Exam  Constitutional:       General: She is not in acute distress  Appearance: She is not ill-appearing, toxic-appearing or diaphoretic  Eyes:      General:         Right eye: No discharge  Left eye: No discharge  Cardiovascular:      Rate and Rhythm: Normal rate and regular rhythm  Pulses: Normal pulses  Pulmonary:      Effort: Pulmonary effort is normal  No respiratory distress  Breath sounds: Normal breath sounds  No wheezing  Abdominal:      General: There is no distension  Palpations: Abdomen is soft  Tenderness: There is no abdominal tenderness  Musculoskeletal:         General: Swelling present  Skin:     General: Skin is warm  Neurological:      Mental Status: She is oriented to person, place, and time  Psychiatric:         Mood and Affect: Mood normal          Behavior: Behavior normal          Thought Content:  Thought content normal          Additional Data:     Labs:    Results from last 7 days   Lab Units 06/26/22  0649   WBC Thousand/uL 7 17   HEMOGLOBIN g/dL 7 5*   HEMATOCRIT % 25 2*   PLATELETS Thousands/uL 158   NEUTROS PCT % 64   LYMPHS PCT % 23   MONOS PCT % 9   EOS PCT % 2     Results from last 7 days   Lab Units 06/26/22  0649 06/25/22  0415 06/24/22 2048   POTASSIUM mmol/L 4 6   < > 5 4*   CHLORIDE mmol/L 106   < > 106   CO2 mmol/L 27   < > 26   BUN mg/dL 53*   < > 71*   CREATININE mg/dL 1 46*   < > 2 26*   CALCIUM mg/dL 9 3   < > 8 9   ALK PHOS U/L  --   --  53   ALT U/L  --   --  17   AST U/L  --   --  12*    < > = values in this interval not displayed  Results from last 7 days   Lab Units 06/24/22 2048   INR  1 16         Recent Cultures (last 7 days):     Results from last 7 days   Lab Units 06/24/22 2048   BLOOD CULTURE  No Growth at 24 hrs  No Growth at 24 hrs         Last 24 Hours Medication List:   Current Facility-Administered Medications   Medication Dose Route Frequency Provider Last Rate    acetaminophen  975 mg Oral Q8H PRN CELINA Maxwell      aspirin  81 mg Oral Daily Chinedu Maxwell [address]      [START ON 6/27/2022] ferrous gluconate  324 mg Oral Daily Before Breakfast Xiang Sánchez MD      fluticasone  1 spray Nasal Daily CELINA Maxwell      fluticasone-vilanterol  1 puff Inhalation Daily Chinedu Maxwell [address]      gabapentin  300 mg Oral BID CELINA Maxwell      heparin (porcine)  7,500 Units Subcutaneous Q8H [address] MD Adrian      insulin glargine  40 Units Subcutaneous Q12H [address] Xiang Sánchez MD      insulin lispro  1-5 Units Subcutaneous HS CELINA Maxwell      insulin lispro  1-6 Units Subcutaneous TID [facility] CELINA Maxwell      insulin lispro  10 Units Subcutaneous TID With Meals Xiang Sánchez MD      ipratropium  0 5 mg Nebulization TID PRN Xiang Sánchez MD      levalbuterol  1 25 mg Nebulization TID PRN Xiang Sánchez MD      loratadine  5 mg Oral Daily CELINA Maxwell  memantine  5 mg Oral BID CELINA Sin      montelukast  10 mg Oral HS CELINA Sin      pantoprazole  40 mg Oral Early Morning CELINA Sin      pravastatin  80 mg Oral Daily With Shahnaz Stout, [address]      [START ON 6/27/2022] torsemide  20 mg Oral Daily Lesley Lemus MD      traZODone  150 mg Oral HS CELINA Sin      vancomycin  20 mg/kg (Adjusted) Intravenous Q24H CELINA Sin          Today, Patient Was Seen By: Lesley Lemus MD    ** Please Note: Dictation voice to text software may have been used in the creation of this document   **

## 2022-06-26 NOTE — ASSESSMENT & PLAN NOTE
· Creatinine 2 26  Baseline 1 5 to 1 8  · Patient was hypotensive with BP 94/47 on arrival to ED  Otherwise, patient denies any appetite change, GI losses, urinary symptoms   · S/p IVF  Hold lisinopril and celebrex  · Resolved  Cr 1 46  Close to baseline  Will resume torsemide 20 mg

## 2022-06-26 NOTE — ASSESSMENT & PLAN NOTE
Lab Results   Component Value Date    HGBA1C 7 2 (H) 04/11/2022     · PTA: lantus 70 units Q12 hours, humalog 39 units TID with meals plus sliding scale  ·  No OR today per podiatry  Diet resumed  Continue Latus 40 mg bid and Humalog 10 untis tid   Will monitor BG and adjust accordingly

## 2022-06-26 NOTE — PROGRESS NOTES
Vancomycin Assessment    Tamie Tavares is a 70 y o  female who is currently receiving vancomycin 1g IV daily prn level <20 for skin-soft tissue infection     Relevant clinical data and objective history reviewed:  Creatinine   Date Value Ref Range Status   06/26/2022 1 46 (H) 0 60 - 1 30 mg/dL Final     Comment:     Standardized to IDMS reference method   06/25/2022 2 07 (H) 0 60 - 1 30 mg/dL Final     Comment:     Standardized to IDMS reference method   06/24/2022 2 26 (H) 0 60 - 1 30 mg/dL Final     Comment:     Standardized to IDMS reference method   09/21/2015 0 79 0 60 - 1 30 mg/dL Final     Comment:     Standardized to IDMS reference method   08/17/2015 0 72 0 60 - 1 30 mg/dL Final     Comment:     Standardized to IDMS reference method   03/16/2015 0 75 0 60 - 1 30 mg/dL Final     Comment:     Standardized to IDMS reference method     Vancomycin Rm   Date Value Ref Range Status   06/26/2022 7 1 (L) 10 0 - 20 0 ug/mL Final     /59   Pulse 88   Temp 99 6 °F (37 6 °C)   Resp 16   Wt 101 kg (221 lb 9 oz)   SpO2 94%   BMI 44 75 kg/m²   I/O last 3 completed shifts: In: 2074 [P O :1524; IV Piggyback:550]  Out: 2300 [Urine:2300]  Lab Results   Component Value Date/Time    BUN 53 (H) 06/26/2022 06:49 AM    BUN 18 09/21/2015 07:38 AM    WBC 7 17 06/26/2022 06:49 AM    WBC 10 21 (H) 09/21/2015 07:38 AM    HGB 7 5 (L) 06/26/2022 06:49 AM    HGB 15 7 (H) 09/21/2015 07:38 AM    HCT 25 2 (L) 06/26/2022 06:49 AM    HCT 44 0 09/21/2015 07:38 AM    MCV 91 06/26/2022 06:49 AM    MCV 89 09/21/2015 07:38 AM     06/26/2022 06:49 AM     09/21/2015 07:38 AM     Temp Readings from Last 3 Encounters:   06/26/22 99 6 °F (37 6 °C)   06/15/22 98 7 °F (37 1 °C) (Tympanic Core)   05/17/22 98 2 °F (36 8 °C)     Vancomycin Days of Therapy: 2    Assessment/Plan  The patient is currently on vancomycin utilizing pulse dosing  Baseline risks associated with therapy include: advanced age    The patient is receiving 1g IV daily prn level <20 with the most recent vancomycin level being not at steady-state and sub-therapeutic based on a goal of 15-20 (appropriate for most indications) ; therefore, after clinical evaluation will be changed to 1500mg IV q24 hours   Pharmacy will continue to follow closely for s/sx of nephrotoxicity, infusion reactions, and appropriateness of therapy  BMP and CBC will be ordered per protocol  Plan for trough as patient approaches steady state, prior to the 4th  dose at approximately 0830 on 6/29/22  Pharmacy will continue to follow the patients culture results and clinical progress daily      Iron Chanel, Pharmacist

## 2022-06-27 LAB
ANION GAP SERPL CALCULATED.3IONS-SCNC: 6 MMOL/L (ref 4–13)
BASOPHILS # BLD AUTO: 0.02 THOUSANDS/ΜL (ref 0–0.1)
BASOPHILS NFR BLD AUTO: 0 % (ref 0–1)
BUN SERPL-MCNC: 38 MG/DL (ref 5–25)
CALCIUM SERPL-MCNC: 9.2 MG/DL (ref 8.4–10.2)
CHLORIDE SERPL-SCNC: 106 MMOL/L (ref 96–108)
CO2 SERPL-SCNC: 28 MMOL/L (ref 21–32)
CREAT SERPL-MCNC: 1.38 MG/DL (ref 0.6–1.3)
EOSINOPHIL # BLD AUTO: 0.22 THOUSAND/ΜL (ref 0–0.61)
EOSINOPHIL NFR BLD AUTO: 3 % (ref 0–6)
ERYTHROCYTE [DISTWIDTH] IN BLOOD BY AUTOMATED COUNT: 17 % (ref 11.6–15.1)
GFR SERPL CREATININE-BSD FRML MDRD: 38 ML/MIN/1.73SQ M
GLUCOSE SERPL-MCNC: 131 MG/DL (ref 65–140)
GLUCOSE SERPL-MCNC: 165 MG/DL (ref 65–140)
GLUCOSE SERPL-MCNC: 172 MG/DL (ref 65–140)
GLUCOSE SERPL-MCNC: 250 MG/DL (ref 65–140)
GLUCOSE SERPL-MCNC: 254 MG/DL (ref 65–140)
HCT VFR BLD AUTO: 26.4 % (ref 34.8–46.1)
HGB BLD-MCNC: 7.9 G/DL (ref 11.5–15.4)
IMM GRANULOCYTES # BLD AUTO: 0.08 THOUSAND/UL (ref 0–0.2)
IMM GRANULOCYTES NFR BLD AUTO: 1 % (ref 0–2)
LYMPHOCYTES # BLD AUTO: 1.52 THOUSANDS/ΜL (ref 0.6–4.47)
LYMPHOCYTES NFR BLD AUTO: 22 % (ref 14–44)
MCH RBC QN AUTO: 27 PG (ref 26.8–34.3)
MCHC RBC AUTO-ENTMCNC: 29.9 G/DL (ref 31.4–37.4)
MCV RBC AUTO: 90 FL (ref 82–98)
MONOCYTES # BLD AUTO: 0.58 THOUSAND/ΜL (ref 0.17–1.22)
MONOCYTES NFR BLD AUTO: 8 % (ref 4–12)
NEUTROPHILS # BLD AUTO: 4.46 THOUSANDS/ΜL (ref 1.85–7.62)
NEUTS SEG NFR BLD AUTO: 66 % (ref 43–75)
NRBC BLD AUTO-RTO: 0 /100 WBCS
PLATELET # BLD AUTO: 144 THOUSANDS/UL (ref 149–390)
PMV BLD AUTO: 9.2 FL (ref 8.9–12.7)
POTASSIUM SERPL-SCNC: 4.2 MMOL/L (ref 3.5–5.3)
RBC # BLD AUTO: 2.93 MILLION/UL (ref 3.81–5.12)
SODIUM SERPL-SCNC: 140 MMOL/L (ref 135–147)
WBC # BLD AUTO: 6.88 THOUSAND/UL (ref 4.31–10.16)

## 2022-06-27 PROCEDURE — 97163 PT EVAL HIGH COMPLEX 45 MIN: CPT

## 2022-06-27 PROCEDURE — 97760 ORTHOTIC MGMT&TRAING 1ST ENC: CPT

## 2022-06-27 PROCEDURE — 93925 LOWER EXTREMITY STUDY: CPT | Performed by: SURGERY

## 2022-06-27 PROCEDURE — 82948 REAGENT STRIP/BLOOD GLUCOSE: CPT

## 2022-06-27 PROCEDURE — 80048 BASIC METABOLIC PNL TOTAL CA: CPT | Performed by: INTERNAL MEDICINE

## 2022-06-27 PROCEDURE — 93922 UPR/L XTREMITY ART 2 LEVELS: CPT | Performed by: SURGERY

## 2022-06-27 PROCEDURE — 85025 COMPLETE CBC W/AUTO DIFF WBC: CPT | Performed by: INTERNAL MEDICINE

## 2022-06-27 PROCEDURE — 99232 SBSQ HOSP IP/OBS MODERATE 35: CPT | Performed by: INTERNAL MEDICINE

## 2022-06-27 PROCEDURE — 97530 THERAPEUTIC ACTIVITIES: CPT

## 2022-06-27 RX ADMIN — VANCOMYCIN HYDROCHLORIDE 1500 MG: 1 INJECTION, POWDER, LYOPHILIZED, FOR SOLUTION INTRAVENOUS at 09:34

## 2022-06-27 RX ADMIN — PRAVASTATIN SODIUM 80 MG: 80 TABLET ORAL at 16:48

## 2022-06-27 RX ADMIN — INSULIN LISPRO 3 UNITS: 100 INJECTION, SOLUTION INTRAVENOUS; SUBCUTANEOUS at 11:12

## 2022-06-27 RX ADMIN — ACETAMINOPHEN 975 MG: 325 TABLET, FILM COATED ORAL at 12:23

## 2022-06-27 RX ADMIN — HEPARIN SODIUM 7500 UNITS: 5000 INJECTION INTRAVENOUS; SUBCUTANEOUS at 12:57

## 2022-06-27 RX ADMIN — INSULIN LISPRO 10 UNITS: 100 INJECTION, SOLUTION INTRAVENOUS; SUBCUTANEOUS at 07:46

## 2022-06-27 RX ADMIN — GABAPENTIN 300 MG: 300 CAPSULE ORAL at 08:25

## 2022-06-27 RX ADMIN — LORATADINE 5 MG: 10 TABLET ORAL at 08:25

## 2022-06-27 RX ADMIN — HEPARIN SODIUM 7500 UNITS: 5000 INJECTION INTRAVENOUS; SUBCUTANEOUS at 21:19

## 2022-06-27 RX ADMIN — TRAZODONE HYDROCHLORIDE 150 MG: 100 TABLET ORAL at 21:19

## 2022-06-27 RX ADMIN — MEMANTINE 5 MG: 5 TABLET ORAL at 16:48

## 2022-06-27 RX ADMIN — MEMANTINE 5 MG: 5 TABLET ORAL at 08:25

## 2022-06-27 RX ADMIN — ACETAMINOPHEN 975 MG: 325 TABLET, FILM COATED ORAL at 20:46

## 2022-06-27 RX ADMIN — FLUTICASONE FUROATE AND VILANTEROL TRIFENATATE 1 PUFF: 200; 25 POWDER RESPIRATORY (INHALATION) at 08:25

## 2022-06-27 RX ADMIN — TORSEMIDE 20 MG: 20 TABLET ORAL at 08:25

## 2022-06-27 RX ADMIN — INSULIN LISPRO 2 UNITS: 100 INJECTION, SOLUTION INTRAVENOUS; SUBCUTANEOUS at 21:20

## 2022-06-27 RX ADMIN — INSULIN LISPRO 1 UNITS: 100 INJECTION, SOLUTION INTRAVENOUS; SUBCUTANEOUS at 07:46

## 2022-06-27 RX ADMIN — FERROUS GLUCONATE 324 MG: 324 TABLET ORAL at 06:18

## 2022-06-27 RX ADMIN — INSULIN LISPRO 10 UNITS: 100 INJECTION, SOLUTION INTRAVENOUS; SUBCUTANEOUS at 11:12

## 2022-06-27 RX ADMIN — INSULIN LISPRO 10 UNITS: 100 INJECTION, SOLUTION INTRAVENOUS; SUBCUTANEOUS at 16:49

## 2022-06-27 RX ADMIN — FLUTICASONE PROPIONATE 1 SPRAY: 50 SPRAY, METERED NASAL at 08:25

## 2022-06-27 RX ADMIN — INSULIN LISPRO 1 UNITS: 100 INJECTION, SOLUTION INTRAVENOUS; SUBCUTANEOUS at 16:49

## 2022-06-27 RX ADMIN — PANTOPRAZOLE SODIUM 40 MG: 40 TABLET, DELAYED RELEASE ORAL at 05:08

## 2022-06-27 RX ADMIN — INSULIN GLARGINE 40 UNITS: 100 INJECTION, SOLUTION SUBCUTANEOUS at 21:18

## 2022-06-27 RX ADMIN — MONTELUKAST 10 MG: 10 TABLET, FILM COATED ORAL at 21:19

## 2022-06-27 RX ADMIN — GABAPENTIN 300 MG: 300 CAPSULE ORAL at 16:48

## 2022-06-27 RX ADMIN — INSULIN GLARGINE 40 UNITS: 100 INJECTION, SOLUTION SUBCUTANEOUS at 08:25

## 2022-06-27 RX ADMIN — ASPIRIN 81 MG: 81 TABLET, COATED ORAL at 08:25

## 2022-06-27 RX ADMIN — HEPARIN SODIUM 7500 UNITS: 5000 INJECTION INTRAVENOUS; SUBCUTANEOUS at 05:08

## 2022-06-27 NOTE — PROGRESS NOTES
Vancomycin IV Pharmacy-to-Dose Consultation    Amanda Houston is a 70 y o  female who is currently receiving Vancomycin IV with management by the Pharmacy Consult service for the treatment of skin and soft tissue infection  Assessment/Plan:  The patient was reviewed  Renal function is stable (continues to be around baseline) and no signs or symptoms of nephrotoxicity and/or infusion reactions were documented in the chart  Based on todays assessment, continue current vancomycin (day # 3) dosing of 1500mg IV q24h, with a plan for trough to be drawn at 0830 on 6/29  We will continue to follow the patients culture results and clinical progress daily      Maynor Cedillo, Pharmacist

## 2022-06-27 NOTE — ASSESSMENT & PLAN NOTE
· Creatinine 2 26  Baseline 1 5 to 1 8  · Patient was hypotensive with BP 94/47 on arrival to ED  Otherwise, patient denies any appetite change, GI losses, urinary symptoms   · S/p IVF  Hold lisinopril and celebrex  · Resolved  Cr 1 46  Close to baseline  Resumed torsemide 20 mg

## 2022-06-27 NOTE — ASSESSMENT & PLAN NOTE
Lab Results   Component Value Date    HGBA1C 7 2 (H) 04/11/2022     · PTA: lantus 70 units Q12 hours, humalog 39 units TID with meals plus sliding scale  ·  Continue Lantus 40 mg bid and Humalog 10 untis tid  -250   Continue current regimen and will monitor BG and adjust accordingly

## 2022-06-27 NOTE — PHYSICAL THERAPY NOTE
PHYSICAL THERAPY EVALUATION NOTE    Patient Name: Derick Barrera  NPDFB'Q Date: 6/27/2022  AGE:   70 y o  Mrn:   [id number]  ADMIT DX:  Cellulitis [L03 90]  Leg pain [M79 606]  Fever [R50 9]  KATHY (acute kidney injury) ([facility] ) [N17 9]    Past Medical History:   Diagnosis Date    Asthma     Diabetes mellitus ([place] [address] )     Hyperlipidemia     Hypertension      Length Of Stay: 3  PHYSICAL THERAPY EVALUATION :    06/27/22 1523   PT Last Visit   PT Visit Date 06/27/22   Pain Assessment   Pain Assessment Tool 0-10   Pain Score 6   Pain Location/Orientation Orientation: Left; Location: Leg   Hospital Pain Intervention(s) Repositioned   Restrictions/Precautions   LLE Weight Bearing Per Order NWB   Braces or Orthoses Other (Comment)  (Orthowedge shoe L LE)   Other Precautions Chair Alarm; Bed Alarm;WBS;Multiple lines; Fall Risk;Pain;O2   Home Living   Type of Home SNF  ([facility])   Additional Comments ambulates w/ rollator or self propels wheelchair  needs assist w/ ADLs and IADLs  uses supplemental oxygen at night  no falls in last 6 months  General   Additional Pertinent History 2L oxygen via nasal cannula  resting pulse ox 95% and 83 BPM    Family/Caregiver Present No   Cognition   Arousal/Participation Alert   Orientation Level Oriented to person; Other (Comment)  (pt was identified w/ full name, birth date)   Following Commands Follows one step commands with increased time or repetition   Subjective   Subjective pt seen supine in bed  agreed to PT eval  states having L LE pain  occasional input was needed for task focus     RUE Assessment   RUE Assessment WFL   LUE Assessment   LUE Assessment WFL   RLE Assessment   RLE Assessment WFL  (3+/5)   LLE Assessment   LLE Assessment X  (hip and knee 3/5, ankle 2+/5)   Coordination   Sensation X  (light touch impaired feet)   Bed Mobility   Supine to Sit 2  Maximal assistance   Additional items Assist x 1;HOB elevated; Increased time required;Verbal cues;LE management  (for trunk/LE positioning)   Transfers   Sit to Stand 1  Dependent  (manual assist provided to maintin NWB L LE)   Additional items Assist x 1; Increased time required;Verbal cues  (for hand placement, LE positioning, NWB compliance)   Stand to Sit 2  Maximal assistance  (manual assist provided to maintin NWB L LE)   Additional items Assist x 1; Impulsive;Verbal cues  (for hand placement, NWB compliance)   Stand pivot Unable to assess   Additional Comments pt stood 20 seconds w/ roller walker and totalx1  assist was provided to maintain NWB of L LE  additional standing not possible due pain and fatigue  pt was unable to mobilize to bedside chair  Ambulation/Elevation   Gait pattern Not appropriate   Assistive Device Rolling walker   Balance   Static Sitting Fair   Static Standing Zero  (w/ roller walker)   Ambulatory Zero   Activity Tolerance   Activity Tolerance Patient limited by fatigue;Patient limited by pain   Nurse Made Aware spoke to Dr Marylin SMITH, Marshall Goetz CM   Assessment   Prognosis Guarded   Problem List Decreased strength;Decreased range of motion;Decreased endurance; Impaired balance;Decreased mobility; Decreased safety awareness; Impaired sensation;Obesity; Decreased skin integrity;Orthopedic restrictions;Pain   Assessment Pt presents with left foot pain  Dx: left foot diabetic ulcer, KATHY, DM, anemia, chronic hypoxic respiratory failure, essential HTN, and Alzheimer's disease  order placed for PT eval and tx, w/ activity order of NWB L LE  Order placed for orthowedge shoe left foot  pt presents w/ comorbidities of DM, HTN, obesity, hyperlipidemia, and asthma and personal factors of advanced age and mobilizing w/ assistive device   pt presents w/ pain, weakness, decreased ROM, decreased endurance, impaired cognition, impaired balance, altered sensation, decreased safety awareness, orthopedic restrictions, fall risk and impaired skin integrity  these impairments are evident in findings from physical examination (weakness, decreased ROM, altered sensation and impaired skin integrity), mobility assessment (evident in need for max to total assist w/ bed mobility and transfers, inability to mobilize to bedside chair or ambulate, need for input for mobility technique and NWB compliance), and Barthel Index: 25/100  pt needed input for task focus and mobility technique  pt is at risk for falls due to physical and safety awareness deficits  pt's clinical presentation is unstable/unpredictable (evident in need for assist w/ all phases of mobility when usually mobilizing independently, pain impacting overall mobility status, need for supplemental oxygen in order to maintain oxygen saturation and need for input for task focus and mobility technique/NWB compliance)  pt needs inpatient PT tx to improve mobility deficits and progress mobility training as appropriate  discharge recommendation is for PT upon return to SNF to reduce fall risk and maximize level of functional independence  Goals   Patient Goals get going again  STG Expiration Date 07/10/22   Short Term Goal #1 pt will:  Increase bilateral LE strength 1/2 grade to facilitate independent mobility, Perform bed mobility w/ supervision to increase level of independence, Perform all transfers w/ minx1 to improve independence, Ambulate 5 ft  with roller walker w/ maxx1 w/o LOB to improve functional independence, Increase all balance 1 grade to decrease risk for falls, Complete exercise program independently to increase strength and endurance, Tolerate 3 hr OOB to faciliate upright tolerance, Improve Barthel Index score to 50 or greater to facilitate independence, Recall and adhere to NWB of L LE to facilitate surgical healing and Don and doff darcowedge shoe and make adjustments to velcro closures w/ minx1 to improve level of independence   PT Treatment Day 1   Plan   Treatment/Interventions Functional transfer training;LE strengthening/ROM; Therapeutic exercise; Endurance training;Cognitive reorientation;Patient/family training;Equipment eval/education; Bed mobility;Gait training   PT Frequency 2-3x/wk   Recommendation   PT Discharge Recommendation Return to facility with rehabilitation services   AM-PAC Basic Mobility Inpatient   Turning in Bed Without Bedrails 3   Lying on Back to Sitting on Edge of Flat Bed 2   Moving Bed to Chair 1   Standing Up From Chair 1   Walk in Room 1   Climb 3-5 Stairs 1   Basic Mobility Inpatient Raw Score 9   Turning Head Towards Sound 4   Follow Simple Instructions 3   Low Function Basic Mobility Raw Score 16   Low Function Basic Mobility Standardized Score 25 72   Highest Level Of Mobility   -[facility] Goal 3: Sit at edge of bed   -HL Achieved 3: Sit at edge of bed   Barthel Index   Feeding 10   Bathing 0   Grooming Score 0   Dressing Score 0   Bladder Score 0   Bowels Score 10   Toilet Use Score 0   Transfers (Bed/Chair) Score 5   Mobility (Level Surface) Score 0   Stairs Score 0   Barthel Index Score 25   Additional Treatment Session   Start Time 1523   End Time 1550   Treatment Assessment Pt agreed to participate in PT intervention  Therapist provided education to pt for mobility technique including NWB status, transfers and roller walker management  Education was provided due to findings from evaluation  Occasional repetition was needed for carryover to be noted  Sit < - > stand transfers w/ maxx1  pt stood 30 seconds x2 w/ roller walker and maxx1  assist was provided to maintain NWB L LE  additional standing not possible due to pain and fatigue  pt continues to be unable to mobilize to bedside chair or ambulate  Sit to supine transition w/ maxx1  Pt was found to have improvement after education w/ increased ambulation tolerance  Pt continues to be a fall risk  pt was sized for and provided w/ Orthowedge shoe to L LE per Podiatry orders  pt was provided w/ medium size  therapist provided education including indications for use, donning/doffing technique, and need for skin checks  pt needed max assist for donning and doffing shoe  continued inpatient PT tx is indicated to reduce fall risk factors  Equipment Use roller walker, Orthowedge shoe   Additional Treatment Day 1   End of Consult   Patient Position at End of Consult Supine;Bed/Chair alarm activated; All needs within reach     The patient's AM-PAC Basic Mobility Inpatient Short Form Raw Score is 9  A Raw score of less than or equal to 16 suggests the patient may benefit from discharge to post-acute rehabilitation services  Please also refer to the recommendation of the Physical Therapist for safe discharge planning  Skilled PT recommended while in hospital and upon DC to progress pt toward treatment goals       Adalgisa Nino, PT

## 2022-06-27 NOTE — PROGRESS NOTES
[address] [place] 1950, 70 y o  female MRN: [id number]  Unit/Bed#: S -66 Encounter: [id number]  Primary Care Provider: Danyel Murguia MD   Date and time admitted to hospital: 6/24/2022  8:34 PM    * Diabetic ulcer of left midfoot associated with type 2 diabetes mellitus [facility])  Assessment & Plan  Lab Results   Component Value Date    HGBA1C 7 2 (H) 04/11/2022     · Presents to ED with complaints of left leg pain  T 100 7  Erythema to left calf  Ulcer to left lateral foot  · xrays left foot  · Received clindamycin in ED  Continue with vanco for now  Abx day 4  · Spoke with Podiatry again today  Per podiatry, arterial doppler findings acceptable  No need for further debridement  Recommend local care with [address]  Will be okay for discharge in [place] per Podiatry  · Will complete a total of 7 days of antibiotics given her fever and low blood pressure on initial presentation  BC neg  Noted Keflex allergy  Consider possibly transitioning to doxycycline next 24 hours     Chronic respiratory failure with hypoxia (HCC)  Assessment & Plan  · Chronically wears 2 liters nasal cannula  Neb prn     KATHY (acute kidney injury) ([facility] [address] )  Assessment & Plan  · Creatinine 2 26  Baseline 1 5 to 1 8  · Patient was hypotensive with BP 94/47 on arrival to ED  Otherwise, patient denies any appetite change, GI losses, urinary symptoms   · S/p IVF  Hold lisinopril and celebrex  · Resolved  Cr 1 46  Close to baseline  Resumed torsemide 20 mg   Type 2 diabetes mellitus, with long-term current use of insulin (HCC)  Assessment & Plan  Lab Results   Component Value Date    HGBA1C 7 2 (H) 04/11/2022     · PTA: lantus 70 units Q12 hours, humalog 39 units TID with meals plus sliding scale  ·  Continue Lantus 40 mg bid and Humalog 10 untis tid  -250   Continue current regimen and will monitor BG and adjust accordingly       VTE Pharmacologic Prophylaxis: Pharmacologic: Heparin  Mechanical VTE Prophylaxis in Place:    Patient Centered Rounds:     Discussions with Specialists or Other Care Team Provider: Podiatry     Education and Discussions with Family / Patient: I had left a message for pt's brother    Time Spent for Care: 20 minutes  More than 50% of total time spent on counseling and coordination of care as described above  Current Length of Stay: 3 day(s)    Current Patient Status: Inpatient   Certification Statement: The patient will continue to require additional inpatient hospital stay due to above/IV abx    Discharge Plan:  Likely DC 24 hours back to [facility]    Code Status: Level 3 - DNAR and DNI      Subjective:   Denies pain  No Events  Feeling better     Objective:     Vitals:   Temp (24hrs), Av 7 °F (37 1 °C), Min:98 4 °F (36 9 °C), Max:98 8 °F (37 1 °C)    Temp:  [98 4 °F (36 9 °C)-98 8 °F (37 1 °C)] 98 8 °F (37 1 °C)  HR:  [84-85] 84  Resp:  [16-18] 18  BP: (116-139)/(56-61) 116/61  SpO2:  [93 %-96 %] 96 %  Body mass index is 44 66 kg/m²  Input and Output Summary (last 24 hours): Intake/Output Summary (Last 24 hours) at 2022  Last data filed at 2022 1259  Gross per 24 hour   Intake --   Output 1550 ml   Net -1550 ml       Physical Exam:     Physical Exam  Constitutional:       General: She is not in acute distress  Appearance: She is not ill-appearing, toxic-appearing or diaphoretic  Eyes:      General:         Right eye: No discharge  Left eye: No discharge  Cardiovascular:      Rate and Rhythm: Normal rate and regular rhythm  Pulses: Normal pulses  Pulmonary:      Effort: Pulmonary effort is normal  No respiratory distress  Breath sounds: No rales  Abdominal:      General: Bowel sounds are normal  There is no distension  Palpations: Abdomen is soft  Tenderness: There is no abdominal tenderness     Musculoskeletal:      Comments: Left foot wrapped in dressing Skin:     General: Skin is warm  Neurological:      Mental Status: She is oriented to person, place, and time  Psychiatric:         Mood and Affect: Mood normal          Behavior: Behavior normal          Thought Content: Thought content normal          Additional Data:     Labs:    Results from last 7 days   Lab Units 06/27/22  0435   WBC Thousand/uL 6 88   HEMOGLOBIN g/dL 7 9*   HEMATOCRIT % 26 4*   PLATELETS Thousands/uL 144*   NEUTROS PCT % 66   LYMPHS PCT % 22   MONOS PCT % 8   EOS PCT % 3     Results from last 7 days   Lab Units 06/27/22  0435 06/25/22  0415 06/24/22 2048   POTASSIUM mmol/L 4 2   < > 5 4*   CHLORIDE mmol/L 106   < > 106   CO2 mmol/L 28   < > 26   BUN mg/dL 38*   < > 71*   CREATININE mg/dL 1 38*   < > 2 26*   CALCIUM mg/dL 9 2   < > 8 9   ALK PHOS U/L  --   --  53   ALT U/L  --   --  17   AST U/L  --   --  12*    < > = values in this interval not displayed  Results from last 7 days   Lab Units 06/24/22 2048   INR  1 16         Recent Cultures (last 7 days):     Results from last 7 days   Lab Units 06/24/22 2048   BLOOD CULTURE  No Growth at 48 hrs  No Growth at 48 hrs         Last 24 Hours Medication List:   Current Facility-Administered Medications   Medication Dose Route Frequency Provider Last Rate    acetaminophen  975 mg Oral Q8H PRN CELINA Hernandez      aspirin  81 mg Oral Daily CELINA Hernandez      ferrous gluconate  324 mg Oral Daily Before Breakfast Stanley Epstein MD      fluticasone  1 spray Nasal Daily CELINA Hernandez      fluticasone-vilanterol  1 puff Inhalation Daily Star Miles, [address]      gabapentin  300 mg Oral BID CELINA Hernandez      heparin (porcine)  7,500 Units Subcutaneous Q8H [address] MD Adrian      insulin glargine  40 Units Subcutaneous Q12H [address] Stanley Epstein MD      insulin lispro  1-5 Units Subcutaneous HS CELINA Hernandez      insulin lispro  1-6 Units Subcutaneous TID [facility] CELINA Hernandez      insulin lispro  10 Units Subcutaneous TID With Meals Stanley Epstein MD      ipratropium  0 5 mg Nebulization TID PRN Stanley Epstein MD      levalbuterol  1 25 mg Nebulization TID PRN tSanley Epstein MD      loratadine  5 mg Oral Daily [place] Jd, CELINA      memantine  5 mg Oral BID Star Jd, CELINA      montelukast  10 mg Oral HS Star Jd, CRBRIDGETTE      pantoprazole  40 mg Oral Early Morning Whiter[place] Jd, CELINA      pravastatin  80 mg Oral Daily With [facility], CRNP      torsemide  20 mg Oral Daily Stanley Epstein MD      traZODone  150 mg Oral HS Star Jd, CELINA      vancomycin  20 mg/kg (Adjusted) Intravenous Q24H CELINA Hernandez          Today, Patient Was Seen By: Stanley Epstein MD    ** Please Note: Dictation voice to text software may have been used in the creation of this document   **

## 2022-06-27 NOTE — PLAN OF CARE
Problem: PHYSICAL THERAPY ADULT  Goal: Performs mobility at highest level of function for planned discharge setting  See evaluation for individualized goals  Description: Treatment/Interventions: Functional transfer training, LE strengthening/ROM, Therapeutic exercise, Endurance training, Cognitive reorientation, Patient/family training, Equipment eval/education, Bed mobility, Gait training          See flowsheet documentation for full assessment, interventions and recommendations  Note: Prognosis: Guarded  Problem List: Decreased strength, Decreased range of motion, Decreased endurance, Impaired balance, Decreased mobility, Decreased safety awareness, Impaired sensation, Obesity, Decreased skin integrity, Orthopedic restrictions, Pain  Assessment: Pt presents with left foot pain  Dx: left foot diabetic ulcer, KATHY, DM, anemia, chronic hypoxic respiratory failure, essential HTN, and Alzheimer's disease  order placed for PT eval and tx, w/ activity order of NWB L LE  Order placed for orthowedge shoe left foot  pt presents w/ comorbidities of DM, HTN, obesity, hyperlipidemia, and asthma and personal factors of advanced age and mobilizing w/ assistive device  pt presents w/ pain, weakness, decreased ROM, decreased endurance, impaired cognition, impaired balance, altered sensation, decreased safety awareness, orthopedic restrictions, fall risk and impaired skin integrity  these impairments are evident in findings from physical examination (weakness, decreased ROM, altered sensation and impaired skin integrity), mobility assessment (evident in need for max to total assist w/ bed mobility and transfers, inability to mobilize to bedside chair or ambulate, need for input for mobility technique and NWB compliance), and Barthel Index: 25/100  pt needed input for task focus and mobility technique  pt is at risk for falls due to physical and safety awareness deficits   pt's clinical presentation is unstable/unpredictable (evident in need for assist w/ all phases of mobility when usually mobilizing independently, pain impacting overall mobility status, need for supplemental oxygen in order to maintain oxygen saturation and need for input for task focus and mobility technique/NWB compliance)  pt needs inpatient PT tx to improve mobility deficits and progress mobility training as appropriate  discharge recommendation is for PT upon return to SNF to reduce fall risk and maximize level of functional independence  PT Discharge Recommendation: Return to facility with rehabilitation services          See flowsheet documentation for full assessment

## 2022-06-27 NOTE — ASSESSMENT & PLAN NOTE
Lab Results   Component Value Date    HGBA1C 7 2 (H) 04/11/2022     · Presents to ED with complaints of left leg pain  T 100 7  Erythema to left calf  Ulcer to left lateral foot  · xrays left foot  · Received clindamycin in ED  Continue with vanco for now  Abx day 4  · Spoke with Podiatry again today  Per podiatry, arterial doppler findings acceptable  No need for further debridement  Recommend local care with [address]  Will be okay for discharge in Am per Podiatry  · Will complete a total of 7 days of antibiotics given her fever and low blood pressure on initial presentation  BC neg  Noted Keflex allergy    Consider possibly transitioning to doxycycline next 24 hours

## 2022-06-27 NOTE — PHYSICAL THERAPY NOTE
Physical Therapy Cancellation Note       06/27/22 0900   PT Last Visit   PT Visit Date 06/27/22   Note Type   Note type Cancelled Session; Evaluation   Cancel Reasons Other   Additional Comments referral received for PT eval and tx  chart review performed  Podiatry consult states NWB L LE and orthowedge shoe  order placed for surgical shoe  contacted Dr Usha Lyons via [address] [place] [address] who stated orthowedge shoe is needed  therapist requested order be placed for shoe  will await order placement prior to iniating PT consult       Rebecca Mack, PT

## 2022-06-28 VITALS
WEIGHT: 221.12 LBS | OXYGEN SATURATION: 93 % | TEMPERATURE: 98.8 F | DIASTOLIC BLOOD PRESSURE: 74 MMHG | BODY MASS INDEX: 44.66 KG/M2 | HEART RATE: 82 BPM | RESPIRATION RATE: 16 BRPM | SYSTOLIC BLOOD PRESSURE: 131 MMHG

## 2022-06-28 LAB
ANION GAP SERPL CALCULATED.3IONS-SCNC: 7 MMOL/L (ref 4–13)
BASOPHILS # BLD AUTO: 0.02 THOUSANDS/ΜL (ref 0–0.1)
BASOPHILS NFR BLD AUTO: 0 % (ref 0–1)
BUN SERPL-MCNC: 30 MG/DL (ref 5–25)
CALCIUM SERPL-MCNC: 8.8 MG/DL (ref 8.4–10.2)
CHLORIDE SERPL-SCNC: 102 MMOL/L (ref 96–108)
CO2 SERPL-SCNC: 28 MMOL/L (ref 21–32)
CREAT SERPL-MCNC: 1.31 MG/DL (ref 0.6–1.3)
EOSINOPHIL # BLD AUTO: 0.21 THOUSAND/ΜL (ref 0–0.61)
EOSINOPHIL NFR BLD AUTO: 3 % (ref 0–6)
ERYTHROCYTE [DISTWIDTH] IN BLOOD BY AUTOMATED COUNT: 16.9 % (ref 11.6–15.1)
FLUAV RNA RESP QL NAA+PROBE: NEGATIVE
FLUBV RNA RESP QL NAA+PROBE: NEGATIVE
GFR SERPL CREATININE-BSD FRML MDRD: 40 ML/MIN/1.73SQ M
GLUCOSE SERPL-MCNC: 184 MG/DL (ref 65–140)
GLUCOSE SERPL-MCNC: 205 MG/DL (ref 65–140)
GLUCOSE SERPL-MCNC: 270 MG/DL (ref 65–140)
HCT VFR BLD AUTO: 26.7 % (ref 34.8–46.1)
HGB BLD-MCNC: 8.2 G/DL (ref 11.5–15.4)
IMM GRANULOCYTES # BLD AUTO: 0.12 THOUSAND/UL (ref 0–0.2)
IMM GRANULOCYTES NFR BLD AUTO: 2 % (ref 0–2)
LYMPHOCYTES # BLD AUTO: 1.51 THOUSANDS/ΜL (ref 0.6–4.47)
LYMPHOCYTES NFR BLD AUTO: 20 % (ref 14–44)
MCH RBC QN AUTO: 27.4 PG (ref 26.8–34.3)
MCHC RBC AUTO-ENTMCNC: 30.7 G/DL (ref 31.4–37.4)
MCV RBC AUTO: 89 FL (ref 82–98)
MONOCYTES # BLD AUTO: 0.59 THOUSAND/ΜL (ref 0.17–1.22)
MONOCYTES NFR BLD AUTO: 8 % (ref 4–12)
NEUTROPHILS # BLD AUTO: 4.98 THOUSANDS/ΜL (ref 1.85–7.62)
NEUTS SEG NFR BLD AUTO: 67 % (ref 43–75)
NRBC BLD AUTO-RTO: 0 /100 WBCS
PLATELET # BLD AUTO: 141 THOUSANDS/UL (ref 149–390)
PMV BLD AUTO: 9.5 FL (ref 8.9–12.7)
POTASSIUM SERPL-SCNC: 4.1 MMOL/L (ref 3.5–5.3)
RBC # BLD AUTO: 2.99 MILLION/UL (ref 3.81–5.12)
RSV RNA RESP QL NAA+PROBE: NEGATIVE
SARS-COV-2 RNA RESP QL NAA+PROBE: NEGATIVE
SODIUM SERPL-SCNC: 137 MMOL/L (ref 135–147)
WBC # BLD AUTO: 7.43 THOUSAND/UL (ref 4.31–10.16)

## 2022-06-28 PROCEDURE — 85025 COMPLETE CBC W/AUTO DIFF WBC: CPT | Performed by: INTERNAL MEDICINE

## 2022-06-28 PROCEDURE — 99239 HOSP IP/OBS DSCHRG MGMT >30: CPT | Performed by: PHYSICIAN ASSISTANT

## 2022-06-28 PROCEDURE — 80048 BASIC METABOLIC PNL TOTAL CA: CPT | Performed by: INTERNAL MEDICINE

## 2022-06-28 PROCEDURE — 82948 REAGENT STRIP/BLOOD GLUCOSE: CPT

## 2022-06-28 PROCEDURE — 0241U HB NFCT DS VIR RESP RNA 4 TRGT: CPT | Performed by: PHYSICIAN ASSISTANT

## 2022-06-28 RX ORDER — INSULIN LISPRO 100 [IU]/ML
10 INJECTION, SOLUTION INTRAVENOUS; SUBCUTANEOUS
Refills: 0
Start: 2022-06-28

## 2022-06-28 RX ORDER — DOXYCYCLINE HYCLATE 100 MG/1
100 CAPSULE ORAL EVERY 12 HOURS SCHEDULED
Qty: 6 CAPSULE | Refills: 0
Start: 2022-06-28 | End: 2022-07-01

## 2022-06-28 RX ORDER — DOXYCYCLINE HYCLATE 100 MG/1
100 CAPSULE ORAL EVERY 12 HOURS SCHEDULED
Status: DISCONTINUED | OUTPATIENT
Start: 2022-06-28 | End: 2022-06-28 | Stop reason: HOSPADM

## 2022-06-28 RX ORDER — DOXYCYCLINE HYCLATE 50 MG/1
324 CAPSULE, GELATIN COATED ORAL
Refills: 0
Start: 2022-06-29

## 2022-06-28 RX ADMIN — INSULIN LISPRO 4 UNITS: 100 INJECTION, SOLUTION INTRAVENOUS; SUBCUTANEOUS at 11:20

## 2022-06-28 RX ADMIN — GABAPENTIN 300 MG: 300 CAPSULE ORAL at 08:35

## 2022-06-28 RX ADMIN — ACETAMINOPHEN 975 MG: 325 TABLET, FILM COATED ORAL at 08:35

## 2022-06-28 RX ADMIN — LORATADINE 5 MG: 10 TABLET ORAL at 08:35

## 2022-06-28 RX ADMIN — INSULIN GLARGINE 40 UNITS: 100 INJECTION, SOLUTION SUBCUTANEOUS at 08:36

## 2022-06-28 RX ADMIN — MEMANTINE 5 MG: 5 TABLET ORAL at 08:35

## 2022-06-28 RX ADMIN — ASPIRIN 81 MG: 81 TABLET, COATED ORAL at 08:35

## 2022-06-28 RX ADMIN — INSULIN LISPRO 2 UNITS: 100 INJECTION, SOLUTION INTRAVENOUS; SUBCUTANEOUS at 07:28

## 2022-06-28 RX ADMIN — PANTOPRAZOLE SODIUM 40 MG: 40 TABLET, DELAYED RELEASE ORAL at 06:15

## 2022-06-28 RX ADMIN — FLUTICASONE FUROATE AND VILANTEROL TRIFENATATE 1 PUFF: 200; 25 POWDER RESPIRATORY (INHALATION) at 08:39

## 2022-06-28 RX ADMIN — FERROUS GLUCONATE 324 MG: 324 TABLET ORAL at 06:15

## 2022-06-28 RX ADMIN — DOXYCYCLINE 100 MG: 100 CAPSULE ORAL at 08:35

## 2022-06-28 RX ADMIN — INSULIN LISPRO 10 UNITS: 100 INJECTION, SOLUTION INTRAVENOUS; SUBCUTANEOUS at 11:21

## 2022-06-28 RX ADMIN — TORSEMIDE 20 MG: 20 TABLET ORAL at 08:35

## 2022-06-28 RX ADMIN — HEPARIN SODIUM 7500 UNITS: 5000 INJECTION INTRAVENOUS; SUBCUTANEOUS at 06:16

## 2022-06-28 RX ADMIN — FLUTICASONE PROPIONATE 1 SPRAY: 50 SPRAY, METERED NASAL at 08:39

## 2022-06-28 RX ADMIN — INSULIN LISPRO 10 UNITS: 100 INJECTION, SOLUTION INTRAVENOUS; SUBCUTANEOUS at 07:29

## 2022-06-28 NOTE — ASSESSMENT & PLAN NOTE
· Initial blood pressure 94/47  · Improved with fluid bolus   · Will hold lisinopril, torsemide due to KATHY   Can resume at nursing facility

## 2022-06-28 NOTE — ASSESSMENT & PLAN NOTE
Lab Results   Component Value Date    HGBA1C 7 2 (H) 04/11/2022     · Presents to ED with complaints of left leg pain  T 100 7  Erythema to left calf  Ulcer to left lateral foot  · xrays left foot  · Received 4 days of IV vancomycin  Transition to p o  Doxy for a total of 7 days of antibiotics  · Spoke with Podiatry again today  Per podiatry, arterial doppler findings acceptable  No need for further debridement  Recommend local care with [address]  Will be okay for discharge in [place] per Podiatry  · Will complete a total of 7 days of antibiotics given her fever and low blood pressure on initial presentation  BC neg  Noted Keflex allergy

## 2022-06-28 NOTE — CASE MANAGEMENT
Case Management Discharge Planning Note    Patient name Gema LORA /S -01 MRN [id number]  : 1950 Date 2022       Current Admission Date: 2022  Current Admission Diagnosis:Diabetic ulcer of left midfoot associated with type 2 diabetes mellitus [facility])   Patient Active Problem List    Diagnosis Date Noted    Chronic respiratory failure with hypoxia ([facility] [address] ) 2022    Diabetic ulcer of left midfoot associated with type 2 diabetes mellitus ([facility] [address] ) 2022    Anemia 2022    GILDARDO (obstructive sleep apnea) 2022    Abscess of abdominal wall 01/10/2022    Sacral ulcer ([address] ) 2021    Morbid (severe) obesity due to excess calories ([facility] [address] ) 10/19/2021    Nocturnal hypoxia 2020    Kidney lesion 2020    Weakness generalized 10/14/2020    S/P tooth extraction 2020    Sepsis ([facility] [address] ) secondary to pneumonia 2020    KATHY (acute kidney injury) ([facility] [address] ) 2020    Hyperlipidemia 2020    Asthma 2020    Hoarse voice quality 2019    Seasonal allergies 2019    Need for pneumococcal vaccination 2019    MCI (mild cognitive impairment) 2018    Fall 2018    Injury of face 2018    Sixth nerve palsy of left eye 2018    Ptosis of left eyelid 2018    Alzheimer's disease ([facility] [address] ) 10/15/2018    Pulmonary nodule 10/15/2018    Preop pulmonary/respiratory exam 10/15/2018    Mental status change 2017    Essential hypertension 2017    Type 2 diabetes mellitus, with long-term current use of insulin ([facility] [address] ) 2017      LOS (days): 4  Geometric Mean LOS (GMLOS) (days): 2 90  Days to GMLOS:-0 7     OBJECTIVE:  Risk of Unplanned Readmission Score: 16         Current admission status: Inpatient   Preferred Pharmacy:   [id number] [place], [place] - [address]  [address] [facility] [address]  Phone: [phone] Fax: [phone]    [address] [place] [address] [facility] - [facility] [place], [address]  [address] [address]  Phone: [phone] Fax: [phone]    Primary Care Provider: Светлана Benjamin MD    Primary Insurance: MEDICARE  Secondary Insurance: Kalee Waller [facility]    DISCHARGE DETAILS:    Discharge planning discussed with[de-identified] facility and brother  Freedom of Choice: Yes     Comments - Freedom of Choice: Pt will be returning to [facility] as she is a long term resident there  Facility is able to accept pt back and family would like her to return there  CM contacted family/caregiver?: Yes  Were Treatment Team discharge recommendations reviewed with patient/caregiver?: Yes     Were patient/caregiver advised of the risks associated with not following Treatment Team discharge recommendations?: Yes    Contacts  Patient Contacts: brother  Relationship to Patient[de-identified] Family  Contact Method: Phone  Reason/Outcome: Discharge Planning, Continuity of Care, Emergency [address]         Is the patient interested in [facility] at discharge?: No    DME Referral Provided  Referral made for DME?: No    Other Referral/Resources/Interventions Provided:  Interventions: Transportation, SNF  Referral Comments: Referral has been made to [facility] to provide clinical information and inform them pt is stable to return to the facility  They are willing to accept pt back and stated she will be returning to  where she is a LIT resident  Referral has also been made to [address] requesting a BLS transport to return to [facility]  Time requested was 1200 and [facility] is able to transport pt at this time  Indigo Anders, facility and brother have been notified of DC arrangements      Would you like to participate in our [address] service program?  : No - Declined    Treatment Team Recommendation: SNF  Discharge Destination Plan[de-identified] SNF  Transport at Discharge : BLS Ambulance  Dispatcher Contacted: Yes     Transported by Josr and Unit #): [address]  ETA of Transport (Date): 06/28/22  ETA of Transport (Time): 1200     Transfer Mode: Stretcher  Accompanied by: EMS personnel  Transfer Equipment: BLS devices  IMM Given (Date):: 06/28/22  IMM Given to[de-identified] Family  Family notified[de-identified] brother         IMM reviewed with brother, brother agrees with discharge determination

## 2022-06-28 NOTE — ASSESSMENT & PLAN NOTE
· Creatinine 2 26  Baseline 1 5 to 1 8  · Patient was hypotensive with BP 94/47 on arrival to ED  Otherwise, patient denies any appetite change, GI losses, urinary symptoms   · S/p IVF  D/c lisinopril and celebrex  · Resolved  Cr 1 31  Close to baseline  Resumed torsemide 20 mg

## 2022-06-28 NOTE — PROGRESS NOTES
Vancomycin IV Pharmacy-to-Dose Consultation    Law Fajardo is a 70 y o  female who is currently receiving Vancomycin IV with management by the Pharmacy Consult service for the treatment of skin-soft tissue infection    Assessment/Plan:    The patient's chart was reviewed  Renal function is stable  There are no signs or symptoms of nephrotoxicity and/or infusion reactions documented  Based on today's assessment, will continue current vancomycin dosing of 1500mg IV every 24 hours, with a plan for trough to be drawn at 0830 on 6/29  We will continue to follow the patient's culture results and clinical progress daily        Anusha Stark, PharmD   Pharmacist

## 2022-06-28 NOTE — DISCHARGE SUMMARY
Isaiah Walls 1950, 70 y o  female MRN: [id number]  Unit/Bed#: S -01 Encounter: [id number]  Primary Care Provider: Kuldeep Way MD   Date and time admitted to hospital: 6/24/2022  8:34 PM    * Diabetic ulcer of left midfoot associated with type 2 diabetes mellitus [facility])  Assessment & Plan  Lab Results   Component Value Date    HGBA1C 7 2 (H) 04/11/2022     · Presents to ED with complaints of left leg pain  T 100 7  Erythema to left calf  Ulcer to left lateral foot  · xrays left foot  · Received 4 days of IV vancomycin  Transition to p o  Doxy for a total of 7 days of antibiotics  · Spoke with Podiatry again today  Per podiatry, arterial doppler findings acceptable  No need for further debridement  Recommend local care with [address]  Will be okay for discharge in [place] per Podiatry  · Will complete a total of 7 days of antibiotics given her fever and low blood pressure on initial presentation  BC neg  Noted Keflex allergy  Anemia  Assessment & Plan  · Hgb 7 9, denies bleeding  · stable    Chronic respiratory failure with hypoxia (HCC)  Assessment & Plan  · Chronically wears 2 liters nasal cannula  Neb prn     GILDARDO (obstructive sleep apnea)  Assessment & Plan  · Chronically wears 2 liters    KATHY (acute kidney injury) ([facility] [address] )  Assessment & Plan  · Creatinine 2 26  Baseline 1 5 to 1 8  · Patient was hypotensive with BP 94/47 on arrival to ED  Otherwise, patient denies any appetite change, GI losses, urinary symptoms   · S/p IVF  D/c lisinopril and celebrex  · Resolved  Cr 1 31  Close to baseline  Resumed torsemide 20 mg   Alzheimer's disease ([facility] [address] )  Assessment & Plan  · Stable  Currently oriented to self and place     · Continue PTA meds  · Resident at [facility]    Type 2 diabetes mellitus, with long-term current use of insulin [facility])  Assessment & Plan  Lab Results   Component Value Date    HGBA1C 7 2 (H) 04/11/2022     · PTA: lantus 70 units Q12 hours, humalog 39 units TID with meals plus sliding scale  · Continue Lantus 40 mg bid and Humalog 10 untis tid  -250  Continue current regimen and will monitor BG and adjust accordingly     Essential hypertension  Assessment & Plan  · Initial blood pressure 94/47  · Improved with fluid bolus   · Will hold lisinopril, torsemide due to KATHY  Can resume at nursing facility    Medical Problems             Resolved Problems  Date Reviewed: 6/28/2022   None               Discharging Physician / Practitioner: Santos Bui PA-C  PCP: Lawanda Salamanca MD  Admission Date:   Admission Orders (From admission, onward)     Ordered        06/24/22 2241  Inpatient Admission  Once                      Discharge Date: 06/28/22    Consultations During Hospital Stay:  · Podiatry     Procedures Performed:   · none    Significant Findings / Test Results:   · As above    Incidental Findings:   · none     Test Results Pending at Discharge (will require follow up):   · none     Outpatient Tests Requested:  · none    Complications:  none    Reason for Admission: KATHY, infected foot ulcer    Hospital Course:   Mariella Fritz is a 70 y o  female patient who originally presented to the hospital on 6/24/2022 due to left foot pain and fever  Also found to have KATHY upon admission  She had an ulcer to her left lateral foot with some drainage likely related to type 2 diabetes  Was admitted and placed on IV vancomycin  She received 4 days of IV antibiotic therapy and was seen by Podiatry  No inpatient operations were needed for the foot, we used supportive care with topical wound care as well as IV antibiotics and she clinically improved  We also held her torsemide, lisinopril, Celebrex with improvement of her KATHY after gentle IV fluids  We resumed her torsemide however continue to hold her lisinopril and Celebrex through discharge  She was transitioned to p o   Doxycycline for a total 7 days of antibiotics per Podiatry and [place] [facility]  She is stable to be discharged back to the nursing home today  Please see above list of diagnoses and related plan for additional information  Condition at Discharge: stable    Discharge Day Visit / Exam:   Subjective:  No overnight events per nursing  Patient is doing well  Stable for discharge back to [address] [place] [address] today  Vitals: Blood Pressure: 140/65 (06/28/22 0726)  Pulse: 82 (06/28/22 0726)  Temperature: 98 8 °F (37 1 °C) (06/28/22 0726)  Temp Source: Oral (06/28/22 0726)  Respirations: 16 (06/28/22 0726)  Weight - Scale: 100 kg (221 lb 1 9 oz) (06/27/22 0600)  SpO2: 93 % (06/28/22 0726)  Exam:   Physical Exam  Vitals and nursing note reviewed  Constitutional:       General: She is not in acute distress  Appearance: Normal appearance  HENT:      Head: Normocephalic  Mouth/Throat:      Mouth: Mucous membranes are moist    Eyes:      General: No scleral icterus  Pupils: Pupils are equal, round, and reactive to light  Cardiovascular:      Rate and Rhythm: Normal rate and regular rhythm  Heart sounds: No murmur heard  Pulmonary:      Effort: Pulmonary effort is normal  No respiratory distress  Breath sounds: Normal breath sounds  No wheezing, rhonchi or rales  Abdominal:      General: Bowel sounds are normal  There is no distension  Palpations: Abdomen is soft  Tenderness: There is no abdominal tenderness  Musculoskeletal:         General: No swelling  Right lower leg: No edema  Left lower leg: No edema  Comments: + left foot wrapped in ACE   Skin:     Capillary Refill: Capillary refill takes less than 2 seconds  Neurological:      General: No focal deficit present  Mental Status: She is alert and oriented to person, place, and time  Mental status is at baseline  Discussion with Family: Attempted to update  (brother) via phone  Left voicemail       Discharge instructions/Information to patient and family:   See after visit summary for information provided to patient and family  Provisions for Follow-Up Care:  See after visit summary for information related to follow-up care and any pertinent home health orders  Disposition:   Rachana Ha Joaquin at [facility] Readmission: no     Discharge Statement:  I spent 45 minutes discharging the patient  This time was spent on the day of discharge  I had direct contact with the patient on the day of discharge  Greater than 50% of the total time was spent examining patient, answering all patient questions, arranging and discussing plan of care with patient as well as directly providing post-discharge instructions  Additional time then spent on discharge activities  Discharge Medications:  See after visit summary for reconciled discharge medications provided to patient and/or family        **Please Note: This note may have been constructed using a voice recognition system**

## 2022-06-28 NOTE — CONSULTS
Vancomycin IV Pharmacy-to-Dose Consultation    María Matute is a 70 y o  female who was receiving Vancomycin IV with management by the Pharmacy Consult service for treatment of skin-soft tissue infection    The patient's Vancomycin therapy has been completed / discontinued  Thank you for allowing us to take part in this patient's care  Pharmacy will sign-off now; please call or re-consult if there are any questions          Cristóbal Villatoro, JoseD  Pharmacist

## 2022-06-28 NOTE — PLAN OF CARE
Problem: Potential for Falls  Goal: Patient will remain free of falls  Description: INTERVENTIONS:  - Educate patient/family on patient safety including physical limitations  - Instruct patient to call for assistance with activity   - Consult OT/PT to assist with strengthening/mobility   - Keep Call bell within reach  - Keep bed low and locked with side rails adjusted as appropriate  - Keep care items and personal belongings within reach  - Initiate and maintain comfort rounds  - Make Fall Risk Sign visible to staff  - Offer Toileting every 2 Hours, in advance of need  - Initiate/Maintain bed alarm  - Obtain necessary fall risk management equipment:   - Apply yellow socks and bracelet for high fall risk patients  - Consider moving patient to room near nurses station  Outcome: Progressing     Problem: Prexisting or High Potential for Compromised Skin Integrity  Goal: Skin integrity is maintained or improved  Description: INTERVENTIONS:  - Identify patients at risk for skin breakdown  - Assess and monitor skin integrity  - Assess and monitor nutrition and hydration status  - Monitor labs   - Assess for incontinence   - Turn and reposition patient  - Assist with mobility/ambulation  - Relieve pressure over bony prominences  - Avoid friction and shearing  - Provide appropriate hygiene as needed including keeping skin clean and dry  - Evaluate need for skin moisturizer/barrier cream  - Collaborate with interdisciplinary team   - Patient/family teaching  - Consider wound care consult   Outcome: Progressing     Problem: MOBILITY - ADULT  Goal: Maintain or return to baseline ADL function  Description: INTERVENTIONS:  -  Assess patient's ability to carry out ADLs; assess patient's baseline for ADL function and identify physical deficits which impact ability to perform ADLs (bathing, care of mouth/teeth, toileting, grooming, dressing, etc )  - Assess/evaluate cause of self-care deficits   - Assess range of motion  - Assess patient's mobility; develop plan if impaired  - Assess patient's need for assistive devices and provide as appropriate  - Encourage maximum independence but intervene and supervise when necessary  - Involve family in performance of ADLs  - Assess for home care needs following discharge   - Consider OT consult to assist with ADL evaluation and planning for discharge  - Provide patient education as appropriate  Outcome: Progressing

## 2022-06-30 LAB
BACTERIA BLD CULT: NORMAL
BACTERIA BLD CULT: NORMAL
GLUCOSE SERPL-MCNC: 195 MG/DL (ref 65–140)

## 2022-10-12 PROBLEM — A41.9 SEPSIS (HCC): Status: RESOLVED | Noted: 2020-09-19 | Resolved: 2022-10-12

## 2022-10-14 ENCOUNTER — OFFICE VISIT (OUTPATIENT)
Dept: PULMONOLOGY | Facility: CLINIC | Age: 72
End: 2022-10-14
Payer: MEDICARE

## 2022-10-14 VITALS
OXYGEN SATURATION: 94 % | BODY MASS INDEX: 44.66 KG/M2 | SYSTOLIC BLOOD PRESSURE: 122 MMHG | HEIGHT: 59 IN | TEMPERATURE: 98 F | DIASTOLIC BLOOD PRESSURE: 70 MMHG | HEART RATE: 87 BPM | RESPIRATION RATE: 18 BRPM

## 2022-10-14 DIAGNOSIS — J96.11 CHRONIC RESPIRATORY FAILURE WITH HYPOXIA (HCC): ICD-10-CM

## 2022-10-14 DIAGNOSIS — J44.9 ASTHMA-COPD OVERLAP SYNDROME (HCC): ICD-10-CM

## 2022-10-14 DIAGNOSIS — G47.33 OSA (OBSTRUCTIVE SLEEP APNEA): Primary | ICD-10-CM

## 2022-10-14 PROBLEM — J44.89 ASTHMA-COPD OVERLAP SYNDROME: Status: ACTIVE | Noted: 2020-09-19

## 2022-10-14 PROCEDURE — 99214 OFFICE O/P EST MOD 30 MIN: CPT | Performed by: NURSE PRACTITIONER

## 2022-10-14 RX ORDER — TRAMADOL HYDROCHLORIDE 50 MG/1
TABLET ORAL
COMMUNITY
Start: 2022-09-19

## 2022-10-14 NOTE — ASSESSMENT & PLAN NOTE
-  patient uses continuous 2 L cannula  -  will continue maintain saturations greater than 88%  -  no indication for 6 minute walk at this time

## 2022-10-14 NOTE — PROGRESS NOTES
Pulmonary Follow-Up Note   Law Fajardo 67 y o  female MRN: [id number]  10/14/2022      Assessment/Plan:    Problem List Items Addressed This Visit        Respiratory    Asthma-COPD overlap syndrome ([address] )     -  patient reports history of middle deltoid asthma along with significant smoking history  -  may have a component of overlap syndrome  -  currently not in acute exacerbation  -  currently denies any wheezing, chest tightness, or significant shortness of breath other than her baseline  -  continue with home regimen:  Advair 500/50 mcg 2 puffs b i d , DuoNeb t i d   -  no indication for steroids at this time or increase maintenance therapy         GILDARDO (obstructive sleep apnea) - Primary     -  patient currently not interested in obtaining PSG testing  -  will continue 2 L q h s  Chronic respiratory failure with hypoxia (HCC)     -  patient uses continuous 2 L cannula  -  will continue maintain saturations greater than 88%  -  no indication for 6 minute walk at this time                 No follow-ups on file  History of Present Illness   Reason for Visit:  Six-month follow-up  Chief Complaint: "I feel pretty good"  HPI: Law Fajardo is a 67 y o  female who presents to the office today for six-month follow-up  Patient reports she overall feels stable from a respiratory standpoint  Patient reports that she does have some increasing mucus production however she does feel it is more easily to expectorate after nebulizer treatment  I did advise patient that if her sputum production increases that she can begin 7-10 day course of Mucinex  Patient reports that she is on continuous 2 L nasal cannula  Patient is currently denying any fevers, chills, hemoptysis, headaches, night sweats, pleuritic chest pain, or palpitations  Review of Systems   Constitutional: Negative for chills and fever  HENT: Negative for ear pain and sore throat  Eyes: Negative for pain and visual disturbance  Respiratory: Positive for cough  Negative for apnea, choking, chest tightness, shortness of breath, wheezing and stridor  Cardiovascular: Negative for chest pain and palpitations  Gastrointestinal: Negative for abdominal pain and vomiting  Genitourinary: Negative for dysuria and hematuria  Musculoskeletal: Negative for arthralgias and back pain  Skin: Negative for color change and rash  Neurological: Negative for seizures and syncope  Psychiatric/Behavioral: Negative for agitation and behavioral problems  All other systems reviewed and are negative        Historical Information   Past Medical History:   Diagnosis Date   • Asthma    • Diabetes mellitus ([facility] [address] )    • Hyperlipidemia    • Hypertension      Past Surgical History:   Procedure Laterality Date   • JOINT REPLACEMENT Bilateral    • KNEE SURGERY     • TOE SURGERY       Family History   Problem Relation Age of Onset   • Dementia Brother    • Breast cancer Sister 76   • Cancer Brother      Social History   Social History     Substance and Sexual Activity   Alcohol Use Not Currently     Social History     Substance and Sexual Activity   Drug Use No     Social History     Tobacco Use   Smoking Status Former Smoker   • Packs/day: 1 00   • Years: 52 00   • Pack years: 52 00   • Types: Cigarettes   • Start date: 65   • Quit date: 2017   • Years since quittin 1   Smokeless Tobacco Never Used     E-Cigarette/Vaping   • E-Cigarette Use Never User      E-Cigarette/Vaping Substances   • Nicotine No    • THC No    • CBD No    • Flavoring No    • Other No        Meds/Allergies     Current Outpatient Medications:   •  acetaminophen (TYLENOL) 325 mg tablet, Take 3 tablets (975 mg total) by mouth every 8 (eight) hours, Disp: 30 tablet, Rfl: 0  •  Advair Diskus 500-50 MCG/DOSE inhaler, , Disp: , Rfl:   •  aspirin (ECOTRIN LOW STRENGTH) 81 mg EC tablet, , Disp: , Rfl:   •  benzonatate (TESSALON) 200 MG capsule, Take 200 mg by mouth 3 (three) times a day as needed for cough, Disp: , Rfl:   •  bisacodyl (DULCOLAX) 10 mg suppository, Insert 10 mg into the rectum daily as needed , Disp: , Rfl:   •  cetirizine (ZyrTEC) 10 mg tablet, Take 10 mg by mouth daily, Disp: , Rfl:   •  Cholecalciferol (VITAMIN D3) 51313 units CAPS, Take 50,000 Units by mouth every 30 (thirty) days, Disp: , Rfl:   •  Dulaglutide (Trulicity) 3 JL/6 7TO SOPN, Inject 0 5 mL (3 mg total) under the skin once a week, Disp: 2 mL, Rfl: 1  •  ferrous gluconate (FERGON) 324 mg tablet, Take 1 tablet (324 mg total) by mouth daily before breakfast, Disp: , Rfl: 0  •  fluticasone (FLONASE) 50 mcg/act nasal spray, 1 spray into each nostril daily, Disp: 1 Bottle, Rfl: 5  •  gabapentin (NEURONTIN) 300 mg capsule, Take 300 mg by mouth 2 (two) times a day, Disp: , Rfl:   •  Glucose Blood (BL TEST STRIP PACK VI), by In Vitro route, Disp: , Rfl:   •  glucose blood test strip, by In Vitro route, Disp: , Rfl:   •  insulin glargine (LANTUS SOLOSTAR) 100 units/mL injection pen, Inject 70 units twice a day, Disp: 15 mL, Rfl:   •  insulin lispro (HumaLOG) 100 units/mL injection, Inject 10 Units under the skin 3 (three) times a day with meals, Disp: , Rfl: 0  •  ipratropium-albuterol (DUO-NEB) 0 5-2 5 mg/3 mL nebulizer solution, , Disp: , Rfl:   •  magnesium hydroxide (MILK OF MAGNESIA) 400 mg/5 mL oral suspension, Take by mouth daily as needed for constipation, Disp: , Rfl:   •  memantine (NAMENDA) 10 mg tablet, 10 mg 2 (two) times a day , Disp: , Rfl:   •  MENTHOL-METHYL SALICYLATE EX, Apply topically 2 (two) times a day, Disp: , Rfl:   •  metFORMIN (GLUCOPHAGE) 500 mg tablet, , Disp: , Rfl:   •  montelukast (SINGULAIR) 10 mg tablet, Take 10 mg by mouth daily at bedtime, Disp: , Rfl:   •  Omeprazole 20 MG TBEC, Take 20 mg by mouth daily, Disp: , Rfl:   •  simvastatin (ZOCOR) 40 mg tablet, Take 1 tablet by mouth daily at bedtime, Disp: , Rfl: 0  •  Sodium Hypochlorite (Anasept Antimicrobial) 0 057 % GEL, Apply topically in the morning, Disp: , Rfl:   •  torsemide (DEMADEX) 20 mg tablet, Take 20 mg by mouth daily, Disp: , Rfl:   •  traMADol (ULTRAM) 50 mg tablet, , Disp: , Rfl:   •  traZODone (DESYREL) 150 mg tablet, , Disp: , Rfl:   Allergies   Allergen Reactions   • Penicillins Shortness Of Breath   • Cephalexin Other (See Comments)     Reaction Date: [id number]; unknown    • Crestor [Rosuvastatin] Other (See Comments)     Reaction Date: [id number]; unknown    • Zithromax [Azithromycin] Other (See Comments)     Unknown        Vitals: Blood pressure 122/70, pulse 87, temperature 98 °F (36 7 °C), temperature source Tympanic, resp  rate 18, height 4' 11" (1 499 m), SpO2 94 %  Body mass index is 44 66 kg/m²  Oxygen Therapy  SpO2: 94 %  Oxygen Therapy: Supplemental oxygen  O2 Delivery Method: Nasal cannula  O2 Flow Rate (L/min): 2 L/min    Physical Exam:  Physical Exam  Constitutional:       General: She is not in acute distress  Appearance: Normal appearance  She is normal weight  She is not ill-appearing  HENT:      Head: Normocephalic and atraumatic  Nose: Nose normal  No congestion or rhinorrhea  Mouth/Throat:      Mouth: Mucous membranes are dry  Pharynx: No oropharyngeal exudate or posterior oropharyngeal erythema  Cardiovascular:      Rate and Rhythm: Normal rate and regular rhythm  Pulses: Normal pulses  Heart sounds: Normal heart sounds  No murmur heard  No friction rub  No gallop  Pulmonary:      Effort: Pulmonary effort is normal  No tachypnea, bradypnea, accessory muscle usage or respiratory distress  Breath sounds: Decreased air movement present  No stridor  Decreased breath sounds and rhonchi present  No wheezing or rales  Comments: Some scattered rhonchi throughout lung fields  Chest:      Chest wall: No tenderness  Abdominal:      General: Abdomen is flat  Bowel sounds are normal  There is no distension  Palpations: Abdomen is soft  There is no mass     Musculoskeletal: Cervical back: Normal range of motion  No rigidity or tenderness  Neurological:      General: No focal deficit present  Mental Status: She is alert and oriented to person, place, and time  Mental status is at baseline  Imaging and other studies: I have personally reviewed pertinent films in PACS     Chest x-ray-06/24/2022    Pulmonary Results (PFTs, PSG): I have personally reviewed pertinent films in PACS     No PFTs record        [address]        Portions of the record may have been created with voice recognition software  Occasional wrong word or "sound a like" substitutions may have occurred due to the inherent limitations of voice recognition software  Read the chart carefully and recognize, using context, where substitutions have occurred or contact the dictating provider

## 2022-10-14 NOTE — ASSESSMENT & PLAN NOTE
-  patient reports history of middle deltoid asthma along with significant smoking history  -  may have a component of overlap syndrome  -  currently not in acute exacerbation  -  currently denies any wheezing, chest tightness, or significant shortness of breath other than her baseline  -  continue with home regimen:  Advair 500/50 mcg 2 puffs b i d , DuoNeb t i d   -  no indication for steroids at this time or increase maintenance therapy

## 2022-11-16 ENCOUNTER — OFFICE VISIT (OUTPATIENT)
Dept: ENDOCRINOLOGY | Facility: CLINIC | Age: 72
End: 2022-11-16

## 2022-11-16 VITALS
HEIGHT: 59 IN | SYSTOLIC BLOOD PRESSURE: 110 MMHG | HEART RATE: 84 BPM | DIASTOLIC BLOOD PRESSURE: 68 MMHG | TEMPERATURE: 97.7 F | BODY MASS INDEX: 44.66 KG/M2

## 2022-11-16 DIAGNOSIS — E78.2 MIXED HYPERLIPIDEMIA: ICD-10-CM

## 2022-11-16 DIAGNOSIS — I10 ESSENTIAL HYPERTENSION: Chronic | ICD-10-CM

## 2022-11-16 DIAGNOSIS — E11.65 TYPE 2 DIABETES MELLITUS WITH HYPERGLYCEMIA, WITH LONG-TERM CURRENT USE OF INSULIN (HCC): Primary | ICD-10-CM

## 2022-11-16 DIAGNOSIS — Z79.4 TYPE 2 DIABETES MELLITUS WITH HYPERGLYCEMIA, WITH LONG-TERM CURRENT USE OF INSULIN (HCC): Primary | ICD-10-CM

## 2022-11-16 NOTE — PROGRESS NOTES
Patient Progress Note      CC: DM  Patient is a resident at [facility]      Referring Provider  No referring provider defined for this encounter  History of Present Illness:   Joycelyn Bell is a 67 y o  female with a history of type 2 diabetes with long term use of insulin  Diabetes course has been stable  Denies recent illness or hospitalizations  Denies recent severe hypoglycemic or severe hyperglycemic episodes  Denies any issues with her current regimen  Home glucose monitoring: are performed regularly     Home blood glucose readings: high 100s to 300s mg/dl     Current regimen: Lantus 70 units BID, Humalog 8 units TID with meals plus scale, Trulicity 3 mg weekly, metformin 500 mg daily  compliant most of the time, denies any side effects from medications  Injects in: abdomen, arm  Rotates sites: Yes  Hypoglycemic episodes: No, rare (not that she is aware of)  H/o of hypoglycemia causing hospitalization or Intervention such as glucagon injection or ambulance call : No  Hypoglycemia symptoms: she cannot recall   Treatment of hypoglycemia: orange juice; discussed treatment      Medic alert tag: recommended: Yes     Diabetes education: No  Diet: 3 meals per day, 0-1 snack per day  Timing of meals is predictable  Diabetic diet compliance: noncompliant some of the time  Activity: Daily activity is predictable: Yes  Activity is limited      Ophthamology: March 2022   Podiatry: Jan 2022; sees regularly     Has hypertension: on ACE inhibitor/ARB, compliant most of the time  Has hyperlipidemia: on statin - tolerating well, no myalgias  compliant most of the time, denies any side effects from medications    Thyroid disorders: No  History of pancreatitis: No    Patient Active Problem List   Diagnosis   • Mental status change   • Essential hypertension   • Type 2 diabetes mellitus, with long-term current use of insulin ([facility])   • Alzheimer's disease ([facility] [address] )   • Pulmonary nodule   • Preop pulmonary/respiratory exam   • MCI (mild cognitive impairment)   • Fall   • Injury of face   • Sixth nerve palsy of left eye   • Ptosis of left eyelid   • Seasonal allergies   • Need for pneumococcal vaccination   • Hoarse voice quality   • KATHY (acute kidney injury) ([facility] )   • Hyperlipidemia   • Asthma-COPD overlap syndrome ([facility])   • S/P tooth extraction   • Weakness generalized   • Nocturnal hypoxia   • Kidney lesion   • Morbid (severe) obesity due to excess calories ([facility])   • Sacral ulcer ([facility])   • Abscess of abdominal wall   • GILDARDO (obstructive sleep apnea)   • Chronic respiratory failure with hypoxia ([facility])   • Diabetic ulcer of left midfoot associated with type 2 diabetes mellitus ([place] [address] )   • Anemia      Past Medical History:   Diagnosis Date   • Asthma    • Diabetes mellitus ([place] [address] )    • Hyperlipidemia    • Hypertension       Past Surgical History:   Procedure Laterality Date   • JOINT REPLACEMENT Bilateral    • KNEE SURGERY     • TOE SURGERY        Family History   Problem Relation Age of Onset   • Dementia Brother    • Breast cancer Sister 76   • Cancer Brother      Social History     Tobacco Use   • Smoking status: Former     Packs/day: 1 00     Years: 52 00     Pack years: 52 00     Types: Cigarettes     Start date:      Quit date: 2017     Years since quittin 2   • Smokeless tobacco: Never   Substance Use Topics   • Alcohol use: Not Currently     Allergies   Allergen Reactions   • Penicillins Shortness Of Breath   • Cephalexin Other (See Comments)     Reaction Date: ; unknown    • Crestor [Rosuvastatin] Other (See Comments)     Reaction Date: ; unknown    • Zithromax [Azithromycin] Other (See Comments)     Unknown          Current Outpatient Medications:   •  acetaminophen (TYLENOL) 325 mg tablet, Take 3 tablets (975 mg total) by mouth every 8 (eight) hours, Disp: 30 tablet, Rfl: 0  •  Advair Diskus 500-50 MCG/DOSE inhaler, , Disp: , Rfl:   •  aspirin (ECOTRIN LOW STRENGTH) 81 mg EC tablet, , Disp: , Rfl:   •  benzonatate (TESSALON) 200 MG capsule, Take 200 mg by mouth 3 (three) times a day as needed for cough, Disp: , Rfl:   •  bisacodyl (DULCOLAX) 10 mg suppository, Insert 10 mg into the rectum daily as needed , Disp: , Rfl:   •  cetirizine (ZyrTEC) 10 mg tablet, Take 10 mg by mouth daily, Disp: , Rfl:   •  Cholecalciferol (VITAMIN D3) 08757 units CAPS, Take 50,000 Units by mouth every 30 (thirty) days, Disp: , Rfl:   •  Dulaglutide (Trulicity) 3 AS/0 5XW SOPN, Inject 0 5 mL (3 mg total) under the skin once a week, Disp: 2 mL, Rfl: 1  •  ferrous gluconate (FERGON) 324 mg tablet, Take 1 tablet (324 mg total) by mouth daily before breakfast, Disp: , Rfl: 0  •  fluticasone (FLONASE) 50 mcg/act nasal spray, 1 spray into each nostril daily, Disp: 1 Bottle, Rfl: 5  •  gabapentin (NEURONTIN) 300 mg capsule, Take 300 mg by mouth 2 (two) times a day, Disp: , Rfl:   •  Glucose Blood (BL TEST STRIP PACK VI), by In Vitro route, Disp: , Rfl:   •  glucose blood test strip, by In Vitro route, Disp: , Rfl:   •  insulin glargine (LANTUS SOLOSTAR) 100 units/mL injection pen, Inject 70 units twice a day, Disp: 15 mL, Rfl:   •  insulin lispro (HumaLOG) 100 units/mL injection, Inject 10 Units under the skin 3 (three) times a day with meals (Patient taking differently: Inject 8 Units under the skin 3 (three) times a day with meals), Disp: , Rfl: 0  •  ipratropium-albuterol (DUO-NEB) 0 5-2 5 mg/3 mL nebulizer solution, , Disp: , Rfl:   •  magnesium hydroxide (MILK OF MAGNESIA) 400 mg/5 mL oral suspension, Take by mouth daily as needed for constipation, Disp: , Rfl:   •  memantine (NAMENDA) 10 mg tablet, 10 mg 2 (two) times a day , Disp: , Rfl:   •  MENTHOL-METHYL SALICYLATE EX, Apply topically 2 (two) times a day, Disp: , Rfl:   •  metFORMIN (GLUCOPHAGE) 500 mg tablet, , Disp: , Rfl:   •  montelukast (SINGULAIR) 10 mg tablet, Take 10 mg by mouth daily at bedtime, Disp: , Rfl:   •  Omeprazole 20 MG TBEC, Take 20 mg by mouth daily, Disp: , Rfl:   •  simvastatin (ZOCOR) 40 mg tablet, Take 1 tablet by mouth daily at bedtime, Disp: , Rfl: 0  •  torsemide (DEMADEX) 20 mg tablet, Take 20 mg by mouth daily, Disp: , Rfl:   •  traMADol (ULTRAM) 50 mg tablet, , Disp: , Rfl:   •  traZODone (DESYREL) 150 mg tablet, , Disp: , Rfl:   •  Sodium Hypochlorite (Anasept Antimicrobial) 0 057 % GEL, Apply topically in the morning (Patient not taking: Reported on 11/16/2022), Disp: , Rfl:   Review of Systems   Constitutional: Negative for activity change, appetite change, fatigue and unexpected weight change  HENT: Negative for trouble swallowing  Eyes: Negative for visual disturbance  Respiratory: Negative for shortness of breath  Cardiovascular: Negative for chest pain and palpitations  Gastrointestinal: Negative for constipation and diarrhea  Endocrine: Negative for polydipsia and polyuria  Musculoskeletal: Positive for arthralgias (arthritis )  Skin: Negative for wound  Neurological: Negative for numbness  Psychiatric/Behavioral: Negative  Physical Exam:  Body mass index is 44 66 kg/m²  /68   Pulse 84   Temp 97 7 °F (36 5 °C) (Skin)   Ht 4' 11" (1 499 m)   BMI 44 66 kg/m²    Wt Readings from Last 3 Encounters:   06/27/22 100 kg (221 lb 1 9 oz)   05/17/22 96 6 kg (213 lb)   01/10/22 99 3 kg (219 lb)       Physical Exam  Vitals and nursing note reviewed  Constitutional:       Appearance: She is well-developed and well-nourished  HENT:      Head: Normocephalic  Eyes:      General: No scleral icterus  Extraocular Movements: EOM normal       Pupils: Pupils are equal, round, and reactive to light  Neck:      Thyroid: No thyromegaly  Cardiovascular:      Rate and Rhythm: Normal rate and regular rhythm  Pulses:           Radial pulses are 2+ on the right side and 2+ on the left side  Heart sounds: No murmur heard    Pulmonary:      Effort: Pulmonary effort is normal  No respiratory distress  Breath sounds: Normal breath sounds  No wheezing  Comments: On NC  Musculoskeletal:      Cervical back: Neck supple  Skin:     General: Skin is warm and dry  Neurological:      Mental Status: She is alert  Psychiatric:         Mood and Affect: Mood and affect normal        Patient's shoes and socks were not removed  Labs:   Lab Results   Component Value Date    HGBA1C 5 6 08/08/2022     Lab Results   Component Value Date    GLUCOSE 171 (H) 09/21/2015    CALCIUM 8 8 06/28/2022     (L) 09/21/2015    K 4 1 06/28/2022    CO2 28 06/28/2022     06/28/2022    BUN 30 (H) 06/28/2022    CREATININE 1 31 (H) 06/28/2022     No results found for: Gael Spicer  eGFR   Date Value Ref Range Status   06/28/2022 40 ml/min/1 73sq m Final     No components found for: [facility]  Lab Results   Component Value Date    CHOL 124 09/21/2015    HDL 37 (L) 04/18/2016    TRIG 140 04/18/2016     Lab Results   Component Value Date    ALT 17 06/24/2022    AST 12 (L) 06/24/2022    ALKPHOS 53 06/24/2022    BILITOT 0 61 09/21/2015     Lab Results   Component Value Date    [id number] 0 730 08/20/2017       Plan:    Diagnoses and all orders for this visit:    Type 2 diabetes mellitus with hyperglycemia, with long-term current use of insulin ([facility] [place] [address] )  HGA1C 5 6%  likely falsely low due to CKD and anemia  Treatment regimen: patient is taking a large amount of basal insulin compared to bolus insulin  Decrease Lantus to 60 units BID and increase Humalog to 12 units TID with meals plus scale  Discussed intensive insulin regimen does increase risk for hypoglycemia  Episodes of hypoglycemia can lead to permanent disability and death  Discussed risks/complications associated with uncontrolled diabetes  Advised to adhere to diabetic diet, and recommended staying active/exercising routinely as tolerated  Keep carbohydrates consistent to limit blood glucose fluctuations    Advised to call if blood sugars less than 70 mg/dl or over 300 mg/dl  Check blood glucose 3+ times a day  Discussed symptoms and treatment of hypoglycemia  Recommended routine follow-up with podiatry and ophthalmology  Send log in 1-2 weeks  Ordered blood work to complete prior to next visit  -     Hemoglobin A1C; Future  -     Basic metabolic panel; Future    Essential hypertension  Blood pressure well controlled, continue current treatment   -     Basic metabolic panel; Future    Mixed hyperlipidemia  LDL previously 50  Continue statin therapy         Discussed with the patient diagnosis and treatment and all questions fully answered  She will call me if any problems arise  Counseled patient on diagnostic results, prognosis, risk and benefit of treatment options, instruction for management, importance of treatment compliance, risk factor reduction and impressions        Elena Mays PA-C

## 2022-11-16 NOTE — PATIENT INSTRUCTIONS
Hypoglycemia instructions   [facility]  11[facility]16[facility]  [id number]    Low Blood Sugar    Steps to treat low blood sugar  1  Test blood sugar if you have symptoms of low blood sugar:   Low Blood Sugar Symptoms:  o Sweaty  o Dizzy  o Rapid heartbeat  o Shaky  o Bad mood  o Hungry      2  Treat blood sugar less than 70 with 15 grams of fast-acting carbohydrate:   Examples of 15 grams Fast-Acting Carbohydrate:  o 4 oz juice  o 4 oz regular soda  o 3-4 glucose tablets (chew)  o 3-4 hard candies (chew)          3  Wait 15 minutes and test your blood sugar again     4   If blood sugar is less than 100, repeat steps 2-3     5  When your blood sugar is 100 or more, eat a snack if it will be longer than one hour until your next meal  The snack should be 15 grams of carbohydrate and a protein:   Examples of snacks:  o ½ sandwich  o 6 crackers with cheese  o Piece of fruit with cheese or peanut butter  o 6 crackers with peanut butter

## 2023-02-18 ENCOUNTER — APPOINTMENT (EMERGENCY)
Dept: RADIOLOGY | Facility: HOSPITAL | Age: 73
End: 2023-02-18

## 2023-02-18 ENCOUNTER — HOSPITAL ENCOUNTER (INPATIENT)
Facility: HOSPITAL | Age: 73
LOS: 2 days | Discharge: NURSING, REHAB OR LONG-TERM CARE | End: 2023-02-20
Attending: EMERGENCY MEDICINE | Admitting: HOSPITALIST

## 2023-02-18 DIAGNOSIS — A41.9 SEPSIS (HCC): ICD-10-CM

## 2023-02-18 DIAGNOSIS — Z79.4 TYPE 2 DIABETES MELLITUS WITH FOOT ULCER, WITH LONG-TERM CURRENT USE OF INSULIN (HCC): ICD-10-CM

## 2023-02-18 DIAGNOSIS — E11.621 TYPE 2 DIABETES MELLITUS WITH FOOT ULCER, WITH LONG-TERM CURRENT USE OF INSULIN (HCC): ICD-10-CM

## 2023-02-18 DIAGNOSIS — L97.509 TYPE 2 DIABETES MELLITUS WITH FOOT ULCER, WITH LONG-TERM CURRENT USE OF INSULIN (HCC): ICD-10-CM

## 2023-02-18 DIAGNOSIS — J18.9 PNEUMONIA: Primary | ICD-10-CM

## 2023-02-18 DIAGNOSIS — J45.901 ASTHMA EXACERBATION: ICD-10-CM

## 2023-02-18 PROBLEM — E66.01 MORBID (SEVERE) OBESITY DUE TO EXCESS CALORIES (HCC): Status: RESOLVED | Noted: 2021-10-19 | Resolved: 2023-02-18

## 2023-02-18 PROBLEM — N17.9 AKI (ACUTE KIDNEY INJURY) (HCC): Status: RESOLVED | Noted: 2020-09-19 | Resolved: 2023-02-18

## 2023-02-18 LAB
ALBUMIN SERPL BCP-MCNC: 3.5 G/DL (ref 3.5–5)
ALP SERPL-CCNC: 54 U/L (ref 34–104)
ALT SERPL W P-5'-P-CCNC: 18 U/L (ref 7–52)
ANION GAP SERPL CALCULATED.3IONS-SCNC: 9 MMOL/L (ref 4–13)
APTT PPP: 30 SECONDS (ref 23–37)
AST SERPL W P-5'-P-CCNC: 12 U/L (ref 13–39)
ATRIAL RATE: 123 BPM
BACTERIA UR QL AUTO: ABNORMAL /HPF
BASE EX.OXY STD BLDV CALC-SCNC: 92.7 % (ref 60–80)
BASE EX.OXY STD BLDV CALC-SCNC: 95.7 % (ref 60–80)
BASE EX.OXY STD BLDV CALC-SCNC: 96.2 % (ref 60–80)
BASE EXCESS BLDV CALC-SCNC: 1.5 MMOL/L
BASE EXCESS BLDV CALC-SCNC: 1.9 MMOL/L
BASE EXCESS BLDV CALC-SCNC: 5 MMOL/L
BASOPHILS # BLD AUTO: 0.03 THOUSANDS/ÂΜL (ref 0–0.1)
BASOPHILS NFR BLD AUTO: 0 % (ref 0–1)
BILIRUB SERPL-MCNC: 1.29 MG/DL (ref 0.2–1)
BILIRUB UR QL STRIP: NEGATIVE
BUN SERPL-MCNC: 21 MG/DL (ref 5–25)
CALCIUM SERPL-MCNC: 8.8 MG/DL (ref 8.4–10.2)
CARDIAC TROPONIN I PNL SERPL HS: 14 NG/L
CHLORIDE SERPL-SCNC: 102 MMOL/L (ref 96–108)
CLARITY UR: CLEAR
CO2 SERPL-SCNC: 31 MMOL/L (ref 21–32)
COLOR UR: YELLOW
CREAT SERPL-MCNC: 1.13 MG/DL (ref 0.6–1.3)
EOSINOPHIL # BLD AUTO: 0.03 THOUSAND/ÂΜL (ref 0–0.61)
EOSINOPHIL NFR BLD AUTO: 0 % (ref 0–6)
ERYTHROCYTE [DISTWIDTH] IN BLOOD BY AUTOMATED COUNT: 15.9 % (ref 11.6–15.1)
FLUAV RNA RESP QL NAA+PROBE: NEGATIVE
FLUBV RNA RESP QL NAA+PROBE: NEGATIVE
GFR SERPL CREATININE-BSD FRML MDRD: 48 ML/MIN/1.73SQ M
GLUCOSE SERPL-MCNC: 104 MG/DL (ref 65–140)
GLUCOSE SERPL-MCNC: 130 MG/DL (ref 65–140)
GLUCOSE SERPL-MCNC: 259 MG/DL (ref 65–140)
GLUCOSE UR STRIP-MCNC: NEGATIVE MG/DL
HCO3 BLDV-SCNC: 26.7 MMOL/L (ref 24–30)
HCO3 BLDV-SCNC: 27 MMOL/L (ref 24–30)
HCO3 BLDV-SCNC: 27.9 MMOL/L (ref 24–30)
HCT VFR BLD AUTO: 33.7 % (ref 34.8–46.1)
HGB BLD-MCNC: 10.6 G/DL (ref 11.5–15.4)
HGB UR QL STRIP.AUTO: NEGATIVE
IMM GRANULOCYTES # BLD AUTO: 0.09 THOUSAND/UL (ref 0–0.2)
IMM GRANULOCYTES NFR BLD AUTO: 1 % (ref 0–2)
INR PPP: 1.12 (ref 0.84–1.19)
KETONES UR STRIP-MCNC: NEGATIVE MG/DL
LACTATE SERPL-SCNC: 1.2 MMOL/L (ref 0.5–2)
LEUKOCYTE ESTERASE UR QL STRIP: NEGATIVE
LYMPHOCYTES # BLD AUTO: 0.56 THOUSANDS/ÂΜL (ref 0.6–4.47)
LYMPHOCYTES NFR BLD AUTO: 4 % (ref 14–44)
MCH RBC QN AUTO: 29.1 PG (ref 26.8–34.3)
MCHC RBC AUTO-ENTMCNC: 31.5 G/DL (ref 31.4–37.4)
MCV RBC AUTO: 93 FL (ref 82–98)
MONOCYTES # BLD AUTO: 0.99 THOUSAND/ÂΜL (ref 0.17–1.22)
MONOCYTES NFR BLD AUTO: 7 % (ref 4–12)
NEUTROPHILS # BLD AUTO: 11.98 THOUSANDS/ÂΜL (ref 1.85–7.62)
NEUTS SEG NFR BLD AUTO: 88 % (ref 43–75)
NITRITE UR QL STRIP: NEGATIVE
NON-SQ EPI CELLS URNS QL MICRO: ABNORMAL /HPF
NRBC BLD AUTO-RTO: 0 /100 WBCS
O2 CT BLDV-SCNC: 14.1 ML/DL
O2 CT BLDV-SCNC: 14.5 ML/DL
O2 CT BLDV-SCNC: 14.9 ML/DL
P AXIS: 46 DEGREES
PCO2 BLDV: 30.9 MM HG (ref 42–50)
PCO2 BLDV: 44.7 MM HG (ref 42–50)
PCO2 BLDV: 50.4 MM HG (ref 42–50)
PH BLDV: 7.36 [PH] (ref 7.3–7.4)
PH BLDV: 7.39 [PH] (ref 7.3–7.4)
PH BLDV: 7.56 [PH] (ref 7.3–7.4)
PH UR STRIP.AUTO: 6 [PH]
PLATELET # BLD AUTO: 171 THOUSANDS/UL (ref 149–390)
PMV BLD AUTO: 10 FL (ref 8.9–12.7)
PO2 BLDV: 117.3 MM HG (ref 35–45)
PO2 BLDV: 166.5 MM HG (ref 35–45)
PO2 BLDV: 179.8 MM HG (ref 35–45)
POTASSIUM SERPL-SCNC: 4.5 MMOL/L (ref 3.5–5.3)
PR INTERVAL: 162 MS
PROCALCITONIN SERPL-MCNC: 0.77 NG/ML
PROT SERPL-MCNC: 7 G/DL (ref 6.4–8.4)
PROT UR STRIP-MCNC: ABNORMAL MG/DL
PROTHROMBIN TIME: 14.6 SECONDS (ref 11.6–14.5)
QRS AXIS: 59 DEGREES
QRSD INTERVAL: 82 MS
QT INTERVAL: 312 MS
QTC INTERVAL: 446 MS
RBC # BLD AUTO: 3.64 MILLION/UL (ref 3.81–5.12)
RBC #/AREA URNS AUTO: ABNORMAL /HPF
RSV RNA RESP QL NAA+PROBE: NEGATIVE
SARS-COV-2 RNA RESP QL NAA+PROBE: NEGATIVE
SODIUM SERPL-SCNC: 142 MMOL/L (ref 135–147)
SP GR UR STRIP.AUTO: 1.02 (ref 1–1.03)
T WAVE AXIS: 58 DEGREES
UROBILINOGEN UR STRIP-ACNC: <2 MG/DL
VENTRICULAR RATE: 123 BPM
WBC # BLD AUTO: 13.68 THOUSAND/UL (ref 4.31–10.16)
WBC #/AREA URNS AUTO: ABNORMAL /HPF

## 2023-02-18 RX ORDER — LORATADINE 10 MG/1
10 TABLET ORAL DAILY
Status: DISCONTINUED | OUTPATIENT
Start: 2023-02-18 | End: 2023-02-20 | Stop reason: HOSPADM

## 2023-02-18 RX ORDER — LEVOFLOXACIN 5 MG/ML
750 INJECTION, SOLUTION INTRAVENOUS ONCE
Status: COMPLETED | OUTPATIENT
Start: 2023-02-18 | End: 2023-02-18

## 2023-02-18 RX ORDER — ALBUTEROL SULFATE 2.5 MG/3ML
2.5 SOLUTION RESPIRATORY (INHALATION) EVERY 6 HOURS PRN
Status: DISCONTINUED | OUTPATIENT
Start: 2023-02-18 | End: 2023-02-20 | Stop reason: HOSPADM

## 2023-02-18 RX ORDER — ACETAMINOPHEN 325 MG/1
975 TABLET ORAL EVERY 8 HOURS SCHEDULED
Status: DISCONTINUED | OUTPATIENT
Start: 2023-02-18 | End: 2023-02-20 | Stop reason: HOSPADM

## 2023-02-18 RX ORDER — PANTOPRAZOLE SODIUM 20 MG/1
20 TABLET, DELAYED RELEASE ORAL
Status: DISCONTINUED | OUTPATIENT
Start: 2023-02-18 | End: 2023-02-20 | Stop reason: HOSPADM

## 2023-02-18 RX ORDER — METHYLPREDNISOLONE SODIUM SUCCINATE 125 MG/2ML
80 INJECTION, POWDER, LYOPHILIZED, FOR SOLUTION INTRAMUSCULAR; INTRAVENOUS ONCE
Status: COMPLETED | OUTPATIENT
Start: 2023-02-18 | End: 2023-02-18

## 2023-02-18 RX ORDER — BISACODYL 10 MG
10 SUPPOSITORY, RECTAL RECTAL DAILY PRN
Status: DISCONTINUED | OUTPATIENT
Start: 2023-02-18 | End: 2023-02-20 | Stop reason: HOSPADM

## 2023-02-18 RX ORDER — INSULIN LISPRO 100 [IU]/ML
24 INJECTION, SOLUTION INTRAVENOUS; SUBCUTANEOUS
Status: DISCONTINUED | OUTPATIENT
Start: 2023-02-18 | End: 2023-02-20 | Stop reason: HOSPADM

## 2023-02-18 RX ORDER — ALBUTEROL SULFATE 2.5 MG/3ML
5 SOLUTION RESPIRATORY (INHALATION) ONCE
Status: COMPLETED | OUTPATIENT
Start: 2023-02-18 | End: 2023-02-18

## 2023-02-18 RX ORDER — LEVOFLOXACIN 5 MG/ML
750 INJECTION, SOLUTION INTRAVENOUS ONCE
Status: DISCONTINUED | OUTPATIENT
Start: 2023-02-18 | End: 2023-02-18

## 2023-02-18 RX ORDER — ASPIRIN 81 MG/1
81 TABLET ORAL DAILY
Status: DISCONTINUED | OUTPATIENT
Start: 2023-02-18 | End: 2023-02-20 | Stop reason: HOSPADM

## 2023-02-18 RX ORDER — MONTELUKAST SODIUM 10 MG/1
10 TABLET ORAL
Status: DISCONTINUED | OUTPATIENT
Start: 2023-02-18 | End: 2023-02-20 | Stop reason: HOSPADM

## 2023-02-18 RX ORDER — ENOXAPARIN SODIUM 100 MG/ML
40 INJECTION SUBCUTANEOUS DAILY
Status: DISCONTINUED | OUTPATIENT
Start: 2023-02-18 | End: 2023-02-20 | Stop reason: HOSPADM

## 2023-02-18 RX ORDER — DOXYCYCLINE HYCLATE 50 MG/1
324 CAPSULE, GELATIN COATED ORAL
Status: DISCONTINUED | OUTPATIENT
Start: 2023-02-19 | End: 2023-02-20 | Stop reason: HOSPADM

## 2023-02-18 RX ORDER — GUAIFENESIN 600 MG/1
1200 TABLET, EXTENDED RELEASE ORAL EVERY 12 HOURS SCHEDULED
Status: DISCONTINUED | OUTPATIENT
Start: 2023-02-18 | End: 2023-02-20 | Stop reason: HOSPADM

## 2023-02-18 RX ORDER — BENZONATATE 100 MG/1
200 CAPSULE ORAL 3 TIMES DAILY PRN
Status: DISCONTINUED | OUTPATIENT
Start: 2023-02-18 | End: 2023-02-20 | Stop reason: HOSPADM

## 2023-02-18 RX ORDER — LEVOFLOXACIN 5 MG/ML
750 INJECTION, SOLUTION INTRAVENOUS ONCE
Status: COMPLETED | OUTPATIENT
Start: 2023-02-19 | End: 2023-02-19

## 2023-02-18 RX ORDER — GABAPENTIN 300 MG/1
300 CAPSULE ORAL 2 TIMES DAILY
Status: DISCONTINUED | OUTPATIENT
Start: 2023-02-18 | End: 2023-02-18

## 2023-02-18 RX ORDER — INSULIN GLARGINE 100 [IU]/ML
30 INJECTION, SOLUTION SUBCUTANEOUS ONCE
Status: COMPLETED | OUTPATIENT
Start: 2023-02-18 | End: 2023-02-18

## 2023-02-18 RX ORDER — PRAVASTATIN SODIUM 80 MG/1
80 TABLET ORAL
Status: DISCONTINUED | OUTPATIENT
Start: 2023-02-18 | End: 2023-02-20 | Stop reason: HOSPADM

## 2023-02-18 RX ORDER — LEVALBUTEROL 1.25 MG/.5ML
1.25 SOLUTION, CONCENTRATE RESPIRATORY (INHALATION)
Status: DISCONTINUED | OUTPATIENT
Start: 2023-02-18 | End: 2023-02-20 | Stop reason: HOSPADM

## 2023-02-18 RX ORDER — IPRATROPIUM BROMIDE AND ALBUTEROL SULFATE 2.5; .5 MG/3ML; MG/3ML
3 SOLUTION RESPIRATORY (INHALATION)
Status: DISCONTINUED | OUTPATIENT
Start: 2023-02-18 | End: 2023-02-18

## 2023-02-18 RX ORDER — FLUTICASONE PROPIONATE 50 MCG
1 SPRAY, SUSPENSION (ML) NASAL DAILY
Status: DISCONTINUED | OUTPATIENT
Start: 2023-02-18 | End: 2023-02-20 | Stop reason: HOSPADM

## 2023-02-18 RX ORDER — ACETAMINOPHEN 325 MG/1
975 TABLET ORAL ONCE
Status: COMPLETED | OUTPATIENT
Start: 2023-02-18 | End: 2023-02-18

## 2023-02-18 RX ORDER — GUAIFENESIN 600 MG/1
1200 TABLET, EXTENDED RELEASE ORAL EVERY 12 HOURS SCHEDULED
COMMUNITY

## 2023-02-18 RX ORDER — MEMANTINE HYDROCHLORIDE 10 MG/1
10 TABLET ORAL 2 TIMES DAILY
Status: DISCONTINUED | OUTPATIENT
Start: 2023-02-18 | End: 2023-02-20 | Stop reason: HOSPADM

## 2023-02-18 RX ORDER — INSULIN GLARGINE 100 [IU]/ML
64 INJECTION, SOLUTION SUBCUTANEOUS EVERY 12 HOURS SCHEDULED
Status: DISCONTINUED | OUTPATIENT
Start: 2023-02-18 | End: 2023-02-20 | Stop reason: HOSPADM

## 2023-02-18 RX ORDER — FLUTICASONE FUROATE AND VILANTEROL 200; 25 UG/1; UG/1
1 POWDER RESPIRATORY (INHALATION) DAILY
Status: DISCONTINUED | OUTPATIENT
Start: 2023-02-18 | End: 2023-02-20 | Stop reason: HOSPADM

## 2023-02-18 RX ADMIN — ALBUTEROL SULFATE 2.5 MG: 2.5 SOLUTION RESPIRATORY (INHALATION) at 15:35

## 2023-02-18 RX ADMIN — IPRATROPIUM BROMIDE 0.5 MG: 0.5 SOLUTION RESPIRATORY (INHALATION) at 13:29

## 2023-02-18 RX ADMIN — ACETAMINOPHEN 975 MG: 325 TABLET ORAL at 07:52

## 2023-02-18 RX ADMIN — ACETAMINOPHEN 975 MG: 325 TABLET ORAL at 20:35

## 2023-02-18 RX ADMIN — LEVOFLOXACIN 750 MG: 750 INJECTION, SOLUTION INTRAVENOUS at 08:28

## 2023-02-18 RX ADMIN — MONTELUKAST 10 MG: 10 TABLET, FILM COATED ORAL at 20:35

## 2023-02-18 RX ADMIN — LEVALBUTEROL HYDROCHLORIDE 1.25 MG: 1.25 SOLUTION, CONCENTRATE RESPIRATORY (INHALATION) at 19:46

## 2023-02-18 RX ADMIN — ALBUTEROL SULFATE 5 MG: 2.5 SOLUTION RESPIRATORY (INHALATION) at 09:23

## 2023-02-18 RX ADMIN — IPRATROPIUM BROMIDE 0.5 MG: 0.5 SOLUTION RESPIRATORY (INHALATION) at 19:46

## 2023-02-18 RX ADMIN — LEVALBUTEROL HYDROCHLORIDE 1.25 MG: 1.25 SOLUTION, CONCENTRATE RESPIRATORY (INHALATION) at 13:29

## 2023-02-18 RX ADMIN — INSULIN GLARGINE 30 UNITS: 100 INJECTION, SOLUTION SUBCUTANEOUS at 22:32

## 2023-02-18 RX ADMIN — TRAZODONE HYDROCHLORIDE 150 MG: 100 TABLET ORAL at 20:35

## 2023-02-18 RX ADMIN — ENOXAPARIN SODIUM 40 MG: 40 INJECTION SUBCUTANEOUS at 13:56

## 2023-02-18 RX ADMIN — IPRATROPIUM BROMIDE 0.5 MG: 0.5 SOLUTION RESPIRATORY (INHALATION) at 09:23

## 2023-02-18 RX ADMIN — GUAIFENESIN 1200 MG: 600 TABLET ORAL at 20:35

## 2023-02-18 RX ADMIN — METHYLPREDNISOLONE SODIUM SUCCINATE 80 MG: 125 INJECTION, POWDER, FOR SOLUTION INTRAMUSCULAR; INTRAVENOUS at 09:21

## 2023-02-18 NOTE — LETTER
FAX      To:     [place]:  Phone:       Fax:        Email:        From:   Terrence Victoria  Phone:       Fax:    Email:      ________________________________________________    Sesar Jordan to be returning today - 2:30pm pick-up confirmed  AVS attached  RN to call report      NOTICE:  This communication, including attachments, may contain information that is confidential and protected by the -client or other privilege(s)

## 2023-02-18 NOTE — RESPIRATORY THERAPY NOTE
RT Protocol Note  Toshia Colunga 67 y o  female MRN: [id number]  Unit/Bed#: S -01 Encounter: [id number]    Assessment    Active Problems:    Essential hypertension    Type 2 diabetes mellitus, with long-term current use of insulin (HCC)    Alzheimer's disease (HCC)    Asthma-COPD overlap syndrome ([facility] )    Pneumonia of right lower lobe due to infectious organism      Home Pulmonary Medications: Duoneb, Advair     Home Devices/Therapy: Home O2 (2L)    Past Medical History:   Diagnosis Date    Asthma     Diabetes mellitus ([facility] )     Hyperlipidemia     Hypertension      Social History     Socioeconomic History    Marital status: Single     Spouse name: None    Number of children: None    Years of education: None    Highest education level: None   Occupational History    None   Tobacco Use    Smoking status: Former     Packs/day: 1 00     Years: 52 00     Pack years: 52 00     Types: Cigarettes     Start date:      Quit date: 2017     Years since quittin 4    Smokeless tobacco: Never   Vaping Use    Vaping Use: Never used   Substance and Sexual Activity    Alcohol use: Not Currently    Drug use: No    Sexual activity: None   Other Topics Concern    None   Social History Narrative    None     Social Determinants of Health     Financial Resource Strain: Not on file   Food Insecurity: Not on file   Transportation Needs: Not on file   Physical Activity: Not on file   Stress: Not on file   Social Connections: Not on file   Intimate Partner Violence: Not on file   Housing Stability: Not on file       Subjective         Objective    Physical Exam:   Assessment Type: Pre-treatment  General Appearance: Alert, Awake  Respiratory Pattern: Normal  Chest Assessment: Chest expansion symmetrical  Bilateral Breath Sounds: Coarse  Cough: None  O2 Device: 2L NC    Vitals:  Blood pressure 123/61, pulse 88, temperature 99 1 °F (37 3 °C), temperature source Oral, resp   rate 16, height 4' 11" (1 499 m), weight 90 6 kg (199 lb 11 8 oz), SpO2 96 %  Imaging and other studies: I have personally reviewed pertinent reports  O2 Device: 2L NC     Plan    Respiratory Plan: Mild Distress pathway, Home Bronchodilator Patient pathway        Resp Comments: Change QID duoneb to TID 1 25mg Xopenex with 0,5mg Atrovent and Q6PRN 2 5mg Albuterol for SOB and wheezing per respiratory protocol

## 2023-02-18 NOTE — H&P
[address] 1950, 67 y o  female MRN: [id number]  Unit/Bed#: S -01 Encounter: [id number]  Primary Care Provider: Gennaro Muñoz MD   Date and time admitted to hospital: 2/18/2023  7:11 AM    Pneumonia of right lower lobe due to infectious organism  Assessment & Plan  Continue Levaquin for total of 5 days  Patient's QTc appears normal       Asthma-COPD overlap syndrome (HCC)  Assessment & Plan  Continue Advair discus, DuoNebs, Singulair, Flonase, Zyrtec    Alzheimer's disease ([facility] [address] )  Assessment & Plan  Continue Namenda  Also on trazodone at night  Type 2 diabetes mellitus, with long-term current use of insulin [facility])  Assessment & Plan  Lab Results   Component Value Date    HGBA1C 5 6 08/08/2022       No results for input(s): POCGLU in the last 72 hours  Blood Sugar Average: Last 72 hrs:  Continue Lantus 64 units twice daily  Continue Humalog 24 units with meals  HbA1c is too tightly controlled in this patient given last value of 5 6  Can work on liberalizing control during this hospitalization    Essential hypertension  Assessment & Plan  Continue home meds    Morbid (severe) obesity due to excess calories (HCC)-resolved as of 2/18/2023  Assessment & Plan  Morbid obesity  VTE Pharmacologic Prophylaxis:   High Risk (Score >/= 5) - Pharmacological DVT Prophylaxis Ordered: enoxaparin (Lovenox)  Sequential Compression Devices Ordered  Code Status: Prior full code  Discussion with family: patient lives at [address] [place] [address]  attempted to call nephew        Anticipated Length of Stay: Patient will be admitted on an inpatient basis with an anticipated length of stay of greater than 2 midnights secondary to pneumonia management      Total Time Spent on Date of Encounter in care of patient: 55 minutes This time was spent on one or more of the following: performing physical exam; counseling and coordination of care; obtaining or reviewing history; documenting in the medical record; reviewing/ordering tests, medications or procedures; communicating with other healthcare professionals and discussing with patient's family/caregivers  Chief Complaint: Fever    History of Present Illness:  Vincent Beyer is a 67 y o  female with a PMH of Alzheimer's dementia, hypertension, diabetes type 2, asthma COPD overlap syndrome who presents from [facility] with symptoms of fever  On arrival to the ER was found to have a fever 103, patient was evaluated for sepsis  Patient was found to have right lower lobe pneumonia  Patient denies any dysuria, diarrhea, headache, productive sputum, or abdominal pain  Patient denies any episodes of aspiration or difficulty with swallowing food  Patient denies any choking episodes either  Review of Systems:  Review of Systems   Constitutional: Negative  HENT: Negative  Eyes: Negative  Respiratory: Positive for cough, choking, chest tightness and shortness of breath  Cardiovascular: Negative  Gastrointestinal: Negative  Genitourinary: Negative  Musculoskeletal: Negative  Skin: Negative  Neurological: Negative  Hematological: Negative  Past Medical and Surgical History:   Past Medical History:   Diagnosis Date   • Asthma    • Diabetes mellitus ([facility] [address] )    • Hyperlipidemia    • Hypertension        Past Surgical History:   Procedure Laterality Date   • JOINT REPLACEMENT Bilateral    • KNEE SURGERY     • TOE SURGERY         Meds/Allergies:  Prior to Admission medications    Medication Sig Start Date End Date Taking?  Authorizing Provider   acetaminophen (TYLENOL) 325 mg tablet Take 3 tablets (975 mg total) by mouth every 8 (eight) hours 9/25/20  Yes Danielle Bonilla MD   Advair Diskus 500-50 MCG/DOSE inhaler  9/28/21  Yes Historical Provider, MD   aspirin (ECOTRIN LOW STRENGTH) 81 mg EC tablet  8/28/21  Yes Historical Provider, MD   benzonatate (TESSALON) 200 MG capsule Take 200 mg by mouth 3 (three) times a day as needed for cough   Yes Historical Provider, MD   bisacodyl (DULCOLAX) 10 mg suppository Insert 10 mg into the rectum daily as needed    Yes Historical Provider, MD   cetirizine (ZyrTEC) 10 mg tablet Take 10 mg by mouth daily   Yes Historical Provider, MD   Cholecalciferol (VITAMIN D3) 09579 units CAPS Take 50,000 Units by mouth every 30 (thirty) days   Yes Historical Provider, MD   Dulaglutide (Trulicity) 3 NS/4 9KF SOPN Inject 0 5 mL (3 mg total) under the skin once a week 4/18/22  Yes Elena Mays PA-C   ferrous gluconate (FERGON) 324 mg tablet Take 1 tablet (324 mg total) by mouth daily before breakfast 6/29/22  Yes Cece Gillis PA-C   fluticasone [facility]) 50 mcg/act nasal spray 1 spray into each nostril daily 8/20/19  Yes CELINA Toney   gabapentin (NEURONTIN) 300 mg capsule Take 300 mg by mouth 2 (two) times a day   Yes Historical Provider, MD   Glucose Blood (BL TEST STRIP PACK VI) by In Vitro route 3/3/11  Yes Historical Provider, MD   glucose blood test strip by In Vitro route 10/8/14  Yes Historical Provider, MD   guaiFENesin (MUCINEX) 600 mg 12 hr tablet Take 1,200 mg by mouth every 12 (twelve) hours   Yes Historical Provider, MD   insulin glargine (LANTUS SOLOSTAR) 100 units/mL injection pen Inject 70 units twice a day  Patient taking differently: 64 Units every 12 (twelve) hours Inject 64units twice a day 4/18/22  Yes Elena Mays PA-C   insulin lispro (HumaLOG) 100 units/mL injection Inject 10 Units under the skin 3 (three) times a day with meals  Patient taking differently: Inject 24 Units under the skin 3 (three) times a day with meals 6/28/22  Yes Cece Gillis PA-C   ipratropium-albuterol (DUO-NEB) 0 5-2 5 mg/3 mL nebulizer solution  10/14/21  Yes Historical Provider, MD   magnesium hydroxide (MILK OF MAGNESIA) 400 mg/5 mL oral suspension Take by mouth daily as needed for constipation   Yes Historical Provider, MD   memantine (NAMENDA) 10 mg tablet 10 mg 2 (two) times a day    Yes Historical Provider, MD   MENTHOL-METHYL SALICYLATE EX Apply topically 2 (two) times a day   Yes Historical Provider, MD   metFORMIN (GLUCOPHAGE) 500 mg tablet  21  Yes Historical Provider, MD   montelukast (SINGULAIR) 10 mg tablet Take 10 mg by mouth daily at bedtime   Yes Historical Provider, MD   Omeprazole 20 MG TBEC Take 20 mg by mouth daily   Yes Historical Provider, MD   simvastatin (ZOCOR) 40 mg tablet Take 1 tablet by mouth daily at bedtime 17  Yes Jacqueline Sharma MD   traZODone (DESYREL) 150 mg tablet  22  Yes Historical Provider, MD   Sodium Hypochlorite (Anasept Antimicrobial) 0 057 % GEL Apply topically in the morning  Patient not taking: Reported on 2022    Historical Provider, MD   torsemide (DEMADEX) 20 mg tablet Take 20 mg by mouth daily  Patient not taking: Reported on 2023    Historical Provider, MD   traMADol Sarah Crew) 50 mg tablet  22   Historical Provider, MD     I have reveiwed home medications using records provided by [facility]  Allergies:    Allergies   Allergen Reactions   • Penicillins Shortness Of Breath   • Cephalexin Other (See Comments)     Reaction Date: ; unknown    • Crestor [Rosuvastatin] Other (See Comments)     Reaction Date: ; unknown    • Zithromax [Azithromycin] Other (See Comments)     Unknown        Social History:  Marital Status: Single   Occupation: retired  Patient Pre-hospital Living Situation: Assisted Living  Patient Pre-hospital Level of Mobility: walks  Patient Pre-hospital Diet Restrictions: none  Substance Use History:   Social History     Substance and Sexual Activity   Alcohol Use Not Currently     Social History     Tobacco Use   Smoking Status Former   • Packs/day: 1 00   • Years: 52 00   • Pack years: 52 00   • Types: Cigarettes   • Start date: 65   • Quit date: 2017   • Years since quittin 4   Smokeless Tobacco Never     Social History     Substance and Sexual Activity   Drug Use No       Family History:  Family History   Problem Relation Age of Onset   • Dementia Brother    • Breast cancer Sister 76   • Cancer Brother        Physical Exam:     Vitals:   Blood Pressure: 123/61 (02/18/23 1212)  Pulse: 88 (02/18/23 1212)  Temperature: 99 1 °F (37 3 °C) (02/18/23 1004)  Temp Source: Oral (02/18/23 1004)  Respirations: 16 (02/18/23 1119)  Height: 4' 11" (149 9 cm) (02/18/23 0720)  Weight - Scale: 90 6 kg (199 lb 11 8 oz) (02/18/23 0720)  SpO2: 90 % (02/18/23 1212)    Physical Exam  Constitutional:       Appearance: Normal appearance  HENT:      Head: Normocephalic  Eyes:      Pupils: Pupils are equal, round, and reactive to light  Cardiovascular:      Rate and Rhythm: Normal rate and regular rhythm  Pulses: Normal pulses  Heart sounds: Normal heart sounds  Pulmonary:      Effort: Pulmonary effort is normal  No respiratory distress  Breath sounds: Normal breath sounds  No stridor  Chest:      Chest wall: No tenderness  Abdominal:      General: Bowel sounds are normal       Palpations: Abdomen is soft  Musculoskeletal:         General: Normal range of motion  Skin:     General: Skin is warm and dry  Neurological:      General: No focal deficit present  Mental Status: She is alert     Psychiatric:         Mood and Affect: Mood normal          Additional Data:     Lab Results:  Results from last 7 days   Lab Units 02/18/23  0745   WBC Thousand/uL 13 68*   HEMOGLOBIN g/dL 10 6*   HEMATOCRIT % 33 7*   PLATELETS Thousands/uL 171   NEUTROS PCT % 88*   LYMPHS PCT % 4*   MONOS PCT % 7   EOS PCT % 0     Results from last 7 days   Lab Units 02/18/23  0745   SODIUM mmol/L 142   POTASSIUM mmol/L 4 5   CHLORIDE mmol/L 102   CO2 mmol/L 31   BUN mg/dL 21   CREATININE mg/dL 1 13   ANION GAP mmol/L 9   CALCIUM mg/dL 8 8   ALBUMIN g/dL 3 5   TOTAL BILIRUBIN mg/dL 1 29*   ALK PHOS U/L 54   ALT U/L 18   AST U/L 12*   GLUCOSE RANDOM mg/dL 104     Results from last 7 days   Lab Units 02/18/23  0745   INR  1 12             Results from last 7 days   Lab Units 02/18/23  0745   LACTIC ACID mmol/L 1 2   PROCALCITONIN ng/ml 0 77*       Lines/Drains:  Invasive Devices     Peripheral Intravenous Line  Duration           Peripheral IV 02/18/23 Distal;Dorsal (posterior); Left Forearm <1 day    Peripheral IV 02/18/23 Distal;Dorsal (posterior); Right Forearm <1 day                    Imaging: Reviewed radiology reports from this admission including: chest xray  XR chest 1 view portable   Final Result by Ana Taylor MD (02/18 1202)      Right greater than left bibasilar opacity which could be due to edema or pneumonia  Workstation performed: [id number]             EKG and Other Studies Reviewed on Admission:   · EKG: sinus tachycardia    ** Please Note: This note has been constructed using a voice recognition system   **

## 2023-02-18 NOTE — ED PROVIDER NOTES
History  Chief Complaint   Patient presents with   • Shortness of Breath     SOB/fever       History provided by:  Patient   used: No    Shortness of Breath  Associated symptoms: cough and fever    Associated symptoms: no abdominal pain, no chest pain, no diaphoresis, no headaches, no neck pain, no rash, no vomiting and no wheezing      Patient is a 22-year-old female presenting to emergency department with shortness of breath, fever, cough, fatigue, chills  No chest pain  No abdominal pain  Nausea vomiting  No calf pain or swelling  No trauma  No headache or neck pain  No urinary complaints  No diarrhea  Prior to Admission Medications   Prescriptions Last Dose Informant Patient Reported? Taking?    Advair Diskus 500-50 MCG/DOSE inhaler 2/17/2023  Yes Yes   Cholecalciferol (VITAMIN D3) 08231 units CAPS 2/17/2023  Yes Yes   Sig: Take 50,000 Units by mouth every 30 (thirty) days   Dulaglutide (Trulicity) 3 LN/4 5ZI SOPN 2/17/2023  No Yes   Sig: Inject 0 5 mL (3 mg total) under the skin once a week   Glucose Blood (BL TEST STRIP PACK VI) 2/17/2023  Yes Yes   Sig: by In Vitro route   MENTHOL-METHYL SALICYLATE EX 2/92/2063  Yes Yes   Sig: Apply topically 2 (two) times a day   Omeprazole 20 MG TBEC 2/17/2023  Yes Yes   Sig: Take 20 mg by mouth daily   Sodium Hypochlorite (Anasept Antimicrobial) 0 057 % GEL Not Taking  Yes No   Sig: Apply topically in the morning   Patient not taking: Reported on 11/16/2022   acetaminophen (TYLENOL) 325 mg tablet 2/18/2023  No Yes   Sig: Take 3 tablets (975 mg total) by mouth every 8 (eight) hours   aspirin (ECOTRIN LOW STRENGTH) 81 mg EC tablet 2/17/2023  Yes Yes   benzonatate (TESSALON) 200 MG capsule 2/17/2023  Yes Yes   Sig: Take 200 mg by mouth 3 (three) times a day as needed for cough   bisacodyl (DULCOLAX) 10 mg suppository Past Month  Yes Yes   Sig: Insert 10 mg into the rectum daily as needed    cetirizine (ZyrTEC) 10 mg tablet 2/17/2023  Yes Yes Sig: Take 10 mg by mouth daily   ferrous gluconate (FERGON) 324 mg tablet 2023  No Yes   Sig: Take 1 tablet (324 mg total) by mouth daily before breakfast   fluticasone (FLONASE) 50 mcg/act nasal spray 2023  No Yes   Si spray into each nostril daily   gabapentin (NEURONTIN) 300 mg capsule 2023  Yes Yes   Sig: Take 300 mg by mouth 2 (two) times a day   glucose blood test strip 2023  Yes Yes   Sig: by In Vitro route   guaiFENesin (MUCINEX) 600 mg 12 hr tablet 2023  Yes Yes   Sig: Take 1,200 mg by mouth every 12 (twelve) hours   insulin glargine (LANTUS SOLOSTAR) 100 units/mL injection pen 2023  No Yes   Sig: Inject 70 units twice a day   Patient taking differently: 64 Units every 12 (twelve) hours Inject 64units twice a day   insulin lispro (HumaLOG) 100 units/mL injection 2023  No Yes   Sig: Inject 10 Units under the skin 3 (three) times a day with meals   Patient taking differently: Inject 24 Units under the skin 3 (three) times a day with meals   ipratropium-albuterol (DUO-NEB) 0 5-2 5 mg/3 mL nebulizer solution 2023  Yes Yes   magnesium hydroxide (MILK OF MAGNESIA) 400 mg/5 mL oral suspension Past Month  Yes Yes   Sig: Take by mouth daily as needed for constipation   memantine (NAMENDA) 10 mg tablet 2023  Yes Yes   Sig: 10 mg 2 (two) times a day    metFORMIN (GLUCOPHAGE) 500 mg tablet 2023  Yes Yes   montelukast (SINGULAIR) 10 mg tablet 2023  Yes Yes   Sig: Take 10 mg by mouth daily at bedtime   simvastatin (ZOCOR) 40 mg tablet 2023  No Yes   Sig: Take 1 tablet by mouth daily at bedtime   torsemide (DEMADEX) 20 mg tablet Not Taking  Yes No   Sig: Take 20 mg by mouth daily   Patient not taking: Reported on 2023   traMADol (ULTRAM) 50 mg tablet Not Taking  Yes No   Patient not taking: Reported on 2023   traZODone (DESYREL) 150 mg tablet 2023  Yes Yes      Facility-Administered Medications: None       Past Medical History:   Diagnosis Date   • Asthma    • Diabetes mellitus ([facility] [address] )    • Hyperlipidemia    • Hypertension        Past Surgical History:   Procedure Laterality Date   • JOINT REPLACEMENT Bilateral    • KNEE SURGERY     • TOE SURGERY         Family History   Problem Relation Age of Onset   • Dementia Brother    • Breast cancer Sister 76   • Cancer Brother      I have reviewed and agree with the history as documented  E-Cigarette/Vaping   • E-Cigarette Use Never User      E-Cigarette/Vaping Substances   • Nicotine No    • THC No    • CBD No    • Flavoring No    • Other No      Social History     Tobacco Use   • Smoking status: Former     Packs/day: 1 00     Years: 52 00     Pack years: 52 00     Types: Cigarettes     Start date: 65     Quit date: 2017     Years since quittin 4   • Smokeless tobacco: Never   Vaping Use   • Vaping Use: Never used   Substance Use Topics   • Alcohol use: Not Currently   • Drug use: No       Review of Systems   Constitutional: Positive for chills, fatigue and fever  Negative for diaphoresis  Respiratory: Positive for cough and shortness of breath  Negative for wheezing and stridor  Cardiovascular: Negative for chest pain, palpitations and leg swelling  Gastrointestinal: Negative for abdominal pain, blood in stool, diarrhea, nausea and vomiting  Genitourinary: Negative for dysuria, frequency and urgency  Musculoskeletal: Negative for neck pain and neck stiffness  Skin: Negative for pallor and rash  Neurological: Negative for dizziness, syncope, weakness, light-headedness and headaches  All other systems reviewed and are negative  Physical Exam  Physical Exam  Vitals reviewed  Constitutional:       Appearance: She is well-developed  HENT:      Head: Normocephalic and atraumatic  Eyes:      Pupils: Pupils are equal, round, and reactive to light  Cardiovascular:      Rate and Rhythm: Normal rate and regular rhythm  Heart sounds: Normal heart sounds     Pulmonary: Effort: Tachypnea present  No respiratory distress  Breath sounds: Examination of the right-lower field reveals rhonchi  Examination of the left-lower field reveals rhonchi  Rhonchi present  Abdominal:      General: Bowel sounds are normal       Palpations: Abdomen is soft  Tenderness: There is no abdominal tenderness  Musculoskeletal:      Cervical back: Neck supple  Right lower leg: No tenderness  No edema  Left lower leg: No tenderness  No edema  Skin:     General: Skin is warm and dry  Neurological:      Mental Status: She is alert and oriented to person, place, and time           Vital Signs  ED Triage Vitals [02/18/23 0720]   Temperature Pulse Respirations Blood Pressure SpO2   (!) 103 °F (39 4 °C) (!) 119 (!) 24 135/58 94 %      Temp Source Heart Rate Source Patient Position - Orthostatic VS BP Location FiO2 (%)   Tympanic Monitor Sitting Right arm --      Pain Score       No Pain           Vitals:    02/18/23 1004 02/18/23 1119 02/18/23 1212 02/18/23 1404   BP: 101/51 108/58 123/61 121/61   Pulse: 100 92 88 80   Patient Position - Orthostatic VS: Sitting Sitting           Visual Acuity      ED Medications  Medications   acetaminophen (TYLENOL) tablet 975 mg (975 mg Oral Not Given 2/18/23 1356)   fluticasone-vilanterol 200-25 mcg/actuation 1 puff (1 puff Inhalation Not Given 2/18/23 1411)   aspirin (ECOTRIN LOW STRENGTH) EC tablet 81 mg (81 mg Oral Not Given 2/18/23 1356)   benzonatate (TESSALON PERLES) capsule 200 mg (has no administration in time range)   bisacodyl (DULCOLAX) rectal suppository 10 mg (has no administration in time range)   loratadine (CLARITIN) tablet 10 mg (10 mg Oral Not Given 2/18/23 1356)   ferrous gluconate (FERGON) tablet 324 mg (has no administration in time range)   fluticasone (FLONASE) 50 mcg/act nasal spray 1 spray (1 spray Nasal Not Given 2/18/23 1357)   guaiFENesin (MUCINEX) 12 hr tablet 1,200 mg (1,200 mg Oral Not Given 2/18/23 1356)   insulin glargine (LANTUS) subcutaneous injection 64 Units 0 64 mL (64 Units Subcutaneous Not Given 2/18/23 1356)   insulin lispro (HumaLOG) 100 units/mL subcutaneous injection 24 Units (24 Units Subcutaneous Not Given 2/18/23 1358)   memantine (NAMENDA) tablet 10 mg (10 mg Oral Not Given 2/18/23 1356)   montelukast (SINGULAIR) tablet 10 mg (has no administration in time range)   pantoprazole (PROTONIX) EC tablet 20 mg (20 mg Oral Not Given 2/18/23 1356)   pravastatin (PRAVACHOL) tablet 80 mg (has no administration in time range)   traZODone (DESYREL) tablet 150 mg (has no administration in time range)   levofloxacin (LEVAQUIN) IVPB (premix in dextrose) 750 mg 150 mL (has no administration in time range)   enoxaparin (LOVENOX) subcutaneous injection 40 mg (40 mg Subcutaneous Given 2/18/23 1356)   levalbuterol (XOPENEX) inhalation solution 1 25 mg (1 25 mg Nebulization Given 2/18/23 1329)     And   ipratropium (ATROVENT) 0 02 % inhalation solution 0 5 mg (0 5 mg Nebulization Given 2/18/23 1329)   albuterol inhalation solution 2 5 mg (2 5 mg Nebulization Given 2/18/23 1535)   acetaminophen (TYLENOL) tablet 975 mg (975 mg Oral Given 2/18/23 0752)   levofloxacin (LEVAQUIN) IVPB (premix in dextrose) 750 mg 150 mL (0 mg Intravenous Stopped 2/18/23 1119)   methylPREDNISolone sodium succinate (Solu-MEDROL) injection 80 mg (80 mg Intravenous Given 2/18/23 0921)   albuterol inhalation solution 5 mg (5 mg Nebulization Given 2/18/23 0923)   ipratropium (ATROVENT) 0 02 % inhalation solution 0 5 mg (0 5 mg Nebulization Given 2/18/23 0923)       Diagnostic Studies  Results Reviewed     Procedure Component Value Units Date/Time    Protime-INR [[id number]]  (Abnormal) Collected: 02/18/23 0745    Lab Status: Final result Specimen: Blood from Arm, Left Updated: 02/18/23 0948     Protime 14 6 seconds      INR 1 12    APTT [[id number]]  (Normal) Collected: 02/18/23 0745    Lab Status: Final result Specimen: Blood from Arm, Left Updated: 02/18/23 0948     PTT 30 seconds     FLU/RSV/COVID - if FLU/RSV clinically relevant [[id number]]  (Normal) Collected: 02/18/23 0745    Lab Status: Final result Specimen: Nares from Nose Updated: 02/18/23 0857     SARS-CoV-2 Negative     INFLUENZA A PCR Negative     INFLUENZA B PCR Negative     RSV PCR Negative    Narrative:      FOR PEDIATRIC PATIENTS - copy/paste COVID Guidelines URL to browser: [email or web address]  [email or web address]    SARS-CoV-2 assay is a Nucleic Acid Amplification assay intended for the  qualitative detection of nucleic acid from SARS-CoV-2 in nasopharyngeal  swabs  Results are for the presumptive identification of SARS-CoV-2 RNA  Positive results are indicative of infection with SARS-CoV-2, the virus  causing COVID-19, but do not rule out bacterial infection or co-infection  with other viruses  Laboratories within the [place] and its  territories are required to report all positive results to the appropriate  public health authorities  Negative results do not preclude SARS-CoV-2  infection and should not be used as the sole basis for treatment or other  patient management decisions  Negative results must be combined with  clinical observations, patient history, and epidemiological information  This test has not been FDA cleared or approved  This test has been authorized by FDA under an Emergency Use Authorization  (EUA)  This test is only authorized for the duration of time the  declaration that circumstances exist justifying the authorization of the  emergency use of an in vitro diagnostic tests for detection of SARS-CoV-2  virus and/or diagnosis of COVID-19 infection under section 564(b)(1) of  the Act, 21 U  S C  210DBO-7(R)(8), unless the authorization is terminated  or revoked sooner  The test has been validated but independent review by FDA  and CLIA is pending  Test performed using [employer]:  This RT-PCR assay targets N2,  a region unique to SARS-CoV-2  A conserved region in the E-gene was chosen  for pan-Sarbecovirus detection which includes SARS-CoV-2  According to CMS-2020-01-R, this platform meets the definition of high-throughput technology      Procalcitonin [[id number]]  (Abnormal) Collected: 02/18/23 0745    Lab Status: Final result Specimen: Blood from Arm, Left Updated: 02/18/23 0829     Procalcitonin 0 77 ng/ml     HS Troponin I 2hr [[id number]]     Lab Status: No result Specimen: Blood     HS Troponin I 4hr [[id number]]     Lab Status: No result Specimen: Blood     HS Troponin 0hr (reflex protocol) [[id number]]  (Normal) Collected: 02/18/23 0745    Lab Status: Final result Specimen: Blood from Arm, Left Updated: 02/18/23 0828     hs TnI 0hr 14 ng/L     Comprehensive metabolic panel [[id number]]  (Abnormal) Collected: 02/18/23 0745    Lab Status: Final result Specimen: Blood from Arm, Left Updated: 02/18/23 0825     Sodium 142 mmol/L      Potassium 4 5 mmol/L      Chloride 102 mmol/L      CO2 31 mmol/L      ANION GAP 9 mmol/L      BUN 21 mg/dL      Creatinine 1 13 mg/dL      Glucose 104 mg/dL      Calcium 8 8 mg/dL      AST 12 U/L      ALT 18 U/L      Alkaline Phosphatase 54 U/L      Total Protein 7 0 g/dL      Albumin 3 5 g/dL      Total Bilirubin 1 29 mg/dL      eGFR 48 ml/min/1 73sq m     Narrative:      [facility] guidelines for Chronic Kidney Disease (CKD):   •  Stage 1 with normal or high GFR (GFR > 90 mL/min/1 73 square meters)  •  Stage 2 Mild CKD (GFR = 60-89 mL/min/1 73 square meters)  •  Stage 3A Moderate CKD (GFR = 45-59 mL/min/1 73 square meters)  •  Stage 3B Moderate CKD (GFR = 30-44 mL/min/1 73 square meters)  •  Stage 4 Severe CKD (GFR = 15-29 mL/min/1 73 square meters)  •  Stage 5 End Stage CKD (GFR <15 mL/min/1 73 square meters)  Note: GFR calculation is accurate only with a steady state creatinine    Lactic acid [[id number]]  (Normal) Collected: 02/18/23 0745    Lab Status: Final result Specimen: Blood from Arm, Left Updated: 02/18/23 0824     LACTIC ACID 1 2 mmol/L     Narrative:      Result may be elevated if tourniquet was used during collection      CBC and differential [[id number]]  (Abnormal) Collected: 02/18/23 0745    Lab Status: Final result Specimen: Blood from Arm, Left Updated: 02/18/23 0802     WBC 13 68 Thousand/uL      RBC 3 64 Million/uL      Hemoglobin 10 6 g/dL      Hematocrit 33 7 %      MCV 93 fL      MCH 29 1 pg      MCHC 31 5 g/dL      RDW 15 9 %      MPV 10 0 fL      Platelets 080 Thousands/uL      nRBC 0 /100 WBCs      Neutrophils Relative 88 %      Immat GRANS % 1 %      Lymphocytes Relative 4 %      Monocytes Relative 7 %      Eosinophils Relative 0 %      Basophils Relative 0 %      Neutrophils Absolute 11 98 Thousands/µL      Immature Grans Absolute 0 09 Thousand/uL      Lymphocytes Absolute 0 56 Thousands/µL      Monocytes Absolute 0 99 Thousand/µL      Eosinophils Absolute 0 03 Thousand/µL      Basophils Absolute 0 03 Thousands/µL     Urine Microscopic [[id number]]  (Abnormal) Collected: 02/18/23 0750    Lab Status: Final result Specimen: Urine, Clean Catch Updated: 02/18/23 0800     RBC, UA 2-4 /hpf      WBC, UA 1-2 /hpf      Epithelial Cells None Seen /hpf      Bacteria, UA None Seen /hpf     Blood gas, venous [[id number]]  (Abnormal) Collected: 02/18/23 0745    Lab Status: Final result Specimen: Blood from Arm, Left Updated: 02/18/23 0800     pH, Leonardo 7 559     pCO2, Leonardo 30 9 mm Hg      pO2, Leonardo 117 3 mm Hg      HCO3, Leonardo 27 0 mmol/L      Base Excess, Leonardo 5 0 mmol/L      O2 Content, Leonardo 14 9 ml/dL      O2 HGB, VENOUS 92 7 %     UA w Reflex to Microscopic w Reflex to Culture [[id number]]  (Abnormal) Collected: 02/18/23 0750    Lab Status: Final result Specimen: Urine, Clean Catch Updated: 02/18/23 0759     Color, UA Yellow     Clarity, UA Clear     Specific Gravity, UA 1 017     pH, UA 6 0     Leukocytes, UA Negative     Nitrite, UA Negative     Protein,  (2+) mg/dl Glucose, UA Negative mg/dl      Ketones, UA Negative mg/dl      Urobilinogen, UA <2 0 mg/dl      Bilirubin, UA Negative     Occult Blood, UA Negative    Blood culture #1 [[id number]] Collected: 02/18/23 0745    Lab Status: In process Specimen: Blood from Arm, Left Updated: 02/18/23 0753    Blood culture #2 [[id number]] Collected: 02/18/23 0745    Lab Status: In process Specimen: Blood from Arm, Right Updated: 02/18/23 0753                 XR chest 1 view portable   Final Result by Milton Arroyo MD (02/18 1202)      Right greater than left bibasilar opacity which could be due to edema or pneumonia  Workstation performed: [id number]                    Procedures  Procedures         ED Course  ED Course as of 02/18/23 1541   Sat Feb 18, 2023   0818 ECG shows rate of 123, sinus, normal axis, normal QRS, nonspecific ST and T waves, normal intervals, independently interpreted by me                                             Medical Decision Making  70-year-old with sepsis, will do septic evaluation, chest x-ray, urine, blood culture, procalcitonin, lactic acid, antibiotics, fluids    Chest x-ray concerning for right lower lobe pneumonia  Covered with levofloxacin  Will be admitted to the hospital     Asthma exacerbation: complicated acute illness or injury  Pneumonia: complicated acute illness or injury  Amount and/or Complexity of Data Reviewed  Labs: ordered  Radiology: ordered  Risk  OTC drugs  Prescription drug management  Decision regarding hospitalization            Disposition  Final diagnoses:   Pneumonia   Asthma exacerbation   Sepsis ([facility] [address] )     Time reflects when diagnosis was documented in both MDM as applicable and the Disposition within this note     Time User Action Codes Description Comment    2/18/2023  8:43 AM Felicita Sow [J18 9] Pneumonia     2/18/2023  8:43 AM Felicita Sow Add [J45 [id number]] Asthma exacerbation     2/18/2023  3:41 PM Felicita Sow [A41 9] Sepsis [facility])       ED Disposition     ED Disposition   Admit    Condition   Stable    Date/Time   Sat Feb 18, 2023  8:43 AM    Comment   Case was discussed with Dr Uvaldo Ryan and the patient's admission status was agreed to be Admission Status: inpatient status to the service of Dr Uvaldo Ryan              Follow-up Information    None         Current Discharge Medication List      CONTINUE these medications which have NOT CHANGED    Details   acetaminophen (TYLENOL) 325 mg tablet Take 3 tablets (975 mg total) by mouth every 8 (eight) hours  Qty: 30 tablet, Refills: 0    Associated Diagnoses: Chronic pain syndrome      Advair Diskus 500-50 MCG/DOSE inhaler       aspirin (ECOTRIN LOW STRENGTH) 81 mg EC tablet       benzonatate (TESSALON) 200 MG capsule Take 200 mg by mouth 3 (three) times a day as needed for cough      bisacodyl (DULCOLAX) 10 mg suppository Insert 10 mg into the rectum daily as needed       cetirizine (ZyrTEC) 10 mg tablet Take 10 mg by mouth daily      Cholecalciferol (VITAMIN D3) 56689 units CAPS Take 50,000 Units by mouth every 30 (thirty) days      Dulaglutide (Trulicity) 3 UN/8 0ON SOPN Inject 0 5 mL (3 mg total) under the skin once a week  Qty: 2 mL, Refills: 1    Associated Diagnoses: Type 2 diabetes mellitus with hyperglycemia, with long-term current use of insulin (HCC)      ferrous gluconate (FERGON) 324 mg tablet Take 1 tablet (324 mg total) by mouth daily before breakfast  Refills: 0    Associated Diagnoses: Anemia, unspecified type      fluticasone (FLONASE) 50 mcg/act nasal spray 1 spray into each nostril daily  Qty: 1 Bottle, Refills: 5    Associated Diagnoses: Seasonal allergies      gabapentin (NEURONTIN) 300 mg capsule Take 300 mg by mouth 2 (two) times a day      !! Glucose Blood (BL TEST STRIP PACK VI) by In Vitro route      !! glucose blood test strip by In Vitro route      guaiFENesin (MUCINEX) 600 mg 12 hr tablet Take 1,200 mg by mouth every 12 (twelve) hours      insulin glargine (LANTUS SOLOSTAR) 100 units/mL injection pen Inject 70 units twice a day  Qty: 15 mL    Associated Diagnoses: Type 2 diabetes mellitus with hyperglycemia, with long-term current use of insulin (HCC)      insulin lispro (HumaLOG) 100 units/mL injection Inject 10 Units under the skin 3 (three) times a day with meals  Refills: 0    Associated Diagnoses: Type 2 diabetes mellitus with foot ulcer, with long-term current use of insulin (HCC)      ipratropium-albuterol (DUO-NEB) 0 5-2 5 mg/3 mL nebulizer solution       magnesium hydroxide (MILK OF MAGNESIA) 400 mg/5 mL oral suspension Take by mouth daily as needed for constipation      memantine (NAMENDA) 10 mg tablet 10 mg 2 (two) times a day       MENTHOL-METHYL SALICYLATE EX Apply topically 2 (two) times a day      metFORMIN (GLUCOPHAGE) 500 mg tablet       montelukast (SINGULAIR) 10 mg tablet Take 10 mg by mouth daily at bedtime      Omeprazole 20 MG TBEC Take 20 mg by mouth daily      simvastatin (ZOCOR) 40 mg tablet Take 1 tablet by mouth daily at bedtime  Refills: 0      traZODone (DESYREL) 150 mg tablet       Sodium Hypochlorite (Anasept Antimicrobial) 0 057 % GEL Apply topically in the morning      torsemide (DEMADEX) 20 mg tablet Take 20 mg by mouth daily      traMADol (ULTRAM) 50 mg tablet        !! - Potential duplicate medications found  Please discuss with provider  No discharge procedures on file      PDMP Review       Value Time User    PDMP Reviewed  Yes 9/25/2020 12:01 PM Renae Sequeira MD          ED Provider  Electronically Signed by           Bri Vazquez MD  02/18/23 5560

## 2023-02-18 NOTE — PLAN OF CARE
Problem: MOBILITY - ADULT  Goal: Maintain or return to baseline ADL function  Description: INTERVENTIONS:  -  Assess patient's ability to carry out ADLs; assess patient's baseline for ADL function and identify physical deficits which impact ability to perform ADLs (bathing, care of mouth/teeth, toileting, grooming, dressing, etc )  - Assess/evaluate cause of self-care deficits   - Assess range of motion  - Assess patient's mobility; develop plan if impaired  - Assess patient's need for assistive devices and provide as appropriate  - Encourage maximum independence but intervene and supervise when necessary  - Involve family in performance of ADLs  - Assess for home care needs following discharge   - Consider OT consult to assist with ADL evaluation and planning for discharge  - Provide patient education as appropriate  Outcome: Progressing  Goal: Maintains/Returns to pre admission functional level  Description: INTERVENTIONS:  - Perform BMAT or MOVE assessment daily    - Set and communicate daily mobility goal to care team and patient/family/caregiver  - Collaborate with rehabilitation services on mobility goals if consulted  - Perform Range of Motion  times a day  - Reposition patient every  hours    - Dangle patient  times a day  - Stand patient  times a day  - Ambulate patient  times a day  - Out of bed to chair  times a day   - Out of bed for meals  times a day  - Out of bed for toileting  - Record patient progress and toleration of activity level   Outcome: Progressing     Problem: Prexisting or High Potential for Compromised Skin Integrity  Goal: Skin integrity is maintained or improved  Description: INTERVENTIONS:  - Identify patients at risk for skin breakdown  - Assess and monitor skin integrity  - Assess and monitor nutrition and hydration status  - Monitor labs   - Assess for incontinence   - Turn and reposition patient  - Assist with mobility/ambulation  - Relieve pressure over bony prominences  - Avoid friction and shearing  - Provide appropriate hygiene as needed including keeping skin clean and dry  - Evaluate need for skin moisturizer/barrier cream  - Collaborate with interdisciplinary team   - Patient/family teaching  - Consider wound care consult   Outcome: Progressing     Problem: RESPIRATORY - ADULT  Goal: Achieves optimal ventilation and oxygenation  Description: INTERVENTIONS:  - Assess for changes in respiratory status  - Assess for changes in mentation and behavior  - Position to facilitate oxygenation and minimize respiratory effort  - Oxygen administered by appropriate delivery if ordered  - Initiate smoking cessation education as indicated  - Encourage broncho-pulmonary hygiene including cough, deep breathe, Incentive Spirometry  - Assess the need for suctioning and aspirate as needed  - Assess and instruct to report SOB or any respiratory difficulty  - Respiratory Therapy support as indicated  Outcome: Progressing

## 2023-02-18 NOTE — ASSESSMENT & PLAN NOTE
Lab Results   Component Value Date    HGBA1C 5 6 08/08/2022       No results for input(s): POCGLU in the last 72 hours  Blood Sugar Average: Last 72 hrs:  Continue Lantus 64 units twice daily  Continue Humalog 24 units with meals  HbA1c is too tightly controlled in this patient given last value of 5 6    Can work on liberalizing control during this hospitalization

## 2023-02-19 LAB
ANION GAP SERPL CALCULATED.3IONS-SCNC: 9 MMOL/L (ref 4–13)
BASOPHILS # BLD AUTO: 0.02 THOUSANDS/ÂΜL (ref 0–0.1)
BASOPHILS NFR BLD AUTO: 0 % (ref 0–1)
BUN SERPL-MCNC: 25 MG/DL (ref 5–25)
CALCIUM SERPL-MCNC: 8.6 MG/DL (ref 8.4–10.2)
CHLORIDE SERPL-SCNC: 102 MMOL/L (ref 96–108)
CO2 SERPL-SCNC: 28 MMOL/L (ref 21–32)
CREAT SERPL-MCNC: 1.08 MG/DL (ref 0.6–1.3)
EOSINOPHIL # BLD AUTO: 0 THOUSAND/ÂΜL (ref 0–0.61)
EOSINOPHIL NFR BLD AUTO: 0 % (ref 0–6)
ERYTHROCYTE [DISTWIDTH] IN BLOOD BY AUTOMATED COUNT: 15.4 % (ref 11.6–15.1)
GFR SERPL CREATININE-BSD FRML MDRD: 51 ML/MIN/1.73SQ M
GLUCOSE SERPL-MCNC: 122 MG/DL (ref 65–140)
GLUCOSE SERPL-MCNC: 209 MG/DL (ref 65–140)
GLUCOSE SERPL-MCNC: 273 MG/DL (ref 65–140)
GLUCOSE SERPL-MCNC: 279 MG/DL (ref 65–140)
GLUCOSE SERPL-MCNC: 289 MG/DL (ref 65–140)
GLUCOSE SERPL-MCNC: 348 MG/DL (ref 65–140)
HCT VFR BLD AUTO: 29.1 % (ref 34.8–46.1)
HGB BLD-MCNC: 9 G/DL (ref 11.5–15.4)
IMM GRANULOCYTES # BLD AUTO: 0.1 THOUSAND/UL (ref 0–0.2)
IMM GRANULOCYTES NFR BLD AUTO: 1 % (ref 0–2)
LYMPHOCYTES # BLD AUTO: 0.7 THOUSANDS/ÂΜL (ref 0.6–4.47)
LYMPHOCYTES NFR BLD AUTO: 6 % (ref 14–44)
MCH RBC QN AUTO: 28.7 PG (ref 26.8–34.3)
MCHC RBC AUTO-ENTMCNC: 30.9 G/DL (ref 31.4–37.4)
MCV RBC AUTO: 93 FL (ref 82–98)
MONOCYTES # BLD AUTO: 0.63 THOUSAND/ÂΜL (ref 0.17–1.22)
MONOCYTES NFR BLD AUTO: 5 % (ref 4–12)
NEUTROPHILS # BLD AUTO: 11.09 THOUSANDS/ÂΜL (ref 1.85–7.62)
NEUTS SEG NFR BLD AUTO: 88 % (ref 43–75)
NRBC BLD AUTO-RTO: 0 /100 WBCS
PLATELET # BLD AUTO: 125 THOUSANDS/UL (ref 149–390)
PMV BLD AUTO: 10.5 FL (ref 8.9–12.7)
POTASSIUM SERPL-SCNC: 4.4 MMOL/L (ref 3.5–5.3)
RBC # BLD AUTO: 3.14 MILLION/UL (ref 3.81–5.12)
SODIUM SERPL-SCNC: 139 MMOL/L (ref 135–147)
WBC # BLD AUTO: 12.54 THOUSAND/UL (ref 4.31–10.16)

## 2023-02-19 RX ORDER — HYDROXYZINE HYDROCHLORIDE 25 MG/1
25 TABLET, FILM COATED ORAL ONCE
Status: COMPLETED | OUTPATIENT
Start: 2023-02-19 | End: 2023-02-19

## 2023-02-19 RX ORDER — INSULIN LISPRO 100 [IU]/ML
1-6 INJECTION, SOLUTION INTRAVENOUS; SUBCUTANEOUS
Status: DISCONTINUED | OUTPATIENT
Start: 2023-02-19 | End: 2023-02-20 | Stop reason: HOSPADM

## 2023-02-19 RX ADMIN — LEVOFLOXACIN 750 MG: 750 INJECTION, SOLUTION INTRAVENOUS at 08:30

## 2023-02-19 RX ADMIN — ENOXAPARIN SODIUM 40 MG: 40 INJECTION SUBCUTANEOUS at 09:32

## 2023-02-19 RX ADMIN — LEVALBUTEROL HYDROCHLORIDE 1.25 MG: 1.25 SOLUTION, CONCENTRATE RESPIRATORY (INHALATION) at 06:29

## 2023-02-19 RX ADMIN — ACETAMINOPHEN 975 MG: 325 TABLET ORAL at 21:53

## 2023-02-19 RX ADMIN — FERROUS GLUCONATE 324 MG: 324 TABLET ORAL at 06:17

## 2023-02-19 RX ADMIN — INSULIN GLARGINE 64 UNITS: 100 INJECTION, SOLUTION SUBCUTANEOUS at 08:51

## 2023-02-19 RX ADMIN — GUAIFENESIN 1200 MG: 600 TABLET ORAL at 08:45

## 2023-02-19 RX ADMIN — ACETAMINOPHEN 975 MG: 325 TABLET ORAL at 06:17

## 2023-02-19 RX ADMIN — PANTOPRAZOLE SODIUM 20 MG: 20 TABLET, DELAYED RELEASE ORAL at 06:17

## 2023-02-19 RX ADMIN — INSULIN LISPRO 24 UNITS: 100 INJECTION, SOLUTION INTRAVENOUS; SUBCUTANEOUS at 17:17

## 2023-02-19 RX ADMIN — INSULIN LISPRO 24 UNITS: 100 INJECTION, SOLUTION INTRAVENOUS; SUBCUTANEOUS at 13:19

## 2023-02-19 RX ADMIN — LEVALBUTEROL HYDROCHLORIDE 1.25 MG: 1.25 SOLUTION, CONCENTRATE RESPIRATORY (INHALATION) at 15:20

## 2023-02-19 RX ADMIN — FLUTICASONE PROPIONATE 1 SPRAY: 50 SPRAY, METERED NASAL at 08:54

## 2023-02-19 RX ADMIN — FLUTICASONE FUROATE AND VILANTEROL TRIFENATATE 1 PUFF: 200; 25 POWDER RESPIRATORY (INHALATION) at 08:55

## 2023-02-19 RX ADMIN — MEMANTINE 10 MG: 10 TABLET ORAL at 08:47

## 2023-02-19 RX ADMIN — TRAZODONE HYDROCHLORIDE 150 MG: 100 TABLET ORAL at 21:53

## 2023-02-19 RX ADMIN — ALBUTEROL SULFATE 2.5 MG: 2.5 SOLUTION RESPIRATORY (INHALATION) at 10:24

## 2023-02-19 RX ADMIN — INSULIN LISPRO 5 UNITS: 100 INJECTION, SOLUTION INTRAVENOUS; SUBCUTANEOUS at 11:46

## 2023-02-19 RX ADMIN — LEVALBUTEROL HYDROCHLORIDE 1.25 MG: 1.25 SOLUTION, CONCENTRATE RESPIRATORY (INHALATION) at 19:52

## 2023-02-19 RX ADMIN — IPRATROPIUM BROMIDE 0.5 MG: 0.5 SOLUTION RESPIRATORY (INHALATION) at 06:29

## 2023-02-19 RX ADMIN — ASPIRIN 81 MG: 81 TABLET, COATED ORAL at 08:45

## 2023-02-19 RX ADMIN — PRAVASTATIN SODIUM 80 MG: 80 TABLET ORAL at 16:39

## 2023-02-19 RX ADMIN — MEMANTINE 10 MG: 10 TABLET ORAL at 18:18

## 2023-02-19 RX ADMIN — HYDROXYZINE HYDROCHLORIDE 25 MG: 25 TABLET ORAL at 11:44

## 2023-02-19 RX ADMIN — ACETAMINOPHEN 975 MG: 325 TABLET ORAL at 13:18

## 2023-02-19 RX ADMIN — MONTELUKAST 10 MG: 10 TABLET, FILM COATED ORAL at 21:53

## 2023-02-19 RX ADMIN — INSULIN GLARGINE 64 UNITS: 100 INJECTION, SOLUTION SUBCUTANEOUS at 21:53

## 2023-02-19 RX ADMIN — INSULIN LISPRO 2 UNITS: 100 INJECTION, SOLUTION INTRAVENOUS; SUBCUTANEOUS at 16:45

## 2023-02-19 RX ADMIN — IPRATROPIUM BROMIDE 0.5 MG: 0.5 SOLUTION RESPIRATORY (INHALATION) at 19:52

## 2023-02-19 RX ADMIN — IPRATROPIUM BROMIDE 0.5 MG: 0.5 SOLUTION RESPIRATORY (INHALATION) at 15:20

## 2023-02-19 RX ADMIN — GUAIFENESIN 1200 MG: 600 TABLET ORAL at 21:55

## 2023-02-19 RX ADMIN — LORATADINE 10 MG: 10 TABLET ORAL at 08:46

## 2023-02-19 RX ADMIN — INSULIN LISPRO 24 UNITS: 100 INJECTION, SOLUTION INTRAVENOUS; SUBCUTANEOUS at 08:50

## 2023-02-19 NOTE — ASSESSMENT & PLAN NOTE
Lab Results   Component Value Date    HGBA1C 5 6 08/08/2022       Recent Labs     02/18/23  1405 02/18/23 2041 02/19/23  0153   POCGLU 130 259* 289*       Blood Sugar Average: Last 72 hrs:  (P) 226   · Continue Lantus 64 units twice daily  Continue Humalog 24 units with meals  · HbA1c is too tightly controlled in this patient given last value of 5 6    · Can work on liberalizing control during this hospitalization

## 2023-02-19 NOTE — PROGRESS NOTES
[address] [place] 1950, 67 y o  female MRN: [id number]  Unit/Bed#: S -01 Encounter: [id number]  Primary Care Provider: Jigna Dugan MD   Date and time admitted to hospital: 2/18/2023  7:11 AM    Asthma-COPD overlap syndrome ([facility] )  Assessment & Plan  · Complicated by underlying PNA and hypoxia  · Initial VBG revealed hypercarbia, patient briefly required BiPAP  · Repeat VBG after BiPAP showed resolution of hypercarbia  · Is now stable on baseline O2 requirements of 2 L nasal cannula  · Continue Advair discus, DuoNebs, Singulair, Flonase, Zyrtec    Sepsis (HCC) secondary to pneumonia  Assessment & Plan  · POA as evidenced by fever and leukocytosis  · CXR reveals PNA as likely source  · UA bland  · Procal elevated, lactate negative  · Started on IV Levaquin in ED, continued on admission  · Follow up Blood Cx's  · Leukocytosis resolved, trend procal   · Afebrile x 24h     Alzheimer's disease ([facility] )  Assessment & Plan  · Continue Namenda and trazodone hs      Type 2 diabetes mellitus, with long-term current use of insulin [facility])  Assessment & Plan  Lab Results   Component Value Date    HGBA1C 5 6 08/08/2022       Recent Labs     02/18/23  1405 02/18/23  2041 02/19/23  0153   POCGLU 130 259* 289*       Blood Sugar Average: Last 72 hrs:  (P) 226   · Continue Lantus 64 units twice daily  Continue Humalog 24 units with meals  · HbA1c is too tightly controlled in this patient given last value of 5 6  · Can work on liberalizing control during this hospitalization    Essential hypertension  Assessment & Plan  · BPs robust, pt takes torsemide as needed  · ACEi had been held for KATHY on admission in June of 2022, can consider resumption if becomes hypertensive as renal function is stable here    * Pneumonia of right lower lobe due to infectious organism  Assessment & Plan  · As stated in primary problem   · Continue Levaquin for total of 5 days  , Patient's QTc appears normal  · Respiratory protocol  · At baseline O2 requirement of 2L NC      Morbid (severe) obesity due to excess calories (HCC)-resolved as of 2023  Assessment & Plan  · Morbid obesity  VTE Pharmacologic Prophylaxis: VTE Score: 5 High Risk (Score >/= 5) - Pharmacological DVT Prophylaxis Ordered: enoxaparin (Lovenox)  Sequential Compression Devices Ordered  Patient Centered Rounds: I performed bedside rounds with nursing staff today  Discussions with Specialists or Other Care Team Provider: nursing    Education and Discussions with Family / Patient: Discussed with patient's nephew and caregiver, Yissel Shields        Total Time Spent on Date of Encounter in care of patient: 45 minutes This time was spent on one or more of the following: performing physical exam; counseling and coordination of care; obtaining or reviewing history; documenting in the medical record; reviewing/ordering tests, medications or procedures; communicating with other healthcare professionals and discussing with patient's family/caregivers  Current Length of Stay: 1 day(s)  Current Patient Status: Inpatient   Certification Statement: The patient will continue to require additional inpatient hospital stay due to pending final blood cultures , IV abx for PNA  Discharge Plan: Anticipate discharge in 24-48 hrs to discharge location to be determined pending rehab evaluations  Code Status: Level 1 - Full Code    Subjective:   Patient states she feels better today  She says her breathing feels improved and she denies any chest pain  She denies fever or chills  Denies headache or dizziness  Denies any urinary symptoms  Has a good appetite and denies nausea, vomiting or diarrhea      Objective:     Vitals:   Temp (24hrs), Av 7 °F (36 5 °C), Min:97 1 °F (36 2 °C), Max:98 2 °F (36 8 °C)    Temp:  [97 1 °F (36 2 °C)-98 2 °F (36 8 °C)] 98 2 °F (36 8 °C)  HR:  [75-92] 86  Resp:  [16-20] 18  BP: (111-137)/(42-70) 127/56  SpO2:  [91 %-98 %] 95 %  Body mass index is 40 34 kg/m²  Input and Output Summary (last 24 hours): Intake/Output Summary (Last 24 hours) at 2/19/2023 1231  Last data filed at 2/19/2023 0945  Gross per 24 hour   Intake --   Output 900 ml   Net -900 ml       Physical Exam:   Physical Exam  Vitals and nursing note reviewed  Constitutional:       General: She is not in acute distress  Appearance: She is obese  She is not toxic-appearing  HENT:      Head: Normocephalic and atraumatic  Right Ear: External ear normal       Left Ear: External ear normal       Nose: Nose normal       Mouth/Throat:      Mouth: Mucous membranes are moist       Pharynx: Oropharynx is clear  Eyes:      Conjunctiva/sclera: Conjunctivae normal    Cardiovascular:      Rate and Rhythm: Normal rate and regular rhythm  Pulses: Normal pulses  Pulmonary:      Effort: No respiratory distress  Breath sounds: Normal breath sounds  No stridor  No wheezing, rhonchi or rales  Comments: Breathing comfortably on 2 L nasal cannula  Abdominal:      General: Abdomen is flat  Bowel sounds are normal  There is no distension  Palpations: Abdomen is soft  There is no mass  Tenderness: There is no abdominal tenderness  There is no guarding or rebound  Hernia: No hernia is present  Musculoskeletal:      Cervical back: Normal range of motion  Right lower leg: No edema  Left lower leg: No edema  Skin:     General: Skin is warm and dry  Capillary Refill: Capillary refill takes less than 2 seconds  Neurological:      Mental Status: She is alert  Mental status is at baseline  Sensory: No sensory deficit  Motor: No weakness  Psychiatric:         Mood and Affect: Mood normal          Thought Content:  Thought content normal           Additional Data:     Labs:  Results from last 7 days   Lab Units 02/19/23  0457   WBC Thousand/uL 12 54*   HEMOGLOBIN g/dL 9 0*   HEMATOCRIT % 29 1*   PLATELETS Thousands/uL 125*   NEUTROS PCT % 88*   LYMPHS PCT % 6*   MONOS PCT % 5   EOS PCT % 0     Results from last 7 days   Lab Units 02/19/23  0457 02/18/23  0745   SODIUM mmol/L 139 142   POTASSIUM mmol/L 4 4 4 5   CHLORIDE mmol/L 102 102   CO2 mmol/L 28 31   BUN mg/dL 25 21   CREATININE mg/dL 1 08 1 13   ANION GAP mmol/L 9 9   CALCIUM mg/dL 8 6 8 8   ALBUMIN g/dL  --  3 5   TOTAL BILIRUBIN mg/dL  --  1 29*   ALK PHOS U/L  --  54   ALT U/L  --  18   AST U/L  --  12*   GLUCOSE RANDOM mg/dL 273* 104     Results from last 7 days   Lab Units 02/18/23  0745   INR  1 12     Results from last 7 days   Lab Units 02/19/23  1123 02/19/23  0813 02/19/23  0153 02/18/23  2041 02/18/23  1405   POC GLUCOSE mg/dl 348* 279* 289* 259* 130         Results from last 7 days   Lab Units 02/18/23  0745   LACTIC ACID mmol/L 1 2   PROCALCITONIN ng/ml 0 77*       Lines/Drains:  Invasive Devices     Peripheral Intravenous Line  Duration           Peripheral IV 02/19/23 Distal;Dorsal (posterior); Right Wrist <1 day                  Telemetry:  Telemetry Orders (From admission, onward)             48 Hour Telemetry Monitoring  Continuous x 48 hours        References:    Telemetry Guidelines   Question:  Reason for 48 Hour Telemetry  Answer:  Arrhythmias Requiring Medical Therapy (eg  SVT, Vtach/fib, Bradycardia, Uncontrolled A-fib)                 Telemetry Reviewed: Normal Sinus Rhythm  Indication for Continued Telemetry Use: No indication for continued use  Will discontinue  Imaging: Reviewed radiology reports from this admission including: chest xray    Recent Cultures (last 7 days):   Results from last 7 days   Lab Units 02/18/23  0745   BLOOD CULTURE  Received in Microbiology Lab  Culture in Progress  Received in Microbiology Lab  Culture in Progress         Last 24 Hours Medication List:   Current Facility-Administered Medications   Medication Dose Route Frequency Provider Last Rate   • acetaminophen  975 mg Oral [facility] Rashad Duane Stover MD     • albuterol  2 5 mg Nebulization Q6H PRN Dima Park MD     • aspirin  81 mg Oral Daily Dima Park MD     • benzonatate  200 mg Oral TID PRN Dima Park MD     • bisacodyl  10 mg Rectal Daily PRN Dima Park MD     • enoxaparin  40 mg Subcutaneous Daily Dima Park MD     • ferrous gluconate  324 mg Oral Daily Before Breakfast Dima Park MD     • fluticasone  1 spray Nasal Daily Dima Park MD     • fluticasone-vilanterol  1 puff Inhalation Daily Dima Park MD     • guaiFENesin  1,200 mg Oral Q12H [facility] Dima Park MD     • insulin glargine  64 Units Subcutaneous Q12H [facility] CELINA Lares     • insulin lispro  1-6 Units Subcutaneous TID AC Shannon Pearlie Aase, PA-C     • insulin lispro  1-6 Units Subcutaneous HS Sofi Cobian PA-C     • insulin lispro  24 Units Subcutaneous TID With Meals Dima Park MD     • levalbuterol  1 25 mg Nebulization TID Dima Park MD      And   • ipratropium  0 5 mg Nebulization TID Dima Park MD     • loratadine  10 mg Oral Daily Dima Park MD     • memantine  10 mg Oral BID Dima Park MD     • montelukast  10 mg Oral HS Dima Park MD     • pantoprazole  20 mg Oral Early Morning Dima Park MD     • pravastatin  80 mg Oral Daily With Ana Jaeger MD     • traZODone  150 mg Oral HS Dima Park MD          Today, Patient Was Seen By: Sofi Cobian PA-C    **Please Note: This note may have been constructed using a voice recognition system  **

## 2023-02-19 NOTE — ASSESSMENT & PLAN NOTE
· As stated in primary problem   · Continue Levaquin for total of 5 days  , Patient's QTc appears normal  · Respiratory protocol  · At baseline O2 requirement of 2L NC

## 2023-02-19 NOTE — PLAN OF CARE
Problem: MOBILITY - ADULT  Goal: Maintain or return to baseline ADL function  Description: INTERVENTIONS:  -  Assess patient's ability to carry out ADLs; assess patient's baseline for ADL function and identify physical deficits which impact ability to perform ADLs (bathing, care of mouth/teeth, toileting, grooming, dressing, etc )  - Assess/evaluate cause of self-care deficits   - Assess range of motion  - Assess patient's mobility; develop plan if impaired  - Assess patient's need for assistive devices and provide as appropriate  - Encourage maximum independence but intervene and supervise when necessary  - Involve family in performance of ADLs  - Assess for home care needs following discharge   - Consider OT consult to assist with ADL evaluation and planning for discharge  - Provide patient education as appropriate  Outcome: Progressing  Goal: Maintains/Returns to pre admission functional level  Description: INTERVENTIONS:  - Perform BMAT or MOVE assessment daily    - Set and communicate daily mobility goal to care team and patient/family/caregiver     - Collaborate with rehabilitation services on mobility goals if consulted  - Perform Range of Motion   - Reposition patient every   - Dangle patient   - Stand patient   - Ambulate patient   - Out of bed to chair   - Out of bed for meals   - Out of bed for toileting  - Record patient progress and toleration of activity level   Outcome: Progressing     Problem: Prexisting or High Potential for Compromised Skin Integrity  Goal: Skin integrity is maintained or improved  Description: INTERVENTIONS:  - Identify patients at risk for skin breakdown  - Assess and monitor skin integrity  - Assess and monitor nutrition and hydration status  - Monitor labs   - Assess for incontinence   - Turn and reposition patient  - Assist with mobility/ambulation  - Relieve pressure over bony prominences  - Avoid friction and shearing  - Provide appropriate hygiene as needed including keeping skin clean and dry  - Evaluate need for skin moisturizer/barrier cream  - Collaborate with interdisciplinary team   - Patient/family teaching  - Consider wound care consult   Outcome: Progressing     Problem: RESPIRATORY - ADULT  Goal: Achieves optimal ventilation and oxygenation  Description: INTERVENTIONS:  - Assess for changes in respiratory status  - Assess for changes in mentation and behavior  - Position to facilitate oxygenation and minimize respiratory effort  - Oxygen administered by appropriate delivery if ordered  - Initiate smoking cessation education as indicated  - Encourage broncho-pulmonary hygiene including cough, deep breathe, Incentive Spirometry  - Assess the need for suctioning and aspirate as needed  - Assess and instruct to report SOB or any respiratory difficulty  - Respiratory Therapy support as indicated  Outcome: Progressing

## 2023-02-19 NOTE — ASSESSMENT & PLAN NOTE
· BPs robust, pt takes torsemide as needed  · ACEi had been held for KATHY on admission in June of 2022, can consider resumption if becomes hypertensive as renal function is stable here

## 2023-02-19 NOTE — ASSESSMENT & PLAN NOTE
· POA as evidenced by fever and leukocytosis  · CXR reveals PNA as likely source  · UA bland  · Procal elevated, lactate negative  · Started on IV Levaquin in ED, continued on admission  · Follow up Blood Cx's  · Leukocytosis resolved, trend procal   · Afebrile x 24h

## 2023-02-19 NOTE — ASSESSMENT & PLAN NOTE
· Complicated by underlying PNA and hypoxia  · Initial VBG revealed hypercarbia, patient briefly required BiPAP  · Repeat VBG after BiPAP showed resolution of hypercarbia  · Is now stable on baseline O2 requirements of 2 L nasal cannula  · Continue Advair discus, DuoNebs, Singulair, Flonase, Zyrtec

## 2023-02-20 VITALS
OXYGEN SATURATION: 95 % | WEIGHT: 199.74 LBS | DIASTOLIC BLOOD PRESSURE: 64 MMHG | TEMPERATURE: 98.1 F | HEART RATE: 79 BPM | BODY MASS INDEX: 40.27 KG/M2 | HEIGHT: 59 IN | RESPIRATION RATE: 18 BRPM | SYSTOLIC BLOOD PRESSURE: 136 MMHG

## 2023-02-20 LAB
ANION GAP SERPL CALCULATED.3IONS-SCNC: 7 MMOL/L (ref 4–13)
BASOPHILS # BLD AUTO: 0.02 THOUSANDS/ÂΜL (ref 0–0.1)
BASOPHILS NFR BLD AUTO: 0 % (ref 0–1)
BUN SERPL-MCNC: 32 MG/DL (ref 5–25)
CALCIUM SERPL-MCNC: 8.7 MG/DL (ref 8.4–10.2)
CHLORIDE SERPL-SCNC: 103 MMOL/L (ref 96–108)
CO2 SERPL-SCNC: 29 MMOL/L (ref 21–32)
CREAT SERPL-MCNC: 1.22 MG/DL (ref 0.6–1.3)
EOSINOPHIL # BLD AUTO: 0.08 THOUSAND/ÂΜL (ref 0–0.61)
EOSINOPHIL NFR BLD AUTO: 1 % (ref 0–6)
ERYTHROCYTE [DISTWIDTH] IN BLOOD BY AUTOMATED COUNT: 15 % (ref 11.6–15.1)
GFR SERPL CREATININE-BSD FRML MDRD: 44 ML/MIN/1.73SQ M
GLUCOSE SERPL-MCNC: 126 MG/DL (ref 65–140)
GLUCOSE SERPL-MCNC: 158 MG/DL (ref 65–140)
GLUCOSE SERPL-MCNC: 75 MG/DL (ref 65–140)
HCT VFR BLD AUTO: 29.9 % (ref 34.8–46.1)
HGB BLD-MCNC: 9.3 G/DL (ref 11.5–15.4)
IMM GRANULOCYTES # BLD AUTO: 0.05 THOUSAND/UL (ref 0–0.2)
IMM GRANULOCYTES NFR BLD AUTO: 1 % (ref 0–2)
LYMPHOCYTES # BLD AUTO: 1.05 THOUSANDS/ÂΜL (ref 0.6–4.47)
LYMPHOCYTES NFR BLD AUTO: 14 % (ref 14–44)
MCH RBC QN AUTO: 29.2 PG (ref 26.8–34.3)
MCHC RBC AUTO-ENTMCNC: 31.1 G/DL (ref 31.4–37.4)
MCV RBC AUTO: 94 FL (ref 82–98)
MONOCYTES # BLD AUTO: 0.33 THOUSAND/ÂΜL (ref 0.17–1.22)
MONOCYTES NFR BLD AUTO: 5 % (ref 4–12)
NEUTROPHILS # BLD AUTO: 5.75 THOUSANDS/ÂΜL (ref 1.85–7.62)
NEUTS SEG NFR BLD AUTO: 79 % (ref 43–75)
NRBC BLD AUTO-RTO: 0 /100 WBCS
PLATELET # BLD AUTO: 154 THOUSANDS/UL (ref 149–390)
PMV BLD AUTO: 10 FL (ref 8.9–12.7)
POTASSIUM SERPL-SCNC: 3.8 MMOL/L (ref 3.5–5.3)
RBC # BLD AUTO: 3.19 MILLION/UL (ref 3.81–5.12)
SODIUM SERPL-SCNC: 139 MMOL/L (ref 135–147)
WBC # BLD AUTO: 7.28 THOUSAND/UL (ref 4.31–10.16)

## 2023-02-20 RX ORDER — INSULIN LISPRO 100 [IU]/ML
24 INJECTION, SOLUTION INTRAVENOUS; SUBCUTANEOUS
COMMUNITY
Start: 2023-02-20

## 2023-02-20 RX ORDER — LEVOFLOXACIN 750 MG/1
750 TABLET ORAL EVERY 24 HOURS
Status: DISCONTINUED | OUTPATIENT
Start: 2023-02-20 | End: 2023-02-20 | Stop reason: HOSPADM

## 2023-02-20 RX ORDER — LEVOFLOXACIN 750 MG/1
750 TABLET ORAL EVERY 24 HOURS
Qty: 2 TABLET | Refills: 0 | Status: SHIPPED | OUTPATIENT
Start: 2023-02-21 | End: 2023-02-23

## 2023-02-20 RX ADMIN — IPRATROPIUM BROMIDE 0.5 MG: 0.5 SOLUTION RESPIRATORY (INHALATION) at 14:00

## 2023-02-20 RX ADMIN — ACETAMINOPHEN 975 MG: 325 TABLET ORAL at 06:27

## 2023-02-20 RX ADMIN — ASPIRIN 81 MG: 81 TABLET, COATED ORAL at 08:37

## 2023-02-20 RX ADMIN — LEVOFLOXACIN 750 MG: 750 TABLET, FILM COATED ORAL at 08:37

## 2023-02-20 RX ADMIN — MEMANTINE 10 MG: 10 TABLET ORAL at 08:37

## 2023-02-20 RX ADMIN — PANTOPRAZOLE SODIUM 20 MG: 20 TABLET, DELAYED RELEASE ORAL at 06:27

## 2023-02-20 RX ADMIN — FLUTICASONE FUROATE AND VILANTEROL TRIFENATATE 1 PUFF: 200; 25 POWDER RESPIRATORY (INHALATION) at 08:38

## 2023-02-20 RX ADMIN — GUAIFENESIN 1200 MG: 600 TABLET ORAL at 08:37

## 2023-02-20 RX ADMIN — INSULIN LISPRO 24 UNITS: 100 INJECTION, SOLUTION INTRAVENOUS; SUBCUTANEOUS at 13:54

## 2023-02-20 RX ADMIN — LEVALBUTEROL HYDROCHLORIDE 1.25 MG: 1.25 SOLUTION, CONCENTRATE RESPIRATORY (INHALATION) at 14:00

## 2023-02-20 RX ADMIN — INSULIN GLARGINE 64 UNITS: 100 INJECTION, SOLUTION SUBCUTANEOUS at 08:37

## 2023-02-20 RX ADMIN — IPRATROPIUM BROMIDE 0.5 MG: 0.5 SOLUTION RESPIRATORY (INHALATION) at 07:36

## 2023-02-20 RX ADMIN — INSULIN LISPRO 24 UNITS: 100 INJECTION, SOLUTION INTRAVENOUS; SUBCUTANEOUS at 08:39

## 2023-02-20 RX ADMIN — LEVALBUTEROL HYDROCHLORIDE 1.25 MG: 1.25 SOLUTION, CONCENTRATE RESPIRATORY (INHALATION) at 07:36

## 2023-02-20 RX ADMIN — FLUTICASONE PROPIONATE 1 SPRAY: 50 SPRAY, METERED NASAL at 08:38

## 2023-02-20 RX ADMIN — LORATADINE 10 MG: 10 TABLET ORAL at 08:37

## 2023-02-20 RX ADMIN — FERROUS GLUCONATE 324 MG: 324 TABLET ORAL at 06:28

## 2023-02-20 RX ADMIN — ENOXAPARIN SODIUM 40 MG: 40 INJECTION SUBCUTANEOUS at 08:40

## 2023-02-20 NOTE — ASSESSMENT & PLAN NOTE
Lab Results   Component Value Date    HGBA1C 5 6 08/08/2022       Recent Labs     02/19/23  1123 02/19/23  1641 02/19/23  2151 02/20/23  0813   POCGLU 348* 209* 122 75       Blood Sugar Average: Last 72 hrs:  (P) 213 875   · Continue Lantus 64 units twice daily  Continue Humalog 24 units with meals  · HbA1c is too tightly controlled in this patient given last value of 5 6    · Work on liberalizing control on discharge

## 2023-02-20 NOTE — CASE MANAGEMENT
Case Management Assessment & Discharge Planning Note    Patient name Laurel Elam  Location S /S -01 MRN [id number]  : 1950 Date 2023       Current Admission Date: 2023  Current Admission Diagnosis:Pneumonia of right lower lobe due to infectious organism   Patient Active Problem List    Diagnosis Date Noted   • Chronic respiratory failure with hypoxia ([address] ) 2022   • Diabetic ulcer of left midfoot associated with type 2 diabetes mellitus ([address] ) 2022   • Anemia 2022   • GILDARDO (obstructive sleep apnea) 2022   • Abscess of abdominal wall 01/10/2022   • Sacral ulcer ([address] ) 2021   • Nocturnal hypoxia 2020   • Kidney lesion 2020   • Weakness generalized 10/14/2020   • S/P tooth extraction 2020   • Sepsis ([address] ) secondary to pneumonia 2020   • Hyperlipidemia 2020   • Asthma-COPD overlap syndrome ([address] ) 2020   • Pneumonia of right lower lobe due to infectious organism 2020   • Hoarse voice quality 2019   • Seasonal allergies 2019   • Need for pneumococcal vaccination 2019   • MCI (mild cognitive impairment) 2018   • Fall 2018   • Injury of face 2018   • Sixth nerve palsy of left eye 2018   • Ptosis of left eyelid 2018   • Alzheimer's disease ([address] ) 10/15/2018   • Pulmonary nodule 10/15/2018   • Preop pulmonary/respiratory exam 10/15/2018   • Mental status change 2017   • Essential hypertension 2017   • Type 2 diabetes mellitus, with long-term current use of insulin ([address] ) 2017      LOS (days): 2  Geometric Mean LOS (GMLOS) (days): 5 00  Days to GMLOS:2 9     OBJECTIVE:    Risk of Unplanned Readmission Score: 17 12         Current admission status: Inpatient       Preferred Pharmacy:   [id number]- [place] - [address]  [address] [place] [address]  Phone: [phone] Fax: [address] [place], [place] - [address] [facility]  [address] [address]  Phone: [phone] Fax: [phone]    Primary Care Provider: Baldo Lira MD    Primary Insurance: MEDICARE  Secondary Insurance: [address]    ASSESSMENT:  Active Health Care Proxies    There are no active Health Care Proxies on file  Patient Information  Admitted from[de-identified] Facility [facility])  Mental Status: Alert  During Assessment patient was accompanied by: Not accompanied during assessment  Assessment information provided by[de-identified] Patient  Primary Caregiver: Other (Comment) (facility staff - Marcelo)  Support Systems: Family members  [place] of Residence: [address] [place] [address] do you live in?: [address] [facility] entry access options   Select all that apply : No steps to enter home  Type of Current Residence: Facility ([facility])  Upon entering residence, is there a bedroom on the main floor (no further steps)?: Yes  Upon entering residence, is there a bathroom on the main floor (no further steps)?: Yes  Homeless/housing insecurity resource given?: N/A  Living Arrangements: Other (Comment) ([address])    Activities of Daily Living Prior to Admission  Functional Status: Assistance  Completes ADLs independently?: No  Level of ADL dependence: Assistance         Patient Information Continued  Does patient have prescription coverage?: Yes  Within the past 12 months, you worried that your food would run out before you got the money to buy more : Never true  Within the past 12 months, the food you bought just didn't last and you didn't have money to get more : Never true  Food insecurity resource given?: N/A  Does patient receive dialysis treatments?: No  Does patient have a history of substance abuse?: No         Means of Transportation  In the past 12 months, has lack of transportation kept you from medical appointments or from getting medications?: No  In the past 12 months, has lack of transportation kept you from meetings, work, or from getting things needed for daily living?: No  Was application for public transport provided?: N/A        DISCHARGE DETAILS:    Discharge planning discussed with[de-identified] patient at bedside and brotherCarey, by phone  Freedom of Choice: Yes (re: facility return)  Comments - Freedom of Choice: requesting to return to [facility] where she is a long-term resident  CM contacted family/caregiver?: Yes Nawaf Farnsworth, brother, by phone)  Were Treatment Team discharge recommendations reviewed with patient/caregiver?: Yes  Did patient/caregiver verbalize understanding of patient care needs?: N/A- going to facility  Were patient/caregiver advised of the risks associated with not following Treatment Team discharge recommendations?: Yes    Contacts  Patient Contacts: brotherCarey  Relationship to Patient[de-identified] Family  Contact Method: Phone    [address]         Is the patient interested in [facility] at discharge?: No    DME Referral Provided  Referral made for DME?: No    Other Referral/Resources/Interventions Provided:  Interventions: SNF  Referral Comments: Patient admitted due to pneumonia  PA reports patient medically stable for d/c today  Met with patient at bedside and she confirmed she's a resident at [facility]  Reports that she's normally transported by EMS and requesting referral to [facility] for her to return  Reports that she's normally only on o2 at night; currently on 3L  Referral sent to [facility] and they confirm ability for patient to return this afternoon- aware of o2 need at this time  COVID test forwarded to them for their records  BLS transport requested for 1:00pm; same confirmed for 2:30pm with [facility]  IMM reviewed with patient and copy provided for her records  Call made to patient's brother, Carey Acosta, and relayed d/c for today to [facility] - in agreement with same      Would you like to participate in our [address] service program?  : No - Declined    Treatment Team Recommendation: SNF  Discharge Destination Plan[de-identified] SNF ([facility])  IMM reviewed with patient, patient agrees with discharge determination              Transported by [employer] and Unit #): Nico Gaytan                    IMM Given (Date):: 02/20/23  IMM Given to[de-identified] Patient  Family notified[de-identified] brotherEster Name, [address] : General Jamil  Receiving Facility/Agency Phone Number: [phone]  Facility/Agency Fax Number: [phone]

## 2023-02-20 NOTE — DISCHARGE INSTR - AVS FIRST PAGE
Continue Levaquin 750 mg once daily by mouth for two more days starting tomorrow (2/21 and 2/22 then stop)

## 2023-02-20 NOTE — ASSESSMENT & PLAN NOTE
· Complicated by underlying PNA and hypoxia  · Initial VBG revealed very mild hypercarbia, patient briefly required BiPAP mostly for WOB  · Is now stable on baseline O2 requirements of 2 L nasal cannula  · Continue Advair discus, DuoNebs, Singulair, Flonase, Zyrtec

## 2023-02-20 NOTE — PLAN OF CARE
Problem: MOBILITY - ADULT  Goal: Maintain or return to baseline ADL function  Description: INTERVENTIONS:  -  Assess patient's ability to carry out ADLs; assess patient's baseline for ADL function and identify physical deficits which impact ability to perform ADLs (bathing, care of mouth/teeth, toileting, grooming, dressing, etc )  - Assess/evaluate cause of self-care deficits   - Assess range of motion  - Assess patient's mobility; develop plan if impaired  - Assess patient's need for assistive devices and provide as appropriate  - Encourage maximum independence but intervene and supervise when necessary  - Involve family in performance of ADLs  - Assess for home care needs following discharge   - Consider OT consult to assist with ADL evaluation and planning for discharge  - Provide patient education as appropriate  Outcome: Progressing  Goal: Maintains/Returns to pre admission functional level  Description: INTERVENTIONS:  - Perform BMAT or MOVE assessment daily    - Set and communicate daily mobility goal to care team and patient/family/caregiver     - Collaborate with rehabilitation services on mobility goals if consulted  - Perform Range of Motion   - Out of bed for toileting  - Record patient progress and toleration of activity level   Outcome: Progressing     Problem: Prexisting or High Potential for Compromised Skin Integrity  Goal: Skin integrity is maintained or improved  Description: INTERVENTIONS:  - Identify patients at risk for skin breakdown  - Assess and monitor skin integrity  - Assess and monitor nutrition and hydration status  - Monitor labs   - Assess for incontinence   - Turn and reposition patient  - Assist with mobility/ambulation  - Relieve pressure over bony prominences  - Avoid friction and shearing  - Provide appropriate hygiene as needed including keeping skin clean and dry  - Evaluate need for skin moisturizer/barrier cream  - Collaborate with interdisciplinary team   - Patient/family teaching  - Consider wound care consult   Outcome: Progressing     Problem: RESPIRATORY - ADULT  Goal: Achieves optimal ventilation and oxygenation  Description: INTERVENTIONS:  - Assess for changes in respiratory status  - Assess for changes in mentation and behavior  - Position to facilitate oxygenation and minimize respiratory effort  - Oxygen administered by appropriate delivery if ordered  - Initiate smoking cessation education as indicated  - Encourage broncho-pulmonary hygiene including cough, deep breathe, Incentive Spirometry  - Assess the need for suctioning and aspirate as needed  - Assess and instruct to report SOB or any respiratory difficulty  - Respiratory Therapy support as indicated  Outcome: Progressing

## 2023-02-20 NOTE — ASSESSMENT & PLAN NOTE
· POA as evidenced by fever and leukocytosis  · CXR reveals PNA as likely source  · UA bland  · Procal elevated, lactate negative  · Started on IV Levaquin in ED, continued on admission  · Blood Cx's neg at 24h  · Leukocytosis resolved, trend procal   · Afebrile x 24h

## 2023-02-20 NOTE — DISCHARGE SUMMARY
[facility]  Discharge- Mariella Fritz 1950, 67 y o  female MRN: [id number]  Unit/Bed#: S -01 Encounter: [id number]  Primary Care Provider: Lawanda Salamanca MD   Date and time admitted to hospital: 2/18/2023  7:11 AM    Asthma-COPD overlap syndrome ([facility] )  Assessment & Plan  · Complicated by underlying PNA and hypoxia  · Initial VBG revealed very mild hypercarbia, patient briefly required BiPAP mostly for WOB  · Is now stable on baseline O2 requirements of 2 L nasal cannula  · Continue Advair discus, DuoNebs, Singulair, Flonase, Zyrtec    Sepsis ([facility] ) secondary to pneumonia  Assessment & Plan  · POA as evidenced by fever and leukocytosis  · CXR reveals PNA as likely source  · UA bland  · Procal elevated, lactate negative  · Started on IV Levaquin in ED, continued on admission  · Blood Cx's neg at 24h  · Leukocytosis resolved, trend procal   · Afebrile x 24h     Alzheimer's disease ([facility] )  Assessment & Plan  · Continue Namenda and trazodone hs      Type 2 diabetes mellitus, with long-term current use of insulin [facility])  Assessment & Plan  Lab Results   Component Value Date    HGBA1C 5 6 08/08/2022       Recent Labs     02/19/23  1123 02/19/23  1641 02/19/23  2151 02/20/23  0813   POCGLU 348* 209* 122 75       Blood Sugar Average: Last 72 hrs:  (P) 213 875   · Continue Lantus 64 units twice daily  Continue Humalog 24 units with meals  · HbA1c is too tightly controlled in this patient given last value of 5 6  · Work on liberalizing control on discharge    Essential hypertension  Assessment & Plan  · BPs robust, pt takes torsemide as needed    * Pneumonia of right lower lobe due to infectious organism  Assessment & Plan  · As stated in primary problem   · Continue Levaquin for total of 5 days  , Patient's QTc appears normal  · Respiratory protocol  · At baseline O2 requirement of 2L NC      Medical Problems     Resolved Problems  Date Reviewed: 2/20/2023          Resolved    Morbid (severe) obesity due to excess calories ([facility] ) 2/18/2023     Resolved by  Dima Park MD        Discharging Physician / Practitioner: Sofi Cobian PA-C  PCP: Kuldeep Way MD  Admission Date:   Admission Orders (From admission, onward)     Ordered        02/18/23 0844  INPATIENT ADMISSION  Once                      Discharge Date: 02/20/23    Consultations During Hospital Stay:  · None    Procedures Performed:   · None    Significant Findings / Test Results:   · CXR with "Right greater than left bibasilar opacity which could be due to edema or pneumonia"  · Odra 60 13 on admission, procal 0 77  · BC's without growth x24    Incidental Findings:   None     Test Results Pending at Discharge (will require follow up):   · Continue routine monitoring of renal function     Outpatient Tests Requested:  · Repeat BMP after completion of Levaquin course  Complications:  None    Reason for Admission: fever    Hospital Course:   Maty Browning is a 67 y o  female patient with PMHx of COPD-asthma, Diabetes mellitus type 2, HTN, CKD who originally presented to the hospital on 2/18/2023 due to fever noted at nursing home  Upon presentation to the ED, pt met sepsis criteria as she was febrile with leukocytosis  She also had an elevated procal to 0 77  She briefly required BiPAP for labored breathing but soon was transitioned to nasal cannula baseline requirements and breathing comfortably at time of admission  CXR revealed the source to be PNA as above and patient was started on IV Levaquin  Her blood cultures remained clear and her leukocytosis resolved by 2/20  She remained stable on 2L NC and afebrile x48h by 2/20 and was able to return to [facility] to complete a total of 5 days of Levaquin  Please see above list of diagnoses and related plan for additional information       Condition at Discharge: fair    Discharge Day Visit / Exam:   Subjective:  Patient says she is a bit fatigued but otherwise has no difficulty breathing today  She denies cough or congestion  Denies n/v/d or abdominal pain  Denies CP or SOB  Denies fever or chills overnight  Denies HA or dizziness and is happy to return to [place] today  Vitals: Blood Pressure: 136/64 (02/20/23 0814)  Pulse: 79 (02/20/23 0814)  Temperature: 98 1 °F (36 7 °C) (02/20/23 0814)  Temp Source: Oral (02/19/23 0345)  Respirations: 18 (02/20/23 0814)  Height: 4' 11" (149 9 cm) (02/18/23 0720)  Weight - Scale: 90 6 kg (199 lb 11 8 oz) (02/18/23 0720)  SpO2: 94 % (02/20/23 0814)  Exam:   Physical Exam  Vitals and nursing note reviewed  Constitutional:       General: She is not in acute distress  Appearance: She is obese  She is not toxic-appearing  HENT:      Head: Normocephalic and atraumatic  Right Ear: External ear normal       Left Ear: External ear normal       Nose: Nose normal       Mouth/Throat:      Mouth: Mucous membranes are moist       Pharynx: Oropharynx is clear  Eyes:      Conjunctiva/sclera: Conjunctivae normal    Cardiovascular:      Rate and Rhythm: Normal rate and regular rhythm  Pulses: Normal pulses  Pulmonary:      Effort: No respiratory distress  Breath sounds: Normal breath sounds  No stridor  No wheezing, rhonchi or rales  Comments: Breathing comfortably on 2 L nasal cannula  Abdominal:      General: Abdomen is flat  Bowel sounds are normal  There is no distension  Palpations: Abdomen is soft  There is no mass  Tenderness: There is no abdominal tenderness  There is no guarding or rebound  Hernia: No hernia is present  Musculoskeletal:      Cervical back: Normal range of motion  Right lower leg: No edema  Left lower leg: No edema  Skin:     General: Skin is warm and dry  Capillary Refill: Capillary refill takes less than 2 seconds  Neurological:      Mental Status: She is alert  Mental status is at baseline  Sensory: No sensory deficit  Motor: No weakness  Psychiatric:         Mood and Affect: Mood normal          Thought Content: Thought content normal           Discussion with Family: Attempted to update  (nephew) via phone  Left voicemail  Discharge instructions/Information to patient and family:   See after visit summary for information provided to patient and family  Provisions for Follow-Up Care:  See after visit summary for information related to follow-up care and any pertinent home health orders  Disposition:   Rachana Ha Joaquin at [facility] [facility] Readmission: no     Discharge Statement:  I spent 45 minutes discharging the patient  This time was spent on the day of discharge  I had direct contact with the patient on the day of discharge  Greater than 50% of the total time was spent examining patient, answering all patient questions, arranging and discussing plan of care with patient as well as directly providing post-discharge instructions  Additional time then spent on discharge activities  Discharge Medications:  See after visit summary for reconciled discharge medications provided to patient and/or family        **Please Note: This note may have been constructed using a voice recognition system**

## 2023-02-21 LAB
ATRIAL RATE: 123 BPM
MRSA NOSE QL CULT: NORMAL
P AXIS: 46 DEGREES
PR INTERVAL: 162 MS
QRS AXIS: 59 DEGREES
QRSD INTERVAL: 82 MS
QT INTERVAL: 312 MS
QTC INTERVAL: 446 MS
T WAVE AXIS: 58 DEGREES
VENTRICULAR RATE: 123 BPM

## 2023-02-23 LAB
BACTERIA BLD CULT: NORMAL
BACTERIA BLD CULT: NORMAL

## 2023-04-21 PROBLEM — J18.9 PNEUMONIA OF RIGHT LOWER LOBE DUE TO INFECTIOUS ORGANISM: Status: RESOLVED | Noted: 2020-09-19 | Resolved: 2023-04-21

## 2023-04-21 PROBLEM — A41.9 SEPSIS (HCC): Status: RESOLVED | Noted: 2020-09-19 | Resolved: 2023-04-21

## 2023-06-25 ENCOUNTER — APPOINTMENT (EMERGENCY)
Dept: RADIOLOGY | Facility: HOSPITAL | Age: 73
End: 2023-06-25
Payer: MEDICARE

## 2023-06-25 ENCOUNTER — HOSPITAL ENCOUNTER (OUTPATIENT)
Facility: HOSPITAL | Age: 73
Setting detail: OBSERVATION
Discharge: NURSING, REHAB OR LONG-TERM CARE | End: 2023-06-26
Attending: EMERGENCY MEDICINE | Admitting: INTERNAL MEDICINE
Payer: MEDICARE

## 2023-06-25 DIAGNOSIS — R73.9 HYPERGLYCEMIA: ICD-10-CM

## 2023-06-25 DIAGNOSIS — N17.9 AKI (ACUTE KIDNEY INJURY) (HCC): Primary | ICD-10-CM

## 2023-06-25 DIAGNOSIS — E87.20 LACTIC ACIDOSIS: ICD-10-CM

## 2023-06-25 PROBLEM — R65.10 SIRS (SYSTEMIC INFLAMMATORY RESPONSE SYNDROME) (HCC): Status: ACTIVE | Noted: 2023-06-25

## 2023-06-25 PROBLEM — E11.65 TYPE 2 DIABETES MELLITUS WITH HYPERGLYCEMIA, WITH LONG-TERM CURRENT USE OF INSULIN (HCC): Status: ACTIVE | Noted: 2017-08-20

## 2023-06-25 LAB
2HR DELTA HS TROPONIN: 3 NG/L
ALBUMIN SERPL BCP-MCNC: 4.1 G/DL (ref 3.5–5)
ALP SERPL-CCNC: 54 U/L (ref 34–104)
ALT SERPL W P-5'-P-CCNC: 23 U/L (ref 7–52)
ANION GAP SERPL CALCULATED.3IONS-SCNC: 13 MMOL/L
APTT PPP: 23 SECONDS (ref 23–37)
AST SERPL W P-5'-P-CCNC: 15 U/L (ref 13–39)
BACTERIA UR QL AUTO: NORMAL /HPF
BASE EX.OXY STD BLDV CALC-SCNC: 90.7 % (ref 60–80)
BASE EXCESS BLDV CALC-SCNC: 2.1 MMOL/L
BASOPHILS # BLD AUTO: 0.03 THOUSANDS/ÂΜL (ref 0–0.1)
BASOPHILS NFR BLD AUTO: 0 % (ref 0–1)
BILIRUB SERPL-MCNC: 0.32 MG/DL (ref 0.2–1)
BILIRUB UR QL STRIP: NEGATIVE
BUN SERPL-MCNC: 38 MG/DL (ref 5–25)
CALCIUM SERPL-MCNC: 9 MG/DL (ref 8.4–10.2)
CARDIAC TROPONIN I PNL SERPL HS: 10 NG/L
CARDIAC TROPONIN I PNL SERPL HS: 7 NG/L
CHLORIDE SERPL-SCNC: 94 MMOL/L (ref 96–108)
CLARITY UR: CLEAR
CO2 SERPL-SCNC: 27 MMOL/L (ref 21–32)
COLOR UR: COLORLESS
CREAT SERPL-MCNC: 1.58 MG/DL (ref 0.6–1.3)
EOSINOPHIL # BLD AUTO: 0.01 THOUSAND/ÂΜL (ref 0–0.61)
EOSINOPHIL NFR BLD AUTO: 0 % (ref 0–6)
ERYTHROCYTE [DISTWIDTH] IN BLOOD BY AUTOMATED COUNT: 15.6 % (ref 11.6–15.1)
GFR SERPL CREATININE-BSD FRML MDRD: 32 ML/MIN/1.73SQ M
GLUCOSE SERPL-MCNC: 281 MG/DL (ref 65–140)
GLUCOSE SERPL-MCNC: 399 MG/DL (ref 65–140)
GLUCOSE SERPL-MCNC: 400 MG/DL (ref 65–140)
GLUCOSE UR STRIP-MCNC: ABNORMAL MG/DL
HCO3 BLDV-SCNC: 27.9 MMOL/L (ref 24–30)
HCT VFR BLD AUTO: 33.4 % (ref 34.8–46.1)
HGB BLD-MCNC: 10.7 G/DL (ref 11.5–15.4)
HGB UR QL STRIP.AUTO: ABNORMAL
IMM GRANULOCYTES # BLD AUTO: 0.27 THOUSAND/UL (ref 0–0.2)
IMM GRANULOCYTES NFR BLD AUTO: 2 % (ref 0–2)
INR PPP: 0.9 (ref 0.84–1.19)
KETONES UR STRIP-MCNC: NEGATIVE MG/DL
LACTATE SERPL-SCNC: 2.7 MMOL/L (ref 0.5–2)
LACTATE SERPL-SCNC: 3.4 MMOL/L (ref 0.5–2)
LEUKOCYTE ESTERASE UR QL STRIP: ABNORMAL
LYMPHOCYTES # BLD AUTO: 1.28 THOUSANDS/ÂΜL (ref 0.6–4.47)
LYMPHOCYTES NFR BLD AUTO: 10 % (ref 14–44)
MCH RBC QN AUTO: 29.2 PG (ref 26.8–34.3)
MCHC RBC AUTO-ENTMCNC: 32 G/DL (ref 31.4–37.4)
MCV RBC AUTO: 91 FL (ref 82–98)
MONOCYTES # BLD AUTO: 0.42 THOUSAND/ÂΜL (ref 0.17–1.22)
MONOCYTES NFR BLD AUTO: 3 % (ref 4–12)
NEUTROPHILS # BLD AUTO: 10.35 THOUSANDS/ÂΜL (ref 1.85–7.62)
NEUTS SEG NFR BLD AUTO: 85 % (ref 43–75)
NITRITE UR QL STRIP: NEGATIVE
NON-SQ EPI CELLS URNS QL MICRO: NORMAL /HPF
NRBC BLD AUTO-RTO: 0 /100 WBCS
O2 CT BLDV-SCNC: 14.8 ML/DL
PCO2 BLDV: 48.4 MM HG (ref 42–50)
PH BLDV: 7.38 [PH] (ref 7.3–7.4)
PH UR STRIP.AUTO: 6 [PH]
PLATELET # BLD AUTO: 178 THOUSANDS/UL (ref 149–390)
PMV BLD AUTO: 10.1 FL (ref 8.9–12.7)
PO2 BLDV: 67.3 MM HG (ref 35–45)
POTASSIUM SERPL-SCNC: 4.7 MMOL/L (ref 3.5–5.3)
PROCALCITONIN SERPL-MCNC: 0.17 NG/ML
PROT SERPL-MCNC: 7.4 G/DL (ref 6.4–8.4)
PROT UR STRIP-MCNC: ABNORMAL MG/DL
PROTHROMBIN TIME: 12.3 SECONDS (ref 11.6–14.5)
RBC # BLD AUTO: 3.66 MILLION/UL (ref 3.81–5.12)
RBC #/AREA URNS AUTO: NORMAL /HPF
SODIUM SERPL-SCNC: 134 MMOL/L (ref 135–147)
SP GR UR STRIP.AUTO: 1.01 (ref 1–1.03)
UROBILINOGEN UR STRIP-ACNC: <2 MG/DL
WBC # BLD AUTO: 12.36 THOUSAND/UL (ref 4.31–10.16)
WBC #/AREA URNS AUTO: NORMAL /HPF

## 2023-06-25 PROCEDURE — 82948 REAGENT STRIP/BLOOD GLUCOSE: CPT

## 2023-06-25 PROCEDURE — 93005 ELECTROCARDIOGRAM TRACING: CPT

## 2023-06-25 PROCEDURE — 99285 EMERGENCY DEPT VISIT HI MDM: CPT

## 2023-06-25 PROCEDURE — 83605 ASSAY OF LACTIC ACID: CPT | Performed by: EMERGENCY MEDICINE

## 2023-06-25 PROCEDURE — 80053 COMPREHEN METABOLIC PANEL: CPT | Performed by: EMERGENCY MEDICINE

## 2023-06-25 PROCEDURE — 84484 ASSAY OF TROPONIN QUANT: CPT | Performed by: EMERGENCY MEDICINE

## 2023-06-25 PROCEDURE — 81001 URINALYSIS AUTO W/SCOPE: CPT | Performed by: EMERGENCY MEDICINE

## 2023-06-25 PROCEDURE — 85730 THROMBOPLASTIN TIME PARTIAL: CPT | Performed by: EMERGENCY MEDICINE

## 2023-06-25 PROCEDURE — 84145 PROCALCITONIN (PCT): CPT | Performed by: EMERGENCY MEDICINE

## 2023-06-25 PROCEDURE — 36415 COLL VENOUS BLD VENIPUNCTURE: CPT

## 2023-06-25 PROCEDURE — 82805 BLOOD GASES W/O2 SATURATION: CPT | Performed by: EMERGENCY MEDICINE

## 2023-06-25 PROCEDURE — 99223 1ST HOSP IP/OBS HIGH 75: CPT | Performed by: PHYSICIAN ASSISTANT

## 2023-06-25 PROCEDURE — 71045 X-RAY EXAM CHEST 1 VIEW: CPT

## 2023-06-25 PROCEDURE — 96360 HYDRATION IV INFUSION INIT: CPT

## 2023-06-25 PROCEDURE — 87040 BLOOD CULTURE FOR BACTERIA: CPT | Performed by: EMERGENCY MEDICINE

## 2023-06-25 PROCEDURE — 85610 PROTHROMBIN TIME: CPT | Performed by: EMERGENCY MEDICINE

## 2023-06-25 PROCEDURE — 85025 COMPLETE CBC W/AUTO DIFF WBC: CPT | Performed by: EMERGENCY MEDICINE

## 2023-06-25 RX ORDER — SODIUM CHLORIDE 9 MG/ML
125 INJECTION, SOLUTION INTRAVENOUS CONTINUOUS
Status: CANCELLED | OUTPATIENT
Start: 2023-06-25

## 2023-06-25 RX ADMIN — SODIUM CHLORIDE 500 ML: 0.9 INJECTION, SOLUTION INTRAVENOUS at 20:42

## 2023-06-25 RX ADMIN — SODIUM CHLORIDE 5 UNITS/HR: 9 INJECTION, SOLUTION INTRAVENOUS at 23:24

## 2023-06-25 RX ADMIN — SODIUM CHLORIDE 500 ML: 0.9 INJECTION, SOLUTION INTRAVENOUS at 21:38

## 2023-06-26 ENCOUNTER — TELEPHONE (OUTPATIENT)
Dept: NEUROLOGY | Facility: CLINIC | Age: 73
End: 2023-06-26

## 2023-06-26 VITALS
BODY MASS INDEX: 44.09 KG/M2 | TEMPERATURE: 98.5 F | RESPIRATION RATE: 18 BRPM | OXYGEN SATURATION: 95 % | HEART RATE: 79 BPM | DIASTOLIC BLOOD PRESSURE: 70 MMHG | WEIGHT: 218.7 LBS | HEIGHT: 59 IN | SYSTOLIC BLOOD PRESSURE: 154 MMHG

## 2023-06-26 PROBLEM — R65.10 SIRS (SYSTEMIC INFLAMMATORY RESPONSE SYNDROME) (HCC): Status: RESOLVED | Noted: 2023-06-25 | Resolved: 2023-06-26

## 2023-06-26 PROBLEM — N17.9 AKI (ACUTE KIDNEY INJURY) (HCC): Status: RESOLVED | Noted: 2020-09-19 | Resolved: 2023-06-26

## 2023-06-26 LAB
4HR DELTA HS TROPONIN: 5 NG/L
ANION GAP SERPL CALCULATED.3IONS-SCNC: 7 MMOL/L
ATRIAL RATE: 97 BPM
BUN SERPL-MCNC: 36 MG/DL (ref 5–25)
CALCIUM SERPL-MCNC: 9.2 MG/DL (ref 8.4–10.2)
CARDIAC TROPONIN I PNL SERPL HS: 12 NG/L
CHLORIDE SERPL-SCNC: 99 MMOL/L (ref 96–108)
CO2 SERPL-SCNC: 33 MMOL/L (ref 21–32)
CREAT SERPL-MCNC: 1.31 MG/DL (ref 0.6–1.3)
ERYTHROCYTE [DISTWIDTH] IN BLOOD BY AUTOMATED COUNT: 15.5 % (ref 11.6–15.1)
GFR SERPL CREATININE-BSD FRML MDRD: 40 ML/MIN/1.73SQ M
GLUCOSE P FAST SERPL-MCNC: 171 MG/DL (ref 65–99)
GLUCOSE SERPL-MCNC: 144 MG/DL (ref 65–140)
GLUCOSE SERPL-MCNC: 171 MG/DL (ref 65–140)
GLUCOSE SERPL-MCNC: 197 MG/DL (ref 65–140)
GLUCOSE SERPL-MCNC: 204 MG/DL (ref 65–140)
GLUCOSE SERPL-MCNC: 212 MG/DL (ref 65–140)
GLUCOSE SERPL-MCNC: 230 MG/DL (ref 65–140)
HBV SURFACE AG SER QL: NORMAL
HCT VFR BLD AUTO: 30.4 % (ref 34.8–46.1)
HCV AB SER QL: NORMAL
HGB BLD-MCNC: 9.6 G/DL (ref 11.5–15.4)
HIV 1+2 AB+HIV1 P24 AG SERPL QL IA: NORMAL
HIV1 P24 AG SER QL: NORMAL
LACTATE SERPL-SCNC: 1.9 MMOL/L (ref 0.5–2)
MAGNESIUM SERPL-MCNC: 2.3 MG/DL (ref 1.9–2.7)
MCH RBC QN AUTO: 28.6 PG (ref 26.8–34.3)
MCHC RBC AUTO-ENTMCNC: 31.6 G/DL (ref 31.4–37.4)
MCV RBC AUTO: 91 FL (ref 82–98)
P AXIS: 49 DEGREES
PLATELET # BLD AUTO: 146 THOUSANDS/UL (ref 149–390)
PMV BLD AUTO: 9.9 FL (ref 8.9–12.7)
POTASSIUM SERPL-SCNC: 3.9 MMOL/L (ref 3.5–5.3)
PR INTERVAL: 174 MS
QRS AXIS: 46 DEGREES
QRSD INTERVAL: 94 MS
QT INTERVAL: 366 MS
QTC INTERVAL: 464 MS
RBC # BLD AUTO: 3.36 MILLION/UL (ref 3.81–5.12)
SARS-COV-2 RNA RESP QL NAA+PROBE: NEGATIVE
SODIUM SERPL-SCNC: 139 MMOL/L (ref 135–147)
T WAVE AXIS: 46 DEGREES
VENTRICULAR RATE: 97 BPM
WBC # BLD AUTO: 10.82 THOUSAND/UL (ref 4.31–10.16)

## 2023-06-26 PROCEDURE — 87806 HIV AG W/HIV1&2 ANTB W/OPTIC: CPT | Performed by: PHYSICIAN ASSISTANT

## 2023-06-26 PROCEDURE — 87635 SARS-COV-2 COVID-19 AMP PRB: CPT | Performed by: PHYSICIAN ASSISTANT

## 2023-06-26 PROCEDURE — 83735 ASSAY OF MAGNESIUM: CPT | Performed by: INTERNAL MEDICINE

## 2023-06-26 PROCEDURE — 87340 HEPATITIS B SURFACE AG IA: CPT | Performed by: PHYSICIAN ASSISTANT

## 2023-06-26 PROCEDURE — 86803 HEPATITIS C AB TEST: CPT | Performed by: PHYSICIAN ASSISTANT

## 2023-06-26 PROCEDURE — 83605 ASSAY OF LACTIC ACID: CPT | Performed by: INTERNAL MEDICINE

## 2023-06-26 PROCEDURE — 80048 BASIC METABOLIC PNL TOTAL CA: CPT | Performed by: INTERNAL MEDICINE

## 2023-06-26 PROCEDURE — 82948 REAGENT STRIP/BLOOD GLUCOSE: CPT

## 2023-06-26 PROCEDURE — 93010 ELECTROCARDIOGRAM REPORT: CPT | Performed by: INTERNAL MEDICINE

## 2023-06-26 PROCEDURE — 85027 COMPLETE CBC AUTOMATED: CPT | Performed by: INTERNAL MEDICINE

## 2023-06-26 PROCEDURE — 99239 HOSP IP/OBS DSCHRG MGMT >30: CPT | Performed by: INTERNAL MEDICINE

## 2023-06-26 PROCEDURE — 84484 ASSAY OF TROPONIN QUANT: CPT | Performed by: PHYSICIAN ASSISTANT

## 2023-06-26 PROCEDURE — 87081 CULTURE SCREEN ONLY: CPT | Performed by: INTERNAL MEDICINE

## 2023-06-26 RX ORDER — INSULIN GLARGINE 100 [IU]/ML
60 INJECTION, SOLUTION SUBCUTANEOUS EVERY 12 HOURS SCHEDULED
Status: DISCONTINUED | OUTPATIENT
Start: 2023-06-26 | End: 2023-06-26 | Stop reason: HOSPADM

## 2023-06-26 RX ORDER — ONDANSETRON 2 MG/ML
4 INJECTION INTRAMUSCULAR; INTRAVENOUS EVERY 6 HOURS PRN
Status: DISCONTINUED | OUTPATIENT
Start: 2023-06-26 | End: 2023-06-26 | Stop reason: HOSPADM

## 2023-06-26 RX ORDER — FLUTICASONE FUROATE AND VILANTEROL 200; 25 UG/1; UG/1
1 POWDER RESPIRATORY (INHALATION)
Status: DISCONTINUED | OUTPATIENT
Start: 2023-06-26 | End: 2023-06-26 | Stop reason: HOSPADM

## 2023-06-26 RX ORDER — MEMANTINE HYDROCHLORIDE 10 MG/1
10 TABLET ORAL 2 TIMES DAILY
Status: DISCONTINUED | OUTPATIENT
Start: 2023-06-26 | End: 2023-06-26 | Stop reason: HOSPADM

## 2023-06-26 RX ORDER — GUAIFENESIN 600 MG/1
1200 TABLET, EXTENDED RELEASE ORAL EVERY 12 HOURS SCHEDULED
Status: DISCONTINUED | OUTPATIENT
Start: 2023-06-26 | End: 2023-06-26 | Stop reason: HOSPADM

## 2023-06-26 RX ORDER — TRAZODONE HYDROCHLORIDE 50 MG/1
75 TABLET ORAL
Status: DISCONTINUED | OUTPATIENT
Start: 2023-06-26 | End: 2023-06-26 | Stop reason: HOSPADM

## 2023-06-26 RX ORDER — PRAVASTATIN SODIUM 80 MG/1
80 TABLET ORAL
Status: DISCONTINUED | OUTPATIENT
Start: 2023-06-26 | End: 2023-06-26 | Stop reason: HOSPADM

## 2023-06-26 RX ORDER — FLUTICASONE PROPIONATE 50 MCG
1 SPRAY, SUSPENSION (ML) NASAL DAILY
Status: DISCONTINUED | OUTPATIENT
Start: 2023-06-26 | End: 2023-06-26 | Stop reason: HOSPADM

## 2023-06-26 RX ORDER — HEPARIN SODIUM 5000 [USP'U]/ML
5000 INJECTION, SOLUTION INTRAVENOUS; SUBCUTANEOUS EVERY 8 HOURS SCHEDULED
Status: DISCONTINUED | OUTPATIENT
Start: 2023-06-26 | End: 2023-06-26 | Stop reason: HOSPADM

## 2023-06-26 RX ORDER — PANTOPRAZOLE SODIUM 20 MG/1
20 TABLET, DELAYED RELEASE ORAL
Status: DISCONTINUED | OUTPATIENT
Start: 2023-06-26 | End: 2023-06-26 | Stop reason: HOSPADM

## 2023-06-26 RX ORDER — LORATADINE 10 MG/1
10 TABLET ORAL DAILY
Status: DISCONTINUED | OUTPATIENT
Start: 2023-06-26 | End: 2023-06-26 | Stop reason: HOSPADM

## 2023-06-26 RX ORDER — ACETAMINOPHEN 325 MG/1
975 TABLET ORAL EVERY 8 HOURS SCHEDULED
Status: DISCONTINUED | OUTPATIENT
Start: 2023-06-26 | End: 2023-06-26 | Stop reason: HOSPADM

## 2023-06-26 RX ORDER — DEXTROSE AND SODIUM CHLORIDE 5; .45 G/100ML; G/100ML
125 INJECTION, SOLUTION INTRAVENOUS CONTINUOUS
Status: DISCONTINUED | OUTPATIENT
Start: 2023-06-26 | End: 2023-06-26

## 2023-06-26 RX ORDER — INSULIN LISPRO 100 [IU]/ML
1-6 INJECTION, SOLUTION INTRAVENOUS; SUBCUTANEOUS
Status: DISCONTINUED | OUTPATIENT
Start: 2023-06-26 | End: 2023-06-26 | Stop reason: HOSPADM

## 2023-06-26 RX ORDER — INSULIN LISPRO 100 [IU]/ML
1-5 INJECTION, SOLUTION INTRAVENOUS; SUBCUTANEOUS
Status: DISCONTINUED | OUTPATIENT
Start: 2023-06-26 | End: 2023-06-26 | Stop reason: HOSPADM

## 2023-06-26 RX ORDER — SODIUM CHLORIDE 9 MG/ML
75 INJECTION, SOLUTION INTRAVENOUS CONTINUOUS
Status: DISCONTINUED | OUTPATIENT
Start: 2023-06-26 | End: 2023-06-26

## 2023-06-26 RX ORDER — LEVALBUTEROL INHALATION SOLUTION 1.25 MG/3ML
1.25 SOLUTION RESPIRATORY (INHALATION) EVERY 6 HOURS PRN
Status: DISCONTINUED | OUTPATIENT
Start: 2023-06-26 | End: 2023-06-26 | Stop reason: HOSPADM

## 2023-06-26 RX ORDER — IPRATROPIUM BROMIDE AND ALBUTEROL SULFATE 2.5; .5 MG/3ML; MG/3ML
3 SOLUTION RESPIRATORY (INHALATION) EVERY 6 HOURS PRN
Status: DISCONTINUED | OUTPATIENT
Start: 2023-06-26 | End: 2023-06-26 | Stop reason: HOSPADM

## 2023-06-26 RX ORDER — DOXYCYCLINE HYCLATE 50 MG/1
324 CAPSULE, GELATIN COATED ORAL
Status: DISCONTINUED | OUTPATIENT
Start: 2023-06-26 | End: 2023-06-26 | Stop reason: HOSPADM

## 2023-06-26 RX ORDER — GABAPENTIN 300 MG/1
300 CAPSULE ORAL 2 TIMES DAILY
Status: DISCONTINUED | OUTPATIENT
Start: 2023-06-26 | End: 2023-06-26 | Stop reason: HOSPADM

## 2023-06-26 RX ORDER — BENZONATATE 100 MG/1
200 CAPSULE ORAL 3 TIMES DAILY PRN
Status: DISCONTINUED | OUTPATIENT
Start: 2023-06-26 | End: 2023-06-26 | Stop reason: HOSPADM

## 2023-06-26 RX ORDER — MONTELUKAST SODIUM 10 MG/1
10 TABLET ORAL
Status: DISCONTINUED | OUTPATIENT
Start: 2023-06-26 | End: 2023-06-26 | Stop reason: HOSPADM

## 2023-06-26 RX ADMIN — GUAIFENESIN 1200 MG: 600 TABLET ORAL at 02:58

## 2023-06-26 RX ADMIN — SODIUM CHLORIDE 75 ML/HR: 0.9 INJECTION, SOLUTION INTRAVENOUS at 02:57

## 2023-06-26 RX ADMIN — FERROUS GLUCONATE 324 MG: 324 TABLET ORAL at 06:10

## 2023-06-26 RX ADMIN — LORATADINE 10 MG: 10 TABLET ORAL at 08:19

## 2023-06-26 RX ADMIN — INSULIN LISPRO 2 UNITS: 100 INJECTION, SOLUTION INTRAVENOUS; SUBCUTANEOUS at 13:09

## 2023-06-26 RX ADMIN — FLUTICASONE PROPIONATE 1 SPRAY: 50 SPRAY, METERED NASAL at 08:20

## 2023-06-26 RX ADMIN — MEMANTINE 10 MG: 10 TABLET ORAL at 08:19

## 2023-06-26 RX ADMIN — FLUTICASONE FUROATE AND VILANTEROL TRIFENATATE 1 PUFF: 200; 25 POWDER RESPIRATORY (INHALATION) at 08:20

## 2023-06-26 RX ADMIN — HEPARIN SODIUM 5000 UNITS: 5000 INJECTION INTRAVENOUS; SUBCUTANEOUS at 06:09

## 2023-06-26 RX ADMIN — ACETAMINOPHEN 975 MG: 325 TABLET ORAL at 06:09

## 2023-06-26 RX ADMIN — PANTOPRAZOLE SODIUM 20 MG: 20 TABLET, DELAYED RELEASE ORAL at 06:10

## 2023-06-26 RX ADMIN — GUAIFENESIN 1200 MG: 600 TABLET ORAL at 08:25

## 2023-06-26 RX ADMIN — INSULIN GLARGINE 60 UNITS: 100 INJECTION, SOLUTION SUBCUTANEOUS at 08:19

## 2023-06-26 RX ADMIN — ASPIRIN 81 MG: 81 TABLET, COATED ORAL at 08:19

## 2023-06-26 RX ADMIN — GABAPENTIN 300 MG: 300 CAPSULE ORAL at 08:19

## 2023-06-26 NOTE — H&P
[facility]  H&P  Name: Lory Ren 67 y o  female I MRN: [id number]  Unit/Bed#: W -01 I Date of Admission: 6/25/2023   Date of Service: 6/26/2023 I Hospital Day: 0      Assessment/Plan   * SIRS (systemic inflammatory response syndrome) ([facility])  Assessment & Plan  · POA: tachycardia, WBC >12; LA 3 4  · Suspected related to dehydration as no obvious infectious source identified  · IVF   · Trend labs/vitals  · See plan for KATHY, hyperglycemia    KATHY (acute kidney injury) ([facility] )  Assessment & Plan  · Cr 1 58; baseline around 1 2  · ?  Dehydration  · IVF  · Monitor for hypotension  · Avoid nephrotoxins  · Monitor urine output  · Daily BMP    Type 2 diabetes mellitus with hyperglycemia, with long-term current use of insulin ([facility])  Assessment & Plan  Lab Results   Component Value Date    HGBA1C 6 0 (H) 06/19/2023       Recent Labs     06/25/23 2034   POCGLU 399*       Blood Sugar Average: Last 72 hrs:  (P) 399   · Most recently hgb a1c indicates good control of DM  ·  on arrival-- elevated at SNF  · No evidence of DKA/HHS  · IVF hydration  · Insulin infusion started in ED-- continue overnight, attempt to transition back to SQ in the AM/tomorrow afternoon  · Hold PO medications + GLP-1  · Home insulin regimen: lantus 60U SQ BID + SSI    Chronic respiratory failure with hypoxia (HCC)  Assessment & Plan  · 2L NC at baseline    GILDARDO (obstructive sleep apnea)  Assessment & Plan  · Does not use CPAP    Asthma-COPD overlap syndrome (HCC)  Assessment & Plan  · Wheezing noted on physical exam  · Continue w/ home regimen of: advair (sub breo per formulary), flonase, anti-histamine, mucinex, singulair  · Nebs PRN  · RT    Alzheimer's disease ([facility] )  Assessment & Plan  · Supportive care  · Namenda, trazodone    Essential hypertension  Assessment & Plan  · BP acceptable continue home medications       VTE Pharmacologic Prophylaxis: VTE Score: 4 Moderate Risk (Score 3-4) - Pharmacological DVT Prophylaxis Ordered: heparin  Code Status: level 3  Discussion with family: Patient declined call to   Anticipated Length of Stay: Patient will be admitted on an observation basis with an anticipated length of stay of less than 2 midnights secondary to KATHY, hyperglycemia  Total Time Spent on Date of Encounter in care of patient: 40 minutes This time was spent on one or more of the following: performing physical exam; counseling and coordination of care; obtaining or reviewing history; documenting in the medical record; reviewing/ordering tests, medications or procedures; communicating with other healthcare professionals and discussing with patient's family/caregivers  Chief Complaint: elevated BG    History of Present Illness:  Ashley Brady is a 67 y o  female with a PMH of DM type 2 on insulin, COPD, chronic respiratory failure, HTN, alzheimer's dz who presents with elevated BG x1 day  Patient reports feeling well, offers no complaints other than feeling somewhat wheezy  She reports she was eating dinner and when they checked her BG it was very high  They gave her insulin w/ no improvement  Patient reports that she does not typically have BG this high  She takes her medications as provided by SNF staff  Review of Systems:  Review of Systems   Constitutional: Negative  HENT: Negative  Eyes: Negative  Respiratory: Positive for wheezing  Cardiovascular: Negative  Gastrointestinal: Negative  Genitourinary: Negative  Musculoskeletal: Negative  Skin: Negative  Neurological: Negative  Hematological: Negative  Psychiatric/Behavioral: Negative          Past Medical and Surgical History:   Past Medical History:   Diagnosis Date   • Asthma    • Diabetes mellitus ([facility] )    • Hyperlipidemia    • Hypertension        Past Surgical History:   Procedure Laterality Date   • JOINT REPLACEMENT Bilateral    • KNEE SURGERY     • TOE SURGERY         Meds/Allergies:  Prior to Admission medications    Medication Sig Start Date End Date Taking?  Authorizing Provider   acetaminophen (TYLENOL) 325 mg tablet Take 3 tablets (975 mg total) by mouth every 8 (eight) hours 9/25/20   Ezequiel Massey MD   Advair Diskus 500-50 MCG/DOSE inhaler  9/28/21   Historical Provider, MD   aspirin (ECOTRIN LOW STRENGTH) 81 mg EC tablet  8/28/21   Historical Provider, MD   benzonatate (TESSALON) 200 MG capsule Take 200 mg by mouth 3 (three) times a day as needed for cough    Historical Provider, MD   bisacodyl (DULCOLAX) 10 mg suppository Insert 10 mg into the rectum daily as needed   Patient not taking: Reported on 4/11/2023    Historical Provider, MD   cetirizine (ZyrTEC) 10 mg tablet Take 10 mg by mouth daily    Historical Provider, MD   Cholecalciferol (VITAMIN D3) 47725 units CAPS Take 50,000 Units by mouth every 30 (thirty) days    Historical Provider, MD   Dulaglutide (Trulicity) 3 ZI/0 9NK SOPN Inject 0 5 mL (3 mg total) under the skin once a week 4/18/22   Elena Mays PA-C   ferrous gluconate (FERGON) 324 mg tablet Take 1 tablet (324 mg total) by mouth daily before breakfast 6/29/22   Joon Mota PA-C   fluticasone [facility]) 50 mcg/act nasal spray 1 spray into each nostril daily 8/20/19   CELINA Lawrence   gabapentin (NEURONTIN) 300 mg capsule Take 300 mg by mouth 2 (two) times a day    Historical Provider, MD   Glucose Blood (BL TEST STRIP PACK VI) by In Vitro route 3/3/11   Historical Provider, MD   glucose blood test strip by In Vitro route 10/8/14   Historical Provider, MD   guaiFENesin (MUCINEX) 600 mg 12 hr tablet Take 1,200 mg by mouth every 12 (twelve) hours    Historical Provider, MD   insulin glargine (LANTUS SOLOSTAR) 100 units/mL injection pen Inject 60  units twice a day 4/11/23   Gabby Wade MD   insulin lispro (HumaLOG) 100 units/mL injection Inject 12 -22 units before meals as per scale 4/11/23   Gabby Wade MD   ipratropium-albuterol (DUO-NEB) 0 5-2 5 mg/3 mL nebulizer solution  10/14/21   Historical Provider, MD   magnesium hydroxide (MILK OF MAGNESIA) 400 mg/5 mL oral suspension Take by mouth daily as needed for constipation    Historical Provider, MD   memantine (NAMENDA) 10 mg tablet 10 mg 2 (two) times a day     Historical Provider, MD   MENTHOL-METHYL SALICYLATE EX Apply topically 2 (two) times a day  Patient not taking: Reported on 2023    Historical Provider, MD   metFORMIN (GLUCOPHAGE) 500 mg tablet  21   Historical Provider, MD   montelukast (SINGULAIR) 10 mg tablet Take 10 mg by mouth daily at bedtime    Historical Provider, MD   Omeprazole 20 MG TBEC Take 20 mg by mouth daily    Historical Provider, MD   simvastatin (ZOCOR) 40 mg tablet Take 1 tablet by mouth daily at bedtime 17   Miguel Grandchild, MD   traZODone (DESYREL) 150 mg tablet  22   Historical Provider, MD     I have reveiwed home medications using records provided by [facility]  Allergies:    Allergies   Allergen Reactions   • Penicillins Shortness Of Breath   • Cephalexin Other (See Comments)     Reaction Date: ; unknown    • Crestor [Rosuvastatin] Other (See Comments)     Reaction Date: ; unknown    • Zithromax [Azithromycin] Other (See Comments)     Unknown        Social History:  Marital Status: Single   Occupation:   Patient Pre-hospital Living Situation: [facility]  Patient Pre-hospital Level of Mobility: unable to be assessed at time of evaluation  Patient Pre-hospital Diet Restrictions: diabetic  Substance Use History:   Social History     Substance and Sexual Activity   Alcohol Use Not Currently     Social History     Tobacco Use   Smoking Status Former   • Packs/day: 1 00   • Years: 52 00   • Total pack years: 52 00   • Types: Cigarettes   • Start date: 65   • Quit date: 2017   • Years since quittin 8   Smokeless Tobacco Never     Social History     Substance and Sexual Activity   Drug Use No       Family History:  Family History   Problem Relation Age of Onset   • Dementia Brother    • Breast cancer Sister 76   • Cancer Brother        Physical Exam:     Vitals:   Blood Pressure: 156/75 (06/26/23 0019)  Pulse: 92 (06/26/23 0019)  Temperature: 98 1 °F (36 7 °C) (06/26/23 0019)  Temp Source: Oral (06/25/23 2014)  Respirations: 18 (06/25/23 2245)  Weight - Scale: 97 4 kg (214 lb 11 7 oz) (06/25/23 2014)  SpO2: 95 % (06/26/23 0019)    Physical Exam  Constitutional:       General: She is not in acute distress  Appearance: She is obese  She is not ill-appearing  HENT:      Head: Normocephalic and atraumatic  Mouth/Throat:      Mouth: Mucous membranes are moist    Eyes:      Extraocular Movements: Extraocular movements intact  Pupils: Pupils are equal, round, and reactive to light  Cardiovascular:      Rate and Rhythm: Normal rate and regular rhythm  Heart sounds: No murmur heard  No friction rub  No gallop  Pulmonary:      Effort: Pulmonary effort is normal  No respiratory distress  Breath sounds: Wheezing and rhonchi present  Comments: 2L NC  Abdominal:      General: Abdomen is flat  Bowel sounds are normal       Palpations: Abdomen is soft  Tenderness: There is no abdominal tenderness  Musculoskeletal:      Right lower leg: No edema  Left lower leg: No edema  Skin:     General: Skin is warm and dry  Neurological:      General: No focal deficit present  Mental Status: She is alert and oriented to person, place, and time     Psychiatric:         Mood and Affect: Mood normal          Behavior: Behavior normal          Additional Data:     Lab Results:  Results from last 7 days   Lab Units 06/25/23 2034   WBC Thousand/uL 12 36*   HEMOGLOBIN g/dL 10 7*   HEMATOCRIT % 33 4*   PLATELETS Thousands/uL 178   NEUTROS PCT % 85*   LYMPHS PCT % 10*   MONOS PCT % 3*   EOS PCT % 0     Results from last 7 days   Lab Units 06/25/23 2034   SODIUM mmol/L 134*   POTASSIUM mmol/L 4  7   CHLORIDE mmol/L 94*   CO2 mmol/L 27   BUN mg/dL 38*   CREATININE mg/dL 1 58*   ANION GAP mmol/L 13   CALCIUM mg/dL 9 0   ALBUMIN g/dL 4 1   TOTAL BILIRUBIN mg/dL 0 32   ALK PHOS U/L 54   ALT U/L 23   AST U/L 15   GLUCOSE RANDOM mg/dL 400*     Results from last 7 days   Lab Units 06/25/23 2034   INR  0 90     Results from last 7 days   Lab Units 06/25/23 2323 06/25/23 2034   POC GLUCOSE mg/dl 281* 399*     Results from last 7 days   Lab Units 06/19/23  0435   HEMOGLOBIN A1C % 6 0*     Results from last 7 days   Lab Units 06/25/23 2330 06/25/23 2034   LACTIC ACID mmol/L 2 7* 3 4*   PROCALCITONIN ng/ml  --  0 17       Lines/Drains:  Invasive Devices     Peripheral Intravenous Line  Duration           Peripheral IV 02/19/23 Distal;Dorsal (posterior); Right Wrist 126 days    Peripheral IV 06/25/23 Distal;Dorsal (posterior); Left Forearm <1 day    Peripheral IV 06/25/23 Dorsal (posterior); Left Forearm <1 day                    Imaging: Reviewed radiology reports from this admission including: chest xray  XR chest 1 view portable    (Results Pending)       EKG and Other Studies Reviewed on Admission:   · EKG: NSR  HR 97     ** Please Note: This note has been constructed using a voice recognition system   **

## 2023-06-26 NOTE — ASSESSMENT & PLAN NOTE
Lab Results   Component Value Date    HGBA1C 6 0 (H) 06/19/2023       Recent Labs     06/25/23 2034   POCGLU 399*       Blood Sugar Average: Last 72 hrs:  (P) 399   · Most recently hgb a1c indicates good control of DM  ·  on arrival-- elevated at SNF  · No evidence of DKA/HHS  · IVF hydration  · Insulin infusion started in ED-- continue overnight, attempt to transition back to SQ in the AM/tomorrow afternoon  · Hold PO medications + GLP-1  · Home insulin regimen: lantus 60U SQ BID + SSI

## 2023-06-26 NOTE — TELEPHONE ENCOUNTER
PT was scheduled for 6/27 with Wan Loss but is currently admitted  R/s to 8/9 @ 11:15 with Isabella in [facility]

## 2023-06-26 NOTE — ED PROVIDER NOTES
History  Chief Complaint   Patient presents with   • Hyperglycemia - Symptomatic     Patient presents from [facility] with bs of over 500  Patient was given insulin 2x at [facility] with no improvement before EMS arrival  Upon EMS arrival [facility] was 322  Patient states she has some SOB  History provided by:  Patient   used: No    Hyperglycemia - Symptomatic  Associated symptoms: no abdominal pain, no chest pain, no diaphoresis, no dizziness, no dysuria, no fever, no nausea, no shortness of breath, no vomiting and no weakness      Patient is a 66-year-old female presenting to emergency department from nursing home due to elevated blood sugar  Reported blood sugar over 500  Given insulin at [facility]  Patient has no complaints  Denies chest pain or shortness of breath  No nausea vomiting  No diarrhea  No urinary complaints  No fevers or chills  No trauma  Chronic shortness of breath unchanged from baseline  No lightness or dizziness  ECG done in ER shows rate of 97, sinus, normal axis, normal QRS, no significant ST or T wave changes, normal intervals, independently interpret by me    Prior to Admission Medications   Prescriptions Last Dose Informant Patient Reported? Taking?    Advair Diskus 500-50 MCG/DOSE inhaler  Outside Facility (Specify) Yes No   Patient not taking: Reported on 4/11/2023   Cholecalciferol (VITAMIN D3) 45100 units CAPS  Outside Facility (Specify) Yes No   Sig: Take 50,000 Units by mouth every 30 (thirty) days   Dulaglutide (Trulicity) 3 ZK/8 5KO SOPN  Outside Facility (Specify) No No   Sig: Inject 0 5 mL (3 mg total) under the skin once a week   Glucose Blood (BL TEST STRIP PACK VI)  Outside Facility (Specify) Yes No   Sig: by In Vitro route   MENTHOL-METHYL SALICYLATE EX  Outside Facility (Specify) Yes No   Sig: Apply topically 2 (two) times a day   Patient not taking: Reported on 4/11/2023   Omeprazole 20 MG TBEC  Outside Facility (Specify) Yes No   Sig: Take 20 mg by mouth daily   acetaminophen (TYLENOL) 325 mg tablet  Outside Facility (Specify) No No   Sig: Take 3 tablets (975 mg total) by mouth every 8 (eight) hours   aspirin (ECOTRIN LOW STRENGTH) 81 mg EC tablet  Outside Facility (Specify) Yes No   benzonatate (TESSALON) 200 MG capsule  Outside Facility (Specify) Yes No   Sig: Take 200 mg by mouth 3 (three) times a day as needed for cough   bisacodyl (DULCOLAX) 10 mg suppository  Outside Facility (Specify) Yes No   Sig: Insert 10 mg into the rectum daily as needed    Patient not taking: Reported on 2023   cetirizine (ZyrTEC) 10 mg tablet  Outside Facility (Specify) Yes No   Sig: Take 10 mg by mouth daily   ferrous gluconate (FERGON) 324 mg tablet  Outside Facility (Specify) No No   Sig: Take 1 tablet (324 mg total) by mouth daily before breakfast   fluticasone (FLONASE) 50 mcg/act nasal spray  Outside Facility (Specify) No No   Si spray into each nostril daily   gabapentin (NEURONTIN) 300 mg capsule  Outside Facility (Specify) Yes No   Sig: Take 300 mg by mouth 2 (two) times a day   glucose blood test strip  Outside Facility (Specify) Yes No   Sig: by In Vitro route   guaiFENesin (MUCINEX) 600 mg 12 hr tablet   Yes No   Sig: Take 1,200 mg by mouth every 12 (twelve) hours   insulin glargine (LANTUS SOLOSTAR) 100 units/mL injection pen   No No   Sig: Inject 60  units twice a day   insulin lispro (HumaLOG) 100 units/mL injection   No No   Sig: Inject 12 -22 units before meals as per scale   ipratropium-albuterol (DUO-NEB) 0 5-2 5 mg/3 mL nebulizer solution  Outside Facility (Specify) Yes No   magnesium hydroxide (MILK OF MAGNESIA) 400 mg/5 mL oral suspension  Outside Facility (Specify) Yes No   Sig: Take by mouth daily as needed for constipation   memantine (NAMENDA) 10 mg tablet  Outside Facility (Specify) Yes No   Sig: 10 mg 2 (two) times a day    metFORMIN (GLUCOPHAGE) 500 mg tablet  Outside Facility (Specify) Yes No   montelukast (SINGULAIR) 10 mg tablet Outside Facility ([facility]) Yes No   Sig: Take 10 mg by mouth daily at bedtime   simvastatin (ZOCOR) 40 mg tablet  Outside Facility (Specify) No No   Sig: Take 1 tablet by mouth daily at bedtime   traZODone (DESYREL) 150 mg tablet  Outside Facility (Specify) Yes No      Facility-Administered Medications: None       Past Medical History:   Diagnosis Date   • Asthma    • Diabetes mellitus ([facility] [address] )    • Hyperlipidemia    • Hypertension        Past Surgical History:   Procedure Laterality Date   • JOINT REPLACEMENT Bilateral    • KNEE SURGERY     • TOE SURGERY         Family History   Problem Relation Age of Onset   • Dementia Brother    • Breast cancer Sister 76   • Cancer Brother      I have reviewed and agree with the history as documented  E-Cigarette/Vaping   • E-Cigarette Use Never User      E-Cigarette/Vaping Substances   • Nicotine No    • THC No    • CBD No    • Flavoring No    • Other No      Social History     Tobacco Use   • Smoking status: Former     Packs/day: 1 00     Years: 52 00     Total pack years: 52 00     Types: Cigarettes     Start date: 65     Quit date: 2017     Years since quittin 8   • Smokeless tobacco: Never   Vaping Use   • Vaping Use: Never used   Substance Use Topics   • Alcohol use: Not Currently   • Drug use: No       Review of Systems   Constitutional: Negative for chills, diaphoresis and fever  HENT: Negative for congestion and sore throat  Respiratory: Negative for cough, shortness of breath, wheezing and stridor  Cardiovascular: Negative for chest pain, palpitations and leg swelling  Gastrointestinal: Negative for abdominal pain, blood in stool, diarrhea, nausea and vomiting  Genitourinary: Negative for dysuria, frequency and urgency  Musculoskeletal: Negative for neck pain and neck stiffness  Skin: Negative for pallor and rash  Neurological: Negative for dizziness, syncope, weakness, light-headedness and headaches     All other systems reviewed and are negative  Physical Exam  Physical Exam  Vitals reviewed  Constitutional:       Appearance: Normal appearance  She is well-developed  HENT:      Head: Normocephalic and atraumatic  Eyes:      Extraocular Movements: Extraocular movements intact  Pupils: Pupils are equal, round, and reactive to light  Cardiovascular:      Rate and Rhythm: Normal rate and regular rhythm  Heart sounds: Normal heart sounds  Pulmonary:      Effort: Pulmonary effort is normal  No respiratory distress  Breath sounds: Normal breath sounds  Abdominal:      General: Bowel sounds are normal       Palpations: Abdomen is soft  Tenderness: There is no abdominal tenderness  Musculoskeletal:         General: No swelling or tenderness  Normal range of motion  Cervical back: Normal range of motion and neck supple  Skin:     General: Skin is warm and dry  Capillary Refill: Capillary refill takes less than 2 seconds  Neurological:      General: No focal deficit present  Mental Status: She is alert and oriented to person, place, and time           Vital Signs  ED Triage Vitals   Temperature Pulse Respirations Blood Pressure SpO2   06/25/23 2014 06/25/23 2014 06/25/23 2014 06/25/23 2014 06/25/23 2014   98 6 °F (37 °C) 97 22 164/72 94 %      Temp Source Heart Rate Source Patient Position - Orthostatic VS BP Location FiO2 (%)   06/25/23 2014 06/25/23 2014 06/25/23 2145 06/25/23 2014 --   Oral Monitor Lying Right arm       Pain Score       06/26/23 0019       No Pain           Vitals:    06/25/23 2014 06/25/23 2145 06/25/23 2245 06/26/23 0019   BP: 164/72 159/70 149/67 156/75   Pulse: 97 92 90 92   Patient Position - Orthostatic VS:  Lying Lying          Visual Acuity      ED Medications  Medications   insulin regular (HumuLIN R,NovoLIN R) 1 Units/mL in sodium chloride 0 9 % 100 mL infusion (5 Units/hr Intravenous New Bag 6/25/23 4349)   levalbuterol (XOPENEX) inhalation solution 1 25 mg (has no administration in time range)   ipratropium (ATROVENT) 0 02 % inhalation solution 0 5 mg (has no administration in time range)   dextrose 5 % and sodium chloride 0 45 % infusion (has no administration in time range)   fluticasone-vilanterol 200-25 mcg/actuation 1 puff (has no administration in time range)   sodium chloride 0 9 % bolus 500 mL (0 mL Intravenous Stopped 6/25/23 2238)       Diagnostic Studies  Results Reviewed     Procedure Component Value Units Date/Time    Blood culture #1 [[id number]] Collected: 06/25/23 2034    Lab Status: Preliminary result Specimen: Blood from Arm, Left Updated: 06/26/23 0101     Blood Culture Received in Microbiology Lab  Culture in Progress  Blood culture #2 [[id number]] Collected: 06/25/23 2034    Lab Status: Preliminary result Specimen: Blood from Arm, Left Updated: 06/26/23 0101     Blood Culture Received in Microbiology Lab  Culture in Progress  HS Troponin I 2hr [[id number]]  (Normal) Collected: 06/25/23 2330    Lab Status: Final result Specimen: Blood from Arm, Left Updated: 06/25/23 2359     hs TnI 2hr 10 ng/L      Delta 2hr hsTnI 3 ng/L     Lactic acid 2 Hours [[id number]]  (Abnormal) Collected: 06/25/23 2330    Lab Status: Final result Specimen: Blood from Arm, Left Updated: 06/25/23 2359     LACTIC ACID 2 7 mmol/L     Narrative:      Result may be elevated if tourniquet was used during collection      Fingerstick Glucose (POCT) [[id number]]  (Abnormal) Collected: 06/25/23 2323    Lab Status: Final result Updated: 06/25/23 2332     POC Glucose 281 mg/dl     HS Troponin I 4hr [[id number]]     Lab Status: No result Specimen: Blood     Urine Microscopic [[id number]]  (Normal) Collected: 06/25/23 2145    Lab Status: Final result Specimen: Urine, Straight Cath Updated: 06/25/23 2220     RBC, UA 1-2 /hpf      WBC, UA None Seen /hpf      Epithelial Cells None Seen /hpf      Bacteria, UA None Seen /hpf     UA w Reflex to Microscopic w Reflex to Culture [[id number]]  (Abnormal) Collected: 06/25/23 2145    Lab Status: Final result Specimen: Urine, Straight Cath Updated: 06/25/23 2204     Color, UA Colorless     Clarity, UA Clear     Specific Gravity, UA 1 013     pH, UA 6 0     Leukocytes, UA Elevated glucose may cause decreased leukocyte values  See urine microscopic for [facility] result     Nitrite, UA Negative     Protein,  (3+) mg/dl      Glucose, UA >=1000 (1%) mg/dl      Ketones, UA Negative mg/dl      Urobilinogen, UA <2 0 mg/dl      Bilirubin, UA Negative     Occult Blood, UA Trace    HS Troponin 0hr (reflex protocol) [[id number]]  (Normal) Collected: 06/25/23 2055    Lab Status: Final result Specimen: Blood from Arm, Left Updated: 06/25/23 2148     hs TnI 0hr 7 ng/L     Lactic acid, plasma (w/reflex if result > 2 0) [[id number]]  (Abnormal) Collected: 06/25/23 2034    Lab Status: Final result Specimen: Blood from Arm, Left Updated: 06/25/23 2124     LACTIC ACID 3 4 mmol/L     Narrative:      Result may be elevated if tourniquet was used during collection      Procalcitonin [[id number]]  (Normal) Collected: 06/25/23 2034    Lab Status: Final result Specimen: Blood from Arm, Left Updated: 06/25/23 2123     Procalcitonin 0 17 ng/ml     Comprehensive metabolic panel [[id number]]  (Abnormal) Collected: 06/25/23 2034    Lab Status: Final result Specimen: Blood from Arm, Left Updated: 06/25/23 2111     Sodium 134 mmol/L      Potassium 4 7 mmol/L      Chloride 94 mmol/L      CO2 27 mmol/L      ANION GAP 13 mmol/L      BUN 38 mg/dL      Creatinine 1 58 mg/dL      Glucose 400 mg/dL      Calcium 9 0 mg/dL      AST 15 U/L      ALT 23 U/L      Alkaline Phosphatase 54 U/L      Total Protein 7 4 g/dL      Albumin 4 1 g/dL      Total Bilirubin 0 32 mg/dL      eGFR 32 ml/min/1 73sq m     Narrative:      [facility] guidelines for Chronic Kidney Disease (CKD):   •  Stage 1 with normal or high GFR (GFR > 90 mL/min/1 73 square meters)  •  Stage 2 Mild CKD (GFR = 60-89 mL/min/1 73 square meters)  •  Stage 3A Moderate CKD (GFR = 45-59 mL/min/1 73 square meters)  •  Stage 3B Moderate CKD (GFR = 30-44 mL/min/1 73 square meters)  •  Stage 4 Severe CKD (GFR = 15-29 mL/min/1 73 square meters)  •  Stage 5 End Stage CKD (GFR <15 mL/min/1 73 square meters)  Note: GFR calculation is accurate only with a steady state creatinine    APTT [[id number]]  (Normal) Collected: 06/25/23 2034    Lab Status: Final result Specimen: Blood from Arm, Left Updated: 06/25/23 2106     PTT 23 seconds     Protime-INR [[id number]]  (Normal) Collected: 06/25/23 2034    Lab Status: Final result Specimen: Blood from Arm, Left Updated: 06/25/23 2106     Protime 12 3 seconds      INR 0 90    Blood gas, venous [[id number]]  (Abnormal) Collected: 06/25/23 2041    Lab Status: Final result Specimen: Blood from Arm, Left Updated: 06/25/23 2104     pH, Leonardo 7 378     pCO2, Leonardo 48 4 mm Hg      pO2, Leonardo 67 3 mm Hg      HCO3, Leonardo 27 9 mmol/L      Base Excess, Leonardo 2 1 mmol/L      O2 Content, Leonardo 14 8 ml/dL      O2 HGB, VENOUS 90 7 %     Fingerstick Glucose (POCT) [[id number]]  (Abnormal) Collected: 06/25/23 2034    Lab Status: Final result Updated: 06/25/23 2056     POC Glucose 399 mg/dl     CBC and differential [[id number]]  (Abnormal) Collected: 06/25/23 2034    Lab Status: Final result Specimen: Blood from Arm, Left Updated: 06/25/23 2052     WBC 12 36 Thousand/uL      RBC 3 66 Million/uL      Hemoglobin 10 7 g/dL      Hematocrit 33 4 %      MCV 91 fL      MCH 29 2 pg      MCHC 32 0 g/dL      RDW 15 6 %      MPV 10 1 fL      Platelets 009 Thousands/uL      nRBC 0 /100 WBCs      Neutrophils Relative 85 %      Immat GRANS % 2 %      Lymphocytes Relative 10 %      Monocytes Relative 3 %      Eosinophils Relative 0 %      Basophils Relative 0 %      Neutrophils Absolute 10 35 Thousands/µL      Immature Grans Absolute 0 27 Thousand/uL      Lymphocytes Absolute 1 28 Thousands/µL      Monocytes Absolute 0 42 Thousand/µL      Eosinophils Absolute 0 01 Thousand/µL      Basophils Absolute 0 03 Thousands/µL                  XR chest 1 view portable    (Results Pending)          Chest with no acute findings    Procedures  Procedures         ED Course                                             Medical Decision Making  70-year-old with elevated blood sugars, will check for acidosis, check for dehydration, fluids for the blood sugar, insulin, check electrolytes, differential also includes ischemia, infection, will check troponin EKG, UA, chest x-ray    No signs of infection noted  Lactic acidosis likely due to dehydration  Will admit to the hospital     Amount and/or Complexity of Data Reviewed  Labs: ordered  Radiology: ordered  Risk  Decision regarding hospitalization  Disposition  Final diagnoses:   KATHY (acute kidney injury) ([place] [address] )   Lactic acidosis   Hyperglycemia     Time reflects when diagnosis was documented in both MDM as applicable and the Disposition within this note     Time User Action Codes Description Comment    6/25/2023 10:43 PM Felicita Sow [N17 9] KATHY (acute kidney injury) ([facility] )     6/25/2023 10:43 PM Felicita Sow Add [E87 20] Lactic acidosis     6/25/2023 10:43 PM Felicita Sow [R73 9] Hyperglycemia       ED Disposition     ED Disposition   Admit    Condition   Stable    Date/Time   Sun Jun 25, 2023 10:43 PM    Comment   Case was discussed with GAIL and the patient's admission status was agreed to be Admission Status: observation status to the service of Dr Willian Colmenares              Follow-up Information    None         Current Discharge Medication List      CONTINUE these medications which have NOT CHANGED    Details   acetaminophen (TYLENOL) 325 mg tablet Take 3 tablets (975 mg total) by mouth every 8 (eight) hours  Qty: 30 tablet, Refills: 0    Associated Diagnoses: Chronic pain syndrome      Advair Diskus 500-50 MCG/DOSE inhaler       aspirin (ECOTRIN LOW STRENGTH) 81 mg EC tablet       benzonatate (TESSALON) 200 MG capsule Take 200 mg by mouth 3 (three) times a day as needed for cough      bisacodyl (DULCOLAX) 10 mg suppository Insert 10 mg into the rectum daily as needed       cetirizine (ZyrTEC) 10 mg tablet Take 10 mg by mouth daily      Cholecalciferol (VITAMIN D3) 31892 units CAPS Take 50,000 Units by mouth every 30 (thirty) days      Dulaglutide (Trulicity) 3 KB/2 2DG SOPN Inject 0 5 mL (3 mg total) under the skin once a week  Qty: 2 mL, Refills: 1    Associated Diagnoses: Type 2 diabetes mellitus with hyperglycemia, with long-term current use of insulin ([facility])      ferrous gluconate (FERGON) 324 mg tablet Take 1 tablet (324 mg total) by mouth daily before breakfast  Refills: 0    Associated Diagnoses: Anemia, unspecified type      fluticasone (FLONASE) 50 mcg/act nasal spray 1 spray into each nostril daily  Qty: 1 Bottle, Refills: 5    Associated Diagnoses: Seasonal allergies      gabapentin (NEURONTIN) 300 mg capsule Take 300 mg by mouth 2 (two) times a day      !! Glucose Blood (BL TEST STRIP PACK VI) by In Vitro route      !! glucose blood test strip by In Vitro route      guaiFENesin (MUCINEX) 600 mg 12 hr tablet Take 1,200 mg by mouth every 12 (twelve) hours      insulin glargine (LANTUS SOLOSTAR) 100 units/mL injection pen Inject 60  units twice a day  Qty: 15 mL    Associated Diagnoses: Type 2 diabetes mellitus with hyperglycemia, with long-term current use of insulin ([facility])      insulin lispro (HumaLOG) 100 units/mL injection Inject 12 -22 units before meals as per scale    Associated Diagnoses: Type 2 diabetes mellitus with foot ulcer, with long-term current use of insulin ([facility])      ipratropium-albuterol (DUO-NEB) 0 5-2 5 mg/3 mL nebulizer solution       magnesium hydroxide (MILK OF MAGNESIA) 400 mg/5 mL oral suspension Take by mouth daily as needed for constipation      memantine (NAMENDA) 10 mg tablet 10 mg 2 (two) times a day       MENTHOL-METHYL SALICYLATE EX Apply topically 2 (two) times a day metFORMIN (GLUCOPHAGE) 500 mg tablet       montelukast (SINGULAIR) 10 mg tablet Take 10 mg by mouth daily at bedtime      Omeprazole 20 MG TBEC Take 20 mg by mouth daily      simvastatin (ZOCOR) 40 mg tablet Take 1 tablet by mouth daily at bedtime  Refills: 0      traZODone (DESYREL) 150 mg tablet        !! - Potential duplicate medications found  Please discuss with provider  No discharge procedures on file      PDMP Review       Value Time User    PDMP Reviewed  Yes 9/25/2020 12:01 PM Avril Dwyer MD          ED Provider  Electronically Signed by           Phi Manuel MD  06/26/23 6043

## 2023-06-26 NOTE — PLAN OF CARE
Problem: MOBILITY - ADULT  Goal: Maintain or return to baseline ADL function  Description: INTERVENTIONS:  -  Assess patient's ability to carry out ADLs; assess patient's baseline for ADL function and identify physical deficits which impact ability to perform ADLs (bathing, care of mouth/teeth, toileting, grooming, dressing, etc )  - Assess/evaluate cause of self-care deficits   - Assess range of motion  - Assess patient's mobility; develop plan if impaired  - Assess patient's need for assistive devices and provide as appropriate  - Encourage maximum independence but intervene and supervise when necessary  - Involve family in performance of ADLs  - Assess for home care needs following discharge   - Consider OT consult to assist with ADL evaluation and planning for discharge  - Provide patient education as appropriate  6/26/2023 1407 by Deisi Browning RN  Outcome: Adequate for Discharge  6/26/2023 1028 by Deisi Browning RN  Outcome: Progressing  Goal: Maintains/Returns to pre admission functional level  Description: INTERVENTIONS:  - Perform BMAT or MOVE assessment daily    - Set and communicate daily mobility goal to care team and patient/family/caregiver  - Collaborate with rehabilitation services on mobility goals if consulted  - Perform Range of Motion  times a day  - Reposition patient every  hours    - Dangle patient  times a day  - Stand patient  times a day  - Ambulate patient  times a day  - Out of bed to chair  times a day   - Out of bed for meals times a day  - Out of bed for toileting  - Record patient progress and toleration of activity level   6/26/2023 1407 by Deisi Browning RN  Outcome: Adequate for Discharge  6/26/2023 1028 by Deisi Browning RN  Outcome: Progressing     Problem: Prexisting or High Potential for Compromised Skin Integrity  Goal: Skin integrity is maintained or improved  Description: INTERVENTIONS:  - Identify patients at risk for skin breakdown  - Assess and monitor skin integrity  - Assess and monitor nutrition and hydration status  - Monitor labs   - Assess for incontinence   - Turn and reposition patient  - Assist with mobility/ambulation  - Relieve pressure over bony prominences  - Avoid friction and shearing  - Provide appropriate hygiene as needed including keeping skin clean and dry  - Evaluate need for skin moisturizer/barrier cream  - Collaborate with interdisciplinary team   - Patient/family teaching  - Consider wound care consult   6/26/2023 1407 by Audra Dodge RN  Outcome: Adequate for Discharge  6/26/2023 1028 by Audra Dodge RN  Outcome: Progressing

## 2023-06-26 NOTE — ASSESSMENT & PLAN NOTE
Lab Results   Component Value Date    HGBA1C 6 0 (H) 06/19/2023       Recent Labs     06/25/23  2034 06/25/23  2323 06/26/23  0147 06/26/23  0732   POCGLU 399* 281* 197* 144*       Blood Sugar Average: Last 72 hrs:  (P) 255 25   · Most recently hgb a1c indicates good control of DM  ·  on arrival-- elevated at SNF  · No evidence of DKA/HHS  · IVF hydration  · Hold PO medications + GLP-1  · Home insulin regimen: lantus 60U SQ BID + SSI

## 2023-06-26 NOTE — CASE MANAGEMENT
Case Management Assessment & Discharge Planning Note    Patient name Ervin YOUSSEF MS 18/W -80 MRN [id number]  : 1950 Date 2023       Current Admission Date: 2023  Current Admission Diagnosis:Type 2 diabetes mellitus with hyperglycemia, with long-term current use of insulin [facility])   Patient Active Problem List    Diagnosis Date Noted   • Chronic respiratory failure with hypoxia ([address] ) 2022   • Diabetic ulcer of left midfoot associated with type 2 diabetes mellitus ([address] ) 2022   • Anemia 2022   • GILDARDO (obstructive sleep apnea) 2022   • Abscess of abdominal wall 01/10/2022   • Sacral ulcer ([address] ) 2021   • Nocturnal hypoxia 2020   • Kidney lesion 2020   • Weakness generalized 10/14/2020   • S/P tooth extraction 2020   • Hyperlipidemia 2020   • Asthma-COPD overlap syndrome ([address] ) 2020   • Hoarse voice quality 2019   • Seasonal allergies 2019   • Need for pneumococcal vaccination 2019   • MCI (mild cognitive impairment) 2018   • Fall 2018   • Injury of face 2018   • Sixth nerve palsy of left eye 2018   • Ptosis of left eyelid 2018   • Alzheimer's disease ([facility] ) 10/15/2018   • Pulmonary nodule 10/15/2018   • Preop pulmonary/respiratory exam 10/15/2018   • Mental status change 2017   • Essential hypertension 2017   • Type 2 diabetes mellitus with hyperglycemia, with long-term current use of insulin ([facility] ) 2017      LOS (days): 0  Geometric Mean LOS (GMLOS) (days):   Days to GMLOS:     OBJECTIVE:              Current admission status: Observation       Preferred Pharmacy:   [id number] [place], [place] - [address] [place] [address]  Phone: [phone] Fax: [phone]    [facility] [address]  [address] [address]  Phone: [phone] Fax: [phone]    Primary Care Provider: Luciano Petty MD    Primary Insurance: MEDICARE  Secondary Insurance: [address]    ASSESSMENT:  Active Health Care Proxies    There are no active Health Care Proxies on file  Readmission Root Cause  30 Day Readmission: No    Patient Information  Admitted from[de-identified] Facility  Mental Status: Alert  During Assessment patient was accompanied by: Not accompanied during assessment  Assessment information provided by[de-identified] Patient  Primary Caregiver: Self  Caregiver's Name[de-identified] Giovanny Arncourtney Staff  Caregiver's Relationship to Patient[de-identified] Other (Specify)  Support Systems: Family members  [place] of Residence: [address] [place] [address] do you live in?: [address] [facility] entry access options  Select all that apply : No steps to enter home  Type of Current Residence: Facility  Upon entering residence, is there a bedroom on the main floor (no further steps)?: Yes  Upon entering residence, is there a bathroom on the main floor (no further steps)?: Yes  In the last 12 months, was there a time when you were not able to pay the mortgage or rent on time?: No  In the last 12 months, how many places have you lived?: 1  In the last 12 months, was there a time when you did not have a steady place to sleep or slept in a shelter (including now)?: No  Homeless/housing insecurity resource given?: N/A  Living Arrangements: Other (Comment) (facility)    Activities of Daily Living Prior to Admission  Functional Status: Assistance  Completes ADLs independently?: No  Level of ADL dependence: Assistance  Ambulates independently?: No  Level of ambulatory dependence: Assistance  Does patient use assisted devices?: Yes  Assisted Devices (DME) used:  Wheelchair  Does patient currently own DME?: Yes  What DME does the patient currently own?: Wheelchair    Patient Information Continued  Does patient have prescription coverage?: Yes  Within the past 12 months, you worried that your food would run out before you got the money to buy more : Never true  Within the past 12 months, the food you bought just didn't last and you didn't have money to get more : Never true  Food insecurity resource given?: N/A    Means of Transportation  Means of Transport to Appts[de-identified] Other (Comment) (facility)  In the past 12 months, has lack of transportation kept you from medical appointments or from getting medications?: No  In the past 12 months, has lack of transportation kept you from meetings, work, or from getting things needed for daily living?: No  Was application for public transport provided?: N/A        DISCHARGE DETAILS:    Discharge planning discussed with[de-identified] patient at bedside, brother Igor Kenney over phone  Freedom of Choice: Yes  Comments - Freedom of Choice: return to [place] [address] contacted family/caregiver?: Yes  Were Treatment Team discharge recommendations reviewed with patient/caregiver?: Yes  Did patient/caregiver verbalize understanding of patient care needs?: N/A- going to facility  Were patient/caregiver advised of the risks associated with not following Treatment Team discharge recommendations?: Yes    Contacts  Patient Contacts: Igor Kenney (brother)  Relationship to Patient[de-identified] Family  Contact Method: Phone  Phone Number: [phone]  Reason/Outcome: Discharge Planning, Continuity of Care, Emergency [address]         Is the patient interested in [facility] at discharge?: No    DME Referral Provided  Referral made for DME?: No    Other Referral/Resources/Interventions Provided:  Interventions: Facility Return, SNF  Referral Comments: CM notified patient is medically cleared for discharge today  Patient is LTC resident from [facility]  Referral placed in [address] [facility] [address] for [address]  with [place]  CM met with patient at bedside to confirm plan is to return to facility, patient stating this is the plan  Call also made to brother, Igor Kenney- brother also agreeable  1:30 PM BLS confirmed- all appropriate parties aware   COVID swab ordered and pending results  No further CM needs anticipated at this time      Would you like to participate in our [address] service program?  : No - Declined    Treatment Team Recommendation: SNF, Facility Return  Discharge Destination Plan[de-identified] SNF, Facility Return  Transport at Discharge : BLS Ambulance  Transported by [address]sie[address] and Unit #): [employer] Name, [address] : [place]  Receiving Facility/Agency Phone Number: [phone]  Facility/Agency Fax Number: [phone]

## 2023-06-26 NOTE — ASSESSMENT & PLAN NOTE
· POA: tachycardia, WBC >12; LA 3 4  · Suspected related to dehydration as no obvious infectious source identified  · IVF   · Trend labs/vitals  · See plan for KATHY, hyperglycemia

## 2023-06-26 NOTE — DISCHARGE SUMMARY
[address] 1950, 67 y o  female MRN: [id number]  Unit/Bed#: W -01 Encounter: [id number]  Primary Care Provider: Gin Carcamo MD   Date and time admitted to hospital: 6/25/2023  8:09 PM    * Type 2 diabetes mellitus with hyperglycemia, with long-term current use of insulin [facility])  Assessment & Plan  Lab Results   Component Value Date    HGBA1C 6 0 (H) 06/19/2023       Recent Labs     06/25/23  2034 06/25/23  2323 06/26/23  0147 06/26/23  0732   POCGLU 399* 281* 197* 144*       Blood Sugar Average: Last 72 hrs:  (P) 255 25   · Most recently hgb a1c indicates good control of DM  ·  on arrival-- elevated at SNF  · No evidence of DKA/HHS  · IVF hydration  · Hold PO medications + GLP-1  · Home insulin regimen: lantus 60U SQ BID + SSI    Chronic respiratory failure with hypoxia (HCC)  Assessment & Plan  · 2L NC at baseline    GILDARDO (obstructive sleep apnea)  Assessment & Plan  · Does not use CPAP    Asthma-COPD overlap syndrome (HCC)  Assessment & Plan  · Wheezing noted on physical exam  · Continue w/ home regimen of: advair (sub breo per formulary), flonase, anti-histamine, mucinex, singulair  · Nebs PRN  · RT    Alzheimer's disease ([facility] )  Assessment & Plan  · Supportive care  · Namenda, trazodone    Essential hypertension  Assessment & Plan  · BP acceptable continue home medications    SIRS (systemic inflammatory response syndrome) (HCC)-resolved as of 6/26/2023  Assessment & Plan  · POA: tachycardia, WBC >12; LA 3 4  · Suspected related to dehydration as no obvious infectious source identified  · IVF   · Trend labs/vitals  · See plan for KATYH, hyperglycemia    KATHY (acute kidney injury) (HCC)-resolved as of 6/26/2023  Assessment & Plan  · Cr 1 58; baseline around 1 2  · Likely secondary to dehydration, improved with IV hydration        Medical Problems     Resolved Problems  Date Reviewed: 6/26/2023          Resolved    KATHY (acute kidney injury) ([facility] ) 6/26/2023     Resolved by  Jennifer Campbell PA-C    SIRS (systemic inflammatory response syndrome) ([facility] ) 6/26/2023     Resolved by  Jennifer Campbell PA-C        Discharging Physician / Practitioner: Carlitos Fair PA-C  PCP: Glenny Young MD  Admission Date:   Admission Orders (From admission, onward)     Ordered        06/25/23 2244  Place in Observation  Once                      Discharge Date: 06/26/23    Consultations During Hospital Stay:  · none    Procedures Performed:   · none    Significant Findings / Test Results:   · Blood sugar 400 upon admission, now 140  · KATHY with creatinine 1 5, resolved to 1 3 which is her baseline    Incidental Findings:   · none     Test Results Pending at Discharge (will require follow up):   · none     Outpatient Tests Requested:  · none    Complications:  none    Reason for Admission: KATHY, hyperglycemia    Hospital Course:   Lory Ren is a 67 y o  female patient who originally presented to the hospital on 6/25/2023 due to elevated blood glucose  Denied any complaints prior to mild wheeziness which is her baseline  She was given insulin in the nursing home without improvement  She was sent to the ER for evaluation  Blood sugar upon admission was in the 400s, was briefly placed on an insulin infusion and then quickly weaned off and placed back on her home regimen  Creatinine was also noted to be 1 5, given IV hydration overnight  She is eating, creatinine back to baseline this morning  She is okay to go back to the nursing facility today  We will recommend to resume home insulin regimen and monitor blood glucoses closely with a carb controlled diet  Please see above list of diagnoses and related plan for additional information  Condition at Discharge: stable    Discharge Day Visit / Exam:   Subjective: Patient is eating breakfast, blood sugars 140 this morning  She is doing well and has no complaints    Denies any worsening shortness of breath of her "baseline  Vitals: Blood Pressure: 134/99 (06/26/23 0732)  Pulse: 80 (06/26/23 0732)  Temperature: 98 2 °F (36 8 °C) (06/26/23 0732)  Temp Source: Oral (06/26/23 0019)  Respirations: 14 (06/26/23 0732)  Height: 4' 11\" (149 9 cm) (06/26/23 0019)  Weight - Scale: 99 2 kg (218 lb 11 1 oz) (06/26/23 0019)  SpO2: 94 % (06/26/23 0732)  Exam:   Physical Exam  Vitals and nursing note reviewed  Constitutional:       General: She is not in acute distress  Appearance: Normal appearance  She is obese  HENT:      Head: Normocephalic  Mouth/Throat:      Mouth: Mucous membranes are moist    Eyes:      General: No scleral icterus  Pupils: Pupils are equal, round, and reactive to light  Cardiovascular:      Rate and Rhythm: Normal rate and regular rhythm  Heart sounds: No murmur heard  Pulmonary:      Effort: Pulmonary effort is normal  No respiratory distress  Breath sounds: Normal breath sounds  No wheezing, rhonchi or rales  Abdominal:      General: Bowel sounds are normal  There is no distension  Palpations: Abdomen is soft  Tenderness: There is no abdominal tenderness  Musculoskeletal:         General: No swelling  Right lower leg: No edema  Left lower leg: No edema  Skin:     Capillary Refill: Capillary refill takes less than 2 seconds  Neurological:      General: No focal deficit present  Mental Status: She is alert and oriented to person, place, and time  Mental status is at baseline  Discussion with Family: Patient declined call to   Discharge instructions/Information to patient and family:   See after visit summary for information provided to patient and family  Provisions for Follow-Up Care:  See after visit summary for information related to follow-up care and any pertinent home health orders         Disposition:   Rachana Nuñez at [employer] Readmission: no     Discharge Statement:  I spent 45 minutes " discharging the patient  This time was spent on the day of discharge  I had direct contact with the patient on the day of discharge  Greater than 50% of the total time was spent examining patient, answering all patient questions, arranging and discussing plan of care with patient as well as directly providing post-discharge instructions  Additional time then spent on discharge activities  Discharge Medications:  See after visit summary for reconciled discharge medications provided to patient and/or family        **Please Note: This note may have been constructed using a voice recognition system**

## 2023-06-26 NOTE — ASSESSMENT & PLAN NOTE
· Cr 1 58; baseline around 1 2  · ?  Dehydration  · IVF  · Monitor for hypotension  · Avoid nephrotoxins  · Monitor urine output  · Daily BMP

## 2023-06-26 NOTE — ASSESSMENT & PLAN NOTE
· 2L NC at baseline of Health:  Child is exposed to the following neighborhood or family violence: none  Within the last 12 months have you worried about having enough money to buy food? no  Are there any problems with your current living situation? no  Parental coping and self-care: doing well  Secondhand smoke exposure (regular or electronic cigarettes): no   Domestic violence in the home: no  Does patient have good self esteem? Yes  Does patient has family support?:  yes, child has a caring and supportive relationship with family                                                                                                                                                                          Vision and Hearing Screening  No results found. ROS:    Constitutional:  Negative for fatigue  HENT:  Negative for congestion, rhinitis, sore throat, normal hearing  Eyes:  No vision issues  Resp:  Negative for SOB, wheezing, cough  Cardiovascular: Negative for CP,   Gastrointestinal: Negative for abd pain and N/V, normal BMs  :  Negative for dysuria and enuresis  Musculoskeletal:  Negative for myalgias  Skin: Negative for rash, change in moles, and sunburn. Neuro:  Negative for dizziness, headache, syncopal episodes                                                                                                                                               Objective:       Vitals:    06/05/23 1600   BP: 95/77   Pulse: 87   Resp: 24   Temp: 98.4 °F (36.9 °C)   TempSrc: Temporal   SpO2: 99%   Weight: 41 lb 12.8 oz (19 kg)   Height: 44.5\" (113 cm)     growth parameters are noted and are appropriate for age. No LMP for male patient. Constitutional: Alert, appears stated age, cooperative. Ears: Tympanic membrane, external ear and ear canal normal bilaterally  Nose: nasal mucosa w/o erythema or edema.    Mouth/Throat:

## 2023-06-26 NOTE — ASSESSMENT & PLAN NOTE
· Wheezing noted on physical exam  · Continue w/ home regimen of: advair (sub breo per formulary), flonase, anti-histamine, mucinex, singulair  · Nebs PRN  · RT

## 2023-06-27 LAB — MRSA NOSE QL CULT: NORMAL

## 2023-07-01 LAB
BACTERIA BLD CULT: NORMAL
BACTERIA BLD CULT: NORMAL

## 2023-07-05 ENCOUNTER — TELEPHONE (OUTPATIENT)
Dept: NEUROLOGY | Facility: CLINIC | Age: 73
End: 2023-07-05

## 2023-07-05 NOTE — TELEPHONE ENCOUNTER
Unable to leave message. Need to r/s appt with Isabella on 8/9.  Please utilize dates in august to r/s

## 2023-07-07 ENCOUNTER — APPOINTMENT (EMERGENCY)
Dept: RADIOLOGY | Facility: HOSPITAL | Age: 73
End: 2023-07-07
Payer: MEDICARE

## 2023-07-07 ENCOUNTER — APPOINTMENT (EMERGENCY)
Dept: CT IMAGING | Facility: HOSPITAL | Age: 73
End: 2023-07-07
Payer: MEDICARE

## 2023-07-07 ENCOUNTER — HOSPITAL ENCOUNTER (EMERGENCY)
Facility: HOSPITAL | Age: 73
Discharge: HOME | End: 2023-07-08
Attending: EMERGENCY MEDICINE
Payer: MEDICARE

## 2023-07-07 DIAGNOSIS — R41.0 CONFUSION: Primary | ICD-10-CM

## 2023-07-07 DIAGNOSIS — Z87.440 HISTORY OF UTI: ICD-10-CM

## 2023-07-07 DIAGNOSIS — D64.9 ANEMIA: ICD-10-CM

## 2023-07-07 DIAGNOSIS — R80.9 PROTEINURIA: ICD-10-CM

## 2023-07-07 PROCEDURE — 71045 X-RAY EXAM CHEST 1 VIEW: CPT

## 2023-07-07 PROCEDURE — 36415 COLL VENOUS BLD VENIPUNCTURE: CPT | Performed by: PHYSICIAN ASSISTANT

## 2023-07-07 PROCEDURE — 84443 ASSAY THYROID STIM HORMONE: CPT | Performed by: PHYSICIAN ASSISTANT

## 2023-07-07 PROCEDURE — 84484 ASSAY OF TROPONIN QUANT: CPT | Performed by: PHYSICIAN ASSISTANT

## 2023-07-07 PROCEDURE — 80053 COMPREHEN METABOLIC PANEL: CPT | Performed by: PHYSICIAN ASSISTANT

## 2023-07-07 PROCEDURE — 99285 EMERGENCY DEPT VISIT HI MDM: CPT

## 2023-07-07 PROCEDURE — 93005 ELECTROCARDIOGRAM TRACING: CPT

## 2023-07-07 PROCEDURE — 85730 THROMBOPLASTIN TIME PARTIAL: CPT | Performed by: PHYSICIAN ASSISTANT

## 2023-07-07 PROCEDURE — G1004 CDSM NDSC: HCPCS

## 2023-07-07 PROCEDURE — 85025 COMPLETE CBC W/AUTO DIFF WBC: CPT | Performed by: PHYSICIAN ASSISTANT

## 2023-07-07 PROCEDURE — 83735 ASSAY OF MAGNESIUM: CPT | Performed by: PHYSICIAN ASSISTANT

## 2023-07-07 PROCEDURE — 85610 PROTHROMBIN TIME: CPT | Performed by: PHYSICIAN ASSISTANT

## 2023-07-07 PROCEDURE — 82140 ASSAY OF AMMONIA: CPT | Performed by: PHYSICIAN ASSISTANT

## 2023-07-07 PROCEDURE — 70450 CT HEAD/BRAIN W/O DYE: CPT

## 2023-07-07 PROCEDURE — 83880 ASSAY OF NATRIURETIC PEPTIDE: CPT | Performed by: PHYSICIAN ASSISTANT

## 2023-07-07 RX ORDER — IPRATROPIUM BROMIDE AND ALBUTEROL SULFATE 2.5; .5 MG/3ML; MG/3ML
3 SOLUTION RESPIRATORY (INHALATION)
Status: DISCONTINUED | OUTPATIENT
Start: 2023-07-08 | End: 2023-07-08 | Stop reason: HOSPADM

## 2023-07-08 VITALS
BODY MASS INDEX: 44.04 KG/M2 | DIASTOLIC BLOOD PRESSURE: 58 MMHG | TEMPERATURE: 98 F | SYSTOLIC BLOOD PRESSURE: 130 MMHG | HEART RATE: 75 BPM | WEIGHT: 218.03 LBS | OXYGEN SATURATION: 100 % | RESPIRATION RATE: 18 BRPM

## 2023-07-08 LAB
2HR DELTA HS TROPONIN: -2 NG/L
ALBUMIN SERPL BCP-MCNC: 3.6 G/DL (ref 3.5–5)
ALP SERPL-CCNC: 50 U/L (ref 34–104)
ALT SERPL W P-5'-P-CCNC: 23 U/L (ref 7–52)
AMMONIA PLAS-SCNC: 47 UMOL/L (ref 18–72)
ANION GAP SERPL CALCULATED.3IONS-SCNC: 6 MMOL/L
APTT PPP: 27 SECONDS (ref 23–37)
AST SERPL W P-5'-P-CCNC: 16 U/L (ref 13–39)
ATRIAL RATE: 79 BPM
ATRIAL RATE: 81 BPM
BACTERIA UR QL AUTO: ABNORMAL /HPF
BASOPHILS # BLD AUTO: 0.01 THOUSANDS/ÂΜL (ref 0–0.1)
BASOPHILS NFR BLD AUTO: 0 % (ref 0–1)
BILIRUB SERPL-MCNC: 0.52 MG/DL (ref 0.2–1)
BILIRUB UR QL STRIP: NEGATIVE
BNP SERPL-MCNC: 31 PG/ML (ref 0–100)
BUN SERPL-MCNC: 25 MG/DL (ref 5–25)
CALCIUM SERPL-MCNC: 8.3 MG/DL (ref 8.4–10.2)
CARDIAC TROPONIN I PNL SERPL HS: 7 NG/L
CARDIAC TROPONIN I PNL SERPL HS: 9 NG/L
CHLORIDE SERPL-SCNC: 106 MMOL/L (ref 96–108)
CLARITY UR: CLEAR
CO2 SERPL-SCNC: 30 MMOL/L (ref 21–32)
COLOR UR: YELLOW
CREAT SERPL-MCNC: 1.32 MG/DL (ref 0.6–1.3)
EOSINOPHIL # BLD AUTO: 0.09 THOUSAND/ÂΜL (ref 0–0.61)
EOSINOPHIL NFR BLD AUTO: 2 % (ref 0–6)
ERYTHROCYTE [DISTWIDTH] IN BLOOD BY AUTOMATED COUNT: 15.7 % (ref 11.6–15.1)
GAS + CO PNL BLDA: 1.4 % (ref 0–1.5)
GFR SERPL CREATININE-BSD FRML MDRD: 40 ML/MIN/1.73SQ M
GLUCOSE SERPL-MCNC: 136 MG/DL (ref 65–140)
GLUCOSE UR STRIP-MCNC: NEGATIVE MG/DL
HCT VFR BLD AUTO: 30.4 % (ref 34.8–46.1)
HGB BLD-MCNC: 9.5 G/DL (ref 11.5–15.4)
HGB UR QL STRIP.AUTO: NEGATIVE
IMM GRANULOCYTES # BLD AUTO: 0.03 THOUSAND/UL (ref 0–0.2)
IMM GRANULOCYTES NFR BLD AUTO: 1 % (ref 0–2)
INR PPP: 0.92 (ref 0.84–1.19)
KETONES UR STRIP-MCNC: NEGATIVE MG/DL
LEUKOCYTE ESTERASE UR QL STRIP: ABNORMAL
LYMPHOCYTES # BLD AUTO: 1.27 THOUSANDS/ÂΜL (ref 0.6–4.47)
LYMPHOCYTES NFR BLD AUTO: 22 % (ref 14–44)
MAGNESIUM SERPL-MCNC: 2.2 MG/DL (ref 1.9–2.7)
MCH RBC QN AUTO: 29.1 PG (ref 26.8–34.3)
MCHC RBC AUTO-ENTMCNC: 31.3 G/DL (ref 31.4–37.4)
MCV RBC AUTO: 93 FL (ref 82–98)
MONOCYTES # BLD AUTO: 0.52 THOUSAND/ÂΜL (ref 0.17–1.22)
MONOCYTES NFR BLD AUTO: 9 % (ref 4–12)
NEUTROPHILS # BLD AUTO: 3.9 THOUSANDS/ÂΜL (ref 1.85–7.62)
NEUTS SEG NFR BLD AUTO: 66 % (ref 43–75)
NITRITE UR QL STRIP: NEGATIVE
NON-SQ EPI CELLS URNS QL MICRO: ABNORMAL /HPF
NRBC BLD AUTO-RTO: 0 /100 WBCS
P AXIS: 56 DEGREES
P AXIS: 57 DEGREES
PH UR STRIP.AUTO: 6 [PH]
PLATELET # BLD AUTO: 123 THOUSANDS/UL (ref 149–390)
PMV BLD AUTO: 10.1 FL (ref 8.9–12.7)
POTASSIUM SERPL-SCNC: 4.2 MMOL/L (ref 3.5–5.3)
PR INTERVAL: 194 MS
PR INTERVAL: 198 MS
PROT SERPL-MCNC: 6.5 G/DL (ref 6.4–8.4)
PROT UR STRIP-MCNC: ABNORMAL MG/DL
PROTHROMBIN TIME: 12.5 SECONDS (ref 11.6–14.5)
QRS AXIS: 31 DEGREES
QRS AXIS: 40 DEGREES
QRSD INTERVAL: 88 MS
QRSD INTERVAL: 94 MS
QT INTERVAL: 390 MS
QT INTERVAL: 404 MS
QTC INTERVAL: 453 MS
QTC INTERVAL: 463 MS
RBC # BLD AUTO: 3.26 MILLION/UL (ref 3.81–5.12)
RBC #/AREA URNS AUTO: ABNORMAL /HPF
SODIUM SERPL-SCNC: 142 MMOL/L (ref 135–147)
SP GR UR STRIP.AUTO: 1.02 (ref 1–1.03)
T WAVE AXIS: 39 DEGREES
T WAVE AXIS: 47 DEGREES
TSH SERPL DL<=0.05 MIU/L-ACNC: 2.08 UIU/ML (ref 0.45–4.5)
UROBILINOGEN UR STRIP-ACNC: <2 MG/DL
VENTRICULAR RATE: 79 BPM
VENTRICULAR RATE: 81 BPM
WBC # BLD AUTO: 5.82 THOUSAND/UL (ref 4.31–10.16)
WBC #/AREA URNS AUTO: ABNORMAL /HPF

## 2023-07-08 PROCEDURE — 82375 ASSAY CARBOXYHB QUANT: CPT | Performed by: PHYSICIAN ASSISTANT

## 2023-07-08 PROCEDURE — 94640 AIRWAY INHALATION TREATMENT: CPT

## 2023-07-08 PROCEDURE — 93010 ELECTROCARDIOGRAM REPORT: CPT | Performed by: INTERNAL MEDICINE

## 2023-07-08 PROCEDURE — 93005 ELECTROCARDIOGRAM TRACING: CPT

## 2023-07-08 PROCEDURE — 36415 COLL VENOUS BLD VENIPUNCTURE: CPT | Performed by: PHYSICIAN ASSISTANT

## 2023-07-08 PROCEDURE — 81001 URINALYSIS AUTO W/SCOPE: CPT | Performed by: PHYSICIAN ASSISTANT

## 2023-07-08 PROCEDURE — 84484 ASSAY OF TROPONIN QUANT: CPT | Performed by: PHYSICIAN ASSISTANT

## 2023-07-08 RX ADMIN — IPRATROPIUM BROMIDE AND ALBUTEROL SULFATE 3 ML: .5; 3 SOLUTION RESPIRATORY (INHALATION) at 00:09

## 2023-07-08 NOTE — ED PROVIDER NOTES
History  Chief Complaint   Patient presents with   • Altered Mental Status     Pt comes from [facility]. Is being treated for a UTI for over a week now, per facility staff, pt seems more confused than as of late. GCS 14 at baseline, pt has dementia. Staff states pt is having body tremors, per ems they did not see any tremors. On arrival pt has no body tremors. -cp -sob chronically on 2L/min for CHF. Patient is a 72-year-old female with history of diabetes mellitus, hyperlipidemia, hypertension, and dementia currently GCS 14 at baseline and on 2 L nasal cannulated oxygen at baseline secondary to COPD and asthma overlap syndrome presents to the emerged department with setting confusion for 1 day. Patient currently being treated for urinary tract infection the past week. Patient presents from [address] [place] [address] with staff reporting that patient has been "acting confused today and want to get her evaluated. ."  Patient denies any pain at this time. Patient atraumatic. Patient denies palliative and provocative factors. Patient denies noneffective treatment. Patient denies fevers, chills, nausea, vomiting, diarrhea, constipation and urinary symptoms. Patient denies recent fall recent trauma. Patient denies sick contacts and recent travel. Patient denies chest pain, shortness of breath, and abdominal pain. History provided by:  Patient   used: No        Prior to Admission Medications   Prescriptions Last Dose Informant Patient Reported? Taking?    Advair Diskus 500-50 MCG/DOSE inhaler  Outside Facility (Specify) Yes No   Cholecalciferol (VITAMIN D3) 70927 units CAPS  Outside Facility (Specify) Yes No   Sig: Take 50,000 Units by mouth every 30 (thirty) days   Dulaglutide (Trulicity) 3 KJ/7.9MW SOPN  Outside Facility (Specify) No No   Sig: Inject 0.5 mL (3 mg total) under the skin once a week   Glucose Blood (BL TEST STRIP PACK VI)  Outside Facility (Specify) Yes No   Sig: by In Vitro route   Omeprazole 20 MG TBEC  Outside Facility (Specify) Yes No   Sig: Take 20 mg by mouth daily   acetaminophen (TYLENOL) 325 mg tablet  Outside Facility (Specify) No No   Sig: Take 3 tablets (975 mg total) by mouth every 8 (eight) hours   aspirin (ECOTRIN LOW STRENGTH) 81 mg EC tablet  Outside Facility (Specify) Yes No   benzonatate (TESSALON) 200 MG capsule  Outside Facility (Specify) Yes No   Sig: Take 200 mg by mouth 3 (three) times a day as needed for cough   cetirizine (ZyrTEC) 10 mg tablet  Outside Facility (Specify) Yes No   Sig: Take 10 mg by mouth daily   ferrous gluconate (FERGON) 324 mg tablet  Outside Facility (Specify) No No   Sig: Take 1 tablet (324 mg total) by mouth daily before breakfast   fluticasone (FLONASE) 50 mcg/act nasal spray  Outside Facility (Specify) No No   Si spray into each nostril daily   gabapentin (NEURONTIN) 300 mg capsule  Outside Facility (Specify) Yes No   Sig: Take 300 mg by mouth 2 (two) times a day   glucose blood test strip  Outside Facility (Specify) Yes No   Sig: by In Vitro route   guaiFENesin (MUCINEX) 600 mg 12 hr tablet   Yes No   Sig: Take 1,200 mg by mouth every 12 (twelve) hours   insulin glargine (LANTUS SOLOSTAR) 100 units/mL injection pen   No No   Sig: Inject 60  units twice a day   insulin lispro (HumaLOG) 100 units/mL injection   No No   Sig: Inject 12 -22 units before meals as per scale   ipratropium-albuterol (DUO-NEB) 0.5-2.5 mg/3 mL nebulizer solution  Outside Facility (Specify) Yes No   magnesium hydroxide (MILK OF MAGNESIA) 400 mg/5 mL oral suspension  Outside Facility (Specify) Yes No   Sig: Take by mouth daily as needed for constipation   memantine (NAMENDA) 10 mg tablet  Outside Facility (Specify) Yes No   Sig: 10 mg 2 (two) times a day    metFORMIN (GLUCOPHAGE) 500 mg tablet  Outside Facility (Specify) Yes No   montelukast (SINGULAIR) 10 mg tablet  Outside Facility (Specify) Yes No   Sig: Take 10 mg by mouth daily at bedtime simvastatin (ZOCOR) 40 mg tablet  Outside Facility (Specify) No No   Sig: Take 1 tablet by mouth daily at bedtime   traZODone (DESYREL) 150 mg tablet  Outside Facility (Specify) Yes No      Facility-Administered Medications: None       Past Medical History:   Diagnosis Date   • Asthma    • Diabetes mellitus ([address])    • Hyperlipidemia    • Hypertension        Past Surgical History:   Procedure Laterality Date   • JOINT REPLACEMENT Bilateral    • KNEE SURGERY     • TOE SURGERY         Family History   Problem Relation Age of Onset   • Dementia Brother    • Breast cancer Sister 76   • Cancer Brother      I have reviewed and agree with the history as documented. E-Cigarette/Vaping   • E-Cigarette Use Never User      E-Cigarette/Vaping Substances   • Nicotine No    • THC No    • CBD No    • Flavoring No    • Other No      Social History     Tobacco Use   • Smoking status: Former     Packs/day: 1.00     Years: 52.00     Total pack years: 52.00     Types: Cigarettes     Start date:      Quit date: 2017     Years since quittin.8   • Smokeless tobacco: Never   Vaping Use   • Vaping Use: Never used   Substance Use Topics   • Alcohol use: Not Currently   • Drug use: No       Review of Systems   Constitutional: Negative for activity change, appetite change, chills and fever. HENT: Negative for congestion, postnasal drip, rhinorrhea, sinus pressure, sinus pain, sore throat and tinnitus. Eyes: Negative for photophobia and visual disturbance. Respiratory: Negative for cough, chest tightness and shortness of breath. Cardiovascular: Negative for chest pain and palpitations. Gastrointestinal: Negative for abdominal pain, constipation, diarrhea, nausea and vomiting. Genitourinary: Negative for difficulty urinating, dysuria, flank pain, frequency and urgency. Musculoskeletal: Negative for back pain, gait problem, neck pain and neck stiffness. Skin: Negative for pallor and rash.    Allergic/Immunologic: Negative for environmental allergies and food allergies. Neurological: Negative for dizziness, weakness, numbness and headaches. Psychiatric/Behavioral: Positive for confusion. All other systems reviewed and are negative. Physical Exam  Physical Exam  Vitals and nursing note reviewed. Constitutional:       General: She is awake. Appearance: Normal appearance. She is well-developed. She is not ill-appearing, toxic-appearing or diaphoretic. Interventions: Nasal cannula in place. Comments: /64 (BP Location: Right arm)   Pulse 82   Temp 98 °F (36.7 °C) (Oral)   Resp 18   Wt 98.9 kg (218 lb 0.6 oz)   SpO2 97%   BMI 44.04 kg/m²      HENT:      Head: Normocephalic and atraumatic. Right Ear: Hearing and external ear normal. No decreased hearing noted. No drainage, swelling or tenderness. No mastoid tenderness. Left Ear: Hearing and external ear normal. No decreased hearing noted. No drainage, swelling or tenderness. No mastoid tenderness. Nose: Nose normal.      Mouth/Throat:      Lips: Pink. Mouth: Mucous membranes are moist.      Pharynx: Oropharynx is clear. Uvula midline. Eyes:      General: Lids are normal. Vision grossly intact. Right eye: No discharge. Left eye: No discharge. Extraocular Movements: Extraocular movements intact. Conjunctiva/sclera: Conjunctivae normal.      Pupils: Pupils are equal, round, and reactive to light. Neck:      Vascular: No JVD. Trachea: Trachea and phonation normal. No tracheal tenderness or tracheal deviation. Cardiovascular:      Rate and Rhythm: Normal rate and regular rhythm. Pulses: Normal pulses. Radial pulses are 2+ on the right side and 2+ on the left side. Posterior tibial pulses are 2+ on the right side and 2+ on the left side. Heart sounds: Normal heart sounds. Pulmonary:      Effort: Pulmonary effort is normal.      Breath sounds: No stridor. Examination of the right-upper field reveals wheezing. Examination of the left-upper field reveals wheezing. Examination of the right-middle field reveals wheezing. Examination of the left-middle field reveals wheezing. Examination of the right-lower field reveals wheezing. Examination of the left-lower field reveals wheezing. Wheezing present. No decreased breath sounds, rhonchi or rales. Comments: Expiratory wheezes all lung fields. Chest:      Chest wall: No tenderness. Abdominal:      General: Abdomen is flat. Bowel sounds are normal. There is no distension. Palpations: Abdomen is soft. Abdomen is not rigid. Tenderness: There is no abdominal tenderness. There is no guarding or rebound. Musculoskeletal:         General: Normal range of motion. Cervical back: Full passive range of motion without pain, normal range of motion and neck supple. No rigidity. No spinous process tenderness or muscular tenderness. Normal range of motion. Right lower leg: Normal.      Left lower leg: Normal.   Lymphadenopathy:      Head:      Right side of head: No submental, submandibular, tonsillar, preauricular, posterior auricular or occipital adenopathy. Left side of head: No submental, submandibular, tonsillar, preauricular, posterior auricular or occipital adenopathy. Cervical: No cervical adenopathy. Right cervical: No superficial, deep or posterior cervical adenopathy. Left cervical: No superficial, deep or posterior cervical adenopathy. Skin:     General: Skin is warm. Capillary Refill: Capillary refill takes less than 2 seconds. Neurological:      General: No focal deficit present. Mental Status: She is alert and oriented to person, place, and time. GCS: GCS eye subscore is 4. GCS verbal subscore is 5. GCS motor subscore is 6. Sensory: No sensory deficit. Deep Tendon Reflexes: Reflexes are normal and symmetric.       Reflex Scores:       Patellar reflexes are 2+ on the right side and 2+ on the left side. Psychiatric:         Mood and Affect: Mood normal.         Speech: Speech normal.         Behavior: Behavior normal. Behavior is cooperative. Thought Content:  Thought content normal.         Judgment: Judgment normal.         Vital Signs  ED Triage Vitals [07/07/23 2111]   Temperature Pulse Respirations Blood Pressure SpO2   98 °F (36.7 °C) 83 18 140/62 96 %      Temp Source Heart Rate Source Patient Position - Orthostatic VS BP Location FiO2 (%)   Oral Monitor Sitting Right arm --      Pain Score       No Pain           Vitals:    07/08/23 0015 07/08/23 0100 07/08/23 0230 07/08/23 0400   BP: 130/63 135/65 147/63 130/58   Pulse: 78 78 77 75   Patient Position - Orthostatic VS: Sitting Sitting Sitting          Visual Acuity      ED Medications  Medications   ipratropium-albuterol (DUO-NEB) 0.5-2.5 mg/3 mL inhalation solution 3 mL (3 mL Nebulization Given 7/8/23 0009)       Diagnostic Studies  Results Reviewed     Procedure Component Value Units Date/Time    HS Troponin I 2hr [[id number]]  (Normal) Collected: 07/08/23 0219    Lab Status: Final result Specimen: Blood from Arm, Left Updated: 07/08/23 0301     hs TnI 2hr 7 ng/L      Delta 2hr hsTnI -2 ng/L     Protime-INR [[id number]]  (Normal) Collected: 07/07/23 2351    Lab Status: Final result Specimen: Blood from Arm, Left Updated: 07/08/23 0044     Protime 12.5 seconds      INR 0.92    APTT [[id number]]  (Normal) Collected: 07/07/23 2351    Lab Status: Final result Specimen: Blood from Arm, Left Updated: 07/08/23 0044     PTT 27 seconds     TSH, 3rd generation with Free T4 reflex [[id number]]  (Normal) Collected: 07/07/23 2351    Lab Status: Final result Specimen: Blood from Arm, Left Updated: 07/08/23 0036     TSH 3RD GENERATON 2.078 uIU/mL     B-Type Natriuretic Peptide(BNP) [[id number]]  (Normal) Collected: 07/07/23 2351    Lab Status: Final result Specimen: Blood from Arm, Left Updated: 07/08/23 9285 BNP 31 pg/mL     HS Troponin I 4hr [[id number]]     Lab Status: No result Specimen: Blood     HS Troponin 0hr (reflex protocol) [[id number]]  (Normal) Collected: 07/07/23 2351    Lab Status: Final result Specimen: Blood from Arm, Left Updated: 07/08/23 0028     hs TnI 0hr 9 ng/L     Urine Microscopic [[id number]]  (Abnormal) Collected: 07/08/23 0010    Lab Status: Final result Specimen: Urine, Clean Catch Updated: 07/08/23 0028     RBC, UA 1-2 /hpf      WBC, UA 2-4 /hpf      Epithelial Cells Occasional /hpf      Bacteria, UA None Seen /hpf     UA w Reflex to Microscopic w Reflex to Culture [[id number]]  (Abnormal) Collected: 07/08/23 0010    Lab Status: Final result Specimen: Urine, Clean Catch Updated: 07/08/23 0026     Color, UA Yellow     Clarity, UA Clear     Specific Gravity, UA 1.018     pH, UA 6.0     Leukocytes, UA Trace     Nitrite, UA Negative     Protein,  (3+) mg/dl      Glucose, UA Negative mg/dl      Ketones, UA Negative mg/dl      Urobilinogen, UA <2.0 mg/dl      Bilirubin, UA Negative     Occult Blood, UA Negative    Carboxyhemoglobin [[id number]]  (Normal) Collected: 07/08/23 0008    Lab Status: Final result Specimen: Blood from Arm, Left Updated: 07/08/23 0021     Carbon Monoxide, Blood 1.4 %     Narrative:       Therapeutic levels (1 mg/mL and 2 mg/mL) of hydroxocobalamin may interfere with the fCOHb and fMetHb where it may cause lower than expected values  Normal Carboxyhemoglobin range for nonsmokers is <1.5%   Normal Carboxyhemoglobin range for smokers is 1.5% to 5.1%     Comprehensive metabolic panel [[id number]]  (Abnormal) Collected: 07/07/23 2351    Lab Status: Final result Specimen: Blood from Arm, Left Updated: 07/08/23 0019     Sodium 142 mmol/L      Potassium 4.2 mmol/L      Chloride 106 mmol/L      CO2 30 mmol/L      ANION GAP 6 mmol/L      BUN 25 mg/dL      Creatinine 1.32 mg/dL      Glucose 136 mg/dL      Calcium 8.3 mg/dL      AST 16 U/L      ALT 23 U/L      Alkaline Phosphatase 50 U/L      Total Protein 6.5 g/dL      Albumin 3.6 g/dL      Total Bilirubin 0.52 mg/dL      eGFR 40 ml/min/1.73sq m     Narrative:      National Kidney Disease Foundation guidelines for Chronic Kidney Disease (CKD):   •  Stage 1 with normal or high GFR (GFR > 90 mL/min/1.73 square meters)  •  Stage 2 Mild CKD (GFR = 60-89 mL/min/1.73 square meters)  •  Stage 3A Moderate CKD (GFR = 45-59 mL/min/1.73 square meters)  •  Stage 3B Moderate CKD (GFR = 30-44 mL/min/1.73 square meters)  •  Stage 4 Severe CKD (GFR = 15-29 mL/min/1.73 square meters)  •  Stage 5 End Stage CKD (GFR <15 mL/min/1.73 square meters)  Note: GFR calculation is accurate only with a steady state creatinine    Magnesium [[id number]]  (Normal) Collected: 07/07/23 2351    Lab Status: Final result Specimen: Blood from Arm, Left Updated: 07/08/23 0019     Magnesium 2.2 mg/dL     Ammonia [[id number]]  (Normal) Collected: 07/07/23 2351    Lab Status: Final result Specimen: Blood from Arm, Left Updated: 07/08/23 0018     Ammonia 47 umol/L     CBC and differential [[id number]]  (Abnormal) Collected: 07/07/23 2351    Lab Status: Final result Specimen: Blood from Arm, Left Updated: 07/08/23 0010     WBC 5.82 Thousand/uL      RBC 3.26 Million/uL      Hemoglobin 9.5 g/dL      Hematocrit 30.4 %      MCV 93 fL      MCH 29.1 pg      MCHC 31.3 g/dL      RDW 15.7 %      MPV 10.1 fL      Platelets 649 Thousands/uL      nRBC 0 /100 WBCs      Neutrophils Relative 66 %      Immat GRANS % 1 %      Lymphocytes Relative 22 %      Monocytes Relative 9 %      Eosinophils Relative 2 %      Basophils Relative 0 %      Neutrophils Absolute 3.90 Thousands/µL      Immature Grans Absolute 0.03 Thousand/uL      Lymphocytes Absolute 1.27 Thousands/µL      Monocytes Absolute 0.52 Thousand/µL      Eosinophils Absolute 0.09 Thousand/µL      Basophils Absolute 0.01 Thousands/µL                  XR chest 1 view portable   ED Interpretation by Adria Penaloza PA-C (07/08 0023)   No acute cardiopulmonary disease on initial read by me        CT head without contrast   ED Interpretation by Trista Hopson PA-C (07/08 0022)   Clint Pham MD  [phone] 7/8/2023     Narrative & Impression  CT BRAIN - WITHOUT CONTRAST     INDICATION:   Mental status change, persistent or worsening  altered mental status.     COMPARISON: CT brain dated August 20, 2017.     TECHNIQUE:  CT examination of the brain was performed. Multiplanar 2D reformatted images were created from the source data.     Radiation dose length product (DLP) for this visit:  984 mGy-cm . This examination, like all CT scans performed in the [facility], was performed utilizing techniques to minimize radiation dose exposure, including the use of iterative   reconstruction and automated exposure control.     IMAGE QUALITY:  Diagnostic.     FINDINGS:     PARENCHYMA:  No intracranial mass, mass effect or midline shift. No CT signs of acute infarction. No acute parenchymal hemorrhage.     There is mild periventricular white matter low attenuation which is nonspecific and most likely related to chronic small vessel ischemic good   ges.     VENTRICLES AND EXTRA-AXIAL SPACES: Enlargement of ventricles and extra-axial CSF spaces, out of proportion to the patient's age most consistent with cerebral and cerebellar atrophy.     VISUALIZED ORBITS: Normal visualized orbits.     PARANASAL SINUSES: Normal visualized paranasal sinuses.     CALVARIUM AND EXTRACRANIAL SOFT TISSUES:  Normal.     IMPRESSION:     No acute intracranial abnormality.     Mild chronic small vessel ischemic changes and central greater than cortical volume loss.           Workstation performed: [id number]           Final Result by Clint Pham MD (07/08 0008)      No acute intracranial abnormality. Mild chronic small vessel ischemic changes and central greater than cortical volume loss.             Workstation performed: [id number] Procedures  ECG 12 Lead Documentation Only    Date/Time: 7/7/2023 11:38 PM    Performed by: Nazanin Arroyo PA-C  Authorized by: Nazanin Arroyo PA-C    Indications / Diagnosis:  Confusion  ECG reviewed by me, the ED Provider: yes    Patient location:  ED  Previous ECG:     Previous ECG:  Compared to current    Comparison ECG info: When compared with ECG of June 25, 2023, no significant changes were noted. Similarity:  No change    Comparison to cardiac monitor: Yes    Interpretation:     Interpretation: normal    Rate:     ECG rate:  81    ECG rate assessment: normal    Rhythm:     Rhythm: sinus rhythm    Ectopy:     Ectopy: none    QRS:     QRS axis:  Normal    QRS intervals:  Normal  Conduction:     Conduction: normal    ST segments:     ST segments:  Normal  T waves:     T waves: normal      ECG 12 Lead Documentation Only    Date/Time: 7/8/2023 2:04 AM    Performed by: Nazanin Arroyo PA-C  Authorized by: Nazanin Arroyo PA-C    Indications / Diagnosis:  Altered mental status  ECG reviewed by me, the ED Provider: yes    Patient location:  ED  Previous ECG:     Previous ECG:  Compared to current    Comparison ECG info: When compared with ECG of July 7, 2023; 2338 no significant changes were noted. Similarity:  No change    Comparison to cardiac monitor: Yes    Interpretation:     Interpretation: normal    Rate:     ECG rate:  79    ECG rate assessment: normal    Rhythm:     Rhythm: sinus rhythm    Ectopy:     Ectopy: none    QRS:     QRS axis:  Normal    QRS intervals:  Normal  Conduction:     Conduction: normal    ST segments:     ST segments:  Normal  T waves:     T waves: normal               ED Course  ED Course as of 07/08/23 0433   Sat Jul 08, 2023   0054 Pt is currently on Macrobid 100mg BID (day 1/5) started 7/6/23; for uti treatment   0129 Pt serum cr at pt baseline                               SBIRT 22yo+    Flowsheet Row Most Recent Value   Initial Alcohol Screen: US AUDIT-C     1.  How often do you have a drink containing alcohol? 0 Filed at: 07/07/2023 2113   3b. FEMALE Any Age, or MALE 65+: How often do you have 4 or more drinks on one occassion? 0 Filed at: 07/07/2023 2113   Audit-C Score 0 Filed at: 07/07/2023 2113   KELI: How many times in the past year have you. .. Used an illegal drug or used a prescription medication for non-medical reasons? Never Filed at: 07/07/2023 2113                    Medical Decision Making  Patient is a 77-year-old female with history of diabetes mellitus, hyperlipidemia, hypertension, and dementia currently GCS 14 at baseline and on 2 L nasal cannulated oxygen at baseline secondary to COPD and asthma overlap syndrome presents to the emerged department with setting confusion for 1 day. Hemodynamically stable and afebrile  Serial troponins negative; serial ECGs with normal sinus rhythm, no ischemic changes  Urinalysis reflective of urinary tract infection; patient is currently on day 1/5 of Macrobid  Proteinuria +3 similar to that on 6/25/2023  patient H&H at baseline  Patient serum creatinine level at baseline; normal electrolytes  Head CT with no acute intracranial abnormality  Rest x-ray with no acute cardiopulmonary disease on initial read. GCS 14 at patient baseline -1 for confusion, patient has no new focal neurological deficits, alert and oriented x2  Patient is on 2 L nasal cannulated oxygen chronically secondary to mixed asthma/COPD  Effusion likely secondary to UTI and will discharge patient back to [facility] and have her follow-up with her PCP status post completion of Macrobid  Follow-up with PCP  Follow the emergency department should symptoms persist or exacerbate  Patient demonstrates verbal understanding of all clinical laboratory imaging findings, discharge instructions follow-up and verbalized agreement with patient current treatment plan. Amount and/or Complexity of Data Reviewed  Labs: ordered.  Decision-making details documented in ED Course. Radiology: ordered and independent interpretation performed. Decision-making details documented in ED Course. ECG/medicine tests:  Decision-making details documented in ED Course. Risk  Prescription drug management. Disposition  Final diagnoses:   Confusion   History of UTI - Currently on Macrobid   Proteinuria   Anemia     Time reflects when diagnosis was documented in both MDM as applicable and the Disposition within this note     Time User Action Codes Description Comment    7/8/2023  3:19 AM Kit Shows Add [R41.0] Confusion     7/8/2023  3:19 AM Kit Shows Add [Z87.440] History of UTI     7/8/2023  3:19 AM Kit Shows Modify [Z87.440] History of UTI Currently on Macrobid    7/8/2023  3:19 AM Kit Shows Add [E34.8] Progeria     7/8/2023  3:19 AM Kit Shows Remove [E34.8] Progeria     7/8/2023  3:19 AM Kit Shows Add [R80.9] Proteinuria     7/8/2023  3:20 AM Kit Shows Add [D64.9] Anemia       ED Disposition     ED Disposition   Discharge    Condition   Stable    Date/Time   Sat Jul 8, 2023  3:18 AM    Comment   Caren Colunga discharge to home/self care.                Follow-up Information     Follow up With Specialties Details Why Contact Info Additional Information    [address] Agustin Adams MD Internal Medicine   [address] [facility]  [address]  [address] Emergency Department Emergency Medicine   [address] [facility] [address] [facility] Emergency Department, [place], [place], [address]          Discharge Medication List as of 7/8/2023  3:20 AM      CONTINUE these medications which have NOT CHANGED    Details   acetaminophen (TYLENOL) 325 mg tablet Take 3 tablets (975 mg total) by mouth every 8 (eight) hours, Starting Fri 9/25/2020, No Print      Advair Diskus 500-50 MCG/DOSE inhaler Starting Tue 9/28/2021, Historical Med aspirin (ECOTRIN LOW STRENGTH) 81 mg EC tablet Starting Sat 8/28/2021, Historical Med      benzonatate (TESSALON) 200 MG capsule Take 200 mg by mouth 3 (three) times a day as needed for cough, Historical Med      cetirizine (ZyrTEC) 10 mg tablet Take 10 mg by mouth daily, Historical Med      Cholecalciferol (VITAMIN D3) 29998 units CAPS Take 50,000 Units by mouth every 30 (thirty) days, Historical Med      Dulaglutide (Trulicity) 3 ST/2.6WA SOPN Inject 0.5 mL (3 mg total) under the skin once a week, Starting Mon 4/18/2022, Normal      ferrous gluconate (FERGON) 324 mg tablet Take 1 tablet (324 mg total) by mouth daily before breakfast, Starting Wed 6/29/2022, No Print      fluticasone (FLONASE) 50 mcg/act nasal spray 1 spray into each nostril daily, Starting Tue 8/20/2019, Normal      gabapentin (NEURONTIN) 300 mg capsule Take 300 mg by mouth 2 (two) times a day, Historical Med      !! Glucose Blood (BL TEST STRIP PACK VI) by In Vitro route, Starting Thu 3/3/2011, Historical Med      !! glucose blood test strip by In Vitro route, Starting Wed 10/8/2014, Historical Med      guaiFENesin (MUCINEX) 600 mg 12 hr tablet Take 1,200 mg by mouth every 12 (twelve) hours, Historical Med      insulin glargine (LANTUS SOLOSTAR) 100 units/mL injection pen Inject 60  units twice a day, No Print      insulin lispro (HumaLOG) 100 units/mL injection Inject 12 -22 units before meals as per scale, No Print      ipratropium-albuterol (DUO-NEB) 0.5-2.5 mg/3 mL nebulizer solution Starting Thu 10/14/2021, Historical Med      magnesium hydroxide (MILK OF MAGNESIA) 400 mg/5 mL oral suspension Take by mouth daily as needed for constipation, Historical Med      memantine (NAMENDA) 10 mg tablet 10 mg 2 (two) times a day , Historical Med      metFORMIN (GLUCOPHAGE) 500 mg tablet Starting Sat 8/28/2021, Historical Med      montelukast (SINGULAIR) 10 mg tablet Take 10 mg by mouth daily at bedtime, Historical Med      Omeprazole 20 MG TBEC Take 20 mg by mouth daily, Historical Med      simvastatin (ZOCOR) 40 mg tablet Take 1 tablet by mouth daily at bedtime, Starting Thu 8/24/2017, No Print      traZODone (DESYREL) 150 mg tablet Starting Tue 1/25/2022, Historical Med       !! - Potential duplicate medications found. Please discuss with provider. No discharge procedures on file.     PDMP Review       Value Time User    PDMP Reviewed  Yes 9/25/2020 12:01 PM Imtiaz Taylor MD          ED Provider  Electronically Signed by           Sina Noriega PA-C  07/08/23 0679

## 2023-09-06 ENCOUNTER — OFFICE VISIT (OUTPATIENT)
Dept: NEUROLOGY | Facility: CLINIC | Age: 73
End: 2023-09-06
Payer: MEDICARE

## 2023-09-06 VITALS
BODY MASS INDEX: 39.88 KG/M2 | TEMPERATURE: 97.8 F | SYSTOLIC BLOOD PRESSURE: 124 MMHG | DIASTOLIC BLOOD PRESSURE: 80 MMHG | HEART RATE: 80 BPM | HEIGHT: 62 IN

## 2023-09-06 DIAGNOSIS — G30.9 ALZHEIMER'S DEMENTIA (HCC): Primary | ICD-10-CM

## 2023-09-06 DIAGNOSIS — F02.80 ALZHEIMER'S DEMENTIA (HCC): Primary | ICD-10-CM

## 2023-09-06 PROCEDURE — 99214 OFFICE O/P EST MOD 30 MIN: CPT | Performed by: PHYSICIAN ASSISTANT

## 2023-09-06 RX ORDER — LIDOCAINE HYDROCHLORIDE 10 MG/ML
INJECTION, SOLUTION EPIDURAL; INFILTRATION; INTRACAUDAL; PERINEURAL
COMMUNITY
Start: 2023-06-21

## 2023-09-06 RX ORDER — LEVALBUTEROL INHALATION SOLUTION 1.25 MG/3ML
SOLUTION RESPIRATORY (INHALATION)
COMMUNITY
Start: 2023-09-01

## 2023-09-06 NOTE — PROGRESS NOTES
Patient ID: Linh Benton is a 67 y.o. female. Assessment/Plan:    Alzheimer's disease ([address])  · Patient was encouraged to increase mind stimulating activities such as reading, crosswords, word searches, puzzles, Soduku, solitaire, coloring and other brain games. We also discussed the importance of staying physically active and eating a health diet such as the Mediterranean or MIND diet. · She will continue memantine. I have spent a total time of 30 minutes on 09/06/23 in caring for this patient including Diagnostic results, Prognosis, Risks and benefits of tx options, Instructions for management, Patient and family education, Importance of tx compliance, Risk factor reductions, Impressions, Counseling / Coordination of care, Documenting in the medical record, Reviewing / ordering tests, medicine, procedures   and Obtaining or reviewing history  . She will follow-up in 1 year. Problem List Items Addressed This Visit    None  Visit Diagnoses     Alzheimer's dementia ([address])    -  Primary             Subjective:    HPI    Linh Benton is a 67 y.o. female, with HTN, DM type 2, hyperlipidemia, anxiety and asthma, who follows in the office due to Alzheimer's dementia. She states that she is doing well overall. She continues to take memantine and tolerates it without difficulty. She states that her memory is slowly worsening. She denies any difficulty with appetite or swallowing. She is able to socialize with other residents at [facility]. She will participate in [facility] and does does word searches. She has not had any hallucinations. She was sent to the ED due to increased confusion while being treated for a UTI since her last appt. Head CT at that time was negative for acute findings. No problems were expressed by the staff at [facility].     The following portions of the patient's history were reviewed and updated as appropriate: allergies, current medications, past family history, past medical history, past social history, past surgical history and problem list.         Objective:    Blood pressure 124/80, pulse 80, temperature 97.8 °F (36.6 °C), temperature source Temporal, height 5' 2" (1.575 m). Physical Exam  Vitals reviewed. Eyes:      General: Lids are normal.      Extraocular Movements: Extraocular movements intact. Pupils: Pupils are equal, round, and reactive to light. Psychiatric:         Speech: Speech normal.         Neurological Exam  Mental Status   Oriented only to person and place. Speech is normal. Language is fluent with no aphasia. Apraxia absent. Cranial Nerves  CN II: Visual fields full to confrontation. CN III, IV, VI: Extraocular movements intact bilaterally. Normal lids and orbits bilaterally. Pupils equal round and reactive to light bilaterally. CN V: Facial sensation is normal.  CN VII: Full and symmetric facial movement. CN XI: Shoulder shrug strength is normal.  CN XII: Tongue midline without atrophy or fasciculations. Motor   Strength is 5/5 in all four extremities except as noted. 4+/5 distal LE. 4/5 proximal LE. Reflexes  Deep tendon reflexes are 2+ and symmetric except as noted. 1/4 throughout. Gait    Wheelchair. ROS:    Agree with ROS as documented by Lea Feliz MA. Review of systems personally reviewed.

## 2023-09-06 NOTE — PATIENT INSTRUCTIONS
Alzheimer's disease [facility])  Patient was encouraged to increase mind stimulating activities such as reading, crosswords, word searches, puzzles, Soduku, solitaire, coloring and other brain games. We also discussed the importance of staying physically active and eating a health diet such as the Mediterranean or MIND diet. She will continue memantine. I have spent a total time of 30 minutes on 09/06/23 in caring for this patient including Diagnostic results, Prognosis, Risks and benefits of tx options, Instructions for management, Patient and family education, Importance of tx compliance, Risk factor reductions, Impressions, Counseling / Coordination of care, Documenting in the medical record, Reviewing / ordering tests, medicine, procedures   and Obtaining or reviewing history  . She will follow-up in 1 year.

## 2023-09-06 NOTE — ASSESSMENT & PLAN NOTE
· Patient was encouraged to increase mind stimulating activities such as reading, crosswords, word searches, puzzles, Soduku, solitaire, coloring and other brain games. We also discussed the importance of staying physically active and eating a health diet such as the Mediterranean or MIND diet. · She will continue memantine. I have spent a total time of 30 minutes on 09/06/23 in caring for this patient including Diagnostic results, Prognosis, Risks and benefits of tx options, Instructions for management, Patient and family education, Importance of tx compliance, Risk factor reductions, Impressions, Counseling / Coordination of care, Documenting in the medical record, Reviewing / ordering tests, medicine, procedures   and Obtaining or reviewing history  . She will follow-up in 1 year.

## 2023-10-14 ENCOUNTER — HOSPITAL ENCOUNTER (EMERGENCY)
Facility: HOSPITAL | Age: 73
Discharge: HOME | End: 2023-10-14
Attending: EMERGENCY MEDICINE
Payer: MEDICARE

## 2023-10-14 ENCOUNTER — APPOINTMENT (EMERGENCY)
Dept: RADIOLOGY | Facility: HOSPITAL | Age: 73
End: 2023-10-14
Payer: MEDICARE

## 2023-10-14 VITALS
HEART RATE: 104 BPM | BODY MASS INDEX: 39.88 KG/M2 | WEIGHT: 218.03 LBS | RESPIRATION RATE: 16 BRPM | OXYGEN SATURATION: 95 % | SYSTOLIC BLOOD PRESSURE: 188 MMHG | DIASTOLIC BLOOD PRESSURE: 79 MMHG | TEMPERATURE: 99.4 F

## 2023-10-14 DIAGNOSIS — T50.B95A FATIGUE AFTER COVID-19 VACCINATION: ICD-10-CM

## 2023-10-14 DIAGNOSIS — R52 BODY ACHES: Primary | ICD-10-CM

## 2023-10-14 DIAGNOSIS — R53.83 FATIGUE AFTER COVID-19 VACCINATION: ICD-10-CM

## 2023-10-14 LAB
ALBUMIN SERPL BCP-MCNC: 4 G/DL (ref 3.5–5)
ALP SERPL-CCNC: 54 U/L (ref 34–104)
ALT SERPL W P-5'-P-CCNC: 28 U/L (ref 7–52)
ANION GAP SERPL CALCULATED.3IONS-SCNC: 8 MMOL/L
AST SERPL W P-5'-P-CCNC: 25 U/L (ref 13–39)
BACTERIA UR QL AUTO: ABNORMAL /HPF
BASOPHILS # BLD AUTO: 0.02 THOUSANDS/ÂΜL (ref 0–0.1)
BASOPHILS NFR BLD AUTO: 0 % (ref 0–1)
BILIRUB SERPL-MCNC: 0.62 MG/DL (ref 0.2–1)
BILIRUB UR QL STRIP: NEGATIVE
BUN SERPL-MCNC: 26 MG/DL (ref 5–25)
CALCIUM SERPL-MCNC: 9.4 MG/DL (ref 8.4–10.2)
CHLORIDE SERPL-SCNC: 97 MMOL/L (ref 96–108)
CLARITY UR: CLEAR
CO2 SERPL-SCNC: 35 MMOL/L (ref 21–32)
COLOR UR: ABNORMAL
CREAT SERPL-MCNC: 1.23 MG/DL (ref 0.6–1.3)
EOSINOPHIL # BLD AUTO: 0.11 THOUSAND/ÂΜL (ref 0–0.61)
EOSINOPHIL NFR BLD AUTO: 2 % (ref 0–6)
ERYTHROCYTE [DISTWIDTH] IN BLOOD BY AUTOMATED COUNT: 15.6 % (ref 11.6–15.1)
GFR SERPL CREATININE-BSD FRML MDRD: 43 ML/MIN/1.73SQ M
GLUCOSE SERPL-MCNC: 196 MG/DL (ref 65–140)
GLUCOSE UR STRIP-MCNC: ABNORMAL MG/DL
HCT VFR BLD AUTO: 31.8 % (ref 34.8–46.1)
HGB BLD-MCNC: 10.2 G/DL (ref 11.5–15.4)
HGB UR QL STRIP.AUTO: ABNORMAL
IMM GRANULOCYTES # BLD AUTO: 0.09 THOUSAND/UL (ref 0–0.2)
IMM GRANULOCYTES NFR BLD AUTO: 1 % (ref 0–2)
KETONES UR STRIP-MCNC: NEGATIVE MG/DL
LEUKOCYTE ESTERASE UR QL STRIP: ABNORMAL
LYMPHOCYTES # BLD AUTO: 0.66 THOUSANDS/ÂΜL (ref 0.6–4.47)
LYMPHOCYTES NFR BLD AUTO: 9 % (ref 14–44)
MCH RBC QN AUTO: 30.1 PG (ref 26.8–34.3)
MCHC RBC AUTO-ENTMCNC: 32.1 G/DL (ref 31.4–37.4)
MCV RBC AUTO: 94 FL (ref 82–98)
MONOCYTES # BLD AUTO: 0.56 THOUSAND/ÂΜL (ref 0.17–1.22)
MONOCYTES NFR BLD AUTO: 8 % (ref 4–12)
NEUTROPHILS # BLD AUTO: 5.71 THOUSANDS/ÂΜL (ref 1.85–7.62)
NEUTS SEG NFR BLD AUTO: 80 % (ref 43–75)
NITRITE UR QL STRIP: NEGATIVE
NON-SQ EPI CELLS URNS QL MICRO: ABNORMAL /HPF
NRBC BLD AUTO-RTO: 0 /100 WBCS
PH UR STRIP.AUTO: 6 [PH]
PLATELET # BLD AUTO: 117 THOUSANDS/UL (ref 149–390)
PMV BLD AUTO: 10 FL (ref 8.9–12.7)
POTASSIUM SERPL-SCNC: 4.4 MMOL/L (ref 3.5–5.3)
PROT SERPL-MCNC: 7.2 G/DL (ref 6.4–8.4)
PROT UR STRIP-MCNC: ABNORMAL MG/DL
RBC # BLD AUTO: 3.39 MILLION/UL (ref 3.81–5.12)
RBC #/AREA URNS AUTO: ABNORMAL /HPF
SODIUM SERPL-SCNC: 140 MMOL/L (ref 135–147)
SP GR UR STRIP.AUTO: 1.02 (ref 1–1.03)
UROBILINOGEN UR STRIP-ACNC: <2 MG/DL
WBC # BLD AUTO: 7.15 THOUSAND/UL (ref 4.31–10.16)
WBC #/AREA URNS AUTO: ABNORMAL /HPF

## 2023-10-14 PROCEDURE — 71045 X-RAY EXAM CHEST 1 VIEW: CPT

## 2023-10-14 PROCEDURE — 87077 CULTURE AEROBIC IDENTIFY: CPT

## 2023-10-14 PROCEDURE — 36415 COLL VENOUS BLD VENIPUNCTURE: CPT

## 2023-10-14 PROCEDURE — 85025 COMPLETE CBC W/AUTO DIFF WBC: CPT

## 2023-10-14 PROCEDURE — 87086 URINE CULTURE/COLONY COUNT: CPT

## 2023-10-14 PROCEDURE — 80053 COMPREHEN METABOLIC PANEL: CPT

## 2023-10-14 PROCEDURE — 81001 URINALYSIS AUTO W/SCOPE: CPT

## 2023-10-14 PROCEDURE — 87186 SC STD MICRODIL/AGAR DIL: CPT

## 2023-10-14 RX ORDER — ACETAMINOPHEN 325 MG/1
650 TABLET ORAL ONCE
Status: COMPLETED | OUTPATIENT
Start: 2023-10-14 | End: 2023-10-14

## 2023-10-14 RX ADMIN — ACETAMINOPHEN 650 MG: 325 TABLET, FILM COATED ORAL at 15:09

## 2023-10-14 RX ADMIN — SODIUM CHLORIDE, SODIUM LACTATE, POTASSIUM CHLORIDE, AND CALCIUM CHLORIDE 500 ML: .6; .31; .03; .02 INJECTION, SOLUTION INTRAVENOUS at 15:15

## 2023-10-14 NOTE — ED PROVIDER NOTES
History  Chief Complaint   Patient presents with    Generalized Body Aches     Resides at [facility], was given both flu and covisd vaccine together yesterday     HPI Pt is a 69 y/o f presenting to the ED with myalgias, fatigue, and hot flashes beginning today after flu and covid immunizations yesterday. Baseline mild dementia. She states she can't keep the blankets on her legs because she feels to hot. On baseline O2 of 2 L. States she feels dry. Pmhx: COPD, asthma, DM, HTN. She states she feels improved from earlier in the day - She was referred from nursing home as she was fatigued, warm, and mildly off her baseline - responding more slowly than usual.    Prior to Admission Medications   Prescriptions Last Dose Informant Patient Reported? Taking?    Advair Diskus 500-50 MCG/DOSE inhaler  Outside Facility (Specify) Yes No   Cholecalciferol (VITAMIN D3) 37327 units CAPS  Outside Facility (Specify) Yes No   Sig: Take 50,000 Units by mouth every 30 (thirty) days   Dulaglutide (Trulicity) 3 KW/4.7QT SOPN  Outside Facility (Specify) No No   Sig: Inject 0.5 mL (3 mg total) under the skin once a week   Glucose Blood (BL TEST STRIP PACK VI)  Outside Facility (Specify) Yes No   Sig: by In Vitro route   Patient not taking: Reported on 9/6/2023   Omeprazole 20 MG TBEC  Outside Facility (Specify) Yes No   Sig: Take 20 mg by mouth daily   acetaminophen (TYLENOL) 325 mg tablet  Outside Facility (Specify) No No   Sig: Take 3 tablets (975 mg total) by mouth every 8 (eight) hours   aspirin (ECOTRIN LOW STRENGTH) 81 mg EC tablet  Outside Facility (Specify) Yes No   benzonatate (TESSALON) 200 MG capsule  Outside Facility (Specify) Yes No   Sig: Take 200 mg by mouth 3 (three) times a day as needed for cough   cetirizine (ZyrTEC) 10 mg tablet  Outside Facility (Specify) Yes No   Sig: Take 10 mg by mouth daily   ferrous gluconate (FERGON) 324 mg tablet  Outside Facility (Specify) No No   Sig: Take 1 tablet (324 mg total) by mouth daily before breakfast   fluticasone (FLONASE) 50 mcg/act nasal spray  Outside Facility (Specify) No No   Si spray into each nostril daily   gabapentin (NEURONTIN) 300 mg capsule  Outside Facility (Specify) Yes No   Sig: Take 300 mg by mouth 2 (two) times a day   glucose blood test strip  Outside Facility (Specify) Yes No   Sig: by In Vitro route   Patient not taking: Reported on 2023   guaiFENesin (MUCINEX) 600 mg 12 hr tablet  Outside Facility (Specify) Yes No   Sig: Take 1,200 mg by mouth every 12 (twelve) hours   insulin glargine (LANTUS SOLOSTAR) 100 units/mL injection pen  Outside Facility (Specify) No No   Sig: Inject 60  units twice a day   Patient not taking: Reported on 2023   insulin lispro (HumaLOG) 100 units/mL injection  Outside Facility (Specify) No No   Sig: Inject 12 -22 units before meals as per scale   ipratropium-albuterol (DUO-NEB) 0.5-2.5 mg/3 mL nebulizer solution  Outside Facility (Specify) Yes No   levalbuterol (XOPENEX) 1.25 mg/3 mL nebulizer solution  Outside Facility (Specify) Yes No   lidocaine, PF, (XYLOCAINE-MPF) 1 %  Outside Facility (Specify) Yes No   magnesium hydroxide (MILK OF MAGNESIA) 400 mg/5 mL oral suspension  Outside Facility (Specify) Yes No   Sig: Take by mouth daily as needed for constipation   memantine (NAMENDA) 10 mg tablet  Outside Facility (Specify) Yes No   Sig: 10 mg 2 (two) times a day    metFORMIN (GLUCOPHAGE) 500 mg tablet  Outside Facility (Specify) Yes No   montelukast (SINGULAIR) 10 mg tablet  Outside Facility (Specify) Yes No   Sig: Take 10 mg by mouth daily at bedtime   simvastatin (ZOCOR) 40 mg tablet  Outside Facility (Specify) No No   Sig: Take 1 tablet by mouth daily at bedtime   traZODone (DESYREL) 150 mg tablet  Outside Facility (Specify) Yes No      Facility-Administered Medications: None       Past Medical History:   Diagnosis Date    Asthma     Diabetes mellitus ([address])     Hyperlipidemia     Hypertension        Past Surgical History: Procedure Laterality Date    JOINT REPLACEMENT Bilateral     KNEE SURGERY      TOE SURGERY         Family History   Problem Relation Age of Onset    Dementia Brother     Breast cancer Sister 76    Cancer Brother      I have reviewed and agree with the history as documented. E-Cigarette/Vaping    E-Cigarette Use Never User      E-Cigarette/Vaping Substances    Nicotine No     THC No     CBD No     Flavoring No     Other No      Social History     Tobacco Use    Smoking status: Former     Packs/day: 1.00     Years: 52.00     Total pack years: 52.00     Types: Cigarettes     Start date: 65     Quit date: 2017     Years since quittin.1    Smokeless tobacco: Never   Vaping Use    Vaping Use: Never used   Substance Use Topics    Alcohol use: Not Currently    Drug use: No        Review of Systems   Constitutional:  Positive for fatigue. Musculoskeletal:  Positive for myalgias (mild, mostly resolved). All other systems reviewed and are negative. Physical Exam  ED Triage Vitals [10/14/23 1431]   Temperature Pulse Respirations Blood Pressure SpO2   99.4 °F (37.4 °C) 104 16 (!) 188/79 95 %      Temp Source Heart Rate Source Patient Position - Orthostatic VS BP Location FiO2 (%)   Oral Monitor Sitting Right arm --      Pain Score       10 - Worst Possible Pain             Orthostatic Vital Signs  Vitals:    10/14/23 1431   BP: (!) 188/79   Pulse: 104   Patient Position - Orthostatic VS: Sitting       Physical Exam  Vitals and nursing note reviewed. Constitutional:       General: She is not in acute distress. Appearance: She is not ill-appearing, toxic-appearing or diaphoretic. Comments: Mildly fatigued, warm to touch - not diaphoretic. HENT:      Head: Normocephalic and atraumatic. Nose: Nose normal.      Mouth/Throat:      Mouth: Mucous membranes are dry. Pharynx: Oropharynx is clear. Eyes:      General: No scleral icterus. Right eye: No discharge.          Left eye: No discharge. Extraocular Movements: Extraocular movements intact. Conjunctiva/sclera: Conjunctivae normal.      Pupils: Pupils are equal, round, and reactive to light. Cardiovascular:      Rate and Rhythm: Regular rhythm. Tachycardia present. Pulses: Normal pulses. Heart sounds: Normal heart sounds. Pulmonary:      Effort: Pulmonary effort is normal. No respiratory distress. Breath sounds: No stridor. Wheezing (scattered wheeze) present. No rhonchi or rales. Chest:      Chest wall: No tenderness. Abdominal:      General: Bowel sounds are normal. There is no distension. Palpations: Abdomen is soft. Tenderness: There is no abdominal tenderness. There is no right CVA tenderness, left CVA tenderness, guarding or rebound. Musculoskeletal:         General: No swelling, tenderness, deformity or signs of injury. Normal range of motion. Cervical back: Normal range of motion and neck supple. No rigidity or tenderness. Right lower leg: No edema. Left lower leg: No edema. Skin:     General: Skin is warm and dry. Coloration: Skin is not jaundiced or pale. Findings: No bruising, erythema, lesion or rash. Neurological:      General: No focal deficit present. Mental Status: She is alert. Mental status is at baseline. Cranial Nerves: No cranial nerve deficit. Sensory: No sensory deficit. Motor: No weakness.       Coordination: Coordination normal.      Comments: Oriented x2, hx dementia - was close on date   Psychiatric:         Mood and Affect: Mood normal.         Behavior: Behavior normal.         ED Medications  Medications   acetaminophen (TYLENOL) tablet 650 mg (650 mg Oral Given 10/14/23 1509)   lactated ringers bolus 500 mL (0 mL Intravenous Stopped 10/14/23 1637)       Diagnostic Studies  Results Reviewed       Procedure Component Value Units Date/Time    Urine Microscopic [[id number]]  (Abnormal) Collected: 10/14/23 1637    Lab Status: Final result Specimen: Urine, Other Updated: 10/14/23 1700     RBC, UA 1-2 /hpf      WBC, UA Innumerable /hpf      Epithelial Cells Occasional /hpf      Bacteria, UA Occasional /hpf     Urine culture [[id number]] Collected: 10/14/23 1637    Lab Status:  In process Specimen: Urine, Other Updated: 10/14/23 1700    UA w Reflex to Microscopic w Reflex to Culture [[id number]]  (Abnormal) Collected: 10/14/23 1637    Lab Status: Final result Specimen: Urine, Other Updated: 10/14/23 1646     Color, UA Light Yellow     Clarity, UA Clear     Specific Gravity, UA 1.016     pH, UA 6.0     Leukocytes, UA Small     Nitrite, UA Negative     Protein,  (3+) mg/dl      Glucose, UA Trace mg/dl      Ketones, UA Negative mg/dl      Urobilinogen, UA <2.0 mg/dl      Bilirubin, UA Negative     Occult Blood, UA Trace    Comprehensive metabolic panel [[id number]]  (Abnormal) Collected: 10/14/23 1514    Lab Status: Final result Specimen: Blood from Arm, Left Updated: 10/14/23 1548     Sodium 140 mmol/L      Potassium 4.4 mmol/L      Chloride 97 mmol/L      CO2 35 mmol/L      ANION GAP 8 mmol/L      BUN 26 mg/dL      Creatinine 1.23 mg/dL      Glucose 196 mg/dL      Calcium 9.4 mg/dL      AST 25 U/L      ALT 28 U/L      Alkaline Phosphatase 54 U/L      Total Protein 7.2 g/dL      Albumin 4.0 g/dL      Total Bilirubin 0.62 mg/dL      eGFR 43 ml/min/1.73sq m     Narrative:      [place] guidelines for Chronic Kidney Disease (CKD):     Stage 1 with normal or high GFR (GFR > 90 mL/min/1.73 square meters)    Stage 2 Mild CKD (GFR = 60-89 mL/min/1.73 square meters)    Stage 3A Moderate CKD (GFR = 45-59 mL/min/1.73 square meters)    Stage 3B Moderate CKD (GFR = 30-44 mL/min/1.73 square meters)    Stage 4 Severe CKD (GFR = 15-29 mL/min/1.73 square meters)    Stage 5 End Stage CKD (GFR <15 mL/min/1.73 square meters)  Note: GFR calculation is accurate only with a steady state creatinine    CBC and differential [[id number]]  (Abnormal) Collected: 10/14/23 1514    Lab Status: Final result Specimen: Blood from Arm, Left Updated: 10/14/23 1526     WBC 7.15 Thousand/uL      RBC 3.39 Million/uL      Hemoglobin 10.2 g/dL      Hematocrit 31.8 %      MCV 94 fL      MCH 30.1 pg      MCHC 32.1 g/dL      RDW 15.6 %      MPV 10.0 fL      Platelets 269 Thousands/uL      nRBC 0 /100 WBCs      Neutrophils Relative 80 %      Immat GRANS % 1 %      Lymphocytes Relative 9 %      Monocytes Relative 8 %      Eosinophils Relative 2 %      Basophils Relative 0 %      Neutrophils Absolute 5.71 Thousands/µL      Immature Grans Absolute 0.09 Thousand/uL      Lymphocytes Absolute 0.66 Thousands/µL      Monocytes Absolute 0.56 Thousand/µL      Eosinophils Absolute 0.11 Thousand/µL      Basophils Absolute 0.02 Thousands/µL                    XR chest 1 view portable   ED Interpretation by [place] DO Joseph (10/14 1601)   No acute cardiopulmonary disease, semi-erect, similar to prior study. Procedures  Procedures      ED Course     CBC, CMP grossly negative. Appeared mildly dry. Pt is alert and oriented to person and place - also close on date - knew month and year. She states she is currently feeling better after fluids, tylenol and would like to head back. Hemodynamically stable, NAD. States she feels at baseline. On her baseline home O2 of 2L. Urine culture pending, epithelial cells present on urine microscopic. Medical Decision Making  67 y/o f presenting to the ED with myalgias, fatigue, and hot flashes beginning today after flu and covid immunizations yesterday. Baseline mild dementia. She states she can't keep the blankets on her legs because she feels to hot. On baseline O2 of 2 L. States she feels dry. Pmhx: COPD, asthma, DM, HTN.  She states she feels improved from earlier in the day - She was referred from nursing home as she was fatigued, warm, and mildly off her baseline - responding more slowly than usual.  CBC, CMP grossly negative. Appeared mildly dry. Pt is alert and oriented to person and place - also close on date - knew month and year. She states she is currently feeling better after fluids, tylenol and would like to head back. Hemodynamically stable, NAD. States she feels at baseline. On her baseline home O2 of 2L. ? Urine culture pending, epithelial cells present on urine microscopic. Amount and/or Complexity of Data Reviewed  Labs: ordered. Radiology: ordered and independent interpretation performed. Risk  OTC drugs. Differential diagnosis including but not limited to: vaccine reaction, rhabdomyolysis, metabolic abnormality, viral syndrome. Disposition  Final diagnoses: Body aches   Fatigue after COVID-19 vaccination - administered yesterday, improving     Time reflects when diagnosis was documented in both MDM as applicable and the Disposition within this note       Time User Action Codes Description Comment    10/14/2023  4:01 PM Dannial Shames Add [R52] Body aches     10/14/2023  4:02 PM Dannial Shames Add [R53.83] Fatigue     10/14/2023  4:02 PM Dannial Shames Add [R53.83,  T50.B95A] Fatigue after COVID-19 vaccination     10/14/2023  4:02 PM Dannial Shames Remove [R53.83] Fatigue     10/14/2023  4:03 PM Dannial Shames Modify [R53.83,  T50.B95A] Fatigue after COVID-19 vaccination administered yesterday, improving          ED Disposition       ED Disposition   Discharge    Condition   Stable    Date/Time   Sat Oct 14, 2023  4:01 PM    [facility] discharge to home/self care.                    Follow-up Information       Follow up With Specialties Details Why Contact Info Additional Information    [address] Emergency Department Emergency Medicine  As needed [address], [address] [facility] [address], [place], [address], [place] Internal Medicine In 1 week  [address]  [address] [id number] [facility]  [phone]               Discharge Medication List as of 10/14/2023  4:08 PM        CONTINUE these medications which have NOT CHANGED    Details   acetaminophen (TYLENOL) 325 mg tablet Take 3 tablets (975 mg total) by mouth every 8 (eight) hours, Starting Fri 9/25/2020, No Print      Advair Diskus 500-50 MCG/DOSE inhaler Starting Tue 9/28/2021, Historical Med      aspirin (ECOTRIN LOW STRENGTH) 81 mg EC tablet Starting Sat 8/28/2021, Historical Med      benzonatate (TESSALON) 200 MG capsule Take 200 mg by mouth 3 (three) times a day as needed for cough, Historical Med      cetirizine (ZyrTEC) 10 mg tablet Take 10 mg by mouth daily, Historical Med      Cholecalciferol (VITAMIN D3) 48985 units CAPS Take 50,000 Units by mouth every 30 (thirty) days, Historical Med      Dulaglutide (Trulicity) 3 WZ/4.2LY SOPN Inject 0.5 mL (3 mg total) under the skin once a week, Starting Mon 4/18/2022, Normal      ferrous gluconate (FERGON) 324 mg tablet Take 1 tablet (324 mg total) by mouth daily before breakfast, Starting Wed 6/29/2022, No Print      fluticasone (FLONASE) 50 mcg/act nasal spray 1 spray into each nostril daily, Starting Tue 8/20/2019, Normal      gabapentin (NEURONTIN) 300 mg capsule Take 300 mg by mouth 2 (two) times a day, Historical Med      !! Glucose Blood (BL TEST STRIP PACK VI) by In Vitro route, Starting Thu 3/3/2011, Historical Med      !! glucose blood test strip by In Vitro route, Starting Wed 10/8/2014, Historical Med      guaiFENesin (MUCINEX) 600 mg 12 hr tablet Take 1,200 mg by mouth every 12 (twelve) hours, Historical Med      insulin glargine (LANTUS SOLOSTAR) 100 units/mL injection pen Inject 60  units twice a day, No Print      insulin lispro (HumaLOG) 100 units/mL injection Inject 12 -22 units before meals as per scale, No Print      ipratropium-albuterol (DUO-NEB) 0.5-2.5 mg/3 mL nebulizer solution Starting Thu 10/14/2021, Historical Med      levalbuterol (XOPENEX) 1.25 mg/3 mL nebulizer solution Starting Fri 9/1/2023, Historical Med      lidocaine, PF, (XYLOCAINE-MPF) 1 % Starting Wed 6/21/2023, Historical Med      magnesium hydroxide (MILK OF MAGNESIA) 400 mg/5 mL oral suspension Take by mouth daily as needed for constipation, Historical Med      memantine (NAMENDA) 10 mg tablet 10 mg 2 (two) times a day , Historical Med      metFORMIN (GLUCOPHAGE) 500 mg tablet Starting Sat 8/28/2021, Historical Med      montelukast (SINGULAIR) 10 mg tablet Take 10 mg by mouth daily at bedtime, Historical Med      Omeprazole 20 MG TBEC Take 20 mg by mouth daily, Historical Med      simvastatin (ZOCOR) 40 mg tablet Take 1 tablet by mouth daily at bedtime, Starting Thu 8/24/2017, No Print      traZODone (DESYREL) 150 mg tablet Starting Tue 1/25/2022, Historical Med       !! - Potential duplicate medications found. Please discuss with provider. No discharge procedures on file. PDMP Review         Value Time User    PDMP Reviewed  Yes 9/25/2020 12:01 PM Mecca Pinto MD             ED Provider  Attending physically available and evaluated [place]. I managed the patient along with the ED Attending.     Electronically Signed by           Keiry Little DO  10/14/23 5696

## 2023-10-14 NOTE — ED ATTENDING ATTESTATION
10/14/2023  I, Darnell Coronel MD, saw and evaluated the patient. I have discussed the patient with the resident/non-physician practitioner and agree with the resident's/non-physician practitioner's findings, Plan of Care, and MDM as documented in the resident's/non-physician practitioner's note, except where noted. All available labs and Radiology studies were reviewed. I was present for key portions of any procedure(s) performed by the resident/non-physician practitioner and I was immediately available to provide assistance. At this point I agree with the current assessment done in the Emergency Department. I have conducted an independent evaluation of this patient a history and physical is as follows:    68-year-old female nursing home resident sent from her facility for evaluation of body aches and elevated temperature today. Patient did receive both COVID and flu vaccinations yesterday. She really has no complaints at the time of my evaluation. States she feels better. Heart rate mildly elevated at 104 with temperature 99.4 degrees on arrival to emergency department. She is on 2 L nasal cannula oxygen which is chronic. She denies feeling short of breath, any chest pain, any abdominal pain. No urinary symptoms. She does have a history of dementia but is oriented x3 at the moment and able to tell me the events of the last few days. She has expiratory wheezes diffusely. Heart sounds are normal.  Abdomen is soft, nontender. There is a large chronic hernia in the left lower quadrant. Plan calling nursing facility for collateral information. We will check labs, give fluids, Tylenol, reevaluate. Symptoms likely related to vaccination. ED Course  ED Course as of 10/14/23 1632   Sat Oct 14, 2023   1537 WBC: 7.15   1537 Platelet Count(!): 279  Stable thrombocytopenia. 1537 Hemoglobin(!): 10.2  Stable anemia. 1551 Chest X-ray reviewed with no acute changes.    1632 At baseline mentation per facility staff. Stable for discharge back to facility. Suspect aches, elevated temperature related to her vaccinations yesterday.          Critical Care Time  Procedures

## 2023-10-14 NOTE — DISCHARGE INSTRUCTIONS
Take tylenol for pain and/or if fevers develop, continue to rehydrate and rest. Follow-up with your pcp within a week.  Please return to the ED if you develop dyspnea, headache, chest pain, nausea, vomiting, dehydration, weakness, worsening confusion, etc

## 2023-10-16 LAB
BACTERIA UR CULT: ABNORMAL

## 2023-10-17 ENCOUNTER — OFFICE VISIT (OUTPATIENT)
Dept: ENDOCRINOLOGY | Facility: CLINIC | Age: 73
End: 2023-10-17
Payer: MEDICARE

## 2023-10-17 VITALS — DIASTOLIC BLOOD PRESSURE: 80 MMHG | SYSTOLIC BLOOD PRESSURE: 158 MMHG | HEART RATE: 86 BPM | OXYGEN SATURATION: 97 %

## 2023-10-17 DIAGNOSIS — L97.509 TYPE 2 DIABETES MELLITUS WITH FOOT ULCER, WITH LONG-TERM CURRENT USE OF INSULIN (HCC): ICD-10-CM

## 2023-10-17 DIAGNOSIS — E11.621 TYPE 2 DIABETES MELLITUS WITH FOOT ULCER, WITH LONG-TERM CURRENT USE OF INSULIN (HCC): ICD-10-CM

## 2023-10-17 DIAGNOSIS — I10 ESSENTIAL HYPERTENSION: ICD-10-CM

## 2023-10-17 DIAGNOSIS — Z79.4 TYPE 2 DIABETES MELLITUS WITH FOOT ULCER, WITH LONG-TERM CURRENT USE OF INSULIN (HCC): ICD-10-CM

## 2023-10-17 DIAGNOSIS — E11.65 TYPE 2 DIABETES MELLITUS WITH HYPERGLYCEMIA, WITH LONG-TERM CURRENT USE OF INSULIN (HCC): Primary | ICD-10-CM

## 2023-10-17 DIAGNOSIS — E78.2 MIXED HYPERLIPIDEMIA: ICD-10-CM

## 2023-10-17 DIAGNOSIS — Z79.4 TYPE 2 DIABETES MELLITUS WITH HYPERGLYCEMIA, WITH LONG-TERM CURRENT USE OF INSULIN (HCC): Primary | ICD-10-CM

## 2023-10-17 DIAGNOSIS — E55.9 VITAMIN D DEFICIENCY: ICD-10-CM

## 2023-10-17 PROCEDURE — 99214 OFFICE O/P EST MOD 30 MIN: CPT | Performed by: INTERNAL MEDICINE

## 2023-10-17 RX ORDER — CHOLECALCIFEROL (VITAMIN D3) 125 MCG
CAPSULE ORAL
COMMUNITY
Start: 2023-09-11

## 2023-10-17 RX ORDER — INSULIN GLARGINE 100 [IU]/ML
INJECTION, SOLUTION SUBCUTANEOUS
Start: 2023-10-17

## 2023-10-17 RX ORDER — INSULIN GLARGINE 100 [IU]/ML
INJECTION, SOLUTION SUBCUTANEOUS
COMMUNITY
End: 2023-10-17 | Stop reason: SDUPTHER

## 2023-10-17 RX ORDER — INSULIN LISPRO 100 [IU]/ML
INJECTION, SOLUTION INTRAVENOUS; SUBCUTANEOUS
Start: 2023-10-17

## 2023-10-17 NOTE — PROGRESS NOTES
Raina Mackenzie 68 y.o. female MRN: [id number]    Encounter: [id number]      Assessment/Plan     Assessment: This is a 68y.o.-year-old female with diabetes with hyperglycemia. Plan:    Diagnoses and all orders for this visit:    Type 2 diabetes mellitus with hyperglycemia, with long-term current use of insulin ([address])    Lab Results   Component Value Date    HGBA1C 6.1 (H) 10/11/2023   Hemoglobin A1c is well controlled  As per patient she is not having any hypoglycemic or hyperglycemic episodes. We will try to obtain glucose log from [address] [place] [address] again. She was recently admitted in the hospital on October 14 for myalgias fatigue, hot flashes. At that time her glucose was 196 at 3 PM.  We will stop metformin as GFR is close to 40  We will continue current regimen, Basaglar 60 units every 12 hour  Continue Humalog 30 units before meals plus correction scale  Continue Trulicity 3 mg once a week  Continue to monitor blood sugar before every meal and nightly, goal for blood sugar is  mg per DL    Educated about hypoglycemia symptoms and treatment    -     Insulin Glargine Solostar (Basaglar KwikPen) 100 UNIT/ML SOPN; Inject 60 units under the skin every 12 hours  -     Hemoglobin A1C; Future  -     Basic metabolic panel; Future  -     Albumin / creatinine urine ratio; Future    Essential hypertension  BP Readings from Last 3 Encounters:   10/17/23 158/80   10/14/23 (!) 188/79   09/06/23 124/80   Blood pressure is elevated, discussed to follow-up with PCP. Continue current management  Mixed hyperlipidemia  Lab Results   Component Value Date    LDLCALC 60 04/18/2016   LDL is at goal  Continue statins  Vitamin D deficiency  Continue current dose of vitamin D3 supplementation  Type 2 diabetes mellitus with foot ulcer, with long-term current use of insulin ([facility])  -     insulin lispro (HumaLOG) 100 units/mL injection;  Inject 30 units before meals + scale    Other orders  -     Melatonin 5 MG TABS  -Inject humalog 30 units with meals +Scale,  Scale - 150-200 -2 units, 201-250-4 units, 251-300 -6 units, 301-350-8 units, > 350- 10 units    Continue basaglar  to 60 units every 12 hours   Do blood work in 6 months and follow up in 4 months   Call if blood sugar < 70 or >400 more than twice a week   Stop Metformin      CC: Diabetes    History of Present Illness     HPI:    Javy Mistry is 66-year-old female with medical history of type 2 diabetes with long-term insulin use is here for follow-up. She denies any recent illnesses or hospitalization. She denies any issues with her current insulin regimen, she takes Basaglar 60 units twice a day, Humalog 30 units 3 times daily with meals, Trulicity 3 mg once a week and metformin 500 mg daily    Resides at [address] [place] [address], accompanied by staff. She is wheelchair-bound, nonambulatory. She injects insulin in her abdomen  She follows with ophthalmology and podiatry regularly  As per pt , her blood sugars are in 100-200 range  They did not send the log with pt, we left message with no reponse     Hyperlipidemia she takes Zocor 40 mg at bedtime     Blood work reviewed from September 13, 2023, albumin 3.8 g per DL, bilirubin 0.5 mg per DL, total protein 6.7 g per DL, AST 22, ALT 23, anion gap is 8 GFR 44 urine test showed glucose negative, ketone negative, blood negative leukocyte esterase 500, WBC more than 100, proteins positive suggestive of urine tract infection  Lab Results   Component Value Date    HGBA1C 6.1 (H) 10/11/2023       Review of Systems   Constitutional:  Positive for activity change and fatigue. Negative for diaphoresis, fever and unexpected weight change. HENT: Negative. Eyes:  Negative for visual disturbance. Respiratory:  Negative for cough, chest tightness and shortness of breath. Cardiovascular:  Negative for chest pain, palpitations and leg swelling.    Gastrointestinal:  Negative for abdominal pain, blood in stool, constipation, diarrhea, nausea and vomiting. Endocrine: Negative for cold intolerance, heat intolerance, polydipsia, polyphagia and polyuria. Genitourinary:  Negative for dysuria, enuresis, frequency and urgency. Musculoskeletal:  Negative for arthralgias and myalgias. Skin:  Negative for pallor, rash and wound. Allergic/Immunologic: Negative. Neurological:  Negative for dizziness, tremors, weakness and numbness. Hematological: Negative. Psychiatric/Behavioral: Negative.          Historical Information   Past Medical History:   Diagnosis Date    Asthma     Diabetes mellitus ([address])     Hyperlipidemia     Hypertension      Past Surgical History:   Procedure Laterality Date    JOINT REPLACEMENT Bilateral     KNEE SURGERY      TOE SURGERY       Social History   Social History     Substance and Sexual Activity   Alcohol Use Not Currently     Social History     Substance and Sexual Activity   Drug Use No     Social History     Tobacco Use   Smoking Status Former    Packs/day: 1.00    Years: 52.00    Total pack years: 52.00    Types: Cigarettes    Start date:     Quit date: 2017    Years since quittin.1   Smokeless Tobacco Never     Family History:   Family History   Problem Relation Age of Onset    Dementia Brother     Breast cancer Sister 76    Cancer Brother        Meds/Allergies   Current Outpatient Medications   Medication Sig Dispense Refill    acetaminophen (TYLENOL) 325 mg tablet Take 3 tablets (975 mg total) by mouth every 8 (eight) hours 30 tablet 0    Advair Diskus 500-50 MCG/DOSE inhaler       aspirin (ECOTRIN LOW STRENGTH) 81 mg EC tablet       benzonatate (TESSALON) 200 MG capsule Take 200 mg by mouth 3 (three) times a day as needed for cough      cetirizine (ZyrTEC) 10 mg tablet Take 10 mg by mouth daily      Cholecalciferol (VITAMIN D3) 15348 units CAPS Take 50,000 Units by mouth every 30 (thirty) days      ferrous gluconate (FERGON) 324 mg tablet Take 1 tablet (324 mg total) by mouth daily before breakfast  0    fluticasone (FLONASE) 50 mcg/act nasal spray 1 spray into each nostril daily 1 Bottle 5    gabapentin (NEURONTIN) 300 mg capsule Take 300 mg by mouth 2 (two) times a day      guaiFENesin (MUCINEX) 600 mg 12 hr tablet Take 1,200 mg by mouth every 12 (twelve) hours      Insulin Glargine Solostar (Basaglar KwikPen) 100 UNIT/ML SOPN Inject 60 units under the skin every 12 hours      insulin lispro (HumaLOG) 100 units/mL injection Inject 30 units before meals + scale      ipratropium-albuterol (DUO-NEB) 0.5-2.5 mg/3 mL nebulizer solution       levalbuterol (XOPENEX) 1.25 mg/3 mL nebulizer solution       lidocaine, PF, (XYLOCAINE-MPF) 1 %       magnesium hydroxide (MILK OF MAGNESIA) 400 mg/5 mL oral suspension Take by mouth daily as needed for constipation      Melatonin 5 MG TABS       memantine (NAMENDA) 10 mg tablet 10 mg 2 (two) times a day       montelukast (SINGULAIR) 10 mg tablet Take 10 mg by mouth daily at bedtime      Omeprazole 20 MG TBEC Take 20 mg by mouth daily      simvastatin (ZOCOR) 40 mg tablet Take 1 tablet by mouth daily at bedtime  0    traZODone (DESYREL) 150 mg tablet       Dulaglutide (Trulicity) 3 KB/3.4AS SOPN Inject 0.5 mL (3 mg total) under the skin once a week (Patient not taking: Reported on 10/17/2023) 2 mL 1    Glucose Blood (BL TEST STRIP PACK VI) by In Vitro route (Patient not taking: Reported on 9/6/2023)      glucose blood test strip by In Vitro route (Patient not taking: Reported on 9/6/2023)       No current facility-administered medications for this visit. Allergies   Allergen Reactions    Penicillins Shortness Of Breath    Cephalexin Other (See Comments)     Reaction Date: [id number]; unknown     Crestor [Rosuvastatin] Other (See Comments)     Reaction Date: [id number]; unknown     Zithromax [Azithromycin] Other (See Comments)     Unknown        Objective   Vitals: Blood pressure 158/80, pulse 86, SpO2 97 %. Physical Exam  Vitals reviewed. Constitutional:       General: She is not in acute distress. Appearance: Normal appearance. She is not ill-appearing. HENT:      Head: Normocephalic and atraumatic. Nose: Nose normal.   Eyes:      Extraocular Movements: Extraocular movements intact. Conjunctiva/sclera: Conjunctivae normal.   Pulmonary:      Effort: No respiratory distress. Musculoskeletal:      Cervical back: Normal range of motion. Neurological:      General: No focal deficit present. Mental Status: She is alert and oriented to person, place, and time. Psychiatric:         Mood and Affect: Mood normal.         Behavior: Behavior normal.         The history was obtained from the review of the chart, patient.     Lab Results:   Lab Results   Component Value Date/Time    Hemoglobin A1C 6.1 (H) 10/11/2023 04:22 AM    Hemoglobin A1C 6.0 (H) 06/19/2023 04:35 AM    Hemoglobin A1C 5.9 (H) 03/16/2023 04:25 AM    WBC 7.15 10/14/2023 03:14 PM    WBC 5.82 07/07/2023 11:51 PM    WBC 10.82 (H) 06/26/2023 04:22 AM    Hemoglobin 10.2 (L) 10/14/2023 03:14 PM    Hemoglobin 9.5 (L) 07/07/2023 11:51 PM    Hemoglobin 9.6 (L) 06/26/2023 04:22 AM    Hematocrit 31.8 (L) 10/14/2023 03:14 PM    Hematocrit 30.4 (L) 07/07/2023 11:51 PM    Hematocrit 30.4 (L) 06/26/2023 04:22 AM    MCV 94 10/14/2023 03:14 PM    MCV 93 07/07/2023 11:51 PM    MCV 91 06/26/2023 04:22 AM    Platelets 689 (L) 92/45/4906 03:14 PM    Platelets 131 (L) 41/37/0602 11:51 PM    Platelets 550 (L) 08/85/1483 04:22 AM    BUN 26 (H) 10/14/2023 03:14 PM    BUN 25 07/07/2023 11:51 PM    BUN 36 (H) 06/26/2023 04:22 AM    Potassium 4.4 10/14/2023 03:14 PM    Potassium 4.2 07/07/2023 11:51 PM    Potassium 3.9 06/26/2023 04:22 AM    Chloride 97 10/14/2023 03:14 PM    Chloride 106 07/07/2023 11:51 PM    Chloride 99 06/26/2023 04:22 AM    CO2 35 (H) 10/14/2023 03:14 PM    CO2 30 07/07/2023 11:51 PM    CO2 33 (H) 06/26/2023 04:22 AM    Creatinine 1.23 10/14/2023 03:14 PM    Creatinine 1.32 (H) 07/07/2023 11:51 PM    Creatinine 1.31 (H) 06/26/2023 04:22 AM    AST 25 10/14/2023 03:14 PM    AST 16 07/07/2023 11:51 PM    AST 15 06/25/2023 08:34 PM    ALT 28 10/14/2023 03:14 PM    ALT 23 07/07/2023 11:51 PM    ALT 23 06/25/2023 08:34 PM    Total Protein 7.2 10/14/2023 03:14 PM    Total Protein 6.5 07/07/2023 11:51 PM    Total Protein 7.4 06/25/2023 08:34 PM    Albumin 4.0 10/14/2023 03:14 PM    Albumin 3.6 07/07/2023 11:51 PM    Albumin 4.1 06/25/2023 08:34 PM           Imaging Studies: I have personally reviewed pertinent reports. Portions of the record may have been created with voice recognition software. Occasional wrong word or "sound a like" substitutions may have occurred due to the inherent limitations of voice recognition software. Read the chart carefully and recognize, using context, where substitutions have occurred.

## 2023-10-17 NOTE — PATIENT INSTRUCTIONS
Inject humalog 30 units with meals +Scale,  Scale - 150-200 -2 units, 201-250-4 units, 251-300 -6 units, 301-350-8 units, > 350- 10 units    Continue basaglar  to 60 units every 12 hours   Do blood work in 6 months and follow up in 4 months   Call if blood sugar < 70 or >400 more than twice a week   Stop Metformin       Yue Perez

## 2023-10-20 ENCOUNTER — TELEPHONE (OUTPATIENT)
Dept: ENDOCRINOLOGY | Facility: CLINIC | Age: 73
End: 2023-10-20

## 2023-10-20 RX ORDER — DULAGLUTIDE 3 MG/.5ML
3 INJECTION, SOLUTION SUBCUTANEOUS WEEKLY
Qty: 2 ML | Refills: 1
Start: 2023-10-20

## 2023-10-20 NOTE — TELEPHONE ENCOUNTER
Was seen in office on October 17, great still nursing home did not send the log with her.   Please call [facility] and get blood sugar log     June Junior

## 2023-10-23 NOTE — TELEPHONE ENCOUNTER
Spoke to [employer] from [place] they are going to fax the patients sugar logs over to us.   Gave both Fax numbers

## 2023-10-29 NOTE — PROGRESS NOTES
Pulmonary Follow Up Note   Raina Mackenzie 68 y.o. female MRN: [id number]  10/31/2023      Assessment:  Asthma - moderate persistent with acute exacerbation  Continue Advair 500/50 1puff twice daily  Continue Duonebs PRN  Continue singulair 10mg daily  Only use NIKI HFA on prn basis  Flu Vaccine encouraged 2023, COVID-19 up to date  Follow up in 12 months or sooner as needed  Noted POLST and DNR/I      Pulmonary Nodule - stable, no further tracking is needed     Renal Lesion  Simple cyst per US     Dementia     Nocturnal hypoxia - per report c/w GILDARDO  Obtain PSG results from [facility]  Will review further with next visit  PSG [facility] 5/24/21 - RDI 16.7/hr, margie SpO2 75%, 179 minutes SpO2 <88%  She declines CPAP titration, will continue overnight oxygen at 2L/min     6. LE edema - further diuretic management per PCP, appears improved today    7. Sore throat - no evidence of infection or thrush, recommended salt water rinses      Plan:    Diagnoses and all orders for this visit:    Asthma-COPD overlap syndrome    Chronic respiratory failure with hypoxia (HCC)    GILDARDO (obstructive sleep apnea)    Seasonal allergies    Sore throat    Other orders  -     ascorbic acid (VITAMIN C) 500 mg tablet  -     levofloxacin (LEVAQUIN) 250 mg tablet;  (Patient not taking: Reported on 10/31/2023)  -     levofloxacin (LEVAQUIN) 500 mg tablet;  (Patient not taking: Reported on 10/31/2023)  -     HumaLOG KwikPen 100 units/mL injection pen        Return in about 1 year (around 10/31/2024) for Recheck. History of Present Illness   HPI:  Raina Mackenzie is a 68 y.o. female who was initially seen during an admission for hypoxia and encephalopathy in August 2017. She is followed for her asthma and does not have COPD. She has progressive dementia and resides at [facility]. She was last seen by me in March 2023 and Aubrey Villarreal in Oct 2022. She presents today for follow up.  Since last visit she had admission for asthma exacerbation and pneumonia - hospital record was reviewed. Reports overall feeling well. Has noted sore throat for past day, denied thrush, using NIKI nebulizer once a day out of habit, no purulent sputum production, no hemoptysis, no LE edema. She is primarily sedentary. She denied nocturnal dyspnea. Review of Systems   Constitutional:  Negative for activity change, chills and fever. HENT:  Positive for sore throat. Negative for mouth sores, postnasal drip, rhinorrhea and trouble swallowing. Respiratory:  Negative for cough, chest tightness, shortness of breath and wheezing. Cardiovascular:  Negative for chest pain, palpitations and leg swelling. Gastrointestinal:  Negative for abdominal pain, nausea and vomiting. Musculoskeletal:  Positive for arthralgias and gait problem. Allergic/Immunologic: Negative for immunocompromised state. Neurological:  Negative for light-headedness and headaches. Psychiatric/Behavioral:  Positive for sleep disturbance. The patient is not nervous/anxious.         Historical Information   Past Medical History:   Diagnosis Date    Asthma     Diabetes mellitus ([address])     Hyperlipidemia     Hypertension      Past Surgical History:   Procedure Laterality Date    JOINT REPLACEMENT Bilateral     KNEE SURGERY      TOE SURGERY       Family History   Problem Relation Age of Onset    Dementia Brother     Breast cancer Sister 76    Cancer Brother          Meds/Allergies     Current Outpatient Medications:     acetaminophen (TYLENOL) 325 mg tablet, Take 3 tablets (975 mg total) by mouth every 8 (eight) hours, Disp: 30 tablet, Rfl: 0    ascorbic acid (VITAMIN C) 500 mg tablet, , Disp: , Rfl:     aspirin (ECOTRIN LOW STRENGTH) 81 mg EC tablet, , Disp: , Rfl:     cetirizine (ZyrTEC) 10 mg tablet, Take 10 mg by mouth daily, Disp: , Rfl:     Cholecalciferol (VITAMIN D3) 90946 units CAPS, Take 50,000 Units by mouth every 30 (thirty) days, Disp: , Rfl:     dulaglutide (Trulicity) 3 LF/0.8BZ injection, Inject 0.5 mL (3 mg total) under the skin once a week, Disp: 2 mL, Rfl: 1    ferrous gluconate (FERGON) 324 mg tablet, Take 1 tablet (324 mg total) by mouth daily before breakfast, Disp: , Rfl: 0    fluticasone (FLONASE) 50 mcg/act nasal spray, 1 spray into each nostril daily, Disp: 1 Bottle, Rfl: 5    guaiFENesin (MUCINEX) 600 mg 12 hr tablet, Take 1,200 mg by mouth every 12 (twelve) hours, Disp: , Rfl:     HumaLOG KwikPen 100 units/mL injection pen, , Disp: , Rfl:     Insulin Glargine Solostar (Basaglar KwikPen) 100 UNIT/ML SOPN, Inject 60 units under the skin every 12 hours, Disp: , Rfl:     insulin lispro (HumaLOG) 100 units/mL injection, Inject 30 units before meals + scale, Disp: , Rfl:     ipratropium-albuterol (DUO-NEB) 0.5-2.5 mg/3 mL nebulizer solution, , Disp: , Rfl:     magnesium hydroxide (MILK OF MAGNESIA) 400 mg/5 mL oral suspension, Take by mouth daily as needed for constipation, Disp: , Rfl:     Melatonin 5 MG TABS, , Disp: , Rfl:     montelukast (SINGULAIR) 10 mg tablet, Take 10 mg by mouth daily at bedtime, Disp: , Rfl:     Omeprazole 20 MG TBEC, Take 20 mg by mouth daily, Disp: , Rfl:     simvastatin (ZOCOR) 40 mg tablet, Take 1 tablet by mouth daily at bedtime, Disp: , Rfl: 0    traZODone (DESYREL) 150 mg tablet, , Disp: , Rfl:     Advair Diskus 500-50 MCG/DOSE inhaler, , Disp: , Rfl:     benzonatate (TESSALON) 200 MG capsule, Take 200 mg by mouth 3 (three) times a day as needed for cough (Patient not taking: Reported on 10/31/2023), Disp: , Rfl:     gabapentin (NEURONTIN) 300 mg capsule, Take 300 mg by mouth 2 (two) times a day (Patient not taking: Reported on 10/31/2023), Disp: , Rfl:     Glucose Blood (BL TEST STRIP PACK VI), by In Vitro route (Patient not taking: Reported on 9/6/2023), Disp: , Rfl:     glucose blood test strip, by In Vitro route (Patient not taking: Reported on 9/6/2023), Disp: , Rfl:     levalbuterol (XOPENEX) 1.25 mg/3 mL nebulizer solution, , Disp: , Rfl:     levofloxacin (LEVAQUIN) 250 mg tablet, , Disp: , Rfl:     levofloxacin (LEVAQUIN) 500 mg tablet, , Disp: , Rfl:     lidocaine, PF, (XYLOCAINE-MPF) 1 %, , Disp: , Rfl:     memantine (NAMENDA) 10 mg tablet, 10 mg 2 (two) times a day  (Patient not taking: Reported on 10/31/2023), Disp: , Rfl:   Allergies   Allergen Reactions    Penicillins Shortness Of Breath    Cephalexin Other (See Comments)     Reaction Date: [id number]; unknown     Crestor [Rosuvastatin] Other (See Comments)     Reaction Date: [id number]; unknown     Zithromax [Azithromycin] Other (See Comments)     Unknown        Vitals: Blood pressure 130/56, pulse 89, temperature (!) 97.1 °F (36.2 °C), SpO2 93 %. There is no height or weight on file to calculate BMI. Oxygen Therapy  SpO2: 93 %  Oxygen Therapy: Supplemental oxygen  O2 Delivery Method: Nasal cannula  O2 Flow Rate (L/min): 2 L/min      Physical Exam  Physical Exam  Vitals reviewed. Constitutional:       General: She is not in acute distress. Appearance: Normal appearance. She is well-developed. She is obese. She is not ill-appearing, toxic-appearing or diaphoretic. Comments: In wheelchair   HENT:      Head: Normocephalic and atraumatic. Right Ear: External ear normal.      Left Ear: External ear normal.      Nose: Nose normal. No congestion or rhinorrhea. Mouth/Throat:      Mouth: Mucous membranes are moist.      Pharynx: Oropharynx is clear. No oropharyngeal exudate. Comments: No thrush, no exudate, mild posterior pharyngeal cobblesoning  Eyes:      General: No scleral icterus. Right eye: No discharge. Left eye: No discharge. Conjunctiva/sclera: Conjunctivae normal.      Pupils: Pupils are equal, round, and reactive to light. Neck:      Vascular: No JVD. Trachea: No tracheal deviation. Cardiovascular:      Rate and Rhythm: Normal rate and regular rhythm. Heart sounds: Normal heart sounds. No murmur heard. No gallop.    Pulmonary:      Effort: Pulmonary effort is normal. No respiratory distress. Breath sounds: Normal breath sounds. No stridor. No wheezing, rhonchi or rales. Abdominal:      General: Bowel sounds are normal. There is no distension. Palpations: Abdomen is soft. Tenderness: There is no abdominal tenderness. There is no guarding or rebound. Musculoskeletal:         General: No deformity. Right lower leg: No edema. Left lower leg: No edema. Lymphadenopathy:      Cervical: No cervical adenopathy. Skin:     General: Skin is warm and dry. Coloration: Skin is not jaundiced. Findings: No erythema or rash. Neurological:      General: No focal deficit present. Mental Status: She is alert. Mental status is at baseline. Psychiatric:         Mood and Affect: Mood normal.         Behavior: Behavior normal.         Thought Content: Thought content normal.         Labs: I have personally reviewed pertinent lab results.   Lab Results   Component Value Date    WBC 7.15 10/14/2023    HGB 10.2 (L) 10/14/2023    HCT 31.8 (L) 10/14/2023    MCV 94 10/14/2023     (L) 10/14/2023     Lab Results   Component Value Date    GLUCOSE 171 (H) 09/21/2015    CALCIUM 9.4 10/14/2023     (L) 09/21/2015    K 4.4 10/14/2023    CO2 35 (H) 10/14/2023    CL 97 10/14/2023    BUN 26 (H) 10/14/2023    CREATININE 1.23 10/14/2023     No results found for: "IGE"  Lab Results   Component Value Date    ALT 28 10/14/2023    AST 25 10/14/2023    ALKPHOS 54 10/14/2023    BILITOT 0.61 09/21/2015       Imaging and other studies: New images and reports personally reviewed  CXR 10/14/2023 - no focal infiltrate, no effusions, no PTX, resolved right basilar infiltrate from 2/18/2023    CXR 11/6/2022 - no dense infiltrate but noted suggestion of central vascular congestions, no PTX, no sig effusions     Renal US 11/12/20 - simple cyst left kidney     CT Chest 10/26/2020 - resolved basilar infiltrates, stable pulmonary nodules largest 7-8mm LYLA has been stable, left kidney lesion recommending renal US. CT Chest 19 - stable LYLA apical nodule, measures at 7mm per radiology, 4mm RUL pulmonary nodule, no significant mediastinal adenopathy     CT Chest 18 - stable 8mm LYLA and 3mm RUL nodules, no sig mediastinal adenopathy, mild basilar atelectasis     Pulmonary function testin2017 - Ratio 69%, %, FEV1 115% - normal spirometry - shortened expiratory time but with values >100% predicted. TTE 2020 - EF 55%, normal RV size/function    Nader Duval DO, [facility]

## 2023-10-30 RX ORDER — LEVOFLOXACIN 500 MG/1
TABLET, FILM COATED ORAL
COMMUNITY
Start: 2023-09-15

## 2023-10-30 RX ORDER — LEVOFLOXACIN 250 MG/1
TABLET ORAL
COMMUNITY
Start: 2023-09-15

## 2023-10-30 RX ORDER — INSULIN LISPRO 100 [IU]/ML
INJECTION, SOLUTION INTRAVENOUS; SUBCUTANEOUS
COMMUNITY
Start: 2023-10-18

## 2023-10-30 RX ORDER — ASCORBIC ACID 500 MG
TABLET ORAL
COMMUNITY
Start: 2023-08-16

## 2023-10-31 ENCOUNTER — OFFICE VISIT (OUTPATIENT)
Dept: PULMONOLOGY | Facility: CLINIC | Age: 73
End: 2023-10-31
Payer: MEDICARE

## 2023-10-31 VITALS
SYSTOLIC BLOOD PRESSURE: 130 MMHG | DIASTOLIC BLOOD PRESSURE: 56 MMHG | OXYGEN SATURATION: 93 % | HEART RATE: 89 BPM | TEMPERATURE: 97.1 F

## 2023-10-31 DIAGNOSIS — J44.89 ASTHMA-COPD OVERLAP SYNDROME: Primary | ICD-10-CM

## 2023-10-31 DIAGNOSIS — J96.11 CHRONIC RESPIRATORY FAILURE WITH HYPOXIA (HCC): ICD-10-CM

## 2023-10-31 DIAGNOSIS — G47.33 OSA (OBSTRUCTIVE SLEEP APNEA): ICD-10-CM

## 2023-10-31 DIAGNOSIS — J30.2 SEASONAL ALLERGIES: ICD-10-CM

## 2023-10-31 DIAGNOSIS — J02.9 SORE THROAT: ICD-10-CM

## 2023-10-31 PROCEDURE — 99214 OFFICE O/P EST MOD 30 MIN: CPT | Performed by: INTERNAL MEDICINE

## 2023-12-07 ENCOUNTER — HOSPITAL ENCOUNTER (INPATIENT)
Facility: HOSPITAL | Age: 73
LOS: 4 days | Discharge: NURSING, REHAB OR LONG-TERM CARE | DRG: 871 | End: 2023-12-11
Attending: EMERGENCY MEDICINE | Admitting: INTERNAL MEDICINE
Payer: MEDICARE

## 2023-12-07 ENCOUNTER — APPOINTMENT (EMERGENCY)
Dept: RADIOLOGY | Facility: HOSPITAL | Age: 73
DRG: 871 | End: 2023-12-07
Payer: MEDICARE

## 2023-12-07 DIAGNOSIS — J96.01 ACUTE RESPIRATORY FAILURE WITH HYPOXIA (HCC): ICD-10-CM

## 2023-12-07 DIAGNOSIS — G47.33 OSA (OBSTRUCTIVE SLEEP APNEA): ICD-10-CM

## 2023-12-07 DIAGNOSIS — I10 HYPERTENSION, UNSPECIFIED TYPE: ICD-10-CM

## 2023-12-07 DIAGNOSIS — R65.20 SEVERE SEPSIS (HCC): Primary | ICD-10-CM

## 2023-12-07 DIAGNOSIS — J44.89 ASTHMA-COPD OVERLAP SYNDROME: ICD-10-CM

## 2023-12-07 DIAGNOSIS — J96.22 ACUTE ON CHRONIC RESPIRATORY FAILURE WITH HYPERCAPNIA (HCC): ICD-10-CM

## 2023-12-07 DIAGNOSIS — A41.9 SEVERE SEPSIS (HCC): Primary | ICD-10-CM

## 2023-12-07 DIAGNOSIS — N39.0 UTI (URINARY TRACT INFECTION): ICD-10-CM

## 2023-12-07 DIAGNOSIS — J96.01 ACUTE HYPOXIC RESPIRATORY FAILURE (HCC): ICD-10-CM

## 2023-12-07 PROBLEM — J96.02 ACUTE RESPIRATORY FAILURE WITH HYPOXIA AND HYPERCAPNIA (HCC): Status: ACTIVE | Noted: 2017-08-20

## 2023-12-07 PROBLEM — J96.90 RESPIRATORY FAILURE (HCC): Status: ACTIVE | Noted: 2017-08-20

## 2023-12-07 PROBLEM — J96.92 HYPERCAPNIC RESPIRATORY FAILURE (HCC): Status: ACTIVE | Noted: 2017-08-20

## 2023-12-07 PROBLEM — G62.9 NEUROPATHY: Status: ACTIVE | Noted: 2023-12-07

## 2023-12-07 LAB
ALBUMIN SERPL BCP-MCNC: 3.8 G/DL (ref 3.5–5)
ALP SERPL-CCNC: 56 U/L (ref 34–104)
ALT SERPL W P-5'-P-CCNC: 28 U/L (ref 7–52)
ANION GAP SERPL CALCULATED.3IONS-SCNC: 4 MMOL/L
APTT PPP: 29 SECONDS (ref 23–37)
AST SERPL W P-5'-P-CCNC: 23 U/L (ref 13–39)
ATRIAL RATE: 95 BPM
BACTERIA UR QL AUTO: ABNORMAL /HPF
BASE EX.OXY STD BLDV CALC-SCNC: 90.5 % (ref 60–80)
BASE EX.OXY STD BLDV CALC-SCNC: 92.8 % (ref 60–80)
BASE EXCESS BLDV CALC-SCNC: 10 MMOL/L
BASE EXCESS BLDV CALC-SCNC: 9.2 MMOL/L
BASOPHILS # BLD AUTO: 0.02 THOUSANDS/ÂΜL (ref 0–0.1)
BASOPHILS NFR BLD AUTO: 0 % (ref 0–1)
BILIRUB SERPL-MCNC: 0.47 MG/DL (ref 0.2–1)
BILIRUB UR QL STRIP: NEGATIVE
BUN SERPL-MCNC: 23 MG/DL (ref 5–25)
CALCIUM SERPL-MCNC: 8.9 MG/DL (ref 8.4–10.2)
CHLORIDE SERPL-SCNC: 97 MMOL/L (ref 96–108)
CLARITY UR: ABNORMAL
CO2 SERPL-SCNC: 39 MMOL/L (ref 21–32)
COLOR UR: ABNORMAL
CREAT SERPL-MCNC: 1.28 MG/DL (ref 0.6–1.3)
EOSINOPHIL # BLD AUTO: 0.08 THOUSAND/ÂΜL (ref 0–0.61)
EOSINOPHIL NFR BLD AUTO: 1 % (ref 0–6)
ERYTHROCYTE [DISTWIDTH] IN BLOOD BY AUTOMATED COUNT: 16.8 % (ref 11.6–15.1)
FLUAV RNA RESP QL NAA+PROBE: NEGATIVE
FLUBV RNA RESP QL NAA+PROBE: NEGATIVE
GFR SERPL CREATININE-BSD FRML MDRD: 41 ML/MIN/1.73SQ M
GLUCOSE SERPL-MCNC: 167 MG/DL (ref 65–140)
GLUCOSE SERPL-MCNC: 196 MG/DL (ref 65–140)
GLUCOSE SERPL-MCNC: 90 MG/DL (ref 65–140)
GLUCOSE UR STRIP-MCNC: NEGATIVE MG/DL
HCO3 BLDV-SCNC: 36.8 MMOL/L (ref 24–30)
HCO3 BLDV-SCNC: 39.2 MMOL/L (ref 24–30)
HCT VFR BLD AUTO: 32.5 % (ref 34.8–46.1)
HGB BLD-MCNC: 10 G/DL (ref 11.5–15.4)
HGB UR QL STRIP.AUTO: NEGATIVE
IMM GRANULOCYTES # BLD AUTO: 0.14 THOUSAND/UL (ref 0–0.2)
IMM GRANULOCYTES NFR BLD AUTO: 2 % (ref 0–2)
INR PPP: 0.95 (ref 0.84–1.19)
KETONES UR STRIP-MCNC: NEGATIVE MG/DL
LACTATE SERPL-SCNC: 0.4 MMOL/L (ref 0.5–2)
LEUKOCYTE ESTERASE UR QL STRIP: ABNORMAL
LYMPHOCYTES # BLD AUTO: 0.84 THOUSANDS/ÂΜL (ref 0.6–4.47)
LYMPHOCYTES NFR BLD AUTO: 12 % (ref 14–44)
MCH RBC QN AUTO: 29.9 PG (ref 26.8–34.3)
MCHC RBC AUTO-ENTMCNC: 30.8 G/DL (ref 31.4–37.4)
MCV RBC AUTO: 97 FL (ref 82–98)
MONOCYTES # BLD AUTO: 0.51 THOUSAND/ÂΜL (ref 0.17–1.22)
MONOCYTES NFR BLD AUTO: 7 % (ref 4–12)
NEUTROPHILS # BLD AUTO: 5.69 THOUSANDS/ÂΜL (ref 1.85–7.62)
NEUTS SEG NFR BLD AUTO: 78 % (ref 43–75)
NITRITE UR QL STRIP: NEGATIVE
NON-SQ EPI CELLS URNS QL MICRO: ABNORMAL /HPF
NRBC BLD AUTO-RTO: 0 /100 WBCS
O2 CT BLDV-SCNC: 12.6 ML/DL
O2 CT BLDV-SCNC: 14.2 ML/DL
P AXIS: 64 DEGREES
PCO2 BLDV: 63.4 MM HG (ref 42–50)
PCO2 BLDV: 91.6 MM HG (ref 42–50)
PH BLDV: 7.25 [PH] (ref 7.3–7.4)
PH BLDV: 7.38 [PH] (ref 7.3–7.4)
PH UR STRIP.AUTO: 6 [PH]
PLATELET # BLD AUTO: 136 THOUSANDS/UL (ref 149–390)
PMV BLD AUTO: 9.8 FL (ref 8.9–12.7)
PO2 BLDV: 62.5 MM HG (ref 35–45)
PO2 BLDV: 83.7 MM HG (ref 35–45)
POTASSIUM SERPL-SCNC: 4.3 MMOL/L (ref 3.5–5.3)
PR INTERVAL: 206 MS
PROCALCITONIN SERPL-MCNC: 0.2 NG/ML
PROCALCITONIN SERPL-MCNC: 0.28 NG/ML
PROT SERPL-MCNC: 7.3 G/DL (ref 6.4–8.4)
PROT UR STRIP-MCNC: ABNORMAL MG/DL
PROTHROMBIN TIME: 13.2 SECONDS (ref 11.6–14.5)
QRS AXIS: 52 DEGREES
QRSD INTERVAL: 90 MS
QT INTERVAL: 370 MS
QTC INTERVAL: 464 MS
RBC # BLD AUTO: 3.35 MILLION/UL (ref 3.81–5.12)
RBC #/AREA URNS AUTO: ABNORMAL /HPF
RSV RNA RESP QL NAA+PROBE: NEGATIVE
SARS-COV-2 RNA RESP QL NAA+PROBE: NEGATIVE
SODIUM SERPL-SCNC: 140 MMOL/L (ref 135–147)
SP GR UR STRIP.AUTO: 1.02 (ref 1–1.03)
T WAVE AXIS: 56 DEGREES
UROBILINOGEN UR STRIP-ACNC: <2 MG/DL
VENTRICULAR RATE: 95 BPM
WBC # BLD AUTO: 7.28 THOUSAND/UL (ref 4.31–10.16)
WBC #/AREA URNS AUTO: ABNORMAL /HPF

## 2023-12-07 PROCEDURE — 80053 COMPREHEN METABOLIC PANEL: CPT | Performed by: EMERGENCY MEDICINE

## 2023-12-07 PROCEDURE — 87077 CULTURE AEROBIC IDENTIFY: CPT | Performed by: EMERGENCY MEDICINE

## 2023-12-07 PROCEDURE — 83605 ASSAY OF LACTIC ACID: CPT | Performed by: EMERGENCY MEDICINE

## 2023-12-07 PROCEDURE — 36415 COLL VENOUS BLD VENIPUNCTURE: CPT

## 2023-12-07 PROCEDURE — 99285 EMERGENCY DEPT VISIT HI MDM: CPT

## 2023-12-07 PROCEDURE — 82805 BLOOD GASES W/O2 SATURATION: CPT | Performed by: EMERGENCY MEDICINE

## 2023-12-07 PROCEDURE — 71045 X-RAY EXAM CHEST 1 VIEW: CPT

## 2023-12-07 PROCEDURE — 99291 CRITICAL CARE FIRST HOUR: CPT | Performed by: EMERGENCY MEDICINE

## 2023-12-07 PROCEDURE — 94664 DEMO&/EVAL PT USE INHALER: CPT

## 2023-12-07 PROCEDURE — 0241U HB NFCT DS VIR RESP RNA 4 TRGT: CPT | Performed by: EMERGENCY MEDICINE

## 2023-12-07 PROCEDURE — 84145 PROCALCITONIN (PCT): CPT | Performed by: EMERGENCY MEDICINE

## 2023-12-07 PROCEDURE — 84145 PROCALCITONIN (PCT): CPT

## 2023-12-07 PROCEDURE — 94760 N-INVAS EAR/PLS OXIMETRY 1: CPT

## 2023-12-07 PROCEDURE — 81001 URINALYSIS AUTO W/SCOPE: CPT | Performed by: EMERGENCY MEDICINE

## 2023-12-07 PROCEDURE — 87040 BLOOD CULTURE FOR BACTERIA: CPT | Performed by: EMERGENCY MEDICINE

## 2023-12-07 PROCEDURE — 82948 REAGENT STRIP/BLOOD GLUCOSE: CPT

## 2023-12-07 PROCEDURE — 99223 1ST HOSP IP/OBS HIGH 75: CPT | Performed by: INTERNAL MEDICINE

## 2023-12-07 PROCEDURE — 85025 COMPLETE CBC W/AUTO DIFF WBC: CPT | Performed by: EMERGENCY MEDICINE

## 2023-12-07 PROCEDURE — 87086 URINE CULTURE/COLONY COUNT: CPT | Performed by: EMERGENCY MEDICINE

## 2023-12-07 PROCEDURE — 93005 ELECTROCARDIOGRAM TRACING: CPT

## 2023-12-07 PROCEDURE — 94002 VENT MGMT INPAT INIT DAY: CPT

## 2023-12-07 PROCEDURE — 87081 CULTURE SCREEN ONLY: CPT | Performed by: INTERNAL MEDICINE

## 2023-12-07 PROCEDURE — 85610 PROTHROMBIN TIME: CPT | Performed by: EMERGENCY MEDICINE

## 2023-12-07 PROCEDURE — 87186 SC STD MICRODIL/AGAR DIL: CPT | Performed by: EMERGENCY MEDICINE

## 2023-12-07 PROCEDURE — 85730 THROMBOPLASTIN TIME PARTIAL: CPT | Performed by: EMERGENCY MEDICINE

## 2023-12-07 PROCEDURE — 96365 THER/PROPH/DIAG IV INF INIT: CPT

## 2023-12-07 PROCEDURE — 96367 TX/PROPH/DG ADDL SEQ IV INF: CPT

## 2023-12-07 PROCEDURE — 94640 AIRWAY INHALATION TREATMENT: CPT

## 2023-12-07 RX ORDER — ACETAMINOPHEN 325 MG/1
650 TABLET ORAL EVERY 8 HOURS SCHEDULED
Status: DISCONTINUED | OUTPATIENT
Start: 2023-12-07 | End: 2023-12-11 | Stop reason: HOSPADM

## 2023-12-07 RX ORDER — ACETAMINOPHEN 650 MG/1
650 SUPPOSITORY RECTAL ONCE
Status: COMPLETED | OUTPATIENT
Start: 2023-12-07 | End: 2023-12-07

## 2023-12-07 RX ORDER — LANOLIN ALCOHOL/MO/W.PET/CERES
3 CREAM (GRAM) TOPICAL
Status: DISCONTINUED | OUTPATIENT
Start: 2023-12-07 | End: 2023-12-11 | Stop reason: HOSPADM

## 2023-12-07 RX ORDER — IPRATROPIUM BROMIDE AND ALBUTEROL SULFATE 2.5; .5 MG/3ML; MG/3ML
3 SOLUTION RESPIRATORY (INHALATION)
Status: DISCONTINUED | OUTPATIENT
Start: 2023-12-07 | End: 2023-12-07

## 2023-12-07 RX ORDER — MEMANTINE HYDROCHLORIDE 10 MG/1
10 TABLET ORAL 2 TIMES DAILY
Status: DISCONTINUED | OUTPATIENT
Start: 2023-12-07 | End: 2023-12-11 | Stop reason: HOSPADM

## 2023-12-07 RX ORDER — PRAVASTATIN SODIUM 80 MG/1
80 TABLET ORAL
Status: DISCONTINUED | OUTPATIENT
Start: 2023-12-07 | End: 2023-12-11 | Stop reason: HOSPADM

## 2023-12-07 RX ORDER — PANTOPRAZOLE SODIUM 20 MG/1
20 TABLET, DELAYED RELEASE ORAL
Status: DISCONTINUED | OUTPATIENT
Start: 2023-12-08 | End: 2023-12-11 | Stop reason: HOSPADM

## 2023-12-07 RX ORDER — FLUTICASONE FUROATE AND VILANTEROL 200; 25 UG/1; UG/1
2 POWDER RESPIRATORY (INHALATION)
Status: DISCONTINUED | OUTPATIENT
Start: 2023-12-08 | End: 2023-12-11 | Stop reason: HOSPADM

## 2023-12-07 RX ORDER — INSULIN LISPRO 100 [IU]/ML
1-6 INJECTION, SOLUTION INTRAVENOUS; SUBCUTANEOUS
Status: DISCONTINUED | OUTPATIENT
Start: 2023-12-07 | End: 2023-12-11 | Stop reason: HOSPADM

## 2023-12-07 RX ORDER — ESCITALOPRAM OXALATE 10 MG/1
10 TABLET ORAL DAILY
COMMUNITY

## 2023-12-07 RX ORDER — GABAPENTIN 300 MG/1
300 CAPSULE ORAL 2 TIMES DAILY
Status: DISCONTINUED | OUTPATIENT
Start: 2023-12-07 | End: 2023-12-11 | Stop reason: HOSPADM

## 2023-12-07 RX ORDER — LABETALOL HYDROCHLORIDE 5 MG/ML
10 INJECTION, SOLUTION INTRAVENOUS EVERY 6 HOURS PRN
Status: DISCONTINUED | OUTPATIENT
Start: 2023-12-07 | End: 2023-12-11 | Stop reason: HOSPADM

## 2023-12-07 RX ORDER — ALBUTEROL SULFATE 90 UG/1
2 AEROSOL, METERED RESPIRATORY (INHALATION) EVERY 4 HOURS PRN
Status: DISCONTINUED | OUTPATIENT
Start: 2023-12-07 | End: 2023-12-11 | Stop reason: HOSPADM

## 2023-12-07 RX ORDER — BUSPIRONE HYDROCHLORIDE 5 MG/1
5 TABLET ORAL 2 TIMES DAILY
COMMUNITY

## 2023-12-07 RX ORDER — METHYLPREDNISOLONE SOD SUCC 125 MG
1 VIAL (EA) INJECTION ONCE
Status: COMPLETED | OUTPATIENT
Start: 2023-12-07 | End: 2023-12-07

## 2023-12-07 RX ORDER — ENOXAPARIN SODIUM 100 MG/ML
40 INJECTION SUBCUTANEOUS DAILY
Status: DISCONTINUED | OUTPATIENT
Start: 2023-12-07 | End: 2023-12-11 | Stop reason: HOSPADM

## 2023-12-07 RX ORDER — GUAIFENESIN 600 MG/1
1200 TABLET, EXTENDED RELEASE ORAL EVERY 12 HOURS SCHEDULED
Status: DISCONTINUED | OUTPATIENT
Start: 2023-12-07 | End: 2023-12-11 | Stop reason: HOSPADM

## 2023-12-07 RX ORDER — LEVALBUTEROL INHALATION SOLUTION 1.25 MG/3ML
1.25 SOLUTION RESPIRATORY (INHALATION)
Status: DISCONTINUED | OUTPATIENT
Start: 2023-12-07 | End: 2023-12-11 | Stop reason: HOSPADM

## 2023-12-07 RX ORDER — MONTELUKAST SODIUM 10 MG/1
10 TABLET ORAL
Status: DISCONTINUED | OUTPATIENT
Start: 2023-12-07 | End: 2023-12-11 | Stop reason: HOSPADM

## 2023-12-07 RX ORDER — INSULIN GLARGINE 100 [IU]/ML
60 INJECTION, SOLUTION SUBCUTANEOUS EVERY 12 HOURS SCHEDULED
Status: DISCONTINUED | OUTPATIENT
Start: 2023-12-07 | End: 2023-12-11 | Stop reason: HOSPADM

## 2023-12-07 RX ORDER — BENZONATATE 100 MG/1
200 CAPSULE ORAL 3 TIMES DAILY PRN
Status: DISCONTINUED | OUTPATIENT
Start: 2023-12-07 | End: 2023-12-11 | Stop reason: HOSPADM

## 2023-12-07 RX ORDER — ACETAMINOPHEN 325 MG/1
650 TABLET ORAL ONCE
Status: DISCONTINUED | OUTPATIENT
Start: 2023-12-07 | End: 2023-12-07

## 2023-12-07 RX ORDER — LEVALBUTEROL INHALATION SOLUTION 1.25 MG/3ML
1.25 SOLUTION RESPIRATORY (INHALATION) EVERY 6 HOURS PRN
Status: CANCELLED | OUTPATIENT
Start: 2023-12-07

## 2023-12-07 RX ORDER — INSULIN LISPRO 100 [IU]/ML
30 INJECTION, SOLUTION INTRAVENOUS; SUBCUTANEOUS
Status: DISCONTINUED | OUTPATIENT
Start: 2023-12-07 | End: 2023-12-11 | Stop reason: HOSPADM

## 2023-12-07 RX ORDER — METHYLPREDNISOLONE SODIUM SUCCINATE 40 MG/ML
40 INJECTION, POWDER, LYOPHILIZED, FOR SOLUTION INTRAMUSCULAR; INTRAVENOUS EVERY 8 HOURS SCHEDULED
Status: DISCONTINUED | OUTPATIENT
Start: 2023-12-07 | End: 2023-12-08

## 2023-12-07 RX ORDER — ALBUTEROL SULFATE 2.5 MG/3ML
1 SOLUTION RESPIRATORY (INHALATION) ONCE
Status: COMPLETED | OUTPATIENT
Start: 2023-12-07 | End: 2023-12-07

## 2023-12-07 RX ORDER — IPRATROPIUM BROMIDE AND ALBUTEROL SULFATE .5; 3 MG/3ML; MG/3ML
1 SOLUTION RESPIRATORY (INHALATION) ONCE
Status: COMPLETED | OUTPATIENT
Start: 2023-12-07 | End: 2023-12-07

## 2023-12-07 RX ADMIN — ACETAMINOPHEN 650 MG: 650 SUPPOSITORY RECTAL at 09:59

## 2023-12-07 RX ADMIN — CEFTRIAXONE SODIUM 1000 MG: 10 INJECTION, POWDER, FOR SOLUTION INTRAVENOUS at 11:34

## 2023-12-07 RX ADMIN — INSULIN LISPRO 2 UNITS: 100 INJECTION, SOLUTION INTRAVENOUS; SUBCUTANEOUS at 21:48

## 2023-12-07 RX ADMIN — INSULIN LISPRO 1 UNITS: 100 INJECTION, SOLUTION INTRAVENOUS; SUBCUTANEOUS at 18:24

## 2023-12-07 RX ADMIN — Medication 3 MG: at 21:47

## 2023-12-07 RX ADMIN — MONTELUKAST 10 MG: 10 TABLET, FILM COATED ORAL at 21:47

## 2023-12-07 RX ADMIN — LEVALBUTEROL HYDROCHLORIDE 1.25 MG: 1.25 SOLUTION RESPIRATORY (INHALATION) at 19:29

## 2023-12-07 RX ADMIN — PRAVASTATIN SODIUM 80 MG: 80 TABLET ORAL at 17:26

## 2023-12-07 RX ADMIN — MEMANTINE 10 MG: 10 TABLET ORAL at 17:27

## 2023-12-07 RX ADMIN — IPRATROPIUM BROMIDE 0.5 MG: 0.5 SOLUTION RESPIRATORY (INHALATION) at 19:29

## 2023-12-07 RX ADMIN — ACETAMINOPHEN 650 MG: 325 TABLET, FILM COATED ORAL at 21:47

## 2023-12-07 RX ADMIN — GABAPENTIN 300 MG: 300 CAPSULE ORAL at 17:27

## 2023-12-07 RX ADMIN — METHYLPREDNISOLONE SODIUM SUCCINATE 40 MG: 40 INJECTION, POWDER, FOR SOLUTION INTRAMUSCULAR; INTRAVENOUS at 22:13

## 2023-12-07 RX ADMIN — SODIUM CHLORIDE, SODIUM LACTATE, POTASSIUM CHLORIDE, AND CALCIUM CHLORIDE 500 ML: .6; .31; .03; .02 INJECTION, SOLUTION INTRAVENOUS at 09:24

## 2023-12-07 RX ADMIN — GUAIFENESIN 1200 MG: 600 TABLET ORAL at 21:47

## 2023-12-07 RX ADMIN — ENOXAPARIN SODIUM 40 MG: 40 INJECTION SUBCUTANEOUS at 17:27

## 2023-12-07 RX ADMIN — INSULIN GLARGINE 60 UNITS: 100 INJECTION, SOLUTION SUBCUTANEOUS at 21:52

## 2023-12-07 RX ADMIN — BENZONATATE 200 MG: 100 CAPSULE ORAL at 17:27

## 2023-12-07 RX ADMIN — METHYLPREDNISOLONE SODIUM SUCCINATE 40 MG: 40 INJECTION, POWDER, FOR SOLUTION INTRAMUSCULAR; INTRAVENOUS at 17:27

## 2023-12-07 NOTE — PLAN OF CARE
Problem: PAIN - ADULT  Goal: Verbalizes/displays adequate comfort level or baseline comfort level  Description: Interventions:  - Encourage patient to monitor pain and request assistance  - Assess pain using appropriate pain scale  - Administer analgesics based on type and severity of pain and evaluate response  - Implement non-pharmacological measures as appropriate and evaluate response  - Consider cultural and social influences on pain and pain management  - Notify physician/advanced practitioner if interventions unsuccessful or patient reports new pain  Outcome: Progressing     Problem: INFECTION - ADULT  Goal: Absence or prevention of progression during hospitalization  Description: INTERVENTIONS:  - Assess and monitor for signs and symptoms of infection  - Monitor lab/diagnostic results  - Monitor all insertion sites, i.e. indwelling lines, tubes, and drains  - Monitor endotracheal if appropriate and nasal secretions for changes in amount and color  - [place] appropriate cooling/warming therapies per order  - Administer medications as ordered  - Instruct and encourage patient and family to use good hand hygiene technique  - Identify and instruct in appropriate isolation precautions for identified infection/condition  Outcome: Progressing     Problem: DISCHARGE PLANNING  Goal: Discharge to home or other facility with appropriate resources  Description: INTERVENTIONS:  - Identify barriers to discharge w/patient and caregiver  - Arrange for needed discharge resources and transportation as appropriate  - Identify discharge learning needs (meds, wound care, etc.)  - Arrange for interpretive services to assist at discharge as needed  - Refer to Case Management Department for coordinating discharge planning if the patient needs post-hospital services based on physician/advanced practitioner order or complex needs related to functional status, cognitive ability, or social support system  Outcome: Progressing Problem: Knowledge Deficit  Goal: Patient/family/caregiver demonstrates understanding of disease process, treatment plan, medications, and discharge instructions  Description: Complete learning assessment and assess knowledge base.   Interventions:  - Provide teaching at level of understanding  - Provide teaching via preferred learning methods  Outcome: Progressing

## 2023-12-07 NOTE — ED PROCEDURE NOTE
PROCEDURE  CriticalCare Time    Date/Time: 12/7/2023 1:20 PM    Performed by: Misa Jameson MD  Authorized by: Misa Jameson MD    Critical care provider statement:     Critical care time (minutes):  75    Critical care start time:  12/7/2023 11:00 AM    Critical care end time:  12/7/2023 12:15 PM    Critical care time was exclusive of:  Separately billable procedures and treating other patients and teaching time    Critical care was necessary to treat or prevent imminent or life-threatening deterioration of the following conditions:  Respiratory failure and sepsis    Critical care was time spent personally by me on the following activities:  Examination of patient, evaluation of patient's response to treatment, development of treatment plan with patient or surrogate, obtaining history from patient or surrogate, review of old charts, re-evaluation of patient's condition, ordering and review of radiographic studies, ordering and review of laboratory studies and ordering and performing treatments and interventions    I assumed direction of critical care for this patient from another provider in my specialty: no    Comments:      PT PLACED ON EVAPS-  FORM OF BIPAP NON INVASIVE VENTILATION FOR  TYPE 2 RESP FAILURE - WITH FREQUENT ER RE-EVALUATIONS AND ASSESSMENTS - AS WEL L AS SEVERE SEPSIS CRITERIA --        Misa Jameson MD  12/07/23 0590

## 2023-12-07 NOTE — ED PROCEDURE NOTE
PROCEDURE  ECG 12 Lead Documentation Only    Date/Time: 12/7/2023 9:41 AM    Performed by: Dema Cranker, MD  Authorized by: Dema Cranker, MD    Indications / Diagnosis:  SOB  ECG reviewed by me, the ED Provider: yes    Patient location:  ED and bedside  Previous ECG:     Previous ECG:  Compared to current    Comparison ECG info:  7/8/23    Similarity:  No change    Comparison to cardiac monitor: Yes    Interpretation:     Interpretation: non-specific    Rate:     ECG rate:  95    ECG rate assessment: normal    Rhythm:     Rhythm: sinus rhythm    Ectopy:     Ectopy: none    QRS:     QRS axis:  Normal    QRS intervals:  Normal  Conduction:     Conduction: abnormal      Abnormal conduction: 1st degree    ST segments:     ST segments:  Normal  T waves:     T waves: flattening      Flattening:  AVL and V1  Q waves:     Q waves:  V1  Comments:      LOW VOLTAGE CRITERIA- NO ECG SIGNS OF ISCHEMIA/ INJURY / Billy Squires / Georgina Cramer MD  12/07/23 5401

## 2023-12-07 NOTE — ASSESSMENT & PLAN NOTE
Patient met SIRS criteria upon admission to the ED with a fever of 101, tachycardia, tachypnea, and elevated Pro-Neville.  Chest x-ray was negative for any evidence of pneumonia. Patient had no leukocytosis. UA revealed evidence of white blood cells and few leukocytes, can consider urinary source. She was given dose of lactated Ringer's and ceftriaxone while in the ED. In addition, patient was in acute respiratory distress and was placed on BiPAP initially while in the ED by respiratory therapy. Now saturating on 4 L nasal cannula at 95%. Patient denies any symptoms of cough, however cannot rule out upper respiratory infection.     Plan:  -IV ceftriaxone 1 g daily  -Blood cultures x 2 pending  -Sputum culture pending  -Continue to monitor vitals for fever  -Monitor morning blood work

## 2023-12-07 NOTE — ASSESSMENT & PLAN NOTE
Patient has a history of asthma/COPD and follows with pulmonary as an outpatient.   His most recent pulmonology visit was in October where further med recs were provided for the patient.  -See plan under respiratory failure

## 2023-12-07 NOTE — ASSESSMENT & PLAN NOTE
Patient was brought to the ED from [facility], as she started to develop increasing respiratory distress with labored breathing and was noted to be hypoxic while there. In the ED, she was placed initially on BiPAP for respiratory distress and then began saturating at 95%. She was then transitioned to 4 L of nasal cannula which she remains on currently. Patient states she still feels short of breath, but feels better overall. Denies any other symptoms at this time. She did meet sepsis criteria, and had an elevated Pro-Neville.  Rest of lab work was relatively unremarkable. Diffuse wheezing appreciated on exam.  Patient has slight gasping of air when trying to talk. Patient most recently saw her pulmonologist at the end of October and provided medication adjustments. Chest x-ray in the ED was unremarkable. Per nephew, patient has oxygen at her facility as needed. Per patient, she uses 2L as needed at night.     Etiology: likely asthma/COPD exacerbation    Plan:  -Pulmonary on board, appreciate recommendations  -Solu-Medrol IV 40 mg every 12 hours, with plan to transition to prednisone 40 mg daily for 10 days  -Respiratory protocol  -Tessalon Perles and Mucinex as needed  -4 L nasal cannula, wean as necessary, maintain SpO2 > 90%  -DuoNebs every 8 hours  -Albuterol prn  -Singulair 10 mg daily  -Sputum culture pending  -BiPAP at night

## 2023-12-07 NOTE — H&P
[facility]  H&P  Name: Justine Nguyen 68 y.o. female I MRN: [id number]  Unit/Bed#: ED-07 I Date of Admission: 12/7/2023   Date of Service: 12/7/2023 I Hospital Day: 0      Assessment/Plan   Sepsis [facility])  Assessment & Plan  Patient met SIRS criteria upon admission to the ED with a fever of 101, tachycardia, tachypnea, and elevated Pro-Neville.  Chest x-ray was negative for any evidence of pneumonia. Patient had no leukocytosis. UA revealed evidence of white blood cells and few leukocytes, can consider urinary source. She was given dose of lactated Ringer's and ceftriaxone while in the ED. In addition, patient was in acute respiratory distress and was placed on BiPAP initially while in the ED by respiratory therapy. Now saturating on 4 L nasal cannula at 95%. Patient denies any symptoms of cough, however cannot rule out upper respiratory infection. Plan:  -IV ceftriaxone 1 g daily  -Blood cultures x 2 pending  -Sputum culture pending  -Continue to monitor vitals for fever  -Monitor morning blood work    Acute hypoxic respiratory failure [facility])  Assessment & Plan  Patient was brought to the ED from [facility], as she started to develop increasing respiratory distress with labored breathing and was noted to be hypoxic while there. In the ED, she was placed initially on BiPAP for respiratory distress and then began saturating at 95%. She was then transitioned to 4 L of nasal cannula which she remains on currently. Patient states she still feels short of breath, but feels better overall. Denies any other symptoms at this time. She did meet sepsis criteria, and had an elevated Pro-Neville.  Rest of lab work was relatively unremarkable. Diffuse wheezing appreciated on exam.  Patient has slight gasping of air when trying to talk. Patient most recently saw her pulmonologist at the end of October and provided medication adjustments. Chest x-ray in the ED was unremarkable.   Per nephew, patient has oxygen at her facility as needed. Acute COPD/asthma exacerbation versus pneumonia versus URI vs other    Plan:  -Pulmonary consult placed  -Respiratory protocol  -Tessalon Perles and Mucinex as needed  -4 L nasal cannula, wean as necessary  -DuoNebs every 6 hours  -Albuterol prn  -Solu-Medrol IV 40 mg every 8 hours  -Singulair 10 mg daily  -IV ceftriaxone  -Sputum culture pending  -May consider BiPAP at night    Neuropathy  Assessment & Plan  Continue gabapentin 300 mg twice daily. Anemia  Assessment & Plan  Patient has chronic anemia at baseline. Hemoglobin stable on admission at 10. Continue to monitor morning CBCs. Asthma-COPD overlap syndrome  Assessment & Plan  Patient has a history of asthma/COPD and follows with pulmonary as an outpatient. His most recent pulmonology visit was in October where further med recs were provided for the patient.  -See plan under respiratory failure    Hyperlipidemia  Assessment & Plan  Continue home statin regimen    Alzheimer's disease ([address])  Assessment & Plan  Continue memantine 10 mg daily. Has trazodone 150 mg as needed for sleep. Type 2 diabetes mellitus with hyperglycemia, with long-term current use of insulin ([facility])  Assessment & Plan  Lab Results   Component Value Date    HGBA1C 6.1 (H) 10/11/2023       No results for input(s): "POCGLU" in the last 72 hours. Blood Sugar Average: Last 72 hrs:  Continue home regimen of 60 units Lantus twice daily. 30 units Humalog with meals. Sliding scale insulin. Essential hypertension  Assessment & Plan  Patient is not on any hypertension medications at home. Blood pressures have been slightly elevated with highest systolic reaching 800.    -10 mg IV labetalol as needed for systolic blood pressures greater than 180. VTE Pharmacologic Prophylaxis: VTE Score: 8 High Risk (Score >/= 5) - Pharmacological DVT Prophylaxis Ordered: enoxaparin (Lovenox). Sequential Compression Devices Ordered.   Code Status: Prior Level 3  Discussion with family: Updated  (nephew) via phone. Anticipated Length of Stay: Patient will be admitted on an inpatient basis with an anticipated length of stay of greater than 2 midnights secondary to sepsis. Total Time Spent on Date of Encounter in care of patient: 45 mins. This time was spent on one or more of the following: performing physical exam; counseling and coordination of care; obtaining or reviewing history; documenting in the medical record; reviewing/ordering tests, medications or procedures; communicating with other healthcare professionals and discussing with patient's family/caregivers. Chief Complaint: respiratory distress    History of Present Illness:  Erika Woodard is a 68 y.o. female with a PMH of COPD/asthma, neuropathy, type 2 diabetes, hyperlipidemia, anemia, and mild dementia who presents with respiratory distress. Patient resides at [facility], where this morning she was found to be in acute respiratory distress with difficulty breathing and was found to be hypoxic. She was brought to the ED and was placed on BiPAP by respiratory therapy. She then began saturating at 95% and was subsequently switched to 4 L of nasal cannula which she is currently saturating on. Patient denies any recent sicknesses or illnesses, did not feel feverish or chills, reports no cough, just difficulty breathing. Patient denies any pain. Of note, patient has mild dementia at baseline. In the ED, patient met SIRS criteria with a fever of 101, tachycardia, tachypnea, and elevated Pro-Neville.  Chest x-ray was relatively unremarkable, and urinalysis revealed few leukocytes and white blood cells. In the ED she was given a dose of ceftriaxone and lactated Ringer's solution. She was also given DuoNebs, albuterol, and a dose of methylprednisolone.   Upon talking with patient's nephew, he states that she is on oxygen as needed at the nursing home but not routinely use. Patient was admitted to medicine for further workup and evaluation. Review of Systems:  Review of Systems   Constitutional:  Positive for fever. HENT: Negative. Eyes: Negative. Respiratory:  Positive for shortness of breath and wheezing. Cardiovascular: Negative. Gastrointestinal: Negative. Endocrine: Negative. Musculoskeletal: Negative. Allergic/Immunologic: Negative. Neurological: Negative. Hematological: Negative. Psychiatric/Behavioral: Negative. Past Medical and Surgical History:   Past Medical History:   Diagnosis Date    Asthma     Diabetes mellitus ([address])     Hyperlipidemia     Hypertension        Past Surgical History:   Procedure Laterality Date    JOINT REPLACEMENT Bilateral     KNEE SURGERY      TOE SURGERY         Meds/Allergies:  Prior to Admission medications    Medication Sig Start Date End Date Taking?  Authorizing Provider   acetaminophen (TYLENOL) 325 mg tablet Take 3 tablets (975 mg total) by mouth every 8 (eight) hours 9/25/20  Yes Linda Deleon MD   ascorbic acid (VITAMIN C) 500 mg tablet  8/16/23  Yes Historical Provider, MD   aspirin (ECOTRIN LOW STRENGTH) 81 mg EC tablet  8/28/21  Yes Historical Provider, MD   cetirizine (ZyrTEC) 10 mg tablet Take 10 mg by mouth daily   Yes Historical Provider, MD   Cholecalciferol (VITAMIN D3) 14019 units CAPS Take 50,000 Units by mouth every 30 (thirty) days   Yes Historical Provider, MD   dulaglutide (Trulicity) 3 EB/2.9QR injection Inject 0.5 mL (3 mg total) under the skin once a week 10/20/23  Yes Tessa Zuluaga MD   ferrous gluconate (FERGON) 324 mg tablet Take 1 tablet (324 mg total) by mouth daily before breakfast 6/29/22  Yes Jake Kramer PA-C   fluticasone [facility]) 50 mcg/act nasal spray 1 spray into each nostril daily 8/20/19  Yes CELINA Lucio   gabapentin (NEURONTIN) 300 mg capsule Take 300 mg by mouth 2 (two) times a day   Yes Historical Provider, MD Glucose Blood (BL TEST STRIP PACK VI) Test 3/3/11  Yes Historical Provider, MD   glucose blood test strip Test 10/8/14  Yes Historical Provider, MD   guaiFENesin (MUCINEX) 600 mg 12 hr tablet Take 1,200 mg by mouth every 12 (twelve) hours   Yes Historical Provider, MD   Insulin Glargine Solostar (Basaglar KwikPen) 100 UNIT/ML SOPN Inject 60 units under the skin every 12 hours 10/17/23  Yes Angel Manning MD   insulin lispro (HumaLOG) 100 units/mL injection Inject 30 units before meals + scale 10/17/23  Yes Angel Manning MD   ipratropium-albuterol (DUO-NEB) 0.5-2.5 mg/3 mL nebulizer solution  10/14/21  Yes Historical Provider, MD   levalbuterol (XOPENEX) 1.25 mg/3 mL nebulizer solution  9/1/23  Yes Historical Provider, MD   magnesium hydroxide (MILK OF MAGNESIA) 400 mg/5 mL oral suspension Take by mouth daily as needed for constipation   Yes Historical Provider, MD   Melatonin 5 MG TABS  9/11/23  Yes Historical Provider, MD   memantine (NAMENDA) 10 mg tablet 10 mg 2 (two) times a day   Yes Historical Provider, MD   montelukast (SINGULAIR) 10 mg tablet Take 10 mg by mouth daily at bedtime   Yes Historical Provider, MD   Omeprazole 20 MG TBEC Take 20 mg by mouth daily   Yes Historical Provider, MD   simvastatin (ZOCOR) 40 mg tablet Take 1 tablet by mouth daily at bedtime 8/24/17  Yes Edda Ahn MD   traZODone (DESYREL) 150 mg tablet  1/25/22  Yes Historical Provider, MD   Advair Diskus 500-50 MCG/DOSE inhaler  9/28/21   Historical Provider, MD   benzonatate (TESSALON) 200 MG capsule Take 200 mg by mouth 3 (three) times a day as needed for cough  Patient not taking: Reported on 10/31/2023    Historical Provider, MD   HumaLOG KwikPen 100 units/mL injection pen  10/18/23   Historical Provider, MD   levofloxacin (LEVAQUIN) 250 mg tablet  9/15/23   Historical Provider, MD   levofloxacin (LEVAQUIN) 500 mg tablet  9/15/23   Historical Provider, MD   lidocaine, PF, (XYLOCAINE-MPF) 1 %  6/21/23   Historical Provider, MD [place] have reveiwed home medications using records provided by [facility]. Allergies: Allergies   Allergen Reactions    Penicillins Shortness Of Breath    Cephalexin Other (See Comments)     Reaction Date: ; unknown     Crestor [Rosuvastatin] Other (See Comments)     Reaction Date: ; unknown     Zithromax [Azithromycin] Other (See Comments)     Unknown        Social History:  Marital Status: Single   Occupation: n/a  Patient Pre-hospital Living Situation: Skilled Nursing Facility: [facility]  Patient Pre-hospital Level of Mobility: unable to be assessed at time of evaluation  Patient Pre-hospital Diet Restrictions: n/a  Substance Use History:   Social History     Substance and Sexual Activity   Alcohol Use Not Currently     Social History     Tobacco Use   Smoking Status Former    Packs/day: 1.00    Years: 52.00    Total pack years: 52.00    Types: Cigarettes    Start date:     Quit date: 2017    Years since quittin.2   Smokeless Tobacco Never     Social History     Substance and Sexual Activity   Drug Use No       Family History:  Family History   Problem Relation Age of Onset    Dementia Brother     Breast cancer Sister 76    Cancer Brother        Physical Exam:     Vitals:   Blood Pressure: (!) 171/72 (23 1430)  Pulse: 75 (23 1430)  Temperature: 99.9 °F (37.7 °C) (23 1133)  Temp Source: Rectal (23 1133)  Respirations: (!) 30 (23 1430)  Height: 5' 2" (157.5 cm) (23 0901)  SpO2: 95 % (23 1430)    Physical Exam  Constitutional:       Appearance: She is obese. She is ill-appearing. HENT:      Head: Normocephalic and atraumatic. Right Ear: External ear normal.      Left Ear: External ear normal.      Nose: Nose normal. No congestion. Mouth/Throat:      Mouth: Mucous membranes are dry. Pharynx: Oropharynx is clear. Eyes:      Extraocular Movements: Extraocular movements intact.       Conjunctiva/sclera: Conjunctivae normal.   Cardiovascular:      Rate and Rhythm: Normal rate and regular rhythm. Heart sounds: No murmur heard. Pulmonary:      Effort: Respiratory distress present. Breath sounds: Wheezing (diffuse) present. Abdominal:      General: There is no distension. Palpations: Abdomen is soft. Tenderness: There is no abdominal tenderness. Musculoskeletal:         General: No swelling or tenderness. Cervical back: Normal range of motion and neck supple. Comments: 3-5 strength in the bilateral upper and lower extremities   Skin:     General: Skin is warm and dry. Neurological:      Mental Status: She is alert and oriented to person, place, and time.    Psychiatric:         Mood and Affect: Mood normal.          Additional Data:     Lab Results:  Results from last 7 days   Lab Units 12/07/23  0905   WBC Thousand/uL 7.28   HEMOGLOBIN g/dL 10.0*   HEMATOCRIT % 32.5*   PLATELETS Thousands/uL 136*   NEUTROS PCT % 78*   LYMPHS PCT % 12*   MONOS PCT % 7   EOS PCT % 1     Results from last 7 days   Lab Units 12/07/23  0905   SODIUM mmol/L 140   POTASSIUM mmol/L 4.3   CHLORIDE mmol/L 97   CO2 mmol/L 39*   BUN mg/dL 23   CREATININE mg/dL 1.28   ANION GAP mmol/L 4   CALCIUM mg/dL 8.9   ALBUMIN g/dL 3.8   TOTAL BILIRUBIN mg/dL 0.47   ALK PHOS U/L 56   ALT U/L 28   AST U/L 23   GLUCOSE RANDOM mg/dL 90     Results from last 7 days   Lab Units 12/07/23  0905   INR  0.95             Results from last 7 days   Lab Units 12/07/23  0905   LACTIC ACID mmol/L 0.4*   PROCALCITONIN ng/ml 0.28*       Lines/Drains:  Invasive Devices       Peripheral Intravenous Line  Duration             Peripheral IV 12/07/23 Distal;Left;Ventral (anterior) Forearm <1 day    Peripheral IV 12/07/23 Distal;Right Forearm <1 day    Peripheral IV 12/07/23 Left Antecubital <1 day                        Imaging: Reviewed radiology reports from this admission including: chest xray  XR chest 1 view portable    (Results Pending)       EKG and Other Studies Reviewed on Admission:   EKG: NSR. HR 98.    ** Please Note: This note has been constructed using a voice recognition system.  **

## 2023-12-07 NOTE — RESPIRATORY THERAPY NOTE
12/07/23 0901   Respiratory Assessment   Assessment Type Assess only   General Appearance Drowsy   Respiratory Pattern Tachypneic   Chest Assessment Chest expansion symmetrical   Bilateral Breath Sounds Diminished;Coarse   Resp Comments Patient came in via EMS for RDS on cpap. Patient was found to by hypoxic at nursing home. Placed patient on bipap IPAP ranging from 12-24 and epap 4-8. On various settings patients TV were less than 250. Placed patient on AVAPS on documented settings.  Provider aware of settings   O2 Device bipap   Non-Invasive Information   O2 Interface Device Face mask   Non-Invasive Ventilation Mode BiPAP AVAP   $ Continous NIV Initial   SpO2 95 %   $ Pulse Oximetry Spot Check Charge Completed   Non-Invasive Settings   Vt (mL) 400 mL   Min Pressure Set (cm H20) 10 cm H2O   Max Pressure Set (cm H20) 25 cm H2O   EPAP (cm) 6 cm   Rate (Set) 10   FiO2 (%) 40   Rise Time 3   Inspiratory Time (Set) 0.8   Non-Invasive Readings   Skin Intervention Skin intact   Total Rate 25   Vt (mL) (Mech) 484   MV (Mech) 12.2   Peak Pressure (Obs) 16   Leak (lpm) 42   Non-Invasive Alarms   Insp Pressure High (cm H20) 25   Insp Pressure Low (cm H20) 4   Low Insp Pressure Time (sec) 20 sec   MV Low (L/min) 3   Vt High (mL) 1000   Vt Low (mL) 250   High Resp Rate (BPM) 40 BPM   Low Resp Rate (BPM) 8 BPM

## 2023-12-07 NOTE — ASSESSMENT & PLAN NOTE
Patient has chronic anemia at baseline. Hemoglobin stable on admission at 10. Continue to monitor morning CBCs.

## 2023-12-07 NOTE — ASSESSMENT & PLAN NOTE
[facility] as she started to develop increasing respiratory distress with labored breathing and was noted to be hypoxic while there. In the ED, she was placed initially on BiPAP for respiratory distress and then began saturating at 95%. She was then transitioned to 4 L of nasal cannula which she remains on currently. Patient states she still feels short of breath, but feels better overall. Denies any other symptoms at this time. She did meet sepsis criteria, and had an elevated Pro-Neville.  Rest of lab work was relatively unremarkable. Diffuse wheezing appreciated on exam.  Patient has slight gasping of air when trying to talk. Patient most recently saw her pulmonologist at the end of October and provided medication adjustments. Chest x-ray in the ED was unremarkable. Per nephew, patient has oxygen at her facility as needed.     Acute COPD/asthma exacerbation versus pneumonia versus URI vs other    Plan:  -Pulmonary consult placed  -Respiratory protocol  -Tessalon Perles and Mucinex as needed  -4 L nasal cannula, wean as necessary  -DuoNebs every 6 hours  -Albuterol prn  -Solu-Medrol IV 40 mg every 8 hours  -Singulair 10 mg daily  -IV ceftriaxone  -Sputum culture pending  -May consider BiPAP at night

## 2023-12-07 NOTE — ASSESSMENT & PLAN NOTE
Lab Results   Component Value Date    HGBA1C 6.1 (H) 10/11/2023       No results for input(s): "POCGLU" in the last 72 hours. Blood Sugar Average: Last 72 hrs:  Continue home regimen of 60 units Lantus twice daily. 30 units Humalog with meals. Sliding scale insulin.

## 2023-12-07 NOTE — SEPSIS NOTE
Sepsis Note   Danii Ramos 68 y.o. female MRN: [id number]  Unit/Bed#: ED-07 Encounter: [id number]       Initial Sepsis Screening       Row Name 12/07/23 1121                Is the patient's history suggestive of a new or worsening infection? Yes (Proceed)  -MB        Suspected source of infection suspect infection, source unknown  -MB        Indicate SIRS criteria Hyperthemia > 38.3C (100.9F) OR Hypothermia <36C (96.8F); Tachycardia > 90 bpm  -MB        Are two or more of the above signs & symptoms of infection both present and new to the patient? Yes (Proceed)  -MB        Assess for evidence of organ dysfunction: Are any of the below criteria present within 6 hours of suspected infection and SIRS criteria that are NOT considered to be chronic conditions? New need for invasive/non-invasive ventilation  -MB        Date of presentation of severe sepsis 12/07/23  -MB        Time of presentation of severe sepsis 1122  -MB        Sepsis Note: Click "NEXT" below (NOT "close") to generate sepsis note based on above information. --                  User Key  (r) = Recorded By, (t) = Taken By, (c) = Cosigned By      [address] [place] [address] Name Provider Type    PRIYANKA Brandt MD Physician                        Body mass index is 39.88 kg/m².   Wt Readings from Last 1 Encounters:   10/14/23 98.9 kg (218 lb 0.6 oz)     IBW (Ideal Body Weight): 50.1 kg    Ideal body weight: 50.1 kg (110 lb 7.2 oz)  Adjusted ideal body weight: 69.6 kg (153 lb 7.7 oz)

## 2023-12-07 NOTE — RESPIRATORY THERAPY NOTE
RT Protocol Note  Thecristian Quevedo 68 y.o. female MRN: [id number]  Unit/Bed#: ED-07 Encounter: [id number]    Assessment    Active Problems: There are no active Hospital Problems. Home Pulmonary Medications:  Xopenex (not taking per chart), duo neb    Home Devices/Therapy: Other (Comment)    Past Medical History:   Diagnosis Date    Asthma     Diabetes mellitus ([address])     Hyperlipidemia     Hypertension      Social History     Socioeconomic History    Marital status: Single     Spouse name: None    Number of children: None    Years of education: None    Highest education level: None   Occupational History    None   Tobacco Use    Smoking status: Former     Packs/day: 1.00     Years: 52.00     Total pack years: 52.00     Types: Cigarettes     Start date: 65     Quit date: 2017     Years since quittin.2    Smokeless tobacco: Never   Vaping Use    Vaping Use: Never used   Substance and Sexual Activity    Alcohol use: Not Currently    Drug use: No    Sexual activity: None   Other Topics Concern    None   Social History Narrative    None     Social Determinants of Health     Financial Resource Strain: Not on file   Food Insecurity: No Food Insecurity (2023)    Hunger Vital Sign     Worried About Running Out of Food in the Last Year: Never true     Ran Out of Food in the Last Year: Never true   Transportation Needs: No Transportation Needs (2023)    PRAPARE - Transportation     Lack of Transportation (Medical): No     Lack of Transportation (Non-Medical):  No   Physical Activity: Not on file   Stress: Not on file   Social Connections: Not on file   Intimate Partner Violence: Not on file   Housing Stability: Low Risk  (2023)    Housing Stability Vital Sign     Unable to Pay for Housing in the Last Year: No     Number of Places Lived in the Last Year: 1     Unstable Housing in the Last Year: No       Subjective         Objective    Physical Exam:   Assessment Type: Assess only  General Appearance: Drowsy  Respiratory Pattern: Tachypneic  Chest Assessment: Chest expansion symmetrical  Bilateral Breath Sounds: Diminished, Coarse  O2 Device: bipap    Vitals:  Blood pressure 168/74, pulse 96, temperature (!) 101 °F (38.3 °C), temperature source Rectal, resp. rate (!) 32, height 5' 2" (1.575 m), SpO2 95 %. Imaging and other studies: I have personally reviewed pertinent reports. O2 Device: bipap     Plan    Respiratory Plan: Moderate/Severe Distress pathway, Home Bronchodilator Patient pathway        Resp Comments: Patient came in via EMS for RDS on cpap. Patient was found to by hypoxic at nursing home. Placed patient on bipap IPAP ranging from 12-24 and epap 4-8. On various settings patients TV were less than 250. Placed patient on AVAPS on documented settings.

## 2023-12-07 NOTE — ASSESSMENT & PLAN NOTE
Patient is not on any hypertension medications at home. Blood pressures have been slightly elevated with highest systolic reaching 995.    -10 mg IV labetalol as needed for systolic blood pressures greater than 180.

## 2023-12-08 PROBLEM — R82.90 ABNORMAL URINALYSIS: Status: ACTIVE | Noted: 2023-12-08

## 2023-12-08 LAB
ANION GAP SERPL CALCULATED.3IONS-SCNC: 5 MMOL/L
BASOPHILS # BLD AUTO: 0.01 THOUSANDS/ÂΜL (ref 0–0.1)
BASOPHILS NFR BLD AUTO: 0 % (ref 0–1)
BUN SERPL-MCNC: 32 MG/DL (ref 5–25)
CALCIUM SERPL-MCNC: 8.8 MG/DL (ref 8.4–10.2)
CHLORIDE SERPL-SCNC: 97 MMOL/L (ref 96–108)
CO2 SERPL-SCNC: 37 MMOL/L (ref 21–32)
CREAT SERPL-MCNC: 1.24 MG/DL (ref 0.6–1.3)
EOSINOPHIL # BLD AUTO: 0 THOUSAND/ÂΜL (ref 0–0.61)
EOSINOPHIL NFR BLD AUTO: 0 % (ref 0–6)
ERYTHROCYTE [DISTWIDTH] IN BLOOD BY AUTOMATED COUNT: 16.2 % (ref 11.6–15.1)
GFR SERPL CREATININE-BSD FRML MDRD: 43 ML/MIN/1.73SQ M
GLUCOSE SERPL-MCNC: 132 MG/DL (ref 65–140)
GLUCOSE SERPL-MCNC: 164 MG/DL (ref 65–140)
GLUCOSE SERPL-MCNC: 187 MG/DL (ref 65–140)
GLUCOSE SERPL-MCNC: 197 MG/DL (ref 65–140)
GLUCOSE SERPL-MCNC: 197 MG/DL (ref 65–140)
HCT VFR BLD AUTO: 30.5 % (ref 34.8–46.1)
HGB BLD-MCNC: 9.5 G/DL (ref 11.5–15.4)
IMM GRANULOCYTES # BLD AUTO: 0.13 THOUSAND/UL (ref 0–0.2)
IMM GRANULOCYTES NFR BLD AUTO: 2 % (ref 0–2)
LYMPHOCYTES # BLD AUTO: 0.75 THOUSANDS/ÂΜL (ref 0.6–4.47)
LYMPHOCYTES NFR BLD AUTO: 14 % (ref 14–44)
MCH RBC QN AUTO: 29.9 PG (ref 26.8–34.3)
MCHC RBC AUTO-ENTMCNC: 31.1 G/DL (ref 31.4–37.4)
MCV RBC AUTO: 96 FL (ref 82–98)
MONOCYTES # BLD AUTO: 0.26 THOUSAND/ÂΜL (ref 0.17–1.22)
MONOCYTES NFR BLD AUTO: 5 % (ref 4–12)
NEUTROPHILS # BLD AUTO: 4.3 THOUSANDS/ÂΜL (ref 1.85–7.62)
NEUTS SEG NFR BLD AUTO: 79 % (ref 43–75)
NRBC BLD AUTO-RTO: 0 /100 WBCS
PLATELET # BLD AUTO: 139 THOUSANDS/UL (ref 149–390)
PMV BLD AUTO: 10.3 FL (ref 8.9–12.7)
POTASSIUM SERPL-SCNC: 4.5 MMOL/L (ref 3.5–5.3)
PROCALCITONIN SERPL-MCNC: 0.17 NG/ML
RBC # BLD AUTO: 3.18 MILLION/UL (ref 3.81–5.12)
SODIUM SERPL-SCNC: 139 MMOL/L (ref 135–147)
WBC # BLD AUTO: 5.45 THOUSAND/UL (ref 4.31–10.16)

## 2023-12-08 PROCEDURE — 84145 PROCALCITONIN (PCT): CPT

## 2023-12-08 PROCEDURE — 99223 1ST HOSP IP/OBS HIGH 75: CPT | Performed by: STUDENT IN AN ORGANIZED HEALTH CARE EDUCATION/TRAINING PROGRAM

## 2023-12-08 PROCEDURE — 94640 AIRWAY INHALATION TREATMENT: CPT

## 2023-12-08 PROCEDURE — 85025 COMPLETE CBC W/AUTO DIFF WBC: CPT | Performed by: INTERNAL MEDICINE

## 2023-12-08 PROCEDURE — 82948 REAGENT STRIP/BLOOD GLUCOSE: CPT

## 2023-12-08 PROCEDURE — 87205 SMEAR GRAM STAIN: CPT

## 2023-12-08 PROCEDURE — 80048 BASIC METABOLIC PNL TOTAL CA: CPT

## 2023-12-08 PROCEDURE — 87070 CULTURE OTHR SPECIMN AEROBIC: CPT

## 2023-12-08 PROCEDURE — 99232 SBSQ HOSP IP/OBS MODERATE 35: CPT | Performed by: INTERNAL MEDICINE

## 2023-12-08 PROCEDURE — 94760 N-INVAS EAR/PLS OXIMETRY 1: CPT

## 2023-12-08 RX ORDER — METHYLPREDNISOLONE SODIUM SUCCINATE 40 MG/ML
40 INJECTION, POWDER, LYOPHILIZED, FOR SOLUTION INTRAMUSCULAR; INTRAVENOUS EVERY 12 HOURS SCHEDULED
Status: DISCONTINUED | OUTPATIENT
Start: 2023-12-08 | End: 2023-12-09

## 2023-12-08 RX ORDER — AZITHROMYCIN 250 MG/1
500 TABLET, FILM COATED ORAL EVERY 24 HOURS
Status: DISCONTINUED | OUTPATIENT
Start: 2023-12-08 | End: 2023-12-10

## 2023-12-08 RX ORDER — LOSARTAN POTASSIUM 25 MG/1
25 TABLET ORAL DAILY
Status: DISCONTINUED | OUTPATIENT
Start: 2023-12-08 | End: 2023-12-11 | Stop reason: HOSPADM

## 2023-12-08 RX ADMIN — GABAPENTIN 300 MG: 300 CAPSULE ORAL at 17:08

## 2023-12-08 RX ADMIN — METHYLPREDNISOLONE SODIUM SUCCINATE 40 MG: 40 INJECTION, POWDER, FOR SOLUTION INTRAMUSCULAR; INTRAVENOUS at 21:32

## 2023-12-08 RX ADMIN — IPRATROPIUM BROMIDE 0.5 MG: 0.5 SOLUTION RESPIRATORY (INHALATION) at 13:42

## 2023-12-08 RX ADMIN — GABAPENTIN 300 MG: 300 CAPSULE ORAL at 09:14

## 2023-12-08 RX ADMIN — INSULIN LISPRO 1 UNITS: 100 INJECTION, SOLUTION INTRAVENOUS; SUBCUTANEOUS at 12:37

## 2023-12-08 RX ADMIN — LEVALBUTEROL HYDROCHLORIDE 1.25 MG: 1.25 SOLUTION RESPIRATORY (INHALATION) at 13:42

## 2023-12-08 RX ADMIN — INSULIN LISPRO 1 UNITS: 100 INJECTION, SOLUTION INTRAVENOUS; SUBCUTANEOUS at 21:42

## 2023-12-08 RX ADMIN — PANTOPRAZOLE SODIUM 20 MG: 20 TABLET, DELAYED RELEASE ORAL at 05:10

## 2023-12-08 RX ADMIN — IPRATROPIUM BROMIDE 0.5 MG: 0.5 SOLUTION RESPIRATORY (INHALATION) at 19:22

## 2023-12-08 RX ADMIN — GUAIFENESIN 1200 MG: 600 TABLET ORAL at 21:42

## 2023-12-08 RX ADMIN — METHYLPREDNISOLONE SODIUM SUCCINATE 40 MG: 40 INJECTION, POWDER, FOR SOLUTION INTRAMUSCULAR; INTRAVENOUS at 13:50

## 2023-12-08 RX ADMIN — INSULIN LISPRO 2 UNITS: 100 INJECTION, SOLUTION INTRAVENOUS; SUBCUTANEOUS at 09:14

## 2023-12-08 RX ADMIN — ASPIRIN 81 MG: 81 TABLET, COATED ORAL at 09:14

## 2023-12-08 RX ADMIN — VANCOMYCIN HYDROCHLORIDE 1750 MG: 1 INJECTION, POWDER, LYOPHILIZED, FOR SOLUTION INTRAVENOUS at 13:45

## 2023-12-08 RX ADMIN — INSULIN GLARGINE 60 UNITS: 100 INJECTION, SOLUTION SUBCUTANEOUS at 21:47

## 2023-12-08 RX ADMIN — INSULIN LISPRO 30 UNITS: 100 INJECTION, SOLUTION INTRAVENOUS; SUBCUTANEOUS at 09:14

## 2023-12-08 RX ADMIN — INSULIN LISPRO 30 UNITS: 100 INJECTION, SOLUTION INTRAVENOUS; SUBCUTANEOUS at 17:08

## 2023-12-08 RX ADMIN — ACETAMINOPHEN 650 MG: 325 TABLET, FILM COATED ORAL at 21:42

## 2023-12-08 RX ADMIN — INSULIN GLARGINE 60 UNITS: 100 INJECTION, SOLUTION SUBCUTANEOUS at 08:43

## 2023-12-08 RX ADMIN — MONTELUKAST 10 MG: 10 TABLET, FILM COATED ORAL at 21:42

## 2023-12-08 RX ADMIN — GUAIFENESIN 1200 MG: 600 TABLET ORAL at 09:14

## 2023-12-08 RX ADMIN — CEFTRIAXONE SODIUM 1000 MG: 10 INJECTION, POWDER, FOR SOLUTION INTRAVENOUS at 12:35

## 2023-12-08 RX ADMIN — MEMANTINE 10 MG: 10 TABLET ORAL at 09:14

## 2023-12-08 RX ADMIN — ACETAMINOPHEN 650 MG: 325 TABLET, FILM COATED ORAL at 05:10

## 2023-12-08 RX ADMIN — LEVALBUTEROL HYDROCHLORIDE 1.25 MG: 1.25 SOLUTION RESPIRATORY (INHALATION) at 19:22

## 2023-12-08 RX ADMIN — METHYLPREDNISOLONE SODIUM SUCCINATE 40 MG: 40 INJECTION, POWDER, FOR SOLUTION INTRAMUSCULAR; INTRAVENOUS at 05:09

## 2023-12-08 RX ADMIN — LOSARTAN POTASSIUM 25 MG: 25 TABLET, FILM COATED ORAL at 11:40

## 2023-12-08 RX ADMIN — PRAVASTATIN SODIUM 80 MG: 80 TABLET ORAL at 17:08

## 2023-12-08 RX ADMIN — INSULIN LISPRO 30 UNITS: 100 INJECTION, SOLUTION INTRAVENOUS; SUBCUTANEOUS at 12:37

## 2023-12-08 RX ADMIN — MEMANTINE 10 MG: 10 TABLET ORAL at 17:08

## 2023-12-08 RX ADMIN — AZITHROMYCIN DIHYDRATE 500 MG: 250 TABLET ORAL at 15:31

## 2023-12-08 RX ADMIN — Medication 3 MG: at 21:42

## 2023-12-08 RX ADMIN — FLUTICASONE FUROATE AND VILANTEROL TRIFENATATE 2 PUFF: 200; 25 POWDER RESPIRATORY (INHALATION) at 09:16

## 2023-12-08 RX ADMIN — ENOXAPARIN SODIUM 40 MG: 40 INJECTION SUBCUTANEOUS at 08:43

## 2023-12-08 NOTE — ASSESSMENT & PLAN NOTE
Lab Results   Component Value Date    HGBA1C 6.1 (H) 10/11/2023       Recent Labs     12/07/23  1736 12/07/23  2108 12/08/23  0735 12/08/23  1159   POCGLU 167* 196* 197* 187*       Blood Sugar Average: Last 72 hrs:  (P) 186. 75Continue home regimen of 60 units Lantus twice daily. 30 units Humalog with meals. Sliding scale insulin.     Plan  Continue to monitor as patient is on steroid therapy

## 2023-12-08 NOTE — CASE MANAGEMENT
Case Management Discharge Planning Note    Patient name Silvia Duron  Location W /W -01 MRN [id number]  : 1950 Date 2023       Current Admission Date: 2023  Current Admission Diagnosis:Acute respiratory failure with hypoxia and hypercapnia [facility])   Patient Active Problem List    Diagnosis Date Noted    Abnormal urinalysis 2023    Neuropathy 2023    Sore throat 10/31/2023    Chronic respiratory failure with hypoxia ([address]) 2022    Diabetic ulcer of left midfoot associated with type 2 diabetes mellitus ([address]) 2022    Anemia 2022    GILDARDO (obstructive sleep apnea) 2022    Abscess of abdominal wall 01/10/2022    Sacral ulcer ([address]) 2021    Nocturnal hypoxia 2020    Kidney lesion 2020    Weakness generalized 10/14/2020    S/P tooth extraction 2020    Sepsis ([address]) 2020    Hyperlipidemia 2020    Asthma-COPD overlap syndrome 2020    Hoarse voice quality 2019    Seasonal allergies 2019    Need for pneumococcal vaccination 2019    MCI (mild cognitive impairment) 2018    Fall 2018    Injury of face 2018    Sixth nerve palsy of left eye 2018    Ptosis of left eyelid 2018    Alzheimer's disease ([address]) 10/15/2018    Pulmonary nodule 10/15/2018    Preop pulmonary/respiratory exam 10/15/2018    Mental status change 2017    Essential hypertension 2017    Acute respiratory failure with hypoxia and hypercapnia ([address]) 2017    Type 2 diabetes mellitus with hyperglycemia, with long-term current use of insulin ([address]) 2017      LOS (days): 1  Geometric Mean LOS (GMLOS) (days): 5.10  Days to GMLOS:3.9     OBJECTIVE:  Risk of Unplanned Readmission Score: 24.87         Current admission status: Inpatient   Preferred Pharmacy:   [facility]/pharmacy #[id number]- [facility] [place] - [address] [address]  Phone: [phone] Fax: [phone] - [place], [address] [place] [address]  [address] [facility]  [address] [facility]  [address] [address]  Phone: [phone] Fax: [phone]    Primary Care Provider: Patito Zavala MD    Primary Insurance: MEDICARE  Secondary Insurance: [employer] [facility]    DISCHARGE DETAILS:        Pt is from [place] and is LTC resident. Pt is agreeable to return as this is her home. CM made referral in [place]. CM to call house supervisor over the weekend in the event she is stable for DC.

## 2023-12-08 NOTE — PROGRESS NOTES
Justine Nguyen is a 68 y.o. female who is currently ordered Vancomycin IV with management by the Pharmacy Consult service. Relevant clinical data and objective / subjective history reviewed. Vancomycin Assessment:  Indication and Goal AUC/Trough: Urinary tract infection (goal -600, trough >10)  Clinical Status:  new  Micro:   10/14/23,Enterococcus faecalis   Current bloodwork pending  Renal Function:  SCr: 1.24 mg/dL  CrCl: 38.1 mL/min  Renal replacement: Not on dialysis  Days of Therapy: 1  Current Dose: 1,500mg IV q24h  Vancomycin Plan:  New Dosin,750mg IV once as loading dose then 750mg IV q24h  Estimated AUC: 449 mcg*hr/mL  Estimated Trough: 13.6 mcg/mL  Next Level: 23 0600  Renal Function Monitoring: Daily BMP and [place] will continue to follow closely for s/sx of nephrotoxicity, infusion reactions and appropriateness of therapy. BMP and CBC will be ordered per protocol. We will continue to follow the patient’s culture results and clinical progress daily.     Brenda Escobedo, Pharmacist

## 2023-12-08 NOTE — ASSESSMENT & PLAN NOTE
Patient is not on any hypertension medications at home. Blood pressures have been slightly elevated with highest systolic reaching 659. Per chart review, patient was previously on lisinopril 2.5 mg daily until June 2022, at which point it was discontinued due to KATHY during a hospitalization. Plan  - Losartan 25 mg daily started  -10 mg IV labetalol as needed for systolic blood pressures greater than 180.

## 2023-12-08 NOTE — ASSESSMENT & PLAN NOTE
UA - turbid urine, 30-50 leukocytes on microscopy, occasional bacteria  Patient denies urinary symptoms  10/14/23 urine culture growth of Vancomycin susceptible Enterococcus    Plan  Follow-up on urine culture  Vancomycin initiated, pharmacology consult appreciated

## 2023-12-08 NOTE — PLAN OF CARE
Problem: PAIN - ADULT  Goal: Verbalizes/displays adequate comfort level or baseline comfort level  Description: Interventions:  - Encourage patient to monitor pain and request assistance  - Assess pain using appropriate pain scale  - Administer analgesics based on type and severity of pain and evaluate response  - Implement non-pharmacological measures as appropriate and evaluate response  - Consider cultural and social influences on pain and pain management  - Notify physician/advanced practitioner if interventions unsuccessful or patient reports new pain  Outcome: Progressing     Problem: INFECTION - ADULT  Goal: Absence or prevention of progression during hospitalization  Description: INTERVENTIONS:  - Assess and monitor for signs and symptoms of infection  - Monitor lab/diagnostic results  - Monitor all insertion sites, i.e. indwelling lines, tubes, and drains  - Monitor endotracheal if appropriate and nasal secretions for changes in amount and color  - [place] appropriate cooling/warming therapies per order  - Administer medications as ordered  - Instruct and encourage patient and family to use good hand hygiene technique  - Identify and instruct in appropriate isolation precautions for identified infection/condition  Outcome: Progressing     Problem: DISCHARGE PLANNING  Goal: Discharge to home or other facility with appropriate resources  Description: INTERVENTIONS:  - Identify barriers to discharge w/patient and caregiver  - Arrange for needed discharge resources and transportation as appropriate  - Identify discharge learning needs (meds, wound care, etc.)  - Arrange for interpretive services to assist at discharge as needed  - Refer to Case Management Department for coordinating discharge planning if the patient needs post-hospital services based on physician/advanced practitioner order or complex needs related to functional status, cognitive ability, or social support system  Outcome: Progressing Problem: Knowledge Deficit  Goal: Patient/family/caregiver demonstrates understanding of disease process, treatment plan, medications, and discharge instructions  Description: Complete learning assessment and assess knowledge base.   Interventions:  - Provide teaching at level of understanding  - Provide teaching via preferred learning methods  Outcome: Progressing     Problem: Prexisting or High Potential for Compromised Skin Integrity  Goal: Skin integrity is maintained or improved  Description: INTERVENTIONS:  - Identify patients at risk for skin breakdown  - Assess and monitor skin integrity  - Assess and monitor nutrition and hydration status  - Monitor labs   - Assess for incontinence   - Turn and reposition patient  - Assist with mobility/ambulation  - Relieve pressure over bony prominences  - Avoid friction and shearing  - Provide appropriate hygiene as needed including keeping skin clean and dry  - Evaluate need for skin moisturizer/barrier cream  - Collaborate with interdisciplinary team   - Patient/family teaching  - Consider wound care consult   Outcome: Progressing

## 2023-12-08 NOTE — ED PROVIDER NOTES
History  Chief Complaint   Patient presents with    Shortness of Breath     Pt presents via EMS from [facility] with increased SOB, and work of breathing. Chronically on 2LNC. Confused at baseline. CPAP applied PTA as well as duo neb and solumedrol. 68 yr female on home o2 at [facility] -- sent into er for 1 day of sob-- fevers- cough vomiting-- pt only co sob- no other comps       History provided by:  Nursing home  History limited by:  Dementia  Shortness of Breath  Associated symptoms: cough, fever and vomiting    Associated symptoms: no abdominal pain, no diaphoresis and no wheezing        Prior to Admission Medications   Prescriptions Last Dose Informant Patient Reported? Taking?    Advair Diskus 500-50 MCG/DOSE inhaler Not Taking Outside Facility (Specify) Yes No   Patient not taking: Reported on 10/31/2023   Cholecalciferol (VITAMIN D3) 46167 units CAPS Past Week Outside Facility (Specify) Yes Yes   Sig: Take 50,000 Units by mouth every 30 (thirty) days   Glucose Blood (BL TEST STRIP PACK VI) Past Week Outside Facility (Specify) Yes Yes   Sig: Test   HumaLOG KwikPen 100 units/mL injection pen Not Taking  Yes No   Patient not taking: Reported on 12/7/2023   Insulin Glargine Solostar (Basaglar KwikPen) 100 UNIT/ML SOPN 12/6/2023  No Yes   Sig: Inject 60 units under the skin every 12 hours   Melatonin 5 MG TABS 12/6/2023 Outside Facility (Specify) Yes Yes   Omeprazole 20 MG TBEC 12/6/2023 Outside Facility (Specify) Yes Yes   Sig: Take 20 mg by mouth daily   acetaminophen (TYLENOL) 325 mg tablet 12/6/2023 Outside Facility (Specify) No Yes   Sig: Take 3 tablets (975 mg total) by mouth every 8 (eight) hours   ascorbic acid (VITAMIN C) 500 mg tablet 12/6/2023  Yes Yes   aspirin (ECOTRIN LOW STRENGTH) 81 mg EC tablet 12/6/2023 Outside Facility (Specify) Yes Yes   benzonatate (TESSALON) 200 MG capsule Past Week Outside Facility (Specify) Yes Yes   Sig: Take 200 mg by mouth 3 (three) times a day as needed for cough   busPIRone (BUSPAR) 5 mg tablet   Yes Yes   Sig: Take 5 mg by mouth 2 (two) times a day   cetirizine (ZyrTEC) 10 mg tablet Past Month Outside Facility (Specify) Yes Yes   Sig: Take 10 mg by mouth daily   dulaglutide (Trulicity) 3 XW/5.5CK injection Past Week  No Yes   Sig: Inject 0.5 mL (3 mg total) under the skin once a week   escitalopram (LEXAPRO) 10 mg tablet   Yes Yes   Sig: Take 10 mg by mouth daily   ferrous gluconate (FERGON) 324 mg tablet 2023 Outside Facility (Specify) No Yes   Sig: Take 1 tablet (324 mg total) by mouth daily before breakfast   fluticasone (FLONASE) 50 mcg/act nasal spray Past Month Outside Facility (Specify) No Yes   Si spray into each nostril daily   gabapentin (NEURONTIN) 300 mg capsule 2023 Outside Facility (Specify) Yes Yes   Sig: Take 300 mg by mouth 2 (two) times a day   glucose blood test strip Past Week Outside Facility (Specify) Yes Yes   Sig: Test   guaiFENesin (MUCINEX) 600 mg 12 hr tablet Past Week Outside Facility (Specify) Yes Yes   Sig: Take 1,200 mg by mouth every 12 (twelve) hours   insulin lispro (HumaLOG) 100 units/mL injection 2023  No Yes   Sig: Inject 30 units before meals + scale   ipratropium-albuterol (DUO-NEB) 0.5-2.5 mg/3 mL nebulizer solution 2023 Outside Facility (Specify) Yes Yes   levalbuterol (XOPENEX) 1.25 mg/3 mL nebulizer solution 2023 Outside Facility (Specify) Yes Yes   levofloxacin (LEVAQUIN) 250 mg tablet Not Taking  Yes No   Patient not taking: Reported on 10/31/2023   levofloxacin (LEVAQUIN) 500 mg tablet Not Taking  Yes No   Patient not taking: Reported on 10/31/2023   lidocaine, PF, (XYLOCAINE-MPF) 1 % Not Taking Outside Facility (Specify) Yes No   Patient not taking: Reported on 10/31/2023   magnesium hydroxide (MILK OF MAGNESIA) 400 mg/5 mL oral suspension 2023 Outside Facility (Specify) Yes Yes   Sig: Take by mouth daily as needed for constipation   memantine (NAMENDA) 10 mg tablet 2023 Outside Facility (Specify) Yes Yes   Sig: 10 mg 2 (two) times a day   metFORMIN (GLUCOPHAGE) 500 mg tablet   Yes Yes   Sig: Take 500 mg by mouth 2 (two) times a day with meals   montelukast (SINGULAIR) 10 mg tablet 2023 Outside Facility ([address] [place] [address]) Yes Yes   Sig: Take 10 mg by mouth daily at bedtime   simvastatin (ZOCOR) 40 mg tablet 2023 Outside Facility (Specify) No Yes   Sig: Take 1 tablet by mouth daily at bedtime   traZODone (DESYREL) 150 mg tablet 2023 Outside Facility ([address] [place] [address]) Yes Yes      Facility-Administered Medications: None       Past Medical History:   Diagnosis Date    Alzheimer disease ([address] [place] [address])     Asthma     Diabetes mellitus ([address] [place] [address])     GERD (gastroesophageal reflux disease)     Hyperlipidemia     Hypertension        Past Surgical History:   Procedure Laterality Date    JOINT REPLACEMENT Bilateral     KNEE SURGERY      TOE SURGERY         Family History   Problem Relation Age of Onset    Dementia Brother     Breast cancer Sister 76    Cancer Brother      I have reviewed and agree with the history as documented. E-Cigarette/Vaping    E-Cigarette Use Never User      E-Cigarette/Vaping Substances    Nicotine No     THC No     CBD No     Flavoring No     Other No      Social History     Tobacco Use    Smoking status: Former     Packs/day: 1.00     Years: 52.00     Total pack years: 52.00     Types: Cigarettes     Start date:      Quit date: 2017     Years since quittin.2    Smokeless tobacco: Never   Vaping Use    Vaping Use: Never used   Substance Use Topics    Alcohol use: Not Currently    Drug use: No       Review of Systems   Constitutional:  Positive for fever. Negative for activity change, appetite change, chills, diaphoresis, fatigue and unexpected weight change. HENT: Negative. Eyes: Negative. Respiratory:  Positive for cough and shortness of breath. Negative for apnea, choking, chest tightness, wheezing and stridor. Cardiovascular: Negative.     Gastrointestinal: Positive for nausea and vomiting. Negative for abdominal distention, abdominal pain, anal bleeding, blood in stool, constipation, diarrhea and rectal pain. Endocrine: Negative. Genitourinary: Negative. Musculoskeletal: Negative. Skin: Negative. Allergic/Immunologic: Negative. Neurological: Negative. Hematological: Negative. Psychiatric/Behavioral: Negative. Physical Exam  Physical Exam  Vitals and nursing note reviewed. Constitutional:       General: She is not in acute distress. Appearance: She is well-developed. She is ill-appearing. She is not toxic-appearing or diaphoretic. Interventions: She is not intubated. Comments: Febrile- tachypneic- pulse ox 95 % on cpap- by ems-- chronically ill appearing    HENT:      Head: Normocephalic and atraumatic. Mouth/Throat:      Mouth: Mucous membranes are moist.   Eyes:      Extraocular Movements: Extraocular movements intact. Pupils: Pupils are equal, round, and reactive to light. Comments: Mm pink   Neck:      Thyroid: No thyromegaly. Vascular: No hepatojugular reflux or JVD. Trachea: No tracheal deviation. Cardiovascular:      Rate and Rhythm: Normal rate and regular rhythm. No extrasystoles are present. Pulses: No decreased pulses. Heart sounds: No murmur heard. No friction rub. No gallop. Pulmonary:      Effort: Tachypnea, accessory muscle usage and respiratory distress present. She is not intubated. Breath sounds: No stridor. Examination of the right-lower field reveals decreased breath sounds. Examination of the left-lower field reveals decreased breath sounds. Decreased breath sounds present. No wheezing, rhonchi or rales. Chest:      Chest wall: No mass, deformity, tenderness, crepitus or edema. There is no dullness to percussion. Abdominal:      General: Bowel sounds are normal.      Palpations: Abdomen is soft. There is no hepatomegaly, splenomegaly or mass. Tenderness: There is no abdominal tenderness. There is no guarding or rebound. Comments: oBese abd- umbilical hernai- nt- no peritoneal signs    Musculoskeletal:      Cervical back: Normal range of motion and neck supple. Right lower leg: No tenderness. Edema present. Left lower leg: No tenderness. Edema present. Comments:  Plus ble pretibial edema- ng- no asym/ erythema- equal bilateral radial/dp pulses   Lymphadenopathy:      Cervical: No cervical adenopathy. Skin:     General: Skin is warm. Capillary Refill: Capillary refill takes less than 2 seconds. Coloration: Skin is pale. Skin is not cyanotic. Findings: No ecchymosis, erythema or rash. Nails: There is no clubbing. Neurological:      General: No focal deficit present. Mental Status: She is alert. She is disoriented. Cranial Nerves: No cranial nerve deficit. Motor: Weakness present.       Comments: Ble weakness- non focal    Psychiatric:         Mood and Affect: Mood normal.         Behavior: Behavior normal.         Vital Signs  ED Triage Vitals [12/07/23 0901]   Temperature Pulse Respirations Blood Pressure SpO2   (!) 101 °F (38.3 °C) 96 (!) 32 168/74 95 %      Temp Source Heart Rate Source Patient Position - Orthostatic VS BP Location FiO2 (%)   Rectal Monitor Sitting Right arm 40      Pain Score       No Pain           Vitals:    12/07/23 1654 12/07/23 1731 12/07/23 2122 12/08/23 0742   BP: (!) 181/72 170/78 152/60 (!) 174/78   Pulse: 83  77 70   Patient Position - Orthostatic VS:  Lying           Visual Acuity  Visual Acuity      Flowsheet Row Most Recent Value   L Pupil Size (mm) 3   R Pupil Size (mm) 3            ED Medications  Medications   acetaminophen (TYLENOL) tablet 650 mg (650 mg Oral Given 12/8/23 0510)   fluticasone-vilanterol 200-25 mcg/actuation 2 puff (2 puffs Inhalation Given 12/8/23 0916)   aspirin (ECOTRIN LOW STRENGTH) EC tablet 81 mg (81 mg Oral Given 12/8/23 0914)   benzonatate (TESSALON PERLES) capsule 200 mg (200 mg Oral Given 12/7/23 1727)   gabapentin (NEURONTIN) capsule 300 mg (300 mg Oral Given 12/8/23 0914)   guaiFENesin (MUCINEX) 12 hr tablet 1,200 mg (1,200 mg Oral Given 12/8/23 0914)   insulin glargine (LANTUS) subcutaneous injection 60 Units 0.6 mL (60 Units Subcutaneous Given 12/8/23 0843)   insulin lispro (HumaLOG) 100 units/mL subcutaneous injection 30 Units (30 Units Subcutaneous Given 12/8/23 0914)   insulin lispro (HumaLOG) 100 units/mL subcutaneous injection 1-6 Units (2 Units Subcutaneous Given 12/8/23 0914)   magnesium hydroxide (MILK OF MAGNESIA) oral suspension 15 mL (has no administration in time range)   melatonin tablet 3 mg (3 mg Oral Given 12/7/23 2147)   memantine (NAMENDA) tablet 10 mg (10 mg Oral Given 12/8/23 0914)   montelukast (SINGULAIR) tablet 10 mg (10 mg Oral Given 12/7/23 2147)   pantoprazole (PROTONIX) EC tablet 20 mg (20 mg Oral Given 12/8/23 0510)   pravastatin (PRAVACHOL) tablet 80 mg (80 mg Oral Given 12/7/23 1726)   traZODone (DESYREL) tablet 150 mg (has no administration in time range)   enoxaparin (LOVENOX) subcutaneous injection 40 mg (40 mg Subcutaneous Given 12/8/23 0843)   methylPREDNISolone sodium succinate (Solu-MEDROL) injection 40 mg (40 mg Intravenous Given 12/8/23 0509)   ceftriaxone (ROCEPHIN) 1 g/50 mL in dextrose IVPB (has no administration in time range)   labetalol (NORMODYNE) injection 10 mg (has no administration in time range)   albuterol (PROVENTIL HFA,VENTOLIN HFA) inhaler 2 puff (has no administration in time range)   levalbuterol (XOPENEX) inhalation solution 1.25 mg (1.25 mg Nebulization Not Given 12/8/23 1015)   ipratropium (ATROVENT) 0.02 % inhalation solution 0.5 mg (0.5 mg Nebulization Not Given 12/8/23 1015)   vancomycin (VANCOCIN) 1,750 mg in sodium chloride 0.9 % 500 mL IVPB (has no administration in time range)   losartan (COZAAR) tablet 25 mg (has no administration in time range)   albuterol (FOR EMS ONLY) (2.5 mg/3 mL) 0.083 % inhalation solution 2.5 mg (0 mg Does not apply Given to EMS 12/7/23 0855)   ipratropium-albuterol (FOR EMS ONLY) (DUO-NEB) 0.5-2.5 mg/3 mL inhalation solution 3 mL (0 mL Does not apply Given to EMS 12/7/23 0855)   methylPREDNISolone sodium succinate (FOR EMS ONLY) (Solu-MEDROL) 125 MG injection 125 mg (0 mg Does not apply Given to EMS 12/7/23 0855)   lactated ringers bolus 500 mL (0 mL Intravenous Stopped 12/7/23 1038)   acetaminophen (TYLENOL) rectal suppository 650 mg (650 mg Rectal Given 12/7/23 0959)   ceftriaxone (ROCEPHIN) 1 g/50 mL in dextrose IVPB (0 mg Intravenous Stopped 12/7/23 1216)       Diagnostic Studies  Results Reviewed       Procedure Component Value Units Date/Time    Procalcitonin, Next Day AM Collection [[id number]]  (Normal) Collected: 12/08/23 0614    Lab Status: Final result Specimen: Blood from Hand, Right Updated: 12/08/23 0813     Procalcitonin 0.17 ng/ml     Basic metabolic panel [[id number]]  (Abnormal) Collected: 12/08/23 0614    Lab Status: Final result Specimen: Blood from Hand, Right Updated: 12/08/23 0807     Sodium 139 mmol/L      Potassium 4.5 mmol/L      Chloride 97 mmol/L      CO2 37 mmol/L      ANION GAP 5 mmol/L      BUN 32 mg/dL      Creatinine 1.24 mg/dL      Glucose 197 mg/dL      Calcium 8.8 mg/dL      eGFR 43 ml/min/1.73sq m     Narrative:      [place] guidelines for Chronic Kidney Disease (CKD):     Stage 1 with normal or high GFR (GFR > 90 mL/min/1.73 square meters)    Stage 2 Mild CKD (GFR = 60-89 mL/min/1.73 square meters)    Stage 3A Moderate CKD (GFR = 45-59 mL/min/1.73 square meters)    Stage 3B Moderate CKD (GFR = 30-44 mL/min/1.73 square meters)    Stage 4 Severe CKD (GFR = 15-29 mL/min/1.73 square meters)    Stage 5 End Stage CKD (GFR <15 mL/min/1.73 square meters)  Note: GFR calculation is accurate only with a steady state creatinine    CBC and differential [[id number]]  (Abnormal) Collected: 12/08/23 0614    Lab Status: Final result Specimen: Blood from Hand, Right Updated: 12/08/23 0720     WBC 5.45 Thousand/uL      RBC 3.18 Million/uL      Hemoglobin 9.5 g/dL      Hematocrit 30.5 %      MCV 96 fL      MCH 29.9 pg      MCHC 31.1 g/dL      RDW 16.2 %      MPV 10.3 fL      Platelets 855 Thousands/uL      nRBC 0 /100 WBCs      Neutrophils Relative 79 %      Immat GRANS % 2 %      Lymphocytes Relative 14 %      Monocytes Relative 5 %      Eosinophils Relative 0 %      Basophils Relative 0 %      Neutrophils Absolute 4.30 Thousands/µL      Immature Grans Absolute 0.13 Thousand/uL      Lymphocytes Absolute 0.75 Thousands/µL      Monocytes Absolute 0.26 Thousand/µL      Eosinophils Absolute 0.00 Thousand/µL      Basophils Absolute 0.01 Thousands/µL     Sputum culture and Gram stain [[id number]]     Lab Status: No result Specimen: Sputum     Blood culture #1 [[id number]] Collected: 12/07/23 0909    Lab Status: Preliminary result Specimen: Blood from Arm, Right Updated: 12/07/23 1501     Blood Culture Received in Microbiology Lab. Culture in Progress. Blood culture #2 [[id number]] Collected: 12/07/23 0905    Lab Status: Preliminary result Specimen: Blood from Arm, Right Updated: 12/07/23 1501     Blood Culture Received in Microbiology Lab. Culture in Progress. Urine Microscopic [[id number]]  (Abnormal) Collected: 12/07/23 1108    Lab Status: Final result Specimen: Urine, Other Updated: 12/07/23 1119     RBC, UA 2-4 /hpf      WBC, UA 30-50 /hpf      Epithelial Cells Occasional /hpf      Bacteria, UA Occasional /hpf     Urine culture [[id number]] Collected: 12/07/23 1108    Lab Status:  In process Specimen: Urine, Other Updated: 12/07/23 1119    UA w Reflex to Microscopic w Reflex to Culture [[id number]]  (Abnormal) Collected: 12/07/23 1108    Lab Status: Final result Specimen: Urine, Other Updated: 12/07/23 1117     Color, UA Light Yellow     Clarity, UA Turbid     Specific Gravity, UA 1.017     pH, UA 6.0     Leukocytes, UA Small Nitrite, UA Negative     Protein,  (3+) mg/dl      Glucose, UA Negative mg/dl      Ketones, UA Negative mg/dl      Urobilinogen, UA <2.0 mg/dl      Bilirubin, UA Negative     Occult Blood, UA Negative    Blood gas, venous [[id number]]  (Abnormal) Collected: 12/07/23 1038    Lab Status: Final result Specimen: Blood from Arm, Right Updated: 12/07/23 1044     pH, Leonardo 7.382     pCO2, Leonardo 63.4 mm Hg      pO2, Leonardo 62.5 mm Hg      HCO3, Leonardo 36.8 mmol/L      Base Excess, Leonardo 10.0 mmol/L      O2 Content, Leonardo 12.6 ml/dL      O2 HGB, VENOUS 90.5 %     COVID/FLU/RSV [[id number]]  (Normal) Collected: 12/07/23 0926    Lab Status: Final result Specimen: Nares from Nose Updated: 12/07/23 1014     SARS-CoV-2 Negative     INFLUENZA A PCR Negative     INFLUENZA B PCR Negative     RSV PCR Negative    Narrative:      FOR PEDIATRIC PATIENTS - copy/paste COVID Guidelines URL to browser: [email or web address]    SARS-CoV-2 assay is a Nucleic Acid Amplification assay intended for the  qualitative detection of nucleic acid from SARS-CoV-2 in nasopharyngeal  swabs. Results are for the presumptive identification of SARS-CoV-2 RNA. Positive results are indicative of infection with SARS-CoV-2, the virus  causing COVID-19, but do not rule out bacterial infection or co-infection  with other viruses. Laboratories within the [facility] and its  territories are required to report all positive results to the appropriate  public health authorities. Negative results do not preclude SARS-CoV-2  infection and should not be used as the sole basis for treatment or other  patient management decisions. Negative results must be combined with  clinical observations, patient history, and epidemiological information. This test has not been FDA cleared or approved. This test has been authorized by FDA under an Emergency Use Authorization  (EUA).  This test is only authorized for the duration of time the  declaration that circumstances exist justifying the authorization of the  emergency use of an in vitro diagnostic tests for detection of SARS-CoV-2  virus and/or diagnosis of COVID-19 infection under section 564(b)(1) of  the Act, 21 U. S.C. 785HHM-0(L)(5), unless the authorization is terminated  or revoked sooner. The test has been validated but independent review by FDA  and CLIA is pending. Test performed using [employer] GeneXpert: This RT-PCR assay targets N2,  a region unique to SARS-CoV-2. A conserved region in the E-gene was chosen  for pan-Sarbecovirus detection which includes SARS-CoV-2. According to CMS-2020-01-R, this platform meets the definition of high-throughput technology.     Procalcitonin [[id number]]  (Abnormal) Collected: 12/07/23 0905    Lab Status: Final result Specimen: Blood from Arm, Right Updated: 12/07/23 0938     Procalcitonin 0.28 ng/ml     Comprehensive metabolic panel [[id number]]  (Abnormal) Collected: 12/07/23 0905    Lab Status: Final result Specimen: Blood from Arm, Right Updated: 12/07/23 0928     Sodium 140 mmol/L      Potassium 4.3 mmol/L      Chloride 97 mmol/L      CO2 39 mmol/L      ANION GAP 4 mmol/L      BUN 23 mg/dL      Creatinine 1.28 mg/dL      Glucose 90 mg/dL      Calcium 8.9 mg/dL      AST 23 U/L      ALT 28 U/L      Alkaline Phosphatase 56 U/L      Total Protein 7.3 g/dL      Albumin 3.8 g/dL      Total Bilirubin 0.47 mg/dL      eGFR 41 ml/min/1.73sq m     Narrative:      [place] guidelines for Chronic Kidney Disease (CKD):     Stage 1 with normal or high GFR (GFR > 90 mL/min/1.73 square meters)    Stage 2 Mild CKD (GFR = 60-89 mL/min/1.73 square meters)    Stage 3A Moderate CKD (GFR = 45-59 mL/min/1.73 square meters)    Stage 3B Moderate CKD (GFR = 30-44 mL/min/1.73 square meters)    Stage 4 Severe CKD (GFR = 15-29 mL/min/1.73 square meters)    Stage 5 End Stage CKD (GFR <15 mL/min/1.73 square meters)  Note: GFR calculation is accurate only with a steady state creatinine    APTT [[id number]]  (Normal) Collected: 12/07/23 0905    Lab Status: Final result Specimen: Blood from Arm, Right Updated: 12/07/23 0928     PTT 29 seconds     Protime-INR [[id number]]  (Normal) Collected: 12/07/23 0905    Lab Status: Final result Specimen: Blood from Arm, Right Updated: 12/07/23 0928     Protime 13.2 seconds      INR 0.95    Lactic acid, plasma (w/reflex if result > 2.0) [[id number]]  (Abnormal) Collected: 12/07/23 0905    Lab Status: Final result Specimen: Blood from Arm, Right Updated: 12/07/23 0927     LACTIC ACID 0.4 mmol/L     Narrative:      Result may be elevated if tourniquet was used during collection.     Blood gas, venous [[id number]]  (Abnormal) Collected: 12/07/23 0905    Lab Status: Final result Specimen: Blood from Arm, Right Updated: 12/07/23 0914     pH, Leonardo 7.249     pCO2, Leonardo 91.6 mm Hg      pO2, Leonardo 83.7 mm Hg      HCO3, Leonardo 39.2 mmol/L      Base Excess, Leonardo 9.2 mmol/L      O2 Content, Leonardo 14.2 ml/dL      O2 HGB, VENOUS 92.8 %     CBC and differential [[id number]]  (Abnormal) Collected: 12/07/23 0905    Lab Status: Final result Specimen: Blood from Arm, Right Updated: 12/07/23 0912     WBC 7.28 Thousand/uL      RBC 3.35 Million/uL      Hemoglobin 10.0 g/dL      Hematocrit 32.5 %      MCV 97 fL      MCH 29.9 pg      MCHC 30.8 g/dL      RDW 16.8 %      MPV 9.8 fL      Platelets 959 Thousands/uL      nRBC 0 /100 WBCs      Neutrophils Relative 78 %      Immat GRANS % 2 %      Lymphocytes Relative 12 %      Monocytes Relative 7 %      Eosinophils Relative 1 %      Basophils Relative 0 %      Neutrophils Absolute 5.69 Thousands/µL      Immature Grans Absolute 0.14 Thousand/uL      Lymphocytes Absolute 0.84 Thousands/µL      Monocytes Absolute 0.51 Thousand/µL      Eosinophils Absolute 0.08 Thousand/µL      Basophils Absolute 0.02 Thousands/µL                    XR chest 1 view portable   Final Result by Geena Nolan MD (12/07 1706) No acute cardiopulmonary disease. Workstation performed: [id number]                    Procedures  Procedures         ED Course  ED Course as of 12/08/23 1136   Thu Dec 07, 2023   0917 - er md note- initial er workup was first nursed    56 Er md note- 9/24/2000- cardiac echo report reviewed by er md    0920 Er md note- tp came in from chronic care facility with polst from= dnr/dni- limitted  other interventions   0946 ER MD NOTE-  PREHOSP 12 LEAD ECG REVIEWED AND COMPARED BY ER MD -NO SIGN CHANGES   1043 CXR PORTABLE- COMPARED TO PREVIOUS CXR 10/23- BORDERLINE CARDIOMEGALY- NO OTHER SIGN CHANGES- NO FREE/SQ AIR- NO INFILTRATE   1043 ER MD NOTE- PT- RE-EVAL ON EVAPS- SLEEPING - EASILY AROUSIBLE- STATES CONTINUES TO FEEL IMPROVED WITH BREATHING-  REPEAT VBG PENDING- WILL WAIT TO SEE IF URINATES ON PURWICK  PRIOR TO STRAIGHT CATH   1052 pCO2, Leonardo(!): 63.4   1117 -er md note- pt re-eval--  again  feels improved on evaps- on repeat vbg  decreased co2 - pt just able to urinate-  will take off evaps and  place pt back on her usual 2 liters o2 nc- to see  how does befrore trying to admit to floor --    1119 pCO2, Leonardo(!): 63.4   1123 - er md note- severe sepsis alert called in by er md- do to  new need for non invasive ventilation which is now being removed-- at present do to hd stability  and resp status improving will not give 30 ml/kg bolus -- -- will discuss with slim about iv antibx pending urine micro   1319 ER MD NOTE- AT PT REQUEST- BROTHER MARISSA CONTACTED AND MESSAGE LEFT ON HIS  PHONE 1724 Trey Adams ADMIT                            Initial Sepsis Screening       [address] Name 12/07/23 1121                Is the patient's history suggestive of a new or worsening infection? Yes (Proceed)  -MB        Suspected source of infection suspect infection, source unknown  -MB        Indicate SIRS criteria Hyperthemia > 38.3C (100.9F) OR Hypothermia <36C (96.8F); Tachycardia > 90 bpm  -MB        Are two or more of the above signs & symptoms of infection both present and new to the patient? Yes (Proceed)  -MB        Assess for evidence of organ dysfunction: Are any of the below criteria present within 6 hours of suspected infection and SIRS criteria that are NOT considered to be chronic conditions? New need for invasive/non-invasive ventilation  -MB        Date of presentation of severe sepsis 12/07/23  -MB        Time of presentation of severe sepsis 1122  -MB        Sepsis Note: Click "NEXT" below (NOT "close") to generate sepsis note based on above information. --                  User Key  (r) = Recorded By, (t) = Taken By, (c) = Cosigned By      [address] [place] [address] Name Provider Type    PRIYANKA Lujan MD Physician                                  Medical Decision Making  Pt with resp istress -fever--  pt with benign abd/skin exam -- pt will need non invasive resp- and sepsis er workup and re-eval     Problems Addressed:  Acute hypoxic respiratory failure ([address]): acute illness or injury     Details: acute on chronic  Acute on chronic respiratory failure with hypercapnia ([address]): acute illness or injury     Details: see chart  Acute respiratory failure with hypoxia [facility]): acute illness or injury     Details: see above  Asthma-COPD overlap syndrome: acute illness or injury     Details: acute on chronic  Hypertension, unspecified type: acute illness or injury     Details: see chart  Severe sepsis [facility]): acute illness or injury with systemic symptoms that poses a threat to life or bodily functions     Details: see chart    Amount and/or Complexity of Data Reviewed  Independent Historian:      Details: chronic care facility  External Data Reviewed: labs, radiology, ECG and notes. Details: all previous testing reviewed by er md  Labs: ordered. Decision-making details documented in ED Course. Details: all reviewed and comapred  Radiology: ordered and independent interpretation performed.  Decision-making details documented in ED Course. Details: all reviewed and compared  ECG/medicine tests: ordered and independent interpretation performed. Decision-making details documented in ED Course. Details: all reviewed  Discussion of management or test interpretation with external provider(s): High degree of er md thought complexity and workup     Risk  OTC drugs. Prescription drug management. Decision regarding hospitalization. Disposition  Final diagnoses:   Severe sepsis ([address])   Acute on chronic respiratory failure with hypercapnia ([address])   Hypertension, unspecified type   Acute respiratory failure with hypoxia (HCC)   Asthma-COPD overlap syndrome   Acute hypoxic respiratory failure ([address])     Time reflects when diagnosis was documented in both MDM as applicable and the Disposition within this note       Time User Action Codes Description Comment    12/7/2023 11:26 AM Mendosa Serene Add [A41.9,  R65.20] Severe sepsis ([address])     12/7/2023 11:26 AM Gary KRAMER Add [J96.22] Acute on chronic respiratory failure with hypercapnia ([address])     12/7/2023  1:20 PM Gary KRAMER Add [I10] Hypertension, unspecified type     12/7/2023  1:53 PM Noy Beth Add [J96.01] Acute respiratory failure with hypoxia ([address])     12/7/2023  2:15 PM Noy Beth Add [J44.89] Asthma-COPD overlap syndrome     12/7/2023  2:41 PM Noy Kruger Add [J96.01] Acute hypoxic respiratory failure [facility])           ED Disposition       ED Disposition   Admit    Condition   Stable    Date/Time   u Dec 7, 2023 12:11 PM    Comment   Case was discussed with DR. CHILD  and the patient's admission status was agreed to be Admission Status: inpatient status to the service of Dr. Praful Shook .                Follow-up Information    None         Current Discharge Medication List        CONTINUE these medications which have NOT CHANGED    Details   acetaminophen (TYLENOL) 325 mg tablet Take 3 tablets (975 mg total) by mouth every 8 (eight) hours  Qty: 30 tablet, Refills: 0    Associated Diagnoses: Chronic pain syndrome      ascorbic acid (VITAMIN C) 500 mg tablet       aspirin (ECOTRIN LOW STRENGTH) 81 mg EC tablet       benzonatate (TESSALON) 200 MG capsule Take 200 mg by mouth 3 (three) times a day as needed for cough      busPIRone (BUSPAR) 5 mg tablet Take 5 mg by mouth 2 (two) times a day      cetirizine (ZyrTEC) 10 mg tablet Take 10 mg by mouth daily      Cholecalciferol (VITAMIN D3) 16034 units CAPS Take 50,000 Units by mouth every 30 (thirty) days      dulaglutide (Trulicity) 3 ZO/5.5VA injection Inject 0.5 mL (3 mg total) under the skin once a week  Qty: 2 mL, Refills: 1    Associated Diagnoses: Type 2 diabetes mellitus with hyperglycemia, with long-term current use of insulin ([facility])      escitalopram (LEXAPRO) 10 mg tablet Take 10 mg by mouth daily      ferrous gluconate (FERGON) 324 mg tablet Take 1 tablet (324 mg total) by mouth daily before breakfast  Refills: 0    Associated Diagnoses: Anemia, unspecified type      fluticasone (FLONASE) 50 mcg/act nasal spray 1 spray into each nostril daily  Qty: 1 Bottle, Refills: 5    Associated Diagnoses: Seasonal allergies      gabapentin (NEURONTIN) 300 mg capsule Take 300 mg by mouth 2 (two) times a day      !! Glucose Blood (BL TEST STRIP PACK VI) Test      !! glucose blood test strip Test      guaiFENesin (MUCINEX) 600 mg 12 hr tablet Take 1,200 mg by mouth every 12 (twelve) hours      Insulin Glargine Solostar (Basaglar KwikPen) 100 UNIT/ML SOPN Inject 60 units under the skin every 12 hours    Associated Diagnoses: Type 2 diabetes mellitus with hyperglycemia, with long-term current use of insulin ([facility])      insulin lispro (HumaLOG) 100 units/mL injection Inject 30 units before meals + scale    Associated Diagnoses: Type 2 diabetes mellitus with foot ulcer, with long-term current use of insulin ([facility])      ipratropium-albuterol (DUO-NEB) 0.5-2.5 mg/3 mL nebulizer solution       levalbuterol (XOPENEX) 1.25 mg/3 mL nebulizer solution       magnesium hydroxide (MILK OF MAGNESIA) 400 mg/5 mL oral suspension Take by mouth daily as needed for constipation      Melatonin 5 MG TABS       memantine (NAMENDA) 10 mg tablet 10 mg 2 (two) times a day      metFORMIN (GLUCOPHAGE) 500 mg tablet Take 500 mg by mouth 2 (two) times a day with meals      montelukast (SINGULAIR) 10 mg tablet Take 10 mg by mouth daily at bedtime      Omeprazole 20 MG TBEC Take 20 mg by mouth daily      simvastatin (ZOCOR) 40 mg tablet Take 1 tablet by mouth daily at bedtime  Refills: 0      traZODone (DESYREL) 150 mg tablet       Advair Diskus 500-50 MCG/DOSE inhaler       HumaLOG KwikPen 100 units/mL injection pen       !! levofloxacin (LEVAQUIN) 250 mg tablet       !! levofloxacin (LEVAQUIN) 500 mg tablet       lidocaine, PF, (XYLOCAINE-MPF) 1 %        ! ! - Potential duplicate medications found. Please discuss with provider. No discharge procedures on file.     PDMP Review         Value Time User    PDMP Reviewed  Yes 9/25/2020 12:01 PM Julio Joseph MD            ED Provider  Electronically Signed by             Jayson Gonzalez MD  12/08/23 4366

## 2023-12-08 NOTE — PROGRESS NOTES
[address] [facility]  Progress Note  Name: Stuart Snow  MRN: [id number]  Unit/Bed#: W -01 I Date of Admission: 12/7/2023   Date of Service: 12/8/2023 I Hospital Day: 1    Assessment/Plan   * Acute respiratory failure with hypoxia and hypercapnia [facility])  Assessment & Plan  Patient was brought to the ED from [facility], as she started to develop increasing respiratory distress with labored breathing and was noted to be hypoxic while there. In the ED, she was placed initially on BiPAP for respiratory distress and then began saturating at 95%. She was then transitioned to 4 L of nasal cannula which she remains on currently. Patient states she still feels short of breath, but feels better overall. Denies any other symptoms at this time. She did meet sepsis criteria, and had an elevated Pro-[place].  Rest of lab work was relatively unremarkable. Diffuse wheezing appreciated on exam.  Patient has slight gasping of air when trying to talk. Patient most recently saw her pulmonologist at the end of October and provided medication adjustments. Chest x-ray in the ED was unremarkable. Per nephew, patient has oxygen at her facility as needed. Per patient, she uses 2L as needed at night. Etiology: likely asthma/COPD exacerbation    Plan:  -Pulmonary on board, appreciate recommendations  -Solu-Medrol IV 40 mg every 12 hours, with plan to transition to prednisone 40 mg daily for 10 days  -Respiratory protocol  -Tessalon Perles and Mucinex as needed  -4 L nasal cannula, wean as necessary, maintain SpO2 > 90%  -DuoNebs every 8 hours  -Albuterol prn  -Singulair 10 mg daily  -Sputum culture pending  -BiPAP at night    Sepsis [facility])  Assessment & Plan  Patient met SIRS criteria upon admission to the ED with a fever of 101, tachycardia, tachypnea, and elevated Pro-[place].  Chest x-ray was negative for any evidence of pneumonia. Patient had no leukocytosis.   UA revealed evidence of white blood cells and few leukocytes, can consider urinary source. She was given dose of lactated Ringer's and ceftriaxone while in the ED. In addition, patient was in acute respiratory distress and was placed on BiPAP initially while in the ED by respiratory therapy. Now saturating on 4 L nasal cannula at 95%. Patient denies any symptoms of cough, however cannot rule out upper respiratory infection. Low suspicion for pneumonia    Plan:  -Discontinued IV ceftriaxone, transitioned to azithromycin  -Blood cultures x 2 pending  -Sputum culture ordered but not collected  -Continue to monitor vitals for fever  -Monitor morning blood work    Abnormal urinalysis  Assessment & Plan  UA - turbid urine, 30-50 leukocytes on microscopy, occasional bacteria  Patient denies urinary symptoms  10/14/23 urine culture growth of Vancomycin susceptible Enterococcus    Plan  Follow-up on urine culture  Vancomycin initiated, pharmacology consult appreciated    Essential hypertension  Assessment & Plan  Patient is not on any hypertension medications at home. Blood pressures have been slightly elevated with highest systolic reaching 442. Per chart review, patient was previously on lisinopril 2.5 mg daily until June 2022, at which point it was discontinued due to KATHY during a hospitalization. Plan  - Losartan 25 mg daily started  -10 mg IV labetalol as needed for systolic blood pressures greater than 180. Neuropathy  Assessment & Plan  Continue gabapentin 300 mg twice daily. Anemia  Assessment & Plan  Patient has chronic anemia at baseline. Hemoglobin stable on admission at 10. Continue to monitor morning CBCs. GILDARDO (obstructive sleep apnea)  Assessment & Plan  History of GILDARDO, but patient declined outpatient therapy    Asthma-COPD overlap syndrome  Assessment & Plan  Patient has a history of asthma/COPD and follows with pulmonary as an outpatient.   His most recent pulmonology visit was in October where further med recs were provided for the patient.  -See plan under respiratory failure    Hyperlipidemia  Assessment & Plan  Continue home statin regimen    Alzheimer's disease ([address])  Assessment & Plan  Continue memantine 10 mg daily. Has trazodone 150 mg as needed for sleep. Type 2 diabetes mellitus with hyperglycemia, with long-term current use of insulin [facility])  Assessment & Plan  Lab Results   Component Value Date    HGBA1C 6.1 (H) 10/11/2023       Recent Labs     12/07/23  1736 12/07/23  2108 12/08/23  0735 12/08/23  1159   POCGLU 167* 196* 197* 187*       Blood Sugar Average: Last 72 hrs:  (P) 186. 75Continue home regimen of 60 units Lantus twice daily. 30 units Humalog with meals. Sliding scale insulin. Plan  Continue to monitor as patient is on steroid therapy           VTE Pharmacologic Prophylaxis: VTE Score: 8 High Risk (Score >/= 5) - Pharmacological DVT Prophylaxis Ordered: enoxaparin (Lovenox). Sequential Compression Devices Ordered. Mobility:   Basic Mobility Inpatient Raw Score: 12  -[facility] Goal: 4: Move to chair/commode  -HLM Achieved: 2: Bed activities/Dependent transfer  HLM Goal NOT achieved. Continue with multidisciplinary rounding and encourage appropriate mobility to improve upon [facility] goals. Patient Centered Rounds: I performed bedside rounds with nursing staff today. Discussions with Specialists or Other Care Team Provider: Attending, Senior resident, Nursing    Education and Discussions with Family / Patient: Updated  (brother) via phone. Current Length of Stay: 1 day(s)  Current Patient Status: Inpatient   Discharge Plan: Anticipate discharge in 48-72 hrs to prior assisted or independent living facility. Code Status: Level 3 - DNAR and DNI    Subjective:   No acute overnight events. Patient was seen and examined at bedside.  She is currently wearing the BiPAP, though eager to have it taken off and eat breakfast. She denies any chest pain, shortness of breath, nausea, vomiting, abdominal pain, flank pain, or dysuria at this time. Objective:     Vitals:   Temp (24hrs), Av °F (36.7 °C), Min:97.9 °F (36.6 °C), Max:98.1 °F (36.7 °C)    Temp:  [97.9 °F (36.6 °C)-98.1 °F (36.7 °C)] 97.9 °F (36.6 °C)  HR:  [70-83] 70  Resp:  [18-34] 18  BP: (132-181)/(60-78) 174/78  SpO2:  [89 %-97 %] 96 %  Body mass index is 42.58 kg/m². Input and Output Summary (last 24 hours): Intake/Output Summary (Last 24 hours) at 2023 1445  Last data filed at 2023 1345  Gross per 24 hour   Intake 1260 ml   Output 1400 ml   Net -140 ml       Physical Exam:   Physical Exam  Constitutional:       Appearance: She is obese. She is ill-appearing. HENT:      Head: Normocephalic and atraumatic. Right Ear: External ear normal.      Left Ear: External ear normal.      Nose: Nose normal. No congestion. Eyes:      Extraocular Movements: Extraocular movements intact. Conjunctiva/sclera: Conjunctivae normal.   Cardiovascular:      Rate and Rhythm: Normal rate and regular rhythm. Heart sounds: No murmur heard. Pulmonary:      Effort: Pulmonary effort is normal. No respiratory distress. Breath sounds: Wheezing (diffuse) present. Comments: Undergoing BiPAP therapy currently  Abdominal:      General: There is no distension. Palpations: Abdomen is soft. Tenderness: There is no abdominal tenderness. Musculoskeletal:         General: No swelling or tenderness. Cervical back: Normal range of motion and neck supple. Right lower leg: No edema. Left lower leg: No edema. Skin:     General: Skin is warm and dry. Neurological:      Mental Status: She is alert and oriented to person, place, and time.    Psychiatric:         Mood and Affect: Mood normal.       Additional Data:     Labs:  Results from last 7 days   Lab Units 23  0614   WBC Thousand/uL 5.45   HEMOGLOBIN g/dL 9.5*   HEMATOCRIT % 30.5*   PLATELETS Thousands/uL 139*   NEUTROS PCT % 79*   LYMPHS PCT % 14   MONOS PCT % 5 EOS PCT % 0     Results from last 7 days   Lab Units 12/08/23  0614 12/07/23  0905   SODIUM mmol/L 139 140   POTASSIUM mmol/L 4.5 4.3   CHLORIDE mmol/L 97 97   CO2 mmol/L 37* 39*   BUN mg/dL 32* 23   CREATININE mg/dL 1.24 1.28   ANION GAP mmol/L 5 4   CALCIUM mg/dL 8.8 8.9   ALBUMIN g/dL  --  3.8   TOTAL BILIRUBIN mg/dL  --  0.47   ALK PHOS U/L  --  56   ALT U/L  --  28   AST U/L  --  23   GLUCOSE RANDOM mg/dL 197* 90     Results from last 7 days   Lab Units 12/07/23  0905   INR  0.95     Results from last 7 days   Lab Units 12/08/23  1159 12/08/23  0735 12/07/23  2108 12/07/23  1736   POC GLUCOSE mg/dl 187* 197* 196* 167*         Results from last 7 days   Lab Units 12/08/23  0614 12/07/23  1833 12/07/23  0905   LACTIC ACID mmol/L  --   --  0.4*   PROCALCITONIN ng/ml 0.17 0.20 0.28*       Lines/Drains:  Invasive Devices       Peripheral Intravenous Line  Duration             Peripheral IV 12/07/23 Distal;Right Forearm 1 day    Peripheral IV 12/07/23 Left Antecubital 1 day              Drain  Duration             External Urinary Catheter 1 day                          Imaging: Reviewed radiology reports from this admission including: chest xray    Recent Cultures (last 7 days):   Results from last 7 days   Lab Units 12/07/23  0909 12/07/23  0905   BLOOD CULTURE  No Growth at 24 hrs. No Growth at 24 hrs.        Last 24 Hours Medication List:   Current Facility-Administered Medications   Medication Dose Route Frequency Provider Last Rate    acetaminophen  650 mg Oral Q8H [address] Noy Beth MD      albuterol  2 puff Inhalation Q4H PRN Abiola Zacarias MD      aspirin  81 mg Oral Daily Noy Beth MD      azithromycin  500 mg Oral Q24H Zane Og DO      benzonatate  200 mg Oral TID PRN Abiola Zacarias MD      enoxaparin  40 mg Subcutaneous Daily Abiola Zacarias MD      fluticasone-vilanterol  2 puff Inhalation Daily Abiola Zacarias MD      gabapentin  300 mg Oral BID Abiola Zacarias MD      guaiFENesin  1,200 mg Oral Q12H Manas Villa MD      insulin glargine  60 Units Subcutaneous Q12H [address] [facility] MD Ignacia      insulin lispro  1-6 Units Subcutaneous 4x Daily (AC & HS) Josephine Fitzgerald MD      insulin lispro  30 Units Subcutaneous TID With Meals Josephine Fitzgerald MD      ipratropium  0.5 mg Nebulization TID Jayda Fitzpatrick MD      labetalol  10 mg Intravenous Q6H PRN Josephine Fitzgerald MD      levalbuterol  1.25 mg Nebulization TID Marvel Campbell MD      losartan  25 mg Oral Daily Juma Thorpe MD      magnesium hydroxide  15 mL Oral Daily PRN Josephine Fitzgerald MD      melatonin  3 mg Oral HS Josephine Fitzgerald MD      memantine  10 mg Oral BID Josephine Fitzgerald MD      methylPREDNISolone sodium succinate  40 mg Intravenous Q12H [address]  JoshEmelle Loco DO Sunny      montelukast  10 mg Oral HS Josephine Fitzgerald MD      pantoprazole  20 mg Oral Early Morning Josephine Fitzgerald MD      pravastatin  80 mg Oral Daily With Lizette Paiz MD      traZODone  150 mg Oral HS PRN Josephine Fitzgerald MD      vancomycin  25 mg/kg (Adjusted) Intravenous Once Juma Thorpe MD 1,750 mg (12/08/23 1345)    [START ON 12/9/2023] vancomycin  750 mg Intravenous Q24H Racheal Hernandez MD          Today, Patient Was Seen By: Juma Thorpe MD    **Please Note: This note may have been constructed using a voice recognition system. **

## 2023-12-08 NOTE — MALNUTRITION/BMI
This medical record reflects one or more clinical indicators suggestive of malnutrition and/or morbid obesity. BMI Findings:  Adult BMI Classifications: Morbid Obesity 40-44.9        Body mass index is 42.58 kg/m². See Nutrition note dated 12/8/2023 for additional details. Completed nutrition assessment is viewable in the nutrition documentation.

## 2023-12-08 NOTE — CONSULTS
Consultation - Pulmonary Medicine   Erika Woodard 68 y.o. female MRN: [id number]      Reason for Consult: Ashtma/COPD Exacerbation    Erika Woodard is a 68 y.o. female with a PMH of COPD/asthma, Tobacco abuse(30 PY quit 1980), neuropathy, MD, HLD, Anemia, and dementia who presents with respiratory distress requiring bipap now greatly improved due to an Asthma/COPD exacerbation    Asthma/COPD exacerbation - Likely viral trigger - Patient improved on current regimen.    - Recommend Solumedrol 40mg q12hr - Transition Prednisone 40mg qday 10 days of steroids total, no taper  - Low concerns for PNA - Can transition to Azithromycin 5 days   - Continue Xopenex/Atrovent TID - wean as tolerated  - Restart home regimen on discharge -Advair, Singular, Levalbuterol       Hypoxic Respiratory Failure - Likely secondary to exacerbation  - Wean FiO2 - Maintain O2 Sat >90%  - Ambulatory pulse ox prior to discharge    GILDARDO/OHS - History of GILDARDO but refused outpatient therapy  - Reasonable to trial BiPAP qhs if in line with patients goals of care      Tram Romero MD  [facility]    _____________________________________________________________________    HPI:    Erika Woodard is a 68 y.o. female with a PMH of COPD/asthma, Tobacco abuse(30 PY quit 1980), neuropathy, MD, HLD, Anemia, and dementia who presents with respiratory distress requiring bipap now greatly improved. She is a poor historian. She  denies inciting event or sick contacts, she has significant wheezing cough and febrile in there ER. She feels much better with interventions. Imaging:  I personally reviewed the images on the [facility] system pertinent to today's visit  CXR    Cardiomediastinal silhouette normal.     Lungs clear. No effusion or pneumothorax. Upper abdomen normal. Bones normal for age. IMPRESSION:     No acute cardiopulmonary disease.        Other studies:  TTE 9/20  LEFT VENTRICLE:  Systolic function was normal. Ejection fraction was estimated to be 55 %. Although no diagnostic regional wall motion abnormality was identified, this possibility cannot be completely excluded on the basis of this study. RIGHT VENTRICLE:  The size was normal.  Systolic function was normal.      Review of Systems:  Aside from what is mentioned in the HPI, the review of systems otherwise negative.     Immunization History   Administered Date(s) Administered    COVID-19 PFIZER VACCINE 0.3 ML IM 01/09/2021, 02/14/2021, 05/19/2022    INFLUENZA 09/22/2015, 10/12/2016    Influenza Quadrivalent, 6-35 Months IM 10/16/2014    Influenza Split High Dose Preservative Free IM 09/22/2015, 10/12/2016    Influenza, high dose seasonal 0.7 mL 10/14/2020    Influenza, seasonal, injectable 10/12/2011, 10/01/2013    Pneumococcal Conjugate 13-Valent 09/22/2015    Pneumococcal Polysaccharide PPV23 09/08/1999, 04/12/2019        Current Medications:    Current Facility-Administered Medications:     acetaminophen (TYLENOL) tablet 650 mg, 650 mg, Oral, Q8H [address] [facility], Noy Beth MD, 650 mg at 12/08/23 0510    albuterol (PROVENTIL HFA,VENTOLIN HFA) inhaler 2 puff, 2 puff, Inhalation, Q4H PRN, Howard Boucher MD    aspirin (ECOTRIN LOW STRENGTH) EC tablet 81 mg, 81 mg, Oral, Daily, Howard Boucher MD, 81 mg at 12/08/23 0914    benzonatate (TESSALON PERLES) capsule 200 mg, 200 mg, Oral, TID PRN, Howard Boucher MD, 200 mg at 12/07/23 1727    ceftriaxone (ROCEPHIN) 1 g/50 mL in dextrose IVPB, 1,000 mg, Intravenous, Q24H, Howard Boucher MD, Last Rate: 100 mL/hr at 12/08/23 1235, 1,000 mg at 12/08/23 1235    enoxaparin (LOVENOX) subcutaneous injection 40 mg, 40 mg, Subcutaneous, Daily, Howard Boucher MD, 40 mg at 12/08/23 0843    fluticasone-vilanterol 200-25 mcg/actuation 2 puff, 2 puff, Inhalation, Daily, Howard Boucher MD, 2 puff at 12/08/23 0916    gabapentin (NEURONTIN) capsule 300 mg, 300 mg, Oral, BID, Howard Boucher MD, 300 mg at 12/08/23 0914    guaiFENesin (MUCINEX) 12 hr tablet 1,200 mg, 1,200 mg, Oral, Q12H [address], Rosenda Barrera MD, 1,200 mg at 12/08/23 0914    insulin glargine (LANTUS) subcutaneous injection 60 Units 0.6 mL, 60 Units, Subcutaneous, Q12H [address], Rosenda Barrera MD, 60 Units at 12/08/23 0843    insulin lispro (HumaLOG) 100 units/mL subcutaneous injection 1-6 Units, 1-6 Units, Subcutaneous, 4x Daily (AC & HS), 1 Units at 12/08/23 1237 **AND** Fingerstick Glucose (POCT), , , TID AC, Noy Beth MD    insulin lispro (HumaLOG) 100 units/mL subcutaneous injection 30 Units, 30 Units, Subcutaneous, TID With Meals, Rosenda Barrera MD, 30 Units at 12/08/23 1237    ipratropium (ATROVENT) 0.02 % inhalation solution 0.5 mg, 0.5 mg, Nebulization, TID, Trever Campbell MD, 0.5 mg at 12/07/23 1929    labetalol (NORMODYNE) injection 10 mg, 10 mg, Intravenous, Q6H PRN, Rosenda Barrera MD    levalbuterol Ellard Fling) inhalation solution 1.25 mg, 1.25 mg, Nebulization, TID, Sergey Haynes MD, 1.25 mg at 12/07/23 1929    losartan (COZAAR) tablet 25 mg, 25 mg, Oral, Daily, Kelsea Rodney MD, 25 mg at 12/08/23 1140    magnesium hydroxide (MILK OF MAGNESIA) oral suspension 15 mL, 15 mL, Oral, Daily PRN, Rosenda Barrera MD    melatonin tablet 3 mg, 3 mg, Oral, HS, Rosenda Barrera MD, 3 mg at 12/07/23 2147    memantine (NAMENDA) tablet 10 mg, 10 mg, Oral, BID, Rosenda Barrera MD, 10 mg at 12/08/23 0914    methylPREDNISolone sodium succinate (Solu-MEDROL) injection 40 mg, 40 mg, Intravenous, Q8H [address], Rosenda Barrera MD, 40 mg at 12/08/23 0509    montelukast (SINGULAIR) tablet 10 mg, 10 mg, Oral, HS, Rosenda Barrera MD, 10 mg at 12/07/23 2147    pantoprazole (PROTONIX) EC tablet 20 mg, 20 mg, Oral, Early Morning, Noy Beth MD, 20 mg at 12/08/23 0510    pravastatin (PRAVACHOL) tablet 80 mg, 80 mg, Oral, Daily With Anay Faye MD, 80 mg at 12/07/23 1726    traZODone (DESYREL) tablet 150 mg, 150 mg, Oral, HS PRN, Rosenda Barrera MD    vancomycin (VANCOCIN) 1,750 mg in sodium chloride 0.9 % 500 mL IVPB, 25 mg/kg (Adjusted), Intravenous, Once, Albertina Lange MD    Historical Information   Past Medical History:   Diagnosis Date    Alzheimer disease ([facility])     Asthma     Diabetes mellitus ([address] [facility])     GERD (gastroesophageal reflux disease)     Hyperlipidemia     Hypertension      Past Surgical History:   Procedure Laterality Date    JOINT REPLACEMENT Bilateral     KNEE SURGERY      TOE SURGERY       Social History   Social History     Tobacco Use   Smoking Status Former    Packs/day: 1.00    Years: 52.00    Total pack years: 52.00    Types: Cigarettes    Start date:     Quit date: 2017    Years since quittin.2   Smokeless Tobacco Never       Family History:   Family History   Problem Relation Age of Onset    Dementia Brother     Breast cancer Sister 76    Cancer Brother          PhysicalExamination:  Vitals:   BP (!) 174/78   Pulse 70   Temp 97.9 °F (36.6 °C)   Resp 18   Ht 5' (1.524 m)   SpO2 97%   BMI 42.58 kg/m²     Appearance -- NAD, speaking full sentences  HEENT -- anicteric sclera, clear OP, MMM  Neck -- no JVD  Heart -- RRR, no murmurs  Lungs -- Wheezing, crackles  Abdomen -- soft, NTND, +bs  Extremities -- WWP, no LE edema  Skin -- no rash  Neuro -- A&Ox3, wnl  Psych -- no obvious depression or hallucination        Diagnostic Data:  Labs:   I personally reviewed the most recent laboratory data pertinent to today's visit    Lab Results   Component Value Date    WBC 5.45 2023    HGB 9.5 (L) 2023    HCT 30.5 (L) 2023    MCV 96 2023     (L) 2023     Lab Results   Component Value Date    GLUCOSE 171 (H) 2015    CALCIUM 8.8 2023     (L) 2015    K 4.5 2023    CO2 37 (H) 2023    CL 97 2023    BUN 32 (H) 2023    CREATININE 1.24 2023     No results found for: "IGE"  Lab Results   Component Value Date    ALT 28 2023    AST 23 2023    ALKPHOS 56 2023    BILITOT 0.61 2015           I have spent a total time of 20-50 minutes on 12/08/23 in caring for this patient including Diagnostic results, Prognosis, Risks and benefits of tx options, Instructions for management, Patient and family education, Importance of tx compliance, Risk factor reductions, Impressions, Counseling / Coordination of care, Documenting in the medical record, Reviewing / ordering tests, medicine, procedures  , Obtaining or reviewing history  , and Communicating with other healthcare professionals .    _

## 2023-12-08 NOTE — PLAN OF CARE
Problem: PAIN - ADULT  Goal: Verbalizes/displays adequate comfort level or baseline comfort level  Description: Interventions:  - Encourage patient to monitor pain and request assistance  - Assess pain using appropriate pain scale  - Administer analgesics based on type and severity of pain and evaluate response  - Implement non-pharmacological measures as appropriate and evaluate response  - Consider cultural and social influences on pain and pain management  - Notify physician/advanced practitioner if interventions unsuccessful or patient reports new pain  Outcome: Progressing     Problem: INFECTION - ADULT  Goal: Absence or prevention of progression during hospitalization  Description: INTERVENTIONS:  - Assess and monitor for signs and symptoms of infection  - Monitor lab/diagnostic results  - Monitor all insertion sites, i.e. indwelling lines, tubes, and drains  - Monitor endotracheal if appropriate and nasal secretions for changes in amount and color  - [place] appropriate cooling/warming therapies per order  - Administer medications as ordered  - Instruct and encourage patient and family to use good hand hygiene technique  - Identify and instruct in appropriate isolation precautions for identified infection/condition  Outcome: Progressing     Problem: DISCHARGE PLANNING  Goal: Discharge to home or other facility with appropriate resources  Description: INTERVENTIONS:  - Identify barriers to discharge w/patient and caregiver  - Arrange for needed discharge resources and transportation as appropriate  - Identify discharge learning needs (meds, wound care, etc.)  - Arrange for interpretive services to assist at discharge as needed  - Refer to Case Management Department for coordinating discharge planning if the patient needs post-hospital services based on physician/advanced practitioner order or complex needs related to functional status, cognitive ability, or social support system  Outcome: Progressing Problem: Knowledge Deficit  Goal: Patient/family/caregiver demonstrates understanding of disease process, treatment plan, medications, and discharge instructions  Description: Complete learning assessment and assess knowledge base.   Interventions:  - Provide teaching at level of understanding  - Provide teaching via preferred learning methods  Outcome: Progressing

## 2023-12-09 LAB
ANION GAP SERPL CALCULATED.3IONS-SCNC: 6 MMOL/L
BUN SERPL-MCNC: 40 MG/DL (ref 5–25)
CALCIUM SERPL-MCNC: 8.7 MG/DL (ref 8.4–10.2)
CHLORIDE SERPL-SCNC: 98 MMOL/L (ref 96–108)
CO2 SERPL-SCNC: 36 MMOL/L (ref 21–32)
CREAT SERPL-MCNC: 1.4 MG/DL (ref 0.6–1.3)
ERYTHROCYTE [DISTWIDTH] IN BLOOD BY AUTOMATED COUNT: 16.1 % (ref 11.6–15.1)
GFR SERPL CREATININE-BSD FRML MDRD: 37 ML/MIN/1.73SQ M
GLUCOSE SERPL-MCNC: 115 MG/DL (ref 65–140)
GLUCOSE SERPL-MCNC: 126 MG/DL (ref 65–140)
GLUCOSE SERPL-MCNC: 139 MG/DL (ref 65–140)
GLUCOSE SERPL-MCNC: 205 MG/DL (ref 65–140)
GLUCOSE SERPL-MCNC: 268 MG/DL (ref 65–140)
HCT VFR BLD AUTO: 32.6 % (ref 34.8–46.1)
HGB BLD-MCNC: 9.9 G/DL (ref 11.5–15.4)
MAGNESIUM SERPL-MCNC: 2.5 MG/DL (ref 1.9–2.7)
MCH RBC QN AUTO: 29.2 PG (ref 26.8–34.3)
MCHC RBC AUTO-ENTMCNC: 30.4 G/DL (ref 31.4–37.4)
MCV RBC AUTO: 96 FL (ref 82–98)
MRSA NOSE QL CULT: NORMAL
PLATELET # BLD AUTO: 148 THOUSANDS/UL (ref 149–390)
PMV BLD AUTO: 9.8 FL (ref 8.9–12.7)
POTASSIUM SERPL-SCNC: 4.8 MMOL/L (ref 3.5–5.3)
RBC # BLD AUTO: 3.39 MILLION/UL (ref 3.81–5.12)
SODIUM SERPL-SCNC: 140 MMOL/L (ref 135–147)
WBC # BLD AUTO: 7.17 THOUSAND/UL (ref 4.31–10.16)

## 2023-12-09 PROCEDURE — 80048 BASIC METABOLIC PNL TOTAL CA: CPT

## 2023-12-09 PROCEDURE — 94640 AIRWAY INHALATION TREATMENT: CPT

## 2023-12-09 PROCEDURE — 94760 N-INVAS EAR/PLS OXIMETRY 1: CPT

## 2023-12-09 PROCEDURE — 99232 SBSQ HOSP IP/OBS MODERATE 35: CPT | Performed by: INTERNAL MEDICINE

## 2023-12-09 PROCEDURE — 82948 REAGENT STRIP/BLOOD GLUCOSE: CPT

## 2023-12-09 PROCEDURE — 83735 ASSAY OF MAGNESIUM: CPT

## 2023-12-09 PROCEDURE — 85027 COMPLETE CBC AUTOMATED: CPT

## 2023-12-09 RX ORDER — PREDNISONE 20 MG/1
40 TABLET ORAL DAILY
Status: DISCONTINUED | OUTPATIENT
Start: 2023-12-09 | End: 2023-12-11 | Stop reason: HOSPADM

## 2023-12-09 RX ADMIN — ACETAMINOPHEN 650 MG: 325 TABLET, FILM COATED ORAL at 21:17

## 2023-12-09 RX ADMIN — ENOXAPARIN SODIUM 40 MG: 40 INJECTION SUBCUTANEOUS at 10:24

## 2023-12-09 RX ADMIN — MONTELUKAST 10 MG: 10 TABLET, FILM COATED ORAL at 21:17

## 2023-12-09 RX ADMIN — MEMANTINE 10 MG: 10 TABLET ORAL at 10:27

## 2023-12-09 RX ADMIN — LEVALBUTEROL HYDROCHLORIDE 1.25 MG: 1.25 SOLUTION RESPIRATORY (INHALATION) at 08:48

## 2023-12-09 RX ADMIN — INSULIN GLARGINE 60 UNITS: 100 INJECTION, SOLUTION SUBCUTANEOUS at 21:37

## 2023-12-09 RX ADMIN — METHYLPREDNISOLONE SODIUM SUCCINATE 40 MG: 40 INJECTION, POWDER, FOR SOLUTION INTRAMUSCULAR; INTRAVENOUS at 10:25

## 2023-12-09 RX ADMIN — ACETAMINOPHEN 650 MG: 325 TABLET, FILM COATED ORAL at 05:18

## 2023-12-09 RX ADMIN — FLUTICASONE FUROATE AND VILANTEROL TRIFENATATE 2 PUFF: 200; 25 POWDER RESPIRATORY (INHALATION) at 10:28

## 2023-12-09 RX ADMIN — PRAVASTATIN SODIUM 80 MG: 80 TABLET ORAL at 16:43

## 2023-12-09 RX ADMIN — GUAIFENESIN 1200 MG: 600 TABLET ORAL at 10:27

## 2023-12-09 RX ADMIN — INSULIN LISPRO 2 UNITS: 100 INJECTION, SOLUTION INTRAVENOUS; SUBCUTANEOUS at 21:37

## 2023-12-09 RX ADMIN — INSULIN LISPRO 3 UNITS: 100 INJECTION, SOLUTION INTRAVENOUS; SUBCUTANEOUS at 16:48

## 2023-12-09 RX ADMIN — INSULIN GLARGINE 60 UNITS: 100 INJECTION, SOLUTION SUBCUTANEOUS at 10:25

## 2023-12-09 RX ADMIN — MEMANTINE 10 MG: 10 TABLET ORAL at 18:34

## 2023-12-09 RX ADMIN — LEVALBUTEROL HYDROCHLORIDE 1.25 MG: 1.25 SOLUTION RESPIRATORY (INHALATION) at 14:19

## 2023-12-09 RX ADMIN — ACETAMINOPHEN 650 MG: 325 TABLET, FILM COATED ORAL at 13:53

## 2023-12-09 RX ADMIN — IPRATROPIUM BROMIDE 0.5 MG: 0.5 SOLUTION RESPIRATORY (INHALATION) at 14:19

## 2023-12-09 RX ADMIN — GUAIFENESIN 1200 MG: 600 TABLET ORAL at 21:17

## 2023-12-09 RX ADMIN — PREDNISONE 40 MG: 20 TABLET ORAL at 21:17

## 2023-12-09 RX ADMIN — LEVALBUTEROL HYDROCHLORIDE 1.25 MG: 1.25 SOLUTION RESPIRATORY (INHALATION) at 19:26

## 2023-12-09 RX ADMIN — Medication 3 MG: at 21:17

## 2023-12-09 RX ADMIN — IPRATROPIUM BROMIDE 0.5 MG: 0.5 SOLUTION RESPIRATORY (INHALATION) at 19:26

## 2023-12-09 RX ADMIN — INSULIN LISPRO 30 UNITS: 100 INJECTION, SOLUTION INTRAVENOUS; SUBCUTANEOUS at 18:05

## 2023-12-09 RX ADMIN — PANTOPRAZOLE SODIUM 20 MG: 20 TABLET, DELAYED RELEASE ORAL at 05:18

## 2023-12-09 RX ADMIN — AZITHROMYCIN DIHYDRATE 500 MG: 250 TABLET ORAL at 13:53

## 2023-12-09 RX ADMIN — GABAPENTIN 300 MG: 300 CAPSULE ORAL at 18:34

## 2023-12-09 RX ADMIN — IPRATROPIUM BROMIDE 0.5 MG: 0.5 SOLUTION RESPIRATORY (INHALATION) at 08:48

## 2023-12-09 RX ADMIN — CEFTRIAXONE SODIUM 1000 MG: 10 INJECTION, POWDER, FOR SOLUTION INTRAVENOUS at 12:26

## 2023-12-09 RX ADMIN — ASPIRIN 81 MG: 81 TABLET, COATED ORAL at 10:26

## 2023-12-09 RX ADMIN — LOSARTAN POTASSIUM 25 MG: 25 TABLET, FILM COATED ORAL at 10:25

## 2023-12-09 RX ADMIN — GABAPENTIN 300 MG: 300 CAPSULE ORAL at 10:24

## 2023-12-09 NOTE — ASSESSMENT & PLAN NOTE
Patient is not on any hypertension medications at home. Blood pressures have been slightly elevated with highest systolic reaching 676. Per chart review, patient was previously on lisinopril 2.5 mg daily until June 2022, at which point it was discontinued due to KATHY during a hospitalization. Plan  - Losartan 25 mg daily started  -10 mg IV labetalol as needed for systolic blood pressures greater than 180.

## 2023-12-09 NOTE — PROGRESS NOTES
[address] [facility]  Progress Note  Name: Lilliam Zamudio  MRN: [id number]  Unit/Bed#: W -01 I Date of Admission: 12/7/2023   Date of Service: 12/9/2023 I Hospital Day: 2    Assessment/Plan   * Acute respiratory failure with hypoxia and hypercapnia [facility])  Assessment & Plan  Patient was brought to the ED from [facility], as she started to develop increasing respiratory distress with labored breathing and was noted to be hypoxic while there. In the ED, she was placed initially on BiPAP for respiratory distress and then began saturating at 95%. She was then transitioned to 4 L of nasal cannula which she remains on currently. Patient states she still feels short of breath, but feels better overall. Denies any other symptoms at this time. She did meet sepsis criteria, and had an elevated Pro-[place].  Rest of lab work was relatively unremarkable. Diffuse wheezing appreciated on exam.  Patient has slight gasping of air when trying to talk. Patient most recently saw her pulmonologist at the end of October and provided medication adjustments. Chest x-ray in the ED was unremarkable. Per nephew, patient has oxygen at her facility as needed. Per patient, she uses 2L as needed at night.     Etiology: likely asthma/COPD exacerbation    Plan:  -Pulmonary on board, appreciate recommendations  -IV Solu-Medrol discontinued today, transition to prednisone 40 mg daily for 10 days  -Respiratory protocol  -Tessalon Perles and Mucinex as needed  -4 L nasal cannula, wean as necessary, maintain SpO2 > 90%  -DuoNebs every 8 hours  -Albuterol prn  -Singulair 10 mg daily  -Sputum culture pending  -BiPAP at night, anticipate overnight pulse ox prior to discharge    Abnormal urinalysis  Assessment & Plan  UA - turbid urine, 30-50 leukocytes on microscopy, occasional bacteria  Patient denies urinary symptoms  10/14/23 urine culture growth of Vancomycin susceptible Enterococcus  Preliminary read on urine cultures gram-negative enteric rods, low suspicion for enterococcus    Plan  Follow-up on final urine culture  Vancomycin initiated, pharmacology consult appreciated  Will discontinue vancomycin    Neuropathy  Assessment & Plan  Continue gabapentin 300 mg twice daily. Anemia  Assessment & Plan  Patient has chronic anemia at baseline. Hemoglobin stable on admission at 10. Continue to monitor morning CBCs. GILDARDO (obstructive sleep apnea)  Assessment & Plan  History of GILDARDO, but patient declined outpatient therapy    Asthma-COPD overlap syndrome  Assessment & Plan  Patient has a history of asthma/COPD and follows with pulmonary as an outpatient. His most recent pulmonology visit was in October where further med recs were provided for the patient.  -See plan under respiratory failure    Hyperlipidemia  Assessment & Plan  Continue home statin regimen    Sepsis [facility])  Assessment & Plan  Patient met SIRS criteria upon admission to the ED with a fever of 101, tachycardia, tachypnea, and elevated Pro-[place].  Chest x-ray was negative for any evidence of pneumonia. Patient had no leukocytosis. UA revealed evidence of white blood cells and few leukocytes, can consider urinary source. She was given dose of lactated Ringer's and ceftriaxone while in the ED. In addition, patient was in acute respiratory distress and was placed on BiPAP initially while in the ED by respiratory therapy. Now saturating on 4 L nasal cannula at 95%. Patient denies any symptoms of cough, however cannot rule out upper respiratory infection.   Low suspicion for pneumonia  Urine culture with growth of gram-negative enteric rods  MRSA culture negative    Plan:  -Continue azithromycin  -Will restart IV ceftriaxone 1 g daily given concerns for gram-negative rods on urine culture  -Discontinue vancomycin  -Blood cultures x 2 pending  -Sputum culture ordered but not collected  -Continue to monitor vitals for fever  -Monitor morning blood work    Alzheimer's disease [facility])  Assessment & Plan  Continue memantine 10 mg daily. Has trazodone 150 mg as needed for sleep. Type 2 diabetes mellitus with hyperglycemia, with long-term current use of insulin [facility])  Assessment & Plan  Lab Results   Component Value Date    HGBA1C 6.1 (H) 10/11/2023       Recent Labs     12/08/23  1159 12/08/23  1656 12/08/23  2112 12/09/23  0720   POCGLU 187* 132 164* 115         Blood Sugar Average: Last 72 hrs:  (P) [id number] home regimen of 60 units Lantus twice daily. 30 units Humalog with meals. Sliding scale insulin. Plan  Continue to monitor as patient is on steroid therapy      Essential hypertension  Assessment & Plan  Patient is not on any hypertension medications at home. Blood pressures have been slightly elevated with highest systolic reaching 997. Per chart review, patient was previously on lisinopril 2.5 mg daily until June 2022, at which point it was discontinued due to KATHY during a hospitalization. Plan  - Losartan 25 mg daily started  -10 mg IV labetalol as needed for systolic blood pressures greater than 180. VTE Pharmacologic Prophylaxis: VTE Score: 8 High Risk (Score >/= 5) - Pharmacological DVT Prophylaxis Ordered: enoxaparin (Lovenox). Sequential Compression Devices Ordered. Mobility:   Basic Mobility Inpatient Raw Score: 12  -HLM Goal: 4: Move to chair/commode  JH-HLM Achieved: 2: Bed activities/Dependent transfer  HLM Goal achieved. Continue to encourage appropriate mobility. Patient Centered Rounds: I performed bedside rounds with nursing staff today. Discussions with Specialists or Other Care Team Provider: Attending    Education and Discussions with Family / Patient: Updated  (brother) via phone. Current Length of Stay: 2 day(s)  Current Patient Status: Inpatient   Discharge Plan: Anticipate discharge in 24-48 hrs to rehab facility.     Code Status: Level 3 - DNAR and DNI    Subjective:   Patient seen and examined at the bedside this morning. No acute events overnight. The patient reported to me that she was feeling better overall and reported some difficulty breathing immediately after waking up this morning that resolved. She denies any recent fever, chills, chest pain, fatigue, dysuria, hematuria, or foul-smelling urine. She continues to endorse urinary frequency. Objective:     Vitals:   Temp (24hrs), Av.9 °F (36.6 °C), Min:97.4 °F (36.3 °C), Max:98.4 °F (36.9 °C)    Temp:  [97.4 °F (36.3 °C)-98.4 °F (36.9 °C)] 97.4 °F (36.3 °C)  HR:  [74-82] 74  Resp:  [16-19] 19  BP: (141-160)/(61-71) 160/71  SpO2:  [94 %-98 %] 96 %  Body mass index is 42.58 kg/m². Input and Output Summary (last 24 hours): Intake/Output Summary (Last 24 hours) at 2023 1154  Last data filed at 2023 1708  Gross per 24 hour   Intake 1570 ml   Output 500 ml   Net 1070 ml       Physical Exam:   Physical Exam  Constitutional:       Appearance: She is obese. She is ill-appearing. HENT:      Head: Normocephalic and atraumatic. Right Ear: External ear normal.      Left Ear: External ear normal.      Nose: Nose normal. No congestion. Eyes:      Extraocular Movements: Extraocular movements intact. Conjunctiva/sclera: Conjunctivae normal.   Cardiovascular:      Rate and Rhythm: Normal rate and regular rhythm. Heart sounds: No murmur heard. Pulmonary:      Effort: Pulmonary effort is normal. No respiratory distress. Breath sounds: Wheezing (diffuse) present. Comments: Undergoing BiPAP therapy currently  Abdominal:      General: There is no distension. Palpations: Abdomen is soft. Tenderness: There is no abdominal tenderness. Musculoskeletal:         General: No swelling or tenderness. Cervical back: Normal range of motion and neck supple. Right lower leg: No edema. Left lower leg: No edema. Skin:     General: Skin is warm and dry.    Neurological:      Mental Status: She is alert and oriented to person, place, and time. Psychiatric:         Mood and Affect: Mood normal.          Additional Data:     Labs:  Results from last 7 days   Lab Units 12/09/23  0449 12/08/23  0614   WBC Thousand/uL 7.17 5.45   HEMOGLOBIN g/dL 9.9* 9.5*   HEMATOCRIT % 32.6* 30.5*   PLATELETS Thousands/uL 148* 139*   NEUTROS PCT %  --  79*   LYMPHS PCT %  --  14   MONOS PCT %  --  5   EOS PCT %  --  0     Results from last 7 days   Lab Units 12/09/23  0449 12/08/23  0614 12/07/23  0905   SODIUM mmol/L 140   < > 140   POTASSIUM mmol/L 4.8   < > 4.3   CHLORIDE mmol/L 98   < > 97   CO2 mmol/L 36*   < > 39*   BUN mg/dL 40*   < > 23   CREATININE mg/dL 1.40*   < > 1.28   ANION GAP mmol/L 6   < > 4   CALCIUM mg/dL 8.7   < > 8.9   ALBUMIN g/dL  --   --  3.8   TOTAL BILIRUBIN mg/dL  --   --  0.47   ALK PHOS U/L  --   --  56   ALT U/L  --   --  28   AST U/L  --   --  23   GLUCOSE RANDOM mg/dL 126   < > 90    < > = values in this interval not displayed. Results from last 7 days   Lab Units 12/07/23  0905   INR  0.95     Results from last 7 days   Lab Units 12/09/23  1132 12/09/23  0720 12/08/23  2112 12/08/23  1656 12/08/23  1159 12/08/23  0735 12/07/23  2108 12/07/23  1736   POC GLUCOSE mg/dl 139 115 164* 132 187* 197* 196* 167*         Results from last 7 days   Lab Units 12/08/23  0614 12/07/23  1833 12/07/23  0905   LACTIC ACID mmol/L  --   --  0.4*   PROCALCITONIN ng/ml 0.17 0.20 0.28*       Lines/Drains:  Invasive Devices       Peripheral Intravenous Line  Duration             Peripheral IV 12/07/23 Distal;Right Forearm 2 days    Peripheral IV 12/07/23 Left Antecubital 2 days              Drain  Duration             External Urinary Catheter 1 day                          Imaging: All radiological imaging reports reviewed from this admission. No new imaging today.     Recent Cultures (last 7 days):   Results from last 7 days   Lab Units 12/08/23  1635 12/07/23  1108 12/07/23  0909 12/07/23  0905   BLOOD CULTURE   --   --  No Growth at 24 hrs. No Growth at 24 hrs.    SPUTUM CULTURE  Culture too young- will reincubate  --   --   --    GRAM STAIN RESULT  1+ Polys  No bacteria seen  --   --   --    URINE CULTURE   --  >100,000 cfu/ml Gram Negative Rolf Enteric Like*  --   --        Last 24 Hours Medication List:   Current Facility-Administered Medications   Medication Dose Route Frequency Provider Last Rate    acetaminophen  650 mg Oral Q8H [facility] Noy Beth MD      albuterol  2 puff Inhalation Q4H PRN Jon Solo MD      aspirin  81 mg Oral Daily Noy Beth MD      azithromycin  500 mg Oral Q24H Zane Delacruz,       benzonatate  200 mg Oral TID PRN Jon Solo MD      cefTRIAXone  1,000 mg Intravenous Q24H Zane Delacruz, DO      enoxaparin  40 mg Subcutaneous Daily Jon Solo MD      fluticasone-vilanterol  2 puff Inhalation Daily Jon Solo MD      gabapentin  300 mg Oral BID Jon Solo MD      guaiFENesin  1,200 mg Oral Q12H Jerzy Whitman MD      insulin glargine  60 Units Subcutaneous Q12H [facility] Noy Beth MD      insulin lispro  1-6 Units Subcutaneous 4x Daily (AC & HS) Jon Solo MD      insulin lispro  30 Units Subcutaneous TID With Meals Noy Beth MD      ipratropium  0.5 mg Nebulization TID Purnima Campbell MD      labetalol  10 mg Intravenous Q6H PRN Jon Solo MD      levalbuterol  1.25 mg Nebulization TID Purnima Campbell MD      losartan  25 mg Oral Daily Eduardo Pillai MD      magnesium hydroxide  15 mL Oral Daily PRN Jon Solo MD      melatonin  3 mg Oral HS Noy Beth MD      memantine  10 mg Oral BID Jon Solo MD      montelukast  10 mg Oral HS Jon Solo MD      pantoprazole  20 mg Oral Early Morning Jon Solo MD      pravastatin  80 mg Oral Daily With Keith John MD      predniSONE  40 mg Oral Daily Zane Delacruz,       traZODone  150 mg Oral HS PRN Jon Solo MD          Today, Patient Was Seen By: Zane Gallo, DO    **Please Note: This note may have been constructed using a voice recognition system. **

## 2023-12-09 NOTE — ASSESSMENT & PLAN NOTE
UA - turbid urine, 30-50 leukocytes on microscopy, occasional bacteria  Patient denies urinary symptoms  10/14/23 urine culture growth of Vancomycin susceptible Enterococcus  Preliminary read on urine cultures gram-negative enteric rods, low suspicion for enterococcus    Plan  Follow-up on final urine culture  Vancomycin initiated, pharmacology consult appreciated  Will discontinue vancomycin

## 2023-12-09 NOTE — PLAN OF CARE
Problem: PAIN - ADULT  Goal: Verbalizes/displays adequate comfort level or baseline comfort level  Description: Interventions:  - Encourage patient to monitor pain and request assistance  - Assess pain using appropriate pain scale  - Administer analgesics based on type and severity of pain and evaluate response  - Implement non-pharmacological measures as appropriate and evaluate response  - Consider cultural and social influences on pain and pain management  - Notify physician/advanced practitioner if interventions unsuccessful or patient reports new pain  Outcome: Progressing     Problem: INFECTION - ADULT  Goal: Absence or prevention of progression during hospitalization  Description: INTERVENTIONS:  - Assess and monitor for signs and symptoms of infection  - Monitor lab/diagnostic results  - Monitor all insertion sites, i.e. indwelling lines, tubes, and drains  - Monitor endotracheal if appropriate and nasal secretions for changes in amount and color  - [place] appropriate cooling/warming therapies per order  - Administer medications as ordered  - Instruct and encourage patient and family to use good hand hygiene technique  - Identify and instruct in appropriate isolation precautions for identified infection/condition  Outcome: Progressing     Problem: DISCHARGE PLANNING  Goal: Discharge to home or other facility with appropriate resources  Description: INTERVENTIONS:  - Identify barriers to discharge w/patient and caregiver  - Arrange for needed discharge resources and transportation as appropriate  - Identify discharge learning needs (meds, wound care, etc.)  - Arrange for interpretive services to assist at discharge as needed  - Refer to Case Management Department for coordinating discharge planning if the patient needs post-hospital services based on physician/advanced practitioner order or complex needs related to functional status, cognitive ability, or social support system  Outcome: Progressing Problem: Knowledge Deficit  Goal: Patient/family/caregiver demonstrates understanding of disease process, treatment plan, medications, and discharge instructions  Description: Complete learning assessment and assess knowledge base.   Interventions:  - Provide teaching at level of understanding  - Provide teaching via preferred learning methods  Outcome: Progressing     Problem: Prexisting or High Potential for Compromised Skin Integrity  Goal: Skin integrity is maintained or improved  Description: INTERVENTIONS:  - Identify patients at risk for skin breakdown  - Assess and monitor skin integrity  - Assess and monitor nutrition and hydration status  - Monitor labs   - Assess for incontinence   - Turn and reposition patient  - Assist with mobility/ambulation  - Relieve pressure over bony prominences  - Avoid friction and shearing  - Provide appropriate hygiene as needed including keeping skin clean and dry  - Evaluate need for skin moisturizer/barrier cream  - Collaborate with interdisciplinary team   - Patient/family teaching  - Consider wound care consult   Outcome: Progressing

## 2023-12-09 NOTE — ASSESSMENT & PLAN NOTE
Lab Results   Component Value Date    HGBA1C 6.1 (H) 10/11/2023       Recent Labs     12/08/23  1159 12/08/23  1656 12/08/23  2112 12/09/23  0720   POCGLU 187* 132 164* 115         Blood Sugar Average: Last 72 hrs:  (P) [id number] home regimen of 60 units Lantus twice daily. 30 units Humalog with meals. Sliding scale insulin.     Plan  Continue to monitor as patient is on steroid therapy

## 2023-12-09 NOTE — ASSESSMENT & PLAN NOTE
Patient met SIRS criteria upon admission to the ED with a fever of 101, tachycardia, tachypnea, and elevated Pro-Neville.  Chest x-ray was negative for any evidence of pneumonia. Patient had no leukocytosis. UA revealed evidence of white blood cells and few leukocytes, can consider urinary source. She was given dose of lactated Ringer's and ceftriaxone while in the ED. In addition, patient was in acute respiratory distress and was placed on BiPAP initially while in the ED by respiratory therapy. Now saturating on 4 L nasal cannula at 95%. Patient denies any symptoms of cough, however cannot rule out upper respiratory infection.   Low suspicion for pneumonia  Urine culture with growth of gram-negative enteric rods  MRSA culture negative    Plan:  -Continue azithromycin  -Will restart IV ceftriaxone 1 g daily given concerns for gram-negative rods on urine culture  -Discontinue vancomycin  -Blood cultures x 2 pending  -Sputum culture ordered but not collected  -Continue to monitor vitals for fever  -Monitor morning blood work

## 2023-12-09 NOTE — PROGRESS NOTES
Doerne Pina is a 68 y.o. female who is currently ordered Vancomycin IV with management by the Pharmacy Consult service. Relevant clinical data and objective / subjective history reviewed. Vancomycin Assessment:  Indication and Goal AUC/Trough: Urinary tract infection (goal -600, trough >10)  Clinical Status: stable  Micro:     Renal Function:  SCr: 493 mg/dL  CrCl: 15.5 mL/min  Renal replacement: Not on dialysis  Days of Therapy: 2  Current Dose: 750 mg every 24 hours  Vancomycin Plan:  New Dosin mg every 24 hours  Estimated AUC: 493 mcg*hr/mL  Estimated Trough: 15.5 mcg/mL  Next Level:  @ 0600    Renal Function Monitoring: Daily BMP and [place] will continue to follow closely for s/sx of nephrotoxicity, infusion reactions and appropriateness of therapy. BMP and CBC will be ordered per protocol. We will continue to follow the patient’s culture results and clinical progress daily.     Alessio Cortes, Pharmacist

## 2023-12-09 NOTE — ASSESSMENT & PLAN NOTE
Patient was brought to the ED from [facility], as she started to develop increasing respiratory distress with labored breathing and was noted to be hypoxic while there. In the ED, she was placed initially on BiPAP for respiratory distress and then began saturating at 95%. She was then transitioned to 4 L of nasal cannula which she remains on currently. Patient states she still feels short of breath, but feels better overall. Denies any other symptoms at this time. She did meet sepsis criteria, and had an elevated Pro-[place]eville.  Rest of lab work was relatively unremarkable. Diffuse wheezing appreciated on exam.  Patient has slight gasping of air when trying to talk. Patient most recently saw her pulmonologist at the end of October and provided medication adjustments. Chest x-ray in the ED was unremarkable. Per nephew, patient has oxygen at her facility as needed. Per patient, she uses 2L as needed at night.     Etiology: likely asthma/COPD exacerbation    Plan:  -Pulmonary on board, appreciate recommendations  -IV Solu-Medrol discontinued today, transition to prednisone 40 mg daily for 10 days  -Respiratory protocol  -Tessalon Perles and Mucinex as needed  -4 L nasal cannula, wean as necessary, maintain SpO2 > 90%  -DuoNebs every 8 hours  -Albuterol prn  -Singulair 10 mg daily  -Sputum culture pending  -BiPAP at night, anticipate overnight pulse ox prior to discharge

## 2023-12-09 NOTE — PLAN OF CARE
Problem: PAIN - ADULT  Goal: Verbalizes/displays adequate comfort level or baseline comfort level  Description: Interventions:  - Encourage patient to monitor pain and request assistance  - Assess pain using appropriate pain scale  - Administer analgesics based on type and severity of pain and evaluate response  - Implement non-pharmacological measures as appropriate and evaluate response  - Consider cultural and social influences on pain and pain management  - Notify physician/advanced practitioner if interventions unsuccessful or patient reports new pain  Outcome: Progressing     Problem: INFECTION - ADULT  Goal: Absence or prevention of progression during hospitalization  Description: INTERVENTIONS:  - Assess and monitor for signs and symptoms of infection  - Monitor lab/diagnostic results  - Monitor all insertion sites, i.e. indwelling lines, tubes, and drains  - Monitor endotracheal if appropriate and nasal secretions for changes in amount and color  - [place] appropriate cooling/warming therapies per order  - Administer medications as ordered  - Instruct and encourage patient and family to use good hand hygiene technique  - Identify and instruct in appropriate isolation precautions for identified infection/condition  Outcome: Progressing     Problem: DISCHARGE PLANNING  Goal: Discharge to home or other facility with appropriate resources  Description: INTERVENTIONS:  - Identify barriers to discharge w/patient and caregiver  - Arrange for needed discharge resources and transportation as appropriate  - Identify discharge learning needs (meds, wound care, etc.)  - Arrange for interpretive services to assist at discharge as needed  - Refer to Case Management Department for coordinating discharge planning if the patient needs post-hospital services based on physician/advanced practitioner order or complex needs related to functional status, cognitive ability, or social support system  Outcome: Progressing Problem: Knowledge Deficit  Goal: Patient/family/caregiver demonstrates understanding of disease process, treatment plan, medications, and discharge instructions  Description: Complete learning assessment and assess knowledge base.   Interventions:  - Provide teaching at level of understanding  - Provide teaching via preferred learning methods  Outcome: Progressing     Problem: Prexisting or High Potential for Compromised Skin Integrity  Goal: Skin integrity is maintained or improved  Description: INTERVENTIONS:  - Identify patients at risk for skin breakdown  - Assess and monitor skin integrity  - Assess and monitor nutrition and hydration status  - Monitor labs   - Assess for incontinence   - Turn and reposition patient  - Assist with mobility/ambulation  - Relieve pressure over bony prominences  - Avoid friction and shearing  - Provide appropriate hygiene as needed including keeping skin clean and dry  - Evaluate need for skin moisturizer/barrier cream  - Collaborate with interdisciplinary team   - Patient/family teaching  - Consider wound care consult   Outcome: Progressing

## 2023-12-10 PROBLEM — N39.0 UTI (URINARY TRACT INFECTION): Status: ACTIVE | Noted: 2023-12-08

## 2023-12-10 LAB
ANION GAP SERPL CALCULATED.3IONS-SCNC: 5 MMOL/L
BACTERIA SPT RESP CULT: NORMAL
BACTERIA UR CULT: ABNORMAL
BACTERIA UR CULT: ABNORMAL
BUN SERPL-MCNC: 39 MG/DL (ref 5–25)
CALCIUM SERPL-MCNC: 8.9 MG/DL (ref 8.4–10.2)
CHLORIDE SERPL-SCNC: 98 MMOL/L (ref 96–108)
CO2 SERPL-SCNC: 38 MMOL/L (ref 21–32)
CREAT SERPL-MCNC: 1.26 MG/DL (ref 0.6–1.3)
ERYTHROCYTE [DISTWIDTH] IN BLOOD BY AUTOMATED COUNT: 15.8 % (ref 11.6–15.1)
GFR SERPL CREATININE-BSD FRML MDRD: 42 ML/MIN/1.73SQ M
GLUCOSE SERPL-MCNC: 116 MG/DL (ref 65–140)
GLUCOSE SERPL-MCNC: 119 MG/DL (ref 65–140)
GLUCOSE SERPL-MCNC: 149 MG/DL (ref 65–140)
GLUCOSE SERPL-MCNC: 151 MG/DL (ref 65–140)
GLUCOSE SERPL-MCNC: 178 MG/DL (ref 65–140)
GRAM STN SPEC: NORMAL
GRAM STN SPEC: NORMAL
HCT VFR BLD AUTO: 32.7 % (ref 34.8–46.1)
HGB BLD-MCNC: 9.9 G/DL (ref 11.5–15.4)
MCH RBC QN AUTO: 28.9 PG (ref 26.8–34.3)
MCHC RBC AUTO-ENTMCNC: 30.3 G/DL (ref 31.4–37.4)
MCV RBC AUTO: 96 FL (ref 82–98)
PLATELET # BLD AUTO: 139 THOUSANDS/UL (ref 149–390)
PMV BLD AUTO: 9.8 FL (ref 8.9–12.7)
POTASSIUM SERPL-SCNC: 4.9 MMOL/L (ref 3.5–5.3)
RBC # BLD AUTO: 3.42 MILLION/UL (ref 3.81–5.12)
SODIUM SERPL-SCNC: 141 MMOL/L (ref 135–147)
WBC # BLD AUTO: 6.58 THOUSAND/UL (ref 4.31–10.16)

## 2023-12-10 PROCEDURE — 94640 AIRWAY INHALATION TREATMENT: CPT

## 2023-12-10 PROCEDURE — 85027 COMPLETE CBC AUTOMATED: CPT

## 2023-12-10 PROCEDURE — 94760 N-INVAS EAR/PLS OXIMETRY 1: CPT

## 2023-12-10 PROCEDURE — 82948 REAGENT STRIP/BLOOD GLUCOSE: CPT

## 2023-12-10 PROCEDURE — 99232 SBSQ HOSP IP/OBS MODERATE 35: CPT | Performed by: INTERNAL MEDICINE

## 2023-12-10 PROCEDURE — 80048 BASIC METABOLIC PNL TOTAL CA: CPT | Performed by: INTERNAL MEDICINE

## 2023-12-10 RX ORDER — NITROFURANTOIN 25; 75 MG/1; MG/1
100 CAPSULE ORAL 2 TIMES DAILY WITH MEALS
Status: DISCONTINUED | OUTPATIENT
Start: 2023-12-10 | End: 2023-12-11 | Stop reason: HOSPADM

## 2023-12-10 RX ORDER — ECHINACEA PURPUREA EXTRACT 125 MG
1 TABLET ORAL
Status: DISCONTINUED | OUTPATIENT
Start: 2023-12-10 | End: 2023-12-11 | Stop reason: HOSPADM

## 2023-12-10 RX ORDER — CEPHALEXIN 500 MG/1
500 CAPSULE ORAL EVERY 6 HOURS SCHEDULED
Status: DISCONTINUED | OUTPATIENT
Start: 2023-12-10 | End: 2023-12-11 | Stop reason: HOSPADM

## 2023-12-10 RX ADMIN — INSULIN LISPRO 1 UNITS: 100 INJECTION, SOLUTION INTRAVENOUS; SUBCUTANEOUS at 08:47

## 2023-12-10 RX ADMIN — GABAPENTIN 300 MG: 300 CAPSULE ORAL at 08:53

## 2023-12-10 RX ADMIN — CEPHALEXIN 500 MG: 500 CAPSULE ORAL at 17:54

## 2023-12-10 RX ADMIN — ASPIRIN 81 MG: 81 TABLET, COATED ORAL at 08:53

## 2023-12-10 RX ADMIN — Medication 3 MG: at 21:37

## 2023-12-10 RX ADMIN — INSULIN LISPRO 30 UNITS: 100 INJECTION, SOLUTION INTRAVENOUS; SUBCUTANEOUS at 17:54

## 2023-12-10 RX ADMIN — SALINE NASAL SPRAY 1 SPRAY: 1.5 SOLUTION NASAL at 12:40

## 2023-12-10 RX ADMIN — INSULIN LISPRO 1 UNITS: 100 INJECTION, SOLUTION INTRAVENOUS; SUBCUTANEOUS at 21:40

## 2023-12-10 RX ADMIN — ACETAMINOPHEN 650 MG: 325 TABLET, FILM COATED ORAL at 21:37

## 2023-12-10 RX ADMIN — ACETAMINOPHEN 650 MG: 325 TABLET, FILM COATED ORAL at 05:16

## 2023-12-10 RX ADMIN — NITROFURANTOIN (MONOHYDRATE/MACROCRYSTALS) 100 MG: 75; 25 CAPSULE ORAL at 17:54

## 2023-12-10 RX ADMIN — PREDNISONE 40 MG: 20 TABLET ORAL at 08:53

## 2023-12-10 RX ADMIN — IPRATROPIUM BROMIDE 0.5 MG: 0.5 SOLUTION RESPIRATORY (INHALATION) at 19:31

## 2023-12-10 RX ADMIN — MONTELUKAST 10 MG: 10 TABLET, FILM COATED ORAL at 21:37

## 2023-12-10 RX ADMIN — MEMANTINE 10 MG: 10 TABLET ORAL at 17:54

## 2023-12-10 RX ADMIN — LEVALBUTEROL HYDROCHLORIDE 1.25 MG: 1.25 SOLUTION RESPIRATORY (INHALATION) at 07:42

## 2023-12-10 RX ADMIN — INSULIN LISPRO 30 UNITS: 100 INJECTION, SOLUTION INTRAVENOUS; SUBCUTANEOUS at 12:40

## 2023-12-10 RX ADMIN — INSULIN LISPRO 30 UNITS: 100 INJECTION, SOLUTION INTRAVENOUS; SUBCUTANEOUS at 08:53

## 2023-12-10 RX ADMIN — CEPHALEXIN 500 MG: 500 CAPSULE ORAL at 12:40

## 2023-12-10 RX ADMIN — INSULIN GLARGINE 60 UNITS: 100 INJECTION, SOLUTION SUBCUTANEOUS at 21:37

## 2023-12-10 RX ADMIN — LEVALBUTEROL HYDROCHLORIDE 1.25 MG: 1.25 SOLUTION RESPIRATORY (INHALATION) at 19:31

## 2023-12-10 RX ADMIN — ACETAMINOPHEN 650 MG: 325 TABLET, FILM COATED ORAL at 13:33

## 2023-12-10 RX ADMIN — GABAPENTIN 300 MG: 300 CAPSULE ORAL at 17:54

## 2023-12-10 RX ADMIN — PRAVASTATIN SODIUM 80 MG: 80 TABLET ORAL at 17:54

## 2023-12-10 RX ADMIN — IPRATROPIUM BROMIDE 0.5 MG: 0.5 SOLUTION RESPIRATORY (INHALATION) at 07:42

## 2023-12-10 RX ADMIN — GUAIFENESIN 1200 MG: 600 TABLET ORAL at 21:37

## 2023-12-10 RX ADMIN — INSULIN GLARGINE 60 UNITS: 100 INJECTION, SOLUTION SUBCUTANEOUS at 08:54

## 2023-12-10 RX ADMIN — GUAIFENESIN 1200 MG: 600 TABLET ORAL at 08:53

## 2023-12-10 RX ADMIN — FLUTICASONE FUROATE AND VILANTEROL TRIFENATATE 2 PUFF: 200; 25 POWDER RESPIRATORY (INHALATION) at 08:53

## 2023-12-10 RX ADMIN — PANTOPRAZOLE SODIUM 20 MG: 20 TABLET, DELAYED RELEASE ORAL at 05:16

## 2023-12-10 RX ADMIN — LOSARTAN POTASSIUM 25 MG: 25 TABLET, FILM COATED ORAL at 08:53

## 2023-12-10 RX ADMIN — MEMANTINE 10 MG: 10 TABLET ORAL at 08:53

## 2023-12-10 RX ADMIN — ENOXAPARIN SODIUM 40 MG: 40 INJECTION SUBCUTANEOUS at 08:53

## 2023-12-10 NOTE — ASSESSMENT & PLAN NOTE
Patient is not on any hypertension medications at home. Blood pressures have been slightly elevated with highest systolic reaching 634. Per chart review, patient was previously on lisinopril 2.5 mg daily until June 2022, at which point it was discontinued due to KATHY during a hospitalization. Plan  - Losartan 25 mg daily started  -10 mg IV labetalol as needed for systolic blood pressures greater than 180.

## 2023-12-10 NOTE — PLAN OF CARE
Problem: PAIN - ADULT  Goal: Verbalizes/displays adequate comfort level or baseline comfort level  Description: Interventions:  - Encourage patient to monitor pain and request assistance  - Assess pain using appropriate pain scale  - Administer analgesics based on type and severity of pain and evaluate response  - Implement non-pharmacological measures as appropriate and evaluate response  - Consider cultural and social influences on pain and pain management  - Notify physician/advanced practitioner if interventions unsuccessful or patient reports new pain  Outcome: Progressing     Problem: INFECTION - ADULT  Goal: Absence or prevention of progression during hospitalization  Description: INTERVENTIONS:  - Assess and monitor for signs and symptoms of infection  - Monitor lab/diagnostic results  - Monitor all insertion sites, i.e. indwelling lines, tubes, and drains  - Monitor endotracheal if appropriate and nasal secretions for changes in amount and color  - [place] appropriate cooling/warming therapies per order  - Administer medications as ordered  - Instruct and encourage patient and family to use good hand hygiene technique  - Identify and instruct in appropriate isolation precautions for identified infection/condition  Outcome: Progressing     Problem: DISCHARGE PLANNING  Goal: Discharge to home or other facility with appropriate resources  Description: INTERVENTIONS:  - Identify barriers to discharge w/patient and caregiver  - Arrange for needed discharge resources and transportation as appropriate  - Identify discharge learning needs (meds, wound care, etc.)  - Arrange for interpretive services to assist at discharge as needed  - Refer to Case Management Department for coordinating discharge planning if the patient needs post-hospital services based on physician/advanced practitioner order or complex needs related to functional status, cognitive ability, or social support system  Outcome: Progressing Problem: Knowledge Deficit  Goal: Patient/family/caregiver demonstrates understanding of disease process, treatment plan, medications, and discharge instructions  Description: Complete learning assessment and assess knowledge base.   Interventions:  - Provide teaching at level of understanding  - Provide teaching via preferred learning methods  Outcome: Progressing     Problem: Prexisting or High Potential for Compromised Skin Integrity  Goal: Skin integrity is maintained or improved  Description: INTERVENTIONS:  - Identify patients at risk for skin breakdown  - Assess and monitor skin integrity  - Assess and monitor nutrition and hydration status  - Monitor labs   - Assess for incontinence   - Turn and reposition patient  - Assist with mobility/ambulation  - Relieve pressure over bony prominences  - Avoid friction and shearing  - Provide appropriate hygiene as needed including keeping skin clean and dry  - Evaluate need for skin moisturizer/barrier cream  - Collaborate with interdisciplinary team   - Patient/family teaching  - Consider wound care consult   Outcome: Progressing

## 2023-12-10 NOTE — ASSESSMENT & PLAN NOTE
Patient met SIRS criteria upon admission to the ED with a fever of 101, tachycardia, tachypnea, and elevated Pro-Neville.  Chest x-ray was negative for any evidence of pneumonia. Patient had no leukocytosis. UA revealed evidence of white blood cells and few leukocytes, can consider urinary source. She was given dose of lactated Ringer's and ceftriaxone while in the ED. In addition, patient was in acute respiratory distress and was placed on BiPAP initially while in the ED by respiratory therapy. Now saturating on 4 L nasal cannula at 95%. Patient denies any symptoms of cough, however cannot rule out upper respiratory infection.   Low suspicion for pneumonia  Urine culture with growth Klebsiella and Enterococcus  MRSA culture negative    Plan:  -Discontinued azithromycin  -Transitioned to Keflex for Klebsiella  -Started nitrofurantoin for Enterococcus  -Continue to monitor vitals for fever  -Monitor morning blood work

## 2023-12-10 NOTE — ASSESSMENT & PLAN NOTE
Patient was brought to the ED from [facility], as she started to develop increasing respiratory distress with labored breathing and was noted to be hypoxic while there. In the ED, she was placed initially on BiPAP for respiratory distress and then began saturating at 95%. She was then transitioned to 4 L of nasal cannula which she remains on currently. Patient states she still feels short of breath, but feels better overall. Denies any other symptoms at this time. She did meet sepsis criteria, and had an elevated Pro-[place].  Rest of lab work was relatively unremarkable. Diffuse wheezing appreciated on exam.  Patient has slight gasping of air when trying to talk. Patient most recently saw her pulmonologist at the end of October and provided medication adjustments. Chest x-ray in the ED was unremarkable. Per nephew, patient has oxygen at her facility as needed. Per patient, she uses 2L as needed at night.     Etiology: likely asthma/COPD exacerbation    Plan:  -Pulmonary on board, appreciate recommendations  -Prednisone 40 mg daily for 10 days total  -Respiratory protocol  -Tessalon Perles and Mucinex as needed  -3L nasal cannula, wean as necessary, maintain SpO2 > 90%  -DuoNebs every 8 hours  -Albuterol prn  -Singulair 10 mg daily  -BiPAP at night  -Overnight pulse ox, AM ABG

## 2023-12-10 NOTE — PROGRESS NOTES
[address] [facility]  Progress Note  Name: Toney Johns  MRN: [id number]  Unit/Bed#: W -01 I Date of Admission: 12/7/2023   Date of Service: 12/10/2023 I Hospital Day: 3    Assessment/Plan   * Acute respiratory failure with hypoxia and hypercapnia [facility])  Assessment & Plan  Patient was brought to the ED from [facility], as she started to develop increasing respiratory distress with labored breathing and was noted to be hypoxic while there. In the ED, she was placed initially on BiPAP for respiratory distress and then began saturating at 95%. She was then transitioned to 4 L of nasal cannula which she remains on currently. Patient states she still feels short of breath, but feels better overall. Denies any other symptoms at this time. She did meet sepsis criteria, and had an elevated Pro-[place].  Rest of lab work was relatively unremarkable. Diffuse wheezing appreciated on exam.  Patient has slight gasping of air when trying to talk. Patient most recently saw her pulmonologist at the end of October and provided medication adjustments. Chest x-ray in the ED was unremarkable. Per nephew, patient has oxygen at her facility as needed. Per patient, she uses 2L as needed at night. Etiology: likely asthma/COPD exacerbation    Plan:  -Pulmonary on board, appreciate recommendations  -Prednisone 40 mg daily for 10 days total  -Respiratory protocol  -Tessalon Perles and Mucinex as needed  -3L nasal cannula, wean as necessary, maintain SpO2 > 90%  -DuoNebs every 8 hours  -Albuterol prn  -Singulair 10 mg daily  -BiPAP at night  -Overnight pulse ox, AM ABG    Sepsis ([address])  Assessment & Plan  Patient met SIRS criteria upon admission to the ED with a fever of 101, tachycardia, tachypnea, and elevated Pro-[place].  Chest x-ray was negative for any evidence of pneumonia. Patient had no leukocytosis. UA revealed evidence of white blood cells and few leukocytes, can consider urinary source.   She was given dose of lactated Ringer's and ceftriaxone while in the ED. In addition, patient was in acute respiratory distress and was placed on BiPAP initially while in the ED by respiratory therapy. Now saturating on 4 L nasal cannula at 95%. Patient denies any symptoms of cough, however cannot rule out upper respiratory infection. Low suspicion for pneumonia  Urine culture with growth Klebsiella and Enterococcus  MRSA culture negative    Plan:  -Discontinued azithromycin  -Transitioned to Keflex for Klebsiella  -Started nitrofurantoin for Enterococcus  -Continue to monitor vitals for fever  -Monitor morning blood work    UTI (urinary tract infection)  Assessment & Plan  UA - turbid urine, 30-50 leukocytes on microscopy, occasional bacteria  Patient denies urinary symptoms  10/14/23 urine culture growth of Vancomycin susceptible Enterococcus  Urine cultures - Klebsiella, Enterococcus    Plan  -Keflex (total 5 days)  -Nitrofurantoin (total 5 days)      Essential hypertension  Assessment & Plan  Patient is not on any hypertension medications at home. Blood pressures have been slightly elevated with highest systolic reaching 412. Per chart review, patient was previously on lisinopril 2.5 mg daily until June 2022, at which point it was discontinued due to KATHY during a hospitalization. Plan  - Losartan 25 mg daily started  -10 mg IV labetalol as needed for systolic blood pressures greater than 180. Neuropathy  Assessment & Plan  Continue gabapentin 300 mg twice daily. Anemia  Assessment & Plan  Patient has chronic anemia at baseline. Hemoglobin stable on admission at 10. Continue to monitor morning CBCs. GILDARDO (obstructive sleep apnea)  Assessment & Plan  History of GILDARDO, but patient reportedly declined outpatient therapy    Asthma-COPD overlap syndrome  Assessment & Plan  Patient has a history of asthma/COPD and follows with pulmonary as an outpatient.   His most recent pulmonology visit was in October where further med recs were provided for the patient.  -See plan under respiratory failure    Hyperlipidemia  Assessment & Plan  Continue home statin regimen    Alzheimer's disease (HCC)  Assessment & Plan  Continue memantine 10 mg daily. Has trazodone 150 mg as needed for sleep. Type 2 diabetes mellitus with hyperglycemia, with long-term current use of insulin [facility])  Assessment & Plan  Lab Results   Component Value Date    HGBA1C 6.1 (H) 10/11/2023       Recent Labs     12/09/23  1553 12/09/23  2137 12/10/23  0746 12/10/23  1158   POCGLU 268* 205* 151* 116         Blood Sugar Average: Last 72 hrs:  (P) 169.75Continue home regimen of 60 units Lantus twice daily. 30 units Humalog with meals. Sliding scale insulin. Plan  Continue to monitor as patient is on steroid therapy           VTE Pharmacologic Prophylaxis: VTE Score: 8 High Risk (Score >/= 5) - Pharmacological DVT Prophylaxis Ordered: enoxaparin (Lovenox). Sequential Compression Devices Ordered. Mobility:   Basic Mobility Inpatient Raw Score: 10  -HLM Goal: 4: Move to chair/commode  JH-HLM Achieved: 2: Bed activities/Dependent transfer  HLM Goal NOT achieved. Continue with multidisciplinary rounding and encourage appropriate mobility to improve upon [facility] goals. Patient Centered Rounds: I performed bedside rounds with nursing staff today. Discussions with Specialists or Other Care Team Provider: Attending, Nursing    Education and Discussions with Family / Patient: Updated  (brother) via phone. Current Length of Stay: 3 day(s)  Current Patient Status: Inpatient   Discharge Plan: Anticipate discharge in 24-48 hrs to prior SNF    Code Status: Level 3 - DNAR and DNI    Subjective:   No acute overnight events. Patient was seen and examined at bedside. She is on 3LPM, and saturating well. She has no complaints at this time, except for a dry cough. She is agreeable to being discharged on BiPAP at night.     Objective:     Vitals: Temp (24hrs), Av.9 °F (36.6 °C), Min:97.8 °F (36.6 °C), Max:97.9 °F (36.6 °C)    Temp:  [97.8 °F (36.6 °C)-97.9 °F (36.6 °C)] 97.9 °F (36.6 °C)  HR:  [71-84] 84  Resp:  [16-17] 17  BP: (153-173)/(60-74) 153/60  SpO2:  [90 %-98 %] 91 %  Body mass index is 42.58 kg/m². Input and Output Summary (last 24 hours): Intake/Output Summary (Last 24 hours) at 12/10/2023 1350  Last data filed at 12/10/2023 0501  Gross per 24 hour   Intake 240 ml   Output 977 ml   Net -737 ml       Physical Exam:   Physical Exam  Constitutional:       Appearance: She is obese. She is not ill-appearing. HENT:      Head: Normocephalic and atraumatic. Right Ear: External ear normal.      Left Ear: External ear normal.      Nose: Nose normal. No congestion. Eyes:      Extraocular Movements: Extraocular movements intact. Conjunctiva/sclera: Conjunctivae normal.   Cardiovascular:      Rate and Rhythm: Normal rate and regular rhythm. Heart sounds: No murmur heard. Pulmonary:      Effort: Pulmonary effort is normal. No respiratory distress. Breath sounds: Wheezing (diffuse) present. Abdominal:      General: There is no distension. Palpations: Abdomen is soft. Tenderness: There is no abdominal tenderness. Musculoskeletal:         General: No swelling or tenderness. Cervical back: Normal range of motion and neck supple. Right lower leg: No edema. Left lower leg: No edema. Skin:     General: Skin is warm and dry. Neurological:      Mental Status: She is alert and oriented to person, place, and time.    Psychiatric:         Mood and Affect: Mood normal.        Additional Data:     Labs:  Results from last 7 days   Lab Units 12/10/23  0450 23  0449 23  0614   WBC Thousand/uL 6.58   < > 5.45   HEMOGLOBIN g/dL 9.9*   < > 9.5*   HEMATOCRIT % 32.7*   < > 30.5*   PLATELETS Thousands/uL 139*   < > 139*   NEUTROS PCT %  --   --  79*   LYMPHS PCT %  --   --  14   MONOS PCT %  --   -- 5   EOS PCT %  --   --  0    < > = values in this interval not displayed. Results from last 7 days   Lab Units 12/10/23  0450 12/08/23  0614 12/07/23  0905   SODIUM mmol/L 141   < > 140   POTASSIUM mmol/L 4.9   < > 4.3   CHLORIDE mmol/L 98   < > 97   CO2 mmol/L 38*   < > 39*   BUN mg/dL 39*   < > 23   CREATININE mg/dL 1.26   < > 1.28   ANION GAP mmol/L 5   < > 4   CALCIUM mg/dL 8.9   < > 8.9   ALBUMIN g/dL  --   --  3.8   TOTAL BILIRUBIN mg/dL  --   --  0.47   ALK PHOS U/L  --   --  56   ALT U/L  --   --  28   AST U/L  --   --  23   GLUCOSE RANDOM mg/dL 149*   < > 90    < > = values in this interval not displayed. Results from last 7 days   Lab Units 12/07/23  0905   INR  0.95     Results from last 7 days   Lab Units 12/10/23  1158 12/10/23  0746 12/09/23  2137 12/09/23  1553 12/09/23  1132 12/09/23  0720 12/08/23  2112 12/08/23  1656 12/08/23  1159 12/08/23  0735 12/07/23  2108 12/07/23  1736   POC GLUCOSE mg/dl 116 151* 205* 268* 139 115 164* 132 187* 197* 196* 167*         Results from last 7 days   Lab Units 12/08/23  0614 12/07/23  1833 12/07/23  0905   LACTIC ACID mmol/L  --   --  0.4*   PROCALCITONIN ng/ml 0.17 0.20 0.28*       Lines/Drains:  Invasive Devices       Peripheral Intravenous Line  Duration             Peripheral IV 12/07/23 Distal;Right Forearm 3 days    Peripheral IV 12/07/23 Left Antecubital 3 days              Drain  Duration             External Urinary Catheter 3 days                          Imaging: No pertinent imaging reviewed. Recent Cultures (last 7 days):   Results from last 7 days   Lab Units 12/08/23  1635 12/07/23  1108 12/07/23  0909 12/07/23  0905   BLOOD CULTURE   --   --  No Growth at 48 hrs. No Growth at 48 hrs.    SPUTUM CULTURE  2+ Growth of  --   --   --    GRAM STAIN RESULT  1+ Polys  No bacteria seen  --   --   --    URINE CULTURE   --  >100,000 cfu/ml Klebsiella pneumoniae*  >100,000 cfu/ml Enterococcus faecalis*  --   --        Last 24 Hours Medication List: Current Facility-Administered Medications   Medication Dose Route Frequency Provider Last Rate    acetaminophen  650 mg Oral Q8H [facility] Karyn Kwan MD      albuterol  2 puff Inhalation Q4H PRN Karyn Kwan MD      aspirin  81 mg Oral Daily Noy Beth MD      benzonatate  200 mg Oral TID PRN Karyn Kwan MD      cephalexin  500 mg Oral Q6H Dominique Ramirez MD      enoxaparin  40 mg Subcutaneous Daily Karyn Kwan MD      fluticasone-vilanterol  2 puff Inhalation Daily Karyn Kwan MD      gabapentin  300 mg Oral BID Karyn Kwan MD      guaiFENesin  1,200 mg Oral Q12H Rachel Monet MD      insulin glargine  60 Units Subcutaneous Q12H [facility] Noy Beth MD      insulin lispro  1-6 Units Subcutaneous 4x Daily (AC & HS) Karyn Kwan MD      insulin lispro  30 Units Subcutaneous TID With Meals Krayn Kwan MD      ipratropium  0.5 mg Nebulization TID Pedro Campbell MD      labetalol  10 mg Intravenous Q6H PRN Karyn Kwan MD      levalbuterol  1.25 mg Nebulization TID Pedro Campbell MD      losartan  25 mg Oral Daily Jamilah Gonzalez MD      magnesium hydroxide  15 mL Oral Daily PRN Karyn Kwan MD      melatonin  3 mg Oral HS Karyn Kwan MD      memantine  10 mg Oral BID Karyn Kwan MD      montelukast  10 mg Oral HS Karyn Kwan MD      nitrofurantoin  100 mg Oral BID With Dat Strange MD      pantoprazole  20 mg Oral Early Morning Karyn Kwan MD      pravastatin  80 mg Oral Daily With Jose Sims MD      predniSONE  40 mg Oral Daily Zane Taylor DO      sodium chloride  1 spray Each Nare Q1H PRN Jamilah Gonzalez MD      traZODone  150 mg Oral HS PRN Karyn Kwan MD          Today, Patient Was Seen By: Jamilah Gonzalez MD    **Please Note: This note may have been constructed using a voice recognition system. **

## 2023-12-10 NOTE — PLAN OF CARE
Problem: PAIN - ADULT  Goal: Verbalizes/displays adequate comfort level or baseline comfort level  Description: Interventions:  - Encourage patient to monitor pain and request assistance  - Assess pain using appropriate pain scale  - Administer analgesics based on type and severity of pain and evaluate response  - Implement non-pharmacological measures as appropriate and evaluate response  - Consider cultural and social influences on pain and pain management  - Notify physician/advanced practitioner if interventions unsuccessful or patient reports new pain  Outcome: Progressing     Problem: INFECTION - ADULT  Goal: Absence or prevention of progression during hospitalization  Description: INTERVENTIONS:  - Assess and monitor for signs and symptoms of infection  - Monitor lab/diagnostic results  - Monitor all insertion sites, i.e. indwelling lines, tubes, and drains  - Monitor endotracheal if appropriate and nasal secretions for changes in amount and color  - [place] appropriate cooling/warming therapies per order  - Administer medications as ordered  - Instruct and encourage patient and family to use good hand hygiene technique  - Identify and instruct in appropriate isolation precautions for identified infection/condition  Outcome: Progressing     Problem: DISCHARGE PLANNING  Goal: Discharge to home or other facility with appropriate resources  Description: INTERVENTIONS:  - Identify barriers to discharge w/patient and caregiver  - Arrange for needed discharge resources and transportation as appropriate  - Identify discharge learning needs (meds, wound care, etc.)  - Arrange for interpretive services to assist at discharge as needed  - Refer to Case Management Department for coordinating discharge planning if the patient needs post-hospital services based on physician/advanced practitioner order or complex needs related to functional status, cognitive ability, or social support system  Outcome: Progressing Problem: Knowledge Deficit  Goal: Patient/family/caregiver demonstrates understanding of disease process, treatment plan, medications, and discharge instructions  Description: Complete learning assessment and assess knowledge base.   Interventions:  - Provide teaching at level of understanding  - Provide teaching via preferred learning methods  Outcome: Progressing     Problem: Prexisting or High Potential for Compromised Skin Integrity  Goal: Skin integrity is maintained or improved  Description: INTERVENTIONS:  - Identify patients at risk for skin breakdown  - Assess and monitor skin integrity  - Assess and monitor nutrition and hydration status  - Monitor labs   - Assess for incontinence   - Turn and reposition patient  - Assist with mobility/ambulation  - Relieve pressure over bony prominences  - Avoid friction and shearing  - Provide appropriate hygiene as needed including keeping skin clean and dry  - Evaluate need for skin moisturizer/barrier cream  - Collaborate with interdisciplinary team   - Patient/family teaching  - Consider wound care consult   Outcome: Progressing

## 2023-12-10 NOTE — ASSESSMENT & PLAN NOTE
Lab Results   Component Value Date    HGBA1C 6.1 (H) 10/11/2023       Recent Labs     12/09/23  1553 12/09/23  2137 12/10/23  0746 12/10/23  1158   POCGLU 268* 205* 151* 116         Blood Sugar Average: Last 72 hrs:  (P) 169.75Continue home regimen of 60 units Lantus twice daily. 30 units Humalog with meals. Sliding scale insulin.     Plan  Continue to monitor as patient is on steroid therapy

## 2023-12-10 NOTE — ASSESSMENT & PLAN NOTE
UA - turbid urine, 30-50 leukocytes on microscopy, occasional bacteria  Patient denies urinary symptoms  10/14/23 urine culture growth of Vancomycin susceptible Enterococcus  Urine cultures - Klebsiella, Enterococcus    Plan  -Keflex (total 5 days)  -Nitrofurantoin (total 5 days)

## 2023-12-11 LAB
ANION GAP SERPL CALCULATED.3IONS-SCNC: 7 MMOL/L
ARTERIAL PATENCY WRIST A: YES
BASE EXCESS BLDA CALC-SCNC: 11.1 MMOL/L
BUN SERPL-MCNC: 33 MG/DL (ref 5–25)
CALCIUM SERPL-MCNC: 9 MG/DL (ref 8.4–10.2)
CHLORIDE SERPL-SCNC: 98 MMOL/L (ref 96–108)
CO2 SERPL-SCNC: 35 MMOL/L (ref 21–32)
CREAT SERPL-MCNC: 1.1 MG/DL (ref 0.6–1.3)
ERYTHROCYTE [DISTWIDTH] IN BLOOD BY AUTOMATED COUNT: 15.7 % (ref 11.6–15.1)
GFR SERPL CREATININE-BSD FRML MDRD: 49 ML/MIN/1.73SQ M
GLUCOSE SERPL-MCNC: 49 MG/DL (ref 65–140)
GLUCOSE SERPL-MCNC: 85 MG/DL (ref 65–140)
GLUCOSE SERPL-MCNC: 95 MG/DL (ref 65–140)
GLUCOSE SERPL-MCNC: 97 MG/DL (ref 65–140)
HCO3 BLDA-SCNC: 37.8 MMOL/L (ref 22–28)
HCT VFR BLD AUTO: 35.2 % (ref 34.8–46.1)
HGB BLD-MCNC: 11.1 G/DL (ref 11.5–15.4)
MCH RBC QN AUTO: 30 PG (ref 26.8–34.3)
MCHC RBC AUTO-ENTMCNC: 31.5 G/DL (ref 31.4–37.4)
MCV RBC AUTO: 95 FL (ref 82–98)
NASAL CANNULA: 2
O2 CT BLDA-SCNC: 15 ML/DL (ref 16–23)
OXYHGB MFR BLDA: 93.2 % (ref 94–97)
PCO2 BLDA: 61.2 MM HG (ref 36–44)
PH BLDA: 7.41 [PH] (ref 7.35–7.45)
PLATELET # BLD AUTO: 152 THOUSANDS/UL (ref 149–390)
PMV BLD AUTO: 9.3 FL (ref 8.9–12.7)
PO2 BLDA: 76.8 MM HG (ref 75–129)
POTASSIUM SERPL-SCNC: 3.8 MMOL/L (ref 3.5–5.3)
RBC # BLD AUTO: 3.7 MILLION/UL (ref 3.81–5.12)
SARS-COV-2 RNA RESP QL NAA+PROBE: NEGATIVE
SODIUM SERPL-SCNC: 140 MMOL/L (ref 135–147)
SPECIMEN SOURCE: ABNORMAL
WBC # BLD AUTO: 8.05 THOUSAND/UL (ref 4.31–10.16)

## 2023-12-11 PROCEDURE — 85027 COMPLETE CBC AUTOMATED: CPT

## 2023-12-11 PROCEDURE — 94640 AIRWAY INHALATION TREATMENT: CPT

## 2023-12-11 PROCEDURE — 82805 BLOOD GASES W/O2 SATURATION: CPT

## 2023-12-11 PROCEDURE — 36600 WITHDRAWAL OF ARTERIAL BLOOD: CPT

## 2023-12-11 PROCEDURE — 82948 REAGENT STRIP/BLOOD GLUCOSE: CPT

## 2023-12-11 PROCEDURE — 80048 BASIC METABOLIC PNL TOTAL CA: CPT

## 2023-12-11 PROCEDURE — 87635 SARS-COV-2 COVID-19 AMP PRB: CPT

## 2023-12-11 PROCEDURE — 94760 N-INVAS EAR/PLS OXIMETRY 1: CPT

## 2023-12-11 PROCEDURE — 99223 1ST HOSP IP/OBS HIGH 75: CPT | Performed by: INTERNAL MEDICINE

## 2023-12-11 PROCEDURE — 99239 HOSP IP/OBS DSCHRG MGMT >30: CPT | Performed by: INTERNAL MEDICINE

## 2023-12-11 PROCEDURE — 94762 N-INVAS EAR/PLS OXIMTRY CONT: CPT

## 2023-12-11 RX ORDER — AZITHROMYCIN 500 MG/1
500 TABLET, FILM COATED ORAL EVERY 24 HOURS
Refills: 0
Start: 2023-12-11 | End: 2023-12-16

## 2023-12-11 RX ORDER — NITROFURANTOIN 25; 75 MG/1; MG/1
100 CAPSULE ORAL 2 TIMES DAILY WITH MEALS
Refills: 0
Start: 2023-12-11 | End: 2023-12-15

## 2023-12-11 RX ORDER — PREDNISONE 20 MG/1
40 TABLET ORAL DAILY
Qty: 14 TABLET | Refills: 0
Start: 2023-12-12 | End: 2023-12-19

## 2023-12-11 RX ORDER — LOSARTAN POTASSIUM 25 MG/1
25 TABLET ORAL DAILY
Refills: 0
Start: 2023-12-12

## 2023-12-11 RX ADMIN — ACETAMINOPHEN 650 MG: 325 TABLET, FILM COATED ORAL at 13:08

## 2023-12-11 RX ADMIN — NITROFURANTOIN (MONOHYDRATE/MACROCRYSTALS) 100 MG: 75; 25 CAPSULE ORAL at 09:24

## 2023-12-11 RX ADMIN — GABAPENTIN 300 MG: 300 CAPSULE ORAL at 09:24

## 2023-12-11 RX ADMIN — ENOXAPARIN SODIUM 40 MG: 40 INJECTION SUBCUTANEOUS at 09:24

## 2023-12-11 RX ADMIN — ACETAMINOPHEN 650 MG: 325 TABLET, FILM COATED ORAL at 05:06

## 2023-12-11 RX ADMIN — CEPHALEXIN 500 MG: 500 CAPSULE ORAL at 05:06

## 2023-12-11 RX ADMIN — LEVALBUTEROL HYDROCHLORIDE 1.25 MG: 1.25 SOLUTION RESPIRATORY (INHALATION) at 13:46

## 2023-12-11 RX ADMIN — MEMANTINE 10 MG: 10 TABLET ORAL at 17:29

## 2023-12-11 RX ADMIN — INSULIN LISPRO 30 UNITS: 100 INJECTION, SOLUTION INTRAVENOUS; SUBCUTANEOUS at 09:29

## 2023-12-11 RX ADMIN — INSULIN GLARGINE 60 UNITS: 100 INJECTION, SOLUTION SUBCUTANEOUS at 09:23

## 2023-12-11 RX ADMIN — CEPHALEXIN 500 MG: 500 CAPSULE ORAL at 17:29

## 2023-12-11 RX ADMIN — CEPHALEXIN 500 MG: 500 CAPSULE ORAL at 12:39

## 2023-12-11 RX ADMIN — INSULIN LISPRO 30 UNITS: 100 INJECTION, SOLUTION INTRAVENOUS; SUBCUTANEOUS at 17:31

## 2023-12-11 RX ADMIN — FLUTICASONE FUROATE AND VILANTEROL TRIFENATATE 2 PUFF: 200; 25 POWDER RESPIRATORY (INHALATION) at 09:29

## 2023-12-11 RX ADMIN — INSULIN LISPRO 30 UNITS: 100 INJECTION, SOLUTION INTRAVENOUS; SUBCUTANEOUS at 12:39

## 2023-12-11 RX ADMIN — LEVALBUTEROL HYDROCHLORIDE 1.25 MG: 1.25 SOLUTION RESPIRATORY (INHALATION) at 07:39

## 2023-12-11 RX ADMIN — PRAVASTATIN SODIUM 80 MG: 80 TABLET ORAL at 17:29

## 2023-12-11 RX ADMIN — PANTOPRAZOLE SODIUM 20 MG: 20 TABLET, DELAYED RELEASE ORAL at 05:06

## 2023-12-11 RX ADMIN — MEMANTINE 10 MG: 10 TABLET ORAL at 09:24

## 2023-12-11 RX ADMIN — PREDNISONE 40 MG: 20 TABLET ORAL at 09:23

## 2023-12-11 RX ADMIN — LOSARTAN POTASSIUM 25 MG: 25 TABLET, FILM COATED ORAL at 09:24

## 2023-12-11 RX ADMIN — IPRATROPIUM BROMIDE 0.5 MG: 0.5 SOLUTION RESPIRATORY (INHALATION) at 07:39

## 2023-12-11 RX ADMIN — NITROFURANTOIN (MONOHYDRATE/MACROCRYSTALS) 100 MG: 75; 25 CAPSULE ORAL at 17:29

## 2023-12-11 RX ADMIN — IPRATROPIUM BROMIDE 0.5 MG: 0.5 SOLUTION RESPIRATORY (INHALATION) at 13:46

## 2023-12-11 RX ADMIN — GABAPENTIN 300 MG: 300 CAPSULE ORAL at 17:29

## 2023-12-11 RX ADMIN — GUAIFENESIN 1200 MG: 600 TABLET ORAL at 09:24

## 2023-12-11 RX ADMIN — CEPHALEXIN 500 MG: 500 CAPSULE ORAL at 00:14

## 2023-12-11 RX ADMIN — ASPIRIN 81 MG: 81 TABLET, COATED ORAL at 09:24

## 2023-12-11 NOTE — ASSESSMENT & PLAN NOTE
Patient is not on any hypertension medications at home. Blood pressures have been slightly elevated with highest systolic reaching 577. Per chart review, patient was previously on lisinopril 2.5 mg daily until June 2022, at which point it was discontinued due to KATHY during a hospitalization.     Plan  - Continue Losartan 25 mg daily started while inpatient  - Follow-up with PCP

## 2023-12-11 NOTE — PLAN OF CARE
Problem: PAIN - ADULT  Goal: Verbalizes/displays adequate comfort level or baseline comfort level  Description: Interventions:  - Encourage patient to monitor pain and request assistance  - Assess pain using appropriate pain scale  - Administer analgesics based on type and severity of pain and evaluate response  - Implement non-pharmacological measures as appropriate and evaluate response  - Consider cultural and social influences on pain and pain management  - Notify physician/advanced practitioner if interventions unsuccessful or patient reports new pain  Outcome: Progressing     Problem: INFECTION - ADULT  Goal: Absence or prevention of progression during hospitalization  Description: INTERVENTIONS:  - Assess and monitor for signs and symptoms of infection  - Monitor lab/diagnostic results  - Monitor all insertion sites, i.e. indwelling lines, tubes, and drains  - Monitor endotracheal if appropriate and nasal secretions for changes in amount and color  - [place] appropriate cooling/warming therapies per order  - Administer medications as ordered  - Instruct and encourage patient and family to use good hand hygiene technique  - Identify and instruct in appropriate isolation precautions for identified infection/condition  Outcome: Progressing     Problem: DISCHARGE PLANNING  Goal: Discharge to home or other facility with appropriate resources  Description: INTERVENTIONS:  - Identify barriers to discharge w/patient and caregiver  - Arrange for needed discharge resources and transportation as appropriate  - Identify discharge learning needs (meds, wound care, etc.)  - Arrange for interpretive services to assist at discharge as needed  - Refer to Case Management Department for coordinating discharge planning if the patient needs post-hospital services based on physician/advanced practitioner order or complex needs related to functional status, cognitive ability, or social support system  Outcome: Progressing Problem: Knowledge Deficit  Goal: Patient/family/caregiver demonstrates understanding of disease process, treatment plan, medications, and discharge instructions  Description: Complete learning assessment and assess knowledge base.   Interventions:  - Provide teaching at level of understanding  - Provide teaching via preferred learning methods  Outcome: Progressing     Problem: Prexisting or High Potential for Compromised Skin Integrity  Goal: Skin integrity is maintained or improved  Description: INTERVENTIONS:  - Identify patients at risk for skin breakdown  - Assess and monitor skin integrity  - Assess and monitor nutrition and hydration status  - Monitor labs   - Assess for incontinence   - Turn and reposition patient  - Assist with mobility/ambulation  - Relieve pressure over bony prominences  - Avoid friction and shearing  - Provide appropriate hygiene as needed including keeping skin clean and dry  - Evaluate need for skin moisturizer/barrier cream  - Collaborate with interdisciplinary team   - Patient/family teaching  - Consider wound care consult   Outcome: Progressing

## 2023-12-11 NOTE — ASSESSMENT & PLAN NOTE
Lab Results   Component Value Date    HGBA1C 6.1 (H) 10/11/2023       Recent Labs     12/10/23  2110 12/11/23  0758 12/11/23  1136 12/11/23  1531   POCGLU 178* 97 95 85         Blood Sugar Average: Last 72 hrs:  (P) [id number] home regimen of 60 units Lantus twice daily. 30 units Humalog with meals. Sliding scale insulin.     Plan  Continue PTA regimen  Follow-up with outpatient Endocrinology

## 2023-12-11 NOTE — PLAN OF CARE
Problem: PAIN - ADULT  Goal: Verbalizes/displays adequate comfort level or baseline comfort level  Description: Interventions:  - Encourage patient to monitor pain and request assistance  - Assess pain using appropriate pain scale  - Administer analgesics based on type and severity of pain and evaluate response  - Implement non-pharmacological measures as appropriate and evaluate response  - Consider cultural and social influences on pain and pain management  - Notify physician/advanced practitioner if interventions unsuccessful or patient reports new pain  12/11/2023 1502 by Bravo Bingham RN  Outcome: Adequate for Discharge  12/11/2023 1400 by Bravo Bingham RN  Outcome: Progressing     Problem: INFECTION - ADULT  Goal: Absence or prevention of progression during hospitalization  Description: INTERVENTIONS:  - Assess and monitor for signs and symptoms of infection  - Monitor lab/diagnostic results  - Monitor all insertion sites, i.e. indwelling lines, tubes, and drains  - Monitor endotracheal if appropriate and nasal secretions for changes in amount and color  - [place] appropriate cooling/warming therapies per order  - Administer medications as ordered  - Instruct and encourage patient and family to use good hand hygiene technique  - Identify and instruct in appropriate isolation precautions for identified infection/condition  12/11/2023 1502 by Bravo Bingham RN  Outcome: Adequate for Discharge  12/11/2023 1400 by Bravo Bingham RN  Outcome: Progressing     Problem: DISCHARGE PLANNING  Goal: Discharge to home or other facility with appropriate resources  Description: INTERVENTIONS:  - Identify barriers to discharge w/patient and caregiver  - Arrange for needed discharge resources and transportation as appropriate  - Identify discharge learning needs (meds, wound care, etc.)  - Arrange for interpretive services to assist at discharge as needed  - Refer to Case Management Department for coordinating discharge planning if the patient needs post-hospital services based on physician/advanced practitioner order or complex needs related to functional status, cognitive ability, or social support system  12/11/2023 1502 by Pato Amaral RN  Outcome: Adequate for Discharge  12/11/2023 1400 by Pato Amaral RN  Outcome: Progressing     Problem: Knowledge Deficit  Goal: Patient/family/caregiver demonstrates understanding of disease process, treatment plan, medications, and discharge instructions  Description: Complete learning assessment and assess knowledge base.   Interventions:  - Provide teaching at level of understanding  - Provide teaching via preferred learning methods  12/11/2023 1502 by Pato Amaral RN  Outcome: Adequate for Discharge  12/11/2023 1400 by Pato Amaral RN  Outcome: Progressing     Problem: Prexisting or High Potential for Compromised Skin Integrity  Goal: Skin integrity is maintained or improved  Description: INTERVENTIONS:  - Identify patients at risk for skin breakdown  - Assess and monitor skin integrity  - Assess and monitor nutrition and hydration status  - Monitor labs   - Assess for incontinence   - Turn and reposition patient  - Assist with mobility/ambulation  - Relieve pressure over bony prominences  - Avoid friction and shearing  - Provide appropriate hygiene as needed including keeping skin clean and dry  - Evaluate need for skin moisturizer/barrier cream  - Collaborate with interdisciplinary team   - Patient/family teaching  - Consider wound care consult   12/11/2023 1502 by Pato Amaral RN  Outcome: Adequate for Discharge  12/11/2023 1400 by Pato Amaral RN  Outcome: Progressing

## 2023-12-11 NOTE — ASSESSMENT & PLAN NOTE
Patient was brought to the ED from [facility], as she started to develop increasing respiratory distress with labored breathing and was noted to be hypoxic while there. In the ED, she was placed initially on BiPAP for respiratory distress and then began saturating at 95%. She was then transitioned to 4 L of nasal cannula which she remains on currently. Patient states she still feels short of breath, but feels better overall. Denies any other symptoms at this time. She did meet sepsis criteria, and had an elevated [place].  Rest of lab work was relatively unremarkable. Diffuse wheezing appreciated on exam.  Patient has slight gasping of air when trying to talk. Patient most recently saw her pulmonologist at the end of October and provided medication adjustments. Chest x-ray in the ED was unremarkable. Per nephew, patient has oxygen at her facility as needed. Per patient, she uses 2L as needed at night.     Etiology: likely asthma/COPD exacerbation    Plan:  -Continue PTA Advair, Singulair, levalbuterol  -Azithromycin for 5 days  -Prednisone 40 mg daily for an additional 7 days (10 days total)  -2L nasal cannula, wean as necessary, maintain SpO2 > 90%  -Follow-up with outpatient Pulmonology

## 2023-12-11 NOTE — PLAN OF CARE
Problem: PAIN - ADULT  Goal: Verbalizes/displays adequate comfort level or baseline comfort level  Description: Interventions:  - Encourage patient to monitor pain and request assistance  - Assess pain using appropriate pain scale  - Administer analgesics based on type and severity of pain and evaluate response  - Implement non-pharmacological measures as appropriate and evaluate response  - Consider cultural and social influences on pain and pain management  - Notify physician/advanced practitioner if interventions unsuccessful or patient reports new pain  Outcome: Progressing     Problem: INFECTION - ADULT  Goal: Absence or prevention of progression during hospitalization  Description: INTERVENTIONS:  - Assess and monitor for signs and symptoms of infection  - Monitor lab/diagnostic results  - Monitor all insertion sites, i.e. indwelling lines, tubes, and drains  - Monitor endotracheal if appropriate and nasal secretions for changes in amount and color  - [place] appropriate cooling/warming therapies per order  - Administer medications as ordered  - Instruct and encourage patient and family to use good hand hygiene technique  - Identify and instruct in appropriate isolation precautions for identified infection/condition  Outcome: Progressing     Problem: DISCHARGE PLANNING  Goal: Discharge to home or other facility with appropriate resources  Description: INTERVENTIONS:  - Identify barriers to discharge w/patient and caregiver  - Arrange for needed discharge resources and transportation as appropriate  - Identify discharge learning needs (meds, wound care, etc.)  - Arrange for interpretive services to assist at discharge as needed  - Refer to Case Management Department for coordinating discharge planning if the patient needs post-hospital services based on physician/advanced practitioner order or complex needs related to functional status, cognitive ability, or social support system  Outcome: Progressing Problem: Knowledge Deficit  Goal: Patient/family/caregiver demonstrates understanding of disease process, treatment plan, medications, and discharge instructions  Description: Complete learning assessment and assess knowledge base.   Interventions:  - Provide teaching at level of understanding  - Provide teaching via preferred learning methods  Outcome: Progressing     Problem: Prexisting or High Potential for Compromised Skin Integrity  Goal: Skin integrity is maintained or improved  Description: INTERVENTIONS:  - Identify patients at risk for skin breakdown  - Assess and monitor skin integrity  - Assess and monitor nutrition and hydration status  - Monitor labs   - Assess for incontinence   - Turn and reposition patient  - Assist with mobility/ambulation  - Relieve pressure over bony prominences  - Avoid friction and shearing  - Provide appropriate hygiene as needed including keeping skin clean and dry  - Evaluate need for skin moisturizer/barrier cream  - Collaborate with interdisciplinary team   - Patient/family teaching  - Consider wound care consult   Outcome: Progressing

## 2023-12-11 NOTE — CONSULTS
Consultation - Pulmonary Medicine   Zabrina Brown 68 y.o. female MRN: [id number]      Reason for Consult: Ashtma/COPD Exacerbation    Zabrina Brown is a 68 y.o. female with a PMH of COPD/asthma, Tobacco abuse(30 PY quit 1980), neuropathy, MD, HLD, Anemia, and dementia who presents with respiratory distress requiring bipap now greatly improved due to an Asthma/COPD exacerbation    Asthma/COPD exacerbation - Likely viral trigger - Patient improved on current regimen. No evidence PNA  - Recommend Prednisone 40mg qday 10 days of steroids total, no taper  - Azithromycin 5 days   - Continue Xopenex/Atrovent TID - wean as tolerated  - Restart home regimen on discharge -Advair, Singular, Levalbuterol   - will set up pulm follow up    Hypoxic Respiratory Failure - Likely secondary to exacerbation  - Wean FiO2 - Maintain O2 Sat >90%  - Ambulatory pulse ox prior to discharge    GILDARDO/OHS - History of GILDARDO but refused outpatient therapy  Chronic hypercapnia  - would benefit from BiPAP at discharge    Pulmonary will sign off  Discussed w primary team  _____________________________________________________________________    Interval Hx:    Breathing significantly improved with steroids \    Imaging:  I personally reviewed the images on the [facility] system pertinent to today's visit  CXR  No acute cardiopulmonary disease. Other studies:  TTE 9/20  LEFT VENTRICLE:  Systolic function was normal. Ejection fraction was estimated to be 55 %. Although no diagnostic regional wall motion abnormality was identified, this possibility cannot be completely excluded on the basis of this study. RIGHT VENTRICLE:  The size was normal.  Systolic function was normal.      Review of Systems:  Aside from what is mentioned in the HPI, the review of systems otherwise negative.     Immunization History   Administered Date(s) Administered    COVID-19 PFIZER VACCINE 0.3 ML IM 01/09/2021, 02/14/2021, 05/19/2022    INFLUENZA 09/22/2015, 10/12/2016    Influenza Quadrivalent, 6-35 Months IM 10/16/2014    Influenza Split High Dose Preservative Free IM 09/22/2015, 10/12/2016    Influenza, high dose seasonal 0.7 mL 10/14/2020    Influenza, seasonal, injectable 10/12/2011, 10/01/2013    Pneumococcal Conjugate 13-Valent 09/22/2015    Pneumococcal Polysaccharide PPV23 09/08/1999, 04/12/2019        Current Medications:    Current Facility-Administered Medications:     acetaminophen (TYLENOL) tablet 650 mg, 650 mg, Oral, Q8H [facility], Noy Beth MD, 650 mg at 12/11/23 0506    albuterol (PROVENTIL HFA,VENTOLIN HFA) inhaler 2 puff, 2 puff, Inhalation, Q4H PRN, Miguel Angel Ren MD    aspirin (ECOTRIN LOW STRENGTH) EC tablet 81 mg, 81 mg, Oral, Daily, Miguel Angel Ren MD, 81 mg at 12/11/23 0924    benzonatate (TESSALON PERLES) capsule 200 mg, 200 mg, Oral, TID PRN, Miguel Angel Ren MD, 200 mg at 12/07/23 1727    cephalexin (KEFLEX) capsule 500 mg, 500 mg, Oral, Q6H [facility], Radha Ramirez MD, 500 mg at 12/11/23 1239    enoxaparin (LOVENOX) subcutaneous injection 40 mg, 40 mg, Subcutaneous, Daily, Miguel Angel Ren MD, 40 mg at 12/11/23 0924    fluticasone-vilanterol 200-25 mcg/actuation 2 puff, 2 puff, Inhalation, Daily, Miguel Angel Ren MD, 2 puff at 12/11/23 0929    gabapentin (NEURONTIN) capsule 300 mg, 300 mg, Oral, BID, Miguel Angel Ren MD, 300 mg at 12/11/23 0924    guaiFENesin (MUCINEX) 12 hr tablet 1,200 mg, 1,200 mg, Oral, Q12H [facility], Miguel Angel Ren MD, 1,200 mg at 12/11/23 0924    insulin glargine (LANTUS) subcutaneous injection 60 Units 0.6 mL, 60 Units, Subcutaneous, Q12H [facility], Miguel Angel Ren MD, 60 Units at 12/11/23 0923    insulin lispro (HumaLOG) 100 units/mL subcutaneous injection 1-6 Units, 1-6 Units, Subcutaneous, 4x Daily (AC & HS), 1 Units at 12/10/23 2140 **AND** Fingerstick Glucose (POCT), , , TID AC, Noy Beth MD    insulin lispro (HumaLOG) 100 units/mL subcutaneous injection 30 Units, 30 Units, Subcutaneous, TID With Meals, Miguel Angel Ren MD, 30 Units at 12/11/23 1239    ipratropium (ATROVENT) 0.02 % inhalation solution 0.5 mg, 0.5 mg, Nebulization, TID, Jayda Campbell MD, 0.5 mg at 12/11/23 0739    labetalol (NORMODYNE) injection 10 mg, 10 mg, Intravenous, Q6H PRN, Yanci Dunaway MD    levalbuterol Reagan ) inhalation solution 1.25 mg, 1.25 mg, Nebulization, TID, Jen Campbell MD, 1.25 mg at 12/11/23 0739    losartan (COZAAR) tablet 25 mg, 25 mg, Oral, Daily, Chas Villa MD, 25 mg at 12/11/23 0861    magnesium hydroxide (MILK OF MAGNESIA) oral suspension 15 mL, 15 mL, Oral, Daily PRN, Yanci Dunaway MD    melatonin tablet 3 mg, 3 mg, Oral, HS, Yanci Dunaway MD, 3 mg at 12/10/23 2137    memantine (NAMENDA) tablet 10 mg, 10 mg, Oral, BID, Yanci Dunaway MD, 10 mg at 12/11/23 0924    montelukast (SINGULAIR) tablet 10 mg, 10 mg, Oral, HS, Yanci Dunaway MD, 10 mg at 12/10/23 2137    nitrofurantoin (MACROBID) extended-release capsule 100 mg, 100 mg, Oral, BID With Meals, Chas Villa MD, 100 mg at 12/11/23 0924    pantoprazole (PROTONIX) EC tablet 20 mg, 20 mg, Oral, Early Morning, Noy Beth MD, 20 mg at 12/11/23 0506    pravastatin (PRAVACHOL) tablet 80 mg, 80 mg, Oral, Daily With Soraya Kang MD, 80 mg at 12/10/23 1754    predniSONE tablet 40 mg, 40 mg, Oral, Daily, Zane Foster DO, 40 mg at 12/11/23 7718    sodium chloride (OCEAN) 0.65 % nasal spray 1 spray, 1 spray, Each Nare, Q1H PRN, Chas Villa MD, 1 spray at 12/10/23 1240    traZODone (DESYREL) tablet 150 mg, 150 mg, Oral, HS PRN, Yanci Dunaway MD    Historical Information   Past Medical History:   Diagnosis Date    Alzheimer disease ([address])     Asthma     Diabetes mellitus ([address])     GERD (gastroesophageal reflux disease)     Hyperlipidemia     Hypertension      Past Surgical History:   Procedure Laterality Date    JOINT REPLACEMENT Bilateral     KNEE SURGERY      TOE SURGERY       Social History   Social History     Tobacco Use   Smoking Status Former    Packs/day: 1.00 Years: 52.00    Total pack years: 52.00    Types: Cigarettes    Start date: 65    Quit date: 2017    Years since quittin.2   Smokeless Tobacco Never       Family History:   Family History   Problem Relation Age of Onset    Dementia Brother     Breast cancer Sister 76    Cancer Brother          PhysicalExamination:  Vitals:   BP (!) 137/46   Pulse 69   Temp 97.6 °F (36.4 °C)   Resp 20   Ht 5' (1.524 m)   SpO2 94%   BMI 42.58 kg/m²     Appearance -- NAD, speaking full sentences  HEENT -- anicteric sclera, clear OP, MMM  Neck -- no JVD  Heart -- RRR, no murmurs  Lungs -- Wheezing, crackles  Abdomen -- soft, NTND, +bs  Extremities -- WWP, no LE edema  Skin -- no rash  Neuro -- A&Ox3, wnl  Psych -- no obvious depression or hallucination        Diagnostic Data:  Labs:   I personally reviewed the most recent laboratory data pertinent to today's visit    Lab Results   Component Value Date    WBC 8.05 2023    HGB 11.1 (L) 2023    HCT 35.2 2023    MCV 95 2023     2023     Lab Results   Component Value Date    GLUCOSE 171 (H) 2015    CALCIUM 9.0 2023     (L) 2015    K 3.8 2023    CO2 35 (H) 2023    CL 98 2023    BUN 33 (H) 2023    CREATININE 1.10 2023     No results found for: "IGE"  Lab Results   Component Value Date    ALT 28 2023    AST 23 2023    ALKPHOS 56 2023    BILITOT 0.61 2015

## 2023-12-11 NOTE — ASSESSMENT & PLAN NOTE
History of GILDARDO, but patient reportedly declined outpatient therapy  Overnight pulse oximetry while inpatient - no events  Referral to sleep medicine provided

## 2023-12-11 NOTE — CASE MANAGEMENT
Case Management Discharge Planning Note    Patient name Gustabo Stevenson  Location W /W -01 MRN [id number]  : 1950 Date 2023       Current Admission Date: 2023  Current Admission Diagnosis:Acute respiratory failure with hypoxia and hypercapnia [facility])   Patient Active Problem List    Diagnosis Date Noted    UTI (urinary tract infection) 2023    Neuropathy 2023    Sore throat 10/31/2023    Chronic respiratory failure with hypoxia ([address]) 2022    Diabetic ulcer of left midfoot associated with type 2 diabetes mellitus ([address]) 2022    Anemia 2022    GILDARDO (obstructive sleep apnea) 2022    Abscess of abdominal wall 01/10/2022    Sacral ulcer ([address]) 2021    Nocturnal hypoxia 2020    Kidney lesion 2020    Weakness generalized 10/14/2020    S/P tooth extraction 2020    Sepsis ([address]) 2020    Hyperlipidemia 2020    Asthma-COPD overlap syndrome 2020    Hoarse voice quality 2019    Seasonal allergies 2019    Need for pneumococcal vaccination 2019    MCI (mild cognitive impairment) 2018    Fall 2018    Injury of face 2018    Sixth nerve palsy of left eye 2018    Ptosis of left eyelid 2018    Alzheimer's disease ([address]) 10/15/2018    Pulmonary nodule 10/15/2018    Preop pulmonary/respiratory exam 10/15/2018    Mental status change 2017    Essential hypertension 2017    Acute respiratory failure with hypoxia and hypercapnia ([address]) 2017    Type 2 diabetes mellitus with hyperglycemia, with long-term current use of insulin ([address]) 2017      LOS (days): 4  Geometric Mean LOS (GMLOS) (days): 5.10  Days to GMLOS:0.9     OBJECTIVE:  Risk of Unplanned Readmission Score: 25.91         Current admission status: Inpatient   Preferred Pharmacy:   [id number] [place] - [address]  [address]  [place] [facility] [address]  Phone: [phone] Fax: [phone] [facility], [address] [place] [address]  [address]  [address] [address]  Phone: [phone] Fax: [phone]    Primary Care Provider: Concepcion Augustin MD    Primary Insurance: MEDICARE  Secondary Insurance: [facility]    DISCHARGE DETAILS:    Discharge planning discussed with[de-identified] brother/Mare  [place] of Choice: Yes  Comments - Freedom of Choice: Pt is a LTC resident at [facility]. She would like to return as she is medically stable for DC. Contacts  Patient Contacts: Eric Spaulding (brother)  Relationship to Patient[de-identified] Family  Contact Method: Phone  Reason/Outcome: Discharge Planning, Continuity of Care, Emergency [address]         Is the patient interested in [facility] at discharge?: No    DME Referral Provided  Referral made for DME?: No    Other Referral/Resources/Interventions Provided:  Interventions: Transportation  Referral Comments: Pt is in need of BLS transportation in order to return to [facility] where she is a LTC resident. Request has been made for [facility] [address] [facility] is able to transport pt at that time. All parties involved have been notified of DC arrangements.     Would you like to participate in our [address] service program?  : No - Declined    Treatment Team Recommendation: Short Term Rehab  Discharge Destination Plan[de-identified] SNF  Transport at Discharge : [facility]  Dispatcher Contacted: Yes  Number/Name of Dispatcher: roundtrip  Transported by [employer] and Unit #): [employer]  ETA of Transport (Date): 12/11/23  ETA of Transport (Time): 7675     Transfer Mode: Stretcher  Accompanied by: EMS personnel  Transfer Equipment: BLS devices  IMM Given (Date):: 12/11/23  IMM Given to[de-identified] Family  Family notified[de-identified] brother          Receiving Facility/Agency Phone Number: [phone]  Facility/Agency Fax Number: [phone]

## 2023-12-11 NOTE — DISCHARGE SUMMARY
[address] A K[place] 1950, 68 y.o. female MRN: [id number]  Unit/Bed#: W -01 Encounter: [id number]  Primary Care Provider: Estiven Jenkins MD   Date and time admitted to hospital: 12/7/2023  8:49 AM    * Acute respiratory failure with hypoxia and hypercapnia [facility])  Assessment & Plan  Patient was brought to the ED from [facility], as she started to develop increasing respiratory distress with labored breathing and was noted to be hypoxic while there. In the ED, she was placed initially on BiPAP for respiratory distress and then began saturating at 95%. She was then transitioned to 4 L of nasal cannula which she remains on currently. Patient states she still feels short of breath, but feels better overall. Denies any other symptoms at this time. She did meet sepsis criteria, and had an elevated Pro-Neville.  Rest of lab work was relatively unremarkable. Diffuse wheezing appreciated on exam.  Patient has slight gasping of air when trying to talk. Patient most recently saw her pulmonologist at the end of October and provided medication adjustments. Chest x-ray in the ED was unremarkable. Per nephew, patient has oxygen at her facility as needed. Per patient, she uses 2L as needed at night. Etiology: likely asthma/COPD exacerbation    Plan:  -Continue PTA Advair, Singulair, levalbuterol  -Azithromycin for 5 days  -Prednisone 40 mg daily for an additional 7 days (10 days total)  -2L nasal cannula, wean as necessary, maintain SpO2 > 90%  -Follow-up with outpatient Pulmonology    Sepsis [facility])  Assessment & Plan  Patient met SIRS criteria upon admission to the ED with a fever of 101, tachycardia, tachypnea, and elevated Pro-Neville.  Chest x-ray was negative for any evidence of pneumonia. Patient had no leukocytosis. UA revealed evidence of white blood cells and few leukocytes, can consider urinary source.   She was given dose of lactated Ringer's and ceftriaxone while in the ED. In addition, patient was in acute respiratory distress and was placed on BiPAP initially while in the ED by respiratory therapy. Now saturating on 4 L nasal cannula at 95%. Patient denies any symptoms of cough, however cannot rule out upper respiratory infection. Low suspicion for pneumonia  Urine culture with growth Klebsiella and Enterococcus  MRSA culture negative  S/p 5 days of cephalosporin therapy    Plan:  -Continue nitrofurantoin for 3 additional days    UTI (urinary tract infection)  Assessment & Plan  UA - turbid urine, 30-50 leukocytes on microscopy, occasional bacteria  Patient denies urinary symptoms  10/14/23 urine culture growth of Vancomycin susceptible Enterococcus  Urine cultures - Klebsiella, Enterococcus  S/p 5 day course of ceftriaxone/Keflex    Plan  -Nitrofurantoin for 3 additional days      Essential hypertension  Assessment & Plan  Patient is not on any hypertension medications at home. Blood pressures have been slightly elevated with highest systolic reaching 817. Per chart review, patient was previously on lisinopril 2.5 mg daily until June 2022, at which point it was discontinued due to KATHY during a hospitalization. Plan  - Continue Losartan 25 mg daily started while inpatient  - Follow-up with PCP    Neuropathy  Assessment & Plan  Continue gabapentin 300 mg twice daily. GILDARDO (obstructive sleep apnea)  Assessment & Plan  History of GILDARDO, but patient reportedly declined outpatient therapy  Overnight pulse oximetry while inpatient - no events  Referral to sleep medicine provided    Asthma-COPD overlap syndrome  Assessment & Plan  Patient has a history of asthma/COPD and follows with pulmonary as an outpatient.   His most recent pulmonology visit was in October where further med recs were provided for the patient.  -See plan under respiratory failure    Hyperlipidemia  Assessment & Plan  Continue PTA statin regimen    Alzheimer's disease ([address])  Assessment & Plan  Continue memantine 10 mg daily. Type 2 diabetes mellitus with hyperglycemia, with long-term current use of insulin [facility])  Assessment & Plan  Lab Results   Component Value Date    HGBA1C 6.1 (H) 10/11/2023       Recent Labs     12/10/23  2110 12/11/23  0758 12/11/23  1136 12/11/23  1531   POCGLU 178* 97 95 85         Blood Sugar Average: Last 72 hrs:  (P) [id number] home regimen of 60 units Lantus twice daily. 30 units Humalog with meals. Sliding scale insulin. Plan  Continue PTA regimen  Follow-up with outpatient Endocrinology      Medical Problems       Resolved Problems  Date Reviewed: 12/11/2023   None       Discharging Resident: Eduardo Pillai MD  Discharging Attending: Tamara iSngh MD  PCP: Selina Issa MD  Admission Date:   Admission Orders (From admission, onward)       Ordered        12/07/23 1211  INPATIENT ADMISSION  Once                          Discharge Date: 12/11/23    Consultations During Hospital Stay:  Pulmonology    Procedures Performed:   None    Significant Findings / Test Results:   XR chest 1 view portable   Final Result by Constantine Ferro MD (12/07 1706)      No acute cardiopulmonary disease. Workstation performed: [id number]             Incidental Findings:   None    Test Results Pending at Discharge (will require follow up): None     Outpatient Tests Requested:  None    Complications:  None    Reason for Admission: shortness of breath, hypoxia    Hospital Course:   Belén Bassett is a 68 y.o. female patient with PMH of COPD/asthma who originally presented to the hospital on 12/7/2023 due to respiratory distress with dyspnea and hypoxia. In the ED, patient was placed on BiPAP by RT and SpO2 improved. She was then switched supplemental O2 via NC, requiring up to 4LPM. Patient only requires 2LPM during nighttime at [facility]. She met SIRS criteria. CXR was unrevealing.  Though asymptomatic, patient was started on empiric antibiotics due to abnormal UA. Steroid therapy was initiated, and patient also wore BiPAP at night. Her breathing and symptoms gradually improved, and she was titrated down to 2LPM. Losartan was started while inpatient. Due to urine culture growth of Klebsiella pneumonia, ceftriaxone was continued, and she was switched to Keflex when susceptibilities became available, completing a total 5 day course. Urine culture also revealed Enterococcus faecalis, and due penicillin allergy, nitrofurantoin was initiated (total 5 day course). Blood cultures and sputum culture remained negative. An overnight pulse oximetry was obtained to determine eligibility for BiPAP, but patient had no desaturation evens. Patient was deemed medically stable for discharge on 12/12, on 2LPM via nasal cannula. Please see above list of diagnoses and related plan for additional information. Condition at Discharge: stable    Discharge Day Visit / Exam:   Subjective:  No acute overnight events. Patient was seen and examined at bedside. She has no complaints at this time, and is eager for discharge. Vitals: Blood Pressure: 122/86 (12/11/23 1534)  Pulse: 77 (12/11/23 1534)  Temperature: 97.8 °F (36.6 °C) (12/11/23 1534)  Temp Source: Oral (12/10/23 1618)  Respirations: 12 (12/11/23 1534)  Height: 5' (152.4 cm) (12/07/23 1700)  SpO2: 93 % (12/11/23 1534)  Exam:   Physical Exam  Constitutional:       Appearance: She is obese. She is not ill-appearing. HENT:      Head: Normocephalic and atraumatic. Right Ear: External ear normal.      Left Ear: External ear normal.      Nose: Nose normal. No congestion. Eyes:      Extraocular Movements: Extraocular movements intact. Conjunctiva/sclera: Conjunctivae normal.   Cardiovascular:      Rate and Rhythm: Normal rate and regular rhythm. Heart sounds: No murmur heard. Pulmonary:      Effort: Pulmonary effort is normal. No respiratory distress. Breath sounds: Wheezing present. Abdominal:      General: There is no distension. Palpations: Abdomen is soft. Tenderness: There is no abdominal tenderness. Musculoskeletal:         General: No swelling or tenderness. Cervical back: Normal range of motion and neck supple. Right lower leg: No edema. Left lower leg: No edema. Skin:     General: Skin is warm and dry. Neurological:      Mental Status: She is alert and oriented to person, place, and time. Psychiatric:         Mood and Affect: Mood normal.          Discussion with Family: Updated  (brother) via phone. Discharge instructions/Information to patient and family:   See after visit summary for information provided to patient and family. Provisions for Follow-Up Care:  See after visit summary for information related to follow-up care and any pertinent home health orders. Mobility at time of Discharge:   Basic Mobility Inpatient Raw Score: 13  JH-HLM Goal: 4: Move to chair/commode  JH-HLM Achieved: 3: Sit at edge of bed  [facility] Goal NOT achieved. Continue to encourage mobility in post discharge setting. Disposition:   Other [address] [facility] Readmission: none    Discharge Medications:  See after visit summary for reconciled discharge medications provided to patient and/or family.       **Please Note: This note may have been constructed using a voice recognition system**

## 2023-12-11 NOTE — ASSESSMENT & PLAN NOTE
UA - turbid urine, 30-50 leukocytes on microscopy, occasional bacteria  Patient denies urinary symptoms  10/14/23 urine culture growth of Vancomycin susceptible Enterococcus  Urine cultures - Klebsiella, Enterococcus  S/p 5 day course of ceftriaxone/Keflex    Plan  -Nitrofurantoin for 3 additional days

## 2023-12-11 NOTE — ASSESSMENT & PLAN NOTE
Patient met SIRS criteria upon admission to the ED with a fever of 101, tachycardia, tachypnea, and elevated Pro-Neville.  Chest x-ray was negative for any evidence of pneumonia. Patient had no leukocytosis. UA revealed evidence of white blood cells and few leukocytes, can consider urinary source. She was given dose of lactated Ringer's and ceftriaxone while in the ED. In addition, patient was in acute respiratory distress and was placed on BiPAP initially while in the ED by respiratory therapy. Now saturating on 4 L nasal cannula at 95%. Patient denies any symptoms of cough, however cannot rule out upper respiratory infection.   Low suspicion for pneumonia  Urine culture with growth Klebsiella and Enterococcus  MRSA culture negative  S/p 5 days of cephalosporin therapy    Plan:  -Continue nitrofurantoin for 3 additional days

## 2023-12-12 VITALS
TEMPERATURE: 98.4 F | OXYGEN SATURATION: 94 % | RESPIRATION RATE: 16 BRPM | BODY MASS INDEX: 42.58 KG/M2 | DIASTOLIC BLOOD PRESSURE: 68 MMHG | HEART RATE: 83 BPM | SYSTOLIC BLOOD PRESSURE: 105 MMHG | HEIGHT: 60 IN

## 2023-12-12 LAB
BACTERIA BLD CULT: NORMAL
BACTERIA BLD CULT: NORMAL

## 2024-01-15 ENCOUNTER — OFFICE VISIT (OUTPATIENT)
Dept: PULMONOLOGY | Facility: CLINIC | Age: 74
End: 2024-01-15
Payer: MEDICARE

## 2024-01-15 VITALS
OXYGEN SATURATION: 96 % | SYSTOLIC BLOOD PRESSURE: 132 MMHG | TEMPERATURE: 98.9 F | DIASTOLIC BLOOD PRESSURE: 62 MMHG | HEART RATE: 92 BPM

## 2024-01-15 DIAGNOSIS — J96.12 CHRONIC RESPIRATORY FAILURE WITH HYPERCAPNIA (HCC): ICD-10-CM

## 2024-01-15 DIAGNOSIS — E66.01 MORBID (SEVERE) OBESITY DUE TO EXCESS CALORIES (HCC): ICD-10-CM

## 2024-01-15 DIAGNOSIS — J44.89 ASTHMA-COPD OVERLAP SYNDROME: ICD-10-CM

## 2024-01-15 DIAGNOSIS — Z87.891 PERSONAL HISTORY OF NICOTINE DEPENDENCE: ICD-10-CM

## 2024-01-15 DIAGNOSIS — J44.1 CHRONIC OBSTRUCTIVE PULMONARY DISEASE WITH (ACUTE) EXACERBATION (HCC): Primary | ICD-10-CM

## 2024-01-15 PROCEDURE — 99214 OFFICE O/P EST MOD 30 MIN: CPT | Performed by: INTERNAL MEDICINE

## 2024-01-15 PROCEDURE — 94010 BREATHING CAPACITY TEST: CPT | Performed by: INTERNAL MEDICINE

## 2024-01-15 RX ORDER — CLONAZEPAM 0.5 MG/1
TABLET ORAL
COMMUNITY
Start: 2023-12-01

## 2024-01-15 NOTE — PROGRESS NOTES
Pulmonary Follow Up Note   Jud Walls 73 y.o. female MRN: [id number]  1/15/2024      Assessment/Plan:    Diagnoses and all orders for this visit:    Asthma-COPD overlap syndrome  Patient recently hospitalized in December for acute respiratory failure.  Treated with steroids and nebulizer therapy. Improved at the patient is feeling much better.  On baseline inhalers, Advair and as needed albuterol.  Continue current treatment.  Plan to get baseline spirometry and CT lung given smoking history.  -     Ambulatory Referral to Pulmonology    GILDARDO  Patient suspected to have GILDARDO, but refuses polysomnography and CPAP/BiPAP.  Continue 2 L nasal cannula.    Morbid obesity  Patient with a BMI of 42.  Recommended lifestyle modifications.  Follow-up with PCP.    Tobacco abuse  Patient with a history of 30 pack years.  Quit about 6 to 8 years ago.  Patient's last CT scan was in 2020.  Plan to order repeat for surveillance.    I discussed with her that she is a candidate for lung cancer CT screening.     The following Shared Decision-Making points were covered:  Benefits of screening were discussed, including the rates of reduction in death from lung cancer and other causes.  Harms of screening were reviewed, including false positive tests, radiation exposure levels, risks of invasive procedures, risks of complications of screening, and risk of overdiagnosis.  I counseled on the importance of adherence to annual lung cancer LDCT screening, impact of co-morbidities, and ability or willingness to undergo diagnosis and treatment.  I counseled on the importance of maintaining abstinence as a former smoker or was counseled on the importance of smoking cessation if a current smoker    Review of Eligibility Criteria: She meets all of the criteria for Lung Cancer Screening.   She is 73 y.o.   She has 20 pack year tobacco history and is a current smoker or has quit within the past 15 years  She presents no signs or symptoms of lung  cancer    After discussion, the patient decided to elect lung cancer screening.        Return in about 6 months (around 7/15/2024).    History of Present Illness   HPI:  Jud Walls is a 73 y.o. female with a past medical history of COPD, dementia, GERD, hypertension, untreated GILDARDO, previous tobacco abuse, anemia, CKD stage IIIb, type 2 diabetes, and hyperlipidemia that presents today for follow-up.    The patient was recently hospitalized for 4 days in December for acute respiratory failure.  She was treated with steroids and nebulizers, improved shortly after. Hs eis doing well since she has left the hospital. Still on 2 L NC.    Previous smoker.  Nondrinker.  No occupational exposure.  No family history of lung disease.  No history of exposure to birds.    PCP note reviewed.    Imaging reviewed.    Labs reviewed.    Review of Systems   Constitutional:  Negative for chills and fatigue.   HENT:  Negative for congestion, rhinorrhea, sinus pressure and sinus pain.    Respiratory:  Negative for cough and shortness of breath.    Cardiovascular:  Negative for chest pain.   Gastrointestinal:  Negative for abdominal distention, abdominal pain and constipation.   Genitourinary:  Negative for dysuria.   Musculoskeletal:  Negative for arthralgias and myalgias.   Skin:  Negative for rash.   Neurological:  Positive for headaches. Negative for dizziness.   Psychiatric/Behavioral:  Negative for agitation and confusion.        Historical Information   Past Medical History:   Diagnosis Date    Alzheimer disease (HCC)     Asthma     Diabetes mellitus (HCC)     GERD (gastroesophageal reflux disease)     Hyperlipidemia     Hypertension      Past Surgical History:   Procedure Laterality Date    JOINT REPLACEMENT Bilateral     KNEE SURGERY      TOE SURGERY       Family History   Problem Relation Age of Onset    Dementia Brother     Breast cancer Sister 75    Cancer Brother      Social History     Tobacco Use   Smoking Status  Former    Current packs/day: 0.00    Average packs/day: 1 pack/day for 52.7 years (52.7 ttl pk-yrs)    Types: Cigarettes    Start date:     Quit date: 2017    Years since quittin.3   Smokeless Tobacco Never       Meds/Allergies     Current Outpatient Medications:     acetaminophen (TYLENOL) 325 mg tablet, Take 3 tablets (975 mg total) by mouth every 8 (eight) hours, Disp: 30 tablet, Rfl: 0    Advair Diskus 500-50 MCG/DOSE inhaler, , Disp: , Rfl:     ascorbic acid (VITAMIN C) 500 mg tablet, , Disp: , Rfl:     aspirin (ECOTRIN LOW STRENGTH) 81 mg EC tablet, , Disp: , Rfl:     benzonatate (TESSALON) 200 MG capsule, Take 200 mg by mouth 3 (three) times a day as needed for cough, Disp: , Rfl:     Cholecalciferol (VITAMIN D3) 22165 units CAPS, Take 50,000 Units by mouth every 30 (thirty) days, Disp: , Rfl:     clonazePAM (KlonoPIN) 0.5 mg tablet, , Disp: , Rfl:     dulaglutide (Trulicity) 3 MG/0.5ML injection, Inject 0.5 mL (3 mg total) under the skin once a week, Disp: 2 mL, Rfl: 1    escitalopram (LEXAPRO) 10 mg tablet, Take 10 mg by mouth daily, Disp: , Rfl:     ferrous gluconate (FERGON) 324 mg tablet, Take 1 tablet (324 mg total) by mouth daily before breakfast, Disp: , Rfl: 0    fluticasone (FLONASE) 50 mcg/act nasal spray, 1 spray into each nostril daily, Disp: 1 Bottle, Rfl: 5    gabapentin (NEURONTIN) 300 mg capsule, Take 300 mg by mouth 2 (two) times a day, Disp: , Rfl:     Glucose Blood (BL TEST STRIP PACK VI), Test, Disp: , Rfl:     glucose blood test strip, Test, Disp: , Rfl:     guaiFENesin (MUCINEX) 600 mg 12 hr tablet, Take 1,200 mg by mouth every 12 (twelve) hours, Disp: , Rfl:     Insulin Glargine Solostar (Basaglar KwikPen) 100 UNIT/ML SOPN, Inject 60 units under the skin every 12 hours, Disp: , Rfl:     insulin lispro (HumaLOG) 100 units/mL injection, Inject 30 units before meals + scale, Disp: , Rfl:     ipratropium-albuterol (DUO-NEB) 0.5-2.5 mg/3 mL nebulizer solution, , Disp: , Rfl:      levalbuterol (XOPENEX) 1.25 mg/3 mL nebulizer solution, , Disp: , Rfl:     losartan (COZAAR) 25 mg tablet, Take 1 tablet (25 mg total) by mouth daily Do not start before December 12, 2023., Disp: , Rfl: 0    magnesium hydroxide (MILK OF MAGNESIA) 400 mg/5 mL oral suspension, Take by mouth daily as needed for constipation, Disp: , Rfl:     Melatonin 5 MG TABS, , Disp: , Rfl:     memantine (NAMENDA) 10 mg tablet, 10 mg 2 (two) times a day, Disp: , Rfl:     montelukast (SINGULAIR) 10 mg tablet, Take 10 mg by mouth daily at bedtime, Disp: , Rfl:     Omeprazole 20 MG TBEC, Take 20 mg by mouth daily, Disp: , Rfl:     simvastatin (ZOCOR) 40 mg tablet, Take 1 tablet by mouth daily at bedtime, Disp: , Rfl: 0    traZODone (DESYREL) 150 mg tablet, , Disp: , Rfl:     busPIRone (BUSPAR) 5 mg tablet, Take 5 mg by mouth 2 (two) times a day (Patient not taking: Reported on 1/15/2024), Disp: , Rfl:     cetirizine (ZyrTEC) 10 mg tablet, Take 10 mg by mouth daily (Patient not taking: Reported on 1/15/2024), Disp: , Rfl:   Allergies   Allergen Reactions    Penicillins Shortness Of Breath    Cephalexin Other (See Comments)     Reaction Date: 26May2011; unknown     Crestor [Rosuvastatin] Other (See Comments)     Reaction Date: 26May2011; unknown     Zithromax [Azithromycin] Other (See Comments)     Unknown        Vitals: Blood pressure 132/62, pulse 92, temperature 98.9 °F (37.2 °C), temperature source Tympanic, SpO2 96%. There is no height or weight on file to calculate BMI. Oxygen Therapy  SpO2: 96 %  Oxygen Therapy: Supplemental oxygen  O2 Delivery Method: Nasal cannula  O2 Flow Rate (L/min): 2 L/min      Physical Exam  Physical Exam  Constitutional:       Appearance: She is obese.   HENT:      Head: Normocephalic and atraumatic.      Right Ear: External ear normal.      Left Ear: External ear normal.      Nose: Nose normal.      Mouth/Throat:      Mouth: Mucous membranes are moist.      Pharynx: Oropharynx is clear.   Eyes:       "Extraocular Movements: Extraocular movements intact.      Pupils: Pupils are equal, round, and reactive to light.   Cardiovascular:      Rate and Rhythm: Normal rate and regular rhythm.      Pulses: Normal pulses.      Heart sounds: Normal heart sounds.   Pulmonary:      Effort: Pulmonary effort is normal.      Breath sounds: Normal breath sounds.   Abdominal:      General: Abdomen is flat. Bowel sounds are normal. There is no distension.      Tenderness: There is no abdominal tenderness.   Musculoskeletal:      Right lower leg: No edema.      Left lower leg: No edema.   Skin:     General: Skin is warm and dry.      Capillary Refill: Capillary refill takes less than 2 seconds.   Neurological:      General: No focal deficit present.      Mental Status: She is alert and oriented to person, place, and time.   Psychiatric:         Mood and Affect: Mood normal.         Behavior: Behavior normal.         Labs: I have personally reviewed pertinent lab results.  Lab Results   Component Value Date    WBC 8.05 12/11/2023    HGB 11.1 (L) 12/11/2023    HCT 35.2 12/11/2023    MCV 95 12/11/2023     12/11/2023     Lab Results   Component Value Date    GLUCOSE 171 (H) 09/21/2015    CALCIUM 9.0 12/11/2023     (L) 09/21/2015    K 3.8 12/11/2023    CO2 35 (H) 12/11/2023    CL 98 12/11/2023    BUN 33 (H) 12/11/2023    CREATININE 1.10 12/11/2023     No results found for: \"IGE\"  Lab Results   Component Value Date    ALT 28 12/07/2023    AST 23 12/07/2023    ALKPHOS 56 12/07/2023    BILITOT 0.61 09/21/2015     Preventive   Influenza vaccine: UTD  COVID-19 vaccination: Needed  Pneumonia vaccine: UTD  Lung Cancer screening: Needed      Imaging and other studies: I have personally reviewed pertinent reports.    XR chest 1 view portable    Result Date: 12/7/2023  Impression: No acute cardiopulmonary disease. Workstation performed: [id number]           Pulmonary function testing:  No results found for: \"FEV1\", \"FVC\", \"VRT8OSB\", " "\"TLC\", \"DLCO\"    EKG, Pathology, and Other Studies: I have personally reviewed pertinent reports.      Disclaimer: Portions of the record may have been created with voice recognition software. Occasional wrong word or \"sound a like\" substitutions may have occurred due to the inherent limitations of voice recognition software. Careful consideration should be taken to recognize, using context, where substitutions have occurred.    Troy Gillis DO, MS  Pulmonary & Critical Care Medicine Fellow PGY-IV  [facility]    "

## 2024-02-05 PROBLEM — A41.9 SEPSIS (HCC): Status: RESOLVED | Noted: 2020-09-19 | Resolved: 2024-02-05

## 2024-02-08 PROBLEM — N39.0 UTI (URINARY TRACT INFECTION): Status: RESOLVED | Noted: 2023-12-08 | Resolved: 2024-02-08

## 2024-03-11 ENCOUNTER — OFFICE VISIT (OUTPATIENT)
Dept: ENDOCRINOLOGY | Facility: CLINIC | Age: 74
End: 2024-03-11
Payer: MEDICARE

## 2024-03-11 VITALS
DIASTOLIC BLOOD PRESSURE: 84 MMHG | HEART RATE: 76 BPM | HEIGHT: 60 IN | BODY MASS INDEX: 42.58 KG/M2 | OXYGEN SATURATION: 96 % | SYSTOLIC BLOOD PRESSURE: 164 MMHG

## 2024-03-11 DIAGNOSIS — I10 ESSENTIAL HYPERTENSION: Chronic | ICD-10-CM

## 2024-03-11 DIAGNOSIS — E11.65 TYPE 2 DIABETES MELLITUS WITH HYPERGLYCEMIA, WITH LONG-TERM CURRENT USE OF INSULIN (HCC): Primary | ICD-10-CM

## 2024-03-11 DIAGNOSIS — Z79.4 TYPE 2 DIABETES MELLITUS WITH HYPERGLYCEMIA, WITH LONG-TERM CURRENT USE OF INSULIN (HCC): Primary | ICD-10-CM

## 2024-03-11 DIAGNOSIS — E78.2 MIXED HYPERLIPIDEMIA: ICD-10-CM

## 2024-03-11 PROCEDURE — 99214 OFFICE O/P EST MOD 30 MIN: CPT | Performed by: PHYSICIAN ASSISTANT

## 2024-03-11 NOTE — PROGRESS NOTES
Patient Progress Note      CC: DM   Resident at [facility]     Referring Provider  Mary Posey Md  [address] [facility]  [address] [place], [place] [address]     History of Present Illness:   Jud Walls is a 73 y.o. female with a history of type 2 diabetes with long term use of insulin. Diabetes course has been stable. She was hospitalized for severe sepsis in Dec 2023. Denies recent severe hypoglycemic or severe hyperglycemic episodes. Denies any issues with her current regimen. Home glucose monitoring: are performed regularly     BG log not sent with patient. MA called nursing home to fax over log.      Current regimen: Basaglar 60 units BID, Humalog 30 units TID with meals plus scale, Trulicity 3 mg weekly  compliant most of the time, denies any side effects from medications.  Injects in: abdomen. Rotates sites: Yes  Hypoglycemic episodes: No, rare (not that she is aware of)  H/o of hypoglycemia causing hospitalization or Intervention such as glucagon injection or ambulance call : No, not that she calls  Hypoglycemia symptoms: vision changes  Treatment of hypoglycemia: juice; discussed treatment      Medic alert tag: recommended: Yes     Diabetes education: No  Diet: 3 meals per day, 0-1 snack per day. Timing of meals is predictable.   Diabetic diet compliance: noncompliant some of the time. Meals are based on what she gets at the nursing home.   Activity: Daily activity is predictable: Yes. Activity is very limited      Ophthamology: March 2022. She cannot recall if she has been since.   Podiatry: feet are examined at the nursing home.      Has hypertension: on ACE inhibitor/ARB, compliant most of the time  Has hyperlipidemia: on statin - tolerating well, no myalgias. compliant most of the time, denies any side effects from medications.  Thyroid disorders: No  History of pancreatitis: No      Patient Active Problem List   Diagnosis    Mental status change    Essential hypertension     Acute respiratory failure with hypoxia and hypercapnia (HCC)    Type 2 diabetes mellitus with hyperglycemia, with long-term current use of insulin (HCC)    Alzheimer's disease (HCC)    Pulmonary nodule    Preop pulmonary/respiratory exam    MCI (mild cognitive impairment)    Fall    Injury of face    Sixth nerve palsy of left eye    Ptosis of left eyelid    Seasonal allergies    Need for pneumococcal vaccination    Hoarse voice quality    Hyperlipidemia    Asthma-COPD overlap syndrome    S/P tooth extraction    Weakness generalized    Nocturnal hypoxia    Kidney lesion    Sacral ulcer (HCC)    Abscess of abdominal wall    GILDARDO (obstructive sleep apnea)    Chronic respiratory failure with hypoxia (HCC)    Diabetic ulcer of left midfoot associated with type 2 diabetes mellitus (HCC)    Anemia    Sore throat    Neuropathy    Chronic obstructive pulmonary disease with (acute) exacerbation (HCC)      Past Medical History:   Diagnosis Date    Alzheimer disease (HCC)     Asthma     Diabetes mellitus (HCC)     GERD (gastroesophageal reflux disease)     Hyperlipidemia     Hypertension       Past Surgical History:   Procedure Laterality Date    JOINT REPLACEMENT Bilateral     KNEE SURGERY      TOE SURGERY        Family History   Problem Relation Age of Onset    Dementia Brother     Breast cancer Sister 75    Cancer Brother      Social History     Tobacco Use    Smoking status: Former     Current packs/day: 0.00     Average packs/day: 1 pack/day for 52.7 years (52.7 ttl pk-yrs)     Types: Cigarettes     Start date:      Quit date: 2017     Years since quittin.5    Smokeless tobacco: Never   Substance Use Topics    Alcohol use: Not Currently     Allergies   Allergen Reactions    Penicillins Shortness Of Breath    Cephalexin Other (See Comments)     Reaction Date: 2011; unknown     Crestor [Rosuvastatin] Other (See Comments)     Reaction Date: 2011; unknown     Zithromax [Azithromycin] Other (See Comments)      Unknown          Current Outpatient Medications:     acetaminophen (TYLENOL) 325 mg tablet, Take 3 tablets (975 mg total) by mouth every 8 (eight) hours, Disp: 30 tablet, Rfl: 0    Advair Diskus 500-50 MCG/DOSE inhaler, , Disp: , Rfl:     ascorbic acid (VITAMIN C) 500 mg tablet, , Disp: , Rfl:     aspirin (ECOTRIN LOW STRENGTH) 81 mg EC tablet, , Disp: , Rfl:     benzonatate (TESSALON) 200 MG capsule, Take 200 mg by mouth 3 (three) times a day as needed for cough, Disp: , Rfl:     Cholecalciferol (VITAMIN D3) 98851 units CAPS, Take 50,000 Units by mouth every 30 (thirty) days, Disp: , Rfl:     clonazePAM (KlonoPIN) 0.5 mg tablet, , Disp: , Rfl:     dulaglutide (Trulicity) 3 MG/0.5ML injection, Inject 0.5 mL (3 mg total) under the skin once a week, Disp: 2 mL, Rfl: 1    escitalopram (LEXAPRO) 10 mg tablet, Take 10 mg by mouth daily, Disp: , Rfl:     ferrous gluconate (FERGON) 324 mg tablet, Take 1 tablet (324 mg total) by mouth daily before breakfast, Disp: , Rfl: 0    fluticasone (FLONASE) 50 mcg/act nasal spray, 1 spray into each nostril daily, Disp: 1 Bottle, Rfl: 5    gabapentin (NEURONTIN) 300 mg capsule, Take 300 mg by mouth 2 (two) times a day, Disp: , Rfl:     Glucose Blood (BL TEST STRIP PACK VI), Test, Disp: , Rfl:     glucose blood test strip, Test, Disp: , Rfl:     guaiFENesin (MUCINEX) 600 mg 12 hr tablet, Take 1,200 mg by mouth every 12 (twelve) hours, Disp: , Rfl:     Insulin Glargine Solostar (Basaglar KwikPen) 100 UNIT/ML SOPN, Inject 60 units under the skin every 12 hours, Disp: , Rfl:     insulin lispro (HumaLOG) 100 units/mL injection, Inject 30 units before meals + scale, Disp: , Rfl:     ipratropium-albuterol (DUO-NEB) 0.5-2.5 mg/3 mL nebulizer solution, , Disp: , Rfl:     levalbuterol (XOPENEX) 1.25 mg/3 mL nebulizer solution, , Disp: , Rfl:     losartan (COZAAR) 25 mg tablet, Take 1 tablet (25 mg total) by mouth daily Do not start before December 12, 2023., Disp: , Rfl: 0    magnesium  hydroxide (MILK OF MAGNESIA) 400 mg/5 mL oral suspension, Take by mouth daily as needed for constipation, Disp: , Rfl:     Melatonin 5 MG TABS, , Disp: , Rfl:     memantine (NAMENDA) 10 mg tablet, 10 mg 2 (two) times a day, Disp: , Rfl:     montelukast (SINGULAIR) 10 mg tablet, Take 10 mg by mouth daily at bedtime, Disp: , Rfl:     Omeprazole 20 MG TBEC, Take 20 mg by mouth daily, Disp: , Rfl:     simvastatin (ZOCOR) 40 mg tablet, Take 1 tablet by mouth daily at bedtime, Disp: , Rfl: 0    traZODone (DESYREL) 150 mg tablet, , Disp: , Rfl:     busPIRone (BUSPAR) 5 mg tablet, Take 5 mg by mouth 2 (two) times a day (Patient not taking: Reported on 1/15/2024), Disp: , Rfl:     cetirizine (ZyrTEC) 10 mg tablet, Take 10 mg by mouth daily (Patient not taking: Reported on 1/15/2024), Disp: , Rfl:   Review of Systems   Constitutional:  Negative for activity change, appetite change, fatigue and unexpected weight change.   HENT:  Negative for trouble swallowing.    Eyes:  Positive for visual disturbance (lost her glasses).   Respiratory:  Negative for shortness of breath.    Cardiovascular:  Negative for chest pain and palpitations.   Gastrointestinal:  Negative for constipation and diarrhea.   Endocrine: Negative for polydipsia and polyuria.   Musculoskeletal:  Positive for arthralgias (arthritis).   Skin:  Negative for wound.   Neurological:  Positive for numbness (mild, occasional in toes).   Psychiatric/Behavioral: Negative.         Physical Exam:  Body mass index is 42.58 kg/m².  /84   Pulse 76   Ht 5' (1.524 m)   SpO2 96%   BMI 42.58 kg/m²    Wt Readings from Last 3 Encounters:   10/14/23 98.9 kg (218 lb 0.6 oz)   07/07/23 98.9 kg (218 lb 0.6 oz)   06/26/23 99.2 kg (218 lb 11.1 oz)       Physical Exam  Vitals and nursing note reviewed.   Constitutional:       Appearance: She is well-developed.   HENT:      Head: Normocephalic.   Eyes:      General: No scleral icterus.     Pupils: Pupils are equal, round, and  "reactive to light.   Neck:      Thyroid: No thyromegaly.   Cardiovascular:      Rate and Rhythm: Normal rate and regular rhythm.      Pulses:           Radial pulses are 2+ on the right side and 2+ on the left side.      Heart sounds: No murmur heard.  Pulmonary:      Effort: Pulmonary effort is normal. No respiratory distress.      Breath sounds: Wheezing present.      Comments: On [place]   Musculoskeletal:      Cervical back: Neck supple.   Skin:     General: Skin is warm and dry.   Neurological:      Mental Status: She is alert.       Diabetic Foot Exam    Labs:   Lab Results   Component Value Date    HGBA1C 7.0 (H) 02/15/2024     Lab Results   Component Value Date    GLUCOSE 171 (H) 09/21/2015    CALCIUM 8.9 02/15/2024     (L) 09/21/2015    K 4.6 02/15/2024    CO2 32 (H) 02/15/2024    CL 99 (L) 02/15/2024    BUN 25 02/15/2024    CREATININE 1.64 (H) 02/15/2024     No results found for: \"MICROALBUR\", \"[id number]\"  GFR, Calculated   Date Value Ref Range Status   11/10/2020 45 (L) >60 mL/min/1.73m2 Final     Comment:     mL/min per 1.73 square meters                                            Normal Function or Mild Renal    Disease (if clinically at risk):  >or=60  Moderately Decreased:                30-59  Severely Decreased:                  15-29  Renal Failure:                         <15                                            -American GFR: multiply reported GFR by 1.16    Please note that the eGFR is based on the CKD-EPI calculation, and is not intended to be used for drug dosing.                                            Note: Calculated GFR may not be an accurate indicator of renal function if the patient's renal function is not in a steady state.     eGFRcr   Date Value Ref Range Status   02/15/2024 33 (L) >59 Final     No components found for: \"MALBCRER\"  Lab Results   Component Value Date    CHOL 124 09/21/2015    HDL 37 (L) 04/18/2016    TRIG 140 04/18/2016     Lab Results   Component " Value Date    ALT 15 12/29/2023    AST 12 12/29/2023    ALKPHOS 43 12/29/2023    BILITOT 0.61 09/21/2015     Lab Results   Component Value Date    UGE2EJEHWDER 2.078 07/07/2023    TSH 2.22 11/14/2023         Plan:    Diagnoses and all orders for this visit:    Type 2 diabetes mellitus with hyperglycemia, with long-term current use of insulin ([facility])  HGA1C 7.0%. Worsened.   Treatment regimen: BG log not yet received. Will review blood glucose log once I receive it.  Will call the nursing home with changes if adjustments need to be made to treatment.  For now continue current treatment.  Try to follow a diabetic diet.    Discussed intensive insulin regimen does increase risk for hypoglycemia. Episodes of hypoglycemia can lead to permanent disability and death.  Discussed risks/complications associated with uncontrolled diabetes.    Advised to adhere to diabetic diet, and recommended staying active/exercising routinely as tolerated  Keep carbohydrates consistent to limit blood glucose fluctuations.  Advised to call if blood sugars less than 70 mg/dl or over 300 mg/dl.   Check blood glucose 3+ times a day  Discussed symptoms and treatment of hypoglycemia.   Recommended routine follow-up with podiatry and ophthalmology.   Send log in 1-2 weeks.    Ordered blood work to complete prior to next visit.  -     Hemoglobin A1C; Future  -     Basic metabolic panel; Future    Essential hypertension  Blood pressure is elevated  Advised to monitor BP and follow-up with PCP if remaining elevated, at or above 140/90  For now continue current treatment  -     Basic metabolic panel; Future    Mixed hyperlipidemia  LDL previously 57  Continue statin therapy   Managed by PCP    Advised patient to follow-up with PCP regarding wheezing.  Per patient this is not a new issue.    Discussed with the patient diagnosis and treatment and all questions fully answered. She will call me if any problems arise.    Counseled patient on diagnostic  results, prognosis, risk and benefit of treatment options, instruction for management, importance of treatment compliance, risk factor reduction and impressions.      Elena Mays PA-C

## 2024-03-11 NOTE — PATIENT INSTRUCTIONS
Hypoglycemia instructions   Jud Walls  3/10/2024  [id number]    Low Blood Sugar    Steps to treat low blood sugar.    1. Test blood sugar if you have symptoms of low blood sugar:   Low Blood Sugar Symptoms:  o Sweaty  o Dizzy  o Rapid heartbeat  o Shaky  o Bad mood  o Hungry      2. Treat blood sugar less than 70 with 15 grams of fast-acting carbohydrate:   Examples of 15 grams Fast-Acting Carbohydrate:  o 4 oz juice  o 4 oz regular soda  o 3-4 glucose tablets (chew)  o 3-4 hard candies (chew)          3.  Wait 15 minutes and test your blood sugar again     4. If blood sugar is less than 100, repeat steps 2-3.    5. When your blood sugar is 100 or more, eat a snack if it will be longer than one hour until your next meal. The snack should be 15 grams of carbohydrate and a protein:   Examples of snacks:  o ½ sandwich  o 6 crackers with cheese  o Piece of fruit with cheese or peanut butter  o 6 crackers with peanut butter

## 2024-03-27 ENCOUNTER — HOSPITAL ENCOUNTER (INPATIENT)
Facility: HOSPITAL | Age: 74
LOS: 8 days | Discharge: NURSING, REHAB OR LONG-TERM CARE | DRG: 329 | End: 2024-04-05
Attending: EMERGENCY MEDICINE | Admitting: SURGERY
Payer: MEDICARE

## 2024-03-27 ENCOUNTER — APPOINTMENT (EMERGENCY)
Dept: CT IMAGING | Facility: HOSPITAL | Age: 74
DRG: 329 | End: 2024-03-27
Payer: MEDICARE

## 2024-03-27 DIAGNOSIS — E78.5 HYPERLIPIDEMIA, UNSPECIFIED HYPERLIPIDEMIA TYPE: Chronic | ICD-10-CM

## 2024-03-27 DIAGNOSIS — K56.2 VOLVULUS OF COLON (HCC): ICD-10-CM

## 2024-03-27 DIAGNOSIS — J96.21 ACUTE ON CHRONIC RESPIRATORY FAILURE WITH HYPOXIA AND HYPERCAPNIA (HCC): ICD-10-CM

## 2024-03-27 DIAGNOSIS — G47.33 OSA (OBSTRUCTIVE SLEEP APNEA): Chronic | ICD-10-CM

## 2024-03-27 DIAGNOSIS — J44.89 ASTHMA-COPD OVERLAP SYNDROME: ICD-10-CM

## 2024-03-27 DIAGNOSIS — J96.22 ACUTE ON CHRONIC RESPIRATORY FAILURE WITH HYPOXIA AND HYPERCAPNIA (HCC): ICD-10-CM

## 2024-03-27 DIAGNOSIS — K56.2 CECAL VOLVULUS (HCC): Primary | ICD-10-CM

## 2024-03-27 LAB
ALBUMIN SERPL BCP-MCNC: 3.8 G/DL (ref 3.5–5)
ALP SERPL-CCNC: 53 U/L (ref 34–104)
ALT SERPL W P-5'-P-CCNC: 12 U/L (ref 7–52)
ANION GAP SERPL CALCULATED.3IONS-SCNC: 7 MMOL/L (ref 4–13)
AST SERPL W P-5'-P-CCNC: 12 U/L (ref 13–39)
BASOPHILS # BLD AUTO: 0.03 THOUSANDS/ÂΜL (ref 0–0.1)
BASOPHILS NFR BLD AUTO: 0 % (ref 0–1)
BILIRUB SERPL-MCNC: 0.79 MG/DL (ref 0.2–1)
BUN SERPL-MCNC: 25 MG/DL (ref 5–25)
CALCIUM SERPL-MCNC: 8.9 MG/DL (ref 8.4–10.2)
CHLORIDE SERPL-SCNC: 98 MMOL/L (ref 96–108)
CO2 SERPL-SCNC: 36 MMOL/L (ref 21–32)
CREAT SERPL-MCNC: 1.43 MG/DL (ref 0.6–1.3)
EOSINOPHIL # BLD AUTO: 0.11 THOUSAND/ÂΜL (ref 0–0.61)
EOSINOPHIL NFR BLD AUTO: 1 % (ref 0–6)
ERYTHROCYTE [DISTWIDTH] IN BLOOD BY AUTOMATED COUNT: 15.8 % (ref 11.6–15.1)
GFR SERPL CREATININE-BSD FRML MDRD: 36 ML/MIN/1.73SQ M
GLUCOSE SERPL-MCNC: 134 MG/DL (ref 65–140)
HCT VFR BLD AUTO: 31.7 % (ref 34.8–46.1)
HGB BLD-MCNC: 10.1 G/DL (ref 11.5–15.4)
IMM GRANULOCYTES # BLD AUTO: 0.12 THOUSAND/UL (ref 0–0.2)
IMM GRANULOCYTES NFR BLD AUTO: 1 % (ref 0–2)
INR PPP: 0.98 (ref 0.84–1.19)
LACTATE SERPL-SCNC: 1 MMOL/L (ref 0.5–2)
LIPASE SERPL-CCNC: 24 U/L (ref 11–82)
LYMPHOCYTES # BLD AUTO: 1.28 THOUSANDS/ÂΜL (ref 0.6–4.47)
LYMPHOCYTES NFR BLD AUTO: 10 % (ref 14–44)
MCH RBC QN AUTO: 29.4 PG (ref 26.8–34.3)
MCHC RBC AUTO-ENTMCNC: 31.9 G/DL (ref 31.4–37.4)
MCV RBC AUTO: 92 FL (ref 82–98)
MONOCYTES # BLD AUTO: 0.91 THOUSAND/ÂΜL (ref 0.17–1.22)
MONOCYTES NFR BLD AUTO: 7 % (ref 4–12)
NEUTROPHILS # BLD AUTO: 10.65 THOUSANDS/ÂΜL (ref 1.85–7.62)
NEUTS SEG NFR BLD AUTO: 81 % (ref 43–75)
NRBC BLD AUTO-RTO: 0 /100 WBCS
PLATELET # BLD AUTO: 167 THOUSANDS/UL (ref 149–390)
PMV BLD AUTO: 10 FL (ref 8.9–12.7)
POTASSIUM SERPL-SCNC: 4.4 MMOL/L (ref 3.5–5.3)
PROT SERPL-MCNC: 7.3 G/DL (ref 6.4–8.4)
PROTHROMBIN TIME: 13.6 SECONDS (ref 11.6–14.5)
RBC # BLD AUTO: 3.43 MILLION/UL (ref 3.81–5.12)
SODIUM SERPL-SCNC: 141 MMOL/L (ref 135–147)
WBC # BLD AUTO: 13.1 THOUSAND/UL (ref 4.31–10.16)

## 2024-03-27 PROCEDURE — 94640 AIRWAY INHALATION TREATMENT: CPT

## 2024-03-27 PROCEDURE — 99285 EMERGENCY DEPT VISIT HI MDM: CPT

## 2024-03-27 PROCEDURE — 85610 PROTHROMBIN TIME: CPT | Performed by: EMERGENCY MEDICINE

## 2024-03-27 PROCEDURE — 96374 THER/PROPH/DIAG INJ IV PUSH: CPT

## 2024-03-27 PROCEDURE — 83605 ASSAY OF LACTIC ACID: CPT | Performed by: EMERGENCY MEDICINE

## 2024-03-27 PROCEDURE — 36415 COLL VENOUS BLD VENIPUNCTURE: CPT | Performed by: EMERGENCY MEDICINE

## 2024-03-27 PROCEDURE — 85025 COMPLETE CBC W/AUTO DIFF WBC: CPT | Performed by: EMERGENCY MEDICINE

## 2024-03-27 PROCEDURE — 83690 ASSAY OF LIPASE: CPT | Performed by: EMERGENCY MEDICINE

## 2024-03-27 PROCEDURE — 80053 COMPREHEN METABOLIC PANEL: CPT | Performed by: EMERGENCY MEDICINE

## 2024-03-27 PROCEDURE — 99285 EMERGENCY DEPT VISIT HI MDM: CPT | Performed by: EMERGENCY MEDICINE

## 2024-03-27 PROCEDURE — 96361 HYDRATE IV INFUSION ADD-ON: CPT

## 2024-03-27 PROCEDURE — 96375 TX/PRO/DX INJ NEW DRUG ADDON: CPT

## 2024-03-27 RX ORDER — FENTANYL CITRATE 50 UG/ML
50 INJECTION, SOLUTION INTRAMUSCULAR; INTRAVENOUS ONCE
Status: COMPLETED | OUTPATIENT
Start: 2024-03-27 | End: 2024-03-27

## 2024-03-27 RX ORDER — ALBUTEROL SULFATE 2.5 MG/3ML
2.5 SOLUTION RESPIRATORY (INHALATION) ONCE
Status: COMPLETED | OUTPATIENT
Start: 2024-03-27 | End: 2024-03-27

## 2024-03-27 RX ORDER — ONDANSETRON 2 MG/ML
4 INJECTION INTRAMUSCULAR; INTRAVENOUS ONCE
Status: COMPLETED | OUTPATIENT
Start: 2024-03-27 | End: 2024-03-27

## 2024-03-27 RX ADMIN — SODIUM CHLORIDE 500 ML: 0.9 INJECTION, SOLUTION INTRAVENOUS at 23:35

## 2024-03-27 RX ADMIN — FENTANYL CITRATE 50 MCG: 50 INJECTION INTRAMUSCULAR; INTRAVENOUS at 23:29

## 2024-03-27 RX ADMIN — ONDANSETRON 4 MG: 2 INJECTION INTRAMUSCULAR; INTRAVENOUS at 23:27

## 2024-03-27 RX ADMIN — ALBUTEROL SULFATE 2.5 MG: 2.5 SOLUTION RESPIRATORY (INHALATION) at 23:32

## 2024-03-27 RX ADMIN — IOHEXOL 50 ML: 240 INJECTION, SOLUTION INTRATHECAL; INTRAVASCULAR; INTRAVENOUS; ORAL at 23:59

## 2024-03-28 ENCOUNTER — ANESTHESIA EVENT (EMERGENCY)
Dept: PERIOP | Facility: HOSPITAL | Age: 74
DRG: 329 | End: 2024-03-28
Payer: MEDICARE

## 2024-03-28 ENCOUNTER — APPOINTMENT (INPATIENT)
Dept: RADIOLOGY | Facility: HOSPITAL | Age: 74
DRG: 329 | End: 2024-03-28
Payer: MEDICARE

## 2024-03-28 ENCOUNTER — ANESTHESIA (EMERGENCY)
Dept: PERIOP | Facility: HOSPITAL | Age: 74
DRG: 329 | End: 2024-03-28
Payer: MEDICARE

## 2024-03-28 ENCOUNTER — APPOINTMENT (EMERGENCY)
Dept: CT IMAGING | Facility: HOSPITAL | Age: 74
DRG: 329 | End: 2024-03-28
Payer: MEDICARE

## 2024-03-28 PROBLEM — N18.32 STAGE 3B CHRONIC KIDNEY DISEASE (HCC): Status: ACTIVE | Noted: 2024-03-28

## 2024-03-28 PROBLEM — K46.0 INCARCERATED HERNIA: Status: ACTIVE | Noted: 2024-03-28

## 2024-03-28 PROBLEM — K56.2 CECAL VOLVULUS (HCC): Status: ACTIVE | Noted: 2024-03-28

## 2024-03-28 LAB
ABO GROUP BLD: NORMAL
ABO GROUP BLD: NORMAL
ALBUMIN SERPL BCP-MCNC: 3.7 G/DL (ref 3.5–5)
ALP SERPL-CCNC: 47 U/L (ref 34–104)
ALT SERPL W P-5'-P-CCNC: 10 U/L (ref 7–52)
ANION GAP SERPL CALCULATED.3IONS-SCNC: 6 MMOL/L (ref 4–13)
AST SERPL W P-5'-P-CCNC: 9 U/L (ref 13–39)
BASE EXCESS BLDA CALC-SCNC: 6 MMOL/L (ref -2–3)
BASE EXCESS BLDA CALC-SCNC: 9 MMOL/L (ref -2–3)
BASOPHILS # BLD AUTO: 0.03 THOUSANDS/ÂΜL (ref 0–0.1)
BASOPHILS NFR BLD AUTO: 0 % (ref 0–1)
BILIRUB SERPL-MCNC: 0.58 MG/DL (ref 0.2–1)
BLD GP AB SCN SERPL QL: NEGATIVE
BUN SERPL-MCNC: 24 MG/DL (ref 5–25)
CA-I BLD-SCNC: 1.06 MMOL/L (ref 1.12–1.32)
CA-I BLD-SCNC: 1.09 MMOL/L (ref 1.12–1.32)
CA-I BLD-SCNC: 1.2 MMOL/L (ref 1.12–1.32)
CALCIUM SERPL-MCNC: 8 MG/DL (ref 8.4–10.2)
CHLORIDE SERPL-SCNC: 99 MMOL/L (ref 96–108)
CO2 SERPL-SCNC: 34 MMOL/L (ref 21–32)
CREAT SERPL-MCNC: 1.56 MG/DL (ref 0.6–1.3)
EOSINOPHIL # BLD AUTO: 0.03 THOUSAND/ÂΜL (ref 0–0.61)
EOSINOPHIL NFR BLD AUTO: 0 % (ref 0–6)
ERYTHROCYTE [DISTWIDTH] IN BLOOD BY AUTOMATED COUNT: 16.2 % (ref 11.6–15.1)
GFR SERPL CREATININE-BSD FRML MDRD: 32 ML/MIN/1.73SQ M
GLUCOSE SERPL-MCNC: 120 MG/DL (ref 65–140)
GLUCOSE SERPL-MCNC: 143 MG/DL (ref 65–140)
GLUCOSE SERPL-MCNC: 166 MG/DL (ref 65–140)
GLUCOSE SERPL-MCNC: 197 MG/DL (ref 65–140)
GLUCOSE SERPL-MCNC: 237 MG/DL (ref 65–140)
GLUCOSE SERPL-MCNC: 240 MG/DL (ref 65–140)
GLUCOSE SERPL-MCNC: 291 MG/DL (ref 65–140)
GLUCOSE SERPL-MCNC: 292 MG/DL (ref 65–140)
GLUCOSE SERPL-MCNC: 303 MG/DL (ref 65–140)
GLUCOSE SERPL-MCNC: 323 MG/DL (ref 65–140)
GLUCOSE SERPL-MCNC: 328 MG/DL (ref 65–140)
GLUCOSE SERPL-MCNC: 331 MG/DL (ref 65–140)
GLUCOSE SERPL-MCNC: 332 MG/DL (ref 65–140)
HCO3 BLDA-SCNC: 34.4 MMOL/L (ref 24–30)
HCO3 BLDA-SCNC: 36 MMOL/L (ref 24–30)
HCT VFR BLD AUTO: 29 % (ref 34.8–46.1)
HCT VFR BLD CALC: 24 % (ref 34.8–46.1)
HCT VFR BLD CALC: 44 % (ref 34.8–46.1)
HGB BLD-MCNC: 9.1 G/DL (ref 11.5–15.4)
HGB BLDA-MCNC: 15 G/DL (ref 11.5–15.4)
HGB BLDA-MCNC: 8.2 G/DL (ref 11.5–15.4)
IMM GRANULOCYTES # BLD AUTO: 0.1 THOUSAND/UL (ref 0–0.2)
IMM GRANULOCYTES NFR BLD AUTO: 1 % (ref 0–2)
LYMPHOCYTES # BLD AUTO: 0.6 THOUSANDS/ÂΜL (ref 0.6–4.47)
LYMPHOCYTES NFR BLD AUTO: 4 % (ref 14–44)
MAGNESIUM SERPL-MCNC: 2.1 MG/DL (ref 1.9–2.7)
MCH RBC QN AUTO: 29.6 PG (ref 26.8–34.3)
MCHC RBC AUTO-ENTMCNC: 31.4 G/DL (ref 31.4–37.4)
MCV RBC AUTO: 95 FL (ref 82–98)
MONOCYTES # BLD AUTO: 0.84 THOUSAND/ÂΜL (ref 0.17–1.22)
MONOCYTES NFR BLD AUTO: 6 % (ref 4–12)
NEUTROPHILS # BLD AUTO: 11.92 THOUSANDS/ÂΜL (ref 1.85–7.62)
NEUTS SEG NFR BLD AUTO: 89 % (ref 43–75)
NRBC BLD AUTO-RTO: 0 /100 WBCS
PCO2 BLD: 36 MMOL/L (ref 21–32)
PCO2 BLD: 38 MMOL/L (ref 21–32)
PCO2 BLD: 52.7 MM HG (ref 42–50)
PCO2 BLD: 73.6 MM HG (ref 42–50)
PH BLD: 7.3 [PH] (ref 7.3–7.4)
PH BLD: 7.42 [PH] (ref 7.3–7.4)
PHOSPHATE SERPL-MCNC: 4.3 MG/DL (ref 2.3–4.1)
PLATELET # BLD AUTO: 138 THOUSANDS/UL (ref 149–390)
PLATELET # BLD AUTO: 141 THOUSANDS/UL (ref 149–390)
PMV BLD AUTO: 9.5 FL (ref 8.9–12.7)
PMV BLD AUTO: 9.6 FL (ref 8.9–12.7)
PO2 BLD: 48 MM HG (ref 35–45)
PO2 BLD: 65 MM HG (ref 35–45)
POTASSIUM BLD-SCNC: 4.2 MMOL/L (ref 3.5–5.3)
POTASSIUM BLD-SCNC: 5.1 MMOL/L (ref 3.5–5.3)
POTASSIUM SERPL-SCNC: 4.2 MMOL/L (ref 3.5–5.3)
PROT SERPL-MCNC: 6.6 G/DL (ref 6.4–8.4)
RBC # BLD AUTO: 3.07 MILLION/UL (ref 3.81–5.12)
RH BLD: NEGATIVE
RH BLD: NEGATIVE
SAO2 % BLD FROM PO2: 83 % (ref 60–85)
SAO2 % BLD FROM PO2: 89 % (ref 60–85)
SODIUM BLD-SCNC: 137 MMOL/L (ref 136–145)
SODIUM BLD-SCNC: 138 MMOL/L (ref 136–145)
SODIUM SERPL-SCNC: 139 MMOL/L (ref 135–147)
SPECIMEN EXPIRATION DATE: NORMAL
SPECIMEN SOURCE: ABNORMAL
SPECIMEN SOURCE: ABNORMAL
WBC # BLD AUTO: 13.52 THOUSAND/UL (ref 4.31–10.16)

## 2024-03-28 PROCEDURE — C9113 INJ PANTOPRAZOLE SODIUM, VIA: HCPCS | Performed by: PHYSICIAN ASSISTANT

## 2024-03-28 PROCEDURE — 82947 ASSAY GLUCOSE BLOOD QUANT: CPT

## 2024-03-28 PROCEDURE — 85025 COMPLETE CBC W/AUTO DIFF WBC: CPT | Performed by: PHYSICIAN ASSISTANT

## 2024-03-28 PROCEDURE — 74177 CT ABD & PELVIS W/CONTRAST: CPT

## 2024-03-28 PROCEDURE — 94640 AIRWAY INHALATION TREATMENT: CPT

## 2024-03-28 PROCEDURE — 85014 HEMATOCRIT: CPT

## 2024-03-28 PROCEDURE — 84100 ASSAY OF PHOSPHORUS: CPT | Performed by: PHYSICIAN ASSISTANT

## 2024-03-28 PROCEDURE — 84132 ASSAY OF SERUM POTASSIUM: CPT

## 2024-03-28 PROCEDURE — 82330 ASSAY OF CALCIUM: CPT

## 2024-03-28 PROCEDURE — 71045 X-RAY EXAM CHEST 1 VIEW: CPT

## 2024-03-28 PROCEDURE — 82948 REAGENT STRIP/BLOOD GLUCOSE: CPT

## 2024-03-28 PROCEDURE — 88307 TISSUE EXAM BY PATHOLOGIST: CPT | Performed by: PATHOLOGY

## 2024-03-28 PROCEDURE — NC001 PR NO CHARGE: Performed by: PHYSICIAN ASSISTANT

## 2024-03-28 PROCEDURE — 86901 BLOOD TYPING SEROLOGIC RH(D): CPT | Performed by: NURSE ANESTHETIST, CERTIFIED REGISTERED

## 2024-03-28 PROCEDURE — 85049 AUTOMATED PLATELET COUNT: CPT | Performed by: SURGERY

## 2024-03-28 PROCEDURE — 94760 N-INVAS EAR/PLS OXIMETRY 1: CPT

## 2024-03-28 PROCEDURE — 97163 PT EVAL HIGH COMPLEX 45 MIN: CPT

## 2024-03-28 PROCEDURE — 97167 OT EVAL HIGH COMPLEX 60 MIN: CPT

## 2024-03-28 PROCEDURE — 83735 ASSAY OF MAGNESIUM: CPT | Performed by: PHYSICIAN ASSISTANT

## 2024-03-28 PROCEDURE — 86850 RBC ANTIBODY SCREEN: CPT | Performed by: NURSE ANESTHETIST, CERTIFIED REGISTERED

## 2024-03-28 PROCEDURE — 0WQF0ZZ REPAIR ABDOMINAL WALL, OPEN APPROACH: ICD-10-PCS | Performed by: SURGERY

## 2024-03-28 PROCEDURE — 82330 ASSAY OF CALCIUM: CPT | Performed by: PHYSICIAN ASSISTANT

## 2024-03-28 PROCEDURE — 96361 HYDRATE IV INFUSION ADD-ON: CPT

## 2024-03-28 PROCEDURE — 96376 TX/PRO/DX INJ SAME DRUG ADON: CPT

## 2024-03-28 PROCEDURE — 80053 COMPREHEN METABOLIC PANEL: CPT | Performed by: PHYSICIAN ASSISTANT

## 2024-03-28 PROCEDURE — 96375 TX/PRO/DX INJ NEW DRUG ADDON: CPT

## 2024-03-28 PROCEDURE — 82803 BLOOD GASES ANY COMBINATION: CPT

## 2024-03-28 PROCEDURE — 0DTF0ZZ RESECTION OF RIGHT LARGE INTESTINE, OPEN APPROACH: ICD-10-PCS | Performed by: SURGERY

## 2024-03-28 PROCEDURE — 84295 ASSAY OF SERUM SODIUM: CPT

## 2024-03-28 PROCEDURE — 86900 BLOOD TYPING SEROLOGIC ABO: CPT | Performed by: NURSE ANESTHETIST, CERTIFIED REGISTERED

## 2024-03-28 RX ORDER — DEXTROSE AND SODIUM CHLORIDE 5; .9 G/100ML; G/100ML
125 INJECTION, SOLUTION INTRAVENOUS CONTINUOUS
Status: DISCONTINUED | OUTPATIENT
Start: 2024-03-28 | End: 2024-03-28

## 2024-03-28 RX ORDER — ALBUTEROL SULFATE 2.5 MG/3ML
2.5 SOLUTION RESPIRATORY (INHALATION) ONCE AS NEEDED
Status: DISCONTINUED | OUTPATIENT
Start: 2024-03-28 | End: 2024-03-28 | Stop reason: HOSPADM

## 2024-03-28 RX ORDER — CEFAZOLIN SODIUM 2 G/50ML
2000 SOLUTION INTRAVENOUS EVERY 8 HOURS
Qty: 100 ML | Refills: 0 | Status: COMPLETED | OUTPATIENT
Start: 2024-03-28 | End: 2024-03-28

## 2024-03-28 RX ORDER — DROPERIDOL 2.5 MG/ML
0.62 INJECTION, SOLUTION INTRAMUSCULAR; INTRAVENOUS ONCE
Status: COMPLETED | OUTPATIENT
Start: 2024-03-28 | End: 2024-03-28

## 2024-03-28 RX ORDER — ONDANSETRON 2 MG/ML
INJECTION INTRAMUSCULAR; INTRAVENOUS AS NEEDED
Status: DISCONTINUED | OUTPATIENT
Start: 2024-03-28 | End: 2024-03-28

## 2024-03-28 RX ORDER — FENTANYL CITRATE/PF 50 MCG/ML
25 SYRINGE (ML) INJECTION
Status: DISCONTINUED | OUTPATIENT
Start: 2024-03-28 | End: 2024-03-28 | Stop reason: HOSPADM

## 2024-03-28 RX ORDER — METRONIDAZOLE 500 MG/100ML
INJECTION, SOLUTION INTRAVENOUS CONTINUOUS PRN
Status: DISCONTINUED | OUTPATIENT
Start: 2024-03-28 | End: 2024-03-28

## 2024-03-28 RX ORDER — HYDROMORPHONE HCL/PF 1 MG/ML
0.5 SYRINGE (ML) INJECTION
Status: DISCONTINUED | OUTPATIENT
Start: 2024-03-28 | End: 2024-03-28 | Stop reason: HOSPADM

## 2024-03-28 RX ORDER — LEVALBUTEROL INHALATION SOLUTION 1.25 MG/3ML
1.25 SOLUTION RESPIRATORY (INHALATION) EVERY 4 HOURS PRN
Status: DISCONTINUED | OUTPATIENT
Start: 2024-03-28 | End: 2024-04-05 | Stop reason: HOSPADM

## 2024-03-28 RX ORDER — HEPARIN SODIUM 5000 [USP'U]/ML
7500 INJECTION, SOLUTION INTRAVENOUS; SUBCUTANEOUS EVERY 8 HOURS SCHEDULED
Status: DISCONTINUED | OUTPATIENT
Start: 2024-03-28 | End: 2024-04-05 | Stop reason: HOSPADM

## 2024-03-28 RX ORDER — DIPHENHYDRAMINE HYDROCHLORIDE 50 MG/ML
12.5 INJECTION INTRAMUSCULAR; INTRAVENOUS ONCE AS NEEDED
Status: COMPLETED | OUTPATIENT
Start: 2024-03-28 | End: 2024-03-28

## 2024-03-28 RX ORDER — SODIUM CHLORIDE 9 MG/ML
INJECTION, SOLUTION INTRAVENOUS CONTINUOUS PRN
Status: DISCONTINUED | OUTPATIENT
Start: 2024-03-28 | End: 2024-03-28

## 2024-03-28 RX ORDER — LIDOCAINE HYDROCHLORIDE 10 MG/ML
INJECTION, SOLUTION EPIDURAL; INFILTRATION; INTRACAUDAL; PERINEURAL AS NEEDED
Status: DISCONTINUED | OUTPATIENT
Start: 2024-03-28 | End: 2024-03-28

## 2024-03-28 RX ORDER — SODIUM CHLORIDE, SODIUM GLUCONATE, SODIUM ACETATE, POTASSIUM CHLORIDE, MAGNESIUM CHLORIDE, SODIUM PHOSPHATE, DIBASIC, AND POTASSIUM PHOSPHATE .53; .5; .37; .037; .03; .012; .00082 G/100ML; G/100ML; G/100ML; G/100ML; G/100ML; G/100ML; G/100ML
50 INJECTION, SOLUTION INTRAVENOUS CONTINUOUS
Status: DISCONTINUED | OUTPATIENT
Start: 2024-03-28 | End: 2024-03-29

## 2024-03-28 RX ORDER — IPRATROPIUM BROMIDE AND ALBUTEROL SULFATE 2.5; .5 MG/3ML; MG/3ML
3 SOLUTION RESPIRATORY (INHALATION)
Status: DISCONTINUED | OUTPATIENT
Start: 2024-03-28 | End: 2024-03-28

## 2024-03-28 RX ORDER — ONDANSETRON 2 MG/ML
4 INJECTION INTRAMUSCULAR; INTRAVENOUS ONCE
Status: COMPLETED | OUTPATIENT
Start: 2024-03-28 | End: 2024-03-28

## 2024-03-28 RX ORDER — PHENYLEPHRINE HCL IN 0.9% NACL 1 MG/10 ML
SYRINGE (ML) INTRAVENOUS AS NEEDED
Status: DISCONTINUED | OUTPATIENT
Start: 2024-03-28 | End: 2024-03-28

## 2024-03-28 RX ORDER — PANTOPRAZOLE SODIUM 40 MG/10ML
40 INJECTION, POWDER, LYOPHILIZED, FOR SOLUTION INTRAVENOUS
Status: DISCONTINUED | OUTPATIENT
Start: 2024-03-28 | End: 2024-03-31

## 2024-03-28 RX ORDER — PROPOFOL 10 MG/ML
INJECTION, EMULSION INTRAVENOUS AS NEEDED
Status: DISCONTINUED | OUTPATIENT
Start: 2024-03-28 | End: 2024-03-28

## 2024-03-28 RX ORDER — ROCURONIUM BROMIDE 10 MG/ML
INJECTION, SOLUTION INTRAVENOUS AS NEEDED
Status: DISCONTINUED | OUTPATIENT
Start: 2024-03-28 | End: 2024-03-28

## 2024-03-28 RX ORDER — ONDANSETRON 2 MG/ML
4 INJECTION INTRAMUSCULAR; INTRAVENOUS ONCE AS NEEDED
Status: COMPLETED | OUTPATIENT
Start: 2024-03-28 | End: 2024-03-28

## 2024-03-28 RX ORDER — FLUTICASONE FUROATE AND VILANTEROL 200; 25 UG/1; UG/1
1 POWDER RESPIRATORY (INHALATION)
Status: DISCONTINUED | OUTPATIENT
Start: 2024-03-28 | End: 2024-04-05 | Stop reason: HOSPADM

## 2024-03-28 RX ORDER — SODIUM CHLORIDE, SODIUM LACTATE, POTASSIUM CHLORIDE, CALCIUM CHLORIDE 600; 310; 30; 20 MG/100ML; MG/100ML; MG/100ML; MG/100ML
INJECTION, SOLUTION INTRAVENOUS CONTINUOUS PRN
Status: DISCONTINUED | OUTPATIENT
Start: 2024-03-28 | End: 2024-03-28

## 2024-03-28 RX ORDER — LEVALBUTEROL INHALATION SOLUTION 1.25 MG/3ML
1.25 SOLUTION RESPIRATORY (INHALATION)
Status: DISCONTINUED | OUTPATIENT
Start: 2024-03-28 | End: 2024-04-05 | Stop reason: HOSPADM

## 2024-03-28 RX ORDER — CHLORHEXIDINE GLUCONATE ORAL RINSE 1.2 MG/ML
15 SOLUTION DENTAL EVERY 12 HOURS SCHEDULED
Status: DISCONTINUED | OUTPATIENT
Start: 2024-03-28 | End: 2024-04-05 | Stop reason: HOSPADM

## 2024-03-28 RX ORDER — METRONIDAZOLE 500 MG/100ML
500 INJECTION, SOLUTION INTRAVENOUS EVERY 8 HOURS
Qty: 200 ML | Refills: 0 | Status: COMPLETED | OUTPATIENT
Start: 2024-03-28 | End: 2024-03-29

## 2024-03-28 RX ORDER — NALOXONE HYDROCHLORIDE 0.4 MG/ML
0.1 INJECTION, SOLUTION INTRAMUSCULAR; INTRAVENOUS; SUBCUTANEOUS
Status: DISCONTINUED | OUTPATIENT
Start: 2024-03-28 | End: 2024-04-05 | Stop reason: HOSPADM

## 2024-03-28 RX ORDER — FENTANYL CITRATE 50 UG/ML
INJECTION, SOLUTION INTRAMUSCULAR; INTRAVENOUS AS NEEDED
Status: DISCONTINUED | OUTPATIENT
Start: 2024-03-28 | End: 2024-03-28

## 2024-03-28 RX ORDER — ALBUMIN, HUMAN INJ 5% 5 %
SOLUTION INTRAVENOUS CONTINUOUS PRN
Status: DISCONTINUED | OUTPATIENT
Start: 2024-03-28 | End: 2024-03-28

## 2024-03-28 RX ORDER — INSULIN LISPRO 100 [IU]/ML
1-6 INJECTION, SOLUTION INTRAVENOUS; SUBCUTANEOUS EVERY 6 HOURS SCHEDULED
Status: DISCONTINUED | OUTPATIENT
Start: 2024-03-28 | End: 2024-03-28

## 2024-03-28 RX ORDER — CEFAZOLIN SODIUM 1 G/3ML
INJECTION, POWDER, FOR SOLUTION INTRAMUSCULAR; INTRAVENOUS AS NEEDED
Status: DISCONTINUED | OUTPATIENT
Start: 2024-03-28 | End: 2024-03-28

## 2024-03-28 RX ORDER — ONDANSETRON 2 MG/ML
4 INJECTION INTRAMUSCULAR; INTRAVENOUS EVERY 6 HOURS PRN
Status: DISCONTINUED | OUTPATIENT
Start: 2024-03-28 | End: 2024-04-05 | Stop reason: HOSPADM

## 2024-03-28 RX ADMIN — Medication: at 08:40

## 2024-03-28 RX ADMIN — FENTANYL CITRATE 50 MCG: 50 INJECTION INTRAMUSCULAR; INTRAVENOUS at 06:50

## 2024-03-28 RX ADMIN — ONDANSETRON 4 MG: 2 INJECTION INTRAMUSCULAR; INTRAVENOUS at 07:54

## 2024-03-28 RX ADMIN — ONDANSETRON 4 MG: 2 INJECTION INTRAMUSCULAR; INTRAVENOUS at 23:17

## 2024-03-28 RX ADMIN — METRONIDAZOLE: 500 INJECTION, SOLUTION INTRAVENOUS at 05:37

## 2024-03-28 RX ADMIN — SODIUM CHLORIDE, SODIUM LACTATE, POTASSIUM CHLORIDE, AND CALCIUM CHLORIDE: .6; .31; .03; .02 INJECTION, SOLUTION INTRAVENOUS at 05:07

## 2024-03-28 RX ADMIN — DROPERIDOL 0.62 MG: 2.5 INJECTION, SOLUTION INTRAMUSCULAR; INTRAVENOUS at 03:45

## 2024-03-28 RX ADMIN — CEFAZOLIN SODIUM 2000 MG: 2 SOLUTION INTRAVENOUS at 15:05

## 2024-03-28 RX ADMIN — CEFAZOLIN 2000 MG: 1 INJECTION, POWDER, FOR SOLUTION INTRAMUSCULAR; INTRAVENOUS at 05:37

## 2024-03-28 RX ADMIN — HEPARIN SODIUM 7500 UNITS: 5000 INJECTION INTRAVENOUS; SUBCUTANEOUS at 21:32

## 2024-03-28 RX ADMIN — PROPOFOL 100 MG: 10 INJECTION, EMULSION INTRAVENOUS at 05:18

## 2024-03-28 RX ADMIN — INSULIN HUMAN 5 UNITS: 100 INJECTION, SOLUTION PARENTERAL at 06:15

## 2024-03-28 RX ADMIN — Medication 200 MCG: at 05:29

## 2024-03-28 RX ADMIN — HEPARIN SODIUM 7500 UNITS: 5000 INJECTION INTRAVENOUS; SUBCUTANEOUS at 15:10

## 2024-03-28 RX ADMIN — INSULIN HUMAN 5 UNITS: 100 INJECTION, SOLUTION PARENTERAL at 07:50

## 2024-03-28 RX ADMIN — DIPHENHYDRAMINE HYDROCHLORIDE 12.5 MG: 50 INJECTION, SOLUTION INTRAMUSCULAR; INTRAVENOUS at 08:04

## 2024-03-28 RX ADMIN — ONDANSETRON 4 MG: 2 INJECTION INTRAMUSCULAR; INTRAVENOUS at 05:59

## 2024-03-28 RX ADMIN — CEFAZOLIN SODIUM 2000 MG: 2 SOLUTION INTRAVENOUS at 21:32

## 2024-03-28 RX ADMIN — METRONIDAZOLE 500 MG: 500 INJECTION, SOLUTION INTRAVENOUS at 21:34

## 2024-03-28 RX ADMIN — DEXTROSE AND SODIUM CHLORIDE 125 ML/HR: 5; .9 INJECTION, SOLUTION INTRAVENOUS at 03:48

## 2024-03-28 RX ADMIN — ONDANSETRON 4 MG: 2 INJECTION INTRAMUSCULAR; INTRAVENOUS at 02:58

## 2024-03-28 RX ADMIN — SODIUM CHLORIDE: 9 INJECTION, SOLUTION INTRAVENOUS at 05:30

## 2024-03-28 RX ADMIN — IPRATROPIUM BROMIDE AND ALBUTEROL SULFATE 3 ML: 2.5; .5 SOLUTION RESPIRATORY (INHALATION) at 07:48

## 2024-03-28 RX ADMIN — IPRATROPIUM BROMIDE 0.5 MG: 0.5 SOLUTION RESPIRATORY (INHALATION) at 20:01

## 2024-03-28 RX ADMIN — SODIUM CHLORIDE 6 UNITS/HR: 9 INJECTION, SOLUTION INTRAVENOUS at 11:36

## 2024-03-28 RX ADMIN — PANTOPRAZOLE SODIUM 40 MG: 40 INJECTION, POWDER, FOR SOLUTION INTRAVENOUS at 09:57

## 2024-03-28 RX ADMIN — IOHEXOL 100 ML: 350 INJECTION, SOLUTION INTRAVENOUS at 01:28

## 2024-03-28 RX ADMIN — ROCURONIUM BROMIDE 20 MG: 10 INJECTION, SOLUTION INTRAVENOUS at 06:08

## 2024-03-28 RX ADMIN — LIDOCAINE HYDROCHLORIDE 50 MG: 10 INJECTION, SOLUTION EPIDURAL; INFILTRATION; INTRACAUDAL; PERINEURAL at 05:18

## 2024-03-28 RX ADMIN — IPRATROPIUM BROMIDE AND ALBUTEROL SULFATE 3 ML: 2.5; .5 SOLUTION RESPIRATORY (INHALATION) at 14:07

## 2024-03-28 RX ADMIN — SUGAMMADEX 200 MG: 100 INJECTION, SOLUTION INTRAVENOUS at 06:44

## 2024-03-28 RX ADMIN — LEVALBUTEROL HYDROCHLORIDE 1.25 MG: 1.25 SOLUTION RESPIRATORY (INHALATION) at 20:01

## 2024-03-28 RX ADMIN — CHLORHEXIDINE GLUCONATE 15 ML: 1.2 SOLUTION ORAL at 09:58

## 2024-03-28 RX ADMIN — ALBUMIN (HUMAN): 12.5 INJECTION, SOLUTION INTRAVENOUS at 05:44

## 2024-03-28 RX ADMIN — CHLORHEXIDINE GLUCONATE 15 ML: 1.2 SOLUTION ORAL at 21:32

## 2024-03-28 RX ADMIN — METRONIDAZOLE 500 MG: 500 INJECTION, SOLUTION INTRAVENOUS at 15:50

## 2024-03-28 RX ADMIN — SODIUM CHLORIDE, SODIUM GLUCONATE, SODIUM ACETATE, POTASSIUM CHLORIDE, MAGNESIUM CHLORIDE, SODIUM PHOSPHATE, DIBASIC, AND POTASSIUM PHOSPHATE 125 ML/HR: .53; .5; .37; .037; .03; .012; .00082 INJECTION, SOLUTION INTRAVENOUS at 09:38

## 2024-03-28 RX ADMIN — Medication 100 MCG: at 06:44

## 2024-03-28 RX ADMIN — FENTANYL CITRATE 50 MCG: 50 INJECTION INTRAMUSCULAR; INTRAVENOUS at 05:55

## 2024-03-28 RX ADMIN — ROCURONIUM BROMIDE 50 MG: 10 INJECTION, SOLUTION INTRAVENOUS at 05:18

## 2024-03-28 RX ADMIN — FENTANYL CITRATE 100 MCG: 50 INJECTION INTRAMUSCULAR; INTRAVENOUS at 05:18

## 2024-03-28 RX ADMIN — ALBUMIN (HUMAN): 12.5 INJECTION, SOLUTION INTRAVENOUS at 05:33

## 2024-03-28 RX ADMIN — PHENYLEPHRINE HYDROCHLORIDE 50 MCG/MIN: 50 INJECTION INTRAVENOUS at 05:21

## 2024-03-28 NOTE — PLAN OF CARE
Problem: PHYSICAL THERAPY ADULT  Goal: Performs mobility at highest level of function for planned discharge setting.  See evaluation for individualized goals.  Description: Treatment/Interventions: Functional transfer training, LE strengthening/ROM, Elevations, Therapeutic exercise, Cognitive reorientation, Equipment eval/education, Patient/family training, Bed mobility, Spoke to nursing, Spoke to case management, OT          See flowsheet documentation for full assessment, interventions and recommendations.  Note: Prognosis: Fair  Problem List: Decreased strength, Decreased endurance, Impaired balance, Decreased mobility, Decreased cognition, Impaired judgement, Decreased safety awareness, Obesity, Decreased skin integrity, Pain  Assessment: Pt is a 73 y.o. female seen for PT evaluation s/p admit to [facility] on 3/27/2024. Pt was admitted with a primary dx of: cecal volvulus.  PT now consulted for assessment of mobility and d/c needs. Pt with OOB to chair orders.  Pts current comorbidities and personal factors effecting treatment include: BMI, alzheimer's disease,HLD, HTN, DM, CKD . Pts current clinical presentation is Unstable/Unpredictable (high complexity) due to Ongoing medical management for primary dx, Increased reliance on more restrictive AD compared to baseline, Decreased activity tolerance compared to baseline, Fall risk, Cog status, s/p surgical intervention. Prior to admission, pt was W/C level. Upon evaluation, pt currently is requiring MaxA x2 for bed mobility; MaxA x2 for transfers Pt presents at PT eval functioning below baseline and currently w/ overall mobility deficits 2* to: BLE weakness, impaired balance, pain, decreased activity tolerance compared to baseline, decreased functional mobility tolerance compared to baseline, decreased safety awareness, impaired judgement, fall risk, decreased skin integrity, decreased cognition. Pt currently at a fall risk 2* to impairments listed  above.  Pt will continue to benefit from skilled acute PT interventions to address stated impairments; to maximize functional mobility; for ongoing pt/ family training; and DME needs. At conclusion of PT session pt returned BTB, bed alarm engaged, all needs in reach, and RN notified of session findings/recommendations with phone and call bell within reach. Pt denies any further questions at this time. Recommend Level II (Moderate Resource Intensity)  upon hospital D/C.        Rehab Resource Intensity Level, PT: II (Moderate Resource Intensity)    See flowsheet documentation for full assessment.

## 2024-03-28 NOTE — ANESTHESIA POSTPROCEDURE EVALUATION
Post-Op Assessment Note    CV Status:  Stable    Pain management: adequate       Hydration Status:  Stable   Airway Patency:  Patent     Post Op Vitals Reviewed: Yes    No anethesia notable event occurred.    Staff: Anesthesiologist               BP   106/49   Temp   97.4   Pulse  87   Resp   10   SpO2   100

## 2024-03-28 NOTE — ED NOTES
Patient sleeping on hospital stretcher with HOB elevated in negative distress. Respirations regular and non-labored. VSS. Will continue to monitor.     Earline Adams RN  03/28/24 [id number]

## 2024-03-28 NOTE — CONSULTS
Critical access hospital  Consult  Name: Jud Walls 73 y.o. female I MRN: [id number]  Unit/Bed#: ICU 13 I Date of Admission: 3/27/2024   Date of Service: 3/28/2024 I Hospital Day: 0    Consults    Assessment/Plan   * Cecal volvulus (HCC)  Assessment & Plan  CT Abd/Pelvis 3/28: 2 focal areas of large bowel volvulus involving the cecum/proximal ascending colon as well as a loop of mid transverse colon due to entrapment within the incarcerated ventral lower abdominal hernia sac as discussed above. Surrounding mesenteric inflammatory edema and reactive ascites within the hernia sac as well as adjacent to markedly distended colon loops proximal to the hernia sac are seen. No gross free air or pneumatosis intestinalis. Emergent surgical consultation recommended. Secondary appendicitis is also seen. Additional ancillary findings detailed above.   S/p Ex-lap, extended R hemicolectomy, ventral hernia repair 3/28 POD 0  Plan:   Continue NPO; NG to NCIS  Serial abdominal exams  Continue cefazolin/flagyl  Surgery following    Incarcerated hernia  Assessment & Plan  CT Abd/Pelvis 3/28: 2 focal areas of large bowel volvulus involving the cecum/proximal ascending colon as well as a loop of mid transverse colon due to entrapment within the incarcerated ventral lower abdominal hernia sac as discussed above. Surrounding mesenteric inflammatory edema and reactive ascites within the hernia sac as well as adjacent to markedly distended colon loops proximal to the hernia sac are seen. No gross free air or pneumatosis intestinalis. Emergent surgical consultation recommended. Secondary appendicitis is also seen. Additional ancillary findings detailed above.   S/p Ex-lap, extended R hemicolectomy, ventral hernia repair 3/28 POD 0  Plan:  Serial abdominal checks  NPO, NG to low continuous suction.     Chronic respiratory failure with hypoxia and hypercapnea (HCC)  Assessment & Plan  Baseline COPD/GILDARDO/OHS. On chronic 2L  home O2. Nonadherant to nocturnal bipap therapy  F/w [facility]; last seen inoffice 1/15/2024  11/1/2017 - Ratio 69%, %, FEV1 115% - normal spirometry - shortened expiratory time but with values >100% predicted.   Home regimen: advair 500-50, ipratropium-albuterol neb, xopenex neb    Plan:   Titrate O2 to maintain SpO2 >88%  Continue nebulizers    Leukocytosis  Assessment & Plan  Suspect reactive in setting of acute illness  Trend WBC/temps    GILDARDO (obstructive sleep apnea)  Assessment & Plan  Nonadherant to BIPAP  Monitor respiratory status    Diabetes (HCC)  Assessment & Plan  Lab Results   Component Value Date    HGBA1C 7.1 (H) 03/15/2024       Recent Labs     03/28/24  0724 03/28/24  0903 03/28/24  1017 03/28/24  1141   POCGLU 303* 331* 328* 323*       Blood Sugar Average: Last 72 hrs:  (P) 321.25    Home regimen: glargine 60q12, dulaglutide, humalog 30 units before meals  Currently NPO and BS uncontrolled  Plan:  Start insulin infusion.   Accuchecks q2hrs    Stage 3b chronic kidney disease (HCC)  Assessment & Plan  Lab Results   Component Value Date    EGFR 32 03/28/2024    EGFR 36 03/27/2024    EGFR 35 (L) 03/15/2024    CREATININE 1.56 (H) 03/28/2024    CREATININE 1.43 (H) 03/27/2024    CREATININE 1.56 (H) 03/15/2024   Creatinine @ baseline.   Monitor. Avoid nephrotoxic agents    Hyperlipidemia  Assessment & Plan  Statin on hold given NPO status    Alzheimer's dementia (HCC)  Assessment & Plan  Namenda on hold 2/2 NPO status           History of Present Illness     HPI: Jud Walls is a 73 y.o.  former smoke (50 py), COPD/OHS/GILDARDO on chronic 2L O2 nonadherant to BIPAP, DM, HTN came to ED for abdominal pain and was found to have a cecal volvulus and incarcerated ventral hernia. She underwent emergent ex-lap, extended R hemicolectomy and ventral hernia repair today and successfully extubated. Due to patient's chronic advanced respiratory disease, patient was transferred to [place] under surgical  critical care for closer cardiopulmonary monitoring.       History obtained from chart review and the patient.  Review of Systems   All other systems reviewed and are negative.    Disposition: Stepdown Level 1   Historical Information   Past Medical History:  No date: Alzheimer disease (HCC)  No date: Asthma  No date: Diabetes mellitus (HCC)  No date: GERD (gastroesophageal reflux disease)  No date: Hyperlipidemia  No date: Hypertension Past Surgical History:  No date: JOINT REPLACEMENT; Bilateral  No date: KNEE SURGERY  No date: TOE SURGERY   Current Outpatient Medications   Medication Instructions    acetaminophen (TYLENOL) 975 mg, Oral, Every 8 hours scheduled    Advair Diskus 500-50 MCG/DOSE inhaler No dose, route, or frequency recorded.    ascorbic acid (VITAMIN C) 500 mg tablet     aspirin (ECOTRIN LOW STRENGTH) 81 mg EC tablet No dose, route, or frequency recorded.    benzonatate (TESSALON) 200 mg, Oral, 3 times daily PRN    busPIRone (BUSPAR) 5 mg, 2 times daily    cetirizine (ZYRTEC) 10 mg, Daily    clonazePAM (KlonoPIN) 0.5 mg tablet     escitalopram (LEXAPRO) 10 mg, Oral, Daily    ferrous gluconate (FERGON) 324 mg, Oral, Daily before breakfast    fluticasone (FLONASE) 50 mcg/act nasal spray 1 spray, Nasal, Daily    gabapentin (NEURONTIN) 300 mg, Oral, 2 times daily    Glucose Blood (BL TEST STRIP PACK VI) In Vitro    glucose blood test strip In Vitro    guaiFENesin (MUCINEX) 1,200 mg, Oral, Every 12 hours scheduled    Insulin Glargine Solostar (Basaglar KwikPen) 100 UNIT/ML SOPN Inject 60 units under the skin every 12 hours    insulin lispro (HumaLOG) 100 units/mL injection Inject 30 units before meals + scale    ipratropium-albuterol (DUO-NEB) 0.5-2.5 mg/3 mL nebulizer solution No dose, route, or frequency recorded.    levalbuterol (XOPENEX) 1.25 mg/3 mL nebulizer solution No dose, route, or frequency recorded.    losartan (COZAAR) 25 mg, Oral, Daily    magnesium hydroxide (MILK OF MAGNESIA) 400 mg/5 mL  oral suspension Oral, Daily PRN    Melatonin 5 MG TABS     memantine (NAMENDA) 10 mg, 2 times daily    montelukast (SINGULAIR) 10 mg, Oral, Daily at bedtime    Omeprazole 20 mg, Oral, Daily    simvastatin (ZOCOR) 40 mg, Oral, Daily at bedtime    traZODone (DESYREL) 150 mg tablet No dose, route, or frequency recorded.    Trulicity 3 mg, Subcutaneous, Weekly    Vitamin D3 50,000 Units, Oral, Every 30 days    Allergies   Allergen Reactions    Penicillins Shortness Of Breath    Cephalexin Other (See Comments)     Reaction Date: 2011; unknown     Crestor [Rosuvastatin] Other (See Comments)     Reaction Date: 2011; unknown     Zithromax [Azithromycin] Other (See Comments)     Unknown       Social History     Tobacco Use    Smoking status: Former     Current packs/day: 0.00     Average packs/day: 1 pack/day for 52.7 years (52.7 ttl pk-yrs)     Types: Cigarettes     Start date:      Quit date: 2017     Years since quittin.5    Smokeless tobacco: Never   Vaping Use    Vaping status: Never Used   Substance Use Topics    Alcohol use: Not Currently    Drug use: No    Family History   Problem Relation Age of Onset    Dementia Brother     Breast cancer Sister 75    Cancer Brother         Objective                            Vitals I/O      Most Recent Min/Max in 24hrs   Temp 98.7 °F (37.1 °C) Temp  Min: 97.4 °F (36.3 °C)  Max: 98.7 °F (37.1 °C)   Pulse 87 Pulse  Min: 66  Max: 88   Resp (!) 25 Resp  Min: 12  Max: 25   BP (!) 181/75 BP  Min: 102/49  Max: 181/75   O2 Sat 96 % SpO2  Min: 90 %  Max: 100 %      Intake/Output Summary (Last 24 hours) at 3/28/2024 1212  Last data filed at 3/28/2024 0939  Gross per 24 hour   Intake 2739.75 ml   Output 925 ml   Net 1814.75 ml       Diet NPO    Invasive Monitoring   Arterial Line  [place] BP    No data recorded   MAP    No data recorded           Physical Exam   Physical Exam  Vitals reviewed.   Skin:     General: Skin is warm and dry.      Capillary Refill: Capillary  refill takes less than 2 seconds.   HENT:      Head: Normocephalic.      Nose: Nasogastric tube present.      Mouth/Throat:      Mouth: Mucous membranes are moist.   Neck:      Vascular: No JVD.   Cardiovascular:      Rate and Rhythm: Normal rate and regular rhythm.      Pulses: Normal pulses.   Abdominal: General: There is no distension.      Palpations: Abdomen is soft.      Comments: Abdominal binder in place   Constitutional:       General: She is not in acute distress.     Appearance: She is well-developed.   Pulmonary:      Effort: Pulmonary effort is normal. No respiratory distress.      Breath sounds: Normal breath sounds. No wheezing.   Genitourinary/Anorectal:     Comments: Campbell in place           Diagnostic Studies      EKG: NSR  CT Abd/Pelvis (3/28) Imagin focal areas of large bowel volvulus involving the cecum/proximal ascending colon as well as a loop of mid transverse colon due to entrapment within the incarcerated ventral lower abdominal hernia sac as discussed above. Surrounding mesenteric   inflammatory edema and reactive ascites within the hernia sac as well as adjacent to markedly distended colon loops proximal to the hernia sac are seen. No gross free air or pneumatosis intestinalis. Emergent surgical consultation recommended. Secondary   appendicitis is also seen. Additional ancillary findings detailed above.  I have personally reviewed pertinent reports.       Medications:  Scheduled PRN   cefazolin, 2,000 mg, Q8H  chlorhexidine, 15 mL, Q12H CLEMENTINE  fluticasone-vilanterol, 1 puff, Daily  heparin (porcine), 7,500 Units, Q8H CLEMENTINE  ipratropium-albuterol, 3 mL, Q6H  metroNIDAZOLE, 500 mg, Q8H  pantoprazole, 40 mg, Q24H CLEMENTINE      levalbuterol, 1.25 mg, Q4H PRN  naloxone, 0.1 mg, Q1MIN PRN  ondansetron, 4 mg, Q6H PRN  phenol, 1 spray, Q2H PRN       Continuous    HYDROmorphone,   insulin regular (HumuLIN R,NovoLIN R) 1 Units/mL in sodium chloride 0.9 % 100 mL infusion, 0.3-21 Units/hr, Last Rate: 6  Units/hr (03/28/24 1136)  multi-electrolyte, 125 mL/hr, Last Rate: 125 mL/hr (03/28/24 0938)         Labs:    CBC    Recent Labs     03/27/24 2323 03/28/24 0548 03/28/24 0624 03/28/24  0828 03/28/24  1004   WBC 13.10*  --   --   --  13.52*   HGB 10.1*   < > 8.2*  --  9.1*   HCT 31.7*   < > 24*  --  29.0*     --   --  141* 138*    < > = values in this interval not displayed.     BMP    Recent Labs     03/27/24 2323 03/28/24 0548 03/28/24 0624 03/28/24  1004   SODIUM 141  --   --  139   K 4.4  --   --  4.2   CL 98  --   --  99   CO2 36*   < > 36* 34*   AGAP 7  --   --  6   BUN 25  --   --  24   CREATININE 1.43*  --   --  1.56*   CALCIUM 8.9  --   --  8.0*    < > = values in this interval not displayed.       Coags    Recent Labs     03/27/24 2323   INR 0.98        Additional Electrolytes  Recent Labs     03/28/24 0624 03/28/24  1004   MG  --  2.1   PHOS  --  4.3*   CAIONIZED 1.09* 1.06*          Blood Gas    No recent results  No recent results LFTs  Recent Labs     03/27/24 2323 03/28/24  1004   ALT 12 10   AST 12* 9*   ALKPHOS 53 47   ALB 3.8 3.7   TBILI 0.79 0.58       Infectious  No recent results  Glucose  Recent Labs     03/27/24 2323 03/28/24  1004   GLUC 134 332*               Shivani Masterson PA-C

## 2024-03-28 NOTE — ASSESSMENT & PLAN NOTE
Baseline COPD/GILDARDO/OHS. On chronic 2L home O2. Nonadherant to nocturnal bipap therapy  F/w [facility]; last seen inoffice 1/15/2024  11/1/2017 - Ratio 69%, %, FEV1 115% - normal spirometry - shortened expiratory time but with values >100% predicted.   Home regimen: advair 500-50, ipratropium-albuterol neb, xopenex neb    Plan:   Titrate O2 to maintain SpO2 >88%  Continue nebulizers

## 2024-03-28 NOTE — PROGRESS NOTES
General Surgery  Progress Note   Jud Walls 73 y.o. female MRN: [id number]  Unit/Bed#: ICU 13 Encounter: [id number]    Assessment:  73-year-old female with cecal volvulus within an incarcerated ventral hernia. S/p Ex-lap, extended R hemicolectomy, ventral hernia repair 3/28.    Afebrile, tachypneic with increasing oxygen requirements  UOP: 1300 cc yellow  NGT: 1050 cc light brown    Plan:  -Clears, no carbonation  -IV fluids  -DC mondragon  -Keep PCA until tolerating PO  -Mepilex down 3/30  -Pain/ nausea control PRN  -OOB/ ambulation  -Incentive Spirometry    Subjective/Objective     Subjective:   Patient seen and examined at bedside, in no acute distress. No acute events overnight.  Patient reports that she feels a little bit better and her abdominal pain.  Patient reports some nausea but no vomiting.  No fevers chills chest pain or shortness of breath.  Patient is not passing bowel movements patient is not passing flatus.    Objective:   Vitals:Blood pressure 158/66, pulse 98, temperature 99.8 °F (37.7 °C), temperature source Oral, resp. rate 19, weight 98 kg (216 lb 0.8 oz), SpO2 93%.  Temp (24hrs), Av.5 °F (37.5 °C), Min:99 °F (37.2 °C), Max:99.9 °F (37.7 °C)      I/O          0701   0700  0701   0700  0701   0700    I.V. (mL/kg)  900 (9.3)     IV Piggyback  1100     Total Intake(mL/kg)  2000 (20.6)     Urine (mL/kg/hr)  500     Blood  100     Total Output  600     Net  +1400                    Invasive Devices       Peripheral Intravenous Line  Duration             Peripheral IV 24 Right;Ventral (anterior) Forearm 1 day    Peripheral IV 24 Left Hand 1 day              Drain  Duration             NG/OG/Enteral Tube Nasogastric 18 Fr Left nare 1 day    Urethral Catheter Latex 16 Fr. 1 day                    Physical Exam:  General: No acute distress  Neuro: Awake, Alert and Oriented x 3  HEENT:  Normocephalic, atraumatic, moist mucous membranes, NGT  CV: Regular  rate and rhythm  Lungs: Normal work of breathing, no increased respiratory effort  Abdomen: Soft, appropriately tender in right upper quadrant, mild distention, minimal strikethrough on Mepilex   Extremities: No edema, clubbing or cyanosis  Skin: Warm, dry    Lab Results   Component Value Date    WBC 12.17 (H) 03/29/2024    HGB 7.5 (L) 03/29/2024    HCT 24.9 (L) 03/29/2024    MCV 96 03/29/2024     (L) 03/29/2024      Lab Results   Component Value Date     (L) 09/21/2015    SODIUM 145 03/29/2024    SODIUM 145 03/29/2024    K 3.6 03/29/2024    K 3.6 03/29/2024     03/29/2024     03/29/2024    CO2 38 (H) 03/29/2024    CO2 38 (H) 03/29/2024    ANIONGAP 8 09/21/2015    AGAP 4 03/29/2024    AGAP 4 03/29/2024    BUN 21 03/29/2024    BUN 21 03/29/2024    CREATININE 1.48 (H) 03/29/2024    CREATININE 1.48 (H) 03/29/2024    GLUC 138 03/29/2024    GLUC 138 03/29/2024    GLUF 171 (H) 06/26/2023    CALCIUM 7.7 (L) 03/29/2024    CALCIUM 7.7 (L) 03/29/2024    AST 7 (L) 03/29/2024    ALT 6 (L) 03/29/2024    ALKPHOS 39 03/29/2024    PROT 7.4 09/21/2015    TP 5.9 (L) 03/29/2024    BILITOT 0.61 09/21/2015    TBILI 0.41 03/29/2024    EGFR 34 03/29/2024    EGFR 34 03/29/2024       VTE Prophylaxis: Heparin      Tamera Marcus MD  3/29/2024  12:36 PM

## 2024-03-28 NOTE — ASSESSMENT & PLAN NOTE
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 Surrounding mesenteric inflammatory edema and reactive ascites within the hernia sac as well as adjacent to markedly distended colon loops proximal to the hernia sac are seen. No gross free air or pneumatosis intestinalis. Emergent surgical consultation recommended. Secondary appendicitis is also seen. Additional ancillary findings detailed above.   S/p Ex-lap, extended R hemicolectomy, ventral hernia repair 3/28 POD 0  Plan:   Continue NPO; NG to NCIS  Serial abdominal exams  Continue cefazolin/flagyl  Surgery following

## 2024-03-28 NOTE — PHYSICAL THERAPY NOTE
Physical Therapy Evaluation    Patient's Name: Jud Walls    Admitting Diagnosis  Volvulus of colon (HCC) [K56.2]  Vomiting [R11.10]  Cecal volvulus (HCC) [K56.2]    Problem List  Patient Active Problem List   Diagnosis    Mental status change    Essential hypertension    Acute respiratory failure with hypoxia and hypercapnia (HCC)    Diabetes (HCC)    Leukocytosis    Alzheimer's dementia (HCC)    Pulmonary nodule    Preop pulmonary/respiratory exam    MCI (mild cognitive impairment)    Fall    Injury of face    Sixth nerve palsy of left eye    Ptosis of left eyelid    Seasonal allergies    Need for pneumococcal vaccination    Hoarse voice quality    Hyperlipidemia    Asthma-COPD overlap syndrome    S/P tooth extraction    Weakness generalized    Nocturnal hypoxia    Kidney lesion    Sacral ulcer (HCC)    Abscess of abdominal wall    GILDARDO (obstructive sleep apnea)    Chronic respiratory failure with hypoxia and hypercapnea (HCC)    Diabetic ulcer of left midfoot associated with type 2 diabetes mellitus (HCC)    Anemia    Sore throat    Neuropathy    Chronic obstructive pulmonary disease with (acute) exacerbation (HCC)    Cecal volvulus (HCC)    Incarcerated hernia    Stage 3b chronic kidney disease (HCC)       Past Medical History  Past Medical History:   Diagnosis Date    Alzheimer disease (HCC)     Asthma     Diabetes mellitus (HCC)     GERD (gastroesophageal reflux disease)     Hyperlipidemia     Hypertension        Past Surgical History  Past Surgical History:   Procedure Laterality Date    JOINT REPLACEMENT Bilateral     KNEE SURGERY      TOE SURGERY          03/28/24 8538   Note Type   Note type Evaluation   Pain Assessment   Pain Assessment Tool 0-10   Pain Score 5   Pain Location/Orientation Location: Abdomen   Effect of Pain on Daily Activities limits comfort, limits bed mobility   Patient's Stated Pain Goal No pain   Hospital Pain Intervention(s) Repositioned   Restrictions/Precautions   Weight  Bearing Precautions Per Order No   Other Precautions Cognitive;Chair Alarm;Bed Alarm;Fall Risk;O2;Telemetry;Multiple lines  (4L O2 via NC, mondragon, NG tube, abdominal binder, +)   Home Living   Type of Home SNF   Home Equipment Walker;Wheelchair-manual   Prior Function   Lives With Facility staff   Falls in the last 6 months 0   Comments per CM pt is Ax2 to SPT to wheelchair   Cognition   Orientation Level Oriented to person   Following Commands Follows one step commands with increased time or repetition   Comments pt ID by wristband, name and    RLE Assessment   RLE Assessment X  (3-/5)   LLE Assessment   LLE Assessment X  (3-/5)   Bed Mobility   Rolling R 2  Maximal assistance   Additional items Assist x 1;Increased time required  (trunk management)   Rolling L 2  Maximal assistance   Additional items Assist x 1;Increased time required;Bedrails  (trunk management)   Supine to Sit 2  Maximal assistance   Additional items Assist x 1;Increased time required;LE management;Bedrails   Sit to Supine 2  Maximal assistance   Additional items Assist x 2;Increased time required;LE management  (trunk management)   Additional Comments (S)  sits EOB requires modAx1 progressing to minAx1 at times, BP with reported light headedness dizziness. SUPINE BP: 169/91, seated EOB: 134-61(symptomatic), repeat supine /67   Transfers   Sit to Stand 2  Maximal assistance   Additional items Assist x 2;Increased time required;Verbal cues   Stand to Sit 2  Maximal assistance   Additional items Assist x 2;Increased time required;Verbal cues   Additional Comments BL UE support, BL knee block required for STS performance, requires max x2 to maintain standing, however, poor tolerance, limited standing to no more than 15 seconds   Balance   Static Sitting Poor +   Dynamic Sitting Poor   Static Standing Zero  (AX2)   Activity Tolerance   Activity Tolerance Patient limited by fatigue  (orthostatics)   Medical Staff Made Aware care coordinated  with OT, spoke with CM   Nurse Made Aware PAUL butcher pre/post   Assessment   Prognosis Fair   Problem List Decreased strength;Decreased endurance;Impaired balance;Decreased mobility;Decreased cognition;Impaired judgement;Decreased safety awareness;Obesity;Decreased skin integrity;Pain   Assessment Pt is a 73 y.o. female seen for PT evaluation s/p admit to [facility] on 3/27/2024. Pt was admitted with a primary dx of: cecal volvulus.  PT now consulted for assessment of mobility and d/c needs. Pt with OOB to chair orders.  Pts current comorbidities and personal factors effecting treatment include: BMI, alzheimer's disease,HLD, HTN, DM, CKD . Pts current clinical presentation is Unstable/Unpredictable (high complexity) due to Ongoing medical management for primary dx, Increased reliance on more restrictive AD compared to baseline, Decreased activity tolerance compared to baseline, Fall risk, Cog status, s/p surgical intervention. Prior to admission, pt was W/C level. Upon evaluation, pt currently is requiring MaxA x2 for bed mobility; MaxA x2 for transfers Pt presents at PT eval functioning below baseline and currently w/ overall mobility deficits 2* to: BLE weakness, impaired balance, pain, decreased activity tolerance compared to baseline, decreased functional mobility tolerance compared to baseline, decreased safety awareness, impaired judgement, fall risk, decreased skin integrity, decreased cognition. Pt currently at a fall risk 2* to impairments listed above.  Pt will continue to benefit from skilled acute PT interventions to address stated impairments; to maximize functional mobility; for ongoing pt/ family training; and DME needs. At conclusion of PT session pt returned BTB, bed alarm engaged, all needs in reach, and RN notified of session findings/recommendations with phone and call bell within reach. Pt denies any further questions at this time. Recommend Level II (Moderate Resource Intensity)  upon  hospital D/C.   Goals   Patient Goals to get better   STG Expiration Date 04/11/24   Short Term Goal #1 In 14 days pt will be able to: 1. Demonstrate ability to perform all aspects of bed mobility with modA to improve functional safety.  2. Perform functional transfers with Jayme x2 to facilitate safe return to previous living environment.  3.  Tolerate sitting EOB for greater than 15 minutes to participate in ADLs, functional tasks.  4. Improve LE strength grades by 1 to increase ease of functional mobility with transfers and gait. 5. Pt will demonstrate improved balance by one grade in order to decrease risk of falls. 6. Tolerate 3 hours OOB in recliner to promote upright tolerance.   PT Treatment Day 0   Plan   Treatment/Interventions Functional transfer training;LE strengthening/ROM;Elevations;Therapeutic exercise;Cognitive reorientation;Equipment eval/education;Patient/family training;Bed mobility;Spoke to nursing;Spoke to case management;OT   PT Frequency 3-5x/wk   Discharge Recommendation   Rehab Resource Intensity Level, PT II (Moderate Resource Intensity)   AM-PAC Basic Mobility Inpatient   Turning in Flat Bed Without Bedrails 1   Lying on Back to Sitting on Edge of Flat Bed Without Bedrails 1   Moving Bed to Chair 1   Standing Up From Chair Using Arms 1   Walk in Room 1   Climb 3-5 Stairs With Railing 1   Basic Mobility Inpatient Raw Score 6   Turning Head Towards Sound 3   Follow Simple Instructions 3   Low Function Basic Mobility Raw Score  12   Low Function Basic Mobility Standardized Score  18.33   [facility] Highest Level Of Mobility   -HL Goal 2: Bed activities/Dependent transfer   -HL Achieved 3: Sit at edge of bed   End of Consult   Patient Position at End of Consult Bed/Chair alarm activated;All needs within reach;Supine   The patient's AM-PAC Basic Mobility Inpatient Short Form Raw Score is 6. A Raw score of less than or equal to 16 suggests the patient may benefit from discharge to  post-acute rehabilitation services. Please also refer to the recommendation of the Physical Therapist for safe discharge planning.  Kalyan Mishra, PT

## 2024-03-28 NOTE — ASSESSMENT & PLAN NOTE
Lab Results   Component Value Date    HGBA1C 7.1 (H) 03/15/2024       Recent Labs     03/28/24  0724 03/28/24  0903 03/28/24  1017 03/28/24  1141   POCGLU 303* 331* 328* 323*       Blood Sugar Average: Last 72 hrs:  (P) 321.25    Home regimen: glargine 60q12, dulaglutide, humalog 30 units before meals  Currently NPO and BS uncontrolled  Plan:  Start insulin infusion.   Accuchecks q2hrs

## 2024-03-28 NOTE — ASSESSMENT & PLAN NOTE
Lab Results   Component Value Date    EGFR 32 03/28/2024    EGFR 36 03/27/2024    EGFR 35 (L) 03/15/2024    CREATININE 1.56 (H) 03/28/2024    CREATININE 1.43 (H) 03/27/2024    CREATININE 1.56 (H) 03/15/2024   Creatinine @ baseline.   Monitor. Avoid nephrotoxic agents

## 2024-03-28 NOTE — OCCUPATIONAL THERAPY NOTE
Occupational Therapy Evaluation     Patient Name: Jud Walls  Today's Date: 3/28/2024  Problem List  Principal Problem:    Cecal volvulus (HCC)  Active Problems:    Diabetes (HCC)    Leukocytosis    Alzheimer's dementia (HCC)    Hyperlipidemia    GILDARDO (obstructive sleep apnea)    Chronic respiratory failure with hypoxia and hypercapnea (HCC)    Incarcerated hernia    Stage 3b chronic kidney disease (HCC)    Past Medical History  Past Medical History:   Diagnosis Date    Alzheimer disease (HCC)     Asthma     Diabetes mellitus (HCC)     GERD (gastroesophageal reflux disease)     Hyperlipidemia     Hypertension      Past Surgical History  Past Surgical History:   Procedure Laterality Date    JOINT REPLACEMENT Bilateral     KNEE SURGERY      TOE SURGERY           03/28/24 1317   OT Last Visit   OT Visit Date 03/28/24   Note Type   Note type Evaluation   Pain Assessment   Pain Assessment Tool 0-10   Pain Score 5   Pain Location/Orientation Location: Abdomen   Pain Onset/Description Onset: Ongoing;Descriptor: Aching   Effect of Pain on Daily Activities limits comfort, activity tolerance, sitting tolerance   Patient's Stated Pain Goal No pain   Hospital Pain Intervention(s) Repositioned;Ambulation/increased activity;Emotional support   Restrictions/Precautions   Weight Bearing Precautions Per Order No   Other Precautions Cognitive;Bed Alarm;Multiple lines;Telemetry;O2;Fall Risk;Pain  (NG tube to suction, abdominal binder, mondragon, 2L O2 NC)   Home Living   Type of Home SNF  ([facility])   Home Layout One level;Performs ADLs on one level;Able to live on main level with bedroom/bathroom   Home Equipment Wheelchair-manual;Walker   Additional Comments Pt is a LTC resident at [facility]   Prior Function   Level of [place] Needs assistance with ADLs;Needs assistance with functional mobility;Needs assistance with IADLS   Lives With Facility staff   Receives Help From Other (Comment)  (Facility staff at [facility])  "  IADLs Family/Friend/Other provides transportation;Family/Friend/Other provides meals;Family/Friend/Other provides medication management   Falls in the last 6 months 0   Vocational Retired   Comments Pt is a questionable historian. Per CM note, pt transfers w/ A x 2 vs sit to stand to her w/c and is able to self propel. Pt reuqires A for all ADLs baseline   Lifestyle   Autonomy Pt lives at [facility] and requires A for ADLs PTA, A x 2 to transfer to w/c, (-) falls   Reciprocal Relationships Supportive facility staff at SNF   Service to Others Retired   Intrinsic Gratification Pt enjoys watching TV and coloring   General   Additional Pertinent History Pt admitted from [facility] w/ vomiting. CAT scan results showed incarcerated ventral wall hernia likely with a cecal volvulus. POD # 0 exploratory laparotomy, right hemicolectomy, repair of ventral hernia. PMH includes: Alzheimers, HLD, chronic respiratory failure on 2L O2 baseline, CKD, HTN, COPD, anemia   Family/Caregiver Present No   Subjective   Subjective \"Yes, the room is spinning\"   ADL   Where Assessed Edge of bed   Eating Assistance Unable to assess   Eating Deficit NPO  (NGT)   Grooming Assistance 4  Minimal Assistance   UB Bathing Assistance 3  Moderate Assistance   LB Bathing Assistance 1  Total Assistance   UB Dressing Assistance 3  Moderate Assistance   LB Dressing Assistance 1  Total Assistance   LB Dressing Deficit Don/doff R sock;Don/doff L sock   Toileting Assistance  1  Total Assistance   Bed Mobility   Supine to Sit 2  Maximal assistance   Additional items Assist x 1;Bedrails;HOB elevated;Increased time required;Verbal cues;LE management   Sit to Supine 2  Maximal assistance   Additional items Assist x 2;Increased time required;LE management   Additional Comments Able to sit EOB w/ variable min/mod A x 1. Improved w/ optimal positioning and UE support on bedrail. (+) dizziness in sitting. BP supine 169/91, BP sitting 134/61, repeat supine BP " 159/67   Transfers   Sit to Stand 2  Maximal assistance   Additional items Assist x 2;Increased time required;Verbal cues   Stand to Sit 2  Maximal assistance   Additional items Assist x 2;Increased time required;Verbal cues   Additional Comments x 2 STS performed from EOB w/ max A x 2, BLE knee block and BUE support on RW. Limited by fatigue/dizziness in stance. Required A x 2 to maintain stand   Functional Mobility   Additional Comments Unable to assess due to limited standing tolerance   Balance   Static Sitting Poor +   Dynamic Sitting Poor   Static Standing Zero  (a x 2)   Activity Tolerance   Activity Tolerance Patient limited by fatigue;Patient limited by pain  (cognition)   Medical Staff Made Aware Care coordinated w/ PT JACOBO Parker   Nurse Made Aware yes, RN Dejah rodriguez to see pt and updated   RUE Assessment   RUE Assessment WFL  (limited shoulder AROM otherwise WFL)   LUE Assessment   LUE Assessment WFL  (limited shoulder AROM otherwise WFL)   Hand Function   Gross Motor Coordination Functional  (able to reach for RW)   Fine Motor Coordination Functional   Sensation   Light Touch No apparent deficits   Vision-Basic Assessment   Current Vision Does not wear glasses   Cognition   Overall Cognitive Status Impaired   Arousal/Participation Alert;Cooperative   Attention Attends with cues to redirect   Orientation Level Oriented to person;Disoriented to place;Disoriented to time;Disoriented to situation  (reports she is at [facility]; reports year is 1920)   Memory Decreased recall of recent events;Decreased recall of precautions;Decreased recall of biographical information   Following Commands Follows one step commands with increased time or repetition   Comments Pleasant and agreeable, questionable insight into deficits. Cues to initiate and problem solve basic tasks. Baseline Alzheimers Dementia   Assessment   Limitation Decreased ADL status;Decreased Safe judgement during ADL;Decreased cognition;Decreased  Johnson Andersen endurance;Decreased self-care trans;Decreased high-level ADLs  (pain, balance, fxnl mobility, act vi, fxnl reach, trunk control, standing vi, strength, fxnl sitting balance, and fxnl sitting vi, safety awareness, insight, orientation, learning new tasks, and initiation, response time)   Prognosis Good   Assessment Pt is a 73 y.o. female seen for OT evaluation s/p admit to [facility] on 3/27/2024 w/Cecal volvulus (HCC). Prior to admission, pt was living at [facility] for LTC, (A) with ADLs/IADLs, A x 2 for transfer to w/c, (-) falls. Personal and environmental factors affecting patient at time of evaluation include current habits and behavioral patterns, difficulty completing ADLs, and difficulty completing IADLs. Personal factors supporting patient at time of evaluation include supportive facility staff, accessible home environment, and (A) with all ADLs. Based upon this evaluation, pt is functioning below baseline currently requiring A x 2 to perform ADLs/all aspects of mobility and is limited by new onset pain s/p ex-lap. Pt will benefit from continued skilled inpatient OT to maximize safety, level of independence overall performance in ADLs, functional transfers, functional mobility to return to functional baseline/highest level of function.   Goals   Patient Goals to feel better   LTG Time Frame 10-14   Long Term Goal #1 see goals below   Plan   Treatment Interventions ADL retraining;Functional transfer training;Endurance training;Cognitive reorientation;Patient/family training;Equipment evaluation/education;Compensatory technique education;Continued evaluation;Energy conservation;Activityengagement   Goal Expiration Date 04/07/24   OT Treatment Day 0   OT Frequency 2-3x/wk   Discharge Recommendation   Rehab Resource Intensity Level, OT II (Moderate Resource Intensity)  (Given pt cognitive deficits, pt may benefit from return to SNF w/ continued assistance for ADLs/mobility nad familiar  environment/routines. If facility is unable to provide adequate assistance, recommend DC to higher level of care.)   Additional Comments  Pt seen as a co-eval with PT due to the patient's co-morbidities and clinically unstable presentation indicated by chart review. During session, pt benefited from two skilled therapists due to extensive physical assistance to achieve transitional movements and pt's decreased activity tolerance.   Additional Comments 2 The patient's raw score on the AM-PAC Daily Activity Inpatient Short Form is 11. A raw score of less than 19 suggests the patient may benefit from discharge to post-acute rehabilitation services. Please refer to the recommendation of the Occupational Therapist for safe discharge planning.   AM-PAC Daily Activity Inpatient   Lower Body Dressing 2   Bathing 2   Toileting 1   Upper Body Dressing 2   Grooming 3   Eating 1   Daily Activity Raw Score 11   Daily Activity Standardized Score (Calc for Raw Score >=11) 29.04   AM-PAC Applied Cognition Inpatient   Following a Speech/Presentation 2   Understanding Ordinary Conversation 3   Taking Medications 1   Remembering Where Things Are Placed or Put Away 2   Remembering List of 4-5 Errands 1   Taking Care of Complicated Tasks 1   Applied Cognition Raw Score 10   Applied Cognition Standardized Score 24.98   End of Consult   Patient Position at End of Consult Supine;Bed/Chair alarm activated;All needs within reach   Nurse Communication Nurse aware of consult     GOALS:    *Patient will perform grooming tasks sitting EOB with (S) in order to increase overall independence     *UB ADL with (S) for inc'd independence with self care    *LB ADL with Mod (A) using AE prn for inc'd independence with self care    *Toileting with Mod (A) for clothing management and hygiene to increase hygiene/thoroughness in order to reduce caregiver burden    *ADL transfers with Mod (A) x2 for inc'd independence with ADLs/purposeful tasks    *Bed  mobility- Mod (A) for inc'd independence to manage own comfort and initiate EOB & OOB purposeful tasks    *Patient will increase OOB/sitting tolerance to 2-4 hours per day to increase participation in self-care and leisure tasks with no s/s of exertion.      CAROLINE Rahman, OTR/L    [place] License [id number]  [place] License [id number]

## 2024-03-28 NOTE — H&P
H&P Exam - General Surgery   Jud Walls 73 y.o. female MRN: [id number]  Unit/Bed#: ED-07 Encounter: [id number]    Assessment/Plan     Assessment:  73-year-old female with past medical history of Alzheimer's, asthma, diabetes mellitus, GERD, hyperlipidemia, hypertension and no previous abdominal surgeries who presents with cecal volvulus within an incarcerated ventral hernia.    Vitals normal on 2 L nasal cannula    WBC 13.10  Hemoglobin 10.1  Creatinine 1.43  Lactate 1.0    3/28 CT abdomen pelvis with IV con: 2 focal areas of large bowel volvulus involving the cecum/proximal ascending colon as well as a loop of mid transverse colon due to entrapment within the incarcerated ventral lower abdominal hernia sac as discussed above. Surrounding mesenteric inflammatory edema and reactive ascites within the hernia sac as well as adjacent to markedly distended colon loops proximal to the hernia sac are seen. No gross free air or pneumatosis intestinalis. Emergent surgical consultation recommended. Secondary   appendicitis is also seen.    Plan:  Plan for emergent OR for exploratory laparotomy, right hemicolectomy, repair of ventral hernia, all other indicated procedures  N.p.o., NG tube  Isolyte@125  As needed analgesia  As needed antiemetics  DVT prophylaxis    History of Present Illness     HPI:  Jud Walls is a 73 y.o. female who presents with abdominal pain, nausea and vomiting.  Patient reports that she became nauseous and started throwing up approximately 6 to 8 hours ago.  She had some associated mild diffuse abdominal pain as well as some abdominal distention.  She does report having a ventral hernia which has been there for many years.  She denies any previous abdominal surgeries.  She states her last bowel movement was several days ago but she has been passing gas.  Upon presentation she was found to have leukocytosis, KATHY and CT imaging demonstrated 2 focal areas of large bowel volvulus involving the  cecum/proximal ascending colon as well as a loop of mid transverse colon due to entrapment within the incarcerated ventral lower abdominal hernia sac.    Review of Systems   Constitutional: Negative.    HENT: Negative.     Eyes: Negative.    Respiratory: Negative.     Cardiovascular: Negative.    Gastrointestinal:  Positive for abdominal distention, abdominal pain (Mild diffuse), nausea and vomiting.   Endocrine: Negative.    Genitourinary: Negative.    Musculoskeletal: Negative.    Skin: Negative.    Neurological: Negative.    Psychiatric/Behavioral: Negative.           Historical Information   Past Medical History:   Diagnosis Date    Alzheimer disease (HCC)     Asthma     Diabetes mellitus (HCC)     GERD (gastroesophageal reflux disease)     Hyperlipidemia     Hypertension      Past Surgical History:   Procedure Laterality Date    JOINT REPLACEMENT Bilateral     KNEE SURGERY      TOE SURGERY       Social History   Social History     Substance and Sexual Activity   Alcohol Use Not Currently     Social History     Substance and Sexual Activity   Drug Use No     Social History     Tobacco Use   Smoking Status Former    Current packs/day: 0.00    Average packs/day: 1 pack/day for 52.7 years (52.7 ttl pk-yrs)    Types: Cigarettes    Start date:     Quit date: 2017    Years since quittin.5   Smokeless Tobacco Never     E-Cigarette/Vaping    E-Cigarette Use Never User      E-Cigarette/Vaping Substances    Nicotine No     THC No     CBD No     Flavoring No     Other No      Family History:   Family History   Problem Relation Age of Onset    Dementia Brother     Breast cancer Sister 75    Cancer Brother        Meds/Allergies   PTA meds:   Prior to Admission Medications   Prescriptions Last Dose Informant Patient Reported? Taking?   Advair Diskus 500-50 MCG/DOSE inhaler  Outside Facility (Specify) Yes No   Cholecalciferol (VITAMIN D3) 98674 units CAPS  Outside Facility (Specify) Yes No   Sig: Take 50,000 Units  by mouth every 30 (thirty) days   Glucose Blood (BL TEST STRIP PACK VI)  Outside Facility (Specify) Yes No   Sig: Test   Insulin Glargine Solostar (Basaglar KwikPen) 100 UNIT/ML SOPN  Outside Facility (Specify) No No   Sig: Inject 60 units under the skin every 12 hours   Melatonin 5 MG TABS  Outside Facility (Specify) Yes No   Omeprazole 20 MG TBEC  Outside Facility (Specify) Yes No   Sig: Take 20 mg by mouth daily   acetaminophen (TYLENOL) 325 mg tablet  Outside Facility (Specify) No No   Sig: Take 3 tablets (975 mg total) by mouth every 8 (eight) hours   ascorbic acid (VITAMIN C) 500 mg tablet  Outside Facility (Specify) Yes No   aspirin (ECOTRIN LOW STRENGTH) 81 mg EC tablet  Outside Facility (Specify) Yes No   benzonatate (TESSALON) 200 MG capsule  Outside Facility (Specify) Yes No   Sig: Take 200 mg by mouth 3 (three) times a day as needed for cough   busPIRone (BUSPAR) 5 mg tablet  Outside Facility (Specify) Yes No   Sig: Take 5 mg by mouth 2 (two) times a day   Patient not taking: Reported on 1/15/2024   cetirizine (ZyrTEC) 10 mg tablet  Outside Facility (Specify) Yes No   Sig: Take 10 mg by mouth daily   Patient not taking: Reported on 1/15/2024   clonazePAM (KlonoPIN) 0.5 mg tablet  Outside Facility (Specify) Yes No   dulaglutide (Trulicity) 3 MG/0.5ML injection  Outside Facility (Specify) No No   Sig: Inject 0.5 mL (3 mg total) under the skin once a week   escitalopram (LEXAPRO) 10 mg tablet  Outside Facility (Specify) Yes No   Sig: Take 10 mg by mouth daily   ferrous gluconate (FERGON) 324 mg tablet  Outside Facility (Specify) No No   Sig: Take 1 tablet (324 mg total) by mouth daily before breakfast   fluticasone (FLONASE) 50 mcg/act nasal spray  Outside Facility (Specify) No No   Si spray into each nostril daily   gabapentin (NEURONTIN) 300 mg capsule  Outside Facility (Specify) Yes No   Sig: Take 300 mg by mouth 2 (two) times a day   glucose blood test strip  Outside Facility (Specify) Yes No   Sig:  Test   guaiFENesin (MUCINEX) 600 mg 12 hr tablet  Outside Facility (Specify) Yes No   Sig: Take 1,200 mg by mouth every 12 (twelve) hours   insulin lispro (HumaLOG) 100 units/mL injection  Outside Facility (Specify) No No   Sig: Inject 30 units before meals + scale   ipratropium-albuterol (DUO-NEB) 0.5-2.5 mg/3 mL nebulizer solution  Outside Facility (Specify) Yes No   levalbuterol (XOPENEX) 1.25 mg/3 mL nebulizer solution  Outside Facility (Specify) Yes No   losartan (COZAAR) 25 mg tablet  Outside Facility (Specify) No No   Sig: Take 1 tablet (25 mg total) by mouth daily Do not start before December 12, 2023.   magnesium hydroxide (MILK OF MAGNESIA) 400 mg/5 mL oral suspension  Outside Facility (Specify) Yes No   Sig: Take by mouth daily as needed for constipation   memantine (NAMENDA) 10 mg tablet  Outside Facility (Specify) Yes No   Sig: 10 mg 2 (two) times a day   montelukast (SINGULAIR) 10 mg tablet  Outside Facility (Specify) Yes No   Sig: Take 10 mg by mouth daily at bedtime   simvastatin (ZOCOR) 40 mg tablet  Outside Facility (Specify) No No   Sig: Take 1 tablet by mouth daily at bedtime   traZODone (DESYREL) 150 mg tablet  Outside Facility (Specify) Yes No      Facility-Administered Medications: None     Allergies   Allergen Reactions    Penicillins Shortness Of Breath    Cephalexin Other (See Comments)     Reaction Date: 26May2011; unknown     Crestor [Rosuvastatin] Other (See Comments)     Reaction Date: 26May2011; unknown     Zithromax [Azithromycin] Other (See Comments)     Unknown        Objective   First Vitals:   Blood Pressure: 156/70 (03/27/24 2310)  Pulse: 66 (03/27/24 2310)  Temperature: 97.8 °F (36.6 °C) (03/27/24 2310)  Temp Source: Oral (03/27/24 2310)  Respirations: 20 (03/27/24 2310)  Weight - Scale: 97.2 kg (214 lb 4.6 oz) (03/27/24 2310)  SpO2: 94 % (03/27/24 2310)    Current Vitals:   Blood Pressure: 150/69 (03/28/24 0400)  Pulse: 84 (03/28/24 0400)  Temperature: 97.8 °F (36.6 °C)  (03/27/24 2310)  Temp Source: Oral (03/27/24 2310)  Respirations: 20 (03/28/24 0400)  Weight - Scale: 97.2 kg (214 lb 4.6 oz) (03/27/24 2310)  SpO2: 100 % (03/28/24 0400)      Intake/Output Summary (Last 24 hours) at 3/28/2024 0434  Last data filed at 3/28/2024 0035  Gross per 24 hour   Intake 500 ml   Output --   Net 500 ml       Invasive Devices       Peripheral Intravenous Line  Duration             Peripheral IV 03/27/24 Right;Ventral (anterior) Forearm <1 day                    Physical Exam  Constitutional:       Appearance: Normal appearance. She is obese.   HENT:      Head: Normocephalic and atraumatic.      Right Ear: External ear normal.      Left Ear: External ear normal.      Nose: Nose normal.      Mouth/Throat:      Mouth: Mucous membranes are moist.      Pharynx: Oropharynx is clear.   Eyes:      Extraocular Movements: Extraocular movements intact.      Conjunctiva/sclera: Conjunctivae normal.      Pupils: Pupils are equal, round, and reactive to light.   Cardiovascular:      Rate and Rhythm: Normal rate and regular rhythm.      Pulses: Normal pulses.   Pulmonary:      Effort: Pulmonary effort is normal.   Abdominal:      General: There is distension.      Tenderness: There is abdominal tenderness (Mildly tender diffusely).      Hernia: A hernia (Incarcerated lower abdominal ventral hernia, no skin changes) is present.   Musculoskeletal:         General: Normal range of motion.      Cervical back: Normal range of motion.   Skin:     General: Skin is warm and dry.   Neurological:      General: No focal deficit present.      Mental Status: She is alert. Mental status is at baseline.   Psychiatric:         Mood and Affect: Mood normal.         Behavior: Behavior normal.           Lab Results: I have personally reviewed pertinent lab results.    Imaging: I have personally reviewed pertinent reports.    EKG, Pathology, and Other Studies: I have personally reviewed pertinent reports.      Code Status:  Prior  Advance Directive and Living Will:      Power of :    POLST:

## 2024-03-28 NOTE — ED NOTES
Patient drank half of contrast dye and stated she couldn't drink anymore. CT tech aware, CT scheduled for 0115.      Mara Ramirez RN  03/28/24 0054

## 2024-03-28 NOTE — SEPSIS NOTE
Sepsis Note   Jud Walls 73 y.o. female MRN: [id number]  Unit/Bed#: ED-07 Encounter: [id number]       Initial Sepsis Screening       Row Name 03/27/24 6960                Is the patient's history suggestive of a new or worsening infection? Yes (Proceed)  -SZ        Suspected source of infection acute abdominal infection  -SZ        Indicate SIRS criteria Leukocytosis (WBC > 32358 IJL) OR Leukopenia (WBC <4000 IJL) OR Bandemia (WBC >10% bands)  -SZ        Are two or more of the above signs & symptoms of infection both present and new to the patient? No  -SZ                  User Key  (r) = Recorded By, (t) = Taken By, (c) = Cosigned By      Initials Name Provider Type    SZ Sarah Patton MD Physician                        Body mass index is 41.85 kg/m².  Wt Readings from Last 1 Encounters:   03/27/24 97.2 kg (214 lb 4.6 oz)        Ideal body weight: 45.5 kg (100 lb 4.9 oz)  Adjusted ideal body weight: 66.2 kg (145 lb 14.4 oz)

## 2024-03-28 NOTE — OP NOTE
OPERATIVE REPORT  PATIENT NAME: Jud Walls    :  1950  MRN: [id number]  Pt Location: AN OR ROOM 01    SURGERY DATE: 3/28/2024    Surgeons and Role:     * Cedrick Lipscomb DO - Primary     * Charles Palacios MD - Assisting    Preop Diagnosis:  Cecal volvulus (HCC) [K56.2]  Incarcerated ventral hernia, greater than 10 cm    Post-Op Diagnosis Codes:     * Cecal volvulus (HCC) [K56.2]  Incarcerated ventral hernia, greater than 10 cm    Procedure(s):  LAPAROTOMY EXPLORATORY. ventral hernia repair no mesh. right hemicolectomy.    Specimen(s):  ID Type Source Tests Collected by Time Destination   1 : EXTENDED RIGHT COLON Tissue Colon TISSUE EXAM Cedrick Lipscomb DO 3/28/2024 0605        Estimated Blood Loss:   100 mL    Drains:  NG/OG/Enteral Tube Nasogastric 18 Fr Left nare (Active)   Placement Reverification Auscultation 24 0446   Number of days: 0       Urethral Catheter Latex 16 Fr. (Active)   Number of days: 0       Anesthesia Type:   General    Operative Indications:  Cecal volvulus (HCC) [K56.2]  Incarcerated ventral hernia    Operative Findings:  Incarcerated ventral hernia with a large amount of redundant right colon within it.  Although colon was healthy in appearance it was enormously stretched out quiring a right colon resection.  Ileocolic anastomosis was performed between the terminal ileum and mid transverse colon.  Repair with suture.    Complications:   None    Procedure and Technique:  Patient was brought the operative suite and identified by visualization, conversation, by armband.  Sequential compression pumps were placed.  She was given Ancef and Flagyl perioperatively.  Once under general anesthesia Campbell catheter was placed under sterile technique.  NG tube have been placed in the emergency room.  Abdomen was prepped and draped in a sterile fashion.  Timeout was performed and was assured that the prep was dry.  Lower midline incision was made around a distended umbilicus.   Carefully got into the subcutaneous tissues and a large hernia sac.  Carefully dissected tissues and each side of the incision away from the hernia sac.  To get into the hernia sac revealing some serosanguineous blood.  Once the entire hernia sac and freed up the surrounding tissues we then opened it up completely and trim this away from the fascia.  This revealed a large portion of colon.  Did have to extend the fascial opening superiorly to fully explore the abdomen.  Upon exploration of the right colon had become redundant pulling the hepatic flexure into the hernia.  Cecum was then delivered terminal ileum was transected with an Endo DREW 75 blue loaded stapler.  There was an appropriate portion of the mid transverse colon which was not involved with the hernia.  This area was also divided with a DREW 75 blue loaded Endo DREW stapler.  Mesentery was then divided carefully with Enseal device.  Left a generous portion of mesentery to assure good blood supply to the remaining colon.  Right colon was handed off the field.  We could feel bounding pulses within the mesentery.  Cut ends completed adequately.  There remained healthy.  Ileocolic anastomosis and performed with a blue loaded DREW 75 mm stapler.  Final enterotomy was closed using a TL 60 stapler.  The stapler was oversewn with 2 oh silks.  Crotch of the staple line was reinforced with a 2-0 silk.  Mesenteric opening was closed using a 3-0 PDS.  Was actually no tension at the anastomosis.  Bowel was then placed back into the abdominal cavity.  NG tube was in the upper portion of the stomach.  Copious irrigation was carried out.  Fascia was then closed using a running #1 PDS suture.  She had a lot of thinned out redundant skin which was trimmed away.  Irrigation of the subcu tissue was carried out.  0 Vicryl was used to reapproximate any subcutaneous tissue.  Skin was closed with skin staples.  Wounds washed and dried.  Dressing and a binder were then applied.   She was awakened in the operating returned to the recovery area in stable condition having tolerated the procedure well.   I was present for the entire procedure.    Patient Disposition:  PACU         SIGNATURE: Cedrick Lipscomb DO  DATE: March 28, 2024  TIME: 6:50 AM

## 2024-03-28 NOTE — ANESTHESIA PREPROCEDURE EVALUATION
Procedure:  LAPAROTOMY EXPLORATORY, ventral hernia repair, possible right hemicolectomy, possible bowel resection, all other indicated procedures (Abdomen)    Relevant Problems   CARDIO   (+) Essential hypertension   (+) Hyperlipidemia      ENDO   (+) Type 2 diabetes mellitus with hyperglycemia, with long-term current use of insulin (HCC)      /RENAL   (+) Kidney lesion      HEMATOLOGY   (+) Anemia      NEURO/PSYCH   (+) Alzheimer's disease (HCC)      PULMONARY   (+) Acute respiratory failure with hypoxia and hypercapnia (HCC)   (+) Asthma-COPD overlap syndrome   (+) Chronic obstructive pulmonary disease with (acute) exacerbation (HCC)   (+) Chronic respiratory failure with hypoxia (HCC)   (+) GILDARDO (obstructive sleep apnea)      Lab Results   Component Value Date     (L) 09/21/2015    SODIUM 141 03/27/2024    K 4.4 03/27/2024    CL 98 03/27/2024    CO2 36 (H) 03/27/2024    ANIONGAP 8 09/21/2015    AGAP 7 03/27/2024    BUN 25 03/27/2024    CREATININE 1.43 (H) 03/27/2024    GLUC 134 03/27/2024    GLUF 171 (H) 06/26/2023    CALCIUM 8.9 03/27/2024    AST 12 (L) 03/27/2024    ALT 12 03/27/2024    ALKPHOS 53 03/27/2024    PROT 7.4 09/21/2015    TP 7.3 03/27/2024    BILITOT 0.61 09/21/2015    TBILI 0.79 03/27/2024    EGFR 36 03/27/2024     Lab Results   Component Value Date    WBC 13.10 (H) 03/27/2024    HGB 10.1 (L) 03/27/2024    HCT 31.7 (L) 03/27/2024    MCV 92 03/27/2024     03/27/2024 9/24/20 TTE  LEFT VENTRICLE: Size was normal. Systolic function was normal. Ejection fraction was estimated to be 55 %. Although no diagnostic regional wall motion abnormality was identified, this possibility cannot be completely excluded on the basis  of this study. No evidence of apical thrombus. DOPPLER: The study was not technically sufficient to allow evaluation of LV diastolic function.     RIGHT VENTRICLE: The size was normal. Systolic function was normal.     LEFT ATRIUM: Size was normal.     RIGHT ATRIUM: Size  was normal.     MITRAL VALVE: Valve structure was normal. There was normal leaflet separation. Not well visualized. DOPPLER: The transmitral velocity was within the normal range. There was no evidence for stenosis.     AORTIC VALVE: The valve was trileaflet. Leaflets exhibited normal thickness and normal cuspal separation. DOPPLER: Transaortic velocity was within the normal range. There was no evidence for stenosis. There was no significant  regurgitation.     TRICUSPID VALVE: The valve structure was normal. There was normal leaflet separation. DOPPLER: The transtricuspid velocity was within the normal range. There was no evidence for stenosis. There was no significant regurgitation. The  tricuspid jet envelope definition was inadequate for estimation of RV systolic pressure.     PULMONIC VALVE: Not well visualized.     PERICARDIUM: There was no pericardial effusion. A pericardial fat pad was present.     AORTA: The root exhibited normal size.     SYSTEMIC VEINS: IVC: The inferior vena cava was not well visualized.     Physical Exam    Airway    Mallampati score: III  TM Distance: <3 FB  Neck ROM: full     Dental       Cardiovascular      Pulmonary      Other Findings  post-pubertal.      Anesthesia Plan  ASA Score- 3 Emergent    Anesthesia Type- general with ASA Monitors.         Additional Monitors:     Airway Plan: ETT.           Plan Factors-Exercise tolerance (METS): <4 METS.    Chart reviewed. EKG reviewed. Imaging results reviewed. Existing labs reviewed. Patient summary reviewed.                  Induction- intravenous.    Postoperative Plan- Plan for postoperative opioid use. Planned trial extubation    Informed Consent- Anesthetic plan and risks discussed with patient.  I personally reviewed this patient with the CRNA. Discussed and agreed on the Anesthesia Plan with the CRNA..

## 2024-03-28 NOTE — PLAN OF CARE
Problem: OCCUPATIONAL THERAPY ADULT  Goal: Performs self-care activities at highest level of function for planned discharge setting.  See evaluation for individualized goals.  Description: Treatment Interventions: ADL retraining, Functional transfer training, Endurance training, Cognitive reorientation, Patient/family training, Equipment evaluation/education, Compensatory technique education, Continued evaluation, Energy conservation, Activityengagement          See flowsheet documentation for full assessment, interventions and recommendations.   Note: Limitation: Decreased ADL status, Decreased Safe judgement during ADL, Decreased cognition, Decreased endurance, Decreased self-care trans, Decreased high-level ADLs (pain, balance, fxnl mobility, act vi, fxnl reach, trunk control, standing vi, strength, fxnl sitting balance, and fxnl sitting vi, safety awareness, insight, orientation, learning new tasks, and initiation, response time)  Prognosis: Good  Assessment: Pt is a 73 y.o. female seen for OT evaluation s/p admit to [facility] on 3/27/2024 w/Cecal volvulus (HCC). Prior to admission, pt was living at [facility] for LTC, (A) with ADLs/IADLs, A x 2 for transfer to w/c, (-) falls. Personal and environmental factors affecting patient at time of evaluation include current habits and behavioral patterns, difficulty completing ADLs, and difficulty completing IADLs. Personal factors supporting patient at time of evaluation include supportive facility staff, accessible home environment, and (A) with all ADLs. Based upon this evaluation, pt is functioning below baseline currently requiring A x 2 to perform ADLs/all aspects of mobility and is limited by new onset pain s/p ex-lap. Pt will benefit from continued skilled inpatient OT to maximize safety, level of independence overall performance in ADLs, functional transfers, functional mobility to return to functional baseline/highest level of function.      Rehab Resource Intensity Level, OT: II (Moderate Resource Intensity) (Given pt cognitive deficits, pt may benefit from return to SNF w/ continued assistance for ADLs/mobility nad familiar environment/routines. If facility is unable to provide adequate assistance, recommend DC to higher level of care.)     Regi Hooper, OTD, OTR/L  [place] License [id number]  [place] License [id number]

## 2024-03-28 NOTE — CASE MANAGEMENT
Case Management Assessment & Discharge Planning Note    Patient name Jud Walls  Location ICU 13/ICU 13 MRN [id number]  : 1950 Date 3/28/2024       Current Admission Date: 3/27/2024  Current Admission Diagnosis:Cecal volvulus (HCC)   Patient Active Problem List    Diagnosis Date Noted    Cecal volvulus (HCC) 2024    Incarcerated hernia 2024    Stage 3b chronic kidney disease (HCC) 2024    Chronic obstructive pulmonary disease with (acute) exacerbation (HCC) 01/15/2024    Neuropathy 2023    Sore throat 10/31/2023    Chronic respiratory failure with hypoxia and hypercapnea (HCC) 2022    Diabetic ulcer of left midfoot associated with type 2 diabetes mellitus (HCC) 2022    Anemia 2022    GILDARDO (obstructive sleep apnea) 2022    Abscess of abdominal wall 01/10/2022    Sacral ulcer (HCC) 2021    Nocturnal hypoxia 2020    Kidney lesion 2020    Weakness generalized 10/14/2020    S/P tooth extraction 2020    Hyperlipidemia 2020    Asthma-COPD overlap syndrome 2020    Hoarse voice quality 2019    Seasonal allergies 2019    Need for pneumococcal vaccination 2019    MCI (mild cognitive impairment) 2018    Fall 2018    Injury of face 2018    Sixth nerve palsy of left eye 2018    Ptosis of left eyelid 2018    Alzheimer's dementia (HCC) 10/15/2018    Pulmonary nodule 10/15/2018    Preop pulmonary/respiratory exam 10/15/2018    Mental status change 2017    Essential hypertension 2017    Acute respiratory failure with hypoxia and hypercapnia (HCC) 2017    Diabetes (HCC) 2017    Leukocytosis 2017      LOS (days): 0  Geometric Mean LOS (GMLOS) (days): 9.8  Days to GMLOS:9.5     OBJECTIVE:    Risk of Unplanned Readmission Score: 24.22         Current admission status: Inpatient       Preferred Pharmacy:   [id number] - [place] - [address] [address]  [address] [facility] [place] [address]  Phone: [phone] Fax: [phone]    [facility] - [address]  [address]  [address]  [place] [address]  Phone: [phone] Fax: [phone]    Primary Care Provider: Mary Posey MD    Primary Insurance: MEDICARE  Secondary Insurance: [email or web address] [facility]    ASSESSMENT:  Active Health Care Proxies    There are no active Health Care Proxies on file.       Patient Information  Admitted from:: Facility ([facility])  Mental Status: Alert  During Assessment patient was accompanied by: Not accompanied during assessment  Assessment information provided by:: Other - please comment  Primary Caregiver: Other (Comment)  Caregiver's Name:: Marcelo  Support Systems: Family members  County of Residence: [place]  What [facility] do you live in?: [place]  Type of Current Residence: Facility    Activities of Daily Living Prior to Admission  Functional Status: Assistance  Completes ADLs independently?: No  Level of ADL dependence: Assistance  Ambulates independently?: No  Level of ambulatory dependence: Assistance (Pt does transfer wtih twp person assistance or a sit to stand. Able to self propel her w/c.)  Does patient use assisted devices?: Yes  Assisted Devices (DME) used: Wheelchair    Patient Information Continued  Does patient have prescription coverage?: Yes  Does patient receive dialysis treatments?: No  Does patient have a history of substance abuse?: No    DISCHARGE DETAILS:    Discharge planning discussed with:: Pt  Freedom of Choice: Yes  Comments - Freedom of Choice: Return to [facility]    Other Referral/Resources/Interventions Provided:  Interventions: SNF  Referral Comments: Chart reviewed. Pt admitted from [facility]. CM spoke with [facility] and pt is alert and oriented at baseline. Pt is an assist of 2 vs sit to stand to her w/c. Pt is able to feed self. CM met with pt and she reports plan is to return [facility].  Pt reports [facility] is her POA, but she would like her brother, Alba, updated. [facility]

## 2024-03-28 NOTE — QUICK NOTE
Post op Check      S: Doing well. Pain well controlled with PCA. Denies n/v. No acute complaints. On 2L NC which is baseline for her.      O: /67 (BP Location: Left arm)   Pulse 92   Temp 99 °F (37.2 °C) (Oral)   Resp 16   Wt 97.2 kg (214 lb 4.6 oz)   SpO2 92%   BMI 41.85 kg/m²     Physical Exam:  General: AAOx3, no acute distress  Heart: regular rate  Lungs: normal work of breathing, no acute respiratory distress. On 2L NC  Abdomen: soft. Appropriately tender. Mepilex in place, minimal strike through. Otherwise CDI. Ngt in place, bilious output  Skin: warm, well perfused  Extremities: no tenderness or edema  : mondragon in place, light yellow urine      Assessment:  73 y.o. female POD#0 s/p Procedure(s) (LRB):  LAPAROTOMY EXPLORATORY, ventral hernia repair no mesh, Extended hemicolectomy, (N/A)  AVSS on 2L (baseline)    UOP: 500 cc since OR  NGT: 300 cc since OR      Plan:  - NPO/NGT  - mondragon  - PCA for pain control  - ancef/flagyl x2 doses postop  - PPI  - insulin gtt per ICU  - ck am labs  - PT/OT: level 2  - dvt ppx  - encourage deep breathing, IS      Cristel Higgins PA-C  3/28/2024 4:30 PM

## 2024-03-28 NOTE — ASSESSMENT & PLAN NOTE
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 Surrounding mesenteric inflammatory edema and reactive ascites within the hernia sac as well as adjacent to markedly distended colon loops proximal to the hernia sac are seen. No gross free air or pneumatosis intestinalis. Emergent surgical consultation recommended. Secondary appendicitis is also seen. Additional ancillary findings detailed above.   S/p Ex-lap, extended R hemicolectomy, ventral hernia repair 3/28 POD 0  Plan:  Serial abdominal checks  NPO, NG to low continuous suction.

## 2024-03-28 NOTE — ED PROVIDER NOTES
"History  Chief Complaint   Patient presents with    Vomiting     Pt presents via EMS from [facility] vomiting all day today, hx of abdominal hernia, chronic 2L NC use. Abd xray today showed possible \"bowel twist and partial obstruction r/t intermittent sigmoid volvulus\"     72 yo F presents to the ED from her nursing residential facility.  She's had intractable n/v today w/ brown emesis. Abdomen began feeling bloated yesterday & she appreciates discomfort maximally \"at my hernia.\"  Unable to tolerate PO.  No fever.  Last BM was unremarkable & passed yesterday.  She has passed some flatus today.  No urinary symptoms.  Has chronic unchanged cough- productive of brown sputum.  She had zofran prior to arrival w/ minimal effect.  Outpt. Xray revealed gaseous distention of intestines & concern for ileus vs. Partial TIP from intermittent volvulus.    No hx of B.O.    PMHx: asthma, DM, GERD, HTN, HLD, CKD.  Admitted in 12/23 for sepsis from UTI    Chart reviewed.  CT 2021: Large L lower ventral wall hernia noted to be stable.        Prior to Admission Medications   Prescriptions Last Dose Informant Patient Reported? Taking?   Advair Diskus 500-50 MCG/DOSE inhaler  Outside Facility (Specify) Yes No   Cholecalciferol (VITAMIN D3) 59935 units CAPS  Outside Facility (Specify) Yes No   Sig: Take 50,000 Units by mouth every 30 (thirty) days   Glucose Blood (BL TEST STRIP PACK VI)  Outside Facility (Specify) Yes No   Sig: Test   Insulin Glargine Solostar (Basaglar KwikPen) 100 UNIT/ML SOPN  Outside Facility (Specify) No No   Sig: Inject 60 units under the skin every 12 hours   Melatonin 5 MG TABS  Outside Facility (Specify) Yes No   Omeprazole 20 MG TBEC  Outside Facility (Specify) Yes No   Sig: Take 20 mg by mouth daily   acetaminophen (TYLENOL) 325 mg tablet  Outside Facility (Specify) No No   Sig: Take 3 tablets (975 mg total) by mouth every 8 (eight) hours   ascorbic acid (VITAMIN C) 500 mg tablet  Outside Facility (Specify) Yes " No   aspirin (ECOTRIN LOW STRENGTH) 81 mg EC tablet  Outside Facility (Specify) Yes No   benzonatate (TESSALON) 200 MG capsule  Outside Facility (Specify) Yes No   Sig: Take 200 mg by mouth 3 (three) times a day as needed for cough   busPIRone (BUSPAR) 5 mg tablet  Outside Facility (Specify) Yes No   Sig: Take 5 mg by mouth 2 (two) times a day   Patient not taking: Reported on 1/15/2024   cetirizine (ZyrTEC) 10 mg tablet  Outside Facility (Specify) Yes No   Sig: Take 10 mg by mouth daily   Patient not taking: Reported on 1/15/2024   clonazePAM (KlonoPIN) 0.5 mg tablet  Outside Facility (Specify) Yes No   dulaglutide (Trulicity) 3 MG/0.5ML injection  Outside Facility (Specify) No No   Sig: Inject 0.5 mL (3 mg total) under the skin once a week   escitalopram (LEXAPRO) 10 mg tablet  Outside Facility (Specify) Yes No   Sig: Take 10 mg by mouth daily   ferrous gluconate (FERGON) 324 mg tablet  Outside Facility (Specify) No No   Sig: Take 1 tablet (324 mg total) by mouth daily before breakfast   fluticasone (FLONASE) 50 mcg/act nasal spray  Outside Facility (Specify) No No   Si spray into each nostril daily   gabapentin (NEURONTIN) 300 mg capsule  Outside Facility (Specify) Yes No   Sig: Take 300 mg by mouth 2 (two) times a day   glucose blood test strip  Outside Facility (Specify) Yes No   Sig: Test   guaiFENesin (MUCINEX) 600 mg 12 hr tablet  Outside Facility (Specify) Yes No   Sig: Take 1,200 mg by mouth every 12 (twelve) hours   insulin lispro (HumaLOG) 100 units/mL injection  Outside Facility (Specify) No No   Sig: Inject 30 units before meals + scale   ipratropium-albuterol (DUO-NEB) 0.5-2.5 mg/3 mL nebulizer solution  Outside Facility (Specify) Yes No   levalbuterol (XOPENEX) 1.25 mg/3 mL nebulizer solution  Outside Facility (Specify) Yes No   losartan (COZAAR) 25 mg tablet  Outside Facility (Specify) No No   Sig: Take 1 tablet (25 mg total) by mouth daily Do not start before 2023.   magnesium  hydroxide (MILK OF MAGNESIA) 400 mg/5 mL oral suspension  Outside Facility (Specify) Yes No   Sig: Take by mouth daily as needed for constipation   memantine (NAMENDA) 10 mg tablet  Outside Facility (Specify) Yes No   Sig: 10 mg 2 (two) times a day   montelukast (SINGULAIR) 10 mg tablet  Outside Facility (Specify) Yes No   Sig: Take 10 mg by mouth daily at bedtime   simvastatin (ZOCOR) 40 mg tablet  Outside Facility (Specify) No No   Sig: Take 1 tablet by mouth daily at bedtime   traZODone (DESYREL) 150 mg tablet  Outside Facility (Specify) Yes No      Facility-Administered Medications: None       Past Medical History:   Diagnosis Date    Alzheimer disease (HCC)     Asthma     Diabetes mellitus (HCC)     GERD (gastroesophageal reflux disease)     Hyperlipidemia     Hypertension        Past Surgical History:   Procedure Laterality Date    JOINT REPLACEMENT Bilateral     KNEE SURGERY      TOE SURGERY         Family History   Problem Relation Age of Onset    Dementia Brother     Breast cancer Sister 75    Cancer Brother      I have reviewed and agree with the history as documented.    E-Cigarette/Vaping    E-Cigarette Use Never User      E-Cigarette/Vaping Substances    Nicotine No     THC No     CBD No     Flavoring No     Other No      Social History     Tobacco Use    Smoking status: Former     Current packs/day: 0.00     Average packs/day: 1 pack/day for 52.7 years (52.7 ttl pk-yrs)     Types: Cigarettes     Start date:      Quit date: 2017     Years since quittin.5    Smokeless tobacco: Never   Vaping Use    Vaping status: Never Used   Substance Use Topics    Alcohol use: Not Currently    Drug use: No       Review of Systems   Constitutional:  Negative for fever.   All other systems reviewed and are negative.      Physical Exam  Physical Exam  Vitals and nursing note reviewed.   Constitutional:       Appearance: She is obese. She is ill-appearing.      Comments: Feculent odor on breath   HENT:       Head: Normocephalic.      Mouth/Throat:      Mouth: Mucous membranes are moist.   Eyes:      General: No scleral icterus.     Extraocular Movements: Extraocular movements intact.      Conjunctiva/sclera: Conjunctivae normal.   Cardiovascular:      Rate and Rhythm: Normal rate and regular rhythm.      Heart sounds: Normal heart sounds.   Pulmonary:      Effort: Pulmonary effort is normal.      Breath sounds: Wheezing (diffuse insp & exp, mild) present.   Abdominal:      General: There is distension.      Tenderness: There is abdominal tenderness (diffuse, maximal over umbilical/ ventral protruberance (mildly purple on coloring, soft, not reducible)). There is no right CVA tenderness, left CVA tenderness, guarding or rebound.      Hernia: A hernia is present.      Comments: Distended, abdomen tympanic, absent bowel sounds   Musculoskeletal:         General: No tenderness.      Right lower leg: No edema.      Left lower leg: No edema.   Skin:     General: Skin is warm and dry.   Neurological:      Mental Status: She is alert and oriented to person, place, and time.   Psychiatric:         Mood and Affect: Mood normal.         Vital Signs  ED Triage Vitals   Temperature Pulse Respirations Blood Pressure SpO2   03/27/24 2310 03/27/24 2310 03/27/24 2310 03/27/24 2310 03/27/24 2310   97.8 °F (36.6 °C) 66 20 156/70 94 %      Temp Source Heart Rate Source Patient Position - Orthostatic VS BP Location FiO2 (%)   03/27/24 2310 03/27/24 2310 03/27/24 2310 03/27/24 2310 --   Oral Monitor Sitting Right arm       Pain Score       03/27/24 2329       5           Vitals:    03/27/24 2310 03/27/24 2330 03/28/24 0000 03/28/24 0030   BP: 156/70 168/71 150/68 152/64   Pulse: 66 82 82 82   Patient Position - Orthostatic VS: Sitting Sitting Sitting Sitting         Visual Acuity      ED Medications  Medications   ondansetron (ZOFRAN) injection 4 mg (4 mg Intravenous Given 3/27/24 2327)   fentaNYL injection 50 mcg (50 mcg Intravenous Given  3/27/24 2329)   albuterol inhalation solution 2.5 mg (2.5 mg Nebulization Given 3/27/24 2332)   sodium chloride 0.9 % bolus 500 mL (0 mL Intravenous Stopped 3/28/24 0035)   iohexol (OMNIPAQUE) 240 MG/ML solution 50 mL (50 mL Oral Given 3/27/24 2359)       Diagnostic Studies  Results Reviewed       Procedure Component Value Units Date/Time    Lactic acid, plasma (w/reflex if result > 2.0) [[id number]]  (Normal) Collected: 03/27/24 2323    Lab Status: Final result Specimen: Blood from Arm, Right Updated: 03/27/24 2348     LACTIC ACID 1.0 mmol/L     Narrative:      Result may be elevated if tourniquet was used during collection.    Comprehensive metabolic panel [[id number]]  (Abnormal) Collected: 03/27/24 2323    Lab Status: Final result Specimen: Blood from Arm, Right Updated: 03/27/24 2348     Sodium 141 mmol/L      Potassium 4.4 mmol/L      Chloride 98 mmol/L      CO2 36 mmol/L      ANION GAP 7 mmol/L      BUN 25 mg/dL      Creatinine 1.43 mg/dL      Glucose 134 mg/dL      Calcium 8.9 mg/dL      AST 12 U/L      ALT 12 U/L      Alkaline Phosphatase 53 U/L      Total Protein 7.3 g/dL      Albumin 3.8 g/dL      Total Bilirubin 0.79 mg/dL      eGFR 36 ml/min/1.73sq m     Narrative:      National Kid[employer] Disease Foundation guidelines for Chronic Kidney Disease (CKD):     Stage 1 with normal or high GFR (GFR > 90 mL/min/1.73 square meters)    Stage 2 Mild CKD (GFR = 60-89 mL/min/1.73 square meters)    Stage 3A Moderate CKD (GFR = 45-59 mL/min/1.73 square meters)    Stage 3B Moderate CKD (GFR = 30-44 mL/min/1.73 square meters)    Stage 4 Severe CKD (GFR = 15-29 mL/min/1.73 square meters)    Stage 5 End Stage CKD (GFR <15 mL/min/1.73 square meters)  Note: GFR calculation is accurate only with a steady state creatinine    Lipase [[id number]]  (Normal) Collected: 03/27/24 2323    Lab Status: Final result Specimen: Blood from Arm, Right Updated: 03/27/24 2348     Lipase 24 u/L     Protime-INR [[id number]]  (Normal) Collected:  03/27/24 2323    Lab Status: Final result Specimen: Blood from Arm, Right Updated: 03/27/24 2344     Protime 13.6 seconds      INR 0.98    CBC and differential [[id number]]  (Abnormal) Collected: 03/27/24 2323    Lab Status: Final result Specimen: Blood from Arm, Right Updated: 03/27/24 2338     WBC 13.10 Thousand/uL      RBC 3.43 Million/uL      Hemoglobin 10.1 g/dL      Hematocrit 31.7 %      MCV 92 fL      MCH 29.4 pg      MCHC 31.9 g/dL      RDW 15.8 %      MPV 10.0 fL      Platelets 167 Thousands/uL      nRBC 0 /100 WBCs      Neutrophils Relative 81 %      Immature Grans % 1 %      Lymphocytes Relative 10 %      Monocytes Relative 7 %      Eosinophils Relative 1 %      Basophils Relative 0 %      Neutrophils Absolute 10.65 Thousands/µL      Absolute Immature Grans 0.12 Thousand/uL      Absolute Lymphocytes 1.28 Thousands/µL      Absolute Monocytes 0.91 Thousand/µL      Eosinophils Absolute 0.11 Thousand/µL      Basophils Absolute 0.03 Thousands/µL     UA (URINE) with reflex to Scope [[id number]]     Lab Status: No result Specimen: Urine                    CT abdomen pelvis with contrast    (Results Pending)              Procedures  Procedures         ED Course  ED Course as of 03/28/24 0104   Wed Mar 27, 2024   2321 DDX: bowel obstruction (partial or full SBO vs. Volvulus) vs. Ileus vs. Incarcerated hernia, less likely diverticulitis, colitis, gastroenteritis, gastritis, UTI/ pyelo   2338 Pt. W/ CKD & GFR in 30s at baseline.  Will attempt PO contrast as GFR may be further impaired given protracted vomiting.   2340 + leukocytosis, Hgb stable   2354 GFR at baseline.  Lactic acid wnl.  Await CT.  Care signed out to Dr. Mosley                            Initial Sepsis Screening       Row Name 03/27/24 2340                Is the patient's history suggestive of a new or worsening infection? Yes (Proceed)  -SZ        Suspected source of infection acute abdominal infection  -SZ        Indicate SIRS criteria Leukocytosis  (WBC > 60623 IJL) OR Leukopenia (WBC <4000 IJL) OR Bandemia (WBC >10% bands)  -SZ        Are two or more of the above signs & symptoms of infection both present and new to the patient? No  -SZ                  User Key  (r) = Recorded By, (t) = Taken By, (c) = Cosigned By      Initials Name Provider Type    SOCORRO Patton MD Physician                    SBIRT 20yo+      Flowsheet Row Most Recent Value   Initial Alcohol Screen: US AUDIT-C     1. How often do you have a drink containing alcohol? 0 Filed at: 03/27/2024 2310   2. How many drinks containing alcohol do you have on a typical day you are drinking?  0 Filed at: 03/27/2024 2310   3b. FEMALE Any Age, or MALE 65+: How often do you have 4 or more drinks on one occassion? 0 Filed at: 03/27/2024 2310   Audit-C Score 0 Filed at: 03/27/2024 2310   KELI: How many times in the past year have you...    Used an illegal drug or used a prescription medication for non-medical reasons? Never Filed at: 03/27/2024 2310                      Medical Decision Making  Amount and/or Complexity of Data Reviewed  Labs: ordered.  Radiology: ordered.    Risk  Prescription drug management.             Disposition  Final diagnoses:   None     ED Disposition       None          Follow-up Information    None         Patient's Medications   Discharge Prescriptions    No medications on file       No discharge procedures on file.    PDMP Review         Value Time User    PDMP Reviewed  Yes 9/25/2020 12:01 PM Michael Kaur MD            ED Provider  Electronically Signed by             Sarah Patton MD  03/28/24 0104

## 2024-03-28 NOTE — ED CARE HANDOFF
Emergency Department Sign Out Note        Sign out and transfer of care from Dr. Patton. See Separate Emergency Department note.     The patient, Jud Walls, was evaluated by the previous provider for abdominal pain.    Workup Completed:  Labs    ED Course / Workup Pending (followup):  CT                                  ED Course as of 03/28/24 0518   Wed Mar 27, 2024   2351 S/O: Vomiting, distended abdomen -- pending CT r/o SBO.   Thu Mar 28, 2024   0226 Radiologist called noting volvulus involving hernia. Will discuss with surgical team for prompt evaluation.   0303 CT shows:    2 focal areas of large bowel volvulus involving the cecum/proximal ascending colon as well as a loop of mid transverse colon due to entrapment within the incarcerated ventral lower abdominal hernia sac as discussed above. Surrounding mesenteric   inflammatory edema and reactive ascites within the hernia sac as well as adjacent to markedly distended colon loops proximal to the hernia sac are seen. No gross free air or pneumatosis intestinalis. Emergent surgical consultation recommended. Secondary   appendicitis is also seen.        Patient evaluated by surgical team and taken to the OR for definitive management.      Procedures  Medical Decision Making  Amount and/or Complexity of Data Reviewed  Labs: ordered.  Radiology: ordered.    Risk  Prescription drug management.  Decision regarding hospitalization.            Disposition  Final diagnoses:   Volvulus of colon (HCC)     Time reflects when diagnosis was documented in both MDM as applicable and the Disposition within this note       Time User Action Codes Description Comment    3/28/2024  4:17 AM Charles Palacios Add [K56.2] Cecal volvulus (HCC)     3/28/2024  4:36 AM Elliott Mosley Add [K56.2] Volvulus of colon (HCC)           ED Disposition       ED Disposition   Send to OR    Condition   --    Date/Time   Thu Mar 28, 2024  4:36 AM    Comment   --             Follow-up  Information    None       Current Discharge Medication List        CONTINUE these medications which have NOT CHANGED    Details   acetaminophen (TYLENOL) 325 mg tablet Take 3 tablets (975 mg total) by mouth every 8 (eight) hours  Qty: 30 tablet, Refills: 0    Associated Diagnoses: Chronic pain syndrome      Advair Diskus 500-50 MCG/DOSE inhaler       ascorbic acid (VITAMIN C) 500 mg tablet       aspirin (ECOTRIN LOW STRENGTH) 81 mg EC tablet       benzonatate (TESSALON) 200 MG capsule Take 200 mg by mouth 3 (three) times a day as needed for cough      busPIRone (BUSPAR) 5 mg tablet Take 5 mg by mouth 2 (two) times a day      cetirizine (ZyrTEC) 10 mg tablet Take 10 mg by mouth daily      Cholecalciferol (VITAMIN D3) 74592 units CAPS Take 50,000 Units by mouth every 30 (thirty) days      clonazePAM (KlonoPIN) 0.5 mg tablet       dulaglutide (Trulicity) 3 MG/0.5ML injection Inject 0.5 mL (3 mg total) under the skin once a week  Qty: 2 mL, Refills: 1    Associated Diagnoses: Type 2 diabetes mellitus with hyperglycemia, with long-term current use of insulin (HCC)      escitalopram (LEXAPRO) 10 mg tablet Take 10 mg by mouth daily      ferrous gluconate (FERGON) 324 mg tablet Take 1 tablet (324 mg total) by mouth daily before breakfast  Refills: 0    Associated Diagnoses: Anemia, unspecified type      fluticasone (FLONASE) 50 mcg/act nasal spray 1 spray into each nostril daily  Qty: 1 Bottle, Refills: 5    Associated Diagnoses: Seasonal allergies      gabapentin (NEURONTIN) 300 mg capsule Take 300 mg by mouth 2 (two) times a day      !! Glucose Blood (BL TEST STRIP PACK VI) Test      !! glucose blood test strip Test      guaiFENesin (MUCINEX) 600 mg 12 hr tablet Take 1,200 mg by mouth every 12 (twelve) hours      Insulin Glargine Solostar (Basaglar KwikPen) 100 UNIT/ML SOPN Inject 60 units under the skin every 12 hours    Associated Diagnoses: Type 2 diabetes mellitus with hyperglycemia, with long-term current use of  insulin (HCC)      insulin lispro (HumaLOG) 100 units/mL injection Inject 30 units before meals + scale    Associated Diagnoses: Type 2 diabetes mellitus with foot ulcer, with long-term current use of insulin (HCC)      ipratropium-albuterol (DUO-NEB) 0.5-2.5 mg/3 mL nebulizer solution       levalbuterol (XOPENEX) 1.25 mg/3 mL nebulizer solution       losartan (COZAAR) 25 mg tablet Take 1 tablet (25 mg total) by mouth daily Do not start before December 12, 2023.  Refills: 0    Associated Diagnoses: Hypertension, unspecified type      magnesium hydroxide (MILK OF MAGNESIA) 400 mg/5 mL oral suspension Take by mouth daily as needed for constipation      Melatonin 5 MG TABS       memantine (NAMENDA) 10 mg tablet 10 mg 2 (two) times a day      montelukast (SINGULAIR) 10 mg tablet Take 10 mg by mouth daily at bedtime      Omeprazole 20 MG TBEC Take 20 mg by mouth daily      simvastatin (ZOCOR) 40 mg tablet Take 1 tablet by mouth daily at bedtime  Refills: 0      traZODone (DESYREL) 150 mg tablet        !! - Potential duplicate medications found. Please discuss with provider.        No discharge procedures on file.       ED Provider  Electronically Signed by     Elliott Mosley MD  03/28/24 2537

## 2024-03-29 ENCOUNTER — APPOINTMENT (INPATIENT)
Dept: RADIOLOGY | Facility: HOSPITAL | Age: 74
DRG: 329 | End: 2024-03-29
Payer: MEDICARE

## 2024-03-29 PROBLEM — J96.21 ACUTE ON CHRONIC RESPIRATORY FAILURE WITH HYPOXIA AND HYPERCAPNIA (HCC): Status: ACTIVE | Noted: 2022-06-25

## 2024-03-29 PROBLEM — J96.22 ACUTE ON CHRONIC RESPIRATORY FAILURE WITH HYPOXIA AND HYPERCAPNIA (HCC): Status: ACTIVE | Noted: 2022-06-25

## 2024-03-29 PROBLEM — J90 PLEURAL EFFUSION: Status: ACTIVE | Noted: 2024-03-29

## 2024-03-29 LAB
ALBUMIN SERPL BCP-MCNC: 3.2 G/DL (ref 3.5–5)
ALP SERPL-CCNC: 39 U/L (ref 34–104)
ALT SERPL W P-5'-P-CCNC: 6 U/L (ref 7–52)
ANION GAP SERPL CALCULATED.3IONS-SCNC: 4 MMOL/L (ref 4–13)
ANION GAP SERPL CALCULATED.3IONS-SCNC: 4 MMOL/L (ref 4–13)
AST SERPL W P-5'-P-CCNC: 7 U/L (ref 13–39)
BACTERIA UR QL AUTO: ABNORMAL /HPF
BASE EX.OXY STD BLDV CALC-SCNC: 68.6 % (ref 60–80)
BASE EXCESS BLDV CALC-SCNC: 12.7 MMOL/L
BASOPHILS # BLD AUTO: 0.02 THOUSANDS/ÂΜL (ref 0–0.1)
BASOPHILS # BLD AUTO: 0.02 THOUSANDS/ÂΜL (ref 0–0.1)
BASOPHILS NFR BLD AUTO: 0 % (ref 0–1)
BASOPHILS NFR BLD AUTO: 0 % (ref 0–1)
BILIRUB SERPL-MCNC: 0.41 MG/DL (ref 0.2–1)
BILIRUB UR QL STRIP: NEGATIVE
BUN SERPL-MCNC: 21 MG/DL (ref 5–25)
BUN SERPL-MCNC: 21 MG/DL (ref 5–25)
CA-I BLD-SCNC: 1.01 MMOL/L (ref 1.12–1.32)
CALCIUM ALBUM COR SERPL-MCNC: 8.3 MG/DL (ref 8.3–10.1)
CALCIUM SERPL-MCNC: 7.7 MG/DL (ref 8.4–10.2)
CALCIUM SERPL-MCNC: 7.7 MG/DL (ref 8.4–10.2)
CHLORIDE SERPL-SCNC: 103 MMOL/L (ref 96–108)
CHLORIDE SERPL-SCNC: 103 MMOL/L (ref 96–108)
CLARITY UR: CLEAR
CO2 SERPL-SCNC: 38 MMOL/L (ref 21–32)
CO2 SERPL-SCNC: 38 MMOL/L (ref 21–32)
COLOR UR: ABNORMAL
CREAT SERPL-MCNC: 1.48 MG/DL (ref 0.6–1.3)
CREAT SERPL-MCNC: 1.48 MG/DL (ref 0.6–1.3)
EOSINOPHIL # BLD AUTO: 0.04 THOUSAND/ÂΜL (ref 0–0.61)
EOSINOPHIL # BLD AUTO: 0.05 THOUSAND/ÂΜL (ref 0–0.61)
EOSINOPHIL NFR BLD AUTO: 0 % (ref 0–6)
EOSINOPHIL NFR BLD AUTO: 0 % (ref 0–6)
ERYTHROCYTE [DISTWIDTH] IN BLOOD BY AUTOMATED COUNT: 16 % (ref 11.6–15.1)
ERYTHROCYTE [DISTWIDTH] IN BLOOD BY AUTOMATED COUNT: 16.1 % (ref 11.6–15.1)
FLUAV RNA RESP QL NAA+PROBE: NEGATIVE
FLUBV RNA RESP QL NAA+PROBE: NEGATIVE
GFR SERPL CREATININE-BSD FRML MDRD: 34 ML/MIN/1.73SQ M
GFR SERPL CREATININE-BSD FRML MDRD: 34 ML/MIN/1.73SQ M
GLUCOSE SERPL-MCNC: 138 MG/DL (ref 65–140)
GLUCOSE SERPL-MCNC: 138 MG/DL (ref 65–140)
GLUCOSE SERPL-MCNC: 143 MG/DL (ref 65–140)
GLUCOSE SERPL-MCNC: 146 MG/DL (ref 65–140)
GLUCOSE SERPL-MCNC: 147 MG/DL (ref 65–140)
GLUCOSE SERPL-MCNC: 150 MG/DL (ref 65–140)
GLUCOSE SERPL-MCNC: 151 MG/DL (ref 65–140)
GLUCOSE SERPL-MCNC: 163 MG/DL (ref 65–140)
GLUCOSE SERPL-MCNC: 167 MG/DL (ref 65–140)
GLUCOSE SERPL-MCNC: 178 MG/DL (ref 65–140)
GLUCOSE SERPL-MCNC: 196 MG/DL (ref 65–140)
GLUCOSE SERPL-MCNC: 207 MG/DL (ref 65–140)
GLUCOSE UR STRIP-MCNC: ABNORMAL MG/DL
HCO3 BLDV-SCNC: 40.4 MMOL/L (ref 24–30)
HCT VFR BLD AUTO: 24.9 % (ref 34.8–46.1)
HCT VFR BLD AUTO: 24.9 % (ref 34.8–46.1)
HGB BLD-MCNC: 7.5 G/DL (ref 11.5–15.4)
HGB BLD-MCNC: 7.5 G/DL (ref 11.5–15.4)
HGB BLD-MCNC: 7.6 G/DL (ref 11.5–15.4)
HGB BLD-MCNC: 7.6 G/DL (ref 11.5–15.4)
HGB UR QL STRIP.AUTO: ABNORMAL
IMM GRANULOCYTES # BLD AUTO: 0.08 THOUSAND/UL (ref 0–0.2)
IMM GRANULOCYTES # BLD AUTO: 0.1 THOUSAND/UL (ref 0–0.2)
IMM GRANULOCYTES NFR BLD AUTO: 1 % (ref 0–2)
IMM GRANULOCYTES NFR BLD AUTO: 1 % (ref 0–2)
KETONES UR STRIP-MCNC: NEGATIVE MG/DL
LACTATE SERPL-SCNC: 1 MMOL/L (ref 0.5–2)
LEUKOCYTE ESTERASE UR QL STRIP: NEGATIVE
LYMPHOCYTES # BLD AUTO: 0.84 THOUSANDS/ÂΜL (ref 0.6–4.47)
LYMPHOCYTES # BLD AUTO: 0.85 THOUSANDS/ÂΜL (ref 0.6–4.47)
LYMPHOCYTES NFR BLD AUTO: 6 % (ref 14–44)
LYMPHOCYTES NFR BLD AUTO: 7 % (ref 14–44)
MAGNESIUM SERPL-MCNC: 2.4 MG/DL (ref 1.9–2.7)
MAGNESIUM SERPL-MCNC: 2.5 MG/DL (ref 1.9–2.7)
MCH RBC QN AUTO: 29.2 PG (ref 26.8–34.3)
MCH RBC QN AUTO: 29.7 PG (ref 26.8–34.3)
MCHC RBC AUTO-ENTMCNC: 30.5 G/DL (ref 31.4–37.4)
MCHC RBC AUTO-ENTMCNC: 30.5 G/DL (ref 31.4–37.4)
MCV RBC AUTO: 96 FL (ref 82–98)
MCV RBC AUTO: 97 FL (ref 82–98)
MONOCYTES # BLD AUTO: 0.87 THOUSAND/ÂΜL (ref 0.17–1.22)
MONOCYTES # BLD AUTO: 1.02 THOUSAND/ÂΜL (ref 0.17–1.22)
MONOCYTES NFR BLD AUTO: 6 % (ref 4–12)
MONOCYTES NFR BLD AUTO: 8 % (ref 4–12)
NEUTROPHILS # BLD AUTO: 10.14 THOUSANDS/ÂΜL (ref 1.85–7.62)
NEUTROPHILS # BLD AUTO: 12.71 THOUSANDS/ÂΜL (ref 1.85–7.62)
NEUTS SEG NFR BLD AUTO: 84 % (ref 43–75)
NEUTS SEG NFR BLD AUTO: 87 % (ref 43–75)
NITRITE UR QL STRIP: NEGATIVE
NON-SQ EPI CELLS URNS QL MICRO: ABNORMAL /HPF
NRBC BLD AUTO-RTO: 0 /100 WBCS
NRBC BLD AUTO-RTO: 0 /100 WBCS
O2 CT BLDV-SCNC: 7.8 ML/DL
PCO2 BLDV: 78.3 MM HG (ref 42–50)
PH BLDV: 7.33 [PH] (ref 7.3–7.4)
PH UR STRIP.AUTO: 6 [PH]
PHOSPHATE SERPL-MCNC: 4 MG/DL (ref 2.3–4.1)
PHOSPHATE SERPL-MCNC: 4.3 MG/DL (ref 2.3–4.1)
PLATELET # BLD AUTO: 127 THOUSANDS/UL (ref 149–390)
PLATELET # BLD AUTO: 135 THOUSANDS/UL (ref 149–390)
PMV BLD AUTO: 9.5 FL (ref 8.9–12.7)
PMV BLD AUTO: 9.9 FL (ref 8.9–12.7)
PO2 BLDV: 39.4 MM HG (ref 35–45)
POTASSIUM SERPL-SCNC: 3.6 MMOL/L (ref 3.5–5.3)
POTASSIUM SERPL-SCNC: 3.6 MMOL/L (ref 3.5–5.3)
PROCALCITONIN SERPL-MCNC: 2.29 NG/ML
PROT SERPL-MCNC: 5.9 G/DL (ref 6.4–8.4)
PROT UR STRIP-MCNC: ABNORMAL MG/DL
RBC # BLD AUTO: 2.56 MILLION/UL (ref 3.81–5.12)
RBC # BLD AUTO: 2.6 MILLION/UL (ref 3.81–5.12)
RBC #/AREA URNS AUTO: ABNORMAL /HPF
RSV RNA RESP QL NAA+PROBE: NEGATIVE
SARS-COV-2 RNA RESP QL NAA+PROBE: NEGATIVE
SODIUM SERPL-SCNC: 145 MMOL/L (ref 135–147)
SODIUM SERPL-SCNC: 145 MMOL/L (ref 135–147)
SP GR UR STRIP.AUTO: 1.02 (ref 1–1.03)
UROBILINOGEN UR STRIP-ACNC: <2 MG/DL
WBC # BLD AUTO: 12.17 THOUSAND/UL (ref 4.31–10.16)
WBC # BLD AUTO: 14.57 THOUSAND/UL (ref 4.31–10.16)
WBC #/AREA URNS AUTO: ABNORMAL /HPF

## 2024-03-29 PROCEDURE — 99291 CRITICAL CARE FIRST HOUR: CPT | Performed by: PHYSICIAN ASSISTANT

## 2024-03-29 PROCEDURE — 81001 URINALYSIS AUTO W/SCOPE: CPT | Performed by: PHYSICIAN ASSISTANT

## 2024-03-29 PROCEDURE — 83735 ASSAY OF MAGNESIUM: CPT | Performed by: PHYSICIAN ASSISTANT

## 2024-03-29 PROCEDURE — 85025 COMPLETE CBC W/AUTO DIFF WBC: CPT | Performed by: PHYSICIAN ASSISTANT

## 2024-03-29 PROCEDURE — 84145 PROCALCITONIN (PCT): CPT | Performed by: PHYSICIAN ASSISTANT

## 2024-03-29 PROCEDURE — 99024 POSTOP FOLLOW-UP VISIT: CPT | Performed by: SURGERY

## 2024-03-29 PROCEDURE — 83605 ASSAY OF LACTIC ACID: CPT | Performed by: PHYSICIAN ASSISTANT

## 2024-03-29 PROCEDURE — C9113 INJ PANTOPRAZOLE SODIUM, VIA: HCPCS | Performed by: PHYSICIAN ASSISTANT

## 2024-03-29 PROCEDURE — 82948 REAGENT STRIP/BLOOD GLUCOSE: CPT

## 2024-03-29 PROCEDURE — 0241U HB NFCT DS VIR RESP RNA 4 TRGT: CPT | Performed by: PHYSICIAN ASSISTANT

## 2024-03-29 PROCEDURE — 94760 N-INVAS EAR/PLS OXIMETRY 1: CPT

## 2024-03-29 PROCEDURE — 87081 CULTURE SCREEN ONLY: CPT | Performed by: PHYSICIAN ASSISTANT

## 2024-03-29 PROCEDURE — 87040 BLOOD CULTURE FOR BACTERIA: CPT | Performed by: PHYSICIAN ASSISTANT

## 2024-03-29 PROCEDURE — NC001 PR NO CHARGE: Performed by: SURGERY

## 2024-03-29 PROCEDURE — 85018 HEMOGLOBIN: CPT | Performed by: PHYSICIAN ASSISTANT

## 2024-03-29 PROCEDURE — 71045 X-RAY EXAM CHEST 1 VIEW: CPT

## 2024-03-29 PROCEDURE — 80048 BASIC METABOLIC PNL TOTAL CA: CPT | Performed by: SURGERY

## 2024-03-29 PROCEDURE — 84100 ASSAY OF PHOSPHORUS: CPT | Performed by: PHYSICIAN ASSISTANT

## 2024-03-29 PROCEDURE — 82330 ASSAY OF CALCIUM: CPT | Performed by: PHYSICIAN ASSISTANT

## 2024-03-29 PROCEDURE — 94640 AIRWAY INHALATION TREATMENT: CPT

## 2024-03-29 PROCEDURE — 82805 BLOOD GASES W/O2 SATURATION: CPT | Performed by: PHYSICIAN ASSISTANT

## 2024-03-29 PROCEDURE — 80053 COMPREHEN METABOLIC PANEL: CPT | Performed by: PHYSICIAN ASSISTANT

## 2024-03-29 RX ORDER — LANOLIN ALCOHOL/MO/W.PET/CERES
6 CREAM (GRAM) TOPICAL
Status: DISCONTINUED | OUTPATIENT
Start: 2024-03-29 | End: 2024-04-02

## 2024-03-29 RX ORDER — LANOLIN ALCOHOL/MO/W.PET/CERES
6 CREAM (GRAM) TOPICAL
Status: DISCONTINUED | OUTPATIENT
Start: 2024-03-29 | End: 2024-03-29

## 2024-03-29 RX ORDER — FUROSEMIDE 10 MG/ML
20 INJECTION INTRAMUSCULAR; INTRAVENOUS ONCE
Status: COMPLETED | OUTPATIENT
Start: 2024-03-29 | End: 2024-03-29

## 2024-03-29 RX ORDER — ACETAMINOPHEN 10 MG/ML
1000 INJECTION, SOLUTION INTRAVENOUS EVERY 6 HOURS PRN
Status: DISCONTINUED | OUTPATIENT
Start: 2024-03-29 | End: 2024-03-31

## 2024-03-29 RX ORDER — INSULIN LISPRO 100 [IU]/ML
2-12 INJECTION, SOLUTION INTRAVENOUS; SUBCUTANEOUS EVERY 6 HOURS SCHEDULED
Status: DISCONTINUED | OUTPATIENT
Start: 2024-03-29 | End: 2024-04-01

## 2024-03-29 RX ORDER — CALCIUM GLUCONATE 20 MG/ML
2 INJECTION, SOLUTION INTRAVENOUS ONCE
Status: COMPLETED | OUTPATIENT
Start: 2024-03-29 | End: 2024-03-29

## 2024-03-29 RX ADMIN — Medication 6 MG: at 21:54

## 2024-03-29 RX ADMIN — FUROSEMIDE 20 MG: 10 INJECTION, SOLUTION INTRAMUSCULAR; INTRAVENOUS at 18:10

## 2024-03-29 RX ADMIN — LEVALBUTEROL HYDROCHLORIDE 1.25 MG: 1.25 SOLUTION RESPIRATORY (INHALATION) at 20:14

## 2024-03-29 RX ADMIN — IPRATROPIUM BROMIDE 0.5 MG: 0.5 SOLUTION RESPIRATORY (INHALATION) at 13:46

## 2024-03-29 RX ADMIN — ONDANSETRON 4 MG: 2 INJECTION INTRAMUSCULAR; INTRAVENOUS at 05:42

## 2024-03-29 RX ADMIN — INSULIN LISPRO 2 UNITS: 100 INJECTION, SOLUTION INTRAVENOUS; SUBCUTANEOUS at 18:10

## 2024-03-29 RX ADMIN — SODIUM CHLORIDE, SODIUM GLUCONATE, SODIUM ACETATE, POTASSIUM CHLORIDE, MAGNESIUM CHLORIDE, SODIUM PHOSPHATE, DIBASIC, AND POTASSIUM PHOSPHATE 125 ML/HR: .53; .5; .37; .037; .03; .012; .00082 INJECTION, SOLUTION INTRAVENOUS at 01:59

## 2024-03-29 RX ADMIN — ACETAMINOPHEN 1000 MG: 10 INJECTION INTRAVENOUS at 16:45

## 2024-03-29 RX ADMIN — CALCIUM GLUCONATE 2 G: 20 INJECTION, SOLUTION INTRAVENOUS at 18:37

## 2024-03-29 RX ADMIN — HEPARIN SODIUM 7500 UNITS: 5000 INJECTION INTRAVENOUS; SUBCUTANEOUS at 21:20

## 2024-03-29 RX ADMIN — IPRATROPIUM BROMIDE 0.5 MG: 0.5 SOLUTION RESPIRATORY (INHALATION) at 07:42

## 2024-03-29 RX ADMIN — SODIUM CHLORIDE, SODIUM GLUCONATE, SODIUM ACETATE, POTASSIUM CHLORIDE, MAGNESIUM CHLORIDE, SODIUM PHOSPHATE, DIBASIC, AND POTASSIUM PHOSPHATE 125 ML/HR: .53; .5; .37; .037; .03; .012; .00082 INJECTION, SOLUTION INTRAVENOUS at 10:04

## 2024-03-29 RX ADMIN — CHLORHEXIDINE GLUCONATE 15 ML: 1.2 SOLUTION ORAL at 08:20

## 2024-03-29 RX ADMIN — HEPARIN SODIUM 7500 UNITS: 5000 INJECTION INTRAVENOUS; SUBCUTANEOUS at 05:42

## 2024-03-29 RX ADMIN — LEVALBUTEROL HYDROCHLORIDE 1.25 MG: 1.25 SOLUTION RESPIRATORY (INHALATION) at 07:43

## 2024-03-29 RX ADMIN — IPRATROPIUM BROMIDE 0.5 MG: 0.5 SOLUTION RESPIRATORY (INHALATION) at 20:14

## 2024-03-29 RX ADMIN — HEPARIN SODIUM 7500 UNITS: 5000 INJECTION INTRAVENOUS; SUBCUTANEOUS at 14:19

## 2024-03-29 RX ADMIN — LEVALBUTEROL HYDROCHLORIDE 1.25 MG: 1.25 SOLUTION RESPIRATORY (INHALATION) at 13:46

## 2024-03-29 RX ADMIN — ACETAMINOPHEN 1000 MG: 10 INJECTION INTRAVENOUS at 21:54

## 2024-03-29 RX ADMIN — ONDANSETRON 4 MG: 2 INJECTION INTRAMUSCULAR; INTRAVENOUS at 12:57

## 2024-03-29 RX ADMIN — PANTOPRAZOLE SODIUM 40 MG: 40 INJECTION, POWDER, FOR SOLUTION INTRAVENOUS at 08:20

## 2024-03-29 RX ADMIN — CHLORHEXIDINE GLUCONATE 15 ML: 1.2 SOLUTION ORAL at 21:20

## 2024-03-29 NOTE — QUICK NOTE
Patient Name: Jud Walls  Patient MRN: [id number]   Date: 03/29/24   Time: 4:33 PM   Unit/Bed: ICU 13    Family/Relationship: Alba Walls (Brother)  Location: Telephone  Content of meeting: Updated brother on increased O2 requirements and new fever. Advised we will keep her SD1 under surgical critical care service and culture her, obtain CXR and obtain labs. Questions answered    Family understanding of patient's condition: Good  Comments: None  Time spent: 10 minutes    Shivani Masterson PA-C

## 2024-03-29 NOTE — ASSESSMENT & PLAN NOTE
Lab Results   Component Value Date    HGBA1C 7.1 (H) 03/15/2024       Recent Labs     03/28/24  2153 03/29/24  0002 03/29/24  0209 03/29/24  0358   POCGLU 143* 147* 151* 150*         Blood Sugar Average: Last 72 hrs:  (P) [id number]    Home regimen: glargine 60q12, dulaglutide, humalog 30 units before meals  Currently NPO and BS uncontrolled  Plan:  Start insulin infusion   Accuchecks q2hrs  Goal blood glucose 140-180  Avoid hypoglycemia  Once okay to take PO per surgery, will need CHO diet

## 2024-03-29 NOTE — ASSESSMENT & PLAN NOTE
Lab Results   Component Value Date    EGFR 32 03/28/2024    EGFR 36 03/27/2024    EGFR 35 (L) 03/15/2024    CREATININE 1.56 (H) 03/28/2024    CREATININE 1.43 (H) 03/27/2024    CREATININE 1.56 (H) 03/15/2024   Creatinine @ baseline.   Avoid nephrotoxic agents, hypotension  Monitor UO and renal indices  Currently NPO, continue IVF until okay to take PO per surgery

## 2024-03-29 NOTE — PROGRESS NOTES
[facility]  Progress Note  Name: Jud Walls I  MRN: [id number]  Unit/Bed#: ICU 13 I Date of Admission: 3/27/2024   Date of Service: 3/29/2024 I Hospital Day: 1    Assessment/Plan   * Cecal volvulus (HCC)  Assessment & Plan  CT Abd/Pelvis 3/28: 2 focal areas of large bowel volvulus involving the cecum/proximal ascending colon as well as a loop of mid transverse colon due to entrapment within the incarcerated ventral lower abdominal hernia sac as discussed above. Surrounding mesenteric inflammatory edema and reactive ascites within the hernia sac as well as adjacent to markedly distended colon loops proximal to the hernia sac are seen. No gross free air or pneumatosis intestinalis. Emergent surgical consultation recommended. Secondary appendicitis is also seen. Additional ancillary findings detailed above.   S/p Ex-lap, extended R hemicolectomy, ventral hernia repair 3/28 POD 1    Plan:   Continue NPO; NG to NCIS  Serial abdominal exams  Continue cefazolin/flagyl  Surgery following, appreciate recommendations    Incarcerated hernia  Assessment & Plan  CT Abd/Pelvis 3/28: 2 focal areas of large bowel volvulus involving the cecum/proximal ascending colon as well as a loop of mid transverse colon due to entrapment within the incarcerated ventral lower abdominal hernia sac as discussed above. Surrounding mesenteric inflammatory edema and reactive ascites within the hernia sac as well as adjacent to markedly distended colon loops proximal to the hernia sac are seen. No gross free air or pneumatosis intestinalis. Emergent surgical consultation recommended. Secondary appendicitis is also seen. Additional ancillary findings detailed above.   S/p Ex-lap, extended R hemicolectomy, ventral hernia repair 3/28 POD 1    Plan:  Serial abdominal checks  NPO, NG to low continuous suction  Surgical site care per surgery    Chronic respiratory failure with hypoxia and hypercapnea (HCC)  Assessment &  Plan  Baseline COPD/GILDARDO/OHS. On chronic 2L home O2. Nonadherant to nocturnal bipap therapy  [facility] last seen inoffice 1/15/2024  11/1/2017 - Ratio 69%, %, FEV1 115% - normal spirometry - shortened expiratory time but with values >100% predicted.   Home regimen: advair 500-50, ipratropium-albuterol neb, xopenex neb    Plan:   Titrate O2 to maintain SpO2 >88%  Continue nebulizers  Incentive spirometry    Leukocytosis  Assessment & Plan  Suspect reactive in setting of acute illness  Trend WBC/temps.  CBC this am    GILDARDO (obstructive sleep apnea)  Assessment & Plan  Nonadherant to BIPAP.  Monitor respiratory status    Diabetes (HCC)  Assessment & Plan  Lab Results   Component Value Date    HGBA1C 7.1 (H) 03/15/2024       Recent Labs     03/28/24  2153 03/29/24  0002 03/29/24  0209 03/29/24  0358   POCGLU 143* 147* 151* 150*         Blood Sugar Average: Last 72 hrs:  (P) [id number]    Home regimen: glargine 60q12, dulaglutide, humalog 30 units before meals  Currently NPO and BS uncontrolled  Plan:  Start insulin infusion   Accuchecks q2hrs  Goal blood glucose 140-180  Avoid hypoglycemia  Once okay to take PO per surgery, will need CHO diet    Stage 3b chronic kidney disease (HCC)  Assessment & Plan  Lab Results   Component Value Date    EGFR 32 03/28/2024    EGFR 36 03/27/2024    EGFR 35 (L) 03/15/2024    CREATININE 1.56 (H) 03/28/2024    CREATININE 1.43 (H) 03/27/2024    CREATININE 1.56 (H) 03/15/2024   Creatinine @ baseline.   Avoid nephrotoxic agents, hypotension  Monitor UO and renal indices  Currently NPO, continue IVF until okay to take PO per surgery    Hyperlipidemia  Assessment & Plan  Statin on hold given NPO status.    Alzheimer's dementia (HCC)  Assessment & Plan  Namenda on hold 2/2 NPO status.             Disposition: Stepdown Level 2    ICU Core Measures     A: Assess, Prevent, and Manage Pain Has pain been assessed? Yes  Need for changes to pain regimen? No   B: Both SAT/SAT   "N/A   C: Choice of Sedation RASS Goal: N/A patient not on sedation  Need for changes to sedation or analgesia regimen? No   D: Delirium CAM-ICU: Negative   E: Early Mobility  Plan for early mobility? Yes   F: Family Engagement Plan for family engagement today? Yes       Review of Invasive Devices:    Campbell Plan: Campbell to be removed. Order has been placed        Prophylaxis:  VTE VTE covered by:  heparin (porcine), Subcutaneous, 7,500 Units at 03/28/24 2132       Stress Ulcer  covered byOmeprazole 20 MG TBEC [[id number]] (Long-Term Med), pantoprazole (PROTONIX) injection 40 mg [[id number]]         Significant 24hr Events     24hr events:   POD #1 s/p ex-lap with extended R hemicolectomy and ventral hernia repair   mL out overnight.   States pain is well controlled.      Subjective   Review of Systems   Constitutional:  Negative for chills and fever.   HENT: Negative.     Eyes: Negative.    Respiratory:  Negative for cough, chest tightness, shortness of breath and wheezing.    Cardiovascular:  Negative for chest pain, palpitations and leg swelling.   Gastrointestinal:  Positive for abdominal distention (\"better than it was\"), abdominal pain (\"it's sore\") and nausea. Negative for diarrhea and vomiting.   Endocrine: Negative.    Genitourinary:  Negative for decreased urine volume, difficulty urinating, flank pain and hematuria.   Musculoskeletal:  Negative for arthralgias and myalgias.   Skin:  Negative for color change, pallor, rash and wound.   Allergic/Immunologic: Negative.    Neurological:  Negative for dizziness, syncope, weakness and light-headedness.   Hematological: Negative.    Psychiatric/Behavioral: Negative.        Objective                            Vitals I/O      Most Recent Min/Max in 24hrs   Temp 99.7 °F (37.6 °C) Temp  Min: 97.4 °F (36.3 °C)  Max: 99.9 °F (37.7 °C)   Pulse 88 Pulse  Min: 76  Max: 98   Resp 18 Resp  Min: 12  Max: 25   /63 BP  Min: 102/49  Max: 181/75   O2 Sat 95 % SpO2  Min: " 90 %  Max: 100 %      Intake/Output Summary (Last 24 hours) at 3/29/2024 0420  Last data filed at 3/28/2024 2155  Gross per 24 hour   Intake 2260.42 ml   Output 2200 ml   Net 60.42 ml       Diet NPO    Invasive Monitoring           Physical Exam   Physical Exam  Vitals and nursing note reviewed.   Eyes:      General: Vision grossly intact. NeglectNo scleral icterus.        Right eye: No discharge.         Left eye: No discharge.      Extraocular Movements: Extraocular movements intact.      Conjunctiva/sclera: Conjunctivae normal.      Pupils: Pupils are equal, round, and reactive to light.   Skin:     General: Skin is warm, dry and not mottled extremities.      Capillary Refill: Capillary refill takes less than 2 seconds.      Coloration: Skin is pale. Skin is not jaundiced.      Findings: No lesion, rash or wound.   HENT:      Head: Normocephalic and atraumatic.      Right Ear: Hearing normal. No drainage.      Left Ear: Hearing normal. No drainage.      Nose: Nasogastric tube present. No nasal deformity, nasal tenderness, congestion, rhinorrhea or epistaxis.      Mouth/Throat:      Mouth: Mucous membranes are dry. No injury or angioedema.      Dentition: Normal dentition.      Pharynx: No oropharyngeal exudate.   Neck:      Vascular: No JVD.   no midline tenderness Cardiovascular:      Rate and Rhythm: Normal rate and regular rhythm.      Pulses: Normal pulses.      Heart sounds: No murmur heard.     No friction rub. No gallop.   Musculoskeletal:         General: No swelling, tenderness, deformity or signs of injury. Normal range of motion.      Right lower leg: No edema.      Left lower leg: No edema.   Abdominal: General: Abdomen is protuberant. Bowel sounds are decreased. There is no distension.      Palpations: Abdomen is soft.      Tenderness: There is abdominal tenderness (appropriate incisional tenderness).   Constitutional:       General: She is not in acute distress.     Appearance: She is well-developed  and well-nourished. She is ill-appearing. She is not toxic-appearing.      Interventions: She is not sedated, not intubated and not restrained.  Pulmonary:      Effort: No tachypnea, accessory muscle usage, respiratory distress or accessory muscle usage. She is not intubated.      Breath sounds: Examination of the right-lower field reveals decreased breath sounds. Examination of the left-lower field reveals decreased breath sounds. Decreased breath sounds present. No wheezing, rhonchi or rales.   Psychiatric:         Mood and Affect:  mood and affect abnormal.        Speech: Speech is not no receptive aphasia or no expressive aphasia.   Neurological:      General: No focal deficit present.      Mental Status: She is alert and oriented to person, place and time. Mental status is at baseline. She is CAM ICU negative.      Cranial Nerves: No dysarthria or facial asymmetry.      Sensory: No sensory deficit.      Motor: Strength full and intact in all extremities. No pronator drift or motor deficit.   Genitourinary/Anorectal:     Comments: Yellow urine   Campbell present.          Diagnostic Studies      EKG: sinus rhythm  Imaging:   3/28 CXR: no focal infiltrate or consolidation  3/28 CT a/p: 2 focal areas of large bowel volvulus involving the cecum/proximal ascending colon as well as a loop of mid transverse colon due to entrapment within the incarcerated ventral lower abdominal hernia sac as discussed above. Surrounding mesenteric   inflammatory edema and reactive ascites within the hernia sac as well as adjacent to markedly distended colon loops proximal to the hernia sac are seen. No gross free air or pneumatosis intestinalis. Emergent surgical consultation recommended. Secondary   appendicitis is also seen. Additional ancillary findings detailed above.      I have personally reviewed pertinent reports.   and I have personally reviewed pertinent films in PACS     Medications:  Scheduled PRN   chlorhexidine, 15 mL, Q12H  CLEMENTINE  fluticasone-vilanterol, 1 puff, Daily  heparin (porcine), 7,500 Units, Q8H CLEMENTINE  ipratropium, 0.5 mg, TID  levalbuterol, 1.25 mg, TID  pantoprazole, 40 mg, Q24H CLEMENTINE      levalbuterol, 1.25 mg, Q4H PRN  naloxone, 0.1 mg, Q1MIN PRN  ondansetron, 4 mg, Q6H PRN  phenol, 1 spray, Q2H PRN       Continuous    HYDROmorphone,   insulin regular (HumuLIN R,NovoLIN R) 1 Units/mL in sodium chloride 0.9 % 100 mL infusion, 0.3-21 Units/hr, Last Rate: 1 Units/hr (03/29/24 0200)  multi-electrolyte, 125 mL/hr, Last Rate: 125 mL/hr (03/29/24 0159)         Labs:    CBC    Recent Labs     03/27/24 2323 03/28/24 0548 03/28/24 0624 03/28/24  0828 03/28/24  1004   WBC 13.10*  --   --   --  13.52*   HGB 10.1*   < > 8.2*  --  9.1*   HCT 31.7*   < > 24*  --  29.0*     --   --  141* 138*    < > = values in this interval not displayed.     BMP    Recent Labs     03/27/24 2323 03/28/24 0548 03/28/24 0624 03/28/24  1004   SODIUM 141  --   --  139   K 4.4  --   --  4.2   CL 98  --   --  99   CO2 36*   < > 36* 34*   AGAP 7  --   --  6   BUN 25  --   --  24   CREATININE 1.43*  --   --  1.56*   CALCIUM 8.9  --   --  8.0*    < > = values in this interval not displayed.       Coags    Recent Labs     03/27/24 2323   INR 0.98        Additional Electrolytes  Recent Labs     03/28/24 0624 03/28/24  1004   MG  --  2.1   PHOS  --  4.3*   CAIONIZED 1.09* 1.06*          Blood Gas    No recent results  No recent results LFTs  Recent Labs     03/27/24 2323 03/28/24  1004   ALT 12 10   AST 12* 9*   ALKPHOS 53 47   ALB 3.8 3.7   TBILI 0.79 0.58       Infectious  No recent results  Glucose  Recent Labs     03/27/24  2323 03/28/24  1004   GLUC 134 332*               CELINA Barrientos

## 2024-03-29 NOTE — ASSESSMENT & PLAN NOTE
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 Surrounding mesenteric inflammatory edema and reactive ascites within the hernia sac as well as adjacent to markedly distended colon loops proximal to the hernia sac are seen. No gross free air or pneumatosis intestinalis. Emergent surgical consultation recommended. Secondary appendicitis is also seen. Additional ancillary findings detailed above.   S/p Ex-lap, extended R hemicolectomy, ventral hernia repair 3/28 POD 1    Plan:   Continue NPO; NG to NCIS  Serial abdominal exams  Continue cefazolin/flagyl  Surgery following, appreciate recommendations
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
## 2024-03-29 NOTE — ASSESSMENT & PLAN NOTE
Baseline COPD/GILDARDO/OHS. On chronic 2L home O2. Nonadherant to nocturnal bipap therapy  F/w [facility] Pul; last seen inoffice 1/15/2024  11/1/2017 - Ratio 69%, %, FEV1 115% - normal spirometry - shortened expiratory time but with values >100% predicted.   Home regimen: advair 500-50, ipratropium-albuterol neb, xopenex neb    Plan:   Titrate O2 to maintain SpO2 >88%  Continue nebulizers  Incentive spirometry

## 2024-03-29 NOTE — ASSESSMENT & PLAN NOTE
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 Surrounding mesenteric inflammatory edema and reactive ascites within the hernia sac as well as adjacent to markedly distended colon loops proximal to the hernia sac are seen. No gross free air or pneumatosis intestinalis. Emergent surgical consultation recommended. Secondary appendicitis is also seen. Additional ancillary findings detailed above.   S/p Ex-lap, extended R hemicolectomy, ventral hernia repair 3/28 POD 1    Plan:  Serial abdominal checks  NPO, NG to low continuous suction  Surgical site care per surgery

## 2024-03-30 ENCOUNTER — APPOINTMENT (INPATIENT)
Dept: RADIOLOGY | Facility: HOSPITAL | Age: 74
DRG: 329 | End: 2024-03-30
Payer: MEDICARE

## 2024-03-30 PROBLEM — A41.9 SEPSIS (HCC): Status: ACTIVE | Noted: 2023-06-25

## 2024-03-30 LAB
ALBUMIN SERPL BCP-MCNC: 3.1 G/DL (ref 3.5–5)
ALP SERPL-CCNC: 50 U/L (ref 34–104)
ALT SERPL W P-5'-P-CCNC: 4 U/L (ref 7–52)
ANION GAP SERPL CALCULATED.3IONS-SCNC: 7 MMOL/L (ref 4–13)
ANISOCYTOSIS BLD QL SMEAR: PRESENT
AST SERPL W P-5'-P-CCNC: 10 U/L (ref 13–39)
BASOPHILS # BLD MANUAL: 0 THOUSAND/UL (ref 0–0.1)
BASOPHILS NFR MAR MANUAL: 0 % (ref 0–1)
BILIRUB SERPL-MCNC: 0.76 MG/DL (ref 0.2–1)
BUN SERPL-MCNC: 25 MG/DL (ref 5–25)
CALCIUM ALBUM COR SERPL-MCNC: 9.2 MG/DL (ref 8.3–10.1)
CALCIUM SERPL-MCNC: 8.5 MG/DL (ref 8.4–10.2)
CHLORIDE SERPL-SCNC: 102 MMOL/L (ref 96–108)
CO2 SERPL-SCNC: 38 MMOL/L (ref 21–32)
CREAT SERPL-MCNC: 1.5 MG/DL (ref 0.6–1.3)
EOSINOPHIL # BLD MANUAL: 0.14 THOUSAND/UL (ref 0–0.4)
EOSINOPHIL NFR BLD MANUAL: 1 % (ref 0–6)
ERYTHROCYTE [DISTWIDTH] IN BLOOD BY AUTOMATED COUNT: 15.9 % (ref 11.6–15.1)
GFR SERPL CREATININE-BSD FRML MDRD: 34 ML/MIN/1.73SQ M
GLUCOSE SERPL-MCNC: 226 MG/DL (ref 65–140)
GLUCOSE SERPL-MCNC: 228 MG/DL (ref 65–140)
GLUCOSE SERPL-MCNC: 243 MG/DL (ref 65–140)
GLUCOSE SERPL-MCNC: 247 MG/DL (ref 65–140)
GLUCOSE SERPL-MCNC: 249 MG/DL (ref 65–140)
HCT VFR BLD AUTO: 23.9 % (ref 34.8–46.1)
HGB BLD-MCNC: 7.2 G/DL (ref 11.5–15.4)
HGB BLD-MCNC: 7.2 G/DL (ref 11.5–15.4)
LG PLATELETS BLD QL SMEAR: PRESENT
LYMPHOCYTES # BLD AUTO: 1.56 THOUSAND/UL (ref 0.6–4.47)
LYMPHOCYTES # BLD AUTO: 11 % (ref 14–44)
MAGNESIUM SERPL-MCNC: 2.5 MG/DL (ref 1.9–2.7)
MCH RBC QN AUTO: 29.4 PG (ref 26.8–34.3)
MCHC RBC AUTO-ENTMCNC: 30.1 G/DL (ref 31.4–37.4)
MCV RBC AUTO: 98 FL (ref 82–98)
MONOCYTES # BLD AUTO: 0.71 THOUSAND/UL (ref 0–1.22)
MONOCYTES NFR BLD: 5 % (ref 4–12)
MRSA NOSE QL CULT: NORMAL
NEUTROPHILS # BLD MANUAL: 11.76 THOUSAND/UL (ref 1.85–7.62)
NEUTS BAND NFR BLD MANUAL: 6 % (ref 0–8)
NEUTS SEG NFR BLD AUTO: 77 % (ref 43–75)
PHOSPHATE SERPL-MCNC: 4.2 MG/DL (ref 2.3–4.1)
PLATELET # BLD AUTO: 129 THOUSANDS/UL (ref 149–390)
PLATELET BLD QL SMEAR: ABNORMAL
PMV BLD AUTO: 9.8 FL (ref 8.9–12.7)
POLYCHROMASIA BLD QL SMEAR: PRESENT
POTASSIUM SERPL-SCNC: 3.7 MMOL/L (ref 3.5–5.3)
PROCALCITONIN SERPL-MCNC: 2.71 NG/ML
PROT SERPL-MCNC: 6.2 G/DL (ref 6.4–8.4)
RBC # BLD AUTO: 2.45 MILLION/UL (ref 3.81–5.12)
RBC MORPH BLD: PRESENT
SODIUM SERPL-SCNC: 147 MMOL/L (ref 135–147)
WBC # BLD AUTO: 14.17 THOUSAND/UL (ref 4.31–10.16)

## 2024-03-30 PROCEDURE — 82948 REAGENT STRIP/BLOOD GLUCOSE: CPT

## 2024-03-30 PROCEDURE — C9113 INJ PANTOPRAZOLE SODIUM, VIA: HCPCS | Performed by: PHYSICIAN ASSISTANT

## 2024-03-30 PROCEDURE — 94760 N-INVAS EAR/PLS OXIMETRY 1: CPT

## 2024-03-30 PROCEDURE — 99024 POSTOP FOLLOW-UP VISIT: CPT | Performed by: SURGERY

## 2024-03-30 PROCEDURE — 84145 PROCALCITONIN (PCT): CPT | Performed by: PHYSICIAN ASSISTANT

## 2024-03-30 PROCEDURE — 71045 X-RAY EXAM CHEST 1 VIEW: CPT

## 2024-03-30 PROCEDURE — 85007 BL SMEAR W/DIFF WBC COUNT: CPT | Performed by: PHYSICIAN ASSISTANT

## 2024-03-30 PROCEDURE — 94640 AIRWAY INHALATION TREATMENT: CPT

## 2024-03-30 PROCEDURE — 85027 COMPLETE CBC AUTOMATED: CPT | Performed by: PHYSICIAN ASSISTANT

## 2024-03-30 PROCEDURE — 85018 HEMOGLOBIN: CPT | Performed by: PHYSICIAN ASSISTANT

## 2024-03-30 PROCEDURE — 99233 SBSQ HOSP IP/OBS HIGH 50: CPT

## 2024-03-30 PROCEDURE — 84100 ASSAY OF PHOSPHORUS: CPT | Performed by: PHYSICIAN ASSISTANT

## 2024-03-30 PROCEDURE — 83735 ASSAY OF MAGNESIUM: CPT | Performed by: PHYSICIAN ASSISTANT

## 2024-03-30 PROCEDURE — 80053 COMPREHEN METABOLIC PANEL: CPT | Performed by: PHYSICIAN ASSISTANT

## 2024-03-30 RX ORDER — SODIUM CHLORIDE, SODIUM GLUCONATE, SODIUM ACETATE, POTASSIUM CHLORIDE, MAGNESIUM CHLORIDE, SODIUM PHOSPHATE, DIBASIC, AND POTASSIUM PHOSPHATE .53; .5; .37; .037; .03; .012; .00082 G/100ML; G/100ML; G/100ML; G/100ML; G/100ML; G/100ML; G/100ML
250 INJECTION, SOLUTION INTRAVENOUS ONCE
Status: DISCONTINUED | OUTPATIENT
Start: 2024-03-30 | End: 2024-03-31

## 2024-03-30 RX ORDER — METRONIDAZOLE 500 MG/100ML
500 INJECTION, SOLUTION INTRAVENOUS EVERY 8 HOURS
Status: DISCONTINUED | OUTPATIENT
Start: 2024-03-30 | End: 2024-04-02

## 2024-03-30 RX ADMIN — Medication 6 MG: at 21:03

## 2024-03-30 RX ADMIN — INSULIN LISPRO 4 UNITS: 100 INJECTION, SOLUTION INTRAVENOUS; SUBCUTANEOUS at 12:01

## 2024-03-30 RX ADMIN — METRONIDAZOLE 500 MG: 500 INJECTION, SOLUTION INTRAVENOUS at 11:34

## 2024-03-30 RX ADMIN — ONDANSETRON 4 MG: 2 INJECTION INTRAMUSCULAR; INTRAVENOUS at 17:48

## 2024-03-30 RX ADMIN — METRONIDAZOLE 500 MG: 500 INJECTION, SOLUTION INTRAVENOUS at 21:03

## 2024-03-30 RX ADMIN — HEPARIN SODIUM 7500 UNITS: 5000 INJECTION INTRAVENOUS; SUBCUTANEOUS at 05:12

## 2024-03-30 RX ADMIN — ONDANSETRON 4 MG: 2 INJECTION INTRAMUSCULAR; INTRAVENOUS at 08:41

## 2024-03-30 RX ADMIN — IPRATROPIUM BROMIDE 0.5 MG: 0.5 SOLUTION RESPIRATORY (INHALATION) at 07:37

## 2024-03-30 RX ADMIN — CEFEPIME 2000 MG: 2 INJECTION, POWDER, FOR SOLUTION INTRAVENOUS at 04:06

## 2024-03-30 RX ADMIN — IPRATROPIUM BROMIDE 0.5 MG: 0.5 SOLUTION RESPIRATORY (INHALATION) at 14:59

## 2024-03-30 RX ADMIN — METRONIDAZOLE 500 MG: 500 INJECTION, SOLUTION INTRAVENOUS at 05:41

## 2024-03-30 RX ADMIN — ACETAMINOPHEN 1000 MG: 10 INJECTION INTRAVENOUS at 21:50

## 2024-03-30 RX ADMIN — LEVALBUTEROL HYDROCHLORIDE 1.25 MG: 1.25 SOLUTION RESPIRATORY (INHALATION) at 22:24

## 2024-03-30 RX ADMIN — IPRATROPIUM BROMIDE 0.5 MG: 0.5 SOLUTION RESPIRATORY (INHALATION) at 22:24

## 2024-03-30 RX ADMIN — HEPARIN SODIUM 7500 UNITS: 5000 INJECTION INTRAVENOUS; SUBCUTANEOUS at 21:03

## 2024-03-30 RX ADMIN — LEVALBUTEROL HYDROCHLORIDE 1.25 MG: 1.25 SOLUTION RESPIRATORY (INHALATION) at 07:37

## 2024-03-30 RX ADMIN — PANTOPRAZOLE SODIUM 40 MG: 40 INJECTION, POWDER, FOR SOLUTION INTRAVENOUS at 08:41

## 2024-03-30 RX ADMIN — CHLORHEXIDINE GLUCONATE 15 ML: 1.2 SOLUTION ORAL at 21:03

## 2024-03-30 RX ADMIN — LEVALBUTEROL HYDROCHLORIDE 1.25 MG: 1.25 SOLUTION RESPIRATORY (INHALATION) at 14:59

## 2024-03-30 RX ADMIN — CHLORHEXIDINE GLUCONATE 15 ML: 1.2 SOLUTION ORAL at 08:41

## 2024-03-30 RX ADMIN — INSULIN LISPRO 4 UNITS: 100 INJECTION, SOLUTION INTRAVENOUS; SUBCUTANEOUS at 05:12

## 2024-03-30 RX ADMIN — INSULIN LISPRO 4 UNITS: 100 INJECTION, SOLUTION INTRAVENOUS; SUBCUTANEOUS at 23:32

## 2024-03-30 RX ADMIN — HEPARIN SODIUM 7500 UNITS: 5000 INJECTION INTRAVENOUS; SUBCUTANEOUS at 13:22

## 2024-03-30 RX ADMIN — INSULIN LISPRO 4 UNITS: 100 INJECTION, SOLUTION INTRAVENOUS; SUBCUTANEOUS at 18:15

## 2024-03-30 RX ADMIN — INSULIN LISPRO 4 UNITS: 100 INJECTION, SOLUTION INTRAVENOUS; SUBCUTANEOUS at 00:01

## 2024-03-30 NOTE — ASSESSMENT & PLAN NOTE
3/29: CXR with new moderate right sided pleural effusion.  Received Lasix 20mg IV x 1 dose  Continue NC, titrate for SpO2 > 88% see plan below.   CXR as indicated

## 2024-03-30 NOTE — ASSESSMENT & PLAN NOTE
As evidence by: Tachypnea, Tachycardia, Leukocytosis, Fever  Suspected source: Unclear possibly intra-abdominal  WBC - 14, procal 2.29, Lactic 1.0  Pt completed post-op ABX yesterday  IVF held d/t new pleural effusion  Blood cultures obtained yesterday  COVID/FLU/RSV normal  UA negative nitrites, leuks, WBC 4-10, occasional bacteria    Plan:  Hold ABX for now, if clinically deteriorates restart broad spectrum ABX  Follow up blood cultures  Follow up sputum culture if obtained  CBC, procalcitonin in AM  Trend WBC/Fever

## 2024-03-30 NOTE — ASSESSMENT & PLAN NOTE
Lab Results   Component Value Date    EGFR 34 03/29/2024    EGFR 34 03/29/2024    EGFR 32 03/28/2024    CREATININE 1.48 (H) 03/29/2024    CREATININE 1.48 (H) 03/29/2024    CREATININE 1.56 (H) 03/28/2024     Creatinine @ baseline.   Avoid nephrotoxic agents, hypotension.  Monitor UO and renal indices.  IVF discontinued.  Diuresis as indicated.

## 2024-03-30 NOTE — PLAN OF CARE
Problem: Prexisting or High Potential for Compromised Skin Integrity  Goal: Skin integrity is maintained or improved  Description: INTERVENTIONS:  - Identify patients at risk for skin breakdown  - Assess and monitor skin integrity  - Assess and monitor nutrition and hydration status  - Monitor labs   - Assess for incontinence   - Turn and reposition patient  - Assist with mobility/ambulation  - Relieve pressure over bony prominences  - Avoid friction and shearing  - Provide appropriate hygiene as needed including keeping skin clean and dry  - Evaluate need for skin moisturizer/barrier cream  - Collaborate with interdisciplinary team   - Patient/family teaching  - Consider wound care consult   Outcome: Progressing     Problem: PAIN - ADULT  Goal: Verbalizes/displays adequate comfort level or baseline comfort level  Description: Interventions:  - Encourage patient to monitor pain and request assistance  - Assess pain using appropriate pain scale  - Administer analgesics based on type and severity of pain and evaluate response  - Implement non-pharmacological measures as appropriate and evaluate response  - Consider cultural and social influences on pain and pain management  - Notify physician/advanced practitioner if interventions unsuccessful or patient reports new pain  Outcome: Progressing     Problem: INFECTION - ADULT  Goal: Absence or prevention of progression during hospitalization  Description: INTERVENTIONS:  - Assess and monitor for signs and symptoms of infection  - Monitor lab/diagnostic results  - Monitor all insertion sites, i.e. indwelling lines, tubes, and drains  - Monitor endotracheal if appropriate and nasal secretions for changes in amount and color  - [place] appropriate cooling/warming therapies per order  - Administer medications as ordered  - Instruct and encourage patient and family to use good hand hygiene technique  - Identify and instruct in appropriate isolation precautions for  identified infection/condition  Outcome: Progressing  Goal: Absence of fever/infection during neutropenic period  Description: INTERVENTIONS:  - Monitor WBC    Outcome: Progressing     Problem: SAFETY ADULT  Goal: Patient will remain free of falls  Description: INTERVENTIONS:  - Educate patient/family on patient safety including physical limitations  - Instruct patient to call for assistance with activity   - Consult OT/PT to assist with strengthening/mobility   - Keep Call bell within reach  - Keep bed low and locked with side rails adjusted as appropriate  - Keep care items and personal belongings within reach  - Initiate and maintain comfort rounds  - Make Fall Risk Sign visible to staff  - Apply yellow socks and bracelet for high fall risk patients  - Consider moving patient to room near nurses station  Outcome: Progressing  Goal: Maintain or return to baseline ADL function  Description: INTERVENTIONS:  -  Assess patient's ability to carry out ADLs; assess patient's baseline for ADL function and identify physical deficits which impact ability to perform ADLs (bathing, care of mouth/teeth, toileting, grooming, dressing, etc.)  - Assess/evaluate cause of self-care deficits   - Assess range of motion  - Assess patient's mobility; develop plan if impaired  - Assess patient's need for assistive devices and provide as appropriate  - Encourage maximum independence but intervene and supervise when necessary  - Involve family in performance of ADLs  - Assess for home care needs following discharge   - Consider OT consult to assist with ADL evaluation and planning for discharge  - Provide patient education as appropriate  Outcome: Progressing  Goal: Maintains/Returns to pre admission functional level  Description: INTERVENTIONS:  - Perform AM-PAC 6 Click Basic Mobility/ Daily Activity assessment daily.  - Set and communicate daily mobility goal to care team and patient/family/caregiver.   - Collaborate with rehabilitation  services on mobility goals if consulted  - Out of bed for toileting  - Record patient progress and toleration of activity level   Outcome: Progressing     Problem: DISCHARGE PLANNING  Goal: Discharge to home or other facility with appropriate resources  Description: INTERVENTIONS:  - Identify barriers to discharge w/patient and caregiver  - Arrange for needed discharge resources and transportation as appropriate  - Identify discharge learning needs (meds, wound care, etc.)  - Arrange for interpretive services to assist at discharge as needed  - Refer to Case Management Department for coordinating discharge planning if the patient needs post-hospital services based on physician/advanced practitioner order or complex needs related to functional status, cognitive ability, or social support system  Outcome: Progressing     Problem: Knowledge Deficit  Goal: Patient/family/caregiver demonstrates understanding of disease process, treatment plan, medications, and discharge instructions  Description: Complete learning assessment and assess knowledge base.  Interventions:  - Provide teaching at level of understanding  - Provide teaching via preferred learning methods  Outcome: Progressing

## 2024-03-30 NOTE — PROGRESS NOTES
General Surgery  Progress Note   Jud Walls 73 y.o. 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 S/p Ex-lap, extended R hemicolectomy, ventral hernia repair 3/28.    Tams 101.2, transiently hypotensive to MAPs of 60's,  Mid mayco 12 from 14  UOP: 1.6L yellow  NGT: 600 cc light bilious     Plan:  -Keep NG tube for now given risk for aspiration  -IV fluids  -Keep PCA for now  -Wean midflow as tolerated  -Mepilex down 3/30  -Pain/ nausea control PRN  -OOB/ ambulation  -Incentive Spirometry  -Continue ICU care    Subjective/Objective     Subjective:   Patient seen and examined at bedside, in no acute distress.  Febrile overnight with transient episode of hypotension which responded to fluid.  Passing flatus.    Objective:   Vitals:Blood pressure 167/68, pulse 98, temperature 100.2 °F (37.9 °C), temperature source Oral, resp. rate (!) 33, weight 96 kg (211 lb 10.3 oz), SpO2 95%.  Temp (24hrs), Av.2 °F (37.9 °C), Min:99.7 °F (37.6 °C), Max:101.2 °F (38.4 °C)      I/O          0701   0700  0701   0700  0701   0700    I.V. (mL/kg)  900 (9.3)     IV Piggyback  1100     Total Intake(mL/kg)  2000 (20.6)     Urine (mL/kg/hr)  500     Blood  100     Total Output  600     Net  +1400                    Invasive Devices       Peripheral Intravenous Line  Duration             Peripheral IV 24 Left Hand 2 days    Peripheral IV 24 Dorsal (posterior);Right Forearm <1 day              Drain  Duration             NG/OG/Enteral Tube Nasogastric 18 Fr Left nare 2 days                    Physical Exam:  General: No acute distress  Neuro: Awake, Alert and Oriented x 3  HEENT:  Normocephalic, atraumatic, moist mucous membranes, NGT  CV: Regular rate and rhythm  Lungs: Normal work of breathing, no increased respiratory effort  Abdomen: Soft, appropriately tender in right upper  quadrant, mild distention, minimal strikethrough on Mepilex which was taken down  Extremities: No edema, clubbing or cyanosis  Skin: Warm, dry    Lab Results   Component Value Date    WBC 14.17 (H) 03/30/2024    HGB 7.2 (L) 03/30/2024    HCT 23.9 (L) 03/30/2024    MCV 98 03/30/2024     (L) 03/30/2024      Lab Results   Component Value Date     (L) 09/21/2015    SODIUM 147 03/30/2024    K 3.7 03/30/2024     03/30/2024    CO2 38 (H) 03/30/2024    ANIONGAP 8 09/21/2015    AGAP 7 03/30/2024    BUN 25 03/30/2024    CREATININE 1.50 (H) 03/30/2024    GLUC 228 (H) 03/30/2024    GLUF 171 (H) 06/26/2023    CALCIUM 8.5 03/30/2024    AST 10 (L) 03/30/2024    ALT 4 (L) 03/30/2024    ALKPHOS 50 03/30/2024    PROT 7.4 09/21/2015    TP 6.2 (L) 03/30/2024    BILITOT 0.61 09/21/2015    TBILI 0.76 03/30/2024    EGFR 34 03/30/2024       VTE Prophylaxis: Heparin      Paolo Trejo MD  3/30/2024  8:14 AM

## 2024-03-30 NOTE — ASSESSMENT & PLAN NOTE
Suspect reactive in setting of acute illness  Pt with temp of 101 yesterday  Blood cultures x 2 drawn  COVID/FLU/RSV - normal  MRSA - pending  Sputum CX - not obtained yet  Trend WBC/temps.  Hold ABX for now  CBC and Procalcitonin in AM.

## 2024-03-30 NOTE — ASSESSMENT & PLAN NOTE
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 Surrounding mesenteric inflammatory edema and reactive ascites within the hernia sac as well as adjacent to markedly distended colon loops proximal to the hernia sac are seen. No gross free air or pneumatosis intestinalis. Emergent surgical consultation recommended. Secondary appendicitis is also seen. Additional ancillary findings detailed above.   S/p Ex-lap, extended R hemicolectomy, ventral hernia repair 3/28 - POD #2    Plan:  Continue NPO  Maintain NG tube to Medium continuous suction  Flush NG tube with 30cc water every shift  Flush NG tube blue port with 10cc air every shift  Dilaudid PCA for analgesia  Serial ABD exams  General surgery following.

## 2024-03-30 NOTE — ASSESSMENT & PLAN NOTE
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 Surrounding mesenteric inflammatory edema and reactive ascites within the hernia sac as well as adjacent to markedly distended colon loops proximal to the hernia sac are seen. No gross free air or pneumatosis intestinalis. Emergent surgical consultation recommended. Secondary appendicitis is also seen. Additional ancillary findings detailed above.   S/p Ex-lap, extended R hemicolectomy, ventral hernia repair 3/28 - POD #2    Plan:  Continue NPO  Maintain NG tube to Medium continuous suction  Flush NG tube with 30cc water every shift  Flush NG tube blue port with 10cc air every shift  Serial ABD exams  Continue ABX:  General surgery following.

## 2024-03-30 NOTE — ASSESSMENT & PLAN NOTE
Lab Results   Component Value Date    HGBA1C 7.1 (H) 03/15/2024     Recent Labs     03/29/24  1022 03/29/24  1233 03/29/24  1421 03/29/24  1744   POCGLU 167* 163* 178* 196*     Blood Sugar Average: Last 72 hrs:  (P) 204.4056946204447514    Home regimen: glargine 60q12, dulaglutide, humalog 30 units before meals  Currently NPO and BS uncontrolled    Plan:  Insulin gtt discontinued  Continue SSI coverage q6h ATC  Goal blood glucose 140-180  Avoid hypoglycemia  Once okay to take PO per surgery, will need CHO diet

## 2024-03-30 NOTE — ASSESSMENT & PLAN NOTE
Suspect reactive in setting of acute illness  Pt with temp of 101 yesterday  Blood cultures x 2 drawn  COVID/FLU/RSV - normal  MRSA - normal  Sputum CX - not obtained yet  Trend WBC/temps.  Hold ABX for now  CBC and Procalcitonin in AM.

## 2024-03-30 NOTE — PROGRESS NOTES
[facility]  Progress Note  Name: Jud Walls I  MRN: [id number]  Unit/Bed#: ICU 13 I Date of Admission: 3/27/2024   Date of Service: 3/30/2024 I Hospital Day: 2    Assessment/Plan   * Cecal volvulus (HCC)  Assessment & Plan  CT Abd/Pelvis 3/28: 2 focal areas of large bowel volvulus involving the cecum/proximal ascending colon as well as a loop of mid transverse colon due to entrapment within the incarcerated ventral lower abdominal hernia sac as discussed above. Surrounding mesenteric inflammatory edema and reactive ascites within the hernia sac as well as adjacent to markedly distended colon loops proximal to the hernia sac are seen. No gross free air or pneumatosis intestinalis. Emergent surgical consultation recommended. Secondary appendicitis is also seen. Additional ancillary findings detailed above.   S/p Ex-lap, extended R hemicolectomy, ventral hernia repair 3/28 - POD #2    Plan:  Continue NPO  Maintain NG tube to Medium continuous suction  Flush NG tube with 30cc water every shift  Flush NG tube blue port with 10cc air every shift  Dilaudid PCA for analgesia  Serial ABD exams  General surgery following.    Incarcerated hernia  Assessment & Plan  CT Abd/Pelvis 3/28: 2 focal areas of large bowel volvulus involving the cecum/proximal ascending colon as well as a loop of mid transverse colon due to entrapment within the incarcerated ventral lower abdominal hernia sac as discussed above. Surrounding mesenteric inflammatory edema and reactive ascites within the hernia sac as well as adjacent to markedly distended colon loops proximal to the hernia sac are seen. No gross free air or pneumatosis intestinalis. Emergent surgical consultation recommended. Secondary appendicitis is also seen. Additional ancillary findings detailed above.   S/p Ex-lap, extended R hemicolectomy, ventral hernia repair 3/28 - POD #2    Plan:  Serial abdominal checks  NPO, NG to Medium continuous  suction  Surgical site care per surgery    Acute on chronic respiratory failure with hypoxia and hypercapnia (HCC)  Assessment & Plan  Baseline COPD/GILDARDO/OHS. On chronic 2L home O2. Nonadherant to nocturnal bipap therapy  F/w [facility]; last seen inoffice 1/15/2024  11/1/2017 - Ratio 69%, %, FEV1 115% - normal spirometry - shortened expiratory time but with values >100% predicted.   Home regimen: advair 500-50, ipratropium-albuterol neb, xopenex neb  Escalate oxygen requirements yesterday up to 15L MFNC, CXR showed new moderate right pleural effusion.    Plan:   Titrate O2 to maintain SpO2 >88%  Continue nebulizers  Incentive spirometry    Leukocytosis  Assessment & Plan  Suspect reactive in setting of acute illness  Pt with temp of 101 yesterday  Blood cultures x 2 drawn  COVID/FLU/RSV - normal  MRSA - pending  Sputum CX - not obtained yet  Trend WBC/temps.  Hold ABX for now  CBC and Procalcitonin in AM.    GILDARDO (obstructive sleep apnea)  Assessment & Plan  Nonadherant to BIPAP.  Monitor respiratory status.    Diabetes ([facility])  Assessment & Plan  Lab Results   Component Value Date    HGBA1C 7.1 (H) 03/15/2024     Recent Labs     03/29/24  1233 03/29/24  1421 03/29/24  1744 03/29/24  2353   POCGLU 163* 178* 196* 207*     Blood Sugar Average: Last 72 hrs:  (P) 204.5    Home regimen: glargine 60q12, dulaglutide, humalog 30 units before meals  Currently NPO and BS uncontrolled    Plan:  Insulin gtt discontinued  Continue SSI coverage q6h ATC  Goal blood glucose 140-180  Avoid hypoglycemia  Once okay to take PO per surgery, will need CHO diet    Pleural effusion  Assessment & Plan  3/29: CXR with new moderate right sided pleural effusion.  Received Lasix 20mg IV x 1 dose  Continue MFNC, titrate for SpO2 > 88% see plan below.   Repeat CXR in AM.    Stage 3b chronic kidney disease (HCC)  Assessment & Plan  Lab Results   Component Value Date    EGFR 34 03/29/2024    EGFR 34 03/29/2024    EGFR 32 03/28/2024     CREATININE 1.48 (H) 03/29/2024    CREATININE 1.48 (H) 03/29/2024    CREATININE 1.56 (H) 03/28/2024     Creatinine @ baseline.   Avoid nephrotoxic agents, hypotension  Monitor UO and renal indices  IVF discontinued  Diuresis as indicated    Sepsis (HCC)  Assessment & Plan  As evidence by: Tachypnea, Tachycardia, Leukocytosis, Fever  Suspected source: Unclear possibly intra-abdominal  WBC - 14, procal 2.29, Lactic 1.0  Pt completed post-op ABX yesterday  IVF held d/t new pleural effusion  Blood cultures obtained yesterday  COVID/FLU/RSV normal  UA negative nitrites, leuks, WBC 4-10, occasional bacteria    Plan:  Start ABX: Cefepime, Flagyl, hold Vancomycin MRSA swab negative  Follow up blood cultures  Follow up sputum culture if obtained  CBC, procalcitonin, lactic in AM  Trend WBC/Fever    Hyperlipidemia  Assessment & Plan  Home regimen: Simvastatin 40mg daily  Continue to hold home Statin while NPO    Alzheimer's dementia (HCC)  Assessment & Plan  Home regimen: Namenda   Continue on hold while NPO        Disposition: Stepdown Level 1    ICU Core Measures     A: Assess, Prevent, and Manage Pain Has pain been assessed? Yes  Need for changes to pain regimen? No   B: Both SAT/SAT  N/A   C: Choice of Sedation RASS Goal: N/A patient not on sedation  Need for changes to sedation or analgesia regimen? NA   D: Delirium CAM-ICU: Negative   E: Early Mobility  Plan for early mobility? Yes   F: Family Engagement Plan for family engagement today? Yes     Review of Invasive Devices:      Prophylaxis:  VTE VTE covered by:  heparin (porcine), Subcutaneous, 7,500 Units at 03/29/24 2120       Stress Ulcer  covered byOmeprazole 20 MG TBEC [[id number]] (Long-Term Med), pantoprazole (PROTONIX) injection 40 mg [[id number]]         Significant 24hr Events     24hr events: Pt developed a fever of 101 yesterday afternoon as well increasing oxygen requirements up to 12L MFNC. CXR was obtained and showed a new moderate right sided pleural  effusion. Blood cultures were obtained, UA sent (negative nitrites, leuks, WBC 4-10, occasional bacteria), COVID/FLU/RSV negative, ABX not started. She was given Lasix 20mg IV. Early evening she became hypoxic down to 84% on 12L MFNC. O2 escalated to 15L MFNC, pt boosted up in bed and encourage to cough/clear secretions when she cough up a moderately size collection of mucous. Afterwards SpO2 improved to 94% and oxygen was weaned accordingly. Early this morning, BP trended down given concern for sepsis 250cc fluid bolus given and ABX started.     Subjective   Review of Systems   Constitutional:  Positive for fever. Negative for activity change, appetite change, chills, diaphoresis and fatigue.   HENT: Negative.     Eyes: Negative.    Respiratory:  Positive for cough. Negative for chest tightness and shortness of breath.    Cardiovascular:  Negative for chest pain and palpitations.   Gastrointestinal:  Positive for abdominal pain. Negative for abdominal distention, nausea and vomiting.   Endocrine: Negative.    Genitourinary: Negative.  Negative for difficulty urinating.   Musculoskeletal: Negative.    Skin: Negative.    Allergic/Immunologic: Negative.    Neurological: Negative.  Negative for dizziness, light-headedness, numbness and headaches.   Hematological: Negative.    Psychiatric/Behavioral: Negative.        Objective                            Vitals I/O      Most Recent Min/Max in 24hrs   Temp 100.2 °F (37.9 °C) Temp  Min: 99.6 °F (37.6 °C)  Max: 101.2 °F (38.4 °C)   Pulse 99 Pulse  Min: 93  Max: 105   Resp (!) 23 Resp  Min: 14  Max: 48   BP (!) 97/43 BP  Min: 97/43  Max: 178/76   O2 Sat 93 % SpO2  Min: 86 %  Max: 97 %      Intake/Output Summary (Last 24 hours) at 3/30/2024 0351  Last data filed at 3/29/2024 2300  Gross per 24 hour   Intake 3447.55 ml   Output 1889 ml   Net 1558.55 ml       Diet NPO    Invasive Monitoring           Physical Exam   Physical Exam  Vitals and nursing note reviewed.   Eyes:       Pupils: Pupils are equal, round, and reactive to light.   Skin:     General: Skin is warm and dry.      Capillary Refill: Capillary refill takes less than 2 seconds.   HENT:      Head: Normocephalic and atraumatic.      Mouth/Throat:      Mouth: Mucous membranes are moist.   Cardiovascular:      Rate and Rhythm: Normal rate and regular rhythm.      Pulses:           Radial pulses are 2+ on the right side and 2+ on the left side.        Dorsalis pedis pulses are 2+ on the right side and 2+ on the left side.      Heart sounds: Normal heart sounds.   Musculoskeletal:      Cervical back: Full passive range of motion without pain, normal range of motion and neck supple.      Right lower leg: Edema present.      Left lower leg: Edema present.   Abdominal: General: Bowel sounds are decreased. There is no distension.      Palpations: Abdomen is soft.      Tenderness: There is no abdominal tenderness.   Constitutional:       General: She is awake. She is not in acute distress.     Appearance: She is well-developed and well-nourished. She is obese. She is ill-appearing.   Pulmonary:      Effort: Pulmonary effort is normal.      Breath sounds: Decreased breath sounds present.   Psychiatric:         Attention and Perception: She is inattentive.         Mood and Affect: Mood is anxious.         Behavior: Behavior is cooperative.         Judgment: Judgment is impulsive.   Neurological:      Mental Status: She is alert, oriented to person, place, and time and oriented to person, place and time.      GCS: GCS eye subscore is 4. GCS verbal subscore is 5. GCS motor subscore is 6.      Sensory: Sensation is intact.          Diagnostic Studies      EKG: NSR  Imaging: I have personally reviewed pertinent reports.   and I have personally reviewed pertinent films in PACS  CXR - new right moderate pleural effusion     Medications:  Scheduled PRN   cefepime, 2,000 mg, Q24H  chlorhexidine, 15 mL, Q12H CLEMENTINE  fluticasone-vilanterol, 1 puff,  Daily  heparin (porcine), 7,500 Units, Q8H CLEMENTINE  insulin lispro, 2-12 Units, Q6H CLEMENTINE  ipratropium, 0.5 mg, TID  levalbuterol, 1.25 mg, TID  melatonin, 6 mg, HS  metroNIDAZOLE, 500 mg, Q8H  pantoprazole, 40 mg, Q24H CLEMENTINE      acetaminophen, 1,000 mg, Q6H PRN  levalbuterol, 1.25 mg, Q4H PRN  naloxone, 0.1 mg, Q1MIN PRN  ondansetron, 4 mg, Q6H PRN  phenol, 1 spray, Q2H PRN       Continuous    HYDROmorphone,          Labs:    CBC    Recent Labs     03/29/24  0433 03/29/24  1139 03/29/24  1638 03/29/24  2141   WBC 12.17*  --  14.57*  --    HGB 7.6*   < > 7.6* 7.5*   HCT 24.9*  --  24.9*  --    *  --  127*  --     < > = values in this interval not displayed.     BMP    Recent Labs     03/28/24  1004 03/29/24  0433   SODIUM 139 145  145   K 4.2 3.6  3.6   CL 99 103  103   CO2 34* 38*  38*   AGAP 6 4  4   BUN 24 21  21   CREATININE 1.56* 1.48*  1.48*   CALCIUM 8.0* 7.7*  7.7*       Coags    No recent results       Additional Electrolytes  Recent Labs     03/28/24  1004 03/29/24  0433 03/29/24  1638 03/29/24  1646   MG 2.1 2.4  --  2.5   PHOS 4.3* 4.3*  --  4.0   CAIONIZED 1.06*  --  1.01*  --           Blood Gas    No recent results  Recent Labs     03/29/24  1638   PHVEN 7.331   [id number] 78.3*   PO2VEN 39.4   [id number] 40.4*   BEVEN 12.7   [id number] 68.6    LFTs  Recent Labs     03/28/24  1004 03/29/24  0433   ALT 10 6*   AST 9* 7*   ALKPHOS 47 39   ALB 3.7 3.2*   TBILI 0.58 0.41       Infectious  Recent Labs     03/29/24  1638   PROCALCITONI 2.29*     Glucose  Recent Labs     03/28/24  1004 03/29/24  0433   GLUC 332* 138  138        LAINEY MujicaNP

## 2024-03-30 NOTE — ASSESSMENT & PLAN NOTE
Baseline COPD/GILDARDO/OHS. On chronic 2L home O2. Nonadherant to nocturnal bipap therapy  F/w [facility]; last seen inoffice 1/15/2024  11/1/2017 - Ratio 69%, %, FEV1 115% - normal spirometry - shortened expiratory time but with values >100% predicted.   Home regimen: advair 500-50, ipratropium-albuterol neb, xopenex neb  3/29: Escalate oxygen requirements up to 15L MFNC, CXR showed new moderate right pleural effusion.  3/30: CXR moderate bibasilar opacification, pna vs atelectasis.    Plan:   Titrate O2 to maintain SpO2 >88%  Continue nebulizers  Continue Advair or pharmacy equivalent, if unable to take consider Performist/Budesonide nebs  Pulmonary toilet  Incentive spirometry

## 2024-03-30 NOTE — ASSESSMENT & PLAN NOTE
Lab Results   Component Value Date    EGFR 34 03/29/2024    EGFR 34 03/29/2024    EGFR 32 03/28/2024    CREATININE 1.48 (H) 03/29/2024    CREATININE 1.48 (H) 03/29/2024    CREATININE 1.56 (H) 03/28/2024     Creatinine @ baseline.   Avoid nephrotoxic agents, hypotension  Monitor UO and renal indices  IVF discontinued  Diuresis as indicated

## 2024-03-30 NOTE — ASSESSMENT & PLAN NOTE
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 Surrounding mesenteric inflammatory edema and reactive ascites within the hernia sac as well as adjacent to markedly distended colon loops proximal to the hernia sac are seen. No gross free air or pneumatosis intestinalis. Emergent surgical consultation recommended. Secondary appendicitis is also seen. Additional ancillary findings detailed above.   S/p Ex-lap, extended R hemicolectomy, ventral hernia repair 3/28 - POD #2    Plan:  Serial abdominal checks  NPO, NG to Medium continuous suction  Surgical site care per surgery

## 2024-03-30 NOTE — ASSESSMENT & PLAN NOTE
As evidence by: Tachypnea, Tachycardia, Leukocytosis, Fever  Suspected source: Unclear possibly intra-abdominal  WBC - 14, procal 2.29, Lactic 1.0  Pt completed post-op ABX yesterday  IVF held d/t new pleural effusion  Blood cultures obtained yesterday  COVID/FLU/RSV normal  UA negative nitrites, leuks, WBC 4-10, occasional bacteria  3/30: CXR moderate bibasilar opacification, pna vs atelectasis.    Plan:  Start ABX: Cefepime, Flagyl, hold Vancomycin MRSA swab negative  Follow up blood cultures  Follow up sputum culture if obtained  CBC, procalcitonin, lactic in AM  Trend WBC/Fever

## 2024-03-30 NOTE — ASSESSMENT & PLAN NOTE
3/29: CXR with new moderate right sided pleural effusion.  Received Lasix 20mg IV x 1 dose  Continue MFNC, titrate for SpO2 > 88% see plan below.   Repeat CXR in AM.

## 2024-03-30 NOTE — ASSESSMENT & PLAN NOTE
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 Surrounding mesenteric inflammatory edema and reactive ascites within the hernia sac as well as adjacent to markedly distended colon loops proximal to the hernia sac are seen. No gross free air or pneumatosis intestinalis. Emergent surgical consultation recommended. Secondary appendicitis is also seen. Additional ancillary findings detailed above.   S/p Ex-lap, extended R hemicolectomy, ventral hernia repair 3/28 - POD #2    Plan:  Serial abdominal checks  NPO, NG to Medium continuous suction  Surgical site care per surgery  Analgesia Dilaudid PCA

## 2024-03-30 NOTE — PLAN OF CARE
Problem: Prexisting or High Potential for Compromised Skin Integrity  Goal: Skin integrity is maintained or improved  Description: INTERVENTIONS:  - Identify patients at risk for skin breakdown  - Assess and monitor skin integrity  - Assess and monitor nutrition and hydration status  - Monitor labs   - Assess for incontinence   - Turn and reposition patient  - Assist with mobility/ambulation  - Relieve pressure over bony prominences  - Avoid friction and shearing  - Provide appropriate hygiene as needed including keeping skin clean and dry  - Evaluate need for skin moisturizer/barrier cream  - Collaborate with interdisciplinary team   - Patient/family teaching  - Consider wound care consult   Outcome: Progressing     Problem: PAIN - ADULT  Goal: Verbalizes/displays adequate comfort level or baseline comfort level  Description: Interventions:  - Encourage patient to monitor pain and request assistance  - Assess pain using appropriate pain scale  - Administer analgesics based on type and severity of pain and evaluate response  - Implement non-pharmacological measures as appropriate and evaluate response  - Consider cultural and social influences on pain and pain management  - Notify physician/advanced practitioner if interventions unsuccessful or patient reports new pain  Outcome: Progressing     Problem: INFECTION - ADULT  Goal: Absence or prevention of progression during hospitalization  Description: INTERVENTIONS:  - Assess and monitor for signs and symptoms of infection  - Monitor lab/diagnostic results  - Monitor all insertion sites, i.e. indwelling lines, tubes, and drains  - Monitor endotracheal if appropriate and nasal secretions for changes in amount and color  - [place] appropriate cooling/warming therapies per order  - Administer medications as ordered  - Instruct and encourage patient and family to use good hand hygiene technique  - Identify and instruct in appropriate isolation precautions for  identified infection/condition  Outcome: Progressing  Goal: Absence of fever/infection during neutropenic period  Description: INTERVENTIONS:  - Monitor WBC    Outcome: Progressing     Problem: SAFETY ADULT  Goal: Patient will remain free of falls  Description: INTERVENTIONS:  - Educate patient/family on patient safety including physical limitations  - Instruct patient to call for assistance with activity   - Consult OT/PT to assist with strengthening/mobility   - Keep Call bell within reach  - Keep bed low and locked with side rails adjusted as appropriate  - Keep care items and personal belongings within reach  - Initiate and maintain comfort rounds  - Make Fall Risk Sign visible to staff  - Offer Toileting every 2 Hours, in advance of need  - Initiate/Maintain bed alarm  - Obtain necessary fall risk management equipment: socks/bracelet  - Apply yellow socks and bracelet for high fall risk patients  - Consider moving patient to room near nurses station  Outcome: Progressing  Goal: Maintain or return to baseline ADL function  Description: INTERVENTIONS:  -  Assess patient's ability to carry out ADLs; assess patient's baseline for ADL function and identify physical deficits which impact ability to perform ADLs (bathing, care of mouth/teeth, toileting, grooming, dressing, etc.)  - Assess/evaluate cause of self-care deficits   - Assess range of motion  - Assess patient's mobility; develop plan if impaired  - Assess patient's need for assistive devices and provide as appropriate  - Encourage maximum independence but intervene and supervise when necessary  - Involve family in performance of ADLs  - Assess for home care needs following discharge   - Consider OT consult to assist with ADL evaluation and planning for discharge  - Provide patient education as appropriate  Outcome: Progressing  Goal: Maintains/Returns to pre admission functional level  Description: INTERVENTIONS:  - Perform AM-PAC 6 Click Basic Mobility/  Daily Activity assessment daily.  - Set and communicate daily mobility goal to care team and patient/family/caregiver.   - Collaborate with rehabilitation services on mobility goals if consulted  - Perform Range of Motion 3 times a day.  - Reposition patient every 2 hours.  - Dangle patient 3 times a day  - Stand patient 3 times a day  - Ambulate patient 3 times a day  - Out of bed to chair 3 times a day   - Out of bed for meals 3 times a day  - Out of bed for toileting  - Record patient progress and toleration of activity level   Outcome: Progressing     Problem: DISCHARGE PLANNING  Goal: Discharge to home or other facility with appropriate resources  Description: INTERVENTIONS:  - Identify barriers to discharge w/patient and caregiver  - Arrange for needed discharge resources and transportation as appropriate  - Identify discharge learning needs (meds, wound care, etc.)  - Arrange for interpretive services to assist at discharge as needed  - Refer to Case Management Department for coordinating discharge planning if the patient needs post-hospital services based on physician/advanced practitioner order or complex needs related to functional status, cognitive ability, or social support system  Outcome: Progressing     Problem: Knowledge Deficit  Goal: Patient/family/caregiver demonstrates understanding of disease process, treatment plan, medications, and discharge instructions  Description: Complete learning assessment and assess knowledge base.  Interventions:  - Provide teaching at level of understanding  - Provide teaching via preferred learning methods  Outcome: Progressing

## 2024-03-30 NOTE — SEPSIS NOTE
Sepsis Note   Jud Walls 73 y.o. female MRN: [id number]  Unit/Bed#: ICU 13 Encounter: [id number]       Initial Sepsis Screening       Row Name 03/29/24 2248 03/27/24 2340             Is the patient's history suggestive of a new or worsening infection? Yes (Proceed)  -TL Yes (Proceed)  -SZ       Suspected source of infection suspect infection, source unknown  -TL acute abdominal infection  -SZ       Indicate SIRS criteria Hyperthemia > 38.3C (100.9F) OR Hypothermia <36C (96.8F);Tachycardia > 90 bpm;Tachypnea > 20 resp per min;Leukocytosis (WBC > 97115 IJL) OR Leukopenia (WBC <4000 IJL) OR Bandemia (WBC >10% bands)  -TL Leukocytosis (WBC > 94710 IJL) OR Leukopenia (WBC <4000 IJL) OR Bandemia (WBC >10% bands)  -SZ       Are two or more of the above signs & symptoms of infection both present and new to the patient? Yes (Proceed)  -TL No  -SZ       Assess for evidence of organ dysfunction: Are any of the below criteria present within 6 hours of suspected infection and SIRS criteria that are NOT considered to be chronic conditions? -- --                 User Key  (r) = Recorded By, (t) = Taken By, (c) = Cosigned By      Initials Name Provider Type    SZ Sarah Patton MD Physician    TL CELINA Mujica Nurse Practitioner                  Pt completed post-op ABX today  Repeat blood cultures obtained earlier today   COVID/FLORENTINO/RSV - normal  MRSA - pending  Sputum culture - not able to be obtained yet  CXR - no consolidation, new right sided effusion.  Hold ABX for now, discussed w/ Dr. Chand  No indication for resuscitation fluids  Follow up CBC and Procalcitonin  Follow up blood cultures    Body mass index is 42.19 kg/m².  Wt Readings from Last 1 Encounters:   03/29/24 98 kg (216 lb 0.8 oz)        Ideal body weight: 45.5 kg (100 lb 4.9 oz)  Adjusted ideal body weight: 66.5 kg (146 lb 9.7 oz)

## 2024-03-30 NOTE — ASSESSMENT & PLAN NOTE
Lab Results   Component Value Date    HGBA1C 7.1 (H) 03/15/2024     Recent Labs     03/29/24  1233 03/29/24  1421 03/29/24  1744 03/29/24  2353   POCGLU 163* 178* 196* 207*     Blood Sugar Average: Last 72 hrs:  (P) 204.5    Home regimen: glargine 60q12, dulaglutide, humalog 30 units before meals.  Currently NPO and BS uncontrolled.    Plan:  Insulin gtt discontinued.  Continue SSI coverage q6h ATC.  Goal blood glucose 140-180.  Avoid hypoglycemia.  Once okay to take PO per surgery, will need CHO diet.

## 2024-03-30 NOTE — ASSESSMENT & PLAN NOTE
Baseline COPD/GILDARDO/OHS. On chronic 2L home O2. Nonadherant to nocturnal bipap therapy  F/w [facility]; last seen inoffice 1/15/2024  11/1/2017 - Ratio 69%, %, FEV1 115% - normal spirometry - shortened expiratory time but with values >100% predicted.   Home regimen: advair 500-50, ipratropium-albuterol neb, xopenex neb  Escalate oxygen requirements yesterday up to 15L MFNC, CXR showed new moderate right pleural effusion.    Plan:   Titrate O2 to maintain SpO2 >88%  Continue nebulizers  Incentive spirometry

## 2024-03-31 PROBLEM — N18.32 STAGE 3B CHRONIC KIDNEY DISEASE (HCC): Chronic | Status: ACTIVE | Noted: 2024-03-28

## 2024-03-31 PROBLEM — E11.9 DIABETES (HCC): Chronic | Status: ACTIVE | Noted: 2017-08-20

## 2024-03-31 PROBLEM — F02.80 ALZHEIMER'S DEMENTIA (HCC): Chronic | Status: ACTIVE | Noted: 2018-10-15

## 2024-03-31 PROBLEM — G47.33 OSA (OBSTRUCTIVE SLEEP APNEA): Chronic | Status: ACTIVE | Noted: 2022-03-16

## 2024-03-31 PROBLEM — G30.9 ALZHEIMER'S DEMENTIA (HCC): Chronic | Status: ACTIVE | Noted: 2018-10-15

## 2024-03-31 PROBLEM — A41.9 SEPSIS (HCC): Status: RESOLVED | Noted: 2023-06-25 | Resolved: 2024-03-31

## 2024-03-31 PROBLEM — E78.5 HYPERLIPIDEMIA: Chronic | Status: ACTIVE | Noted: 2020-09-19

## 2024-03-31 LAB
ANION GAP SERPL CALCULATED.3IONS-SCNC: 9 MMOL/L (ref 4–13)
BASOPHILS # BLD AUTO: 0.02 THOUSANDS/ÂΜL (ref 0–0.1)
BASOPHILS NFR BLD AUTO: 0 % (ref 0–1)
BUN SERPL-MCNC: 29 MG/DL (ref 5–25)
CALCIUM SERPL-MCNC: 8.8 MG/DL (ref 8.4–10.2)
CHLORIDE SERPL-SCNC: 102 MMOL/L (ref 96–108)
CO2 SERPL-SCNC: 39 MMOL/L (ref 21–32)
CREAT SERPL-MCNC: 1.64 MG/DL (ref 0.6–1.3)
EOSINOPHIL # BLD AUTO: 0.02 THOUSAND/ÂΜL (ref 0–0.61)
EOSINOPHIL NFR BLD AUTO: 0 % (ref 0–6)
ERYTHROCYTE [DISTWIDTH] IN BLOOD BY AUTOMATED COUNT: 14.9 % (ref 11.6–15.1)
GFR SERPL CREATININE-BSD FRML MDRD: 30 ML/MIN/1.73SQ M
GLUCOSE SERPL-MCNC: 235 MG/DL (ref 65–140)
GLUCOSE SERPL-MCNC: 257 MG/DL (ref 65–140)
GLUCOSE SERPL-MCNC: 269 MG/DL (ref 65–140)
GLUCOSE SERPL-MCNC: 282 MG/DL (ref 65–140)
HCT VFR BLD AUTO: 25.1 % (ref 34.8–46.1)
HGB BLD-MCNC: 7.2 G/DL (ref 11.5–15.4)
IMM GRANULOCYTES # BLD AUTO: 0.16 THOUSAND/UL (ref 0–0.2)
IMM GRANULOCYTES NFR BLD AUTO: 1 % (ref 0–2)
LYMPHOCYTES # BLD AUTO: 0.96 THOUSANDS/ÂΜL (ref 0.6–4.47)
LYMPHOCYTES NFR BLD AUTO: 8 % (ref 14–44)
MAGNESIUM SERPL-MCNC: 2.6 MG/DL (ref 1.9–2.7)
MCH RBC QN AUTO: 28.8 PG (ref 26.8–34.3)
MCHC RBC AUTO-ENTMCNC: 28.7 G/DL (ref 31.4–37.4)
MCV RBC AUTO: 100 FL (ref 82–98)
MONOCYTES # BLD AUTO: 0.6 THOUSAND/ÂΜL (ref 0.17–1.22)
MONOCYTES NFR BLD AUTO: 5 % (ref 4–12)
NEUTROPHILS # BLD AUTO: 10.74 THOUSANDS/ÂΜL (ref 1.85–7.62)
NEUTS SEG NFR BLD AUTO: 86 % (ref 43–75)
NRBC BLD AUTO-RTO: 0 /100 WBCS
PHOSPHATE SERPL-MCNC: 2.9 MG/DL (ref 2.3–4.1)
PLATELET # BLD AUTO: 156 THOUSANDS/UL (ref 149–390)
PMV BLD AUTO: 9.9 FL (ref 8.9–12.7)
POTASSIUM SERPL-SCNC: 3.2 MMOL/L (ref 3.5–5.3)
RBC # BLD AUTO: 2.5 MILLION/UL (ref 3.81–5.12)
SODIUM SERPL-SCNC: 150 MMOL/L (ref 135–147)
WBC # BLD AUTO: 12.5 THOUSAND/UL (ref 4.31–10.16)

## 2024-03-31 PROCEDURE — 85025 COMPLETE CBC W/AUTO DIFF WBC: CPT | Performed by: NURSE PRACTITIONER

## 2024-03-31 PROCEDURE — 94760 N-INVAS EAR/PLS OXIMETRY 1: CPT

## 2024-03-31 PROCEDURE — 99233 SBSQ HOSP IP/OBS HIGH 50: CPT | Performed by: SURGERY

## 2024-03-31 PROCEDURE — 84100 ASSAY OF PHOSPHORUS: CPT | Performed by: NURSE PRACTITIONER

## 2024-03-31 PROCEDURE — 82948 REAGENT STRIP/BLOOD GLUCOSE: CPT

## 2024-03-31 PROCEDURE — 99024 POSTOP FOLLOW-UP VISIT: CPT | Performed by: SURGERY

## 2024-03-31 PROCEDURE — C9113 INJ PANTOPRAZOLE SODIUM, VIA: HCPCS | Performed by: PHYSICIAN ASSISTANT

## 2024-03-31 PROCEDURE — 83735 ASSAY OF MAGNESIUM: CPT | Performed by: NURSE PRACTITIONER

## 2024-03-31 PROCEDURE — 80048 BASIC METABOLIC PNL TOTAL CA: CPT | Performed by: NURSE PRACTITIONER

## 2024-03-31 PROCEDURE — 94640 AIRWAY INHALATION TREATMENT: CPT

## 2024-03-31 RX ORDER — ACETAMINOPHEN 325 MG/1
975 TABLET ORAL EVERY 6 HOURS SCHEDULED
Status: DISCONTINUED | OUTPATIENT
Start: 2024-03-31 | End: 2024-04-05 | Stop reason: HOSPADM

## 2024-03-31 RX ORDER — LOSARTAN POTASSIUM 25 MG/1
25 TABLET ORAL DAILY
Status: DISCONTINUED | OUTPATIENT
Start: 2024-04-01 | End: 2024-04-05 | Stop reason: HOSPADM

## 2024-03-31 RX ORDER — PRAVASTATIN SODIUM 80 MG/1
80 TABLET ORAL
Status: DISCONTINUED | OUTPATIENT
Start: 2024-03-31 | End: 2024-04-05 | Stop reason: HOSPADM

## 2024-03-31 RX ORDER — LABETALOL HYDROCHLORIDE 5 MG/ML
10 INJECTION, SOLUTION INTRAVENOUS EVERY 6 HOURS PRN
Status: DISCONTINUED | OUTPATIENT
Start: 2024-03-31 | End: 2024-04-05 | Stop reason: HOSPADM

## 2024-03-31 RX ORDER — POTASSIUM CHLORIDE 14.9 MG/ML
20 INJECTION INTRAVENOUS ONCE
Qty: 100 ML | Refills: 0 | Status: COMPLETED | OUTPATIENT
Start: 2024-03-31 | End: 2024-03-31

## 2024-03-31 RX ORDER — MEMANTINE HYDROCHLORIDE 10 MG/1
10 TABLET ORAL 2 TIMES DAILY
Status: DISCONTINUED | OUTPATIENT
Start: 2024-03-31 | End: 2024-04-05 | Stop reason: HOSPADM

## 2024-03-31 RX ORDER — SODIUM CHLORIDE, SODIUM GLUCONATE, SODIUM ACETATE, POTASSIUM CHLORIDE, MAGNESIUM CHLORIDE, SODIUM PHOSPHATE, DIBASIC, AND POTASSIUM PHOSPHATE .53; .5; .37; .037; .03; .012; .00082 G/100ML; G/100ML; G/100ML; G/100ML; G/100ML; G/100ML; G/100ML
50 INJECTION, SOLUTION INTRAVENOUS CONTINUOUS
Status: DISPENSED | OUTPATIENT
Start: 2024-03-31 | End: 2024-03-31

## 2024-03-31 RX ORDER — PANTOPRAZOLE SODIUM 20 MG/1
20 TABLET, DELAYED RELEASE ORAL
Status: DISCONTINUED | OUTPATIENT
Start: 2024-04-01 | End: 2024-04-05 | Stop reason: HOSPADM

## 2024-03-31 RX ORDER — SODIUM CHLORIDE, SODIUM GLUCONATE, SODIUM ACETATE, POTASSIUM CHLORIDE, MAGNESIUM CHLORIDE, SODIUM PHOSPHATE, DIBASIC, AND POTASSIUM PHOSPHATE .53; .5; .37; .037; .03; .012; .00082 G/100ML; G/100ML; G/100ML; G/100ML; G/100ML; G/100ML; G/100ML
250 INJECTION, SOLUTION INTRAVENOUS ONCE
Status: COMPLETED | OUTPATIENT
Start: 2024-03-31 | End: 2024-03-31

## 2024-03-31 RX ADMIN — SODIUM CHLORIDE, SODIUM GLUCONATE, SODIUM ACETATE, POTASSIUM CHLORIDE, MAGNESIUM CHLORIDE, SODIUM PHOSPHATE, DIBASIC, AND POTASSIUM PHOSPHATE 250 ML: .53; .5; .37; .037; .03; .012; .00082 INJECTION, SOLUTION INTRAVENOUS at 11:29

## 2024-03-31 RX ADMIN — PRAVASTATIN SODIUM 80 MG: 80 TABLET ORAL at 16:37

## 2024-03-31 RX ADMIN — HEPARIN SODIUM 7500 UNITS: 5000 INJECTION INTRAVENOUS; SUBCUTANEOUS at 13:59

## 2024-03-31 RX ADMIN — ACETAMINOPHEN 1000 MG: 10 INJECTION INTRAVENOUS at 11:15

## 2024-03-31 RX ADMIN — Medication 6 MG: at 21:38

## 2024-03-31 RX ADMIN — ACETAMINOPHEN 975 MG: 325 TABLET, FILM COATED ORAL at 17:26

## 2024-03-31 RX ADMIN — CEFEPIME 2000 MG: 2 INJECTION, POWDER, FOR SOLUTION INTRAVENOUS at 03:58

## 2024-03-31 RX ADMIN — HEPARIN SODIUM 7500 UNITS: 5000 INJECTION INTRAVENOUS; SUBCUTANEOUS at 05:38

## 2024-03-31 RX ADMIN — LEVALBUTEROL HYDROCHLORIDE 1.25 MG: 1.25 SOLUTION RESPIRATORY (INHALATION) at 08:41

## 2024-03-31 RX ADMIN — IPRATROPIUM BROMIDE 0.5 MG: 0.5 SOLUTION RESPIRATORY (INHALATION) at 13:40

## 2024-03-31 RX ADMIN — SODIUM CHLORIDE, SODIUM GLUCONATE, SODIUM ACETATE, POTASSIUM CHLORIDE, MAGNESIUM CHLORIDE, SODIUM PHOSPHATE, DIBASIC, AND POTASSIUM PHOSPHATE 50 ML/HR: .53; .5; .37; .037; .03; .012; .00082 INJECTION, SOLUTION INTRAVENOUS at 11:49

## 2024-03-31 RX ADMIN — HEPARIN SODIUM 7500 UNITS: 5000 INJECTION INTRAVENOUS; SUBCUTANEOUS at 21:38

## 2024-03-31 RX ADMIN — INSULIN LISPRO 4 UNITS: 100 INJECTION, SOLUTION INTRAVENOUS; SUBCUTANEOUS at 17:58

## 2024-03-31 RX ADMIN — INSULIN LISPRO 6 UNITS: 100 INJECTION, SOLUTION INTRAVENOUS; SUBCUTANEOUS at 12:34

## 2024-03-31 RX ADMIN — IPRATROPIUM BROMIDE 0.5 MG: 0.5 SOLUTION RESPIRATORY (INHALATION) at 20:33

## 2024-03-31 RX ADMIN — PANTOPRAZOLE SODIUM 40 MG: 40 INJECTION, POWDER, FOR SOLUTION INTRAVENOUS at 08:31

## 2024-03-31 RX ADMIN — CHLORHEXIDINE GLUCONATE 15 ML: 1.2 SOLUTION ORAL at 21:38

## 2024-03-31 RX ADMIN — IPRATROPIUM BROMIDE 0.5 MG: 0.5 SOLUTION RESPIRATORY (INHALATION) at 08:41

## 2024-03-31 RX ADMIN — POTASSIUM CHLORIDE 20 MEQ: 14.9 INJECTION, SOLUTION INTRAVENOUS at 11:34

## 2024-03-31 RX ADMIN — LEVALBUTEROL HYDROCHLORIDE 1.25 MG: 1.25 SOLUTION RESPIRATORY (INHALATION) at 13:40

## 2024-03-31 RX ADMIN — POTASSIUM CHLORIDE 20 MEQ: 14.9 INJECTION, SOLUTION INTRAVENOUS at 12:51

## 2024-03-31 RX ADMIN — METRONIDAZOLE 500 MG: 500 INJECTION, SOLUTION INTRAVENOUS at 11:56

## 2024-03-31 RX ADMIN — CHLORHEXIDINE GLUCONATE 15 ML: 1.2 SOLUTION ORAL at 08:31

## 2024-03-31 RX ADMIN — INSULIN LISPRO 6 UNITS: 100 INJECTION, SOLUTION INTRAVENOUS; SUBCUTANEOUS at 05:50

## 2024-03-31 RX ADMIN — MEMANTINE 10 MG: 10 TABLET ORAL at 17:26

## 2024-03-31 RX ADMIN — LEVALBUTEROL HYDROCHLORIDE 1.25 MG: 1.25 SOLUTION RESPIRATORY (INHALATION) at 20:33

## 2024-03-31 RX ADMIN — METRONIDAZOLE 500 MG: 500 INJECTION, SOLUTION INTRAVENOUS at 20:49

## 2024-03-31 RX ADMIN — TRAZODONE HYDROCHLORIDE 150 MG: 50 TABLET ORAL at 21:38

## 2024-03-31 RX ADMIN — METRONIDAZOLE 500 MG: 500 INJECTION, SOLUTION INTRAVENOUS at 04:58

## 2024-03-31 RX ADMIN — FLUTICASONE FUROATE AND VILANTEROL TRIFENATATE 1 PUFF: 200; 25 POWDER RESPIRATORY (INHALATION) at 08:32

## 2024-03-31 NOTE — PROGRESS NOTES
General Surgery  Progress Note   Jud Walls 73 y.o. female MRN: [id number]  Unit/Bed#: ICU 13 Encounter: [id number]    Assessment:  73-year-old female with cecal volvulus within an incarcerated ventral hernia. S/p Ex-lap, extended R hemicolectomy, ventral hernia repair 3/28.    Tmax of 100.8 at 2030 yesterday; otherwise AVSS on 8L mid flow     UOP: 1.2L yellow  NGT: 50 cc light bilious     WBC 12.5 from 14.1  Hgb 7.2 from 7.5 from 7.6  Creatinine 1.64  Plan:  -Keep NG tube for now given risk for aspiration; potential DC of tube later today  -Keep PCA for now  -Wean midflow as tolerated  -Pain/ nausea control PRN  -OOB/ ambulation  - PT/OT  -Incentive Spirometry  -Continue ICU care    Subjective/Objective     Subjective:   Patient seen and examined at bedside, in no acute distress.  Overall feeling well.  Denies abdominal pain.  Denies N/V, chest pains, shortness of breath.  Passing flatus but denies BM.    Objective:   Vitals:Blood pressure 159/72, pulse 98, temperature 99.4 °F (37.4 °C), temperature source Oral, resp. rate (!) 28, weight 93.3 kg (205 lb 11 oz), SpO2 94%.  Temp (24hrs), Av.7 °F (37.6 °C), Min:99.1 °F (37.3 °C), Max:100.8 °F (38.2 °C)      I/O          0701   0700  0701   0700  0701   0700    I.V. (mL/kg)  900 (9.3)     IV Piggyback  1100     Total Intake(mL/kg)  2000 (20.6)     Urine (mL/kg/hr)  500     Blood  100     Total Output  600     Net  +1400                    Invasive Devices       Peripheral Intravenous Line  Duration             Peripheral IV 24 Left Hand 3 days    Peripheral IV 24 Dorsal (posterior);Right Forearm 1 day              Drain  Duration             NG/OG/Enteral Tube Nasogastric Right nare <1 day                    Physical Exam:  General: No acute distress  Neuro: Awake, Alert and Oriented x 3  HEENT:  Normocephalic, atraumatic, moist mucous membranes, NGT  CV: Regular rate and rhythm  Lungs: Normal work of breathing, no  increased respiratory effort  Abdomen: Soft, appropriately tender in right upper quadrant, protuberant abdomen but appears ND  Extremities: No edema, clubbing or cyanosis  Skin: Warm, dry    Lab Results   Component Value Date    WBC 12.50 (H) 03/31/2024    HGB 7.2 (L) 03/31/2024    HCT 25.1 (L) 03/31/2024     (H) 03/31/2024     03/31/2024      Lab Results   Component Value Date     (L) 09/21/2015    SODIUM 150 (H) 03/31/2024    K 3.2 (L) 03/31/2024     03/31/2024    CO2 39 (H) 03/31/2024    ANIONGAP 8 09/21/2015    AGAP 9 03/31/2024    BUN 29 (H) 03/31/2024    CREATININE 1.64 (H) 03/31/2024    GLUC 282 (H) 03/31/2024    GLUF 171 (H) 06/26/2023    CALCIUM 8.8 03/31/2024    AST 10 (L) 03/30/2024    ALT 4 (L) 03/30/2024    ALKPHOS 50 03/30/2024    PROT 7.4 09/21/2015    TP 6.2 (L) 03/30/2024    BILITOT 0.61 09/21/2015    TBILI 0.76 03/30/2024    EGFR 30 03/31/2024       VTE Prophylaxis: Heparin      Pamler Fitzpatrick DO  3/31/2024  6:39 AM

## 2024-03-31 NOTE — PROGRESS NOTES
[facility]  Progress Note  Name: Jud Walls I  MRN: [id number]  Unit/Bed#: ICU 13 I Date of Admission: 3/27/2024   Date of Service: 3/31/2024 I Hospital Day: 3    Assessment/Plan   * Cecal volvulus (HCC)  Assessment & Plan  CT Abd/Pelvis 3/28: 2 focal areas of large bowel volvulus involving the cecum/proximal ascending colon as well as a loop of mid transverse colon due to entrapment within the incarcerated ventral lower abdominal hernia sac as discussed above. Surrounding mesenteric inflammatory edema and reactive ascites within the hernia sac as well as adjacent to markedly distended colon loops proximal to the hernia sac are seen. No gross free air or pneumatosis intestinalis. Emergent surgical consultation recommended. Secondary appendicitis is also seen. Additional ancillary findings detailed above.   S/p Ex-lap, extended R hemicolectomy, ventral hernia repair 3/28 - POD #2    Plan:  Continue NPO  Maintain NG tube to Medium continuous suction  Flush NG tube with 30cc water every shift  Flush NG tube blue port with 10cc air every shift  Dilaudid PCA for analgesia  Serial ABD exams  General surgery following.    Incarcerated hernia  Assessment & Plan  CT Abd/Pelvis 3/28: 2 focal areas of large bowel volvulus involving the cecum/proximal ascending colon as well as a loop of mid transverse colon due to entrapment within the incarcerated ventral lower abdominal hernia sac as discussed above. Surrounding mesenteric inflammatory edema and reactive ascites within the hernia sac as well as adjacent to markedly distended colon loops proximal to the hernia sac are seen. No gross free air or pneumatosis intestinalis. Emergent surgical consultation recommended. Secondary appendicitis is also seen. Additional ancillary findings detailed above.   S/p Ex-lap, extended R hemicolectomy, ventral hernia repair 3/28 - POD #2    Plan:  Serial abdominal checks  NPO, NG to Medium continuous  suction  Surgical site care per surgery  Analgesia Dilaudid PCA    Acute on chronic respiratory failure with hypoxia and hypercapnia (HCC)  Assessment & Plan  Baseline COPD/GILDARDO/OHS. On chronic 2L home O2. Nonadherant to nocturnal bipap therapy  F/w [facility] Pul; last seen inoffice 1/15/2024  11/1/2017 - Ratio 69%, %, FEV1 115% - normal spirometry - shortened expiratory time but with values >100% predicted.   Home regimen: advair 500-50, ipratropium-albuterol neb, xopenex neb  3/29: Escalate oxygen requirements up to 15L MFNC, CXR showed new moderate right pleural effusion.  3/30: CXR moderate bibasilar opacification, pna vs atelectasis.    Plan:   Titrate O2 to maintain SpO2 >88%  Continue nebulizers  Continue Advair or pharmacy equivalent, if unable to take consider Performist/Budesonide nebs  Pulmonary toilet  Incentive spirometry    Leukocytosis  Assessment & Plan  Suspect reactive in setting of acute illness  Pt with temp of 101 yesterday  Blood cultures x 2 drawn  COVID/FLU/RSV - normal  MRSA - normal  Sputum CX - not obtained yet  Trend WBC/temps.  Hold ABX for now  CBC and Procalcitonin in AM.    GILDARDO (obstructive sleep apnea)  Assessment & Plan  Nonadherant to BIPAP.  Monitor respiratory status.    Diabetes (HCC)  Assessment & Plan  Lab Results   Component Value Date    HGBA1C 7.1 (H) 03/15/2024     Recent Labs     03/29/24  1233 03/29/24  1421 03/29/24  1744 03/29/24  2353   POCGLU 163* 178* 196* 207*     Blood Sugar Average: Last 72 hrs:  (P) 204.5    Home regimen: glargine 60q12, dulaglutide, humalog 30 units before meals.  Currently NPO and BS uncontrolled.    Plan:  Insulin gtt discontinued.  Continue SSI coverage q6h ATC.  Goal blood glucose 140-180.  Avoid hypoglycemia.  Once okay to take PO per surgery, will need CHO diet.    Pleural effusion  Assessment & Plan  3/29: CXR with new moderate right sided pleural effusion.  Received Lasix 20mg IV x 1 dose  Continue NC, titrate for SpO2 > 88% see  plan below.   CXR as indicated    Stage 3b chronic kidney disease (HCC)  Assessment & Plan  Lab Results   Component Value Date    EGFR 34 03/29/2024    EGFR 34 03/29/2024    EGFR 32 03/28/2024    CREATININE 1.48 (H) 03/29/2024    CREATININE 1.48 (H) 03/29/2024    CREATININE 1.56 (H) 03/28/2024     Creatinine @ baseline.   Avoid nephrotoxic agents, hypotension.  Monitor UO and renal indices.  IVF discontinued.  Diuresis as indicated.    Sepsis (Former[facility])  Assessment & Plan  As evidence by: Tachypnea, Tachycardia, Leukocytosis, Fever  Suspected source: Unclear possibly intra-abdominal  WBC - 14, procal 2.29, Lactic 1.0  Pt completed post-op ABX yesterday  IVF held d/t new pleural effusion  Blood cultures obtained yesterday  COVID/FLU/RSV normal  UA negative nitrites, leuks, WBC 4-10, occasional bacteria  3/30: CXR moderate bibasilar opacification, pna vs atelectasis.    Plan:  Start ABX: Cefepime, Flagyl, hold Vancomycin MRSA swab negative  Follow up blood cultures  Follow up sputum culture if obtained  CBC, procalcitonin, lactic in AM  Trend WBC/Fever    Hyperlipidemia  Assessment & Plan  Home regimen: Simvastatin 40mg daily  Continue to hold home Statin while NPO    Alzheimer's dementia (Former[facility])  Assessment & Plan  Home regimen: Namenda   Continue on hold while NPO        Disposition: Stepdown Level 1    ICU Core Measures     A: Assess, Prevent, and Manage Pain Has pain been assessed? Yes  Need for changes to pain regimen? No   B: Both SAT/SAT  N/A   C: Choice of Sedation RASS Goal: N/A patient not on sedation  Need for changes to sedation or analgesia regimen? NA   D: Delirium CAM-ICU: Negative   E: Early Mobility  Plan for early mobility? Yes   F: Family Engagement Plan for family engagement today? Yes       Antibiotic Review: Awaiting culture results.     Review of Invasive Devices:      Prophylaxis:  VTE VTE covered by:  heparin (porcine), Subcutaneous, 7,500 Units at 03/31/24 0538       Stress Ulcer  covered  byOmeprazole 20 MG TBEC [[id number]] (Long-Term Med), pantoprazole (PROTONIX) injection 40 mg [[id number]]         Significant 24hr Events     24hr events: Pt able to be weaned down to 5L NC during the daytime yesterday, tolerated well. Overnight SpO2 requirements increased to 8L NC. No acute events overnight.      Subjective   Review of Systems   Constitutional: Negative.  Negative for activity change, chills and diaphoresis.   HENT: Negative.     Eyes: Negative.    Respiratory:  Positive for shortness of breath. Negative for cough and chest tightness.    Cardiovascular: Negative.  Negative for chest pain and palpitations.   Gastrointestinal: Negative.  Negative for abdominal distention, abdominal pain, constipation, diarrhea, nausea and vomiting.   Endocrine: Negative.    Genitourinary: Negative.    Musculoskeletal: Negative.    Skin: Negative.    Allergic/Immunologic: Negative.    Neurological: Negative.  Negative for dizziness, weakness, light-headedness, numbness and headaches.   Hematological: Negative.    Psychiatric/Behavioral: Negative.        Objective                            Vitals I/O      Most Recent Min/Max in 24hrs   Temp 99.4 °F (37.4 °C) Temp  Min: 99.1 °F (37.3 °C)  Max: 100.8 °F (38.2 °C)   Pulse 98 Pulse  Min: 98  Max: 116   Resp (!) 28 Resp  Min: 21  Max: 36   /72 BP  Min: 136/62  Max: 178/69   O2 Sat 94 % SpO2  Min: 87 %  Max: 97 %      Intake/Output Summary (Last 24 hours) at 3/31/2024 0545  Last data filed at 3/31/2024 0401  Gross per 24 hour   Intake 287 ml   Output 1252 ml   Net -965 ml       Diet NPO    Invasive Monitoring           Physical Exam   Physical Exam  Vitals and nursing note reviewed.   Eyes:      Pupils: Pupils are equal, round, and reactive to light.   Skin:     General: Skin is warm and dry.      Capillary Refill: Capillary refill takes less than 2 seconds.   HENT:      Head: Normocephalic and atraumatic.      Mouth/Throat:      Mouth: Mucous membranes are moist.    Cardiovascular:      Rate and Rhythm: Normal rate and regular rhythm.      Pulses:           Radial pulses are 2+ on the right side and 2+ on the left side.        Dorsalis pedis pulses are 2+ on the right side and 2+ on the left side.      Heart sounds: Normal heart sounds.   Musculoskeletal:         General: Normal range of motion.      Cervical back: Full passive range of motion without pain, normal range of motion and neck supple.      Right lower leg: Edema present.      Left lower leg: Edema present.   Abdominal: General: Bowel sounds are decreased. There is no distension.      Palpations: Abdomen is soft.      Tenderness: There is no abdominal tenderness.      Comments: Midline incision    Constitutional:       General: She is not in acute distress.     Appearance: She is well-developed and well-nourished. She is obese. She is not ill-appearing.   Pulmonary:      Effort: Pulmonary effort is normal.      Breath sounds: Examination of the left-upper field reveals decreased breath sounds. Examination of the left-middle field reveals decreased breath sounds. Examination of the left-lower field reveals decreased breath sounds. Decreased breath sounds present.   Psychiatric:         Attention and Perception: Attention normal.         Mood and Affect: Mood normal.         Speech: Speech normal.         Behavior: Behavior is cooperative.         Thought Content: Thought content normal.         Cognition and Memory: Cognition normal.         Judgment: Judgment normal.   Neurological:      General: No focal deficit present.      Mental Status: She is alert, oriented to person, place, and time and oriented to person, place and time. Mental status is at baseline.      GCS: GCS eye subscore is 4. GCS verbal subscore is 5. GCS motor subscore is 6.      Sensory: Sensation is intact.          Diagnostic Studies      EKG: NSR  Imaging:  I have personally reviewed pertinent reports.   and I have personally reviewed pertinent  films in PACS  CXR - Moderate bibasilar opacity which could be due to atelectasis and/or pneumonia.     Medications:  Scheduled PRN   cefepime, 2,000 mg, Q24H  chlorhexidine, 15 mL, Q12H CLEMENTINE  fluticasone-vilanterol, 1 puff, Daily  heparin (porcine), 7,500 Units, Q8H CLEMENTINE  insulin lispro, 2-12 Units, Q6H CLEMENTINE  ipratropium, 0.5 mg, TID  levalbuterol, 1.25 mg, TID  melatonin, 6 mg, HS  metroNIDAZOLE, 500 mg, Q8H  multi-electrolyte, 250 mL, Once  pantoprazole, 40 mg, Q24H CLEMENTINE      acetaminophen, 1,000 mg, Q6H PRN  levalbuterol, 1.25 mg, Q4H PRN  naloxone, 0.1 mg, Q1MIN PRN  ondansetron, 4 mg, Q6H PRN  phenol, 1 spray, Q2H PRN       Continuous    HYDROmorphone,          Labs:    CBC    Recent Labs     03/29/24  1638 03/29/24  2141 03/30/24  0450 03/30/24  0919   WBC 14.57*  --  14.17*  --    HGB 7.6*   < > 7.2* 7.2*   HCT 24.9*  --  23.9*  --    *  --  129*  --    BANDSPCT  --   --  6  --     < > = values in this interval not displayed.     BMP    Recent Labs     03/30/24  0450   SODIUM 147   K 3.7      CO2 38*   AGAP 7   BUN 25   CREATININE 1.50*   CALCIUM 8.5       Coags    No recent results     Additional Electrolytes  Recent Labs     03/29/24  1638 03/29/24  1646 03/30/24  0450   MG  --  2.5 2.5   PHOS  --  4.0 4.2*   CAIONIZED 1.01*  --   --           Blood Gas    No recent results  Recent Labs     03/29/24  1638   PHVEN 7.331   [id number] 78.3*   PO2VEN 39.4   [id number] 40.4*   BEVEN 12.7   [id number] 68.6    LFTs  Recent Labs     03/30/24  0450   ALT 4*   AST 10*   ALKPHOS 50   ALB 3.1*   TBILI 0.76       Infectious  Recent Labs     03/29/24  1638 03/30/24  0450   PROCALCITONI 2.29* 2.71*     Glucose  Recent Labs     03/30/24  0450   GLUC 228*        CELINA Mujica

## 2024-03-31 NOTE — PLAN OF CARE
Problem: Prexisting or High Potential for Compromised Skin Integrity  Goal: Skin integrity is maintained or improved  Description: INTERVENTIONS:  - Identify patients at risk for skin breakdown  - Assess and monitor skin integrity  - Assess and monitor nutrition and hydration status  - Monitor labs   - Assess for incontinence   - Turn and reposition patient  - Assist with mobility/ambulation  - Relieve pressure over bony prominences  - Avoid friction and shearing  - Provide appropriate hygiene as needed including keeping skin clean and dry  - Evaluate need for skin moisturizer/barrier cream  - Collaborate with interdisciplinary team   - Patient/family teaching  - Consider wound care consult   Outcome: Progressing     Problem: PAIN - ADULT  Goal: Verbalizes/displays adequate comfort level or baseline comfort level  Description: Interventions:  - Encourage patient to monitor pain and request assistance  - Assess pain using appropriate pain scale  - Administer analgesics based on type and severity of pain and evaluate response  - Implement non-pharmacological measures as appropriate and evaluate response  - Consider cultural and social influences on pain and pain management  - Notify physician/advanced practitioner if interventions unsuccessful or patient reports new pain  Outcome: Progressing     Problem: INFECTION - ADULT  Goal: Absence or prevention of progression during hospitalization  Description: INTERVENTIONS:  - Assess and monitor for signs and symptoms of infection  - Monitor lab/diagnostic results  - Monitor all insertion sites, i.e. indwelling lines, tubes, and drains  - Monitor endotracheal if appropriate and nasal secretions for changes in amount and color  - [place] appropriate cooling/warming therapies per order  - Administer medications as ordered  - Instruct and encourage patient and family to use good hand hygiene technique  - Identify and instruct in appropriate isolation precautions for  identified infection/condition  Outcome: Progressing  Goal: Absence of fever/infection during neutropenic period  Description: INTERVENTIONS:  - Monitor WBC    Outcome: Progressing     Problem: SAFETY ADULT  Goal: Patient will remain free of falls  Description: INTERVENTIONS:  - Educate patient/family on patient safety including physical limitations  - Instruct patient to call for assistance with activity   - Consult OT/PT to assist with strengthening/mobility   - Keep Call bell within reach  - Keep bed low and locked with side rails adjusted as appropriate  - Keep care items and personal belongings within reach  - Initiate and maintain comfort rounds  - Make Fall Risk Sign visible to staff  - Offer Toileting every 2 Hours, in advance of need  - Initiate/Maintain 2alarm  - Obtain necessary fall risk management equipment: 2  - Apply yellow socks and bracelet for high fall risk patients  - Consider moving patient to room near nurses station  Outcome: Progressing  Goal: Maintain or return to baseline ADL function  Description: INTERVENTIONS:  -  Assess patient's ability to carry out ADLs; assess patient's baseline for ADL function and identify physical deficits which impact ability to perform ADLs (bathing, care of mouth/teeth, toileting, grooming, dressing, etc.)  - Assess/evaluate cause of self-care deficits   - Assess range of motion  - Assess patient's mobility; develop plan if impaired  - Assess patient's need for assistive devices and provide as appropriate  - Encourage maximum independence but intervene and supervise when necessary  - Involve family in performance of ADLs  - Assess for home care needs following discharge   - Consider OT consult to assist with ADL evaluation and planning for discharge  - Provide patient education as appropriate  Outcome: Progressing  Goal: Maintains/Returns to pre admission functional level  Description: INTERVENTIONS:  - Perform AM-PAC 6 Click Basic Mobility/ Daily Activity  assessment daily.  - Set and communicate daily mobility goal to care team and patient/family/caregiver.   - Collaborate with rehabilitation services on mobility goals if consulted  - Perform Range of Motion 2 times a day.  - Reposition patient every 2 hours.  - Dangle patient 2 times a day  - Stand patient 2 times a day  - Ambulate patient 2 times a day  - Out of bed to chair 2 times a day   - Out of bed for meals 2 times a day  - Out of bed for toileting  - Record patient progress and toleration of activity level   Outcome: Progressing     Problem: DISCHARGE PLANNING  Goal: Discharge to home or other facility with appropriate resources  Description: INTERVENTIONS:  - Identify barriers to discharge w/patient and caregiver  - Arrange for needed discharge resources and transportation as appropriate  - Identify discharge learning needs (meds, wound care, etc.)  - Arrange for interpretive services to assist at discharge as needed  - Refer to Case Management Department for coordinating discharge planning if the patient needs post-hospital services based on physician/advanced practitioner order or complex needs related to functional status, cognitive ability, or social support system  Outcome: Progressing     Problem: Knowledge Deficit  Goal: Patient/family/caregiver demonstrates understanding of disease process, treatment plan, medications, and discharge instructions  Description: Complete learning assessment and assess knowledge base.  Interventions:  - Provide teaching at level of understanding  - Provide teaching via preferred learning methods  Outcome: Progressing

## 2024-04-01 ENCOUNTER — APPOINTMENT (INPATIENT)
Dept: RADIOLOGY | Facility: HOSPITAL | Age: 74
DRG: 329 | End: 2024-04-01
Payer: MEDICARE

## 2024-04-01 PROBLEM — G92.8 TOXIC METABOLIC ENCEPHALOPATHY: Status: ACTIVE | Noted: 2024-04-01

## 2024-04-01 LAB
ABO GROUP BLD: NORMAL
ANION GAP SERPL CALCULATED.3IONS-SCNC: 8 MMOL/L (ref 4–13)
BASOPHILS # BLD AUTO: 0.02 THOUSANDS/ÂΜL (ref 0–0.1)
BASOPHILS NFR BLD AUTO: 0 % (ref 0–1)
BLD GP AB SCN SERPL QL: NEGATIVE
BUN SERPL-MCNC: 31 MG/DL (ref 5–25)
CALCIUM SERPL-MCNC: 8.3 MG/DL (ref 8.4–10.2)
CHLORIDE SERPL-SCNC: 102 MMOL/L (ref 96–108)
CO2 SERPL-SCNC: 40 MMOL/L (ref 21–32)
CREAT SERPL-MCNC: 1.57 MG/DL (ref 0.6–1.3)
EOSINOPHIL # BLD AUTO: 0.11 THOUSAND/ÂΜL (ref 0–0.61)
EOSINOPHIL NFR BLD AUTO: 1 % (ref 0–6)
ERYTHROCYTE [DISTWIDTH] IN BLOOD BY AUTOMATED COUNT: 14.8 % (ref 11.6–15.1)
GFR SERPL CREATININE-BSD FRML MDRD: 32 ML/MIN/1.73SQ M
GLUCOSE SERPL-MCNC: 218 MG/DL (ref 65–140)
GLUCOSE SERPL-MCNC: 242 MG/DL (ref 65–140)
GLUCOSE SERPL-MCNC: 251 MG/DL (ref 65–140)
GLUCOSE SERPL-MCNC: 251 MG/DL (ref 65–140)
GLUCOSE SERPL-MCNC: 252 MG/DL (ref 65–140)
GLUCOSE SERPL-MCNC: 268 MG/DL (ref 65–140)
GLUCOSE SERPL-MCNC: 268 MG/DL (ref 65–140)
GLUCOSE SERPL-MCNC: 285 MG/DL (ref 65–140)
HCT VFR BLD AUTO: 23.8 % (ref 34.8–46.1)
HCT VFR BLD AUTO: 25.9 % (ref 34.8–46.1)
HGB BLD-MCNC: 6.7 G/DL (ref 11.5–15.4)
HGB BLD-MCNC: 7.6 G/DL (ref 11.5–15.4)
IMM GRANULOCYTES # BLD AUTO: 0.11 THOUSAND/UL (ref 0–0.2)
IMM GRANULOCYTES NFR BLD AUTO: 1 % (ref 0–2)
LYMPHOCYTES # BLD AUTO: 0.87 THOUSANDS/ÂΜL (ref 0.6–4.47)
LYMPHOCYTES NFR BLD AUTO: 11 % (ref 14–44)
MAGNESIUM SERPL-MCNC: 2.6 MG/DL (ref 1.9–2.7)
MCH RBC QN AUTO: 28.8 PG (ref 26.8–34.3)
MCHC RBC AUTO-ENTMCNC: 28.2 G/DL (ref 31.4–37.4)
MCV RBC AUTO: 102 FL (ref 82–98)
MONOCYTES # BLD AUTO: 0.48 THOUSAND/ÂΜL (ref 0.17–1.22)
MONOCYTES NFR BLD AUTO: 6 % (ref 4–12)
NEUTROPHILS # BLD AUTO: 6.07 THOUSANDS/ÂΜL (ref 1.85–7.62)
NEUTS SEG NFR BLD AUTO: 81 % (ref 43–75)
NRBC BLD AUTO-RTO: 0 /100 WBCS
PHOSPHATE SERPL-MCNC: 2.1 MG/DL (ref 2.3–4.1)
PLATELET # BLD AUTO: 147 THOUSANDS/UL (ref 149–390)
PMV BLD AUTO: 10 FL (ref 8.9–12.7)
POTASSIUM SERPL-SCNC: 3.2 MMOL/L (ref 3.5–5.3)
RBC # BLD AUTO: 2.33 MILLION/UL (ref 3.81–5.12)
RH BLD: NEGATIVE
SODIUM SERPL-SCNC: 150 MMOL/L (ref 135–147)
SPECIMEN EXPIRATION DATE: NORMAL
WBC # BLD AUTO: 7.66 THOUSAND/UL (ref 4.31–10.16)

## 2024-04-01 PROCEDURE — 85018 HEMOGLOBIN: CPT | Performed by: SURGERY

## 2024-04-01 PROCEDURE — 71045 X-RAY EXAM CHEST 1 VIEW: CPT

## 2024-04-01 PROCEDURE — 80048 BASIC METABOLIC PNL TOTAL CA: CPT

## 2024-04-01 PROCEDURE — 99024 POSTOP FOLLOW-UP VISIT: CPT | Performed by: SURGERY

## 2024-04-01 PROCEDURE — 94640 AIRWAY INHALATION TREATMENT: CPT

## 2024-04-01 PROCEDURE — 87040 BLOOD CULTURE FOR BACTERIA: CPT | Performed by: SURGERY

## 2024-04-01 PROCEDURE — 99221 1ST HOSP IP/OBS SF/LOW 40: CPT | Performed by: INTERNAL MEDICINE

## 2024-04-01 PROCEDURE — 94760 N-INVAS EAR/PLS OXIMETRY 1: CPT

## 2024-04-01 PROCEDURE — 30233N1 TRANSFUSION OF NONAUTOLOGOUS RED BLOOD CELLS INTO PERIPHERAL VEIN, PERCUTANEOUS APPROACH: ICD-10-PCS | Performed by: SURGERY

## 2024-04-01 PROCEDURE — 86900 BLOOD TYPING SEROLOGIC ABO: CPT | Performed by: SURGERY

## 2024-04-01 PROCEDURE — 83735 ASSAY OF MAGNESIUM: CPT

## 2024-04-01 PROCEDURE — P9016 RBC LEUKOCYTES REDUCED: HCPCS

## 2024-04-01 PROCEDURE — 86850 RBC ANTIBODY SCREEN: CPT | Performed by: SURGERY

## 2024-04-01 PROCEDURE — 86901 BLOOD TYPING SEROLOGIC RH(D): CPT | Performed by: SURGERY

## 2024-04-01 PROCEDURE — 86923 COMPATIBILITY TEST ELECTRIC: CPT

## 2024-04-01 PROCEDURE — 85014 HEMATOCRIT: CPT | Performed by: SURGERY

## 2024-04-01 PROCEDURE — 88307 TISSUE EXAM BY PATHOLOGIST: CPT | Performed by: PATHOLOGY

## 2024-04-01 PROCEDURE — 85025 COMPLETE CBC W/AUTO DIFF WBC: CPT

## 2024-04-01 PROCEDURE — 82948 REAGENT STRIP/BLOOD GLUCOSE: CPT

## 2024-04-01 PROCEDURE — 84100 ASSAY OF PHOSPHORUS: CPT

## 2024-04-01 RX ORDER — POTASSIUM CHLORIDE 20 MEQ/1
40 TABLET, EXTENDED RELEASE ORAL ONCE
Status: COMPLETED | OUTPATIENT
Start: 2024-04-01 | End: 2024-04-01

## 2024-04-01 RX ORDER — INSULIN GLARGINE 100 [IU]/ML
6 INJECTION, SOLUTION SUBCUTANEOUS
Status: DISCONTINUED | OUTPATIENT
Start: 2024-04-01 | End: 2024-04-02

## 2024-04-01 RX ORDER — INSULIN LISPRO 100 [IU]/ML
4-20 INJECTION, SOLUTION INTRAVENOUS; SUBCUTANEOUS
Status: DISCONTINUED | OUTPATIENT
Start: 2024-04-01 | End: 2024-04-05 | Stop reason: HOSPADM

## 2024-04-01 RX ORDER — SODIUM CHLORIDE, SODIUM LACTATE, POTASSIUM CHLORIDE, CALCIUM CHLORIDE 600; 310; 30; 20 MG/100ML; MG/100ML; MG/100ML; MG/100ML
100 INJECTION, SOLUTION INTRAVENOUS CONTINUOUS
Status: DISCONTINUED | OUTPATIENT
Start: 2024-04-01 | End: 2024-04-02

## 2024-04-01 RX ORDER — OXYCODONE HYDROCHLORIDE 5 MG/1
5 TABLET ORAL EVERY 4 HOURS PRN
Status: DISCONTINUED | OUTPATIENT
Start: 2024-04-01 | End: 2024-04-05 | Stop reason: HOSPADM

## 2024-04-01 RX ADMIN — METRONIDAZOLE 500 MG: 500 INJECTION, SOLUTION INTRAVENOUS at 12:09

## 2024-04-01 RX ADMIN — LOSARTAN POTASSIUM 25 MG: 25 TABLET, FILM COATED ORAL at 08:58

## 2024-04-01 RX ADMIN — MEMANTINE 10 MG: 10 TABLET ORAL at 08:58

## 2024-04-01 RX ADMIN — ACETAMINOPHEN 975 MG: 325 TABLET, FILM COATED ORAL at 12:09

## 2024-04-01 RX ADMIN — POTASSIUM CHLORIDE 40 MEQ: 1500 TABLET, EXTENDED RELEASE ORAL at 08:58

## 2024-04-01 RX ADMIN — INSULIN LISPRO 12 UNITS: 100 INJECTION, SOLUTION INTRAVENOUS; SUBCUTANEOUS at 22:21

## 2024-04-01 RX ADMIN — CEFEPIME 2000 MG: 2 INJECTION, POWDER, FOR SOLUTION INTRAVENOUS at 04:47

## 2024-04-01 RX ADMIN — HEPARIN SODIUM 7500 UNITS: 5000 INJECTION INTRAVENOUS; SUBCUTANEOUS at 04:46

## 2024-04-01 RX ADMIN — IPRATROPIUM BROMIDE 0.5 MG: 0.5 SOLUTION RESPIRATORY (INHALATION) at 07:58

## 2024-04-01 RX ADMIN — PRAVASTATIN SODIUM 80 MG: 80 TABLET ORAL at 18:49

## 2024-04-01 RX ADMIN — HEPARIN SODIUM 7500 UNITS: 5000 INJECTION INTRAVENOUS; SUBCUTANEOUS at 21:17

## 2024-04-01 RX ADMIN — CHLORHEXIDINE GLUCONATE 15 ML: 1.2 SOLUTION ORAL at 21:17

## 2024-04-01 RX ADMIN — SODIUM CHLORIDE, SODIUM LACTATE, POTASSIUM CHLORIDE, AND CALCIUM CHLORIDE 125 ML/HR: .6; .31; .03; .02 INJECTION, SOLUTION INTRAVENOUS at 08:59

## 2024-04-01 RX ADMIN — INSULIN GLARGINE 6 UNITS: 100 INJECTION, SOLUTION SUBCUTANEOUS at 21:17

## 2024-04-01 RX ADMIN — Medication 6 MG: at 21:17

## 2024-04-01 RX ADMIN — ACETAMINOPHEN 975 MG: 325 TABLET, FILM COATED ORAL at 04:47

## 2024-04-01 RX ADMIN — CHLORHEXIDINE GLUCONATE 15 ML: 1.2 SOLUTION ORAL at 08:58

## 2024-04-01 RX ADMIN — LEVALBUTEROL HYDROCHLORIDE 1.25 MG: 1.25 SOLUTION RESPIRATORY (INHALATION) at 07:58

## 2024-04-01 RX ADMIN — INSULIN LISPRO 8 UNITS: 100 INJECTION, SOLUTION INTRAVENOUS; SUBCUTANEOUS at 13:16

## 2024-04-01 RX ADMIN — MEMANTINE 10 MG: 10 TABLET ORAL at 18:49

## 2024-04-01 RX ADMIN — IPRATROPIUM BROMIDE 0.5 MG: 0.5 SOLUTION RESPIRATORY (INHALATION) at 19:57

## 2024-04-01 RX ADMIN — HEPARIN SODIUM 7500 UNITS: 5000 INJECTION INTRAVENOUS; SUBCUTANEOUS at 13:16

## 2024-04-01 RX ADMIN — METRONIDAZOLE 500 MG: 500 INJECTION, SOLUTION INTRAVENOUS at 21:26

## 2024-04-01 RX ADMIN — ACETAMINOPHEN 975 MG: 325 TABLET, FILM COATED ORAL at 18:49

## 2024-04-01 RX ADMIN — METRONIDAZOLE 500 MG: 500 INJECTION, SOLUTION INTRAVENOUS at 03:43

## 2024-04-01 RX ADMIN — LEVALBUTEROL HYDROCHLORIDE 1.25 MG: 1.25 SOLUTION RESPIRATORY (INHALATION) at 19:57

## 2024-04-01 RX ADMIN — TRAZODONE HYDROCHLORIDE 150 MG: 50 TABLET ORAL at 21:17

## 2024-04-01 RX ADMIN — INSULIN LISPRO 8 UNITS: 100 INJECTION, SOLUTION INTRAVENOUS; SUBCUTANEOUS at 09:06

## 2024-04-01 RX ADMIN — LEVALBUTEROL HYDROCHLORIDE 1.25 MG: 1.25 SOLUTION RESPIRATORY (INHALATION) at 13:43

## 2024-04-01 RX ADMIN — Medication 2.5 MG: at 13:39

## 2024-04-01 RX ADMIN — INSULIN LISPRO 6 UNITS: 100 INJECTION, SOLUTION INTRAVENOUS; SUBCUTANEOUS at 00:49

## 2024-04-01 RX ADMIN — POTASSIUM PHOSPHATE, MONOBASIC POTASSIUM PHOSPHATE, DIBASIC 30 MMOL: 224; 236 INJECTION, SOLUTION, CONCENTRATE INTRAVENOUS at 08:59

## 2024-04-01 RX ADMIN — INSULIN LISPRO 4 UNITS: 100 INJECTION, SOLUTION INTRAVENOUS; SUBCUTANEOUS at 06:27

## 2024-04-01 RX ADMIN — PANTOPRAZOLE SODIUM 20 MG: 20 TABLET, DELAYED RELEASE ORAL at 04:47

## 2024-04-01 RX ADMIN — IPRATROPIUM BROMIDE 0.5 MG: 0.5 SOLUTION RESPIRATORY (INHALATION) at 13:43

## 2024-04-01 NOTE — ASSESSMENT & PLAN NOTE
Lab Results   Component Value Date    EGFR 32 04/01/2024    EGFR 30 03/31/2024    EGFR 34 03/30/2024    CREATININE 1.57 (H) 04/01/2024    CREATININE 1.64 (H) 03/31/2024    CREATININE 1.50 (H) 03/30/2024     Baseline Cr. 1.5-1.6   Avoid nephrotoxins and hypotension

## 2024-04-01 NOTE — ASSESSMENT & PLAN NOTE
Admitted to surgical service for cecal volvulus within an incarcerated ventral hernia   S/p Ex-lap, extended R hemicolectomy, ventral hernia repair on 03/28  Downgraded to [facility] on 03/31   Encourage incentive spirometry; antiemetics/analgesia  Management per primary team

## 2024-04-01 NOTE — ASSESSMENT & PLAN NOTE
Evidenced by Tachypnea, Tachycardia, Leukocytosis, Fever beginning on 03/30   Recent fever today of 100.9 F   COVID/FLU/RSV normal  CXR (04/01):   Suspected right lower lung pneumonia -possible HAP vs. Aspiration PNA  Small right pleural effusion  2/2 intra-abdominal infection?  WBC improved   BCx w/o growth x 48 hrs   Trend Procal   UA not obtained; would recommend straight cath with persistent fevers  BP & HR stable   Continue cefepime and Flagyl  Patient receiving continuous IV fluids per surgery      Recent Labs     03/30/24  0450 03/31/24  0549 04/01/24  0528   WBC 14.17* 12.50* 7.66

## 2024-04-01 NOTE — PLAN OF CARE
Problem: PAIN - ADULT  Goal: Verbalizes/displays adequate comfort level or baseline comfort level  Description: Interventions:  - Encourage patient to monitor pain and request assistance  - Assess pain using appropriate pain scale  - Administer analgesics based on type and severity of pain and evaluate response  - Implement non-pharmacological measures as appropriate and evaluate response  - Consider cultural and social influences on pain and pain management  - Notify physician/advanced practitioner if interventions unsuccessful or patient reports new pain  Outcome: Progressing     Problem: INFECTION - ADULT  Goal: Absence or prevention of progression during hospitalization  Description: INTERVENTIONS:  - Assess and monitor for signs and symptoms of infection  - Monitor lab/diagnostic results  - Monitor all insertion sites, i.e. indwelling lines, tubes, and drains  - Monitor endotracheal if appropriate and nasal secretions for changes in amount and color  - [place] appropriate cooling/warming therapies per order  - Administer medications as ordered  - Instruct and encourage patient and family to use good hand hygiene technique  - Identify and instruct in appropriate isolation precautions for identified infection/condition  Outcome: Progressing     Problem: SAFETY ADULT  Goal: Maintain or return to baseline ADL function  Description: INTERVENTIONS:  -  Assess patient's ability to carry out ADLs; assess patient's baseline for ADL function and identify physical deficits which impact ability to perform ADLs (bathing, care of mouth/teeth, toileting, grooming, dressing, etc.)  - Assess/evaluate cause of self-care deficits   - Assess range of motion  - Assess patient's mobility; develop plan if impaired  - Assess patient's need for assistive devices and provide as appropriate  - Encourage maximum independence but intervene and supervise when necessary  - Involve family in performance of ADLs  - Assess for home care  needs following discharge   - Consider OT consult to assist with ADL evaluation and planning for discharge  - Provide patient education as appropriate  Outcome: Progressing     Problem: DISCHARGE PLANNING  Goal: Discharge to home or other facility with appropriate resources  Description: INTERVENTIONS:  - Identify barriers to discharge w/patient and caregiver  - Arrange for needed discharge resources and transportation as appropriate  - Identify discharge learning needs (meds, wound care, etc.)  - Arrange for interpretive services to assist at discharge as needed  - Refer to Case Management Department for coordinating discharge planning if the patient needs post-hospital services based on physician/advanced practitioner order or complex needs related to functional status, cognitive ability, or social support system  Outcome: Progressing     Problem: Knowledge Deficit  Goal: Patient/family/caregiver demonstrates understanding of disease process, treatment plan, medications, and discharge instructions  Description: Complete learning assessment and assess knowledge base.  Interventions:  - Provide teaching at level of understanding  - Provide teaching via preferred learning methods  Outcome: Progressing

## 2024-04-01 NOTE — ASSESSMENT & PLAN NOTE
Lab Results   Component Value Date    HGBA1C 7.1 (H) 03/15/2024     Recent Labs     04/01/24  0844 04/01/24  1011 04/01/24  1220 04/01/24  1714   POCGLU 251* 251* 268* 218*     Blood Sugar Average: Last 72 hrs:  (P) [id number]    Home regimen: glargine 60 U q12, dulaglutide, & humalog 30 U TID w/ meals   To optimize glycemic control, add Lantus 6 u QHS   Continue SSI AC & QHS; glycemic protocol

## 2024-04-01 NOTE — PROGRESS NOTES
General Surgery  Progress Note   Jud Walls 73 y.o. 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 S/p Ex-lap, extended R hemicolectomy, ventral hernia repair 3/28.  Downgraded to MedSurg on .    Tmax 101.8, heart rate in the 90s, normotensive, 4 L nasal cannula saturating in the mid 90s  UOP: Not recorded    WBC 7.6 from 12.5  Hgb 6.7 from 7.2  Creatinine 1.57 from 1.64  Sodium 150 from 150  Potassium 3.2 from 3.2  Glucose 268  Phos 2.1 from 2.9    Plan:  -CLD/CCD 2  -Increase IV fluids to 125 of LR given ongoing hypernatremia  -Transition to oral pain regimen  -Continue to wean supplemental oxygen as tolerated  -Will transfuse 1 unit of packed red blood cells and recheck hemoglobin this afternoon at 2 PM  -Repeat chest x-ray, UA, and obtain new blood cultures given ongoing fevers  -Increase sliding scale insulin regimen given persistent hyperglycemia  -Resume home antihypertensive regimen along with IV PRNs  -Replete electrolytes as needed  -Pain/ nausea control PRN  -OOB/ ambulation  -Incentive Spirometry  -DVT prophylaxis    Subjective/Objective     Subjective:   Patient seen and examined at bedside, in no acute distress.  Febrile overnight.  Passing flatus.  Denies nausea and vomiting.  Denies chest pain.    Objective:   Vitals:Blood pressure (!) 190/80, pulse 99, temperature (!) 101.9 °F (38.8 °C), temperature source Oral, resp. rate 20, weight 93 kg (205 lb 0.4 oz), SpO2 95%.  Temp (24hrs), Av.4 °F (38 °C), Min:98.7 °F (37.1 °C), Max:101.9 °F (38.8 °C)      I/O          07 0700  07 07 07 07    I.V. (mL/kg)  900 (9.3)     IV Piggyback  1100     Total Intake(mL/kg)  2000 (20.6)     Urine (mL/kg/hr)  500     Blood  100     Total Output  600     Net  +1400                    Invasive Devices       Peripheral Intravenous Line  Duration              Peripheral IV 03/31/24 Distal;Left;Ventral (anterior) Forearm <1 day    Peripheral IV 03/31/24 Ventral (anterior);Left Forearm <1 day                    Physical Exam:  General: No acute distress  Neuro: Awake, Alert and Oriented x 3  HEENT:  Normocephalic, atraumatic, moist mucous membranes, NGT  CV: Regular rate and rhythm  Lungs: Normal work of breathing, no increased respiratory effort  Abdomen: Soft, appropriately tender, mild distention, midline incision clean dry and intact extremities: No edema, clubbing or cyanosis  Skin: Warm, dry    Lab Results   Component Value Date    WBC 7.66 04/01/2024    HGB 6.7 (L) 04/01/2024    HCT 23.8 (L) 04/01/2024     (H) 04/01/2024     (L) 04/01/2024      Lab Results   Component Value Date     (L) 09/21/2015    SODIUM 150 (H) 04/01/2024    K 3.2 (L) 04/01/2024     04/01/2024    CO2 40 (H) 04/01/2024    ANIONGAP 8 09/21/2015    AGAP 8 04/01/2024    BUN 31 (H) 04/01/2024    CREATININE 1.57 (H) 04/01/2024    GLUC 268 (H) 04/01/2024    GLUF 171 (H) 06/26/2023    CALCIUM 8.3 (L) 04/01/2024    AST 10 (L) 03/30/2024    ALT 4 (L) 03/30/2024    ALKPHOS 50 03/30/2024    PROT 7.4 09/21/2015    TP 6.2 (L) 03/30/2024    BILITOT 0.61 09/21/2015    TBILI 0.76 03/30/2024    EGFR 32 04/01/2024       VTE Prophylaxis: Heparin      Paolo Trejo MD  4/1/2024  8:37 AM

## 2024-04-01 NOTE — CONSULTS
[facility]  Consult  Name: Jud Walls 73 y.o. 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   S/p Ex-lap, extended R hemicolectomy, ventral hernia repair on 03/28  Downgraded to MedSurg on 03/31   Encourage incentive spirometry; antiemetics/analgesia  Management per primary team       Anemia  Assessment & Plan  Recent Labs     03/31/24  0549 04/01/24  0528 04/01/24  1532   HGB 7.2* 6.7* 7.6*     Baseline Hgb 9-10   S/p 1 u PRBC today   Trend   Likely anemia following intraoperative blood loss; recommend serial abdominal exams  Requested iron panel, folate acid, and vitamin B12 levels    Acute on chronic respiratory failure with hypoxia and hypercapnia (HCC)  Assessment & Plan  H/o COPD/GILDARDO/OHS  Baseline 2 L via NC   Required up to 15 L of O2, now stable on 4 L   CXR (04/01):   Suspected right lower lung pneumonia.  Small right pleural effusion.  S/p dose of Lasix x 1   Continue cefepime  Continue nebulizer therapy, Advair, incentive spirometry as tolerated, pulmonary toilet  Would carefully monitor patient's volume status in setting of IV fluid administration and blood administration    Sepsis (HCC)  Assessment & Plan  Evidenced by Tachypnea, Tachycardia, Leukocytosis, Fever beginning on 03/30   Recent fever today of 100.9 F   COVID/FLU/RSV normal  CXR (04/01):   Suspected right lower lung pneumonia -possible HAP vs. Aspiration PNA  Small right pleural effusion  2/2 intra-abdominal infection?  WBC improved   BCx w/o growth x 48 hrs   Trend Procal   UA not obtained; would recommend straight cath with persistent fevers  BP & HR stable    Continue cefepime and Flagyl  Patient receiving continuous IV fluids per surgery      Recent Labs     03/30/24  0450 03/31/24  0549 04/01/24  0528   WBC 14.17* 12.50* 7.66         Diabetes (HCC)  Assessment & Plan  Lab Results   Component Value Date    HGBA1C 7.1 (H) 03/15/2024     Recent Labs     04/01/24  0844 04/01/24  1011 04/01/24  1220 04/01/24  1714   POCGLU 251* 251* 268* 218*     Blood Sugar Average: Last 72 hrs:  (P) 211.3770777571354000    Home regimen: glargine 60 U q12, dulaglutide, & humalog 30 U TID w/ meals   To optimize glycemic control, add Lantus 6 u QHS   Continue SSI AC & QHS; glycemic protocol      Stage 3b chronic kidney disease (HCC)  Assessment & Plan  Lab Results   Component Value Date    EGFR 32 04/01/2024    EGFR 30 03/31/2024    EGFR 34 03/30/2024    CREATININE 1.57 (H) 04/01/2024    CREATININE 1.64 (H) 03/31/2024    CREATININE 1.50 (H) 03/30/2024     Baseline Cr. 1.5-1.6   Avoid nephrotoxins and hypotension    GILDARDO (obstructive sleep apnea)  Assessment & Plan  Nonadherant to BIPAP    Hyperlipidemia  Assessment & Plan  Continue PTA statin    Alzheimer's dementia (HCC)  Assessment & Plan  Continue PTA Namenda and trazodone  Patient is sleepy on exam; would recommend reducing trazodone with continued daytime somnolence           VTE Prophylaxis: DVT prophylaxis per primary team; patient is receiving heparin    Mobility:   Basic Mobility Inpatient Raw Score: 6  JH-HLM Goal: 2: Bed activities/Dependent transfer  JH-HLM Achieved: 2: Bed activities/Dependent transfer  JH-HLM Goal NOT achieved. Continue with multidisciplinary rounding and encourage appropriate mobility to improve upon JH-HLM goals.    Recommendations for Discharge:  Patient still actively in her hospital course; no current recommendations    Total Time Spent on Date of Encounter in care of patient: 50 mins. This time was spent on one or more of the following: performing physical exam; counseling and coordination of care;  obtaining or reviewing history; documenting in the medical record; reviewing/ordering tests, medications or procedures; communicating with other healthcare professionals and discussing with patient's family/caregivers.    Collaboration of Care: Were Recommendations Directly Discussed with Primary Treatment Team? No    History of Present Illness:  Jud Walls is a 73 y.o. female who is originally admitted to the surgical service service due to cecal volvulus with an incarcerated ventral hernia. We are consulted for medical management.  Patient is status post an ex lap, extended hemicolectomy, and ventral hernia repair on 03/28.  Patient was downgraded from the ICU on 03/31.  Since 03/30, patient has been having fevers.  Patient met septic criteria on 03/31 with tachypnea, tachycardia, and fever.  Patient is receiving cefepime and Flagyl for possible intra-abdominal infection/hospital-acquired pneumonia/aspiration pneumonia.  Patient required up to 15 L of O2; she has been tapered down to 4 L of O2.  Patient's baseline oxygen requirement is 2 L.  Patient is tolerating some p.o. intake.  Her nurse at bedside notes that she is incredibly sleepy since she came in at 3 PM.  A urine culture was not able to be obtained thus far.    Review of Systems:  Review of Systems   Reason unable to perform ROS: Pt too sleepy to answer questions.       Past Medical and Surgical History:   Past Medical History:   Diagnosis Date    Alzheimer disease (HCC)     Asthma     Diabetes mellitus (HCC)     GERD (gastroesophageal reflux disease)     Hyperlipidemia     Hypertension        Past Surgical History:   Procedure Laterality Date    JOINT REPLACEMENT Bilateral     KNEE SURGERY      LAPAROTOMY N/A 3/28/2024    Procedure: LAPAROTOMY EXPLORATORY, ventral hernia repair no mesh, Extended hemicolectomy,;  Surgeon: Cedrick Lipscomb DO;  Location: [facility];  Service: General    TOE SURGERY         Meds/Allergies:  all medications and  allergies reviewed    Allergies:   Allergies   Allergen Reactions    Penicillins Shortness Of Breath    Cephalexin Other (See Comments)     Reaction Date: 2011; unknown     Crestor [Rosuvastatin] Other (See Comments)     Reaction Date: 2011; unknown     Zithromax [Azithromycin] Other (See Comments)     Unknown        Social History:  Marital Status: Single  Substance Use History:   Social History     Substance and Sexual Activity   Alcohol Use Not Currently     Social History     Tobacco Use   Smoking Status Former    Current packs/day: 0.00    Average packs/day: 1 pack/day for 52.7 years (52.7 ttl pk-yrs)    Types: Cigarettes    Start date:     Quit date: 2017    Years since quittin.5   Smokeless Tobacco Never     Social History     Substance and Sexual Activity   Drug Use No       Family History:  Family History   Problem Relation Age of Onset    Dementia Brother     Breast cancer Sister 75    Cancer Brother        Physical Exam:   Vitals:   Blood Pressure: 128/58 (24 1423)  Pulse: 88 (24 1423)  Temperature: 98.4 °F (36.9 °C) (24 1423)  Temp Source: Oral (24 1129)  Respirations: 20 (24 1423)  Weight - Scale: 93 kg (205 lb 0.4 oz) (24 0600)  SpO2: 90 % (24 1423)    Physical Exam  Constitutional:       General: She is not in acute distress.     Appearance: She is obese. She is ill-appearing. She is not toxic-appearing or diaphoretic.      Comments: Pt is sleepy & is not answering questions; she is fully arousable but then subsequently returned to sleep   HENT:      Head: Normocephalic.      Right Ear: External ear normal.      Left Ear: External ear normal.      Nose: Nose normal.      Mouth/Throat:      Mouth: Mucous membranes are dry.      Pharynx: Oropharynx is clear.   Eyes:      Extraocular Movements: Extraocular movements intact.      Conjunctiva/sclera: Conjunctivae normal.   Cardiovascular:      Rate and Rhythm: Normal rate and regular rhythm.       Pulses: Normal pulses.      Heart sounds: Normal heart sounds.   Pulmonary:      Effort: Pulmonary effort is normal.      Breath sounds: Normal breath sounds.      Comments: Sounds coarse; I only auscultated anteriorly as patient is morbidly obese/asleep  Abdominal:      General: Abdomen is flat. Bowel sounds are normal.      Palpations: Abdomen is soft.      Comments: Postoperative incisions are clean and dry and intact; no grimacing with very mild palpation of abdomen   Musculoskeletal:         General: Swelling (Trace lower extremity edema) present. Normal range of motion.      Cervical back: Normal range of motion.   Skin:     General: Skin is warm and dry.   Neurological:      Comments: Unable to assess patient's orientation   Psychiatric:      Comments: Unable to appropriately assess         Additional Data:   Lab Results:    Results from last 7 days   Lab Units 04/01/24  1532 04/01/24 0528 03/30/24  0919 03/30/24  0450   WBC Thousand/uL  --  7.66   < > 14.17*   HEMOGLOBIN g/dL 7.6* 6.7*   < > 7.2*   HEMATOCRIT % 25.9* 23.8*   < > 23.9*   PLATELETS Thousands/uL  --  147*   < > 129*   BANDS PCT %  --   --   --  6   NEUTROS PCT %  --  81*   < >  --    LYMPHS PCT %  --  11*   < >  --    LYMPHO PCT %  --   --   --  11*   MONOS PCT %  --  6   < >  --    MONO PCT %  --   --   --  5   EOS PCT %  --  1   < > 1    < > = values in this interval not displayed.     Results from last 7 days   Lab Units 04/01/24 0528 03/31/24  0549 03/30/24  0450   SODIUM mmol/L 150*   < > 147   POTASSIUM mmol/L 3.2*   < > 3.7   CHLORIDE mmol/L 102   < > 102   CO2 mmol/L 40*   < > 38*   BUN mg/dL 31*   < > 25   CREATININE mg/dL 1.57*   < > 1.50*   ANION GAP mmol/L 8   < > 7   CALCIUM mg/dL 8.3*   < > 8.5   ALBUMIN g/dL  --   --  3.1*   TOTAL BILIRUBIN mg/dL  --   --  0.76   ALK PHOS U/L  --   --  50   ALT U/L  --   --  4*   AST U/L  --   --  10*   GLUCOSE RANDOM mg/dL 268*   < > 228*    < > = values in this interval not displayed.      Results from last 7 days   Lab Units 03/27/24  2323   INR  0.98         Lab Results   Component Value Date/Time    HGBA1C 7.1 (H) 03/15/2024 04:24 AM    HGBA1C 7.0 (H) 02/15/2024 04:50 AM    HGBA1C 6.1 (H) 10/11/2023 04:22 AM     Results from last 7 days   Lab Units 04/01/24  1714 04/01/24  1220 04/01/24  1011 04/01/24  0844 04/01/24  0603 04/01/24  0042 03/31/24  1756 03/31/24  1228 03/31/24  0545 03/30/24  2331 03/30/24  1811 03/30/24  1157   POC GLUCOSE mg/dl 218* 268* 251* 251* 242* 252* 235* 257* 269* 249* 247* 243*     Results from last 7 days   Lab Units 03/30/24  0450 03/29/24  1819 03/29/24  1638 03/27/24  2323   LACTIC ACID mmol/L  --  1.0  --  1.0   PROCALCITONIN ng/ml 2.71*  --  2.29*  --        Imaging: Reviewed radiology reports from this admission including: chest xray  XR chest portable   Final Result by Rayray Thomas MD (04/01 1250)      Suspected right lower lung pneumonia.   Small right pleural effusion.            Workstation performed: [id number]         XR chest portable   Final Result by Gayle Cannon MD (03/30 2102)      NG tube below the diaphragm.      Moderate bibasilar opacity which could be due to atelectasis and/or pneumonia.            Workstation performed: [id number]         XR chest portable ICU   Final Result by Will Anderson MD (04/01 0824)      Persistent right lung base atelectasis plus minus infection. Partial reexpansion since earlier study suggested.            Workstation performed: [id number]         XR chest portable ICU   Final Result by Gayle Cannon MD (03/30 0721)      Extensive new bibasilar opacity, likely atelectasis and pneumonia/aspiration.            Workstation performed: [id number]         XR chest portable   Final Result by Paolo Humphries MD (03/28 9291)      No acute cardiopulmonary disease.            Workstation performed: [id number]         CT abdomen pelvis with contrast   Final Result by Jorge Cosme MD (03/28 0259)      2  focal areas of large bowel volvulus involving the cecum/proximal ascending colon as well as a loop of mid transverse colon due to entrapment within the incarcerated ventral lower abdominal hernia sac as discussed above. Surrounding mesenteric    inflammatory edema and reactive ascites within the hernia sac as well as adjacent to markedly distended colon loops proximal to the hernia sac are seen. No gross free air or pneumatosis intestinalis. Emergent surgical consultation recommended. Secondary    appendicitis is also seen. Additional ancillary findings detailed above.         Findings discussed with Dr. Mosley         Workstation performed: [id number]             EKG, Pathology, and Other Studies Reviewed on Admission:   Most recent available EKG is from 12/2023 with normal sinus rhythm    ** Please Note: This note may have been constructed using a voice recognition system. **

## 2024-04-01 NOTE — ASSESSMENT & PLAN NOTE
Recent Labs     03/31/24  0549 04/01/24  0528 04/01/24  1532   HGB 7.2* 6.7* 7.6*     Baseline Hgb 9-10   S/p 1 u PRBC today   Trend   Likely anemia following intraoperative blood loss; recommend serial abdominal exams  Requested iron panel, folate acid, and vitamin B12 levels

## 2024-04-01 NOTE — ASSESSMENT & PLAN NOTE
H/o COPD/GILDARDO/OHS  Baseline 2 L via NC   Required up to 15 L of O2, now stable on 4 L   CXR (04/01):   Suspected right lower lung pneumonia.  Small right pleural effusion.  S/p dose of Lasix x 1   Continue cefepime  Continue nebulizer therapy, Advair, incentive spirometry as tolerated, pulmonary toilet  Would carefully monitor patient's volume status in setting of IV fluid administration and blood administration

## 2024-04-01 NOTE — PROGRESS NOTES
Progress Note - General Surgery   Jud Walls 73 y.o. 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 S/p Ex-lap, extended R hemicolectomy, ventral hernia repair 3/28. Course complicated by KATHY POA, PNA    Febrile 101.9 yesterday at 7:30 AM, now vitals normal on 5L nasal cannula  Urine output not recorded    WBC pending (from 7.66)  Hb pending (from 7.6 from 6.7, received 1 unit of pRBC's)  Cr pending (from 1.57)    4/1 CXR: Suspected right lower lung pneumonia. Small right pleural effusion.    Plan:  Wean O2 as tolerated, IS/pulm toilet  Advance to diabetic clear liquids with toast and crackers  Continue LR@100  Follow up labs  Continue antibiotics for right lower lobe pneumonia, Cefepime/flagyl, plan for 10 day total course  Follow up 4/1 repeat blood cultures  Follow up UA  Trend Hb daily, transfuse PRN for Hb < 7, received one unit pRBC yesterday  Appreciate SLIM recommendations, low dose lantus nightly and SSI, consider basal bolus given persistent hyperglycemia  Replete electrolytes PRN  PRN analgesia  DVT PPX  OOB/ambulate    Subjective/Objective     Subjective: Received 1 unit pRBC yesterday, Patient with desaturation overnight requiring mid flow nasal cannula, CXR overnight slightly improved from prior, VBG showing metabolic alkalosis with respiratory compensation, tolerating diabetic clears without nausea or vomiting, passing flatus but denies BM, pain controlled    Objective:     Blood pressure 138/55, pulse 79, temperature 98.7 °F (37.1 °C), temperature source Axillary, resp. rate (!) 32, weight 93 kg (205 lb 0.4 oz), SpO2 98%.,Body mass index is 40.04 kg/m².      Intake/Output Summary (Last 24 hours) at 4/2/2024 0522  Last data filed at 4/1/2024 1420  Gross per 24 hour   Intake 383.73 ml   Output --   Net 383.73 ml       Invasive Devices       Peripheral Intravenous Line  Duration              Peripheral IV 03/31/24 Distal;Left;Ventral (anterior) Forearm 1 day    Peripheral IV 03/31/24 Ventral (anterior);Left Forearm 1 day                    Physical Exam:   Gen:    NAD  CV:      warm, well-perfused  Lungs: No respiratory distress on 5L NC  Abd:     soft, appropriately tender, non-distended, incision clean dry and intact  Ext:      no CCE  Neuro: A&Ox3

## 2024-04-01 NOTE — ASSESSMENT & PLAN NOTE
Continue PTA Namenda and trazodone  Patient is sleepy on exam; would recommend reducing trazodone with continued daytime somnolence

## 2024-04-02 ENCOUNTER — APPOINTMENT (INPATIENT)
Dept: RADIOLOGY | Facility: HOSPITAL | Age: 74
DRG: 329 | End: 2024-04-02
Payer: MEDICARE

## 2024-04-02 PROBLEM — E87.70 VOLUME OVERLOAD: Status: ACTIVE | Noted: 2024-04-02

## 2024-04-02 PROBLEM — J18.9 RLL PNEUMONIA: Status: ACTIVE | Noted: 2024-04-02

## 2024-04-02 LAB
ANION GAP SERPL CALCULATED.3IONS-SCNC: 10 MMOL/L (ref 4–13)
BASE EX.OXY STD BLDV CALC-SCNC: 93.4 % (ref 60–80)
BASE EXCESS BLDV CALC-SCNC: 11.6 MMOL/L
BASOPHILS # BLD AUTO: 0.02 THOUSANDS/ÂΜL (ref 0–0.1)
BASOPHILS NFR BLD AUTO: 0 % (ref 0–1)
BUN SERPL-MCNC: 28 MG/DL (ref 5–25)
CALCIUM SERPL-MCNC: 7.6 MG/DL (ref 8.4–10.2)
CHLORIDE SERPL-SCNC: 99 MMOL/L (ref 96–108)
CO2 SERPL-SCNC: 32 MMOL/L (ref 21–32)
CREAT SERPL-MCNC: 1.36 MG/DL (ref 0.6–1.3)
EOSINOPHIL # BLD AUTO: 0.28 THOUSAND/ÂΜL (ref 0–0.61)
EOSINOPHIL NFR BLD AUTO: 4 % (ref 0–6)
ERYTHROCYTE [DISTWIDTH] IN BLOOD BY AUTOMATED COUNT: 16.5 % (ref 11.6–15.1)
FERRITIN SERPL-MCNC: 316 NG/ML (ref 11–307)
FOLATE SERPL-MCNC: 15.4 NG/ML
GFR SERPL CREATININE-BSD FRML MDRD: 38 ML/MIN/1.73SQ M
GLUCOSE SERPL-MCNC: 212 MG/DL (ref 65–140)
GLUCOSE SERPL-MCNC: 241 MG/DL (ref 65–140)
GLUCOSE SERPL-MCNC: 251 MG/DL (ref 65–140)
GLUCOSE SERPL-MCNC: 255 MG/DL (ref 65–140)
GLUCOSE SERPL-MCNC: 267 MG/DL (ref 65–140)
GLUCOSE SERPL-MCNC: 321 MG/DL (ref 65–140)
HCO3 BLDV-SCNC: 37.4 MMOL/L (ref 24–30)
HCT VFR BLD AUTO: 22.9 % (ref 34.8–46.1)
HCT VFR BLD AUTO: 25.9 % (ref 34.8–46.1)
HGB BLD-MCNC: 6.8 G/DL (ref 11.5–15.4)
HGB BLD-MCNC: 8.2 G/DL (ref 11.5–15.4)
IMM GRANULOCYTES # BLD AUTO: 0.12 THOUSAND/UL (ref 0–0.2)
IMM GRANULOCYTES NFR BLD AUTO: 2 % (ref 0–2)
IRON SATN MFR SERPL: 20 % (ref 15–50)
IRON SERPL-MCNC: 26 UG/DL (ref 50–212)
LYMPHOCYTES # BLD AUTO: 0.85 THOUSANDS/ÂΜL (ref 0.6–4.47)
LYMPHOCYTES NFR BLD AUTO: 11 % (ref 14–44)
MAGNESIUM SERPL-MCNC: 2.1 MG/DL (ref 1.9–2.7)
MCH RBC QN AUTO: 28.8 PG (ref 26.8–34.3)
MCHC RBC AUTO-ENTMCNC: 29.7 G/DL (ref 31.4–37.4)
MCV RBC AUTO: 97 FL (ref 82–98)
MONOCYTES # BLD AUTO: 0.42 THOUSAND/ÂΜL (ref 0.17–1.22)
MONOCYTES NFR BLD AUTO: 5 % (ref 4–12)
NEUTROPHILS # BLD AUTO: 6.3 THOUSANDS/ÂΜL (ref 1.85–7.62)
NEUTS SEG NFR BLD AUTO: 78 % (ref 43–75)
NRBC BLD AUTO-RTO: 0 /100 WBCS
O2 CT BLDV-SCNC: 10.8 ML/DL
PCO2 BLDV: 58.2 MM HG (ref 42–50)
PH BLDV: 7.43 [PH] (ref 7.3–7.4)
PHOSPHATE SERPL-MCNC: 3.3 MG/DL (ref 2.3–4.1)
PLATELET # BLD AUTO: 137 THOUSANDS/UL (ref 149–390)
PMV BLD AUTO: 10.5 FL (ref 8.9–12.7)
PO2 BLDV: 93.9 MM HG (ref 35–45)
POTASSIUM SERPL-SCNC: 3.5 MMOL/L (ref 3.5–5.3)
PROCALCITONIN SERPL-MCNC: 1.52 NG/ML
RBC # BLD AUTO: 2.36 MILLION/UL (ref 3.81–5.12)
SODIUM SERPL-SCNC: 141 MMOL/L (ref 135–147)
TIBC SERPL-MCNC: 130 UG/DL (ref 250–450)
UIBC SERPL-MCNC: 104 UG/DL (ref 155–355)
VIT B12 SERPL-MCNC: 265 PG/ML (ref 180–914)
WBC # BLD AUTO: 7.99 THOUSAND/UL (ref 4.31–10.16)

## 2024-04-02 PROCEDURE — 83735 ASSAY OF MAGNESIUM: CPT | Performed by: INTERNAL MEDICINE

## 2024-04-02 PROCEDURE — 80048 BASIC METABOLIC PNL TOTAL CA: CPT | Performed by: PHYSICIAN ASSISTANT

## 2024-04-02 PROCEDURE — 84145 PROCALCITONIN (PCT): CPT | Performed by: PHYSICIAN ASSISTANT

## 2024-04-02 PROCEDURE — 94640 AIRWAY INHALATION TREATMENT: CPT

## 2024-04-02 PROCEDURE — 99024 POSTOP FOLLOW-UP VISIT: CPT | Performed by: SURGERY

## 2024-04-02 PROCEDURE — 83550 IRON BINDING TEST: CPT | Performed by: PHYSICIAN ASSISTANT

## 2024-04-02 PROCEDURE — 82607 VITAMIN B-12: CPT | Performed by: PHYSICIAN ASSISTANT

## 2024-04-02 PROCEDURE — 82948 REAGENT STRIP/BLOOD GLUCOSE: CPT

## 2024-04-02 PROCEDURE — 85014 HEMATOCRIT: CPT

## 2024-04-02 PROCEDURE — 82805 BLOOD GASES W/O2 SATURATION: CPT | Performed by: SURGERY

## 2024-04-02 PROCEDURE — 99232 SBSQ HOSP IP/OBS MODERATE 35: CPT | Performed by: PHYSICIAN ASSISTANT

## 2024-04-02 PROCEDURE — P9016 RBC LEUKOCYTES REDUCED: HCPCS

## 2024-04-02 PROCEDURE — 94760 N-INVAS EAR/PLS OXIMETRY 1: CPT

## 2024-04-02 PROCEDURE — 71045 X-RAY EXAM CHEST 1 VIEW: CPT

## 2024-04-02 PROCEDURE — 85018 HEMOGLOBIN: CPT

## 2024-04-02 PROCEDURE — 85025 COMPLETE CBC W/AUTO DIFF WBC: CPT | Performed by: PHYSICIAN ASSISTANT

## 2024-04-02 PROCEDURE — 94002 VENT MGMT INPAT INIT DAY: CPT

## 2024-04-02 PROCEDURE — 99232 SBSQ HOSP IP/OBS MODERATE 35: CPT | Performed by: HOSPITALIST

## 2024-04-02 PROCEDURE — 82746 ASSAY OF FOLIC ACID SERUM: CPT | Performed by: PHYSICIAN ASSISTANT

## 2024-04-02 PROCEDURE — 82728 ASSAY OF FERRITIN: CPT | Performed by: PHYSICIAN ASSISTANT

## 2024-04-02 PROCEDURE — 83540 ASSAY OF IRON: CPT | Performed by: PHYSICIAN ASSISTANT

## 2024-04-02 PROCEDURE — 84100 ASSAY OF PHOSPHORUS: CPT | Performed by: PHYSICIAN ASSISTANT

## 2024-04-02 RX ORDER — POTASSIUM CHLORIDE 14.9 MG/ML
20 INJECTION INTRAVENOUS 2 TIMES DAILY
Qty: 200 ML | Refills: 0 | Status: COMPLETED | OUTPATIENT
Start: 2024-04-02 | End: 2024-04-02

## 2024-04-02 RX ORDER — CYANOCOBALAMIN 1000 UG/ML
1000 INJECTION, SOLUTION INTRAMUSCULAR; SUBCUTANEOUS ONCE
Status: COMPLETED | OUTPATIENT
Start: 2024-04-02 | End: 2024-04-02

## 2024-04-02 RX ORDER — INSULIN GLARGINE 100 [IU]/ML
10 INJECTION, SOLUTION SUBCUTANEOUS
Status: DISCONTINUED | OUTPATIENT
Start: 2024-04-02 | End: 2024-04-05 | Stop reason: HOSPADM

## 2024-04-02 RX ORDER — METRONIDAZOLE 500 MG/1
500 TABLET ORAL EVERY 8 HOURS SCHEDULED
Status: DISCONTINUED | OUTPATIENT
Start: 2024-04-02 | End: 2024-04-05 | Stop reason: HOSPADM

## 2024-04-02 RX ORDER — FUROSEMIDE 10 MG/ML
40 INJECTION INTRAMUSCULAR; INTRAVENOUS ONCE
Status: COMPLETED | OUTPATIENT
Start: 2024-04-02 | End: 2024-04-02

## 2024-04-02 RX ORDER — POTASSIUM CHLORIDE 29.8 MG/ML
40 INJECTION INTRAVENOUS ONCE
Status: DISCONTINUED | OUTPATIENT
Start: 2024-04-02 | End: 2024-04-02 | Stop reason: SDUPTHER

## 2024-04-02 RX ADMIN — INSULIN GLARGINE 10 UNITS: 100 INJECTION, SOLUTION SUBCUTANEOUS at 21:03

## 2024-04-02 RX ADMIN — POTASSIUM CHLORIDE 20 MEQ: 14.9 INJECTION, SOLUTION INTRAVENOUS at 09:57

## 2024-04-02 RX ADMIN — LEVALBUTEROL HYDROCHLORIDE 1.25 MG: 1.25 SOLUTION RESPIRATORY (INHALATION) at 19:27

## 2024-04-02 RX ADMIN — LOSARTAN POTASSIUM 25 MG: 25 TABLET, FILM COATED ORAL at 09:58

## 2024-04-02 RX ADMIN — INSULIN LISPRO 12 UNITS: 100 INJECTION, SOLUTION INTRAVENOUS; SUBCUTANEOUS at 11:54

## 2024-04-02 RX ADMIN — LEVALBUTEROL HYDROCHLORIDE 1.25 MG: 1.25 SOLUTION RESPIRATORY (INHALATION) at 01:07

## 2024-04-02 RX ADMIN — HEPARIN SODIUM 7500 UNITS: 5000 INJECTION INTRAVENOUS; SUBCUTANEOUS at 21:03

## 2024-04-02 RX ADMIN — ACETAMINOPHEN 975 MG: 325 TABLET, FILM COATED ORAL at 05:15

## 2024-04-02 RX ADMIN — MEMANTINE 10 MG: 10 TABLET ORAL at 09:58

## 2024-04-02 RX ADMIN — LEVALBUTEROL HYDROCHLORIDE 1.25 MG: 1.25 SOLUTION RESPIRATORY (INHALATION) at 13:28

## 2024-04-02 RX ADMIN — LEVALBUTEROL HYDROCHLORIDE 1.25 MG: 1.25 SOLUTION RESPIRATORY (INHALATION) at 07:55

## 2024-04-02 RX ADMIN — CYANOCOBALAMIN 1000 MCG: 1000 INJECTION, SOLUTION INTRAMUSCULAR; SUBCUTANEOUS at 14:54

## 2024-04-02 RX ADMIN — IPRATROPIUM BROMIDE 0.5 MG: 0.5 SOLUTION RESPIRATORY (INHALATION) at 07:55

## 2024-04-02 RX ADMIN — MEMANTINE 10 MG: 10 TABLET ORAL at 17:23

## 2024-04-02 RX ADMIN — POTASSIUM CHLORIDE 20 MEQ: 14.9 INJECTION, SOLUTION INTRAVENOUS at 12:20

## 2024-04-02 RX ADMIN — FUROSEMIDE 40 MG: 10 INJECTION, SOLUTION INTRAMUSCULAR; INTRAVENOUS at 14:54

## 2024-04-02 RX ADMIN — METRONIDAZOLE 500 MG: 500 INJECTION, SOLUTION INTRAVENOUS at 03:54

## 2024-04-02 RX ADMIN — PRAVASTATIN SODIUM 80 MG: 80 TABLET ORAL at 17:23

## 2024-04-02 RX ADMIN — PANTOPRAZOLE SODIUM 20 MG: 20 TABLET, DELAYED RELEASE ORAL at 05:16

## 2024-04-02 RX ADMIN — INSULIN LISPRO 8 UNITS: 100 INJECTION, SOLUTION INTRAVENOUS; SUBCUTANEOUS at 21:07

## 2024-04-02 RX ADMIN — CHLORHEXIDINE GLUCONATE 15 ML: 1.2 SOLUTION ORAL at 09:58

## 2024-04-02 RX ADMIN — METRONIDAZOLE 500 MG: 500 INJECTION, SOLUTION INTRAVENOUS at 11:54

## 2024-04-02 RX ADMIN — INSULIN LISPRO 8 UNITS: 100 INJECTION, SOLUTION INTRAVENOUS; SUBCUTANEOUS at 09:58

## 2024-04-02 RX ADMIN — IPRATROPIUM BROMIDE 0.5 MG: 0.5 SOLUTION RESPIRATORY (INHALATION) at 19:27

## 2024-04-02 RX ADMIN — METRONIDAZOLE 500 MG: 500 TABLET ORAL at 21:03

## 2024-04-02 RX ADMIN — HEPARIN SODIUM 7500 UNITS: 5000 INJECTION INTRAVENOUS; SUBCUTANEOUS at 13:54

## 2024-04-02 RX ADMIN — CEFEPIME 2000 MG: 2 INJECTION, POWDER, FOR SOLUTION INTRAVENOUS at 04:16

## 2024-04-02 RX ADMIN — IPRATROPIUM BROMIDE 0.5 MG: 0.5 SOLUTION RESPIRATORY (INHALATION) at 13:29

## 2024-04-02 RX ADMIN — ACETAMINOPHEN 975 MG: 325 TABLET, FILM COATED ORAL at 17:23

## 2024-04-02 RX ADMIN — ACETAMINOPHEN 975 MG: 325 TABLET, FILM COATED ORAL at 11:54

## 2024-04-02 RX ADMIN — HEPARIN SODIUM 7500 UNITS: 5000 INJECTION INTRAVENOUS; SUBCUTANEOUS at 05:16

## 2024-04-02 NOTE — ASSESSMENT & PLAN NOTE
CXR on 4/1 with RLL PNA  Patient at risk for aspiration pneumonia.    Continue cefepime and Flagyl, day #4  Pulmonary toliet  Covid/rsv/flu negative

## 2024-04-02 NOTE — PLAN OF CARE
Problem: Prexisting or High Potential for Compromised Skin Integrity  Goal: Skin integrity is maintained or improved  Description: INTERVENTIONS:  - Identify patients at risk for skin breakdown  - Assess and monitor skin integrity  - Assess and monitor nutrition and hydration status  - Monitor labs   - Assess for incontinence   - Turn and reposition patient  - Assist with mobility/ambulation  - Relieve pressure over bony prominences  - Avoid friction and shearing  - Provide appropriate hygiene as needed including keeping skin clean and dry  - Evaluate need for skin moisturizer/barrier cream  - Collaborate with interdisciplinary team   - Patient/family teaching  - Consider wound care consult   Outcome: Progressing     Problem: PAIN - ADULT  Goal: Verbalizes/displays adequate comfort level or baseline comfort level  Description: Interventions:  - Encourage patient to monitor pain and request assistance  - Assess pain using appropriate pain scale  - Administer analgesics based on type and severity of pain and evaluate response  - Implement non-pharmacological measures as appropriate and evaluate response  - Consider cultural and social influences on pain and pain management  - Notify physician/advanced practitioner if interventions unsuccessful or patient reports new pain  Outcome: Progressing     Problem: SAFETY ADULT  Goal: Maintain or return to baseline ADL function  Description: INTERVENTIONS:  -  Assess patient's ability to carry out ADLs; assess patient's baseline for ADL function and identify physical deficits which impact ability to perform ADLs (bathing, care of mouth/teeth, toileting, grooming, dressing, etc.)  - Assess/evaluate cause of self-care deficits   - Assess range of motion  - Assess patient's mobility; develop plan if impaired  - Assess patient's need for assistive devices and provide as appropriate  - Encourage maximum independence but intervene and supervise when necessary  - Involve family in  performance of ADLs  - Assess for home care needs following discharge   - Consider OT consult to assist with ADL evaluation and planning for discharge  - Provide patient education as appropriate  Outcome: Progressing     Problem: DISCHARGE PLANNING  Goal: Discharge to home or other facility with appropriate resources  Description: INTERVENTIONS:  - Identify barriers to discharge w/patient and caregiver  - Arrange for needed discharge resources and transportation as appropriate  - Identify discharge learning needs (meds, wound care, etc.)  - Arrange for interpretive services to assist at discharge as needed  - Refer to Case Management Department for coordinating discharge planning if the patient needs post-hospital services based on physician/advanced practitioner order or complex needs related to functional status, cognitive ability, or social support system  Outcome: Progressing     Problem: Knowledge Deficit  Goal: Patient/family/caregiver demonstrates understanding of disease process, treatment plan, medications, and discharge instructions  Description: Complete learning assessment and assess knowledge base.  Interventions:  - Provide teaching at level of understanding  - Provide teaching via preferred learning methods  Outcome: Progressing

## 2024-04-02 NOTE — PROGRESS NOTES
[facility]  Progress Note  Name: Jud Walls I  MRN: [id number]  Unit/Bed#: S -01 I Date of Admission: 3/27/2024   Date of Service: 4/2/2024 I Hospital Day: 5    Assessment/Plan   * Cecal volvulus (HCC)  Assessment & Plan  Admitted to surgical service for cecal volvulus within an incarcerated ventral hernia   S/p Ex-lap, extended R hemicolectomy, ventral hernia repair on 03/28  Downgraded to MedSurg on 03/31   Encourage incentive spirometry; antiemetics/analgesia  Abdominal binder in place  On IV cefepime/flagyl  Management per primary team       Acute on chronic respiratory failure with hypoxia and hypercapnia (HCC)  Assessment & Plan  Baseline 2 L via NC   Required up to 15 L of O2, now stable on 6 L (was on 4L this AM)  CXR (04/01): Suspected right lower lung pneumonia. Small right pleural effusion.  CXR (04/02): volume overload  S/p dose of Lasix x 1 on 3/29  Continue cefepime (day #4) and flagyl (day #4)  COVID/influenza/RSV negative  Continue nebulizer therapy, Advair, incentive spirometry as tolerated, pulmonary toilet  Patient has received over 6.6 L of IV fluids since admission, 1 unit of PRBCs and is receiving her second unit of PRBCs today  Stop further IV fluids  Will give IV lasix with blood transfusion    RLL pneumonia  Assessment & Plan  CXR on 4/1 with RLL PNA  Patient at risk for aspiration pneumonia.    Continue cefepime and Flagyl, day #4  Pulmonary toliet  Covid/rsv/flu negative    Anemia  Assessment & Plan  Baseline Hgb 9-10   S/p 1 u PRBC 4/1 and receiving another 1 unit today  Likely anemia following intraoperative blood loss and continued IV fluid use  Stop fluids. Give lasix with blood transfusion today  Follow up on iron panel.   Folate WNL  Give 1 mg IM vitamin B12, add oral supplementation     Volume overload  Assessment & Plan  Stop IV fluids  6.6L since admission of IV fluids  Getting blood transfusion - will give 40 IV x 1 with lasix  Volume  overload on imaging and on exam    Sepsis ([facility])  Assessment & Plan  Tachypnea, Tachycardia, Leukocytosis, Fever beginning on 03/30 4/1/24 100.9 at 11 last fever  COVID/FLU/RSV normal  CXR (04/01): Suspected right lower lung pneumonia. Small right pleural effusion  Consider related to RLL PNA vs. Intraabdominal infection  WBC improved   BCx w/o growth x 72 hrs   Procalcitonin trending down  Continue cefepime and Flagyl day #4      Stage 3b chronic kidney disease ([facility])  Assessment & Plan  Lab Results   Component Value Date    EGFR 38 04/02/2024    EGFR 32 04/01/2024    EGFR 30 03/31/2024    CREATININE 1.36 (H) 04/02/2024    CREATININE 1.57 (H) 04/01/2024    CREATININE 1.64 (H) 03/31/2024     Baseline creatinine 1.5-1.6     Diabetes ([facility])  Assessment & Plan  Lab Results   Component Value Date    HGBA1C 7.1 (H) 03/15/2024       Blood Sugar Average: Last 72 hrs:  (P) 254.0625  Home regimen: glargine 60 U q12, dulaglutide, & humalog 30 U TID w/ meals   Increase lantus, SSI ACHS  Continue SSI AC & QHS      Alzheimer's dementia ([facility])  Assessment & Plan  Continue namenda  Holding trazodone         VTE Pharmacologic Prophylaxis: VTE Score: 6 High Risk (Score >/= 5) - Pharmacological DVT Prophylaxis Ordered: heparin. Sequential Compression Devices Ordered.    Mobility:   Basic Mobility Inpatient Raw Score: 6  JH-HLM Goal: 2: Bed activities/Dependent transfer  JH-HLM Achieved: 2: Bed activities/Dependent transfer  JH-HLM Goal achieved. Continue to encourage appropriate mobility.    Patient Centered Rounds: I performed bedside rounds with nursing staff today.   Discussions with Specialists or Other Care Team Provider: nursing, surgery    Education and Discussions with Family / Patient: updated nursing    Total Time Spent on Date of Encounter in care of patient:  mins. This time was spent on one or more of the following: performing physical exam; counseling and coordination of care; obtaining or reviewing history; documenting  in the medical record; reviewing/ordering tests, medications or procedures; communicating with other healthcare professionals and discussing with patient's family/caregivers.    Current Length of Stay: 5 day(s)  Current Patient Status: Inpatient   Discharge Plan: [facility] is following this patient on consult. They are not yet medically stable for discharge secondary to anemia, hypoxia.    Code Status: Level 1 - Full Code    Subjective:   No nausea or vomiting.  No chest pain  Still coughing   No abd pain today  No dizziness    Objective:     Vitals:   Temp (24hrs), Av.7 °F (37.1 °C), Min:97.7 °F (36.5 °C), Max:99.1 °F (37.3 °C)    Temp:  [97.7 °F (36.5 °C)-99.1 °F (37.3 °C)] 97.7 °F (36.5 °C)  HR:  [76-88] 76  Resp:  [14-32] 20  BP: (125-157)/(52-72) 125/52  SpO2:  [87 %-99 %] 93 %  Body mass index is 40.13 kg/m².     Input and Output Summary (last 24 hours):     Intake/Output Summary (Last 24 hours) at 2024 1354  Last data filed at 2024 1350  Gross per 24 hour   Intake 1699.58 ml   Output --   Net 1699.58 ml       Physical Exam:   Physical Exam  Vitals and nursing note reviewed.   Constitutional:       General: She is not in acute distress.     Appearance: Normal appearance. She is obese. She is not diaphoretic.   HENT:      Head: Normocephalic and atraumatic.   Cardiovascular:      Rate and Rhythm: Normal rate and regular rhythm.   Pulmonary:      Effort: Pulmonary effort is normal.      Breath sounds: No stridor. Rhonchi and rales present. No wheezing.      Comments: 6L NC. Rales, rhonchi  Abdominal:      General: Bowel sounds are normal.      Palpations: Abdomen is soft. There is no mass.      Tenderness: There is no abdominal tenderness. There is no guarding.      Comments: Abd binder in place   Musculoskeletal:      Right lower leg: No edema.      Left lower leg: No edema.   Skin:     General: Skin is warm and dry.   Neurological:      Mental Status: She is alert.   Psychiatric:         Mood and  Affect: Mood normal.         Behavior: Behavior normal.          Additional Data:     Labs:  Results from last 7 days   Lab Units 04/02/24  0642 03/30/24  0919 03/30/24  0450   WBC Thousand/uL 7.99   < > 14.17*   HEMOGLOBIN g/dL 6.8*   < > 7.2*   HEMATOCRIT % 22.9*   < > 23.9*   PLATELETS Thousands/uL 137*   < > 129*   BANDS PCT %  --   --  6   NEUTROS PCT % 78*   < >  --    LYMPHS PCT % 11*   < >  --    LYMPHO PCT %  --   --  11*   MONOS PCT % 5   < >  --    MONO PCT %  --   --  5   EOS PCT % 4   < > 1    < > = values in this interval not displayed.     Results from last 7 days   Lab Units 04/02/24  0642 03/31/24  0549 03/30/24  0450   SODIUM mmol/L 141   < > 147   POTASSIUM mmol/L 3.5   < > 3.7   CHLORIDE mmol/L 99   < > 102   CO2 mmol/L 32   < > 38*   BUN mg/dL 28*   < > 25   CREATININE mg/dL 1.36*   < > 1.50*   ANION GAP mmol/L 10   < > 7   CALCIUM mg/dL 7.6*   < > 8.5   ALBUMIN g/dL  --   --  3.1*   TOTAL BILIRUBIN mg/dL  --   --  0.76   ALK PHOS U/L  --   --  50   ALT U/L  --   --  4*   AST U/L  --   --  10*   GLUCOSE RANDOM mg/dL 255*   < > 228*    < > = values in this interval not displayed.     Results from last 7 days   Lab Units 03/27/24  2323   INR  0.98     Results from last 7 days   Lab Units 04/02/24  1143 04/02/24  0735 04/01/24  2204 04/01/24  1714 04/01/24  1220 04/01/24  1011 04/01/24  0844 04/01/24  0603 04/01/24  0042 03/31/24  1756 03/31/24  1228 03/31/24  0545   POC GLUCOSE mg/dl 321* 251* 285* 218* 268* 251* 251* 242* 252* 235* 257* 269*         Results from last 7 days   Lab Units 04/02/24  0642 03/30/24  0450 03/29/24  1819 03/29/24  1638 03/27/24  2323   LACTIC ACID mmol/L  --   --  1.0  --  1.0   PROCALCITONIN ng/ml 1.52* 2.71*  --  2.29*  --        Lines/Drains:  Invasive Devices       Peripheral Intravenous Line  Duration             Peripheral IV 03/31/24 Ventral (anterior);Left Forearm 2 days    Peripheral IV 03/31/24 Distal;Left;Ventral (anterior) Forearm 1 day                           Imaging: Personally reviewed the following imaging: chest xray    Recent Cultures (last 7 days):   Results from last 7 days   Lab Units 04/01/24  0846 03/29/24  1636   BLOOD CULTURE  Received in Microbiology Lab. Culture in Progress.  Received in Microbiology Lab. Culture in Progress. No Growth at 72 hrs.  No Growth at 72 hrs.       Last 24 Hours Medication List:   Current Facility-Administered Medications   Medication Dose Route Frequency Provider Last Rate    acetaminophen  975 mg Oral Q6H [facility] Paolo Trejo MD      cefepime  2,000 mg Intravenous Q24H Paolo Trejo MD 2,000 mg (04/02/24 0416)    chlorhexidine  15 mL Mouth/Throat Q12H [facility] Paolo Trejo MD      [START ON 4/3/2024] cyanocobalamin  1,000 mcg Oral Daily Coco Martinez PA-C      cyanocobalamin  1,000 mcg Intramuscular Once Coco Martinez PA-C      fluticasone-vilanterol  1 puff Inhalation Daily Paolo Trejo MD      furosemide  40 mg Intravenous Once Coco Martinez PA-C      heparin (porcine)  7,500 Units Subcutaneous Q8H [facility] Paolo Trejo MD      insulin glargine  10 Units Subcutaneous HS Coco Martinez PA-C      insulin lispro  4-20 Units Subcutaneous TID AC Charles Palacios MD      insulin lispro  4-20 Units Subcutaneous HS Charles Palacios MD      ipratropium  0.5 mg Nebulization TID Paolo Trejo MD      labetalol  10 mg Intravenous Q6H PRN Paolo Trejo MD      levalbuterol  1.25 mg Nebulization Q4H PRN Paolo Trejo MD      levalbuterol  1.25 mg Nebulization TID Paolo Trejo MD      losartan  25 mg Oral Daily Paolo Trejo MD      magnesium hydroxide  30 mL Oral Once Paolo Trejo MD      memantine  10 mg Oral BID Paolo Trejo MD      metroNIDAZOLE  500 mg Intravenous Q8H Paolo Trejo  mg (04/02/24 1154)    naloxone  0.1 mg Intravenous Q1MIN PRN Paolo Trejo MD      ondansetron  4 mg Intravenous Q6H PRN Paolo Trejo MD      oxyCODONE  5 mg Oral Q4H PRN Charles  DOMONIQUE Palacios MD      oxyCODONE  2.5 mg Oral Q4H PRN Charles Palacios MD      pantoprazole  20 mg Oral Early Morning Paolo Trejo MD      phenol  1 spray Mouth/Throat Q2H PRN Paolo Trejo MD      potassium chloride  20 mEq Intravenous BID Manuelito Beverly MD 20 mEq (04/02/24 1220)    pravastatin  80 mg Oral Daily With Dinner Paolo Trejo MD          Today, Patient Was Seen By: Coco Martinez PA-C    **Please Note: This note may have been constructed using a voice recognition system.**

## 2024-04-02 NOTE — ASSESSMENT & PLAN NOTE
Stop IV fluids  6.6L since admission of IV fluids  Getting blood transfusion - will give 40 IV x 1 with lasix  Volume overload on imaging and on exam

## 2024-04-02 NOTE — ASSESSMENT & PLAN NOTE
Baseline 2 L via NC   Required up to 15 L of O2, now stable on 6 L (was on 4L this AM)  CXR (04/01): Suspected right lower lung pneumonia. Small right pleural effusion.  CXR (04/02): volume overload  S/p dose of Lasix x 1 on 3/29  Continue cefepime (day #4) and flagyl (day #4)  COVID/influenza/RSV negative  Continue nebulizer therapy, Advair, incentive spirometry as tolerated, pulmonary toilet  Patient has received over 6.6 L of IV fluids since admission, 1 unit of PRBCs and is receiving her second unit of PRBCs today  Stop further IV fluids  Will give IV lasix with blood transfusion

## 2024-04-02 NOTE — ASSESSMENT & PLAN NOTE
Admitted to surgical service for cecal volvulus within an incarcerated ventral hernia   S/p Ex-lap, extended R hemicolectomy, ventral hernia repair on 03/28  Downgraded to Medr on 03/31   Encourage incentive spirometry; antiemetics/analgesia  Abdominal binder in place  On IV cefepime/flagyl  Management per primary team

## 2024-04-02 NOTE — ASSESSMENT & PLAN NOTE
Tachypnea, Tachycardia, Leukocytosis, Fever beginning on 03/30 4/1/24 100.9 at 11 last fever  COVID/FLU/RSV normal  CXR (04/01): Suspected right lower lung pneumonia. Small right pleural effusion  Consider related to RLL PNA vs. Intraabdominal infection  WBC improved   BCx w/o growth x 72 hrs   Procalcitonin trending down  Continue cefepime and Flagyl day #4

## 2024-04-02 NOTE — ASSESSMENT & PLAN NOTE
Lab Results   Component Value Date    HGBA1C 7.1 (H) 03/15/2024       Blood Sugar Average: Last 72 hrs:  (P) 254.0625  Home regimen: glargine 60 U q12, dulaglutide, & humalog 30 U TID w/ meals   Increase lantus, SSI ACHS  Continue SSI AC & QHS

## 2024-04-02 NOTE — ASSESSMENT & PLAN NOTE
Baseline Hgb 9-10   S/p 1 u PRBC 4/1 and receiving another 1 unit today  Likely anemia following intraoperative blood loss and continued IV fluid use  Stop fluids. Give lasix with blood transfusion today  Follow up on iron panel.   Folate WNL  Give 1 mg IM vitamin B12, add oral supplementation

## 2024-04-02 NOTE — PROGRESS NOTES
The metronidazole has / have been converted to Oral per [facility] IV-to-PO Auto-Conversion Protocol for Adults as approved by the Pharmacy and Therapeutics Committee. The patient met all eligible criteria:  1) Age = 18 years old   2) Received at least one dose of the IV form   3) Receiving at least one other scheduled oral/enteral medication   4) Tolerating an oral/enteral diet   and did not have any exclusions:   1) Critical care patient   2) Active GI bleed (IF assessing H2RAs or PPIs)   3) Continuous tube feeding (IF assessing cipro, doxycycline, levofloxacin, minocycline, rifampin, or voriconazole)   4) Receiving PO vancomycin (IF assessing metronidazole)   5) Persistent nausea and/or vomiting   6) Ileus or gastrointestinal obstruction   7) Bruna/nasogastric tube set for continuous suction   8) Specific order not to automatically convert to PO (in the order's comments or if discussed in the most recent Infectious Disease or primary team's progress notes).

## 2024-04-02 NOTE — ASSESSMENT & PLAN NOTE
Lab Results   Component Value Date    EGFR 38 04/02/2024    EGFR 32 04/01/2024    EGFR 30 03/31/2024    CREATININE 1.36 (H) 04/02/2024    CREATININE 1.57 (H) 04/01/2024    CREATININE 1.64 (H) 03/31/2024     Baseline creatinine 1.5-1.6

## 2024-04-03 LAB
ABO GROUP BLD BPU: NORMAL
ABO GROUP BLD BPU: NORMAL
ANION GAP SERPL CALCULATED.3IONS-SCNC: 9 MMOL/L (ref 4–13)
ANISOCYTOSIS BLD QL SMEAR: PRESENT
BACTERIA BLD CULT: NORMAL
BACTERIA BLD CULT: NORMAL
BASOPHILS # BLD MANUAL: 0 THOUSAND/UL (ref 0–0.1)
BASOPHILS NFR MAR MANUAL: 0 % (ref 0–1)
BPU ID: NORMAL
BPU ID: NORMAL
BUN SERPL-MCNC: 26 MG/DL (ref 5–25)
CALCIUM SERPL-MCNC: 8.2 MG/DL (ref 8.4–10.2)
CHLORIDE SERPL-SCNC: 94 MMOL/L (ref 96–108)
CO2 SERPL-SCNC: 36 MMOL/L (ref 21–32)
CREAT SERPL-MCNC: 1.3 MG/DL (ref 0.6–1.3)
CROSSMATCH: NORMAL
CROSSMATCH: NORMAL
EOSINOPHIL # BLD MANUAL: 0.27 THOUSAND/UL (ref 0–0.4)
EOSINOPHIL NFR BLD MANUAL: 3 % (ref 0–6)
ERYTHROCYTE [DISTWIDTH] IN BLOOD BY AUTOMATED COUNT: 15.9 % (ref 11.6–15.1)
GFR SERPL CREATININE-BSD FRML MDRD: 40 ML/MIN/1.73SQ M
GLUCOSE SERPL-MCNC: 218 MG/DL (ref 65–140)
GLUCOSE SERPL-MCNC: 231 MG/DL (ref 65–140)
GLUCOSE SERPL-MCNC: 248 MG/DL (ref 65–140)
GLUCOSE SERPL-MCNC: 253 MG/DL (ref 65–140)
GLUCOSE SERPL-MCNC: 262 MG/DL (ref 65–140)
HCT VFR BLD AUTO: 28.4 % (ref 34.8–46.1)
HGB BLD-MCNC: 9 G/DL (ref 11.5–15.4)
LYMPHOCYTES # BLD AUTO: 0.98 THOUSAND/UL (ref 0.6–4.47)
LYMPHOCYTES # BLD AUTO: 11 % (ref 14–44)
MAGNESIUM SERPL-MCNC: 2 MG/DL (ref 1.9–2.7)
MCH RBC QN AUTO: 29.1 PG (ref 26.8–34.3)
MCHC RBC AUTO-ENTMCNC: 31.7 G/DL (ref 31.4–37.4)
MCV RBC AUTO: 92 FL (ref 82–98)
MONOCYTES # BLD AUTO: 0.18 THOUSAND/UL (ref 0–1.22)
MONOCYTES NFR BLD: 2 % (ref 4–12)
NEUTROPHILS # BLD MANUAL: 7.45 THOUSAND/UL (ref 1.85–7.62)
NEUTS BAND NFR BLD MANUAL: 3 % (ref 0–8)
NEUTS SEG NFR BLD AUTO: 81 % (ref 43–75)
PHOSPHATE SERPL-MCNC: 2.8 MG/DL (ref 2.3–4.1)
PLATELET # BLD AUTO: 141 THOUSANDS/UL (ref 149–390)
PLATELET BLD QL SMEAR: ABNORMAL
PMV BLD AUTO: 10 FL (ref 8.9–12.7)
POLYCHROMASIA BLD QL SMEAR: PRESENT
POTASSIUM SERPL-SCNC: 3.2 MMOL/L (ref 3.5–5.3)
RBC # BLD AUTO: 3.09 MILLION/UL (ref 3.81–5.12)
RBC MORPH BLD: PRESENT
SODIUM SERPL-SCNC: 139 MMOL/L (ref 135–147)
STOMATOCYTES BLD QL SMEAR: PRESENT
UNIT DISPENSE STATUS: NORMAL
UNIT DISPENSE STATUS: NORMAL
UNIT PRODUCT CODE: NORMAL
UNIT PRODUCT CODE: NORMAL
UNIT PRODUCT VOLUME: 350 ML
UNIT PRODUCT VOLUME: 350 ML
UNIT RH: NORMAL
UNIT RH: NORMAL
WBC # BLD AUTO: 8.87 THOUSAND/UL (ref 4.31–10.16)

## 2024-04-03 PROCEDURE — 97530 THERAPEUTIC ACTIVITIES: CPT

## 2024-04-03 PROCEDURE — 97110 THERAPEUTIC EXERCISES: CPT

## 2024-04-03 PROCEDURE — 82948 REAGENT STRIP/BLOOD GLUCOSE: CPT

## 2024-04-03 PROCEDURE — 80048 BASIC METABOLIC PNL TOTAL CA: CPT | Performed by: INTERNAL MEDICINE

## 2024-04-03 PROCEDURE — 87493 C DIFF AMPLIFIED PROBE: CPT | Performed by: SURGERY

## 2024-04-03 PROCEDURE — 99024 POSTOP FOLLOW-UP VISIT: CPT | Performed by: SURGERY

## 2024-04-03 PROCEDURE — 84100 ASSAY OF PHOSPHORUS: CPT | Performed by: INTERNAL MEDICINE

## 2024-04-03 PROCEDURE — 83735 ASSAY OF MAGNESIUM: CPT | Performed by: INTERNAL MEDICINE

## 2024-04-03 PROCEDURE — 85027 COMPLETE CBC AUTOMATED: CPT | Performed by: INTERNAL MEDICINE

## 2024-04-03 PROCEDURE — 97535 SELF CARE MNGMENT TRAINING: CPT

## 2024-04-03 PROCEDURE — 99232 SBSQ HOSP IP/OBS MODERATE 35: CPT | Performed by: PHYSICIAN ASSISTANT

## 2024-04-03 PROCEDURE — 94640 AIRWAY INHALATION TREATMENT: CPT

## 2024-04-03 PROCEDURE — 85007 BL SMEAR W/DIFF WBC COUNT: CPT | Performed by: INTERNAL MEDICINE

## 2024-04-03 PROCEDURE — 94760 N-INVAS EAR/PLS OXIMETRY 1: CPT

## 2024-04-03 RX ORDER — FUROSEMIDE 10 MG/ML
40 INJECTION INTRAMUSCULAR; INTRAVENOUS ONCE
Status: COMPLETED | OUTPATIENT
Start: 2024-04-03 | End: 2024-04-03

## 2024-04-03 RX ORDER — TRAZODONE HYDROCHLORIDE 50 MG/1
50 TABLET ORAL ONCE
Status: COMPLETED | OUTPATIENT
Start: 2024-04-03 | End: 2024-04-03

## 2024-04-03 RX ORDER — POTASSIUM CHLORIDE 20 MEQ/1
40 TABLET, EXTENDED RELEASE ORAL 2 TIMES DAILY
Status: COMPLETED | OUTPATIENT
Start: 2024-04-03 | End: 2024-04-03

## 2024-04-03 RX ADMIN — ACETAMINOPHEN 975 MG: 325 TABLET, FILM COATED ORAL at 17:41

## 2024-04-03 RX ADMIN — PRAVASTATIN SODIUM 80 MG: 80 TABLET ORAL at 17:41

## 2024-04-03 RX ADMIN — ACETAMINOPHEN 975 MG: 325 TABLET, FILM COATED ORAL at 12:06

## 2024-04-03 RX ADMIN — MEMANTINE 10 MG: 10 TABLET ORAL at 08:22

## 2024-04-03 RX ADMIN — CHLORHEXIDINE GLUCONATE 15 ML: 1.2 SOLUTION ORAL at 21:51

## 2024-04-03 RX ADMIN — IPRATROPIUM BROMIDE 0.5 MG: 0.5 SOLUTION RESPIRATORY (INHALATION) at 14:08

## 2024-04-03 RX ADMIN — ACETAMINOPHEN 975 MG: 325 TABLET, FILM COATED ORAL at 23:00

## 2024-04-03 RX ADMIN — LOSARTAN POTASSIUM 25 MG: 25 TABLET, FILM COATED ORAL at 08:23

## 2024-04-03 RX ADMIN — INSULIN LISPRO 8 UNITS: 100 INJECTION, SOLUTION INTRAVENOUS; SUBCUTANEOUS at 12:05

## 2024-04-03 RX ADMIN — HEPARIN SODIUM 7500 UNITS: 5000 INJECTION INTRAVENOUS; SUBCUTANEOUS at 21:52

## 2024-04-03 RX ADMIN — INSULIN LISPRO 8 UNITS: 100 INJECTION, SOLUTION INTRAVENOUS; SUBCUTANEOUS at 21:56

## 2024-04-03 RX ADMIN — LEVALBUTEROL HYDROCHLORIDE 1.25 MG: 1.25 SOLUTION RESPIRATORY (INHALATION) at 14:08

## 2024-04-03 RX ADMIN — CYANOCOBALAMIN TAB 500 MCG 1000 MCG: 500 TAB at 08:22

## 2024-04-03 RX ADMIN — MEMANTINE 10 MG: 10 TABLET ORAL at 17:41

## 2024-04-03 RX ADMIN — TRAZODONE HYDROCHLORIDE 50 MG: 50 TABLET ORAL at 21:52

## 2024-04-03 RX ADMIN — LEVALBUTEROL HYDROCHLORIDE 1.25 MG: 1.25 SOLUTION RESPIRATORY (INHALATION) at 20:40

## 2024-04-03 RX ADMIN — POTASSIUM CHLORIDE 40 MEQ: 1500 TABLET, EXTENDED RELEASE ORAL at 17:41

## 2024-04-03 RX ADMIN — CEFEPIME 2000 MG: 2 INJECTION, POWDER, FOR SOLUTION INTRAVENOUS at 03:39

## 2024-04-03 RX ADMIN — METRONIDAZOLE 500 MG: 500 TABLET ORAL at 12:06

## 2024-04-03 RX ADMIN — PANTOPRAZOLE SODIUM 20 MG: 20 TABLET, DELAYED RELEASE ORAL at 05:00

## 2024-04-03 RX ADMIN — INSULIN LISPRO 8 UNITS: 100 INJECTION, SOLUTION INTRAVENOUS; SUBCUTANEOUS at 17:37

## 2024-04-03 RX ADMIN — IPRATROPIUM BROMIDE 0.5 MG: 0.5 SOLUTION RESPIRATORY (INHALATION) at 20:40

## 2024-04-03 RX ADMIN — FLUTICASONE FUROATE AND VILANTEROL TRIFENATATE 1 PUFF: 200; 25 POWDER RESPIRATORY (INHALATION) at 08:27

## 2024-04-03 RX ADMIN — METRONIDAZOLE 500 MG: 500 TABLET ORAL at 03:33

## 2024-04-03 RX ADMIN — Medication 2.5 MG: at 22:56

## 2024-04-03 RX ADMIN — ACETAMINOPHEN 975 MG: 325 TABLET, FILM COATED ORAL at 05:00

## 2024-04-03 RX ADMIN — METRONIDAZOLE 500 MG: 500 TABLET ORAL at 21:52

## 2024-04-03 RX ADMIN — FUROSEMIDE 40 MG: 10 INJECTION, SOLUTION INTRAMUSCULAR; INTRAVENOUS at 10:28

## 2024-04-03 RX ADMIN — INSULIN GLARGINE 10 UNITS: 100 INJECTION, SOLUTION SUBCUTANEOUS at 21:51

## 2024-04-03 RX ADMIN — INSULIN LISPRO 8 UNITS: 100 INJECTION, SOLUTION INTRAVENOUS; SUBCUTANEOUS at 06:56

## 2024-04-03 RX ADMIN — POTASSIUM CHLORIDE 40 MEQ: 1500 TABLET, EXTENDED RELEASE ORAL at 08:23

## 2024-04-03 RX ADMIN — HEPARIN SODIUM 7500 UNITS: 5000 INJECTION INTRAVENOUS; SUBCUTANEOUS at 13:35

## 2024-04-03 RX ADMIN — LEVALBUTEROL HYDROCHLORIDE 1.25 MG: 1.25 SOLUTION RESPIRATORY (INHALATION) at 07:15

## 2024-04-03 RX ADMIN — IPRATROPIUM BROMIDE 0.5 MG: 0.5 SOLUTION RESPIRATORY (INHALATION) at 07:15

## 2024-04-03 RX ADMIN — HEPARIN SODIUM 7500 UNITS: 5000 INJECTION INTRAVENOUS; SUBCUTANEOUS at 05:02

## 2024-04-03 NOTE — ASSESSMENT & PLAN NOTE
Baseline 2 L via NC   Required up to 15 L of O2, now stable on 6 L   CXR (04/01): Suspected right lower lung pneumonia. Small right pleural effusion.  CXR (04/02): volume overload  S/p dose of Lasix x 1 on 3/29  Patient has received over 6.6 L of IV fluids and blood transfusions  Continue cefepime (day #4) and flagyl (day #4)  COVID/influenza/RSV negative  Continue nebulizer therapy, Advair, incentive spirometry as tolerated, pulmonary toilet  Stop further IV fluids  Continue pulse dose IV Lasix

## 2024-04-03 NOTE — OCCUPATIONAL THERAPY NOTE
Occupational Therapy Progress Note     Patient Name: Jud Walls  Today's Date: 4/3/2024  Problem List  Principal Problem:    Cecal volvulus (HCC)  Active Problems:    Diabetes (HCC)    Alzheimer's dementia (HCC)    Sepsis (HCC)    Acute on chronic respiratory failure with hypoxia and hypercapnia (HCC)    Anemia    Incarcerated hernia    Stage 3b chronic kidney disease (HCC)    Pleural effusion    Toxic metabolic encephalopathy    RLL pneumonia    Volume overload          04/03/24 1345   OT Last Visit   OT Visit Date 04/03/24   Note Type   Note Type Treatment   Pain Assessment   Pain Assessment Tool 0-10   Pain Score 3   Pain Location/Orientation Orientation: Bilateral;Location: Leg   Hospital Pain Intervention(s) Repositioned;Ambulation/increased activity;Emotional support   Restrictions/Precautions   Weight Bearing Precautions Per Order No   Other Precautions Cognitive;Chair Alarm;Bed Alarm;Multiple lines;Fall Risk;O2  (2L O2, abdominal binder)   Lifestyle   Autonomy Pt lives at [facility] and requires A for ADLs PTA, A x 2 to transfer to w/c, (-) falls   Reciprocal Relationships Supportive facility staff at [facility]   Service to Others Retired   Intrinsic Gratification Pt enjoys watching TV and coloring   ADL   Eating Assistance 5  Supervision/Setup   Eating Deficit Increased time to complete   Eating Comments able to bring drink to mouth without assistance   LB Bathing Assistance 1  Total Assistance   LB Bathing Deficit Increased time to complete;Supervision/safety;Verbal cueing;Right lower leg including foot;Left lower leg including foot;Buttocks   LB Bathing Comments grossly incontinent of bowel, requiring dep x1 for washing B legs and buttocks   UB Dressing Assistance 3  Moderate Assistance   UB Dressing Deficit Increased time to complete;Supervision/safety;Verbal cueing;Thread RUE;Thread LUE   UB Dressing Comments doff/donning gown   LB Dressing Assistance 1  Total Assistance   LB Dressing Deficit  "Don/doff R sock;Don/doff L sock   Toileting Assistance  1  Total Assistance   Toileting Deficit Perineal hygiene   Toileting Comments grossly incontinent of bowel and bladder in bed   Bed Mobility   Rolling R 2  Maximal assistance   Additional items Assist x 1;Increased time required;Verbal cues;Bedrails   Rolling L 2  Maximal assistance   Additional items Assist x 1;Increased time required;Verbal cues;Bedrails   Supine to Sit 2  Maximal assistance   Additional items Assist x 1;Increased time required;Verbal cues;LE management   Additional Comments sitting EOB with overall (S), no need for UE support on bed rail   Transfers   Sit to Stand 2  Maximal assistance   Additional items Assist x 2;Increased time required;Verbal cues   Stand to Sit 2  Maximal assistance   Additional items Assist x 2;Increased time required;Verbal cues   Additional Comments B knee block + BUE support. Able to complete x3 trials and maintain approx 45 sec, 30 sec and 30 sec   Functional Mobility   Additional Comments unable to advance at this time   Subjective   Subjective \"Whew I'm tired\"   Cognition   Overall Cognitive Status Impaired   Arousal/Participation Alert;Cooperative   Attention Attends with cues to redirect   Orientation Level Oriented to person;Disoriented to place;Disoriented to time;Disoriented to situation  (when pt educated that she is in the hospital pt states \"I am?  Wow really?\")   Memory Decreased short term memory;Decreased recall of recent events;Decreased recall of precautions   Following Commands Follows one step commands with increased time or repetition   Comments Pt requiring consistent repetition of instructions and cuing for attention to task   Activity Tolerance   Activity Tolerance Patient limited by fatigue;Other (Comment)  (limited by cognition)   Medical Staff Made Aware RN Edite   Assessment   Assessment Pt seen on this date for skilled OT treatment session. At start of session pt supine in bed and calling " out for help. Pt noted to be saturated in urine and bowel movement. Pt requiring Ax1 for rolling in bed for hygiene, completing sup > sit and sitting EOB for continued hygiene washing LE + candice area. Pt with improved sitting balance from previous session, demonstrating improved performance with UB dressing, continues to require (A) With LB ADL tasks due to impaired functional reach and overall strength. Pt with improved activity tolerance, standing x3 trials for completion of candice-hygiene. And sheet change while in standing. Pt noted to be limited by cognition - recall and overall awareness to situation. Pt with poor standing tolerance and overall endurance. Pt provided with education on techniques for transfers, ADL tasks and energy conservation due to SOB. Pt would continue to benefit from skilled OT treatment sessions in order to address remaining deficits   Plan   Goal Expiration Date 04/07/24   OT Treatment Day 1   OT Frequency 2-3x/wk   Discharge Recommendation   Rehab Resource Intensity Level, OT II (Moderate Resource Intensity)  (Given pt cognitive deficits, pt may benefit from return to SNF w/ continued assistance for ADLs/mobility nad familiar environment/routines. If facility is unable to provide adequate assistance, recommend DC to higher level of care)   AM-PAC Daily Activity Inpatient   Lower Body Dressing 1   Bathing 1   Toileting 1   Upper Body Dressing 2   Grooming 2   Eating 3   Daily Activity Raw Score 10   Turning Head Towards Sound 4   Follow Simple Instructions 3   Low Function Daily Activity Raw Score 17   Low Function Daily Activity Standardized Score  28.95   AM-PAC Applied Cognition Inpatient   Following a Speech/Presentation 2   Understanding Ordinary Conversation 3   Taking Medications 1   Remembering Where Things Are Placed or Put Away 1   Remembering List of 4-5 Errands 1   Taking Care of Complicated Tasks 1   Applied Cognition Raw Score 9   Applied Cognition Standardized Score 22.48    End of Consult   Education Provided Yes  (edu on PLB for SOB, O2 sats ranging 89-92% on 2L)   Patient Position at End of Consult Supine;Bed/Chair alarm activated;All needs within reach     GOALS:     *Patient will perform grooming tasks sitting EOB with (S) in order to increase overall independence      *UB ADL with (S) for inc'd independence with self care PROGRESSING     *LB ADL with Mod (A) using AE prn for inc'd independence with self care PROGRESSING     *Toileting with Mod (A) for clothing management and hygiene to increase hygiene/thoroughness in order to reduce caregiver burden PROGRESSING     *ADL transfers with Mod (A) x2 for inc'd independence with ADLs/purposeful tasks PROGRESSING     *Bed mobility- Mod (A) for inc'd independence to manage own comfort and initiate EOB & OOB purposeful tasks PROGRESSING     *Patient will increase OOB/sitting tolerance to 2-4 hours per day to increase participation in self-care and leisure tasks with no s/s of exertion.  PROGRESSING        The patient's raw score on the -PAC Daily Activity Inpatient Short Form is 10. A raw score of less than 19 suggests the patient may benefit from discharge to post-acute rehabilitation services. Please refer to the recommendation of the Occupational Therapist for safe discharge planning.      Nimisha Valerio MS, OTR/L

## 2024-04-03 NOTE — ASSESSMENT & PLAN NOTE
Lab Results   Component Value Date    EGFR 40 04/03/2024    EGFR 38 04/02/2024    EGFR 32 04/01/2024    CREATININE 1.30 04/03/2024    CREATININE 1.36 (H) 04/02/2024    CREATININE 1.57 (H) 04/01/2024     Baseline creatinine 1.5-1.6, currently below baseline   Monitor with diuresis

## 2024-04-03 NOTE — ASSESSMENT & PLAN NOTE
Lab Results   Component Value Date    HGBA1C 7.1 (H) 03/15/2024       Blood Sugar Average: Last 72 hrs:  (P) 253.1875  Home regimen: Lantus 60 U q12, dulaglutide, & humalog 30 U TID w/ meals   Increase lantus, SSI ACHS  Continue SSI AC & QHS

## 2024-04-03 NOTE — ASSESSMENT & PLAN NOTE
Tachypnea, Tachycardia, Leukocytosis, Fever beginning on 03/30 4/1/24 100.9 at 11 last fever  COVID/FLU/RSV normal  CXR (04/01): Suspected right lower lung pneumonia. Small right pleural effusion  Consider related to RLL PNA vs. Intraabdominal infection  WBC improved   BCx w/o growth x 72 hrs   Procalcitonin trending down  Continue Cefepime and Flagyl

## 2024-04-03 NOTE — PLAN OF CARE
Problem: Prexisting or High Potential for Compromised Skin Integrity  Goal: Skin integrity is maintained or improved  Description: INTERVENTIONS:  - Identify patients at risk for skin breakdown  - Assess and monitor skin integrity  - Assess and monitor nutrition and hydration status  - Monitor labs   - Assess for incontinence   - Turn and reposition patient  - Assist with mobility/ambulation  - Relieve pressure over bony prominences  - Avoid friction and shearing  - Provide appropriate hygiene as needed including keeping skin clean and dry  - Evaluate need for skin moisturizer/barrier cream  - Collaborate with interdisciplinary team   - Patient/family teaching  - Consider wound care consult   Outcome: Progressing     Problem: INFECTION - ADULT  Goal: Absence or prevention of progression during hospitalization  Description: INTERVENTIONS:  - Assess and monitor for signs and symptoms of infection  - Monitor lab/diagnostic results  - Monitor all insertion sites, i.e. indwelling lines, tubes, and drains  - Monitor endotracheal if appropriate and nasal secretions for changes in amount and color  - [place] appropriate cooling/warming therapies per order  - Administer medications as ordered  - Instruct and encourage patient and family to use good hand hygiene technique  - Identify and instruct in appropriate isolation precautions for identified infection/condition  Outcome: Progressing  Goal: Absence of fever/infection during neutropenic period  Description: INTERVENTIONS:  - Monitor WBC    Outcome: Progressing     Problem: SAFETY ADULT  Goal: Patient will remain free of falls  Description: INTERVENTIONS:  - Educate patient/family on patient safety including physical limitations  - Instruct patient to call for assistance with activity   - Consult OT/PT to assist with strengthening/mobility   - Keep Call bell within reach  - Keep bed low and locked with side rails adjusted as appropriate  - Keep care items and personal  belongings within reach  - Initiate and maintain comfort rounds  - Make Fall Risk Sign visible to staff  - Offer Toileting jeimy Hours, in advance of need  - Initiate/Maintain alarm  - Obtain necessary fall risk management equipment:   - Apply yellow socks and bracelet for high fall risk patients  - Consider moving patient to room near nurses station  Outcome: Progressing  Goal: Maintain or return to baseline ADL function  Description: INTERVENTIONS:  -  Assess patient's ability to carry out ADLs; assess patient's baseline for ADL function and identify physical deficits which impact ability to perform ADLs (bathing, care of mouth/teeth, toileting, grooming, dressing, etc.)  - Assess/evaluate cause of self-care deficits   - Assess range of motion  - Assess patient's mobility; develop plan if impaired  - Assess patient's need for assistive devices and provide as appropriate  - Encourage maximum independence but intervene and supervise when necessary  - Involve family in performance of ADLs  - Assess for home care needs following discharge   - Consider OT consult to assist with ADL evaluation and planning for discharge  - Provide patient education as appropriate  Outcome: Progressing  Goal: Maintains/Returns to pre admission functional level  Description: INTERVENTIONS:  - Perform AM-PAC 6 Click Basic Mobility/ Daily Activity assessment daily.  - Set and communicate daily mobility goal to care team and patient/family/caregiver.   - Collaborate with rehabilitation services on mobility goals if consulted  - Perform Range of Motion  times a day.  - Reposition patient every  hours.  - Dangle patient  times a day  - Stand patient  times a day  - Ambulate patient  times a day  - Out of bed to chair  times a day   - Out of bed for meals  times a day  - Out of bed for toileting  - Record patient progress and toleration of activity level   Outcome: Progressing

## 2024-04-03 NOTE — ASSESSMENT & PLAN NOTE
Baseline Hgb 9-10, currently at baseline    Status post 2 U PRBCs  Likely anemia following intraoperative blood loss and continued IV fluid use  Iron low   Stop fluids  Consider Venofer  Continue B12

## 2024-04-03 NOTE — PLAN OF CARE
Problem: INFECTION - ADULT  Goal: Absence or prevention of progression during hospitalization  Description: INTERVENTIONS:  - Assess and monitor for signs and symptoms of infection  - Monitor lab/diagnostic results  - Monitor all insertion sites, i.e. indwelling lines, tubes, and drains  - [place] appropriate cooling/warming therapies per order  - Administer medications as ordered  - Instruct and encourage patient and family to use good hand hygiene technique  - Identify and instruct in appropriate isolation precautions for identified infection/condition  Outcome: Progressing

## 2024-04-03 NOTE — PROGRESS NOTES
General Surgery  Progress Note   Jud Walls 73 y.o. 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 S/p Ex-lap, extended R hemicolectomy, ventral hernia repair 3/28. Course complicated by KATHY POA, PNA     Received 1 unit prbc for 6.8, post transfusion hb 8.2.    AVSS on 6 L mid flow  UOP: 2 unmeasured occurrences  Stool 13x    WBC 8.87 from 7.99  Hgb 9.0 from 8.2  Cr 1.30 from 1.36     Blood cx: No growth @24hrs    Plan:  -Advance to lo residue diet  -DC IV fluids  -Stool studies, C. Diff  -Monitor Hb, transfuse PRN for Hb < 7, received one unit pRBC yesterday   -Wean O2  -Continue abx for total 10 days  -Pain/ nausea control PRN  -OOB/ ambulation  -Incentive Spirometry        Subjective/Objective     Subjective:   Patient seen and examined at bedside, in no acute distress. No acute events overnight.  Patient denies abdominal pain.  Patient is tolerating diet.  Patient is passing bowel movements.  No nausea vomiting fevers chills chest pain or shortness of breath.    Objective:   Vitals:Blood pressure 156/60, pulse 74, temperature 98.3 °F (36.8 °C), temperature source Axillary, resp. rate 20, height 5' (1.524 m), weight 92.6 kg (204 lb 2.3 oz), SpO2 98%.  Temp (24hrs), Av.3 °F (36.8 °C), Min:97.1 °F (36.2 °C), Max:99.3 °F (37.4 °C)      I/O          0701   0700 / 0701   0700    P.O.  960    Blood 383.3 356.3    Total Intake(mL/kg) 383.3 (4.1) 1316.3 (14.1)    Net +383.3 +1316.3          Unmeasured Urine Occurrence  1 x    Unmeasured Stool Occurrence  10 x            Invasive Devices       Peripheral Intravenous Line  Duration             Peripheral IV 24 Distal;Left;Ventral (anterior) Forearm 2 days    Peripheral IV 24 Ventral (anterior);Left Forearm 2 days                    Physical Exam:  General: No acute distress  Neuro: Awake, Alert and Oriented x  3  HEENT:  Normocephalic, atraumatic, moist mucous membranes  CV: Regular rate and rhythm  Lungs: Normal work of breathing, no increased respiratory effort  Abdomen: Soft, mild tenderness with deep palpation in periumbilical region, moderate distention. Incision(s) clean, dry and intact.  Extremities: No edema, clubbing or cyanosis  Skin: Warm, dry    Lab Results   Component Value Date    WBC 8.87 04/03/2024    HGB 9.0 (L) 04/03/2024    HCT 28.4 (L) 04/03/2024    MCV 92 04/03/2024     (L) 04/03/2024      Lab Results   Component Value Date     (L) 09/21/2015    SODIUM 139 04/03/2024    K 3.2 (L) 04/03/2024    CL 94 (L) 04/03/2024    CO2 36 (H) 04/03/2024    ANIONGAP 8 09/21/2015    AGAP 9 04/03/2024    BUN 26 (H) 04/03/2024    CREATININE 1.30 04/03/2024    GLUC 218 (H) 04/03/2024    GLUF 171 (H) 06/26/2023    CALCIUM 8.2 (L) 04/03/2024    AST 10 (L) 03/30/2024    ALT 4 (L) 03/30/2024    ALKPHOS 50 03/30/2024    PROT 7.4 09/21/2015    TP 6.2 (L) 03/30/2024    BILITOT 0.61 09/21/2015    TBILI 0.76 03/30/2024    EGFR 40 04/03/2024       VTE Prophylaxis: Heparin      Tamera Marcus MD  4/3/2024  8:36 AM

## 2024-04-03 NOTE — ASSESSMENT & PLAN NOTE
6.6L since admission of IV fluids  Getting blood transfusion  Volume overload on imaging and on exam  Give Lasix 40 mg IV x 1

## 2024-04-03 NOTE — PHYSICAL THERAPY NOTE
PHYSICAL THERAPY TREATMENT NOTE    Patient Name: Jud Walls  Today's Date: 4/3/2024     04/03/24 1527   Pain Assessment   Pain Assessment Tool 0-10   Pain Score No Pain   Restrictions/Precautions   Other Precautions Cognitive;Chair Alarm;Bed Alarm;Contact/isolation;Multiple lines;O2;Fall Risk  ([place])   Cognition   Overall Cognitive Status Impaired   Arousal/Participation Cooperative   Attention Attends with cues to redirect   Orientation Level Oriented to person;Other (Comment)  (pt was identified w/ full name, birth date)   Following Commands Follows one step commands with increased time or repetition   Subjective   Subjective pt seen supine in bed. agreed to PT session. frequent input was needed for task focus. pt was unable to retain reorientation information. denied pain or dizziness. pt states feeling tired.   Bed Mobility   Rolling R 2  Maximal assistance   Additional items Assist x 1;Bedrails;Increased time required;Verbal cues;LE management  (for trunk/LE positioning, task focus/safety)   Rolling L 2  Maximal assistance   Additional items Assist x 1;Bedrails;Increased time required;Verbal cues;LE management  (for trunk/LE positioning, task focus/safety)   Supine to Sit 2  Maximal assistance   Additional items Assist x 1;HOB elevated;Bedrails;Increased time required;Verbal cues;LE management  (for trunk/LE positioning, task focus/safety)   Sit to Supine 2  Maximal assistance   Additional items Assist x 1;Increased time required;Impulsive;Verbal cues;LE management  (for trunk/LE positioning, task focus/safety)   Additional Comments pt sat edge of bed approximately 5 minutes w/ supervision to minx1. additional sitting was not possible due to fatigue.  (pt was noted to be incontinent of bowel and bladder. pt was dependent for being cleaned.)   Transfers   Sit to Stand Unable to assess  (pt declined transfer training due to  fatigue)   Balance   Static Sitting Fair -  (to poor+)   Static Standing Zero   Activity Tolerance   Activity Tolerance Patient limited by fatigue   Nurse Made Aware [place] [facility]   [place] Use   Comments ankle pumps 15. short arc quads and heel slides 10 each. input was needed to complete all reps and maintain exercise technique.   Assessment   Problem List Decreased strength;Decreased endurance;Impaired balance;Decreased mobility;Decreased cognition;Impaired judgement;Decreased safety awareness;Obesity;Decreased skin integrity   Assessment limited session completed due to pt declining participation in transfer training. pt needed frequent input for task focus and had limited carryover of instruction provided during the session. pt remains at risk for falls due to physical and safety awareness deficits. continued inpatient PT is indicated to reduce fall risk factors and caregiver burden.   Goals   Patient Goals I need to rest   STG Expiration Date 04/11/24   Short Term Goal #1 pt will be able to: 1. Demonstrate ability to perform all aspects of bed mobility with modA to improve functional safety.  2. Perform functional transfers with Jayme x2 to facilitate safe return to previous living environment.  3.  Tolerate sitting EOB for greater than 15 minutes to participate in ADLs, functional tasks.  4. Improve LE strength grades by 1 to increase ease of functional mobility with transfers and gait. 5. Pt will demonstrate improved balance by one grade in order to decrease risk of falls. 6. Tolerate 3 hours OOB in recliner to promote upright tolerance.   PT Treatment Day 1   Plan   Treatment/Interventions Functional transfer training;LE strengthening/ROM;Elevations;Therapeutic exercise;Cognitive reorientation;Equipment eval/education;Patient/family training;Bed mobility   Progress Slow progress, decreased activity tolerance   PT Frequency 3-5x/wk   Discharge Recommendation   Rehab Resource Intensity Level,  PT II (Moderate Resource Intensity)   AM-PAC Basic Mobility Inpatient   Turning in Flat Bed Without Bedrails 2   Lying on Back to Sitting on Edge of Flat Bed Without Bedrails 1   Moving Bed to Chair 1   Standing Up From Chair Using Arms 1   Walk in Room 1   Climb 3-5 Stairs With Railing 1   Basic Mobility Inpatient Raw Score 7   Turning Head Towards Sound 3   Follow Simple Instructions 3   Low Function Basic Mobility Raw Score  13   Low Function Basic Mobility Standardized Score  20.14   [facility] Highest Level Of Mobility   -[facility] Goal 2: Bed activities/Dependent transfer   -[facility] Achieved 3: Sit at edge of bed   End of Consult   Patient Position at End of Consult Supine;Bed/Chair alarm activated;All needs within reach     The patient's AM-PAC Basic Mobility Inpatient Short Form Raw Score is 7. A Raw score of less than or equal to 16 suggests the patient may benefit from discharge to post-acute rehabilitation services. Please also refer to the recommendation of the Physical Therapist for safe discharge planning.    Skilled inpatient PT recommended while in hospital to progress pt toward treatment goals.    Navin Hinkle, PT

## 2024-04-03 NOTE — PLAN OF CARE
Problem: PHYSICAL THERAPY ADULT  Goal: Performs mobility at highest level of function for planned discharge setting.  See evaluation for individualized goals.  Description: Treatment/Interventions: Functional transfer training, LE strengthening/ROM, Elevations, Therapeutic exercise, Cognitive reorientation, Equipment eval/education, Patient/family training, Bed mobility, Spoke to nursing, Spoke to case management, OT          See flowsheet documentation for full assessment, interventions and recommendations.  Outcome: Progressing  Note: Prognosis: Fair  Problem List: Decreased strength, Decreased endurance, Impaired balance, Decreased mobility, Decreased cognition, Impaired judgement, Decreased safety awareness, Obesity, Decreased skin integrity  Assessment: limited session completed due to pt declining participation in transfer training. pt needed frequent input for task focus and had limited carryover of instruction provided during the session. pt remains at risk for falls due to physical and safety awareness deficits. continued inpatient PT is indicated to reduce fall risk factors and caregiver burden.        Rehab Resource Intensity Level, PT: II (Moderate Resource Intensity)    See flowsheet documentation for full assessment.

## 2024-04-03 NOTE — ASSESSMENT & PLAN NOTE
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

## 2024-04-03 NOTE — PROGRESS NOTES
[facility]  Progress Note  Name: Jud Walls I  MRN: [id number]  Unit/Bed#: S -01 I Date of Admission: 3/27/2024   Date of Service: 4/3/2024 I Hospital Day: 6    Assessment/Plan   * Cecal volvulus (HCC)  Assessment & Plan  Admitted to surgical service for cecal volvulus within an incarcerated ventral hernia   Status post ex-lap, extended R hemicolectomy, ventral hernia repair on 03/28  Downgraded to MedSurg on 03/31   Encourage incentive spirometry; antiemetics/analgesia  Abdominal binder in place  On IV Cefepime/Flagyl per surgery   Management per primary team       Acute on chronic respiratory failure with hypoxia and hypercapnia (HCC)  Assessment & Plan  Baseline 2 L via NC   Required up to 15 L of O2, now stable on 6 L   CXR (04/01): Suspected right lower lung pneumonia. Small right pleural effusion.  CXR (04/02): volume overload  S/p dose of Lasix x 1 on 3/29  Patient has received over 6.6 L of IV fluids and blood transfusions  Continue cefepime (day #4) and flagyl (day #4)  COVID/influenza/RSV negative  Continue nebulizer therapy, Advair, incentive spirometry as tolerated, pulmonary toilet  Stop further IV fluids  Continue pulse dose IV Lasix     Volume overload  Assessment & Plan  6.6L since admission of IV fluids  Getting blood transfusion  Volume overload on imaging and on exam  Give Lasix 40 mg IV x 1     Stage 3b chronic kidney disease (HCC)  Assessment & Plan  Lab Results   Component Value Date    EGFR 40 04/03/2024    EGFR 38 04/02/2024    EGFR 32 04/01/2024    CREATININE 1.30 04/03/2024    CREATININE 1.36 (H) 04/02/2024    CREATININE 1.57 (H) 04/01/2024     Baseline creatinine 1.5-1.6, currently below baseline   Monitor with diuresis     Alzheimer's dementia (HCC)  Assessment & Plan  Appears to be at baseline   Continue namenda  Holding trazodone    Sepsis (HCC)  Assessment & Plan  Tachypnea, Tachycardia, Leukocytosis, Fever beginning on 03/30 4/1/24 100.9 at 11  last fever  COVID/FLU/RSV normal  CXR (04/01): Suspected right lower lung pneumonia. Small right pleural effusion  Consider related to RLL PNA vs. Intraabdominal infection  WBC improved   BCx w/o growth x 72 hrs   Procalcitonin trending down  Continue Cefepime and Flagyl       Diabetes (HCC)  Assessment & Plan  Lab Results   Component Value Date    HGBA1C 7.1 (H) 03/15/2024       Blood Sugar Average: Last 72 hrs:  (P) 253.1875  Home regimen: Lantus 60 U q12, dulaglutide, & humalog 30 U TID w/ meals   Increase lantus, SSI ACHS  Continue SSI AC & QHS      Anemia  Assessment & Plan  Baseline Hgb 9-10, currently at baseline    Status post 2 U PRBCs  Likely anemia following intraoperative blood loss and continued IV fluid use  Iron low   Stop fluids  Consider Venofer  Continue B12               VTE Pharmacologic Prophylaxis: VTE Score: 6 High Risk (Score >/= 5) - Pharmacological DVT Prophylaxis Ordered: heparin. Sequential Compression Devices Ordered.    Mobility:   Basic Mobility Inpatient Raw Score: 6  -HLM Goal: 2: Bed activities/Dependent transfer  JH-HLM Achieved: 1: Laying in bed  JH-HLM Goal NOT achieved. Continue with multidisciplinary rounding and encourage appropriate mobility to improve upon JH-HLM goals.    Patient Centered Rounds: I performed bedside rounds with nursing staff today.   Discussions with Specialists or Other Care Team Provider: Discussed with general surgery     Education and Discussions with Family / Patient: Patient declined call to .     Total Time Spent on Date of Encounter in care of patient: 35 mins. This time was spent on one or more of the following: performing physical exam; counseling and coordination of care; obtaining or reviewing history; documenting in the medical record; reviewing/ordering tests, medications or procedures; communicating with other healthcare professionals and discussing with patient's family/caregivers.    Current Length of Stay: 6 day(s)  Current  Patient Status: Inpatient   Certification Statement: The patient will continue to require additional inpatient hospital stay due to on going O2 requirement, IV diuresis   Discharge Plan: [facility] is following this patient on consult. They are not yet medically stable for discharge secondary to IV diuresis, O2 requirement .    Code Status: Level 1 - Full Code    Subjective:   Patient denies complaints. Remains at baseline confusional state.     Objective:     Vitals:   Temp (24hrs), Av °F (36.7 °C), Min:97.1 °F (36.2 °C), Max:99.3 °F (37.4 °C)    Temp:  [97.1 °F (36.2 °C)-99.3 °F (37.4 °C)] 98.3 °F (36.8 °C)  HR:  [69-76] 74  Resp:  [16-20] 20  BP: (125-156)/(51-60) 156/60  SpO2:  [90 %-98 %] 98 %  Body mass index is 39.87 kg/m².     Input and Output Summary (last 24 hours):     Intake/Output Summary (Last 24 hours) at 4/3/2024 1117  Last data filed at 2024 1350  Gross per 24 hour   Intake 836.25 ml   Output --   Net 836.25 ml       Physical Exam:   Physical Exam  Constitutional:       General: She is not in acute distress.     Appearance: Normal appearance. She is obese. She is not ill-appearing or diaphoretic.      Interventions: Nasal cannula in place.   HENT:      Head: Normocephalic and atraumatic.      Mouth/Throat:      Mouth: Mucous membranes are moist.   Eyes:      General: No scleral icterus.     Pupils: Pupils are equal, round, and reactive to light.   Cardiovascular:      Rate and Rhythm: Normal rate and regular rhythm.      Pulses: Normal pulses.      Heart sounds: Normal heart sounds, S1 normal and S2 normal. No murmur heard.     No systolic murmur is present.      No diastolic murmur is present.      No gallop. No S3 or S4 sounds.   Pulmonary:      Effort: Pulmonary effort is normal. No accessory muscle usage or respiratory distress.      Breath sounds: No stridor. Examination of the right-lower field reveals decreased breath sounds. Examination of the left-lower field reveals decreased breath  sounds. Decreased breath sounds present. No wheezing, rhonchi or rales.   Chest:      Chest wall: No tenderness.   Abdominal:      General: Bowel sounds are normal. There is no distension.      Palpations: Abdomen is soft.      Tenderness: There is no abdominal tenderness. There is no guarding.   Musculoskeletal:      Right lower leg: No edema.      Left lower leg: No edema.   Skin:     General: Skin is warm and dry.      Coloration: Skin is not jaundiced.   Neurological:      General: No focal deficit present.      Mental Status: She is alert. Mental status is at baseline.      Motor: No tremor or seizure activity.   Psychiatric:         Behavior: Behavior is cooperative.          Additional Data:     Labs:  Results from last 7 days   Lab Units 04/03/24  0341 04/02/24  1407 04/02/24  0642   WBC Thousand/uL 8.87  --  7.99   HEMOGLOBIN g/dL 9.0*   < > 6.8*   HEMATOCRIT % 28.4*   < > 22.9*   PLATELETS Thousands/uL 141*  --  137*   BANDS PCT % 3  --   --    NEUTROS PCT %  --   --  78*   LYMPHS PCT %  --   --  11*   LYMPHO PCT % 11*  --   --    MONOS PCT %  --   --  5   MONO PCT % 2*  --   --    EOS PCT % 3  --  4    < > = values in this interval not displayed.     Results from last 7 days   Lab Units 04/03/24  0341 03/31/24  0549 03/30/24  0450   SODIUM mmol/L 139   < > 147   POTASSIUM mmol/L 3.2*   < > 3.7   CHLORIDE mmol/L 94*   < > 102   CO2 mmol/L 36*   < > 38*   BUN mg/dL 26*   < > 25   CREATININE mg/dL 1.30   < > 1.50*   ANION GAP mmol/L 9   < > 7   CALCIUM mg/dL 8.2*   < > 8.5   ALBUMIN g/dL  --   --  3.1*   TOTAL BILIRUBIN mg/dL  --   --  0.76   ALK PHOS U/L  --   --  50   ALT U/L  --   --  4*   AST U/L  --   --  10*   GLUCOSE RANDOM mg/dL 218*   < > 228*    < > = values in this interval not displayed.     Results from last 7 days   Lab Units 03/27/24  2323   INR  0.98     Results from last 7 days   Lab Units 04/03/24  0641 04/02/24  2101 04/02/24  1624 04/02/24  1358 04/02/24  1143 04/02/24  0735  04/01/24  2204 04/01/24  1714 04/01/24  1220 04/01/24  1011 04/01/24  0844 04/01/24  0603   POC GLUCOSE mg/dl 231* 241* 212* 267* 321* 251* 285* 218* 268* 251* 251* 242*         Results from last 7 days   Lab Units 04/02/24  0642 03/30/24  0450 03/29/24  1819 03/29/24  1638 03/27/24  2323   LACTIC ACID mmol/L  --   --  1.0  --  1.0   PROCALCITONIN ng/ml 1.52* 2.71*  --  2.29*  --        Lines/Drains:  Invasive Devices       Peripheral Intravenous Line  Duration             Peripheral IV 03/31/24 Distal;Left;Ventral (anterior) Forearm 2 days    Peripheral IV 03/31/24 Ventral (anterior);Left Forearm 2 days                          Imaging: No pertinent imaging reviewed.    Recent Cultures (last 7 days):   Results from last 7 days   Lab Units 04/01/24  0846 03/29/24  1636   BLOOD CULTURE  No Growth at 24 hrs.  No Growth at 24 hrs. No Growth After 4 Days.  No Growth After 4 Days.       Last 24 Hours Medication List:   Current Facility-Administered Medications   Medication Dose Route Frequency Provider Last Rate    acetaminophen  975 mg Oral Q6H [facility] Paolo Trejo MD      cefepime  2,000 mg Intravenous Q24H Paolo Trejo MD 2,000 mg (04/03/24 0339)    chlorhexidine  15 mL Mouth/Throat Q12H [facility] Paolo Trejo MD      cyanocobalamin  1,000 mcg Oral Daily Coco Martinez PA-C      fluticasone-vilanterol  1 puff Inhalation Daily Paolo Trejo MD      heparin (porcine)  7,500 Units Subcutaneous Q8H [facility] Paolo Trejo MD      insulin glargine  10 Units Subcutaneous HS Coco Martinez PA-C      insulin lispro  4-20 Units Subcutaneous TID  Charles Palacios MD      insulin lispro  4-20 Units Subcutaneous HS Charles Palacios MD      ipratropium  0.5 mg Nebulization TID Paolo Trejo MD      labetalol  10 mg Intravenous Q6H PRN Paolo Trejo MD      levalbuterol  1.25 mg Nebulization Q4H PRN Paolo Trejo MD      levalbuterol  1.25 mg Nebulization TID Paolo Trejo MD      losartan  25 mg Oral  Daily Paolo Trejo MD      memantine  10 mg Oral BID Paolo Trejo MD      metroNIDAZOLE  500 mg Oral Q8H CLEMENTINE Lipscomb DO      naloxone  0.1 mg Intravenous Q1MIN PRN Paolo Trejo MD      ondansetron  4 mg Intravenous Q6H PRN Paolo Trejo MD      oxyCODONE  5 mg Oral Q4H PRN Charles Palacios MD      oxyCODONE  2.5 mg Oral Q4H PRN Charles Palacios MD      pantoprazole  20 mg Oral Early Morning Paolo Trejo MD      phenol  1 spray Mouth/Throat Q2H PRN Paolo Trejo MD      potassium chloride  40 mEq Oral BID Cristel Higgins PA-C      pravastatin  80 mg Oral Daily With Dinner Paolo Trejo MD          Today, Patient Was Seen By: Alvino Turner PA-C    **Please Note: This note may have been constructed using a voice recognition system.**

## 2024-04-04 PROBLEM — R29.898 MUSCULAR DECONDITIONING: Status: ACTIVE | Noted: 2024-04-04

## 2024-04-04 PROBLEM — J69.0 ASPIRATION PNEUMONIA (HCC): Status: ACTIVE | Noted: 2024-04-04

## 2024-04-04 PROBLEM — I50.9 ACUTE HEART FAILURE (HCC): Status: ACTIVE | Noted: 2024-04-04

## 2024-04-04 LAB
ANION GAP SERPL CALCULATED.3IONS-SCNC: 10 MMOL/L (ref 4–13)
BUN SERPL-MCNC: 23 MG/DL (ref 5–25)
C DIFF TOX A+B STL QL IA: NEGATIVE
C DIFF TOX GENS STL QL NAA+PROBE: POSITIVE
CALCIUM SERPL-MCNC: 8.4 MG/DL (ref 8.4–10.2)
CHLORIDE SERPL-SCNC: 92 MMOL/L (ref 96–108)
CO2 SERPL-SCNC: 33 MMOL/L (ref 21–32)
CREAT SERPL-MCNC: 1.26 MG/DL (ref 0.6–1.3)
ERYTHROCYTE [DISTWIDTH] IN BLOOD BY AUTOMATED COUNT: 15.3 % (ref 11.6–15.1)
GFR SERPL CREATININE-BSD FRML MDRD: 42 ML/MIN/1.73SQ M
GLUCOSE SERPL-MCNC: 264 MG/DL (ref 65–140)
GLUCOSE SERPL-MCNC: 267 MG/DL (ref 65–140)
GLUCOSE SERPL-MCNC: 324 MG/DL (ref 65–140)
GLUCOSE SERPL-MCNC: 339 MG/DL (ref 65–140)
GLUCOSE SERPL-MCNC: 366 MG/DL (ref 65–140)
HCT VFR BLD AUTO: 30.2 % (ref 34.8–46.1)
HGB BLD-MCNC: 9.5 G/DL (ref 11.5–15.4)
MCH RBC QN AUTO: 29.2 PG (ref 26.8–34.3)
MCHC RBC AUTO-ENTMCNC: 31.5 G/DL (ref 31.4–37.4)
MCV RBC AUTO: 93 FL (ref 82–98)
PLATELET # BLD AUTO: 147 THOUSANDS/UL (ref 149–390)
PMV BLD AUTO: 10.3 FL (ref 8.9–12.7)
POTASSIUM SERPL-SCNC: 3.4 MMOL/L (ref 3.5–5.3)
RBC # BLD AUTO: 3.25 MILLION/UL (ref 3.81–5.12)
SODIUM SERPL-SCNC: 135 MMOL/L (ref 135–147)
WBC # BLD AUTO: 9.66 THOUSAND/UL (ref 4.31–10.16)

## 2024-04-04 PROCEDURE — 82948 REAGENT STRIP/BLOOD GLUCOSE: CPT

## 2024-04-04 PROCEDURE — 94640 AIRWAY INHALATION TREATMENT: CPT

## 2024-04-04 PROCEDURE — 99024 POSTOP FOLLOW-UP VISIT: CPT | Performed by: SURGERY

## 2024-04-04 PROCEDURE — 99232 SBSQ HOSP IP/OBS MODERATE 35: CPT | Performed by: PHYSICIAN ASSISTANT

## 2024-04-04 PROCEDURE — 80048 BASIC METABOLIC PNL TOTAL CA: CPT | Performed by: PHYSICIAN ASSISTANT

## 2024-04-04 PROCEDURE — 85027 COMPLETE CBC AUTOMATED: CPT | Performed by: PHYSICIAN ASSISTANT

## 2024-04-04 PROCEDURE — 94760 N-INVAS EAR/PLS OXIMETRY 1: CPT

## 2024-04-04 RX ORDER — FUROSEMIDE 10 MG/ML
40 INJECTION INTRAMUSCULAR; INTRAVENOUS ONCE
Status: COMPLETED | OUTPATIENT
Start: 2024-04-04 | End: 2024-04-04

## 2024-04-04 RX ORDER — POTASSIUM CHLORIDE 20 MEQ/1
40 TABLET, EXTENDED RELEASE ORAL 2 TIMES DAILY
Status: COMPLETED | OUTPATIENT
Start: 2024-04-04 | End: 2024-04-04

## 2024-04-04 RX ADMIN — LEVALBUTEROL HYDROCHLORIDE 1.25 MG: 1.25 SOLUTION RESPIRATORY (INHALATION) at 13:11

## 2024-04-04 RX ADMIN — INSULIN LISPRO 16 UNITS: 100 INJECTION, SOLUTION INTRAVENOUS; SUBCUTANEOUS at 22:08

## 2024-04-04 RX ADMIN — POTASSIUM CHLORIDE 40 MEQ: 1500 TABLET, EXTENDED RELEASE ORAL at 17:30

## 2024-04-04 RX ADMIN — LOSARTAN POTASSIUM 25 MG: 25 TABLET, FILM COATED ORAL at 09:11

## 2024-04-04 RX ADMIN — METRONIDAZOLE 500 MG: 500 TABLET ORAL at 22:37

## 2024-04-04 RX ADMIN — POTASSIUM CHLORIDE 40 MEQ: 1500 TABLET, EXTENDED RELEASE ORAL at 09:11

## 2024-04-04 RX ADMIN — ACETAMINOPHEN 975 MG: 325 TABLET, FILM COATED ORAL at 17:31

## 2024-04-04 RX ADMIN — FUROSEMIDE 40 MG: 10 INJECTION, SOLUTION INTRAMUSCULAR; INTRAVENOUS at 10:02

## 2024-04-04 RX ADMIN — LEVALBUTEROL HYDROCHLORIDE 1.25 MG: 1.25 SOLUTION RESPIRATORY (INHALATION) at 07:21

## 2024-04-04 RX ADMIN — IPRATROPIUM BROMIDE 0.5 MG: 0.5 SOLUTION RESPIRATORY (INHALATION) at 13:11

## 2024-04-04 RX ADMIN — ACETAMINOPHEN 975 MG: 325 TABLET, FILM COATED ORAL at 05:03

## 2024-04-04 RX ADMIN — FLUTICASONE FUROATE AND VILANTEROL TRIFENATATE 1 PUFF: 200; 25 POWDER RESPIRATORY (INHALATION) at 09:12

## 2024-04-04 RX ADMIN — MEMANTINE 10 MG: 10 TABLET ORAL at 17:30

## 2024-04-04 RX ADMIN — PANTOPRAZOLE SODIUM 20 MG: 20 TABLET, DELAYED RELEASE ORAL at 05:03

## 2024-04-04 RX ADMIN — LEVALBUTEROL HYDROCHLORIDE 1.25 MG: 1.25 SOLUTION RESPIRATORY (INHALATION) at 19:39

## 2024-04-04 RX ADMIN — INSULIN LISPRO 12 UNITS: 100 INJECTION, SOLUTION INTRAVENOUS; SUBCUTANEOUS at 12:05

## 2024-04-04 RX ADMIN — CYANOCOBALAMIN TAB 500 MCG 1000 MCG: 500 TAB at 09:11

## 2024-04-04 RX ADMIN — INSULIN LISPRO 16 UNITS: 100 INJECTION, SOLUTION INTRAVENOUS; SUBCUTANEOUS at 17:31

## 2024-04-04 RX ADMIN — HEPARIN SODIUM 7500 UNITS: 5000 INJECTION INTRAVENOUS; SUBCUTANEOUS at 17:30

## 2024-04-04 RX ADMIN — IPRATROPIUM BROMIDE 0.5 MG: 0.5 SOLUTION RESPIRATORY (INHALATION) at 07:21

## 2024-04-04 RX ADMIN — ONDANSETRON 4 MG: 2 INJECTION INTRAMUSCULAR; INTRAVENOUS at 01:09

## 2024-04-04 RX ADMIN — CHLORHEXIDINE GLUCONATE 15 ML: 1.2 SOLUTION ORAL at 22:13

## 2024-04-04 RX ADMIN — INSULIN LISPRO 8 UNITS: 100 INJECTION, SOLUTION INTRAVENOUS; SUBCUTANEOUS at 09:11

## 2024-04-04 RX ADMIN — INSULIN GLARGINE 10 UNITS: 100 INJECTION, SOLUTION SUBCUTANEOUS at 22:08

## 2024-04-04 RX ADMIN — IPRATROPIUM BROMIDE 0.5 MG: 0.5 SOLUTION RESPIRATORY (INHALATION) at 19:39

## 2024-04-04 RX ADMIN — CHLORHEXIDINE GLUCONATE 15 ML: 1.2 SOLUTION ORAL at 09:11

## 2024-04-04 RX ADMIN — METRONIDAZOLE 500 MG: 500 TABLET ORAL at 12:05

## 2024-04-04 RX ADMIN — PRAVASTATIN SODIUM 80 MG: 80 TABLET ORAL at 17:31

## 2024-04-04 RX ADMIN — METRONIDAZOLE 500 MG: 500 TABLET ORAL at 04:33

## 2024-04-04 RX ADMIN — HEPARIN SODIUM 7500 UNITS: 5000 INJECTION INTRAVENOUS; SUBCUTANEOUS at 05:03

## 2024-04-04 RX ADMIN — HEPARIN SODIUM 7500 UNITS: 5000 INJECTION INTRAVENOUS; SUBCUTANEOUS at 22:09

## 2024-04-04 RX ADMIN — CEFEPIME 2000 MG: 2 INJECTION, POWDER, FOR SOLUTION INTRAVENOUS at 04:33

## 2024-04-04 RX ADMIN — MEMANTINE 10 MG: 10 TABLET ORAL at 09:11

## 2024-04-04 NOTE — PLAN OF CARE
Problem: Prexisting or High Potential for Compromised Skin Integrity  Goal: Skin integrity is maintained or improved  Description: INTERVENTIONS:  - Identify patients at risk for skin breakdown  - Assess and monitor skin integrity  - Assess and monitor nutrition and hydration status  - Monitor labs   - Assess for incontinence   - Turn and reposition patient  - Assist with mobility/ambulation  - Relieve pressure over bony prominences  - Avoid friction and shearing  - Provide appropriate hygiene as needed including keeping skin clean and dry  - Evaluate need for skin moisturizer/barrier cream  - Collaborate with interdisciplinary team   - Patient/family teaching  - Consider wound care consult   Outcome: Progressing     Problem: PAIN - ADULT  Goal: Verbalizes/displays adequate comfort level or baseline comfort level  Description: Interventions:  - Encourage patient to monitor pain and request assistance  - Assess pain using appropriate pain scale  - Administer analgesics based on type and severity of pain and evaluate response  - Implement non-pharmacological measures as appropriate and evaluate response  - Consider cultural and social influences on pain and pain management  - Notify physician/advanced practitioner if interventions unsuccessful or patient reports new pain  Outcome: Progressing     Problem: INFECTION - ADULT  Goal: Absence or prevention of progression during hospitalization  Description: INTERVENTIONS:  - Assess and monitor for signs and symptoms of infection  - Monitor lab/diagnostic results  - Monitor all insertion sites, i.e. indwelling lines, tubes, and drains  - Monitor endotracheal if appropriate and nasal secretions for changes in amount and color  - [place] appropriate cooling/warming therapies per order  - Administer medications as ordered  - Instruct and encourage patient and family to use good hand hygiene technique  - Identify and instruct in appropriate isolation precautions for  identified infection/condition  Outcome: Progressing  Goal: Absence of fever/infection during neutropenic period  Description: INTERVENTIONS:  - Monitor WBC    Outcome: Progressing     Problem: SAFETY ADULT  Goal: Patient will remain free of falls  Description: INTERVENTIONS:  - Educate patient/family on patient safety including physical limitations  - Instruct patient to call for assistance with activity   - Consult OT/PT to assist with strengthening/mobility   - Keep Call bell within reach  - Keep bed low and locked with side rails adjusted as appropriate  - Keep care items and personal belongings within reach  - Initiate and maintain comfort rounds  - Make Fall Risk Sign visible to staff  - Apply yellow socks and bracelet for high fall risk patients  - Consider moving patient to room near nurses station  Outcome: Progressing  Goal: Maintain or return to baseline ADL function  Description: INTERVENTIONS:  -  Assess patient's ability to carry out ADLs; assess patient's baseline for ADL function and identify physical deficits which impact ability to perform ADLs (bathing, care of mouth/teeth, toileting, grooming, dressing, etc.)  - Assess/evaluate cause of self-care deficits   - Assess range of motion  - Assess patient's mobility; develop plan if impaired  - Assess patient's need for assistive devices and provide as appropriate  - Encourage maximum independence but intervene and supervise when necessary  - Involve family in performance of ADLs  - Assess for home care needs following discharge   - Consider OT consult to assist with ADL evaluation and planning for discharge  - Provide patient education as appropriate  Outcome: Progressing  Goal: Maintains/Returns to pre admission functional level  Description: INTERVENTIONS:  - Perform AM-PAC 6 Click Basic Mobility/ Daily Activity assessment daily.  - Set and communicate daily mobility goal to care team and patient/family/caregiver.   - Collaborate with rehabilitation  services on mobility goals if consulted  - Out of bed for toileting  - Record patient progress and toleration of activity level   Outcome: Progressing     Problem: DISCHARGE PLANNING  Goal: Discharge to home or other facility with appropriate resources  Description: INTERVENTIONS:  - Identify barriers to discharge w/patient and caregiver  - Arrange for needed discharge resources and transportation as appropriate  - Identify discharge learning needs (meds, wound care, etc.)  - Arrange for interpretive services to assist at discharge as needed  - Refer to Case Management Department for coordinating discharge planning if the patient needs post-hospital services based on physician/advanced practitioner order or complex needs related to functional status, cognitive ability, or social support system  Outcome: Progressing     Problem: Knowledge Deficit  Goal: Patient/family/caregiver demonstrates understanding of disease process, treatment plan, medications, and discharge instructions  Description: Complete learning assessment and assess knowledge base.  Interventions:  - Provide teaching at level of understanding  - Provide teaching via preferred learning methods  Outcome: Progressing     Problem: Potential for Falls  Goal: Patient will remain free of falls  Description: INTERVENTIONS:  - Educate patient/family on patient safety including physical limitations  - Instruct patient to call for assistance with activity   - Consult OT/PT to assist with strengthening/mobility   - Keep Call bell within reach  - Keep bed low and locked with side rails adjusted as appropriate  - Keep care items and personal belongings within reach  - Initiate and maintain comfort rounds  - Make Fall Risk Sign visible to staff  - Apply yellow socks and bracelet for high fall risk patients  - Consider moving patient to room near nurses station  Outcome: Progressing     Problem: Nutrition/Hydration-ADULT  Goal: Nutrient/Hydration intake appropriate  for improving, restoring or maintaining nutritional needs  Description: Monitor and assess patient's nutrition/hydration status for malnutrition. Collaborate with interdisciplinary team and initiate plan and interventions as ordered.  Monitor patient's weight and dietary intake as ordered or per policy. Utilize nutrition screening tool and intervene as necessary. Determine patient's food preferences and provide high-protein, high-caloric foods as appropriate.     INTERVENTIONS:  - Monitor oral intake, urinary output, labs, and treatment plans  - Assess nutrition and hydration status and recommend course of action  - Evaluate amount of meals eaten  - Assist patient with eating if necessary   - Allow adequate time for meals  - Recommend/ encourage appropriate diets, oral nutritional supplements, and vitamin/mineral supplements  - Order, calculate, and assess calorie counts as needed  - Recommend, monitor, and adjust tube feedings and TPN/PPN based on assessed needs  - Assess need for intravenous fluids  - Provide specific nutrition/hydration education as appropriate  - Include patient/family/caregiver in decisions related to nutrition  Outcome: Progressing

## 2024-04-04 NOTE — ASSESSMENT & PLAN NOTE
6.6L since admission of IV fluids  Getting blood transfusion  Volume overload on imaging and on exam  Give Lasix 40 mg IV x 1 - last dose 4/4

## 2024-04-04 NOTE — ASSESSMENT & PLAN NOTE
Appears to be at baseline   Continue namenda  Can restart trazodone whenever from medicine perspective
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
## 2024-04-04 NOTE — ASSESSMENT & PLAN NOTE
Baseline 2 L via NC   Required up to 15 L of O2, now stable on 6 L   CXR (04/01): Suspected right lower lung pneumonia. Small right pleural effusion.  CXR (04/02): volume overload  S/p dose of Lasix x 1 on 3/29  Patient has received over 6.6 L of IV fluids and blood transfusions  O2 demand improved to 2 L after diuresis   Continue cefepime (day #4) and flagyl (day #4)  COVID/influenza/RSV negative  Continue nebulizer therapy, Advair, incentive spirometry as tolerated, pulmonary toilet  Stop further IV fluids  Continue pulse dose IV Lasix - last dose today 4/4  Can discharge from medicine standpoint Friday as long as no drastic electrolyte derangement

## 2024-04-04 NOTE — PROGRESS NOTES
Progress Note - General Surgery   Jud Walls 73 y.o. female MRN: [id number]  Unit/Bed#: S -01 Encounter: [id number]    Assessment:  73-year-old female with cecal volvulus within an incarcerated ventral hernia. S/p Ex-lap, extended R hemicolectomy, ventral hernia repair 3/28    Plan:  - Low res diet  - F/u c diff  - Cefepime/flagyl  - Pain and nausea control PRN  - Encourage IS, OOB  - DVT ppx   - Dispo planning     Subjective:  Episodes of confusion last night, redirectable. Reports abdominal exam and fatigue this morning. Patient denies nausea, vomiting, fever, chills, chest pain, shortness of breath. Endorses passing flatus, having bowel movements, voiding.     Objective:    Vitals:   Afebrile, Normal VS on 6 L nc  Temp:  [97.4 °F (36.3 °C)-99.3 °F (37.4 °C)] 99.3 °F (37.4 °C)  HR:  [74-80] 80  Resp:  [17-20] 18  BP: (117-140)/(56-72) 140/56  Body mass index is 40.04 kg/m².    Physical Exam:   Gen: NAD, Resting in bed  Neuro: A&O, No focal deficits  Head: Normal Cephalic, Atraumatic  Eye: EOMI, No scleral icterus  CV: Regular rate  Pulm: Normal work of breathing, No respiratory distress on RA  Abd: Soft, Mildly distended, Tender candice-incisionally; incision cdi   Ext: No edema bilateral lower extremities, Non-tender  Skin: Warm, Dry, Intact    I/O:  U.O: 1.2 L + 4x unmeasured   BM: x1     Intake/Output Summary (Last 24 hours) at 4/4/2024 0747  Last data filed at 4/4/2024 0601  Gross per 24 hour   Intake 340 ml   Output 1182 ml   Net -842 ml       Lab Results:  Recent Labs     04/02/24  0642 04/02/24  1407 04/03/24  0341 04/04/24  0525 04/04/24  0526   WBC 7.99  --  8.87 9.66  --    HGB 6.8*   < > 9.0* 9.5*  --    *  --  141* 147*  --    SODIUM 141  --  139  --  135   K 3.5  --  3.2*  --  3.4*   CL 99  --  94*  --  92*   CO2 32  --  36*  --  33*   BUN 28*  --  26*  --  23   CREATININE 1.36*  --  1.30  --  1.26   GLUC 255*  --  218*  --  267*   CALCIUM 7.6*  --  8.2*  --  8.4   MG 2.1  --  2.0  --    --    PHOS 3.3  --  2.8  --   --     < > = values in this interval not displayed.       ---    Casandra Henry MD  General Surgery PGY-I

## 2024-04-04 NOTE — PROGRESS NOTES
[facility]  Progress Note  Name: Jud Walls I  MRN: [id number]  Unit/Bed#: S -01 I Date of Admission: 3/27/2024   Date of Service: 4/4/2024 I Hospital Day: 7    Assessment/Plan   * Cecal volvulus (HCC)  Assessment & Plan  Admitted to surgical service for cecal volvulus within an incarcerated ventral hernia   Status post ex-lap, extended R hemicolectomy, ventral hernia repair on 03/28  Downgraded to MedSurg on 03/31   Encourage incentive spirometry; antiemetics/analgesia  Abdominal binder in place  On IV Cefepime/Flagyl per surgery   Management per primary team       Acute on chronic respiratory failure with hypoxia and hypercapnia (HCC)  Assessment & Plan  Baseline 2 L via NC   Required up to 15 L of O2, now stable on 6 L   CXR (04/01): Suspected right lower lung pneumonia. Small right pleural effusion.  CXR (04/02): volume overload  S/p dose of Lasix x 1 on 3/29  Patient has received over 6.6 L of IV fluids and blood transfusions  O2 demand improved to 2 L after diuresis   Continue cefepime (day #4) and flagyl (day #4)  COVID/influenza/RSV negative  Continue nebulizer therapy, Advair, incentive spirometry as tolerated, pulmonary toilet  Stop further IV fluids  Continue pulse dose IV Lasix - last dose today 4/4  Can discharge from medicine standpoint Friday as long as no drastic electrolyte derangement     Volume overload  Assessment & Plan  6.6L since admission of IV fluids  Getting blood transfusion  Volume overload on imaging and on exam  Give Lasix 40 mg IV x 1 - last dose 4/4    Stage 3b chronic kidney disease (HCC)  Assessment & Plan  Lab Results   Component Value Date    EGFR 42 04/04/2024    EGFR 40 04/03/2024    EGFR 38 04/02/2024    CREATININE 1.26 04/04/2024    CREATININE 1.30 04/03/2024    CREATININE 1.36 (H) 04/02/2024     Baseline creatinine 1.5-1.6, currently below baseline   Monitor with diuresis     Alzheimer's dementia (HCC)  Assessment & Plan  Appears to be  at baseline   Continue namenda  Can restart trazodone whenever from medicine perspective     Sepsis ([facility])  Assessment & Plan  Tachypnea, Tachycardia, Leukocytosis, Fever beginning on 03/30 4/1/24 100.9 at 11 last fever  COVID/FLU/RSV normal  CXR (04/01): Suspected right lower lung pneumonia. Small right pleural effusion  Consider related to RLL PNA vs. Intraabdominal infection  WBC improved   BCx w/o growth x 72 hrs   Procalcitonin trending down  Continue Cefepime and Flagyl       Diabetes (HCC)  Assessment & Plan  Lab Results   Component Value Date    HGBA1C 7.1 (H) 03/15/2024       Blood Sugar Average: Last 72 hrs:  (P) 253.6185059063450694  Home regimen: Lantus 60 U q12, dulaglutide, & humalog 30 U TID w/ meals   Increase lantus, SSI ACHS  Continue SSI AC & QHS  Restart home regimen at discharge       Anemia  Assessment & Plan  Baseline Hgb 9-10, currently at baseline    Status post 2 U PRBCs  Likely anemia following intraoperative blood loss and continued IV fluid use  Iron low   Stop fluids  Consider Venofer  Continue B12             VTE Pharmacologic Prophylaxis: VTE Score: 6 High Risk (Score >/= 5) - Pharmacological DVT Prophylaxis Ordered: heparin. Sequential Compression Devices Ordered.    Mobility:   Basic Mobility Inpatient Raw Score: 7  JH-HLM Goal: 2: Bed activities/Dependent transfer  JH-HLM Achieved: 1: Laying in bed  JH-HLM Goal NOT achieved. Continue with multidisciplinary rounding and encourage appropriate mobility to improve upon JH-HLM goals.    Patient Centered Rounds: I performed bedside rounds with nursing staff today.   Discussions with Specialists or Other Care Team Provider: Discussed with surgery, RN, CM    Education and Discussions with Family / Patient: Patient declined call to .     Total Time Spent on Date of Encounter in care of patient: 35 mins. This time was spent on one or more of the following: performing physical exam; counseling and coordination of care;  obtaining or reviewing history; documenting in the medical record; reviewing/ordering tests, medications or procedures; communicating with other healthcare professionals and discussing with patient's family/caregivers.    Current Length of Stay: 7 day(s)  Current Patient Status: Inpatient   Certification Statement: The patient will continue to require additional inpatient hospital stay due to IV diuresis today, check renal function tomorrow   Discharge Plan: [facility] is following this patient on consult. They are not yet medically stable for discharge secondary to IV Lasix today, check BMP tomorrow, likely discharge tomorrow .    Code Status: Level 1 - Full Code    Subjective:   Patient has no complaints. No events per nursing staff.     Objective:     Vitals:   Temp (24hrs), Av.5 °F (36.9 °C), Min:97.4 °F (36.3 °C), Max:99.3 °F (37.4 °C)    Temp:  [97.4 °F (36.3 °C)-99.3 °F (37.4 °C)] 99.3 °F (37.4 °C)  HR:  [74-80] 80  Resp:  [17-20] 18  BP: (117-140)/(56-72) 140/56  SpO2:  [91 %-95 %] 92 %  Body mass index is 40.04 kg/m².     Input and Output Summary (last 24 hours):     Intake/Output Summary (Last 24 hours) at 2024 0944  Last data filed at 2024 0601  Gross per 24 hour   Intake 340 ml   Output 1182 ml   Net -842 ml       Physical Exam:   Physical Exam  Constitutional:       General: She is not in acute distress.     Appearance: Normal appearance. She is obese. She is not ill-appearing or diaphoretic.      Interventions: Nasal cannula in place.   HENT:      Head: Normocephalic and atraumatic.      Mouth/Throat:      Mouth: Mucous membranes are moist.   Eyes:      General: No scleral icterus.     Pupils: Pupils are equal, round, and reactive to light.   Cardiovascular:      Rate and Rhythm: Normal rate and regular rhythm.      Pulses: Normal pulses.      Heart sounds: Normal heart sounds, S1 normal and S2 normal. No murmur heard.     No systolic murmur is present.      No diastolic murmur is present.       No gallop. No S3 or S4 sounds.   Pulmonary:      Effort: Pulmonary effort is normal. No accessory muscle usage or respiratory distress.      Breath sounds: Normal breath sounds. No stridor. No decreased breath sounds, wheezing, rhonchi or rales.   Chest:      Chest wall: No tenderness.   Abdominal:      General: Bowel sounds are normal. There is no distension.      Palpations: Abdomen is soft.      Tenderness: There is no abdominal tenderness. There is no guarding.   Musculoskeletal:      Right lower leg: No edema.      Left lower leg: No edema.   Skin:     General: Skin is warm and dry.      Coloration: Skin is not jaundiced.   Neurological:      General: No focal deficit present.      Mental Status: She is alert. Mental status is at baseline. She is confused.      Motor: No tremor or seizure activity.   Psychiatric:         Behavior: Behavior is cooperative.          Additional Data:     Labs:  Results from last 7 days   Lab Units 04/04/24  0525 04/03/24  0341 04/02/24  1407 04/02/24  0642   WBC Thousand/uL 9.66 8.87  --  7.99   HEMOGLOBIN g/dL 9.5* 9.0*   < > 6.8*   HEMATOCRIT % 30.2* 28.4*   < > 22.9*   PLATELETS Thousands/uL 147* 141*  --  137*   BANDS PCT %  --  3  --   --    NEUTROS PCT %  --   --   --  78*   LYMPHS PCT %  --   --   --  11*   LYMPHO PCT %  --  11*  --   --    MONOS PCT %  --   --   --  5   MONO PCT %  --  2*  --   --    EOS PCT %  --  3  --  4    < > = values in this interval not displayed.     Results from last 7 days   Lab Units 04/04/24  0526 03/31/24  0549 03/30/24  0450   SODIUM mmol/L 135   < > 147   POTASSIUM mmol/L 3.4*   < > 3.7   CHLORIDE mmol/L 92*   < > 102   CO2 mmol/L 33*   < > 38*   BUN mg/dL 23   < > 25   CREATININE mg/dL 1.26   < > 1.50*   ANION GAP mmol/L 10   < > 7   CALCIUM mg/dL 8.4   < > 8.5   ALBUMIN g/dL  --   --  3.1*   TOTAL BILIRUBIN mg/dL  --   --  0.76   ALK PHOS U/L  --   --  50   ALT U/L  --   --  4*   AST U/L  --   --  10*   GLUCOSE RANDOM mg/dL 267*   < > 228*     < > = values in this interval not displayed.         Results from last 7 days   Lab Units 04/04/24  0741 04/03/24  2045 04/03/24  1559 04/03/24  1121 04/03/24  0641 04/02/24  2101 04/02/24  1624 04/02/24  1358 04/02/24  1143 04/02/24  0735 04/01/24  2204 04/01/24  1714   POC GLUCOSE mg/dl 264* 262* 253* 248* 231* 241* 212* 267* 321* 251* 285* 218*         Results from last 7 days   Lab Units 04/02/24  0642 03/30/24  0450 03/29/24  1819 03/29/24  1638   LACTIC ACID mmol/L  --   --  1.0  --    PROCALCITONIN ng/ml 1.52* 2.71*  --  2.29*       Lines/Drains:  Invasive Devices       Peripheral Intravenous Line  Duration             Peripheral IV 03/31/24 Ventral (anterior);Left Forearm 3 days                          Imaging: No pertinent imaging reviewed.    Recent Cultures (last 7 days):   Results from last 7 days   Lab Units 04/01/24  0846 03/29/24  1636   BLOOD CULTURE  No Growth at 48 hrs.  No Growth at 48 hrs. No Growth After 5 Days.  No Growth After 5 Days.       Last 24 Hours Medication List:   Current Facility-Administered Medications   Medication Dose Route Frequency Provider Last Rate    acetaminophen  975 mg Oral Q6H CLEMENTINE Trejo MD      cefepime  2,000 mg Intravenous Q24H Paolo Trejo MD Stopped (04/04/24 0615)    chlorhexidine  15 mL Mouth/Throat Q12H Count includes the [facility] Paolo Trejo MD      cyanocobalamin  1,000 mcg Oral Daily Coco Martinez PA-C      fluticasone-vilanterol  1 puff Inhalation Daily Paolo Trejo MD      furosemide  40 mg Intravenous Once Alvino Turner PA-C      heparin (porcine)  7,500 Units Subcutaneous Q8H Count includes the [facility] Paolo Trejo MD      insulin glargine  10 Units Subcutaneous HS Coco Martinez PA-C      insulin lispro  4-20 Units Subcutaneous TID  Charles Palacios MD      insulin lispro  4-20 Units Subcutaneous HS Charles Palacios MD      ipratropium  0.5 mg Nebulization TID Paolo Trejo MD      labetalol  10 mg Intravenous Q6H PRN Paolo Trejo MD       levalbuterol  1.25 mg Nebulization Q4H PRN Paolo Trejo MD      levalbuterol  1.25 mg Nebulization TID Paolo Trejo MD      losartan  25 mg Oral Daily Paolo Trejo MD      memantine  10 mg Oral BID Paolo Trejo MD      metroNIDAZOLE  500 mg Oral Q8H CLEMENTINE Lipscomb DO      naloxone  0.1 mg Intravenous Q1MIN PRN Paolo Trejo MD      ondansetron  4 mg Intravenous Q6H PRN Paolo Trejo MD      oxyCODONE  5 mg Oral Q4H PRN Charles Palacios MD      oxyCODONE  2.5 mg Oral Q4H PRN Charles Palacios MD      pantoprazole  20 mg Oral Early Morning Paolo Trejo MD      phenol  1 spray Mouth/Throat Q2H PRN Paolo Trejo MD      potassium chloride  40 mEq Oral BID Alvino Turner PA-C      pravastatin  80 mg Oral Daily With Dinner Paolo Trejo MD          Today, Patient Was Seen By: Alvino Turner PA-C    **Please Note: This note may have been constructed using a voice recognition system.**

## 2024-04-04 NOTE — CASE MANAGEMENT
Case Management Discharge Planning Note    Patient name Jud Walls  Location S /S -01 MRN [id number]  : 1950 Date 2024       Current Admission Date: 3/27/2024  Current Admission Diagnosis:Cecal volvulus (HCC)   Patient Active Problem List    Diagnosis Date Noted    Aspiration pneumonia (HCC) 2024    Acute heart failure (HCC) 2024    Muscular deconditioning 2024    RLL pneumonia 2024    Volume overload 2024    Toxic metabolic encephalopathy 2024    Pleural effusion 2024    Cecal volvulus (HCC) 2024    Incarcerated hernia 2024    Stage 3b chronic kidney disease ([facility]) 2024    Chronic obstructive pulmonary disease with (acute) exacerbation ([facility]) 01/15/2024    Neuropathy 2023    Sore throat 10/31/2023    Acute on chronic respiratory failure with hypoxia and hypercapnia ([facility]) 2022    Diabetic ulcer of left midfoot associated with type 2 diabetes mellitus ([facility]) 2022    Anemia 2022    GILDARDO (obstructive sleep apnea) 2022    Abscess of abdominal wall 01/10/2022    Sacral ulcer ([facility]) 2021    Nocturnal hypoxia 2020    Kidney lesion 2020    Weakness generalized 10/14/2020    S/P tooth extraction 2020    Sepsis ([facility]) 2020    Hyperlipidemia 2020    Asthma-COPD overlap syndrome 2020    Hoarse voice quality 2019    Seasonal allergies 2019    Need for pneumococcal vaccination 2019    MCI (mild cognitive impairment) 2018    Fall 2018    Injury of face 2018    Sixth nerve palsy of left eye 2018    Ptosis of left eyelid 2018    Alzheimer's dementia ([facility]) 10/15/2018    Pulmonary nodule 10/15/2018    Preop pulmonary/respiratory exam 10/15/2018    Mental status change 2017    Essential hypertension 2017    Acute respiratory failure with hypoxia and hypercapnia ([facility]) 2017    Diabetes ([facility]) 2017    Hypokalemia  08/20/2017    Leukocytosis 08/20/2017      LOS (days): 7  Geometric Mean LOS (GMLOS) (days): 9.8  Days to GMLOS:2.6     OBJECTIVE:  Risk of Unplanned Readmission Score: 24.92         Current admission status: Inpatient   Preferred Pharmacy:   [id number] - [place], [place] - [address]  [address] [place] [address]  Phone: [phone] Fax: [phone]    [facility] [place]  [place], [place] - [address]  [address]  [address]  [place] [address]  Phone: [phone] Fax: [phone]    Primary Care Provider: Mary Posey MD    Primary Insurance: MEDICARE  Secondary Insurance: [facility]    DISCHARGE DETAILS:                         CM advised by provider that patient may be medically stable for discharge tomorrow, 4/5/2024. CM contacted brother Alba to make him aware and review IMM. Alba confirmed understanding of same.    Update given to [facility] via [facility] as well.                                                               IMM Given (Date):: 04/04/24  IMM Given to:: Family (brother Alba)        IMM reviewed with  brother Alba ,  he  agrees with discharge determination.

## 2024-04-04 NOTE — ASSESSMENT & PLAN NOTE
Lab Results   Component Value Date    EGFR 42 04/04/2024    EGFR 40 04/03/2024    EGFR 38 04/02/2024    CREATININE 1.26 04/04/2024    CREATININE 1.30 04/03/2024    CREATININE 1.36 (H) 04/02/2024     Baseline creatinine 1.5-1.6, currently below baseline   Monitor with diuresis

## 2024-04-05 VITALS
BODY MASS INDEX: 37.96 KG/M2 | OXYGEN SATURATION: 94 % | DIASTOLIC BLOOD PRESSURE: 58 MMHG | TEMPERATURE: 98.4 F | SYSTOLIC BLOOD PRESSURE: 149 MMHG | HEIGHT: 60 IN | WEIGHT: 193.34 LBS | RESPIRATION RATE: 18 BRPM | HEART RATE: 77 BPM

## 2024-04-05 LAB
ANION GAP SERPL CALCULATED.3IONS-SCNC: 9 MMOL/L (ref 4–13)
BUN SERPL-MCNC: 23 MG/DL (ref 5–25)
CALCIUM SERPL-MCNC: 8.9 MG/DL (ref 8.4–10.2)
CHLORIDE SERPL-SCNC: 92 MMOL/L (ref 96–108)
CO2 SERPL-SCNC: 36 MMOL/L (ref 21–32)
CREAT SERPL-MCNC: 1.3 MG/DL (ref 0.6–1.3)
GFR SERPL CREATININE-BSD FRML MDRD: 40 ML/MIN/1.73SQ M
GLUCOSE SERPL-MCNC: 224 MG/DL (ref 65–140)
GLUCOSE SERPL-MCNC: 360 MG/DL (ref 65–140)
GLUCOSE SERPL-MCNC: 381 MG/DL (ref 65–140)
POTASSIUM SERPL-SCNC: 3.7 MMOL/L (ref 3.5–5.3)
SODIUM SERPL-SCNC: 137 MMOL/L (ref 135–147)

## 2024-04-05 PROCEDURE — 94640 AIRWAY INHALATION TREATMENT: CPT

## 2024-04-05 PROCEDURE — 82948 REAGENT STRIP/BLOOD GLUCOSE: CPT

## 2024-04-05 PROCEDURE — 99232 SBSQ HOSP IP/OBS MODERATE 35: CPT | Performed by: PHYSICIAN ASSISTANT

## 2024-04-05 PROCEDURE — 80048 BASIC METABOLIC PNL TOTAL CA: CPT | Performed by: INTERNAL MEDICINE

## 2024-04-05 PROCEDURE — 94760 N-INVAS EAR/PLS OXIMETRY 1: CPT

## 2024-04-05 RX ORDER — VANCOMYCIN HYDROCHLORIDE 250 MG/1
250 CAPSULE ORAL 4 TIMES DAILY
Start: 2024-04-05 | End: 2024-04-12

## 2024-04-05 RX ADMIN — METRONIDAZOLE 500 MG: 500 TABLET ORAL at 06:07

## 2024-04-05 RX ADMIN — HEPARIN SODIUM 7500 UNITS: 5000 INJECTION INTRAVENOUS; SUBCUTANEOUS at 06:07

## 2024-04-05 RX ADMIN — ACETAMINOPHEN 975 MG: 325 TABLET, FILM COATED ORAL at 01:27

## 2024-04-05 RX ADMIN — PANTOPRAZOLE SODIUM 20 MG: 20 TABLET, DELAYED RELEASE ORAL at 06:07

## 2024-04-05 RX ADMIN — LEVALBUTEROL HYDROCHLORIDE 1.25 MG: 1.25 SOLUTION RESPIRATORY (INHALATION) at 07:48

## 2024-04-05 RX ADMIN — ACETAMINOPHEN 975 MG: 325 TABLET, FILM COATED ORAL at 11:20

## 2024-04-05 RX ADMIN — IPRATROPIUM BROMIDE 0.5 MG: 0.5 SOLUTION RESPIRATORY (INHALATION) at 07:48

## 2024-04-05 RX ADMIN — LOSARTAN POTASSIUM 25 MG: 25 TABLET, FILM COATED ORAL at 09:55

## 2024-04-05 RX ADMIN — CYANOCOBALAMIN TAB 500 MCG 1000 MCG: 500 TAB at 09:55

## 2024-04-05 RX ADMIN — INSULIN LISPRO 16 UNITS: 100 INJECTION, SOLUTION INTRAVENOUS; SUBCUTANEOUS at 11:20

## 2024-04-05 RX ADMIN — FLUTICASONE FUROATE AND VILANTEROL TRIFENATATE 1 PUFF: 200; 25 POWDER RESPIRATORY (INHALATION) at 10:00

## 2024-04-05 RX ADMIN — INSULIN LISPRO 20 UNITS: 100 INJECTION, SOLUTION INTRAVENOUS; SUBCUTANEOUS at 09:55

## 2024-04-05 RX ADMIN — CHLORHEXIDINE GLUCONATE 15 ML: 1.2 SOLUTION ORAL at 09:55

## 2024-04-05 RX ADMIN — ACETAMINOPHEN 975 MG: 325 TABLET, FILM COATED ORAL at 06:07

## 2024-04-05 RX ADMIN — CEFEPIME 2000 MG: 2 INJECTION, POWDER, FOR SOLUTION INTRAVENOUS at 03:35

## 2024-04-05 RX ADMIN — MEMANTINE 10 MG: 10 TABLET ORAL at 09:55

## 2024-04-05 NOTE — ASSESSMENT & PLAN NOTE
Baseline 2 L via NC   Required up to 15 L of O2, now stable on 6 L   CXR (04/01): Suspected right lower lung pneumonia. Small right pleural effusion.  CXR (04/02): volume overload  S/p dose of Lasix x 1 on 3/29  Patient has received over 6.6 L of IV fluids and blood transfusions  O2 demand improved to 2 L after diuresis   Antibiotics per surgery, but none needed from pneumonia standpoint   COVID/influenza/RSV negative  Continue nebulizer therapy, Advair, incentive spirometry as tolerated, pulmonary toilet  Stop further IV fluids  Continue pulse dose IV Lasix - last dose 4/4  Can discharge from medicine standpoint Friday as long as no drastic electrolyte derangement

## 2024-04-05 NOTE — ASSESSMENT & PLAN NOTE
Lab Results   Component Value Date    HGBA1C 7.1 (H) 03/15/2024       Blood Sugar Average: Last 72 hrs:  (P) 282.8351285062953107  Home regimen: Lantus 60 U q12, dulaglutide, & humalog 30 U TID w/ meals   Restart home regimen at discharge

## 2024-04-05 NOTE — PLAN OF CARE
Problem: Prexisting or High Potential for Compromised Skin Integrity  Goal: Skin integrity is maintained or improved  Description: INTERVENTIONS:  - Identify patients at risk for skin breakdown  - Assess and monitor skin integrity  - Assess and monitor nutrition and hydration status  - Monitor labs   - Assess for incontinence   - Turn and reposition patient  - Assist with mobility/ambulation  - Relieve pressure over bony prominences  - Avoid friction and shearing  - Provide appropriate hygiene as needed including keeping skin clean and dry  - Evaluate need for skin moisturizer/barrier cream  - Collaborate with interdisciplinary team   - Patient/family teaching  - Consider wound care consult   Outcome: Progressing     Problem: PAIN - ADULT  Goal: Verbalizes/displays adequate comfort level or baseline comfort level  Description: Interventions:  - Encourage patient to monitor pain and request assistance  - Assess pain using appropriate pain scale  - Administer analgesics based on type and severity of pain and evaluate response  - Implement non-pharmacological measures as appropriate and evaluate response  - Consider cultural and social influences on pain and pain management  - Notify physician/advanced practitioner if interventions unsuccessful or patient reports new pain  Outcome: Progressing     Problem: INFECTION - ADULT  Goal: Absence or prevention of progression during hospitalization  Description: INTERVENTIONS:  - Assess and monitor for signs and symptoms of infection  - Monitor lab/diagnostic results  - Monitor all insertion sites, i.e. indwelling lines, tubes, and drains  - Monitor endotracheal if appropriate and nasal secretions for changes in amount and color  - [place] appropriate cooling/warming therapies per order  - Administer medications as ordered  - Instruct and encourage patient and family to use good hand hygiene technique  - Identify and instruct in appropriate isolation precautions for  identified infection/condition  Outcome: Progressing  Goal: Absence of fever/infection during neutropenic period  Description: INTERVENTIONS:  - Monitor WBC    Outcome: Progressing     Problem: SAFETY ADULT  Goal: Patient will remain free of falls  Description: INTERVENTIONS:  - Educate patient/family on patient safety including physical limitations  - Instruct patient to call for assistance with activity   - Consult OT/PT to assist with strengthening/mobility   - Keep Call bell within reach  - Keep bed low and locked with side rails adjusted as appropriate  - Keep care items and personal belongings within reach  - Initiate and maintain comfort rounds  - Make Fall Risk Sign visible to staff  - Apply yellow socks and bracelet for high fall risk patients  - Consider moving patient to room near nurses station  Outcome: Progressing  Goal: Maintain or return to baseline ADL function  Description: INTERVENTIONS:  -  Assess patient's ability to carry out ADLs; assess patient's baseline for ADL function and identify physical deficits which impact ability to perform ADLs (bathing, care of mouth/teeth, toileting, grooming, dressing, etc.)  - Assess/evaluate cause of self-care deficits   - Assess range of motion  - Assess patient's mobility; develop plan if impaired  - Assess patient's need for assistive devices and provide as appropriate  - Encourage maximum independence but intervene and supervise when necessary  - Involve family in performance of ADLs  - Assess for home care needs following discharge   - Consider OT consult to assist with ADL evaluation and planning for discharge  - Provide patient education as appropriate  Outcome: Progressing  Goal: Maintains/Returns to pre admission functional level  Description: INTERVENTIONS:  - Perform AM-PAC 6 Click Basic Mobility/ Daily Activity assessment daily.  - Set and communicate daily mobility goal to care team and patient/family/caregiver.   - Collaborate with rehabilitation  services on mobility goals if consulted  - Out of bed for toileting  - Record patient progress and toleration of activity level   Outcome: Progressing     Problem: DISCHARGE PLANNING  Goal: Discharge to home or other facility with appropriate resources  Description: INTERVENTIONS:  - Identify barriers to discharge w/patient and caregiver  - Arrange for needed discharge resources and transportation as appropriate  - Identify discharge learning needs (meds, wound care, etc.)  - Arrange for interpretive services to assist at discharge as needed  - Refer to Case Management Department for coordinating discharge planning if the patient needs post-hospital services based on physician/advanced practitioner order or complex needs related to functional status, cognitive ability, or social support system  Outcome: Progressing     Problem: Knowledge Deficit  Goal: Patient/family/caregiver demonstrates understanding of disease process, treatment plan, medications, and discharge instructions  Description: Complete learning assessment and assess knowledge base.  Interventions:  - Provide teaching at level of understanding  - Provide teaching via preferred learning methods  Outcome: Progressing     Problem: Potential for Falls  Goal: Patient will remain free of falls  Description: INTERVENTIONS:  - Educate patient/family on patient safety including physical limitations  - Instruct patient to call for assistance with activity   - Consult OT/PT to assist with strengthening/mobility   - Keep Call bell within reach  - Keep bed low and locked with side rails adjusted as appropriate  - Keep care items and personal belongings within reach  - Initiate and maintain comfort rounds  - Make Fall Risk Sign visible to staff  - Apply yellow socks and bracelet for high fall risk patients  - Consider moving patient to room near nurses station  Outcome: Progressing     Problem: Nutrition/Hydration-ADULT  Goal: Nutrient/Hydration intake appropriate  for improving, restoring or maintaining nutritional needs  Description: Monitor and assess patient's nutrition/hydration status for malnutrition. Collaborate with interdisciplinary team and initiate plan and interventions as ordered.  Monitor patient's weight and dietary intake as ordered or per policy. Utilize nutrition screening tool and intervene as necessary. Determine patient's food preferences and provide high-protein, high-caloric foods as appropriate.     INTERVENTIONS:  - Monitor oral intake, urinary output, labs, and treatment plans  - Assess nutrition and hydration status and recommend course of action  - Evaluate amount of meals eaten  - Assist patient with eating if necessary   - Allow adequate time for meals  - Recommend/ encourage appropriate diets, oral nutritional supplements, and vitamin/mineral supplements  - Order, calculate, and assess calorie counts as needed  - Recommend, monitor, and adjust tube feedings and TPN/PPN based on assessed needs  - Assess need for intravenous fluids  - Provide specific nutrition/hydration education as appropriate  - Include patient/family/caregiver in decisions related to nutrition  Outcome: Progressing

## 2024-04-05 NOTE — PROGRESS NOTES
[facility]  Progress Note  Name: Jud Walls I  MRN: [id number]  Unit/Bed#: S -01 I Date of Admission: 3/27/2024   Date of Service: 4/5/2024 I Hospital Day: 8    Assessment/Plan   * Cecal volvulus (HCC)  Assessment & Plan  Admitted to surgical service for cecal volvulus within an incarcerated ventral hernia   Status post ex-lap, extended R hemicolectomy, ventral hernia repair on 03/28  Downgraded to MedSurg on 03/31   Encourage incentive spirometry; antiemetics/analgesia  Abdominal binder in place  On IV Cefepime/Flagyl per surgery   Management per primary team       Acute on chronic respiratory failure with hypoxia and hypercapnia (HCC)  Assessment & Plan  Baseline 2 L via NC   Required up to 15 L of O2, now stable on 6 L   CXR (04/01): Suspected right lower lung pneumonia. Small right pleural effusion.  CXR (04/02): volume overload  S/p dose of Lasix x 1 on 3/29  Patient has received over 6.6 L of IV fluids and blood transfusions  O2 demand improved to 2 L after diuresis   Antibiotics per surgery, but none needed from pneumonia standpoint   COVID/influenza/RSV negative  Continue nebulizer therapy, Advair, incentive spirometry as tolerated, pulmonary toilet  Stop further IV fluids  Continue pulse dose IV Lasix - last dose 4/4  Can discharge from medicine standpoint Friday as long as no drastic electrolyte derangement     Volume overload  Assessment & Plan  6.6L since admission of IV fluids  Getting blood transfusion  Volume overload on imaging and on exam  Give Lasix 40 mg IV x 1 - last dose 4/4    Stage 3b chronic kidney disease (HCC)  Assessment & Plan  Lab Results   Component Value Date    EGFR 40 04/05/2024    EGFR 42 04/04/2024    EGFR 40 04/03/2024    CREATININE 1.30 04/05/2024    CREATININE 1.26 04/04/2024    CREATININE 1.30 04/03/2024     Baseline creatinine 1.5-1.6, currently below baseline   Monitor with diuresis     Alzheimer's dementia (HCC)  Assessment &  Plan  Appears to be at baseline   Continue namenda  Can restart trazodone whenever from medicine perspective     Sepsis ([facility])  Assessment & Plan  Tachypnea, Tachycardia, Leukocytosis, Fever beginning on 03/30 4/1/24 100.9 at 11 last fever  COVID/FLU/RSV normal  CXR (04/01): Suspected right lower lung pneumonia. Small right pleural effusion  Consider related to RLL PNA vs. Intraabdominal infection  WBC improved   BCx w/o growth x 72 hrs   Procalcitonin trending down  Continue Cefepime and Flagyl       Diabetes ([facility])  Assessment & Plan  Lab Results   Component Value Date    HGBA1C 7.1 (H) 03/15/2024       Blood Sugar Average: Last 72 hrs:  (P) [id number]  Home regimen: Lantus 60 U q12, dulaglutide, & humalog 30 U TID w/ meals   Restart home regimen at discharge       Anemia  Assessment & Plan  Baseline Hgb 9-10, currently at baseline    Status post 2 U PRBCs  Likely anemia following intraoperative blood loss and continued IV fluid use  Iron low   Stop fluids  Consider Venofer  Continue B12             VTE Pharmacologic Prophylaxis: VTE Score: 6 High Risk (Score >/= 5) - Pharmacological DVT Prophylaxis Ordered: heparin. Sequential Compression Devices Ordered.    Mobility:   Basic Mobility Inpatient Raw Score: 8  JH-HLM Goal: 3: Sit at edge of bed  JH-HLM Achieved: 3: Sit at edge of bed  JH-HLM Goal achieved. Continue to encourage appropriate mobility.    Patient Centered Rounds: I performed bedside rounds with nursing staff today.   Discussions with Specialists or Other Care Team Provider: Discussed with General Surgery, RN, CM    Education and Discussions with Family / Patient: Patient declined call to .     Total Time Spent on Date of Encounter in care of patient: 35 mins. This time was spent on one or more of the following: performing physical exam; counseling and coordination of care; obtaining or reviewing history; documenting in the medical record; reviewing/ordering tests, medications  or procedures; communicating with other healthcare professionals and discussing with patient's family/caregivers.    Current Length of Stay: 8 day(s)  Current Patient Status: Inpatient   Certification Statement:  Patient stable for discharge   Discharge Plan: [facility] is following this patient on consult. They are medically stable for discharge when deemed appropriate by primary service.    Code Status: Level 1 - Full Code    Subjective:   Patient without complaints. No events per nursing staff.     Objective:     Vitals:   Temp (24hrs), Av.8 °F (37.1 °C), Min:98.4 °F (36.9 °C), Max:99.8 °F (37.7 °C)    Temp:  [98.4 °F (36.9 °C)-99.8 °F (37.7 °C)] 98.4 °F (36.9 °C)  HR:  [77-91] 77  Resp:  [16-18] 18  BP: (111-153)/(58-66) 149/58  SpO2:  [87 %-94 %] 94 %  Body mass index is 37.76 kg/m².     Input and Output Summary (last 24 hours):     Intake/Output Summary (Last 24 hours) at 2024 1039  Last data filed at 2024 2208  Gross per 24 hour   Intake 240 ml   Output 976 ml   Net -736 ml       Physical Exam:   Physical Exam  Constitutional:       General: She is not in acute distress.     Appearance: Normal appearance. She is obese. She is not ill-appearing or diaphoretic.      Interventions: Nasal cannula in place.   HENT:      Head: Normocephalic and atraumatic.      Mouth/Throat:      Mouth: Mucous membranes are moist.   Eyes:      General: No scleral icterus.     Pupils: Pupils are equal, round, and reactive to light.   Cardiovascular:      Rate and Rhythm: Normal rate and regular rhythm.      Pulses: Normal pulses.      Heart sounds: Normal heart sounds, S1 normal and S2 normal. No murmur heard.     No systolic murmur is present.      No diastolic murmur is present.      No gallop. No S3 or S4 sounds.   Pulmonary:      Effort: Pulmonary effort is normal. No accessory muscle usage or respiratory distress.      Breath sounds: Normal breath sounds. No stridor. No decreased breath sounds, wheezing, rhonchi or rales.    Chest:      Chest wall: No tenderness.   Abdominal:      General: Bowel sounds are normal. There is no distension.      Palpations: Abdomen is soft.      Tenderness: There is no abdominal tenderness. There is no guarding.   Musculoskeletal:      Right lower leg: No edema.      Left lower leg: No edema.   Skin:     General: Skin is warm and dry.      Coloration: Skin is not jaundiced.   Neurological:      General: No focal deficit present.      Mental Status: She is alert. Mental status is at baseline.      Motor: No tremor or seizure activity.   Psychiatric:         Behavior: Behavior is cooperative.          Additional Data:     Labs:  Results from last 7 days   Lab Units 04/04/24  0525 04/03/24  0341 04/02/24  1407 04/02/24  0642   WBC Thousand/uL 9.66 8.87  --  7.99   HEMOGLOBIN g/dL 9.5* 9.0*   < > 6.8*   HEMATOCRIT % 30.2* 28.4*   < > 22.9*   PLATELETS Thousands/uL 147* 141*  --  137*   BANDS PCT %  --  3  --   --    NEUTROS PCT %  --   --   --  78*   LYMPHS PCT %  --   --   --  11*   LYMPHO PCT %  --  11*  --   --    MONOS PCT %  --   --   --  5   MONO PCT %  --  2*  --   --    EOS PCT %  --  3  --  4    < > = values in this interval not displayed.     Results from last 7 days   Lab Units 04/05/24  0314 03/31/24  0549 03/30/24  0450   SODIUM mmol/L 137   < > 147   POTASSIUM mmol/L 3.7   < > 3.7   CHLORIDE mmol/L 92*   < > 102   CO2 mmol/L 36*   < > 38*   BUN mg/dL 23   < > 25   CREATININE mg/dL 1.30   < > 1.50*   ANION GAP mmol/L 9   < > 7   CALCIUM mg/dL 8.9   < > 8.5   ALBUMIN g/dL  --   --  3.1*   TOTAL BILIRUBIN mg/dL  --   --  0.76   ALK PHOS U/L  --   --  50   ALT U/L  --   --  4*   AST U/L  --   --  10*   GLUCOSE RANDOM mg/dL 224*   < > 228*    < > = values in this interval not displayed.         Results from last 7 days   Lab Units 04/05/24  0959 04/04/24 2054 04/04/24  1539 04/04/24  1140 04/04/24  0741 04/03/24  2045 04/03/24  1559 04/03/24  1121 04/03/24  0641 04/02/24  2101 04/02/24  1624  04/02/24  1358   POC GLUCOSE mg/dl 381* 339* 366* 324* 264* 262* 253* 248* 231* 241* 212* 267*         Results from last 7 days   Lab Units 04/02/24  0642 03/30/24  0450 03/29/24  1819 03/29/24  1638   LACTIC ACID mmol/L  --   --  1.0  --    PROCALCITONIN ng/ml 1.52* 2.71*  --  2.29*       Lines/Drains:  Invasive Devices       Peripheral Intravenous Line  Duration             Peripheral IV 04/04/24 Right;Ventral (anterior) Wrist <1 day                          Imaging: No pertinent imaging reviewed.    Recent Cultures (last 7 days):   Results from last 7 days   Lab Units 04/03/24  1356 04/01/24  0846 03/29/24  1636   BLOOD CULTURE   --  No Growth at 72 hrs.  No Growth at 72 hrs. No Growth After 5 Days.  No Growth After 5 Days.   C DIFF TOXIN B BY PCR  Positive*  --   --        Last 24 Hours Medication List:   Current Facility-Administered Medications   Medication Dose Route Frequency Provider Last Rate    acetaminophen  975 mg Oral Q6H [facility] Paolo Trejo MD      cefepime  2,000 mg Intravenous Q24H Paolo Trejo MD 2,000 mg (04/05/24 0335)    chlorhexidine  15 mL Mouth/Throat Q12H [facility] Paolo Trejo MD      cyanocobalamin  1,000 mcg Oral Daily Coco Martinez PA-C      fluticasone-vilanterol  1 puff Inhalation Daily Paolo Trejo MD      heparin (porcine)  7,500 Units Subcutaneous Q8H [facility] Paolo Trejo MD      insulin glargine  10 Units Subcutaneous HS Coco Martinez PA-C      insulin lispro  4-20 Units Subcutaneous TID AC Charles Palacios MD      insulin lispro  4-20 Units Subcutaneous HS Charles Palacios MD      ipratropium  0.5 mg Nebulization TID Paolo Trejo MD      labetalol  10 mg Intravenous Q6H PRN Paolo Trejo MD      levalbuterol  1.25 mg Nebulization Q4H PRN Paolo Trejo MD      levalbuterol  1.25 mg Nebulization TID Paolo Trejo MD      losartan  25 mg Oral Daily Paolo Trejo MD      memantine  10 mg Oral BID Paolo Trejo MD      metroNIDAZOLE  500 mg  Oral Q8H [facility] Cedrick Lipscomb DO      naloxone  0.1 mg Intravenous Q1MIN PRN Paolo Trejo MD      ondansetron  4 mg Intravenous Q6H PRN Paolo Trejo MD      oxyCODONE  5 mg Oral Q4H PRN Charles Palacios MD      oxyCODONE  2.5 mg Oral Q4H PRN Charles Palacios MD      pantoprazole  20 mg Oral Early Morning Paolo Trejo MD      phenol  1 spray Mouth/Throat Q2H PRN Paolo Trejo MD      pravastatin  80 mg Oral Daily With Dinner Paolo Trejo MD          Today, Patient Was Seen By: Alvino Turner PA-C    **Please Note: This note may have been constructed using a voice recognition system.**

## 2024-04-05 NOTE — PROGRESS NOTES
Progress Note - General Surgery   Jud Walls 73 y.o. female MRN: [id number]  Unit/Bed#: S -01 Encounter: [id number]    Assessment:  73-year-old female with cecal volvulus within an incarcerated ventral hernia. S/p Ex-lap, extended R hemicolectomy, ventral hernia repair 3/28.    Plan:  - Low res diet  - C. difficile PCR positive toxin negative; will plan for 10-day course of oral vancomycin  - Discontinue cefepime/flagyl  - Pain and nausea control PRN  - Encourage IS, OOB  - DVT ppx   - Dispo planning; plan for discharge today    Subjective:  No acute events overnight.  Patient having normal bowel function.  Denies pain, nausea, and vomiting.    Objective:    Vitals:   Afebrile, Normal VS on 6 L nc  Temp:  [98.4 °F (36.9 °C)-99.8 °F (37.7 °C)] 98.4 °F (36.9 °C)  HR:  [77-91] 77  Resp:  [16-18] 18  BP: (111-153)/(58-66) 149/58  Body mass index is 37.76 kg/m².    Physical Exam:   Gen: NAD, Resting in bed  Neuro: A&O, No focal deficits  Head: Normal Cephalic, Atraumatic  Eye: EOMI, No scleral icterus  CV: Regular rate  Pulm: Normal work of breathing, No respiratory distress on RA  Abd: Soft, nontender, nondistended; incision clean dry and intact  Ext: No edema bilateral lower extremities, Non-tender  Skin: Warm, Dry, Intact      Intake/Output Summary (Last 24 hours) at 4/5/2024 0824  Last data filed at 4/4/2024 2208  Gross per 24 hour   Intake 840 ml   Output 1920 ml   Net -1080 ml       Lab Results:  Recent Labs     04/03/24  0341 04/04/24  0525 04/04/24  0526 04/05/24  0314   WBC 8.87 9.66  --   --    HGB 9.0* 9.5*  --   --    * 147*  --   --    SODIUM 139  --  135 137   K 3.2*  --  3.4* 3.7   CL 94*  --  92* 92*   CO2 36*  --  33* 36*   BUN 26*  --  23 23   CREATININE 1.30  --  1.26 1.30   GLUC 218*  --  267* 224*   CALCIUM 8.2*  --  8.4 8.9   MG 2.0  --   --   --    PHOS 2.8  --   --   --        ---    Paolo Trejo MD  General Surgery PGY-2

## 2024-04-05 NOTE — ASSESSMENT & PLAN NOTE
Lab Results   Component Value Date    EGFR 40 04/05/2024    EGFR 42 04/04/2024    EGFR 40 04/03/2024    CREATININE 1.30 04/05/2024    CREATININE 1.26 04/04/2024    CREATININE 1.30 04/03/2024     Baseline creatinine 1.5-1.6, currently below baseline   Monitor with diuresis

## 2024-04-05 NOTE — CASE MANAGEMENT
Case Management Discharge Planning Note    Patient name Jud Walls  Location S /S -01 MRN [id number]  : 1950 Date 2024       Current Admission Date: 3/27/2024  Current Admission Diagnosis:Cecal volvulus (HCC)   Patient Active Problem List    Diagnosis Date Noted    Aspiration pneumonia (HCC) 2024    Acute heart failure (HCC) 2024    Muscular deconditioning 2024    RLL pneumonia 2024    Volume overload 2024    Toxic metabolic encephalopathy 2024    Pleural effusion 2024    Cecal volvulus (HCC) 2024    Incarcerated hernia 2024    Stage 3b chronic kidney disease ([facility]) 2024    Chronic obstructive pulmonary disease with (acute) exacerbation ([facility]) 01/15/2024    Neuropathy 2023    Sore throat 10/31/2023    Acute on chronic respiratory failure with hypoxia and hypercapnia ([facility]) 2022    Diabetic ulcer of left midfoot associated with type 2 diabetes mellitus ([facility]) 2022    Anemia 2022    GILDARDO (obstructive sleep apnea) 2022    Abscess of abdominal wall 01/10/2022    Sacral ulcer ([facility]) 2021    Nocturnal hypoxia 2020    Kidney lesion 2020    Weakness generalized 10/14/2020    S/P tooth extraction 2020    Sepsis ([facility]) 2020    Hyperlipidemia 2020    Asthma-COPD overlap syndrome 2020    Hoarse voice quality 2019    Seasonal allergies 2019    Need for pneumococcal vaccination 2019    MCI (mild cognitive impairment) 2018    Fall 2018    Injury of face 2018    Sixth nerve palsy of left eye 2018    Ptosis of left eyelid 2018    Alzheimer's dementia ([facility]) 10/15/2018    Pulmonary nodule 10/15/2018    Preop pulmonary/respiratory exam 10/15/2018    Mental status change 2017    Essential hypertension 2017    Acute respiratory failure with hypoxia and hypercapnia ([facility]) 2017    Diabetes ([facility]) 2017    Hypokalemia  08/20/2017    Leukocytosis 08/20/2017      LOS (days): 8  Geometric Mean LOS (GMLOS) (days): 9.8  Days to GMLOS:1.7     OBJECTIVE:  Risk of Unplanned Readmission Score: 25.57         Current admission status: Inpatient   Preferred Pharmacy:   [id number] - [place], [place] - [address]  [address] [facility] [place] [address]  Phone: [phone] Fax: [phone]    [facility] [place]  [place], [place] - [address]  [address] [place]  [address]  [place] [address]  Phone: [phone] Fax: [phone]    Primary Care Provider: Mary Posey MD    Primary Insurance: MEDICARE  Secondary Insurance: [facility]    DISCHARGE DETAILS:    Discharge planning discussed with:: Patient                                     Other Referral/Resources/Interventions Provided:  Interventions: Facility Return  Referral Comments: Patient is medically stable for return to [facility] today. Facility made aware and can accommodate. No covid test needed as she was tested and was negative from 3/29/2024. CM requested transport for noon via BLS due to O2 requirement. All appropriate parties made aware. No further CM needs indicated. CM will remain available.         Treatment Team Recommendation: Facility Return  Discharge Destination Plan:: Facility Return                                              Accepting Facility Name, City & State : [facility]  Receiving Facility/Agency Phone Number: [phone]  Facility/Agency Fax Number: [phone]     No further CM needs indicated at this time. CM will remain available.    ADDENDUM:    Transport officially scheduled for noon via [facility].

## 2024-04-06 LAB
BACTERIA BLD CULT: NORMAL
BACTERIA BLD CULT: NORMAL

## 2024-04-09 NOTE — DISCHARGE SUMMARY
Discharge Summary - Jud Walls 73 y.o. female MRN: [id number]    Unit/Bed#: S -01 Encounter: [id number]    Admission Date: 3/27/2024   Discharge Date: 04/05/2024    Admitting Diagnosis:   Volvulus of colon (HCC) [K56.2]  Vomiting [R11.10]  Cecal volvulus (HCC) [K56.2]    Discharge Diagnoses: Principal Problem:    Cecal volvulus (HCC)  Active Problems:    Essential hypertension    Acute respiratory failure with hypoxia and hypercapnia (HCC)    Diabetes (HCC)    Hypokalemia    Alzheimer's dementia (HCC)    Sepsis (HCC)    GILDARDO (obstructive sleep apnea)    Acute on chronic respiratory failure with hypoxia and hypercapnia (HCC)    Anemia    Incarcerated hernia    Stage 3b chronic kidney disease (HCC)    Pleural effusion    Toxic metabolic encephalopathy    RLL pneumonia    Volume overload    Aspiration pneumonia (HCC)    Acute heart failure (HCC)    Muscular deconditioning      Consultations: Critical care, Internal medicine    Procedures Performed: 03/28/2024 - Exploratory laparotomy, ventral hernia repair, right hemicolectomy    HPI:    Hospital Course: Jud Walls is a 73 y.o. female who presented 3/27/2024 with cecal volvulus, incarcerated ventral hernia. The patient was take to the operating room on 03/28/2024. Intraoperative findings included: Incarcerated ventral hernia with a large amount of redundant right colon within it. Although colon was healthy in appearance it was enormously stretched out quiring a right colon resection. Ileocolic anastomosis was performed between the terminal ileum and mid transverse colon.  Post-operatively, patient was managed by the critical care team due to chronic advanced respiratory disease.  NG tube was maintained post-op due to risk of aspiration.  Bowel function returned.  NG tube removed on 3/31, CLD started and was slowly advanced.  Patient was noted to have pneumonia and fluid overload and was started on antibioitics and given pulse doses of lasix.   .Patient initially had multiple loose bowel movements which then slowed down, c.diff PCR was positive, toxin negative. Patient was started on oral vancomycin.  Patient was stable for discharge to SNF on 4/5/2024.  On the day of discharge, the patient was voiding spontaneously, ambulating at baseline, and pain was well controlled. The patient was sent home with medication for antibiotics.  She understood all instructions for discharge.  She was also given the names and numbers of the providers as well as instructions for follow up appointments.     Condition at Discharge: fair     Discharge instructions/Information to patient and family:   See after visit summary for information provided to patient and family.      Provisions for Follow-Up Care:  See after visit summary for information related to follow-up care and any pertinent home health orders.      Disposition: Skilled nursing facility    Planned Readmission: No, but high risk due to chronic medical conditions    Discharge Statement   I spent 18 minutes discharging the patient. This time was spent on the day of discharge. I had direct contact with the patient on the day of discharge.     Discharge Medications:  See after visit summary for reconciled discharge medications provided to patient and family.

## 2024-04-18 ENCOUNTER — TELEPHONE (OUTPATIENT)
Age: 74
End: 2024-04-18

## 2024-04-18 NOTE — TELEPHONE ENCOUNTER
Caller requesting to schedule a follow up appointment for patient. Caller stated they will have transport call and schedule to coordinate transportation.

## 2024-04-24 ENCOUNTER — OFFICE VISIT (OUTPATIENT)
Dept: SURGERY | Facility: CLINIC | Age: 74
End: 2024-04-24

## 2024-04-24 DIAGNOSIS — K56.2 CECAL VOLVULUS (HCC): Primary | ICD-10-CM

## 2024-04-24 DIAGNOSIS — K42.0 UMBILICAL HERNIA WITH OBSTRUCTION BUT NO GANGRENE: ICD-10-CM

## 2024-04-24 PROCEDURE — 99024 POSTOP FOLLOW-UP VISIT: CPT | Performed by: SURGERY

## 2024-04-24 NOTE — PROGRESS NOTES
Assessment/Plan:    Diagnoses and all orders for this visit:    Cecal volvulus (HCC)    Umbilical hernia with obstruction but no gangrene    Overall feeling well.  Slight skin separation in the middle of the wound.  Advised to wash with soap and water and keep covered on a daily basis.  Diet and activity as tolerated.    Pathology: Reviewed with patient, all questions answered.       Postoperative restrictions reviewed. All questions answered.       ______________________________________________________  HPI: Patient presents post operatively.  Laparotomy exploratory, ventral hernia repair no mesh, extended hemicolectomy 3/28/2024   Final Diagnosis  A. Colon, EXTENDED RIGHT COLON:  - Colon wall with edematous change and focal hemorrhage  - Reactive changes in serosa  - Tubular adenomas in the colon  - Appendix with acute inflammation  - Benign terminal ileum  - Benign proximal and distal margins  - 6 benign lymph nodes (0/6)  - Calcified nodule in the omentum                       ROS:  General ROS: negative for - chills, fatigue, fever or night sweats, weight loss  Respiratory ROS: no cough, shortness of breath, or wheezing  Cardiovascular ROS: no chest pain or dyspnea on exertion  Genito-Urinary ROS: no dysuria, trouble voiding, or hematuria  Musculoskeletal ROS: negative for - gait disturbance, joint pain or muscle pain  Neurological ROS: no TIA or stroke symptoms  GI ROS: see HPI  Skin ROS: no new rashes or lesions   Lymphatic ROS: no new adenopathy noted by pt.   GYN ROS: see HPI, no new GYN history or bleeding noted  Psy ROS: no new mental or behavioral disturbances         Patient Active Problem List   Diagnosis    Mental status change    Essential hypertension    Acute respiratory failure with hypoxia and hypercapnia (HCC)    Diabetes (HCC)    Hypokalemia    Leukocytosis    Alzheimer's dementia (HCC)    Pulmonary nodule    Preop pulmonary/respiratory exam    MCI (mild cognitive impairment)    Fall    Injury  of face    Sixth nerve palsy of left eye    Ptosis of left eyelid    Seasonal allergies    Need for pneumococcal vaccination    Hoarse voice quality    Sepsis (HCC)    Hyperlipidemia    Asthma-COPD overlap syndrome    S/P tooth extraction    Weakness generalized    Nocturnal hypoxia    Kidney lesion    Sacral ulcer (HCC)    Abscess of abdominal wall    GILDARDO (obstructive sleep apnea)    Acute on chronic respiratory failure with hypoxia and hypercapnia (HCC)    Diabetic ulcer of left midfoot associated with type 2 diabetes mellitus (HCC)    Anemia    Sore throat    Neuropathy    Chronic obstructive pulmonary disease with (acute) exacerbation (HCC)    Cecal volvulus (HCC)    Incarcerated hernia    Stage 3b chronic kidney disease (HCC)    Pleural effusion    Toxic metabolic encephalopathy    RLL pneumonia    Volume overload    Aspiration pneumonia (HCC)    Acute heart failure (HCC)    Muscular deconditioning    Umbilical hernia with obstruction but no gangrene       Allergies:  Penicillins, Cephalexin, Crestor [rosuvastatin], and Zithromax [azithromycin]      Current Outpatient Medications:     acetaminophen (TYLENOL) 325 mg tablet, Take 3 tablets (975 mg total) by mouth every 8 (eight) hours, Disp: 30 tablet, Rfl: 0    Advair Diskus 500-50 MCG/DOSE inhaler, , Disp: , Rfl:     ascorbic acid (VITAMIN C) 500 mg tablet, , Disp: , Rfl:     aspirin (ECOTRIN LOW STRENGTH) 81 mg EC tablet, , Disp: , Rfl:     benzonatate (TESSALON) 200 MG capsule, Take 200 mg by mouth 3 (three) times a day as needed for cough, Disp: , Rfl:     busPIRone (BUSPAR) 5 mg tablet, Take 5 mg by mouth 2 (two) times a day (Patient not taking: Reported on 1/15/2024), Disp: , Rfl:     cetirizine (ZyrTEC) 10 mg tablet, Take 10 mg by mouth daily (Patient not taking: Reported on 1/15/2024), Disp: , Rfl:     Cholecalciferol (VITAMIN D3) 15180 units CAPS, Take 50,000 Units by mouth every 30 (thirty) days, Disp: , Rfl:     clonazePAM (KlonoPIN) 0.5 mg tablet, ,  Disp: , Rfl:     dulaglutide (Trulicity) 3 MG/0.5ML injection, Inject 0.5 mL (3 mg total) under the skin once a week, Disp: 2 mL, Rfl: 1    escitalopram (LEXAPRO) 10 mg tablet, Take 10 mg by mouth daily, Disp: , Rfl:     ferrous gluconate (FERGON) 324 mg tablet, Take 1 tablet (324 mg total) by mouth daily before breakfast, Disp: , Rfl: 0    fluticasone (FLONASE) 50 mcg/act nasal spray, 1 spray into each nostril daily, Disp: 1 Bottle, Rfl: 5    gabapentin (NEURONTIN) 300 mg capsule, Take 300 mg by mouth 2 (two) times a day, Disp: , Rfl:     Glucose Blood (BL TEST STRIP PACK VI), Test, Disp: , Rfl:     glucose blood test strip, Test, Disp: , Rfl:     guaiFENesin (MUCINEX) 600 mg 12 hr tablet, Take 1,200 mg by mouth every 12 (twelve) hours, Disp: , Rfl:     Insulin Glargine Solostar (Basaglar KwikPen) 100 UNIT/ML SOPN, Inject 60 units under the skin every 12 hours, Disp: , Rfl:     insulin lispro (HumaLOG) 100 units/mL injection, Inject 30 units before meals + scale, Disp: , Rfl:     ipratropium-albuterol (DUO-NEB) 0.5-2.5 mg/3 mL nebulizer solution, , Disp: , Rfl:     levalbuterol (XOPENEX) 1.25 mg/3 mL nebulizer solution, , Disp: , Rfl:     losartan (COZAAR) 25 mg tablet, Take 1 tablet (25 mg total) by mouth daily Do not start before December 12, 2023., Disp: , Rfl: 0    magnesium hydroxide (MILK OF MAGNESIA) 400 mg/5 mL oral suspension, Take by mouth daily as needed for constipation, Disp: , Rfl:     Melatonin 5 MG TABS, , Disp: , Rfl:     memantine (NAMENDA) 10 mg tablet, 10 mg 2 (two) times a day, Disp: , Rfl:     montelukast (SINGULAIR) 10 mg tablet, Take 10 mg by mouth daily at bedtime, Disp: , Rfl:     Omeprazole 20 MG TBEC, Take 20 mg by mouth daily, Disp: , Rfl:     simvastatin (ZOCOR) 40 mg tablet, Take 1 tablet by mouth daily at bedtime, Disp: , Rfl: 0    traZODone (DESYREL) 150 mg tablet, , Disp: , Rfl:     Past Medical History:   Diagnosis Date    Alzheimer disease (HCC)     Asthma     Diabetes mellitus  (HCC)     GERD (gastroesophageal reflux disease)     Hyperlipidemia     Hypertension        Past Surgical History:   Procedure Laterality Date    JOINT REPLACEMENT Bilateral     KNEE SURGERY      LAPAROTOMY N/A 3/28/2024    Procedure: LAPAROTOMY EXPLORATORY, ventral hernia repair no mesh, Extended hemicolectomy,;  Surgeon: Cedrick Lipscomb DO;  Location: [facility];  Service: General    TOE SURGERY         Family History   Problem Relation Age of Onset    Dementia Brother     Breast cancer Sister 75    Cancer Brother         reports that she quit smoking about 6 years ago. Her smoking use included cigarettes. She started smoking about 59 years ago. She has a 52.7 pack-year smoking history. She has never used smokeless tobacco. She reports that she does not currently use alcohol. She reports that she does not use drugs.    PHYSICAL EXAM    There were no vitals taken for this visit.    General: normal, cooperative, no distress  Abdominal: soft, nondistended, or nontender  Incision: clean, dry, and intact and healing well.  Slight skin separation of the middle 2 inches.  Staples removed.  Clean dressing applied.      Cedrick Lipscomb DO    Date: 4/24/2024 Time: 10:14 AM

## 2024-04-24 NOTE — LETTER
April 24, 2024     Mary Posey MD  [address]  [address]  [facility] [address]    Patient: Jud Walls   YOB: 1950   Date of Visit: 4/24/2024       Dear Dr. Posey:    Thank you for referring Jud Walls to me for evaluation. Below are my notes for this consultation.    If you have questions, please do not hesitate to call me. I look forward to following your patient along with you.         Sincerely,        Cedrick Lipscomb DO        CC: No Recipients    Cedrick Lipscomb DO  4/24/2024 10:15 AM  Sign when Signing Visit  Assessment/Plan:    Diagnoses and all orders for this visit:    Cecal volvulus (HCC)    Umbilical hernia with obstruction but no gangrene    Overall feeling well.  Slight skin separation in the middle of the wound.  Advised to wash with soap and water and keep covered on a daily basis.  Diet and activity as tolerated.    Pathology: Reviewed with patient, all questions answered.       Postoperative restrictions reviewed. All questions answered.       ______________________________________________________  HPI: Patient presents post operatively.  Laparotomy exploratory, ventral hernia repair no mesh, extended hemicolectomy 3/28/2024   Final Diagnosis  A. Colon, EXTENDED RIGHT COLON:  - Colon wall with edematous change and focal hemorrhage  - Reactive changes in serosa  - Tubular adenomas in the colon  - Appendix with acute inflammation  - Benign terminal ileum  - Benign proximal and distal margins  - 6 benign lymph nodes (0/6)  - Calcified nodule in the omentum                       ROS:  General ROS: negative for - chills, fatigue, fever or night sweats, weight loss  Respiratory ROS: no cough, shortness of breath, or wheezing  Cardiovascular ROS: no chest pain or dyspnea on exertion  Genito-Urinary ROS: no dysuria, trouble voiding, or hematuria  Musculoskeletal ROS: negative for - gait disturbance, joint pain or muscle pain  Neurological ROS: no TIA  or stroke symptoms  GI ROS: see HPI  Skin ROS: no new rashes or lesions   Lymphatic ROS: no new adenopathy noted by pt.   GYN ROS: see HPI, no new GYN history or bleeding noted  Psy ROS: no new mental or behavioral disturbances         Patient Active Problem List   Diagnosis   • Mental status change   • Essential hypertension   • Acute respiratory failure with hypoxia and hypercapnia (HCC)   • Diabetes (HCC)   • Hypokalemia   • Leukocytosis   • Alzheimer's dementia (HCC)   • Pulmonary nodule   • Preop pulmonary/respiratory exam   • MCI (mild cognitive impairment)   • Fall   • Injury of face   • Sixth nerve palsy of left eye   • Ptosis of left eyelid   • Seasonal allergies   • Need for pneumococcal vaccination   • Hoarse voice quality   • Sepsis (HCC)   • Hyperlipidemia   • Asthma-COPD overlap syndrome   • S/P tooth extraction   • Weakness generalized   • Nocturnal hypoxia   • Kidney lesion   • Sacral ulcer (HCC)   • Abscess of abdominal wall   • GILDARDO (obstructive sleep apnea)   • Acute on chronic respiratory failure with hypoxia and hypercapnia (HCC)   • Diabetic ulcer of left midfoot associated with type 2 diabetes mellitus (HCC)   • Anemia   • Sore throat   • Neuropathy   • Chronic obstructive pulmonary disease with (acute) exacerbation (HCC)   • Cecal volvulus (HCC)   • Incarcerated hernia   • Stage 3b chronic kidney disease (HCC)   • Pleural effusion   • Toxic metabolic encephalopathy   • RLL pneumonia   • Volume overload   • Aspiration pneumonia (HCC)   • Acute heart failure (HCC)   • Muscular deconditioning   • Umbilical hernia with obstruction but no gangrene       Allergies:  Penicillins, Cephalexin, Crestor [rosuvastatin], and Zithromax [azithromycin]      Current Outpatient Medications:   •  acetaminophen (TYLENOL) 325 mg tablet, Take 3 tablets (975 mg total) by mouth every 8 (eight) hours, Disp: 30 tablet, Rfl: 0  •  Advair Diskus 500-50 MCG/DOSE inhaler, , Disp: , Rfl:   •  ascorbic acid (VITAMIN C) 500  mg tablet, , Disp: , Rfl:   •  aspirin (ECOTRIN LOW STRENGTH) 81 mg EC tablet, , Disp: , Rfl:   •  benzonatate (TESSALON) 200 MG capsule, Take 200 mg by mouth 3 (three) times a day as needed for cough, Disp: , Rfl:   •  busPIRone (BUSPAR) 5 mg tablet, Take 5 mg by mouth 2 (two) times a day (Patient not taking: Reported on 1/15/2024), Disp: , Rfl:   •  cetirizine (ZyrTEC) 10 mg tablet, Take 10 mg by mouth daily (Patient not taking: Reported on 1/15/2024), Disp: , Rfl:   •  Cholecalciferol (VITAMIN D3) 26369 units CAPS, Take 50,000 Units by mouth every 30 (thirty) days, Disp: , Rfl:   •  clonazePAM (KlonoPIN) 0.5 mg tablet, , Disp: , Rfl:   •  dulaglutide (Trulicity) 3 MG/0.5ML injection, Inject 0.5 mL (3 mg total) under the skin once a week, Disp: 2 mL, Rfl: 1  •  escitalopram (LEXAPRO) 10 mg tablet, Take 10 mg by mouth daily, Disp: , Rfl:   •  ferrous gluconate (FERGON) 324 mg tablet, Take 1 tablet (324 mg total) by mouth daily before breakfast, Disp: , Rfl: 0  •  fluticasone (FLONASE) 50 mcg/act nasal spray, 1 spray into each nostril daily, Disp: 1 Bottle, Rfl: 5  •  gabapentin (NEURONTIN) 300 mg capsule, Take 300 mg by mouth 2 (two) times a day, Disp: , Rfl:   •  Glucose Blood (BL TEST STRIP PACK VI), Test, Disp: , Rfl:   •  glucose blood test strip, Test, Disp: , Rfl:   •  guaiFENesin (MUCINEX) 600 mg 12 hr tablet, Take 1,200 mg by mouth every 12 (twelve) hours, Disp: , Rfl:   •  Insulin Glargine Solostar (Basaglar KwikPen) 100 UNIT/ML SOPN, Inject 60 units under the skin every 12 hours, Disp: , Rfl:   •  insulin lispro (HumaLOG) 100 units/mL injection, Inject 30 units before meals + scale, Disp: , Rfl:   •  ipratropium-albuterol (DUO-NEB) 0.5-2.5 mg/3 mL nebulizer solution, , Disp: , Rfl:   •  levalbuterol (XOPENEX) 1.25 mg/3 mL nebulizer solution, , Disp: , Rfl:   •  losartan (COZAAR) 25 mg tablet, Take 1 tablet (25 mg total) by mouth daily Do not start before December 12, 2023., Disp: , Rfl: 0  •  magnesium  hydroxide (MILK OF MAGNESIA) 400 mg/5 mL oral suspension, Take by mouth daily as needed for constipation, Disp: , Rfl:   •  Melatonin 5 MG TABS, , Disp: , Rfl:   •  memantine (NAMENDA) 10 mg tablet, 10 mg 2 (two) times a day, Disp: , Rfl:   •  montelukast (SINGULAIR) 10 mg tablet, Take 10 mg by mouth daily at bedtime, Disp: , Rfl:   •  Omeprazole 20 MG TBEC, Take 20 mg by mouth daily, Disp: , Rfl:   •  simvastatin (ZOCOR) 40 mg tablet, Take 1 tablet by mouth daily at bedtime, Disp: , Rfl: 0  •  traZODone (DESYREL) 150 mg tablet, , Disp: , Rfl:     Past Medical History:   Diagnosis Date   • Alzheimer disease (HCC)    • Asthma    • Diabetes mellitus (HCC)    • GERD (gastroesophageal reflux disease)    • Hyperlipidemia    • Hypertension        Past Surgical History:   Procedure Laterality Date   • JOINT REPLACEMENT Bilateral    • KNEE SURGERY     • LAPAROTOMY N/A 3/28/2024    Procedure: LAPAROTOMY EXPLORATORY, ventral hernia repair no mesh, Extended hemicolectomy,;  Surgeon: Cedrick Lipscomb DO;  Location: [facility];  Service: General   • TOE SURGERY         Family History   Problem Relation Age of Onset   • Dementia Brother    • Breast cancer Sister 75   • Cancer Brother         reports that she quit smoking about 6 years ago. Her smoking use included cigarettes. She started smoking about 59 years ago. She has a 52.7 pack-year smoking history. She has never used smokeless tobacco. She reports that she does not currently use alcohol. She reports that she does not use drugs.    PHYSICAL EXAM    There were no vitals taken for this visit.    General: normal, cooperative, no distress  Abdominal: soft, nondistended, or nontender  Incision: clean, dry, and intact and healing well.  Slight skin separation of the middle 2 inches.  Staples removed.  Clean dressing applied.      Cedrick Lipscomb DO    Date: 4/24/2024 Time: 10:14 AM

## 2024-05-01 PROBLEM — A41.9 SEPSIS (HCC): Status: RESOLVED | Noted: 2020-09-19 | Resolved: 2024-05-01

## 2024-05-02 PROBLEM — J18.9 RLL PNEUMONIA: Status: RESOLVED | Noted: 2024-04-02 | Resolved: 2024-05-02

## 2024-05-04 PROBLEM — J69.0 ASPIRATION PNEUMONIA (HCC): Status: RESOLVED | Noted: 2024-04-04 | Resolved: 2024-05-04

## 2024-05-15 ENCOUNTER — HOSPITAL ENCOUNTER (OUTPATIENT)
Dept: CT IMAGING | Facility: HOSPITAL | Age: 74
Discharge: HOME | End: 2024-05-15
Payer: MEDICARE

## 2024-05-15 DIAGNOSIS — J44.89 ASTHMA-COPD OVERLAP SYNDROME: ICD-10-CM

## 2024-05-15 DIAGNOSIS — Z87.891 PERSONAL HISTORY OF NICOTINE DEPENDENCE: ICD-10-CM

## 2024-05-15 PROCEDURE — 71271 CT THORAX LUNG CANCER SCR C-: CPT

## 2024-05-20 DIAGNOSIS — E11.65 TYPE 2 DIABETES MELLITUS WITH HYPERGLYCEMIA, WITH LONG-TERM CURRENT USE OF INSULIN (HCC): Primary | ICD-10-CM

## 2024-05-20 DIAGNOSIS — Z79.4 TYPE 2 DIABETES MELLITUS WITH HYPERGLYCEMIA, WITH LONG-TERM CURRENT USE OF INSULIN (HCC): Primary | ICD-10-CM

## 2024-05-20 NOTE — TELEPHONE ENCOUNTER
Xavier from [facility] calling. Trulicity is on backorder and patients last dose was on 5/10.. they would like Dr. Rivera recommendations on what to do. Please advise.     Please call nurse Xavier at [phone]

## 2024-05-20 NOTE — TELEPHONE ENCOUNTER
If the 1.5 mg weekly dose of Trulicity is available then we can prescribe that in the interim and she can go back to the 3 mg dose once its available.  If no Trulicity is available, then she should continue her other diabetes treatments and if blood sugars start to trend upwards, then adjustments will need to be made to her insulin doses.

## 2024-05-21 DIAGNOSIS — E11.65 TYPE 2 DIABETES MELLITUS WITH HYPERGLYCEMIA, WITH LONG-TERM CURRENT USE OF INSULIN (HCC): ICD-10-CM

## 2024-05-21 DIAGNOSIS — Z79.4 TYPE 2 DIABETES MELLITUS WITH HYPERGLYCEMIA, WITH LONG-TERM CURRENT USE OF INSULIN (HCC): ICD-10-CM

## 2024-05-21 RX ORDER — DULAGLUTIDE 1.5 MG/.5ML
INJECTION, SOLUTION SUBCUTANEOUS
Qty: 6 ML | Refills: 1 | Status: SHIPPED | OUTPATIENT
Start: 2024-05-21 | End: 2024-05-21 | Stop reason: SDUPTHER

## 2024-05-21 RX ORDER — DULAGLUTIDE 1.5 MG/.5ML
INJECTION, SOLUTION SUBCUTANEOUS
Qty: 6 ML | Refills: 1 | Status: SHIPPED | OUTPATIENT
Start: 2024-05-21

## 2024-05-21 NOTE — TELEPHONE ENCOUNTER
[facility]urse from [facility], relayed above message, she states they do have the 1.5mg of Trulicity available.      Script can be faxed to [phone]

## 2024-05-29 DIAGNOSIS — R91.1 LUNG NODULE: Primary | ICD-10-CM

## 2024-05-30 ENCOUNTER — OFFICE VISIT (OUTPATIENT)
Dept: SLEEP CENTER | Facility: CLINIC | Age: 74
End: 2024-05-30
Payer: MEDICARE

## 2024-05-30 VITALS
OXYGEN SATURATION: 95 % | BODY MASS INDEX: 37.89 KG/M2 | HEART RATE: 74 BPM | RESPIRATION RATE: 18 BRPM | DIASTOLIC BLOOD PRESSURE: 70 MMHG | HEIGHT: 60 IN | SYSTOLIC BLOOD PRESSURE: 130 MMHG | WEIGHT: 193 LBS

## 2024-05-30 DIAGNOSIS — J44.89 ASTHMA-COPD OVERLAP SYNDROME: ICD-10-CM

## 2024-05-30 DIAGNOSIS — G47.19 EXCESSIVE DAYTIME SLEEPINESS: ICD-10-CM

## 2024-05-30 DIAGNOSIS — J44.1 CHRONIC OBSTRUCTIVE PULMONARY DISEASE WITH (ACUTE) EXACERBATION (HCC): ICD-10-CM

## 2024-05-30 DIAGNOSIS — I10 ESSENTIAL HYPERTENSION: Chronic | ICD-10-CM

## 2024-05-30 DIAGNOSIS — G47.33 OSA (OBSTRUCTIVE SLEEP APNEA): Primary | ICD-10-CM

## 2024-05-30 DIAGNOSIS — Z99.81 OXYGEN DEPENDENT: ICD-10-CM

## 2024-05-30 DIAGNOSIS — G31.84 MCI (MILD COGNITIVE IMPAIRMENT): ICD-10-CM

## 2024-05-30 DIAGNOSIS — G47.33 OBSTRUCTIVE SLEEP APNEA (ADULT) (PEDIATRIC): ICD-10-CM

## 2024-05-30 PROCEDURE — 99204 OFFICE O/P NEW MOD 45 MIN: CPT | Performed by: STUDENT IN AN ORGANIZED HEALTH CARE EDUCATION/TRAINING PROGRAM

## 2024-05-30 NOTE — PATIENT INSTRUCTIONS
GILDARDO Evaluation:    I suspect you may have sleep apnea.  Sleep apnea is a serious sleep disorder that occurs when a person's breathing is interrupted during sleep. People with untreated sleep apnea stop breathing repeatedly during their sleep, sometimes hundreds of times during the night.  The most common type of sleep apnea is obstructive sleep apnea.  If left untreated, obstructive sleep apnea can result in a number of health problems including hypertension, stroke, arrhythmias, cardiomyopathy (enlargement of the muscle tissue of the heart), heart failure, diabetes, obesity, and heart attacks. It has also been found to make chronic medical conditions (such as high blood pressure, migraine headaches, and seizures (more difficult to manage.  Treatment options for obstructive sleep apnea may include positive airway pressure (PAP) therapy, oral appliance, hypoglossal nerve stimulator, nose/throat surgery, weight loss, side sleeping, or avoidance of medications or substances that can relax the airway muscles (alcohol, benzodiazepines, and opioids).  I have ordered an in-lab polysomnogram (PSG) to evaluate for sleep apnea.  This test should be scheduled at your earliest convenience.   Sleep Clinic: This should be scheduled at the  before you leave today.  Avoid driving if drowsy. We recommend that if you are dozing off while driving, that you do not drive until your sleepiness is appropriately treated.  We encourage a healthy lifestyle with adequate sleep (7-9 hours per night), diet and exercise.  Recommend good sleep hygiene, as outlined below    Myself and/or my team will reach out to you via MyChart and/or phone call with the results and next steps.      Good Sleep Hygiene  Wake up at the same time every day, even on the weekends.  Use your bed for sleep and intimacy only.  If you have been in bed awake for 30 minutes, get up and leave the bedroom. Choose a dull activity not involving a blue screen (TV,  computer, handheld devices). Go back to bed when you feel sleepy.  Avoid caffeine, nicotine and alcohol before you go to bed.  Avoid large meals before you go to bed.  Avoid using screens (computers, tablets, smartphones, etc.) for at least 1 hour before bedtime  Exercise regularly, but do not exercise right before you go to bed.  Avoid daytime naps. If you do take a nap, sleep for 20-40 minutes, and not after dinner.

## 2024-05-30 NOTE — PROGRESS NOTES
[facility]  Sleep Medicine New Patient Evaluation    PATIENT NAME: Jud Walls  DATE OF SERVICE: May 30, 2024    CONSULTING PROVIDER:  Priscila Ramos MD  [address] [facility]  [place] [address]      Assessment/Plan:  1. GILDARDO (obstructive sleep apnea)  Ambulatory Referral to Sleep Medicine    Split Study      2. Oxygen dependent  Split Study      3. Obstructive sleep apnea (adult) (pediatric)  Ambulatory Referral to Sleep Medicine    Split Study      4. Excessive daytime sleepiness  Split Study      5. Essential hypertension  Split Study      6. MCI (mild cognitive impairment)  Split Study      7. Chronic obstructive pulmonary disease with (acute) exacerbation (HCC)  Split Study      8. Asthma-COPD overlap syndrome  Split Study         Jud is a pleasant 73-year-old woman with PMHx of heart failure (EF 55% 9/2020), asthma-COPD overlap syndrome, HTN, T2DM, Alzheimer's disease, CKD stage IIIb, HLD who presents for GILDARDO evaluation.  While she herself does not endorse drastic symptoms during her sleep itself, she does endorse excessive daytime sleepiness, and given this coupled with her medical history/significant comorbidities, evaluating for and treating sleep disordered breathing if present is merited.    Discussed with patient the pathophysiology of OSAS and medical conditions associated with OSAS such as DM, HTN, CAD, Depression, Stroke, Headache.  Treatment options including surgery, Dental appliances, positional therapy, and CPAP were discussed.  I have ordered a split-night polysomnogram to evaluate for sleep-disordered breathing and the most effective PAP pressure setting.    Advised patient to avoid activities that could harm self or others when tired/sleepy, including driving.  Encouraged maintaining normal weight  Discussed importance of good sleep hygiene.  Pending the results of the sleep study, we will plan to order CPAP with a subsequent compliance follow-up.  She will Return  for Follow-up 2-3 weeks after sleep study to review results.    Thank you for allowing me to participate in the care of your patient.  If there are any questions regarding evaluation please feel free to reach out.       ________________________________________________________________________________________________    HPI: Jud Walls is a 73 y.o. female with PMHx of heart failure (EF 55% 2020), asthma-COPD overlap syndrome, HTN, T2DM, Alzheimer's disease, CKD stage IIIb, HLD who presents for GILDARDO evaluation.    Sleep-Related History:     She is not sure why she is here. The doctors at the [facility] where she is living recommended she come for an GILDARDO evaluation. There is supposedly a history of GILDARDO, but she herself is unaware of this.  She was also referred following a hospitalization in 2023.    Does wear oxygen at night; 2L via [place].    [place] Sleepiness Scale:  What are your chances of dozing?   0= no chance  1= slight chance  2= moderate chance  3= high chance    Sitting and readin   Watching TV: 3  Sitting, inactive in a public place (e.g. a theatre or a meeting):0  As a passenger in a car for an hour without a break: 3  Lying down to rest in the afternoon when circumstances permit: 3   Sitting and talking to someone: 0  Sitting quietly after a lunch without alcohol: 2  In a car, while stopped for a few minutes in the traffic: 3       TOTAL  15/24  Greater or equal to 10 is positive for excessive daytime sleepiness    Pertinent Medications: Klonopin 0.5 mg, Lexapro 10 mg daily, gabapentin 300 mg twice daily, memantine 10 mg twice daily, trazodone 150 mg      SLEEP-WAKE SCHEDULE  Sleep Schedule:  Weekdays:  Bedtime: 2100/2200   Asleep in ~30 minutes or less   Nighttime awakenings: 0     Wake: 0500    Naps: 0    Average total sleep time (in a 24 hour period): ~7 hours.    Weekends:   Same    Sleep-Related Details:  Preferred Sleep Position: supine and side(s)  SDB Symptoms: waking  "unrefreshed; never been told she snores  Bruxism: No  Nocturnal GERD: No  Nocturnal Palpitations: No  Nocturnal Anxiety or Rumination: No  Sleep-Related Hallucinations: No   Sleep Paralysis: No     She denies any parasomnia activity.    Wake-Related Details:  Daytime Sleepiness: Yes, \"sometimes\"    Work Schedule: n/a  Alcohol Use: No  Substance Use: No  Tobacco Use: No      She does have difficulty with memory or concentration.      PAST TREATMENTS:  None    PRIOR SLEEP STUDIES:  -Endorses one \"years and years ago;\" unsure of what it showed.     OTHER RELEVANT LABS AND STUDIES:  None    Past Medical History:   Diagnosis Date    Alzheimer disease (HCC)     Asthma     Diabetes mellitus (HCC)     GERD (gastroesophageal reflux disease)     Hyperlipidemia     Hypertension       Past Surgical History:   Procedure Laterality Date    JOINT REPLACEMENT Bilateral     KNEE SURGERY      LAPAROTOMY N/A 3/28/2024    Procedure: LAPAROTOMY EXPLORATORY, ventral hernia repair no mesh, Extended hemicolectomy,;  Surgeon: Cedrick Lipscomb DO;  Location: [facility];  Service: General    TOE SURGERY        Patient Active Problem List   Diagnosis    Mental status change    Essential hypertension    Acute respiratory failure with hypoxia and hypercapnia (HCC)    Diabetes (HCC)    Hypokalemia    Leukocytosis    Alzheimer's dementia (HCC)    Pulmonary nodule    Preop pulmonary/respiratory exam    MCI (mild cognitive impairment)    Fall    Injury of face    Sixth nerve palsy of left eye    Ptosis of left eyelid    Seasonal allergies    Need for pneumococcal vaccination    Hoarse voice quality    Hyperlipidemia    Asthma-COPD overlap syndrome    S/P tooth extraction    Weakness generalized    Nocturnal hypoxia    Kidney lesion    Sacral ulcer (HCC)    Abscess of abdominal wall    GILDARDO (obstructive sleep apnea)    Acute on chronic respiratory failure with hypoxia and hypercapnia (HCC)    Diabetic ulcer of left midfoot associated with type 2 " diabetes mellitus (HCC)    Anemia    Sore throat    Neuropathy    Chronic obstructive pulmonary disease with (acute) exacerbation (HCC)    Cecal volvulus (HCC)    Incarcerated hernia    Stage 3b chronic kidney disease (HCC)    Pleural effusion    Toxic metabolic encephalopathy    Volume overload    Acute heart failure (HCC)    Muscular deconditioning    Umbilical hernia with obstruction but no gangrene      Allergies as of 05/30/2024 - Reviewed 05/30/2024   Allergen Reaction Noted    Penicillins Shortness Of Breath 01/10/2013    Cephalexin Other (See Comments) 05/26/2011    Crestor [rosuvastatin] Other (See Comments) 05/26/2011    Zithromax [azithromycin] Other (See Comments) 12/03/2014         Review of Symptoms:    Review of Systems  10-point system review completed, all of which are negative except as mentioned above.    CURRENT MEDICATIONS:  Current Outpatient Medications   Medication Instructions    acetaminophen (TYLENOL) 975 mg, Oral, Every 8 hours scheduled    Advair Diskus 500-50 MCG/DOSE inhaler No dose, route, or frequency recorded.    ascorbic acid (VITAMIN C) 500 mg tablet     aspirin (ECOTRIN LOW STRENGTH) 81 mg EC tablet No dose, route, or frequency recorded.    benzonatate (TESSALON) 200 mg, Oral, 3 times daily PRN    busPIRone (BUSPAR) 5 mg, 2 times daily    cetirizine (ZYRTEC) 10 mg, Daily    clonazePAM (KlonoPIN) 0.5 mg tablet     dulaglutide (Trulicity) 1.5 MG/0.5ML injection Inject 1.5 mg weekly    escitalopram (LEXAPRO) 10 mg, Oral, Daily    ferrous gluconate (FERGON) 324 mg, Oral, Daily before breakfast    fluticasone (FLONASE) 50 mcg/act nasal spray 1 spray, Nasal, Daily    gabapentin (NEURONTIN) 300 mg, Oral, 2 times daily    Glucose Blood (BL TEST STRIP PACK VI) In Vitro    glucose blood test strip In Vitro    guaiFENesin (MUCINEX) 1,200 mg, Oral, Every 12 hours scheduled    Insulin Glargine Solostar (Basaglar KwikPen) 100 UNIT/ML SOPN Inject 60 units under the skin every 12 hours     insulin lispro (HumaLOG) 100 units/mL injection Inject 30 units before meals + scale    ipratropium-albuterol (DUO-NEB) 0.5-2.5 mg/3 mL nebulizer solution No dose, route, or frequency recorded.    levalbuterol (XOPENEX) 1.25 mg/3 mL nebulizer solution No dose, route, or frequency recorded.    losartan (COZAAR) 25 mg, Oral, Daily    magnesium hydroxide (MILK OF MAGNESIA) 400 mg/5 mL oral suspension Oral, Daily PRN    Melatonin 5 MG TABS     memantine (NAMENDA) 10 mg, 2 times daily    montelukast (SINGULAIR) 10 mg, Oral, Daily at bedtime    Omeprazole 20 mg, Oral, Daily    simvastatin (ZOCOR) 40 mg, Oral, Daily at bedtime    traZODone (DESYREL) 150 mg tablet No dose, route, or frequency recorded.    Trulicity 3 mg, Subcutaneous, Weekly    Vitamin D3 50,000 Units, Oral, Every 30 days         SOCIAL HISTORY:  Social History     Tobacco Use    Smoking status: Former     Current packs/day: 0.00     Average packs/day: 1 pack/day for 52.7 years (52.7 ttl pk-yrs)     Types: Cigarettes     Start date:      Quit date: 2017     Years since quittin.7    Smokeless tobacco: Never   Vaping Use    Vaping status: Never Used   Substance Use Topics    Alcohol use: Not Currently    Drug use: No       FAMILY HISTORY:  Family History   Problem Relation Age of Onset    Dementia Brother     Breast cancer Sister 75    Cancer Brother           PHYSICAL EXAMINATION:  Vital Signs:  /70   Pulse 74   Resp 18   Ht 5' (1.524 m)   Wt 87.5 kg (193 lb)   SpO2 95%   BMI 37.69 kg/m²  Body mass index is 37.69 kg/m².    Constitutional: NAD, well appearing   Mental Status: AAOx3  Neck Circumference: Neck Circumference: 17 inches  Skin: Warm, dry, no rashes noted   Eyes: PERRL, normal conjunctiva  ENT: Nasal congestion absent, nasal valve incompetence absent.  Posterior Airspace:   Song Tongue Position: 4  Retrognathia: absent  Overbite: absent  High Arched Palate: absent  Tongue Scalloping/Ridging: present  Uvula: normal  Chest:  No evidence of respiratory distress, no accessory muscle use; no evidence of peripheral cyanosis on 2 L nasal cannula.  Abdomen: Soft, NT/ND    NEUROLOGICAL EXAM:  General: Awake, alert, speech fluent, comprehension, naming, repetition intact. Short and long term memory intact.  CN: PERRL, EOMI without nystagmus, facial sensation and strength are normal and symmetric, hearing is intact to conversational tone, palate and tongue movements are intact and symmetric.  Motor: Normal tone, bulk and strength bilaterally.   Sensation: LT intact throughout.  Gait: Utilizing wheelchair.      I have spent a total time of 40-50 minutes on 05/30/24 in caring for this patient including Diagnostic results, Prognosis, Risks and benefits of tx options, Instructions for management, Patient and family education, Importance of tx compliance, Risk factor reductions, Documenting in the medical record, Reviewing / ordering tests, medicine, procedures  , and Obtaining or reviewing history  .        Electronically signed by:    Arash Alejandro DO  Board-certified Neurology and Sleep Medicine  [facility]  05/30/24

## 2024-06-04 ENCOUNTER — TELEPHONE (OUTPATIENT)
Dept: SLEEP CENTER | Facility: CLINIC | Age: 74
End: 2024-06-04

## 2024-06-04 NOTE — TELEPHONE ENCOUNTER
Message left on the nurse line by [facility] inquiring about split study referral.    Returned call to [facility], spoke with Shabnam.  Per Shabnam, patient will not be able to complete the sleep study, as she will require assistance and there is no one avaialble to assist her.  Recommended sooner follow up appointment with Dr. Alejandro to discuss treatment option in lieu of completing split study.  No sooner appointments available, added to wait list for a cancellation in [place] with Dr. Alejandro.

## 2024-06-12 ENCOUNTER — HOSPITAL ENCOUNTER (OUTPATIENT)
Dept: CT IMAGING | Facility: HOSPITAL | Age: 74
Discharge: HOME | End: 2024-06-12
Payer: MEDICARE

## 2024-06-12 DIAGNOSIS — R91.1 LUNG NODULE: ICD-10-CM

## 2024-06-12 PROCEDURE — 71250 CT THORAX DX C-: CPT

## 2024-06-21 DIAGNOSIS — R93.89 ABNORMAL CT OF THE CHEST: Primary | ICD-10-CM

## 2024-06-21 DIAGNOSIS — R91.1 LUNG NODULE: ICD-10-CM

## 2024-06-21 NOTE — PROGRESS NOTES
Improving bibasilar opacities, likely infectious.  repeat CT chest in 3months to evaluate for resolution

## 2024-07-23 ENCOUNTER — OFFICE VISIT (OUTPATIENT)
Dept: ENDOCRINOLOGY | Facility: CLINIC | Age: 74
End: 2024-07-23
Payer: MEDICARE

## 2024-07-23 VITALS — DIASTOLIC BLOOD PRESSURE: 76 MMHG | SYSTOLIC BLOOD PRESSURE: 120 MMHG | HEART RATE: 72 BPM | OXYGEN SATURATION: 96 %

## 2024-07-23 DIAGNOSIS — I10 ESSENTIAL HYPERTENSION: Chronic | ICD-10-CM

## 2024-07-23 DIAGNOSIS — E11.65 TYPE 2 DIABETES MELLITUS WITH HYPERGLYCEMIA, WITH LONG-TERM CURRENT USE OF INSULIN (HCC): Primary | Chronic | ICD-10-CM

## 2024-07-23 DIAGNOSIS — Z79.4 TYPE 2 DIABETES MELLITUS WITH HYPERGLYCEMIA, WITH LONG-TERM CURRENT USE OF INSULIN (HCC): Primary | Chronic | ICD-10-CM

## 2024-07-23 DIAGNOSIS — E78.2 MIXED HYPERLIPIDEMIA: Chronic | ICD-10-CM

## 2024-07-23 PROCEDURE — 99214 OFFICE O/P EST MOD 30 MIN: CPT | Performed by: PHYSICIAN ASSISTANT

## 2024-07-23 RX ORDER — INSULIN GLARGINE 100 [IU]/ML
INJECTION, SOLUTION SUBCUTANEOUS
Start: 2024-07-23

## 2024-07-23 NOTE — PROGRESS NOTES
Patient Progress Note      CC: DM   Resident at [facility]     Referring Provider  No referring provider defined for this encounter.     History of Present Illness:   Jud Walls is a 73 y.o. female with a history of type 2 diabetes with long term use of insulin. Diabetes course has been stable. She was hospitalized in March 2024 for a cecal volvulus, incarcerated ventral hernia. Denies recent severe hypoglycemic or severe hyperglycemic episodes. Denies any issues with her current regimen. Home glucose monitoring: are performed regularly     BG log not sent with patient. [facility] called nursing home to fax over log.      Current regimen: Basaglar 56 units BID, Admelog 30 units TID with meals plus scale, Trulicity 1.5 mg weekly  compliant most of the time, denies any side effects from medications.  Injects in: abdomen. Rotates sites: Yes  Hypoglycemic episodes: No, rare (not that she is aware of)  H/o of hypoglycemia causing hospitalization or Intervention such as glucagon injection or ambulance call : No, not that she recalls  Hypoglycemia symptoms: vision changes  Treatment of hypoglycemia: juice; discussed treatment      Medic alert tag: recommended: Yes     Diabetes education: No  Diet: 3 meals per day, 0-1 snack per day. Timing of meals is predictable.   Diabetic diet compliance: noncompliant some of the time. Meals are based on what she gets at the nursing home.   Activity: Daily activity is predictable: Yes. Activity is very limited      Ophthamology: March 2022. She cannot recall if she has been since.   Podiatry: feet are examined at the nursing home.      Has hypertension: on ACE inhibitor/ARB, compliant most of the time  Has hyperlipidemia: on statin - tolerating well, no myalgias. compliant most of the time, denies any side effects from medications.  Thyroid disorders: No  History of pancreatitis: No      Patient Active Problem List   Diagnosis    Mental status change    Essential  hypertension    Acute respiratory failure with hypoxia and hypercapnia (HCC)    Diabetes (HCC)    Hypokalemia    Leukocytosis    Alzheimer's dementia (HCC)    Pulmonary nodule    Preop pulmonary/respiratory exam    MCI (mild cognitive impairment)    Fall    Injury of face    Sixth nerve palsy of left eye    Ptosis of left eyelid    Seasonal allergies    Need for pneumococcal vaccination    Hoarse voice quality    Hyperlipidemia    Asthma-COPD overlap syndrome    S/P tooth extraction    Weakness generalized    Nocturnal hypoxia    Kidney lesion    Sacral ulcer (HCC)    Abscess of abdominal wall    GILDARDO (obstructive sleep apnea)    Acute on chronic respiratory failure with hypoxia and hypercapnia (HCC)    Diabetic ulcer of left midfoot associated with type 2 diabetes mellitus (HCC)    Anemia    Sore throat    Neuropathy    Chronic obstructive pulmonary disease with (acute) exacerbation (HCC)    Cecal volvulus (HCC)    Incarcerated hernia    Stage 3b chronic kidney disease (HCC)    Pleural effusion    Toxic metabolic encephalopathy    Volume overload    Acute heart failure (HCC)    Muscular deconditioning    Umbilical hernia with obstruction but no gangrene      Past Medical History:   Diagnosis Date    Alzheimer disease (HCC)     Asthma     Diabetes mellitus (HCC)     GERD (gastroesophageal reflux disease)     Hyperlipidemia     Hypertension       Past Surgical History:   Procedure Laterality Date    JOINT REPLACEMENT Bilateral     KNEE SURGERY      LAPAROTOMY N/A 3/28/2024    Procedure: LAPAROTOMY EXPLORATORY, ventral hernia repair no mesh, Extended hemicolectomy,;  Surgeon: Cedrick Lipscomb DO;  Location: [facility];  Service: General    TOE SURGERY        Family History   Problem Relation Age of Onset    Dementia Brother     Breast cancer Sister 75    Cancer Brother      Social History     Tobacco Use    Smoking status: Former     Current packs/day: 0.00     Average packs/day: 1 pack/day for 52.7 years (52.7  ttl pk-yrs)     Types: Cigarettes     Start date:      Quit date: 2017     Years since quittin.8    Smokeless tobacco: Never   Substance Use Topics    Alcohol use: Not Currently     Allergies   Allergen Reactions    Penicillins Shortness Of Breath    Cephalexin Other (See Comments)     Reaction Date: 2011; unknown     Crestor [Rosuvastatin] Other (See Comments)     Reaction Date: 2011; unknown     Zithromax [Azithromycin] Other (See Comments)     Unknown          Current Outpatient Medications:     acetaminophen (TYLENOL) 325 mg tablet, Take 3 tablets (975 mg total) by mouth every 8 (eight) hours, Disp: 30 tablet, Rfl: 0    Advair Diskus 500-50 MCG/DOSE inhaler, , Disp: , Rfl:     ascorbic acid (VITAMIN C) 500 mg tablet, , Disp: , Rfl:     aspirin (ECOTRIN LOW STRENGTH) 81 mg EC tablet, , Disp: , Rfl:     benzonatate (TESSALON) 200 MG capsule, Take 200 mg by mouth 3 (three) times a day as needed for cough, Disp: , Rfl:     busPIRone (BUSPAR) 5 mg tablet, Take 5 mg by mouth 2 (two) times a day, Disp: , Rfl:     Cholecalciferol (VITAMIN D3) 74573 units CAPS, Take 50,000 Units by mouth every 30 (thirty) days, Disp: , Rfl:     clonazePAM (KlonoPIN) 0.5 mg tablet, , Disp: , Rfl:     dulaglutide (Trulicity) 1.5 MG/0.5ML injection, Inject 1.5 mg weekly, Disp: 6 mL, Rfl: 1    escitalopram (LEXAPRO) 10 mg tablet, Take 10 mg by mouth daily, Disp: , Rfl:     ferrous gluconate (FERGON) 324 mg tablet, Take 1 tablet (324 mg total) by mouth daily before breakfast, Disp: , Rfl: 0    fluticasone (FLONASE) 50 mcg/act nasal spray, 1 spray into each nostril daily, Disp: 1 Bottle, Rfl: 5    gabapentin (NEURONTIN) 300 mg capsule, Take 300 mg by mouth 2 (two) times a day, Disp: , Rfl:     Glucose Blood (BL TEST STRIP PACK VI), Test, Disp: , Rfl:     glucose blood test strip, Test, Disp: , Rfl:     guaiFENesin (MUCINEX) 600 mg 12 hr tablet, Take 1,200 mg by mouth every 12 (twelve) hours, Disp: , Rfl:     Insulin  Glargine Solostar (Basaglar KwikPen) 100 UNIT/ML SOPN, Inject 56 units under the skin every 12 hours, Disp: , Rfl:     insulin lispro (HumaLOG) 100 units/mL injection, Inject 30 units before meals + scale, Disp: , Rfl:     ipratropium-albuterol (DUO-NEB) 0.5-2.5 mg/3 mL nebulizer solution, , Disp: , Rfl:     levalbuterol (XOPENEX) 1.25 mg/3 mL nebulizer solution, , Disp: , Rfl:     losartan (COZAAR) 25 mg tablet, Take 1 tablet (25 mg total) by mouth daily Do not start before December 12, 2023., Disp: , Rfl: 0    magnesium hydroxide (MILK OF MAGNESIA) 400 mg/5 mL oral suspension, Take by mouth daily as needed for constipation, Disp: , Rfl:     Melatonin 5 MG TABS, , Disp: , Rfl:     memantine (NAMENDA) 10 mg tablet, 10 mg 2 (two) times a day, Disp: , Rfl:     montelukast (SINGULAIR) 10 mg tablet, Take 10 mg by mouth daily at bedtime, Disp: , Rfl:     Omeprazole 20 MG TBEC, Take 20 mg by mouth daily, Disp: , Rfl:     simvastatin (ZOCOR) 40 mg tablet, Take 1 tablet by mouth daily at bedtime, Disp: , Rfl: 0    traZODone (DESYREL) 150 mg tablet, , Disp: , Rfl:     cetirizine (ZyrTEC) 10 mg tablet, Take 10 mg by mouth daily (Patient not taking: Reported on 1/15/2024), Disp: , Rfl:   Review of Systems   Constitutional:  Positive for fatigue (occasional). Negative for activity change, appetite change and unexpected weight change.   HENT:  Negative for trouble swallowing.    Eyes:  Negative for visual disturbance.   Respiratory:  Negative for shortness of breath.    Cardiovascular:  Negative for chest pain and palpitations.   Gastrointestinal:  Negative for constipation and diarrhea.   Endocrine: Negative for polydipsia and polyuria.   Musculoskeletal:  Positive for arthralgias.   Skin:  Negative for wound.   Neurological:  Positive for numbness (occasional).   Psychiatric/Behavioral: Negative.         Physical Exam:  There is no height or weight on file to calculate BMI.  /76 (BP Location: Left arm, Patient Position:  "Sitting, Cuff Size: Large)   Pulse 72   SpO2 96%    Wt Readings from Last 3 Encounters:   05/30/24 87.5 kg (193 lb)   04/05/24 87.7 kg (193 lb 5.5 oz)   10/14/23 98.9 kg (218 lb 0.6 oz)       Physical Exam  Vitals and nursing note reviewed.   Constitutional:       Appearance: She is well-developed.   HENT:      Head: Normocephalic.   Eyes:      General: No scleral icterus.     Extraocular Movements: EOM normal.   Neck:      Thyroid: No thyromegaly.   Cardiovascular:      Rate and Rhythm: Normal rate and regular rhythm.      Pulses:           Radial pulses are 2+ on the right side and 2+ on the left side.      Heart sounds: No murmur heard.  Pulmonary:      Effort: Pulmonary effort is normal. No respiratory distress.      Breath sounds: Normal breath sounds. No wheezing.   Musculoskeletal:      Cervical back: Neck supple.   Skin:     General: Skin is warm and dry.   Neurological:      Mental Status: She is alert.   Psychiatric:         Mood and Affect: Mood and affect normal.       Patient's shoes and socks were not removed.          Labs:   Lab Results   Component Value Date    HGBA1C 5.7 (H) 07/05/2024     Lab Results   Component Value Date    GLUCOSE 292 (H) 03/28/2024    CALCIUM 8.7 07/05/2024     (L) 09/21/2015    K 4.5 07/05/2024    CO2 32 (H) 07/05/2024     07/05/2024    BUN 39 (H) 07/05/2024    CREATININE 1.75 (H) 07/05/2024     No results found for: \"MICROALBUR\", \"[id number]\"  GFR, Calculated   Date Value Ref Range Status   11/10/2020 45 (L) >60 mL/min/1.73m2 Final     Comment:     mL/min per 1.73 square meters                                            Normal Function or Mild Renal    Disease (if clinically at risk):  >or=60  Moderately Decreased:                30-59  Severely Decreased:                  15-29  Renal Failure:                         <15                                            -American GFR: multiply reported GFR by 1.16    Please note that the eGFR is based on the " "CKD-EPI calculation, and is not intended to be used for drug dosing.                                            Note: Calculated GFR may not be an accurate indicator of renal function if the patient's renal function is not in a steady state.     eGFRcr   Date Value Ref Range Status   07/05/2024 30 (L) >59 Final     No components found for: \"MALBCRER\"  Lab Results   Component Value Date    CHOL 124 09/21/2015    HDL 37 (L) 04/18/2016    TRIG 140 04/18/2016     Lab Results   Component Value Date    ALT 4 (L) 03/30/2024    AST 10 (L) 03/30/2024    ALKPHOS 50 03/30/2024    BILITOT 0.61 09/21/2015     Lab Results   Component Value Date    KTE1FLKLEMPL 2.078 07/07/2023    TSH 2.22 11/14/2023           Plan:    Diagnoses and all orders for this visit:    Type 2 diabetes mellitus with hyperglycemia, with long-term current use of insulin ([facility])  HGA1C 5.7%.  Likely falsely low due to CKD  Treatment regimen: Unable to make changes today as patient did not bring blood glucose log.  Clinical staff did call nursing home to send blood glucose log.  Will review once fax is received and suggest changes as necessary.  For now continue current treatment  Discussed intensive insulin regimen does increase risk for hypoglycemia. Episodes of hypoglycemia can lead to permanent disability and death.  Discussed risks/complications associated with uncontrolled diabetes.    Advised to adhere to diabetic diet, and recommended staying active/exercising routinely as tolerated.  Keep carbohydrates consistent to limit blood glucose fluctuations.  Advised to call if blood sugars less than 70 mg/dl or over 300 mg/dl.   Check blood glucose 3+ times a day  Discussed symptoms and treatment of hypoglycemia.   Recommended routine follow-up with podiatry and ophthalmology.   Ordered blood work to complete prior to next visit.  -     Insulin Glargine Solostar (Basaglar FauziaikPen) 100 UNIT/ML SOPN; Inject 56 units under the skin every 12 hours  -     " Hemoglobin A1C; Future  -     Fructosamine; Future  -     Basic metabolic panel; Future    Essential hypertension  Blood pressure well-controlled, continue current treatment    Mixed hyperlipidemia  LDL previously 57  On statin therapy  Managed by PCP         Discussed with the patient diagnosis and treatment and all questions fully answered. She will call me if any problems arise.    Counseled patient on diagnostic results, prognosis, risk and benefit of treatment options, instruction for management, importance of treatment compliance, risk factor reduction and impressions.      Elena Mays PA-C

## 2024-07-23 NOTE — PATIENT INSTRUCTIONS
Hypoglycemia instructions   Jud Ayalazer  7/23/2024  [id number]    Low Blood Sugar    Steps to treat low blood sugar.    1. Test blood sugar if you have symptoms of low blood sugar:   Low Blood Sugar Symptoms:  o Sweaty  o Dizzy  o Rapid heartbeat  o Shaky  o Bad mood  o Hungry      2. Treat blood sugar less than 70 with 15 grams of fast-acting carbohydrate:   Examples of 15 grams Fast-Acting Carbohydrate:  o 4 oz juice  o 4 oz regular soda  o 3-4 glucose tablets (chew)  o 3-4 hard candies (chew)          3.  Wait 15 minutes and test your blood sugar again     4. If blood sugar is less than 100, repeat steps 2-3.    5. When your blood sugar is 100 or more, eat a snack if it will be longer than one hour until your next meal. The snack should be 15 grams of carbohydrate and a protein:   Examples of snacks:  o ½ sandwich  o 6 crackers with cheese  o Piece of fruit with cheese or peanut butter  o 6 crackers with peanut butter

## 2024-08-09 ENCOUNTER — OFFICE VISIT (OUTPATIENT)
Dept: PULMONOLOGY | Facility: CLINIC | Age: 74
End: 2024-08-09
Payer: MEDICARE

## 2024-08-09 VITALS
SYSTOLIC BLOOD PRESSURE: 122 MMHG | OXYGEN SATURATION: 97 % | DIASTOLIC BLOOD PRESSURE: 60 MMHG | TEMPERATURE: 97.1 F | HEART RATE: 75 BPM

## 2024-08-09 DIAGNOSIS — R91.1 LUNG NODULE: Primary | ICD-10-CM

## 2024-08-09 DIAGNOSIS — R93.89 ABNORMAL CT OF THE CHEST: ICD-10-CM

## 2024-08-09 DIAGNOSIS — G47.33 OSA (OBSTRUCTIVE SLEEP APNEA): ICD-10-CM

## 2024-08-09 DIAGNOSIS — J44.89 ASTHMA-COPD OVERLAP SYNDROME: ICD-10-CM

## 2024-08-09 DIAGNOSIS — J96.11 CHRONIC RESPIRATORY FAILURE WITH HYPOXIA (HCC): ICD-10-CM

## 2024-08-09 DIAGNOSIS — J96.12 CHRONIC RESPIRATORY FAILURE WITH HYPERCAPNIA (HCC): ICD-10-CM

## 2024-08-09 PROCEDURE — G2211 COMPLEX E/M VISIT ADD ON: HCPCS | Performed by: INTERNAL MEDICINE

## 2024-08-09 PROCEDURE — 99214 OFFICE O/P EST MOD 30 MIN: CPT | Performed by: INTERNAL MEDICINE

## 2024-08-09 NOTE — PROGRESS NOTES
Follow Up - Pulmonary Medicine   Jud Walls 73 y.o. female MRN: [id number]    Jud Walls is a 73 y.o. female hx former smoker, COPD, chronic hypoxic respiratory failure on 2L NC, DM2, GILDARDO not on tx, HTN, presenting for post hospital follow up.     COPD  Chronic hypoxic respiratory failure  Former smoker  50PY, quit 6 years ago. Recent hospitalization for COPD flare, improved with steroids. Currently at baseline, though not very ambulatory at baseline  Spirometry with severe obstruction  - Continue Advair and albuterol prn, doubt will be any significant benefit from changing inhalers, multiple hospitalizations therefore benefits from ICS  - continue 2L O2    Abnormal CT  Lower lobe nodular opacities  Pulm Nodule  On CT lung screening, found incidentally, stable on 1 month follow up, lilkey resolving infectious/inflammatory. Did not get abx but had some URI symptoms around that time. In March (admission for hernia) had smaller bibasilar opacities. Currenlty no fevers, no change in chronic dry cough or sputum production    - CT chest in 3 months (scheduled in October)     GILDARDO  Chronic hypercapnia  - Likely hypercarbia due to COPD + GILDARDO + OHS, pt continues to decline any PAP therapy    Follow up 3-6 months with Dr Duval    Vaccines    Immunization History   Administered Date(s) Administered    COVID-19 PFIZER VACCINE 0.3 ML IM 01/09/2021, 02/14/2021, 10/30/2021, 05/19/2022    COVID-19 Pfizer Vac BIVALENT Chucky-sucrose 12 Yr+ IM 10/07/2022    INFLUENZA 09/22/2015, 10/12/2016    Influenza Quadrivalent, 6-35 Months IM 10/16/2014    Influenza Split High Dose Preservative Free IM 09/22/2015, 10/12/2016    Influenza, high dose seasonal 0.7 mL 10/14/2020    Influenza, seasonal, injectable 10/12/2011, 10/01/2013    Pneumococcal Conjugate 13-Valent 09/22/2015    Pneumococcal Polysaccharide PPV23 09/08/1999, 04/12/2019      ______________________________________________________________________    HPI:    Jud  CELI Walls is a 73 y.o. female  hx former smoker, COPD, chronic hypoxic respiratory failure on 2L NC, DM2, GILDARDO not on tx, HTN, presenting for follow up.     Admitted in March for incarcerated hernia sp OR    In Lung ca screening in May had some bibasilar opacities, stable a month later  Pt says she may have had some URI sympotms at the itme    Currently no fevers, chills, no changes in chronic mild cough, dry  No chest pain or and has chronic FARIAS, poor ET      Review of Systems:  Review of Systems  Aside from what is mentioned in the HPI, the review of systems otherwise negative.    Current Medications:    Current Outpatient Medications:     acetaminophen (TYLENOL) 325 mg tablet, Take 3 tablets (975 mg total) by mouth every 8 (eight) hours, Disp: 30 tablet, Rfl: 0    Advair Diskus 500-50 MCG/DOSE inhaler, , Disp: , Rfl:     albuterol (Albuterol Sulfate) (5 mg/mL) 0.5 % nebulizer solution, Take 2.5 mg by nebulization every 6 (six) hours as needed for wheezing, Disp: , Rfl:     ascorbic acid (VITAMIN C) 500 mg tablet, , Disp: , Rfl:     aspirin (ECOTRIN LOW STRENGTH) 81 mg EC tablet, , Disp: , Rfl:     benzonatate (TESSALON) 200 MG capsule, Take 200 mg by mouth 3 (three) times a day as needed for cough, Disp: , Rfl:     bisacodyl (FLEET) 10 MG/30ML ENEM, Insert 10 mg into the rectum once, Disp: , Rfl:     busPIRone (BUSPAR) 5 mg tablet, Take 5 mg by mouth 2 (two) times a day, Disp: , Rfl:     cetirizine (ZyrTEC) 10 mg tablet, Take 10 mg by mouth daily, Disp: , Rfl:     Cholecalciferol (VITAMIN D3) 45543 units CAPS, Take 50,000 Units by mouth every 30 (thirty) days, Disp: , Rfl:     dulaglutide (Trulicity) 1.5 MG/0.5ML injection, Inject 1.5 mg weekly, Disp: 6 mL, Rfl: 1    escitalopram (LEXAPRO) 10 mg tablet, Take 10 mg by mouth daily, Disp: , Rfl:     ferrous gluconate (FERGON) 324 mg tablet, Take 1 tablet (324 mg total) by mouth daily before breakfast, Disp: , Rfl: 0    fluticasone (FLONASE) 50 mcg/act nasal spray, 1  spray into each nostril daily, Disp: 1 Bottle, Rfl: 5    gabapentin (NEURONTIN) 300 mg capsule, Take 300 mg by mouth 2 (two) times a day, Disp: , Rfl:     Glucose Blood (BL TEST STRIP PACK VI), Test, Disp: , Rfl:     glucose blood test strip, Test, Disp: , Rfl:     Insulin Glargine Solostar (Basaglar KwikPen) 100 UNIT/ML SOPN, Inject 56 units under the skin every 12 hours, Disp: , Rfl:     insulin lispro (HumaLOG) 100 units/mL injection, Inject 30 units before meals + scale, Disp: , Rfl:     ipratropium-albuterol (DUO-NEB) 0.5-2.5 mg/3 mL nebulizer solution, , Disp: , Rfl:     losartan (COZAAR) 25 mg tablet, Take 1 tablet (25 mg total) by mouth daily Do not start before December 12, 2023., Disp: , Rfl: 0    magnesium hydroxide (MILK OF MAGNESIA) 400 mg/5 mL oral suspension, Take by mouth daily as needed for constipation, Disp: , Rfl:     memantine (NAMENDA) 10 mg tablet, 10 mg 2 (two) times a day, Disp: , Rfl:     montelukast (SINGULAIR) 10 mg tablet, Take 10 mg by mouth daily at bedtime, Disp: , Rfl:     Omeprazole 20 MG TBEC, Take 20 mg by mouth daily, Disp: , Rfl:     simvastatin (ZOCOR) 40 mg tablet, Take 1 tablet by mouth daily at bedtime, Disp: , Rfl: 0    traZODone (DESYREL) 150 mg tablet, , Disp: , Rfl:     clonazePAM (KlonoPIN) 0.5 mg tablet, , Disp: , Rfl:     guaiFENesin (MUCINEX) 600 mg 12 hr tablet, Take 1,200 mg by mouth every 12 (twelve) hours (Patient not taking: Reported on 8/9/2024), Disp: , Rfl:     levalbuterol (XOPENEX) 1.25 mg/3 mL nebulizer solution, , Disp: , Rfl:     Melatonin 5 MG TABS, , Disp: , Rfl:     Historical Information   Past Medical History:   Diagnosis Date    Alzheimer disease (HCC)     Asthma     Diabetes mellitus (HCC)     GERD (gastroesophageal reflux disease)     Hyperlipidemia     Hypertension      Past Surgical History:   Procedure Laterality Date    JOINT REPLACEMENT Bilateral     KNEE SURGERY      LAPAROTOMY N/A 3/28/2024    Procedure: LAPAROTOMY EXPLORATORY, ventral  "hernia repair no mesh, Extended hemicolectomy,;  Surgeon: Cedrick Lipscomb DO;  Location: [facility];  Service: General    TOE SURGERY       Social History   Social History     Tobacco Use   Smoking Status Former    Current packs/day: 0.00    Average packs/day: 1 pack/day for 52.7 years (52.7 ttl pk-yrs)    Types: Cigarettes    Start date:     Quit date: 2017    Years since quittin.9   Smokeless Tobacco Never       Family History:   Family History   Problem Relation Age of Onset    Dementia Brother     Breast cancer Sister 75    Cancer Brother          PhysicalExamination:  Vitals:   /60   Pulse 75   Temp (!) 97.1 °F (36.2 °C)   SpO2 97%  on 2L    Appearance -- NAD, speaking full sentences  HEENT -- anicteric sclera, clear OP, MMM  Neck -- no JVD  Heart -- RRR, no murmurs  Lungs -- decreased breath sounds at b/l bases, no wheezing or rhonchi  Abdomen -- soft, NTND, +bs  Extremities -- WWP, no LE edema  Skin -- no rash  Neuro -- A&Ox3, wnl  Psych -- no obvious depression or hallucination        Diagnostic Data:  Labs:  I personally reviewed the most recent laboratory data pertinent to today's visit    Lab Results   Component Value Date    WBC 9.66 2024    HGB 9.5 (L) 2024    HCT 30.2 (L) 2024    MCV 93 2024     (L) 2024     Lab Results   Component Value Date    GLUCOSE 292 (H) 2024    CALCIUM 8.7 2024     (L) 2015    K 4.5 2024    CO2 32 (H) 2024     2024    BUN 39 (H) 2024    CREATININE 1.75 (H) 2024     No results found for: \"IGE\"  Lab Results   Component Value Date    ALT 4 (L) 2024    AST 10 (L) 2024    ALKPHOS 50 2024    BILITOT 0.61 2015       PFT results:  The most recent pulmonary function tests were reviewed.      Imaging:  I personally reviewed the images on the PAC system pertinent to today's visit  CT Chest 2024: 1. Multifocal opacities of right middle and " bilateral lower lobes likely represent infectious/inflammatory process.  2. Stable pulmonary nodules measuring up to 8 mm.    Ct Chest  6/2024: Persistent peribronchovascular nodular and groundglass opacities in the bilateral lower lobes representing ongoing or resolving pneumonia. Recommend follow-up chest CT in 3 months following appropriate treatment to ensure complete resolution.     Unchanged pulmonary nodules measuring up to 8 mm.          Emma Aguiar MD  [facility]

## 2024-08-19 NOTE — ASSESSMENT & PLAN NOTE
Patient met SIRS criteria upon admission to the ED with a fever of 101, tachycardia, tachypnea, and elevated [place].  Chest x-ray was negative for any evidence of pneumonia. Patient had no leukocytosis. UA revealed evidence of white blood cells and few leukocytes, can consider urinary source. She was given dose of lactated Ringer's and ceftriaxone while in the ED. In addition, patient was in acute respiratory distress and was placed on BiPAP initially while in the ED by respiratory therapy. Now saturating on 4 L nasal cannula at 95%. Patient denies any symptoms of cough, however cannot rule out upper respiratory infection.   Low suspicion for pneumonia    Plan:  -Discontinued IV ceftriaxone, transitioned to azithromycin  -Blood cultures x 2 pending  -Sputum culture ordered but not collected  -Continue to monitor vitals for fever  -Monitor morning blood work Render Risk Assessment In Note?: no Detail Level: Simple Additional Notes: Patient’s  had jar of TAC, pt will just use his.

## 2024-09-05 ENCOUNTER — TELEPHONE (OUTPATIENT)
Dept: NEUROLOGY | Facility: CLINIC | Age: 74
End: 2024-09-05

## 2024-09-05 NOTE — TELEPHONE ENCOUNTER
Spoke to pt nursing home rep and confirmed their 1:00 PM   appt on Sep 11th    w/   Isabella Bolanos. Reminded pt to arrive 15 mins prior to appt to check in with .

## 2024-09-11 ENCOUNTER — TELEMEDICINE (OUTPATIENT)
Dept: NEUROLOGY | Facility: CLINIC | Age: 74
End: 2024-09-11
Payer: MEDICARE

## 2024-09-11 DIAGNOSIS — G30.9 ALZHEIMER'S DEMENTIA (HCC): Primary | Chronic | ICD-10-CM

## 2024-09-11 DIAGNOSIS — F02.80 ALZHEIMER'S DEMENTIA (HCC): Primary | Chronic | ICD-10-CM

## 2024-09-11 PROCEDURE — 99214 OFFICE O/P EST MOD 30 MIN: CPT | Performed by: PHYSICIAN ASSISTANT

## 2024-09-11 NOTE — PROGRESS NOTES
Virtual Regular Visit  Name: Jud Walls      : 1950      MRN: [id number]  Encounter Provider: Isabella Rose PA-C  Encounter Date: 2024   Encounter department: [facility] [place]    Verification of patient location:    Patient is located at Other in the following state in which I hold an active license [place]    Assessment & Plan  Alzheimer's dementia (HCC)  Pt and staff report stability of memory.  Pt will continue memantine.  Patient was encouraged to increase mind stimulating activities such as reading, crosswords, word searches, puzzles, Soduku, solitaire, coloring and other brain games.  We also discussed the importance of staying physically active and eating a health diet such as the Mediterranean or MIND diet.  She was also encouraged to participate in group activities to keep her mind stimulated as well.  I have spent a total time of 30 minutes in caring for this patient on the day of the visit/encounter including Diagnostic results, Prognosis, Risks and benefits of tx options, Instructions for management, Patient and family education, Importance of tx compliance, Risk factor reductions, Impressions, Counseling / Coordination of care, Documenting in the medical record, Reviewing / ordering tests, medicine, procedures  , Obtaining or reviewing history  , and Communicating with other healthcare professionals .  She will follow-up in 1 year.           Encounter provider Isabella Rose PA-C    The patient was identified by name and date of birth. Jud Walls was informed that this is a telemedicine visit and that the visit is being conducted through the Epic Embedded platform. She agrees to proceed..  My office door was closed. No one else was in the room.  She acknowledged consent and understanding of privacy and security of the video platform. The patient has agreed to participate and understands they can discontinue the visit at any time.    Patient is  aware this is a billable service.     History of Present Illness       Jud Walls is a 74 y.o. female, with HTN, DM type 2, hyperlipidemia, anxiety and asthma, who follows in the office due to Alzheimer's dementia. She states that she is doing well overall. She continues to take memantine and tolerates it without difficulty. She states that her memory is slowly worsening. She denies any difficulty with appetite or swallowing. She is able to socialize with other residents at [facility]. She will participate in [employer] and does does word searches. She has not had any hallucinations. She was hospitalized in April due to volvulus and COPD exacerbation. No problems were expressed by the staff at [facility].     History obtained from : patient and caregivers  A 10 point review of systems was negative except for what is noted in the HPI.    Medical History Reviewed by provider this encounter:  Tobacco  Allergies  Meds  Problems  Med Hx  Surg Hx  Fam Hx           Objective     There were no vitals taken for this visit.  Physical Exam  Vitals reviewed.   Constitutional:       Appearance: Normal appearance.   HENT:      Head: Normocephalic and atraumatic.      Mouth/Throat:      Mouth: Mucous membranes are moist.   Eyes:      Extraocular Movements: Extraocular movements intact.   Pulmonary:      Effort: Pulmonary effort is normal.   Neurological:      Mental Status: She is alert. Mental status is at baseline.      Comments: Speech and language intact.  Face symmetric.         Visit Time  Total Visit Duration: 30 minutes

## 2024-10-23 ENCOUNTER — HOSPITAL ENCOUNTER (OUTPATIENT)
Dept: CT IMAGING | Facility: HOSPITAL | Age: 74
Discharge: HOME | End: 2024-10-23
Attending: INTERNAL MEDICINE
Payer: MEDICARE

## 2024-10-23 DIAGNOSIS — R93.89 ABNORMAL CT OF THE CHEST: ICD-10-CM

## 2024-10-23 DIAGNOSIS — R91.1 LUNG NODULE: ICD-10-CM

## 2024-10-23 PROCEDURE — 71250 CT THORAX DX C-: CPT

## 2024-10-29 ENCOUNTER — TELEPHONE (OUTPATIENT)
Dept: PULMONOLOGY | Facility: CLINIC | Age: 74
End: 2024-10-29

## 2024-10-29 NOTE — TELEPHONE ENCOUNTER
Called patient to schedule appointment for the 3M FU (around 11/9/2024) from the Recall List and left message with staff which will route the call to Transport to schedule appointment.

## 2024-12-04 ENCOUNTER — OFFICE VISIT (OUTPATIENT)
Dept: PULMONOLOGY | Facility: CLINIC | Age: 74
End: 2024-12-04
Payer: MEDICARE

## 2024-12-04 VITALS
OXYGEN SATURATION: 98 % | TEMPERATURE: 97 F | SYSTOLIC BLOOD PRESSURE: 122 MMHG | DIASTOLIC BLOOD PRESSURE: 80 MMHG | HEART RATE: 67 BPM

## 2024-12-04 DIAGNOSIS — R91.1 PULMONARY NODULE: ICD-10-CM

## 2024-12-04 DIAGNOSIS — J96.21 ACUTE ON CHRONIC RESPIRATORY FAILURE WITH HYPOXIA AND HYPERCAPNIA (HCC): Primary | ICD-10-CM

## 2024-12-04 DIAGNOSIS — J44.1 CHRONIC OBSTRUCTIVE PULMONARY DISEASE WITH (ACUTE) EXACERBATION (HCC): ICD-10-CM

## 2024-12-04 DIAGNOSIS — J96.22 ACUTE ON CHRONIC RESPIRATORY FAILURE WITH HYPOXIA AND HYPERCAPNIA (HCC): Primary | ICD-10-CM

## 2024-12-04 DIAGNOSIS — G47.33 OSA (OBSTRUCTIVE SLEEP APNEA): Chronic | ICD-10-CM

## 2024-12-04 DIAGNOSIS — G47.34 NOCTURNAL HYPOXIA: ICD-10-CM

## 2024-12-04 PROBLEM — J96.01 ACUTE RESPIRATORY FAILURE WITH HYPOXIA AND HYPERCAPNIA (HCC): Status: RESOLVED | Noted: 2017-08-20 | Resolved: 2024-12-04

## 2024-12-04 PROBLEM — Z01.811 PREOP PULMONARY/RESPIRATORY EXAM: Status: RESOLVED | Noted: 2018-10-15 | Resolved: 2024-12-04

## 2024-12-04 PROBLEM — J96.02 ACUTE RESPIRATORY FAILURE WITH HYPOXIA AND HYPERCAPNIA (HCC): Status: RESOLVED | Noted: 2017-08-20 | Resolved: 2024-12-04

## 2024-12-04 PROCEDURE — G2211 COMPLEX E/M VISIT ADD ON: HCPCS | Performed by: INTERNAL MEDICINE

## 2024-12-04 PROCEDURE — 99214 OFFICE O/P EST MOD 30 MIN: CPT | Performed by: INTERNAL MEDICINE

## 2024-12-04 NOTE — PROGRESS NOTES
Follow Up - Pulmonary Medicine   Jud Walls 74 y.o. female MRN: [id number]    Jud Walls is a 74 y.o. female hx former smoker, COPD, chronic hypoxic respiratory failure on 2L NC, DM2, GILDARDO not on tx, HTN, presenting for post hospital follow up.     COPD  Chronic hypoxic respiratory failure  Former smoker  50PY, quit 7 years ago. Currently at baseline, though not very ambulatory at baseline  Spirometry with severe obstruction  No recent admissions/exacerbations since last visit in August    - Continue Advair and albuterol prn, doubt will be any significant benefit from changing inhalers, multiple hospitalizations therefore benefits from ICS  - continue 2L O2    Abnormal CT  Lower lobe nodular opacities  Pulm Nodule  On CT lung screening, found incidentally, slightly increased Aug to Oct 2024 from 8 to 9mm. Did not get abx but had some URI symptoms around that time. In March (admission for hernia) had smaller bibasilar opacities which are resolving. Currenlty no fevers, no change in chronic dry cough or sputum production    - repeat CT chest in 3 months     GILDARDO  Chronic hypercapnia  - Likely hypercarbia due to COPD + GILDARDO + OHS, pt continues to decline any PAP therapy    Follow up 3-6 months    Vaccines    Immunization History   Administered Date(s) Administered    COVID-19 PFIZER VACCINE 0.3 ML IM 01/09/2021, 02/14/2021, 10/30/2021, 05/19/2022    COVID-19 Pfizer Vac BIVALENT Chucky-sucrose 12 Yr+ IM 10/07/2022    INFLUENZA 09/22/2015, 10/12/2016    Influenza Quadrivalent, 6-35 Months IM 10/16/2014    Influenza Split High Dose Preservative Free IM 09/22/2015, 10/12/2016    Influenza, high dose seasonal 0.7 mL 10/14/2020    Influenza, seasonal, injectable 10/12/2011, 10/01/2013    Pneumococcal Conjugate 13-Valent 09/22/2015    Pneumococcal Polysaccharide PPV23 09/08/1999, 04/12/2019      ______________________________________________________________interval Hx  Feeling ok. Mainly in wheelchair, minimal  ambulaiton  Mild chronic cough, no exacerbations requiring prednisone or admission  No changes in breathing. Taking advair and prn albuterol    HPI:    Jud Walls is a 74 y.o. female  hx former smoker, COPD, chronic hypoxic respiratory failure on 2L NC, DM2, GILDARDO not on tx, HTN, presenting for follow up.     Admitted in March for incarcerated hernia sp OR    In Lung ca screening in May had some bibasilar opacities, stable a month later  Pt says she may have had some URI sympotms at the itme    Currently no fevers, chills, no changes in chronic mild cough, dry  No chest pain or and has chronic FARIAS, poor ET      Review of Systems:  Review of Systems  Aside from what is mentioned in the HPI, the review of systems otherwise negative.    Current Medications:    Current Outpatient Medications:     acetaminophen (TYLENOL) 325 mg tablet, Take 3 tablets (975 mg total) by mouth every 8 (eight) hours, Disp: 30 tablet, Rfl: 0    Advair Diskus 500-50 MCG/DOSE inhaler, , Disp: , Rfl:     albuterol (Albuterol Sulfate) (5 mg/mL) 0.5 % nebulizer solution, Take 2.5 mg by nebulization every 6 (six) hours as needed for wheezing, Disp: , Rfl:     ascorbic acid (VITAMIN C) 500 mg tablet, , Disp: , Rfl:     aspirin (ECOTRIN LOW STRENGTH) 81 mg EC tablet, , Disp: , Rfl:     benzonatate (TESSALON) 200 MG capsule, Take 200 mg by mouth 3 (three) times a day as needed for cough, Disp: , Rfl:     bisacodyl (FLEET) 10 MG/30ML ENEM, Insert 10 mg into the rectum once, Disp: , Rfl:     busPIRone (BUSPAR) 5 mg tablet, Take 5 mg by mouth 2 (two) times a day, Disp: , Rfl:     cetirizine (ZyrTEC) 10 mg tablet, Take 10 mg by mouth daily, Disp: , Rfl:     Cholecalciferol (VITAMIN D3) 76026 units CAPS, Take 50,000 Units by mouth every 30 (thirty) days, Disp: , Rfl:     dulaglutide (Trulicity) 1.5 MG/0.5ML injection, Inject 1.5 mg weekly, Disp: 6 mL, Rfl: 1    escitalopram (LEXAPRO) 10 mg tablet, Take 10 mg by mouth daily, Disp: , Rfl:     ferrous  gluconate (FERGON) 324 mg tablet, Take 1 tablet (324 mg total) by mouth daily before breakfast, Disp: , Rfl: 0    fluticasone (FLONASE) 50 mcg/act nasal spray, 1 spray into each nostril daily, Disp: 1 Bottle, Rfl: 5    gabapentin (NEURONTIN) 300 mg capsule, Take 300 mg by mouth 2 (two) times a day, Disp: , Rfl:     Glucose Blood (BL TEST STRIP PACK VI), Test, Disp: , Rfl:     glucose blood test strip, Test, Disp: , Rfl:     guaiFENesin (MUCINEX) 600 mg 12 hr tablet, Take 1,200 mg by mouth every 12 (twelve) hours, Disp: , Rfl:     Insulin Glargine Solostar (Basaglar KwikPen) 100 UNIT/ML SOPN, Inject 56 units under the skin every 12 hours, Disp: , Rfl:     insulin lispro (HumaLOG) 100 units/mL injection, Inject 30 units before meals + scale, Disp: , Rfl:     ipratropium-albuterol (DUO-NEB) 0.5-2.5 mg/3 mL nebulizer solution, , Disp: , Rfl:     levalbuterol (XOPENEX) 1.25 mg/3 mL nebulizer solution, , Disp: , Rfl:     losartan (COZAAR) 25 mg tablet, Take 1 tablet (25 mg total) by mouth daily Do not start before December 12, 2023., Disp: , Rfl: 0    magnesium hydroxide (MILK OF MAGNESIA) 400 mg/5 mL oral suspension, Take by mouth daily as needed for constipation, Disp: , Rfl:     memantine (NAMENDA) 10 mg tablet, 10 mg 2 (two) times a day, Disp: , Rfl:     montelukast (SINGULAIR) 10 mg tablet, Take 10 mg by mouth daily at bedtime, Disp: , Rfl:     Omeprazole 20 MG TBEC, Take 20 mg by mouth daily, Disp: , Rfl:     simvastatin (ZOCOR) 40 mg tablet, Take 1 tablet by mouth daily at bedtime, Disp: , Rfl: 0    traZODone (DESYREL) 150 mg tablet, , Disp: , Rfl:     clonazePAM (KlonoPIN) 0.5 mg tablet, , Disp: , Rfl:     Melatonin 5 MG TABS, , Disp: , Rfl:     Historical Information   Past Medical History:   Diagnosis Date    Alzheimer disease (HCC)     Asthma     Diabetes mellitus (HCC)     GERD (gastroesophageal reflux disease)     Hyperlipidemia     Hypertension      Past Surgical History:   Procedure Laterality Date    JOINT  "REPLACEMENT Bilateral     KNEE SURGERY      LAPAROTOMY N/A 3/28/2024    Procedure: LAPAROTOMY EXPLORATORY, ventral hernia repair no mesh, Extended hemicolectomy,;  Surgeon: Cedrick Lipscomb DO;  Location: [facility];  Service: General    TOE SURGERY       Social History   Social History     Tobacco Use   Smoking Status Former    Current packs/day: 0.00    Average packs/day: 1 pack/day for 52.7 years (52.7 ttl pk-yrs)    Types: Cigarettes    Start date:     Quit date: 2017    Years since quittin.2   Smokeless Tobacco Never       Family History:   Family History   Problem Relation Age of Onset    Dementia Brother     Breast cancer Sister 75    Cancer Brother          PhysicalExamination:  Vitals:   /80   Pulse 67   Temp (!) 97 °F (36.1 °C) (Temporal)   SpO2 98%  on 2L    Appearance -- NAD, speaking full sentences  HEENT -- anicteric sclera, clear OP, MMM  Neck -- no JVD  Heart -- RRR, no murmurs  Lungs -- decreased breath sounds at b/l bases, no wheezing or rhonchi  Abdomen -- soft, NTND, +bs  Extremities -- WWP, no LE edema  Skin -- no rash  Neuro -- A&Ox3, wnl  Psych -- no obvious depression or hallucination        Diagnostic Data:  Labs:  I personally reviewed the most recent laboratory data pertinent to today's visit    Lab Results   Component Value Date    WBC 9.66 2024    HGB 9.5 (L) 2024    HCT 30.2 (L) 2024    MCV 93 2024     (L) 2024     Lab Results   Component Value Date    GLUCOSE 292 (H) 2024    CALCIUM 8.7 2024     (L) 2015    K 4.5 2024    CO2 32 (H) 2024     2024    BUN 39 (H) 2024    CREATININE 1.75 (H) 2024     No results found for: \"IGE\"  Lab Results   Component Value Date    ALT 4 (L) 2024    AST 10 (L) 2024    ALKPHOS 50 2024    BILITOT 0.61 2015       PFT results:  The most recent pulmonary function tests were reviewed.      Imaging:  I personally " reviewed the images on the PAC system pertinent to today's visit  CT Chest 5/2024: 1. Multifocal opacities of right middle and bilateral lower lobes likely represent infectious/inflammatory process.  2. Stable pulmonary nodules measuring up to 8 mm.    Ct Chest  6/2024: Persistent peribronchovascular nodular and groundglass opacities in the bilateral lower lobes representing ongoing or resolving pneumonia. Recommend follow-up chest CT in 3 months following appropriate treatment to ensure complete resolution.  Unchanged pulmonary nodules measuring up to 8 mm.    CT Chest 10/2024: Mild interval enlargement of the left upper lobe nodule now measuring 9 mm, measured 8 mm when measured in similar fashion on the previous study. Remaining index nodules without significant change.  Continued improvement of the peribronchial vascular nodular and groundglass opacities predominantly in the lower lobes. Continued scattered bandlike and dependent opacities could be due to atelectasis or scarring.  Mediastinal lymph nodes are present without pathological enlargement.        Emma Aguiar MD  [facility]

## 2025-02-03 RX ORDER — DULAGLUTIDE 1.5 MG/.5ML
INJECTION, SOLUTION SUBCUTANEOUS
COMMUNITY
Start: 2024-11-18

## 2025-02-03 RX ORDER — INSULIN LISPRO 100 [IU]/ML
30 INJECTION, SOLUTION INTRAVENOUS; SUBCUTANEOUS
COMMUNITY
Start: 2024-10-28

## 2025-02-04 ENCOUNTER — OFFICE VISIT (OUTPATIENT)
Dept: ENDOCRINOLOGY | Facility: CLINIC | Age: 75
End: 2025-02-04
Payer: MEDICARE

## 2025-02-04 VITALS — SYSTOLIC BLOOD PRESSURE: 124 MMHG | OXYGEN SATURATION: 92 % | DIASTOLIC BLOOD PRESSURE: 78 MMHG | HEART RATE: 74 BPM

## 2025-02-04 DIAGNOSIS — E78.2 MIXED HYPERLIPIDEMIA: Chronic | ICD-10-CM

## 2025-02-04 DIAGNOSIS — N18.32 CHRONIC KIDNEY DISEASE, STAGE 3B (HCC): ICD-10-CM

## 2025-02-04 DIAGNOSIS — E11.65 TYPE 2 DIABETES MELLITUS WITH HYPERGLYCEMIA, WITH LONG-TERM CURRENT USE OF INSULIN (HCC): Primary | ICD-10-CM

## 2025-02-04 DIAGNOSIS — I10 ESSENTIAL HYPERTENSION: Chronic | ICD-10-CM

## 2025-02-04 DIAGNOSIS — Z79.4 TYPE 2 DIABETES MELLITUS WITH HYPERGLYCEMIA, WITH LONG-TERM CURRENT USE OF INSULIN (HCC): Primary | ICD-10-CM

## 2025-02-04 PROBLEM — E11.621 DIABETIC ULCER OF LEFT MIDFOOT ASSOCIATED WITH TYPE 2 DIABETES MELLITUS (HCC): Status: RESOLVED | Noted: 2022-06-25 | Resolved: 2025-02-04

## 2025-02-04 PROBLEM — L97.429 DIABETIC ULCER OF LEFT MIDFOOT ASSOCIATED WITH TYPE 2 DIABETES MELLITUS (HCC): Status: RESOLVED | Noted: 2022-06-25 | Resolved: 2025-02-04

## 2025-02-04 LAB — SL AMB POCT HEMOGLOBIN AIC: 5.7 (ref ?–6.5)

## 2025-02-04 PROCEDURE — 99214 OFFICE O/P EST MOD 30 MIN: CPT | Performed by: PHYSICIAN ASSISTANT

## 2025-02-04 PROCEDURE — 83036 HEMOGLOBIN GLYCOSYLATED A1C: CPT | Performed by: PHYSICIAN ASSISTANT

## 2025-02-04 RX ORDER — ONDANSETRON 4 MG/1
4 TABLET, FILM COATED ORAL
COMMUNITY
Start: 2024-12-09

## 2025-02-04 RX ORDER — FLUTICASONE FUROATE AND VILANTEROL TRIFENATATE 200; 25 UG/1; UG/1
1 POWDER RESPIRATORY (INHALATION)
COMMUNITY
Start: 2025-01-25

## 2025-02-04 RX ORDER — INSULIN GLARGINE 100 [IU]/ML
INJECTION, SOLUTION SUBCUTANEOUS
Status: SHIPPED
Start: 2025-02-04

## 2025-02-04 NOTE — PROGRESS NOTES
Patient Progress Note      CC: DM   Resident at [facility]      Referring Provider  No referring provider defined for this encounter.     History of Present Illness:   Jud Walls is a 74 y.o. female with a history of type 2 diabetes with long term use of insulin. Diabetes course has been stable. No recent hospitalization. Denies recent severe hypoglycemic or severe hyperglycemic episodes. Denies any issues with her current regimen. Home glucose monitoring: are performed regularly     BG log not sent with patient. Front staff called nursing home to fax over log.      Current regimen: Basaglar 52 units BID, Humalog 30 units TID with meals plus scale (giving 1/2 dose if BG under 110 mg/dl), Trulicity 1.5 mg weekly  compliant most of the time, denies any side effects from medications.  Injects in: abdomen. Rotates sites: Yes  Hypoglycemic episodes: Yes, she is unable to tell me how often she drops low  H/o of hypoglycemia causing hospitalization or Intervention such as glucagon injection or ambulance call : No, not that she recalls  Hypoglycemia symptoms: no symptoms.  Treatment of hypoglycemia: juice; discussed treatment      Medic alert tag: recommended: Yes     Diabetes education: No  Diet: 3 meals per day, 1 snack per day. Timing of meals is predictable.   Diabetic diet compliance: noncompliant some of the time. Meals are based on what she gets at the nursing home.   Activity: Daily activity is predictable: Yes. Activity is very limited      Ophthamology: March 2022. Overdue  Podiatry: feet are examined at the nursing home.      Has hypertension: on ACE inhibitor/ARB, compliant most of the time  Has hyperlipidemia: on statin - tolerating well, no myalgias. compliant most of the time, denies any side effects from medications.  Thyroid disorders: No  History of pancreatitis: No      Patient Active Problem List   Diagnosis    Mental status change    Essential hypertension    Diabetes (HCC)     Hypokalemia    Leukocytosis    Alzheimer's dementia (HCC)    Pulmonary nodule    MCI (mild cognitive impairment)    Fall    Injury of face    Sixth nerve palsy of left eye    Ptosis of left eyelid    Seasonal allergies    Need for pneumococcal vaccination    Hoarse voice quality    Hyperlipidemia    Asthma-COPD overlap syndrome (HCC)    S/P tooth extraction    Weakness generalized    Nocturnal hypoxia    Kidney lesion    Sacral ulcer (HCC)    Abscess of abdominal wall    GILDARDO (obstructive sleep apnea)    Acute on chronic respiratory failure with hypoxia and hypercapnia (HCC)    Anemia    Sore throat    Neuropathy    Chronic obstructive pulmonary disease with (acute) exacerbation (HCC)    Cecal volvulus (HCC)    Incarcerated hernia    Chronic kidney disease, stage 3b (HCC)    Pleural effusion    Toxic metabolic encephalopathy    Volume overload    Acute heart failure (HCC)    Muscular deconditioning    Umbilical hernia with obstruction but no gangrene    BMI 37.0-37.9, adult      Past Medical History:   Diagnosis Date    Alzheimer disease (HCC)     Asthma     Diabetes mellitus (HCC)     GERD (gastroesophageal reflux disease)     Hyperlipidemia     Hypertension       Past Surgical History:   Procedure Laterality Date    JOINT REPLACEMENT Bilateral     KNEE SURGERY      LAPAROTOMY N/A 3/28/2024    Procedure: LAPAROTOMY EXPLORATORY, ventral hernia repair no mesh, Extended hemicolectomy,;  Surgeon: Cedrick Lipscomb DO;  Location: [facility];  Service: General    TOE SURGERY        Family History   Problem Relation Age of Onset    Dementia Brother     Breast cancer Sister 75    Cancer Brother      Social History     Tobacco Use    Smoking status: Former     Current packs/day: 0.00     Average packs/day: 1 pack/day for 52.7 years (52.7 ttl pk-yrs)     Types: Cigarettes     Start date:      Quit date: 2017     Years since quittin.4    Smokeless tobacco: Never   Substance Use Topics    Alcohol use: Not  Currently     Allergies   Allergen Reactions    Penicillins Shortness Of Breath    Cephalexin Other (See Comments)     Reaction Date: 26May2011; unknown     Crestor [Rosuvastatin] Other (See Comments)     Reaction Date: 26May2011; unknown     Zithromax [Azithromycin] Other (See Comments)     Unknown          Current Outpatient Medications:     acetaminophen (TYLENOL) 325 mg tablet, Take 3 tablets (975 mg total) by mouth every 8 (eight) hours, Disp: 30 tablet, Rfl: 0    Advair Diskus 500-50 MCG/DOSE inhaler, , Disp: , Rfl:     albuterol (Albuterol Sulfate) (5 mg/mL) 0.5 % nebulizer solution, Take 2.5 mg by nebulization every 6 (six) hours as needed for wheezing, Disp: , Rfl:     ascorbic acid (VITAMIN C) 500 mg tablet, , Disp: , Rfl:     Ascorbic Acid 500 MG CAPS, , Disp: , Rfl:     aspirin (ECOTRIN LOW STRENGTH) 81 mg EC tablet, , Disp: , Rfl:     benzonatate (TESSALON) 200 MG capsule, Take 200 mg by mouth 3 (three) times a day as needed for cough, Disp: , Rfl:     bisacodyl (FLEET) 10 MG/30ML ENEM, Insert 10 mg into the rectum once, Disp: , Rfl:     Breo Ellipta 200-25 MCG/ACT inhaler, Inhale 1 puff, Disp: , Rfl:     busPIRone (BUSPAR) 5 mg tablet, Take 5 mg by mouth 2 (two) times a day, Disp: , Rfl:     cetirizine (ZyrTEC) 10 mg tablet, Take 10 mg by mouth daily, Disp: , Rfl:     Cholecalciferol (VITAMIN D3) 32290 units CAPS, Take 50,000 Units by mouth every 30 (thirty) days, Disp: , Rfl:     dulaglutide (Trulicity) 1.5 MG/0.5ML injection, Inject 1.5 mg weekly, Disp: 6 mL, Rfl: 1    escitalopram (LEXAPRO) 10 mg tablet, Take 10 mg by mouth daily, Disp: , Rfl:     esomeprazole (NexIUM) 20 mg capsule, Take 20 mg by mouth, Disp: , Rfl:     ferrous gluconate (FERGON) 324 mg tablet, Take 1 tablet (324 mg total) by mouth daily before breakfast, Disp: , Rfl: 0    fluticasone (FLONASE) 50 mcg/act nasal spray, 1 spray into each nostril daily, Disp: 1 Bottle, Rfl: 5    gabapentin (NEURONTIN) 300 mg capsule, Take 300 mg by  mouth 2 (two) times a day, Disp: , Rfl:     Glucose Blood (BL TEST STRIP PACK VI), Test, Disp: , Rfl:     glucose blood test strip, Test, Disp: , Rfl:     guaiFENesin (MUCINEX) 600 mg 12 hr tablet, Take 1,200 mg by mouth every 12 (twelve) hours, Disp: , Rfl:     HumaLOG KwikPen 100 units/mL injection pen, , Disp: , Rfl:     Insulin Glargine Solostar (Basaglar KwikPen) 100 UNIT/ML SOPN, Inject 56 units under the skin every 12 hours, Disp: , Rfl:     insulin lispro (HumaLOG) 100 units/mL injection, Inject 30 units before meals + scale, Disp: , Rfl:     ipratropium-albuterol (DUO-NEB) 0.5-2.5 mg/3 mL nebulizer solution, , Disp: , Rfl:     levalbuterol (XOPENEX) 1.25 mg/3 mL nebulizer solution, , Disp: , Rfl:     losartan (COZAAR) 25 mg tablet, Take 1 tablet (25 mg total) by mouth daily Do not start before December 12, 2023., Disp: , Rfl: 0    magnesium hydroxide (MILK OF MAGNESIA) 400 mg/5 mL oral suspension, Take by mouth daily as needed for constipation, Disp: , Rfl:     melatonin 3 mg, , Disp: , Rfl:     memantine (NAMENDA) 10 mg tablet, 10 mg 2 (two) times a day, Disp: , Rfl:     montelukast (SINGULAIR) 10 mg tablet, Take 10 mg by mouth daily at bedtime, Disp: , Rfl:     Omeprazole 20 MG TBEC, Take 20 mg by mouth daily, Disp: , Rfl:     ondansetron (ZOFRAN) 4 mg tablet, Take 4 mg by mouth, Disp: , Rfl:     simvastatin (ZOCOR) 40 mg tablet, Take 1 tablet by mouth daily at bedtime, Disp: , Rfl: 0    traZODone (DESYREL) 150 mg tablet, , Disp: , Rfl:     Trulicity 1.5 MG/0.5ML SOAJ, , Disp: , Rfl:     clonazePAM (KlonoPIN) 0.5 mg tablet, , Disp: , Rfl:     Melatonin 5 MG TABS, , Disp: , Rfl:   Review of Systems   Constitutional:  Positive for fatigue (occasional). Negative for activity change, appetite change and unexpected weight change.   HENT:  Negative for trouble swallowing.    Eyes:  Negative for visual disturbance.   Respiratory:  Negative for shortness of breath.    Cardiovascular:  Negative for chest pain and  "palpitations.   Gastrointestinal:  Negative for constipation and diarrhea.   Endocrine: Positive for polydipsia (occasional). Negative for polyuria.   Musculoskeletal:  Positive for arthralgias.   Skin:  Negative for wound.   Neurological:  Negative for numbness.   Psychiatric/Behavioral: Negative.         Physical Exam:  There is no height or weight on file to calculate BMI.  /78   Pulse 74   SpO2 92%    Wt Readings from Last 3 Encounters:   05/30/24 87.5 kg (193 lb)   04/05/24 87.7 kg (193 lb 5.5 oz)   10/14/23 98.9 kg (218 lb 0.6 oz)       Physical Exam  Vitals and nursing note reviewed.   Constitutional:       Appearance: She is well-developed.   HENT:      Head: Normocephalic.   Eyes:      General: No scleral icterus.  Cardiovascular:      Rate and Rhythm: Normal rate and regular rhythm.      Pulses:           Radial pulses are 2+ on the right side and 2+ on the left side.      Heart sounds: No murmur heard.  Pulmonary:      Effort: Pulmonary effort is normal. No respiratory distress.      Breath sounds: Normal breath sounds. No wheezing.   Musculoskeletal:      Cervical back: Neck supple.   Skin:     General: Skin is warm and dry.   Neurological:      Mental Status: She is alert.           Labs:   Lab Results   Component Value Date    HGBA1C 5.7 02/04/2025     Lab Results   Component Value Date    GLUCOSE 292 (H) 03/28/2024    CALCIUM 8.3 (L) 01/27/2025     (L) 09/21/2015    K 4.1 01/27/2025    CO2 30 01/27/2025     01/27/2025    BUN 27 (H) 01/27/2025    CREATININE 1.53 (H) 01/27/2025     No results found for: \"MICROALBUR\", \"MIHT84TGF\"  GFR, Calculated   Date Value Ref Range Status   11/10/2020 45 (L) >60 mL/min/1.73m2 Final     Comment:     mL/min per 1.73 square meters                                            Normal Function or Mild Renal    Disease (if clinically at risk):  >or=60  Moderately Decreased:                30-59  Severely Decreased:                  15-29  Renal Failure:    " "                     <15                                            -American GFR: multiply reported GFR by 1.16    Please note that the eGFR is based on the CKD-EPI calculation, and is not intended to be used for drug dosing.                                            Note: Calculated GFR may not be an accurate indicator of renal function if the patient's renal function is not in a steady state.     eGFRcr   Date Value Ref Range Status   01/27/2025 35 (L) >59 Final     No components found for: \"MALBCRER\"  Lab Results   Component Value Date    CHOL 124 09/21/2015    HDL 37 (L) 04/18/2016    TRIG 140 04/18/2016     Lab Results   Component Value Date    ALT 4 (L) 03/30/2024    AST 10 (L) 03/30/2024    ALKPHOS 50 03/30/2024    BILITOT 0.61 09/21/2015     Lab Results   Component Value Date    BXW1HTBYHSXW 2.078 07/07/2023    TSH 2.22 11/14/2023           Plan:    Diagnoses and all orders for this visit:    Type 2 diabetes mellitus with hyperglycemia, with long-term current use of insulin ([facility])  HGA1C 5.7%. Remained the Same.  Treatment regimen: A1C may not be reliable due to CKD.  Unable to make changes to treatment regimen if necessary as patient did not bring along a blood glucose log.  Front staff did try to call the nursing home to have a blood glucose log sent but was unable to reach anyone.  Did include in the after visit summary that a log has been requested.  Discussed intensive insulin regimen does increase risk for hypoglycemia. Episodes of hypoglycemia can lead to permanent disability and death.  Discussed risks/complications associated with uncontrolled diabetes.    Advised to adhere to diabetic diet, and recommended staying active/exercising routinely as tolerated  Keep carbohydrates consistent to limit blood glucose fluctuations.  Advised to call if blood sugars less than 70 mg/dl or over 300 mg/dl.   Check blood glucose 3+ times a day  Discussed symptoms and treatment of hypoglycemia.   Recommended " routine follow-up with podiatry and ophthalmology.   Ordered blood work to complete prior to next visit.  -     POCT hemoglobin A1c  -     Ambulatory referral to Podiatry; Future  -     Hemoglobin A1C; Future  -     Basic metabolic panel; Future  -     Fructosamine; Future  -     Ambulatory referral to Ophthalmology; Future    Essential hypertension  Blood pressure adequately controlled, continue current treatment    Mixed hyperlipidemia  On statin therapy  Managed by PCP    BMI 37.0-37.9, adult  Recommended healthy diet  She is on GLP-1 agonist which can help with weight loss    Chronic kidney disease, stage 3b (HCC)  GFR 35  CKD remaining relatively stable       Discussed with the patient diagnosis and treatment and all questions fully answered. She will call me if any problems arise.    Counseled patient on diagnostic results, prognosis, risk and benefit of treatment options, instruction for management, importance of treatment compliance, risk factor reduction and impressions.      Elena Mays PA-C

## 2025-02-04 NOTE — PATIENT INSTRUCTIONS
Diabetes: A1C 5.7%. Could be unreliable due to CKD. Next time, also check fructosamine level. BG log was not send with the patient. Please send BG log so adjustments can be made to treatment. Recommended following a diabetic diet. Check BG levels 4 times and more if needed. Recommended routine diabetic eye and foot exam. Complete labs prior to next visit.    Hypoglycemia instructions   Jud ALATORRE Kavita  2/4/2025  [id number]    Low Blood Sugar    Steps to treat low blood sugar.    1. Test blood sugar if you have symptoms of low blood sugar:   Low Blood Sugar Symptoms:  o Sweaty  o Dizzy  o Rapid heartbeat  o Shaky  o Bad mood  o Hungry      2. Treat blood sugar less than 70 with 15 grams of fast-acting carbohydrate:   Examples of 15 grams Fast-Acting Carbohydrate:  o 4 oz juice  o 4 oz regular soda  o 3-4 glucose tablets (chew)  o 3-4 hard candies (chew)          3.  Wait 15 minutes and test your blood sugar again     4. If blood sugar is less than 100, repeat steps 2-3.    5. When your blood sugar is 100 or more, eat a snack if it will be longer than one hour until your next meal. The snack should be 15 grams of carbohydrate and a protein:   Examples of snacks:  o ½ sandwich  o 6 crackers with cheese  o Piece of fruit with cheese or peanut butter  o 6 crackers with peanut butter    HTN: blood pressure adequately controlled today

## 2025-03-06 ENCOUNTER — OFFICE VISIT (OUTPATIENT)
Dept: SLEEP CENTER | Facility: CLINIC | Age: 75
End: 2025-03-06
Payer: MEDICARE

## 2025-03-06 VITALS — DIASTOLIC BLOOD PRESSURE: 78 MMHG | SYSTOLIC BLOOD PRESSURE: 124 MMHG

## 2025-03-06 DIAGNOSIS — J44.9 CHRONIC OBSTRUCTIVE PULMONARY DISEASE, UNSPECIFIED COPD TYPE (HCC): ICD-10-CM

## 2025-03-06 DIAGNOSIS — J96.12 CHRONIC HYPERCAPNIC RESPIRATORY FAILURE (HCC): ICD-10-CM

## 2025-03-06 DIAGNOSIS — G47.00 INSOMNIA, UNSPECIFIED TYPE: ICD-10-CM

## 2025-03-06 DIAGNOSIS — G47.33 OSA (OBSTRUCTIVE SLEEP APNEA): Primary | ICD-10-CM

## 2025-03-06 PROCEDURE — 99214 OFFICE O/P EST MOD 30 MIN: CPT | Performed by: INTERNAL MEDICINE

## 2025-03-06 PROCEDURE — G2211 COMPLEX E/M VISIT ADD ON: HCPCS | Performed by: INTERNAL MEDICINE

## 2025-03-06 NOTE — PROGRESS NOTES
Name: Jud Walls      : 1950      MRN: [id number]  Encounter Provider: Arash Ford MD  Encounter Date: 3/6/2025   Encounter department: [facility] SLEEP MEDICINE [place]  :  Assessment & Plan  GILDARDO (obstructive sleep apnea)  Split night 24 - not completed -recommend to nursing home staff to schedule study  Has excessive daytime sleepiness, hypercapnic respiratory failure    Recommend completing split-night with transcutaneous.  Patient is on 2 L chronically.  Low threshold to proceed to BiPAP to help with oxygenation and hypercapnia  Follow-up to be determined after sleep study  Orders:    Split Study w/ Transcutaneous; Future    Chronic hypercapnic respiratory failure (HCC)  Probably due to obesity and COPD.  Last ABG 2023    She will benefit from PAP therapy.  Split-night with transcutaneous  Orders:    Split Study w/ Transcutaneous; Future    Chronic obstructive pulmonary disease, unspecified COPD type (HCC)  Continue to follow with pulmonary  Severe obstruction.  50 pack year smoking history    Continue Breo  200 daily,  Albuterol as needed       Insomnia, unspecified type  Currently already on multiple psychotropic medications that can cause sedation Klonopin 0.5 mg, Lexapro 10 mg daily, gabapentin 300 mg twice daily, memantine 10 mg twice daily, trazodone 150 mg     I would not recommend adding any additional sleep aids to avoid worsening of hypercapnia           History of Present Illness   HPI   74 y.o. female with PMHx of heart failure (EF 55% 2020), asthma-COPD overlap syndrome, HTN, T2DM, Alzheimer's disease, CKD stage IIIb, HLD who presents for GILDARDO evaluation.     3/6/25 - Follow up - SNF never called to schedule sleep study.  Overall stable from a respiratory standpoint.  Uses a four point walker.  Breathing is stable.  No hospitalizations.  No exacerbations.  Breo and albuterol as needed.  She says that despite the medications that she is on she still has trouble  falling asleep sometimes    5/30/24 - Consult (Javon) - She is not sure why she is here. The doctors at the SNF ([facility]) where she is living recommended she come for an GILDARDO evaluation. There is supposedly a history of GILDARDO, but she herself is unaware of this.  She was also referred following a hospitalization in December 2023. [place] [address]    Pertinent Medications: Klonopin 0.5 mg, Lexapro 10 mg daily, gabapentin 300 mg twice daily, memantine 10 mg twice daily, trazodone 150 mg      Does wear oxygen at night; 2L via NC.                                        Review of Systems  Pertinent positives/negatives included in HPI and also as noted:       Objective   There were no vitals taken for this visit.       Physical Exam  Constitutional:       General: She is not in acute distress.     Appearance: Normal appearance. She is not ill-appearing, toxic-appearing or diaphoretic.      Comments: In wheelchair wearing oxygen at 2 L nasal cannula   HENT:      Mouth/Throat:      Mouth: Mucous membranes are moist.      Pharynx: Oropharynx is clear. No oropharyngeal exudate.   Eyes:      General: No scleral icterus.     Extraocular Movements: Extraocular movements intact.   Cardiovascular:      Rate and Rhythm: Normal rate.   Pulmonary:      Effort: Pulmonary effort is normal. No respiratory distress.      Breath sounds: Normal breath sounds.   Abdominal:      Tenderness: There is no guarding.   Musculoskeletal:         General: Normal range of motion.      Cervical back: Normal range of motion and neck supple. No rigidity.      Right lower leg: No edema.      Left lower leg: No edema.   Neurological:      General: No focal deficit present.      Mental Status: She is alert and oriented to person, place, and time.         Data  Lab Results   Component Value Date    HGB 9.5 (L) 04/04/2024    HCT 30.2 (L) 04/04/2024    MCV 93 04/04/2024      Lab Results   Component Value Date    GLUCOSE 292 (H) 03/28/2024    CALCIUM 8.3 (L)  01/27/2025     (L) 09/21/2015    K 4.1 01/27/2025    CO2 30 01/27/2025     01/27/2025    BUN 27 (H) 01/27/2025    CREATININE 1.53 (H) 01/27/2025     Lab Results   Component Value Date    IRON 26 (L) 04/02/2024    TIBC 130 (L) 04/02/2024    FERRITIN 316 (H) 04/02/2024     Lab Results   Component Value Date    AST 10 (L) 03/30/2024    ALT 4 (L) 03/30/2024

## 2025-03-11 ENCOUNTER — TELEPHONE (OUTPATIENT)
Age: 75
End: 2025-03-11

## 2025-03-11 NOTE — TELEPHONE ENCOUNTER
"Recd call from Patricia of [facility] [phone]. Pt has a VV on 9/16/25. They need to know what platform will be used for virtual appt so they are sure to have it available on their system.   Advised Patricia that per 9/11/24 LOV note:    \" ...this is a telemedicine visit and that the visit is being conducted through the Epic Embedded platform.\"    Patricia will forward info to supervisor. They will call back if any problems/questions.     "

## 2025-04-08 ENCOUNTER — HOSPITAL ENCOUNTER (OUTPATIENT)
Dept: CT IMAGING | Facility: HOSPITAL | Age: 75
Discharge: HOME | End: 2025-04-08
Attending: INTERNAL MEDICINE
Payer: MEDICARE

## 2025-04-08 DIAGNOSIS — R91.1 PULMONARY NODULE: ICD-10-CM

## 2025-04-08 PROCEDURE — 71250 CT THORAX DX C-: CPT

## 2025-05-21 ENCOUNTER — OFFICE VISIT (OUTPATIENT)
Dept: PULMONOLOGY | Facility: CLINIC | Age: 75
End: 2025-05-21
Payer: MEDICARE

## 2025-05-21 VITALS
TEMPERATURE: 97.4 F | DIASTOLIC BLOOD PRESSURE: 70 MMHG | SYSTOLIC BLOOD PRESSURE: 120 MMHG | OXYGEN SATURATION: 98 % | HEART RATE: 74 BPM

## 2025-05-21 DIAGNOSIS — J45.50 SEVERE PERSISTENT ASTHMA WITHOUT COMPLICATION: ICD-10-CM

## 2025-05-21 DIAGNOSIS — I50.9 ACUTE HEART FAILURE, UNSPECIFIED HEART FAILURE TYPE (HCC): ICD-10-CM

## 2025-05-21 DIAGNOSIS — J44.1 CHRONIC OBSTRUCTIVE PULMONARY DISEASE WITH (ACUTE) EXACERBATION (HCC): Primary | ICD-10-CM

## 2025-05-21 DIAGNOSIS — G47.33 OSA (OBSTRUCTIVE SLEEP APNEA): Chronic | ICD-10-CM

## 2025-05-21 DIAGNOSIS — R91.1 PULMONARY NODULE: ICD-10-CM

## 2025-05-21 PROBLEM — J44.89 ASTHMA-COPD OVERLAP SYNDROME (HCC): Status: RESOLVED | Noted: 2020-09-19 | Resolved: 2025-05-21

## 2025-05-21 PROCEDURE — 99214 OFFICE O/P EST MOD 30 MIN: CPT | Performed by: INTERNAL MEDICINE

## 2025-05-21 PROCEDURE — G2211 COMPLEX E/M VISIT ADD ON: HCPCS | Performed by: INTERNAL MEDICINE

## 2025-05-21 NOTE — PROGRESS NOTES
Follow Up - Pulmonary Medicine   Jud Walls 74 y.o. female MRN: [id number]    Jud Walls is a 74 y.o. female hx former smoker, COPD, chronic hypoxic respiratory failure on 2L NC, DM2, GILDARDO not on tx, HTN, presenting for post hospital follow up.     COPD  Chronic hypoxic respiratory failure  Former smoker  50PY, quit 7 years ago. Currently at baseline, though not very ambulatory at baseline  Spirometry with severe obstruction  No recent admissions/exacerbations since last visit in August    - Continue Advair and albuterol prn, due to multiple hospitalizations pt benefits from [facility]  - continue 2L O2    Abnormal CT  Lower lobe nodular opacities  Pulm Nodule  On CT lung screening, found incidentally, slightly increased Aug to Oct 2024 from 8 to 9mm. Stable on 3 month CTCurrenlty no fevers, no change in chronic dry cough or sputum production  - repeat CT chest in 6 months     GILDARDO  Chronic hypercapnia  - Likely hypercarbia due to COPD + GILDARDO + OHS, pt continues to decline any PAP therapy    Follow up 6 months    ______________________________________________________________  Interval Hx  In wheelchair, minimal ambulaiton  Mild chronic cough, no exacerbations requiring prednisone or admission  No changes in breathing. Taking advair and prn albuterol    HPI:    Jud Walls is a 74 y.o. female  hx former smoker, COPD, chronic hypoxic respiratory failure on 2L NC, DM2, GILDARDO not on tx, HTN, presenting for follow up.     Admitted in March for incarcerated hernia sp OR    In Lung ca screening in May had some bibasilar opacities, stable a month later  Pt says she may have had some URI sympotms at the itme    Currently no fevers, chills, no changes in chronic mild cough, dry  No chest pain or and has chronic FARIAS, poor ET      Review of Systems:  Review of Systems  Aside from what is mentioned in the HPI, the review of systems otherwise negative.    Current Medications:    Current Outpatient Medications:      acetaminophen (TYLENOL) 325 mg tablet, Take 3 tablets (975 mg total) by mouth every 8 (eight) hours, Disp: 30 tablet, Rfl: 0    albuterol (Albuterol Sulfate) (5 mg/mL) 0.5 % nebulizer solution, Take 2.5 mg by nebulization every 6 (six) hours as needed for wheezing, Disp: , Rfl:     ascorbic acid (VITAMIN C) 500 mg tablet, , Disp: , Rfl:     Ascorbic Acid 500 MG CAPS, , Disp: , Rfl:     aspirin (ECOTRIN LOW STRENGTH) 81 mg EC tablet, , Disp: , Rfl:     benzonatate (TESSALON) 200 MG capsule, Take 200 mg by mouth as needed in the morning and 200 mg as needed at noon and 200 mg as needed in the evening for cough., Disp: , Rfl:     bisacodyl (FLEET) 10 MG/30ML ENEM, Insert 10 mg into the rectum once, Disp: , Rfl:     Breo Ellipta 200-25 MCG/ACT inhaler, Inhale 1 puff, Disp: , Rfl:     busPIRone (BUSPAR) 5 mg tablet, Take 5 mg by mouth in the morning and 5 mg before bedtime., Disp: , Rfl:     cetirizine (ZyrTEC) 10 mg tablet, Take 10 mg by mouth in the morning., Disp: , Rfl:     Cholecalciferol (VITAMIN D3) 89542 units CAPS, Take 50,000 Units by mouth every 30 (thirty) days, Disp: , Rfl:     dulaglutide (Trulicity) 1.5 MG/0.5ML injection, Inject 1.5 mg weekly, Disp: 6 mL, Rfl: 1    escitalopram (LEXAPRO) 10 mg tablet, Take 10 mg by mouth in the morning., Disp: , Rfl:     esomeprazole (NexIUM) 20 mg capsule, Take 20 mg by mouth, Disp: , Rfl:     ferrous gluconate (FERGON) 324 mg tablet, Take 1 tablet (324 mg total) by mouth daily before breakfast, Disp: , Rfl: 0    fluticasone (FLONASE) 50 mcg/act nasal spray, 1 spray into each nostril daily, Disp: 1 Bottle, Rfl: 5    gabapentin (NEURONTIN) 300 mg capsule, Take 300 mg by mouth in the morning and 300 mg in the evening., Disp: , Rfl:     Glucose Blood (BL TEST STRIP PACK VI), Test, Disp: , Rfl:     glucose blood test strip, Test, Disp: , Rfl:     guaiFENesin (MUCINEX) 600 mg 12 hr tablet, Take 1,200 mg by mouth every 12 (twelve) hours, Disp: , Rfl:     HumaLOG KwikPen 100  units/mL injection pen, Inject 30 Units under the skin in the morning and 30 Units at noon and 30 Units in the evening. Inject with meals., Disp: , Rfl:     Insulin Glargine Solostar (Basaglar KwikPen) 100 UNIT/ML SOPN, Inject 52 units under the skin every 12 hours, Disp: , Rfl:     insulin lispro (HumaLOG) 100 units/mL injection, Inject 30 units before meals + scale, Disp: , Rfl:     ipratropium-albuterol (DUO-NEB) 0.5-2.5 mg/3 mL nebulizer solution, , Disp: , Rfl:     levalbuterol (XOPENEX) 1.25 mg/3 mL nebulizer solution, , Disp: , Rfl:     losartan (COZAAR) 25 mg tablet, Take 1 tablet (25 mg total) by mouth daily Do not start before December 12, 2023., Disp: , Rfl: 0    magnesium hydroxide (MILK OF MAGNESIA) 400 mg/5 mL oral suspension, Take by mouth daily as needed for constipation, Disp: , Rfl:     melatonin 3 mg, , Disp: , Rfl:     memantine (NAMENDA) 10 mg tablet, 10 mg in the morning and 10 mg in the evening., Disp: , Rfl:     montelukast (SINGULAIR) 10 mg tablet, Take 10 mg by mouth daily at bedtime, Disp: , Rfl:     Omeprazole 20 MG TBEC, Take 20 mg by mouth in the morning., Disp: , Rfl:     ondansetron (ZOFRAN) 4 mg tablet, Take 4 mg by mouth, Disp: , Rfl:     simvastatin (ZOCOR) 40 mg tablet, Take 1 tablet by mouth daily at bedtime, Disp: , Rfl: 0    traZODone (DESYREL) 150 mg tablet, , Disp: , Rfl:     Trulicity 1.5 MG/0.5ML SOAJ, , Disp: , Rfl:     clonazePAM (KlonoPIN) 0.5 mg tablet, , Disp: , Rfl:     Melatonin 5 MG TABS, , Disp: , Rfl:     Historical Information   Past Medical History:   Diagnosis Date    Alzheimer disease (HCC)     Asthma     Diabetes mellitus (HCC)     GERD (gastroesophageal reflux disease)     Hyperlipidemia     Hypertension      Past Surgical History:   Procedure Laterality Date    JOINT REPLACEMENT Bilateral     KNEE SURGERY      LAPAROTOMY N/A 3/28/2024    Procedure: LAPAROTOMY EXPLORATORY, ventral hernia repair no mesh, Extended hemicolectomy,;  Surgeon: Cedrick Malik  "DO Ruel;  Location: [facility];  Service: General    TOE SURGERY       Social History   Social History     Tobacco Use   Smoking Status Former    Current packs/day: 0.00    Average packs/day: 1 pack/day for 52.7 years (52.7 ttl pk-yrs)    Types: Cigarettes    Start date:     Quit date: 2017    Years since quittin.7   Smokeless Tobacco Never       Family History:   Family History   Problem Relation Age of Onset    Dementia Brother     Breast cancer Sister 75    Cancer Brother          PhysicalExamination:  Vitals:   /70 (BP Location: Left arm, Patient Position: Sitting, Cuff Size: Standard)   Pulse 74   Temp (!) 97.4 °F (36.3 °C) (Temporal)   SpO2 98%  on 2L    Appearance -- NAD, speaking full sentences  HEENT -- anicteric sclera, clear OP, MMM  Neck -- no JVD  Heart -- RRR, no murmurs  Lungs -- decreased breath sounds at b/l bases, no wheezing or rhonchi  Abdomen -- soft, NTND, +bs  Extremities -- WWP, no LE edema  Skin -- no rash  Neuro -- A&Ox3, wnl  Psych -- no obvious depression or hallucination        Diagnostic Data:  Labs:  I personally reviewed the most recent laboratory data pertinent to today's visit    Lab Results   Component Value Date    WBC 9.66 2024    HGB 9.5 (L) 2024    HCT 30.2 (L) 2024    MCV 93 2024     (L) 2024     Lab Results   Component Value Date    GLUCOSE 292 (H) 2024    CALCIUM 7.9 (L) 03/15/2025     (L) 2015    K 4.3 03/15/2025    CO2 27 03/15/2025     03/15/2025    BUN 34 (H) 03/15/2025    CREATININE 1.67 (H) 03/15/2025     No results found for: \"IGE\"  Lab Results   Component Value Date    ALT 4 (L) 2024    AST 10 (L) 2024    ALKPHOS 50 2024    BILITOT 0.61 2015       PFT results:  The most recent pulmonary function tests were reviewed.      Imaging:  I personally reviewed the images on the PAC system pertinent to today's visit  CT Chest 2024: 1. Multifocal opacities of right " middle and bilateral lower lobes likely represent infectious/inflammatory process.  2. Stable pulmonary nodules measuring up to 8 mm.    Ct Chest  6/2024: Persistent peribronchovascular nodular and groundglass opacities in the bilateral lower lobes representing ongoing or resolving pneumonia. Recommend follow-up chest CT in 3 months following appropriate treatment to ensure complete resolution.  Unchanged pulmonary nodules measuring up to 8 mm.    CT Chest 10/2024: Mild interval enlargement of the left upper lobe nodule now measuring 9 mm, measured 8 mm when measured in similar fashion on the previous study. Remaining index nodules without significant change.  Continued improvement of the peribronchial vascular nodular and groundglass opacities predominantly in the lower lobes. Continued scattered bandlike and dependent opacities could be due to atelectasis or scarring.  Mediastinal lymph nodes are present without pathological enlargement.        Emma Aguiar MD  [facility]

## 2025-05-22 ENCOUNTER — TELEPHONE (OUTPATIENT)
Age: 75
End: 2025-05-22

## 2025-05-22 NOTE — TELEPHONE ENCOUNTER
Patricia from [facility] calling requesting an order be placed for a CT, advised there is one In that was placed yesterday. No further questions.

## (undated) DEVICE — INTENDED FOR TISSUE SEPARATION, AND OTHER PROCEDURES THAT REQUIRE A SHARP SURGICAL BLADE TO PUNCTURE OR CUT.: Brand: BARD-PARKER SAFETY BLADES SIZE 10, STERILE

## (undated) DEVICE — SUT PDS II 1 CTX 36 IN Z371T

## (undated) DEVICE — TOWEL SURG XR DETECT GREEN STRL RFD

## (undated) DEVICE — 3M™ IOBAN™ 2 ANTIMICROBIAL INCISE DRAPE 6650EZ: Brand: IOBAN™ 2

## (undated) DEVICE — PROXIMATE RELOADABLE LINEAR STAPLER: Brand: PROXIMATE

## (undated) DEVICE — DRESSING MEPILEX AG BORDER POST-OP 4 X 12 IN

## (undated) DEVICE — SUT VICRYL 0 CT-1 27 IN J260H

## (undated) DEVICE — POOLE SUCTION HANDLE: Brand: CARDINAL HEALTH

## (undated) DEVICE — SUT PDS II 3-0 SH 27 IN Z316H

## (undated) DEVICE — INTENDED FOR TISSUE SEPARATION, AND OTHER PROCEDURES THAT REQUIRE A SHARP SURGICAL BLADE TO PUNCTURE OR CUT.: Brand: BARD-PARKER SAFETY BLADES SIZE 15, STERILE

## (undated) DEVICE — TRAY FOLEY 16FR URIMETER SURESTEP

## (undated) DEVICE — GLOVE SRG BIOGEL ORTHOPEDIC 8

## (undated) DEVICE — BETHLEHEM MAJOR GENERAL PACK: Brand: CARDINAL HEALTH

## (undated) DEVICE — SUT SILK 0 SH 30 IN K834H

## (undated) DEVICE — PLUMEPEN PRO 10FT

## (undated) DEVICE — SUT SILK 2-0 SH 30 IN K833H

## (undated) DEVICE — SUT VICRYL 2-0 REEL 54 IN J286G

## (undated) DEVICE — SUT VICRYL 0 REEL 54 IN J287G

## (undated) DEVICE — SUT SILK 3-0 18 IN A184H